# Patient Record
Sex: FEMALE | Race: WHITE | Employment: OTHER | URBAN - METROPOLITAN AREA
[De-identification: names, ages, dates, MRNs, and addresses within clinical notes are randomized per-mention and may not be internally consistent; named-entity substitution may affect disease eponyms.]

---

## 2023-07-05 LAB
ALBUMIN (G/DL) IN SER/PLAS: 3.9 G/DL (ref 3.4–5)
ANION GAP IN SER/PLAS: 14 MMOL/L (ref 10–20)
CALCIUM (MG/DL) IN SER/PLAS: 8.9 MG/DL (ref 8.6–10.3)
CARBON DIOXIDE, TOTAL (MMOL/L) IN SER/PLAS: 29 MMOL/L (ref 21–32)
CHLORIDE (MMOL/L) IN SER/PLAS: 97 MMOL/L (ref 98–107)
CREATININE (MG/DL) IN SER/PLAS: 1.11 MG/DL (ref 0.5–1.05)
GFR FEMALE: 47 ML/MIN/1.73M2
GLUCOSE (MG/DL) IN SER/PLAS: 89 MG/DL (ref 74–99)
NATRIURETIC PEPTIDE B (PG/ML) IN SER/PLAS: 215 PG/ML (ref 0–99)
PHOSPHATE (MG/DL) IN SER/PLAS: 3.9 MG/DL (ref 2.5–4.9)
POTASSIUM (MMOL/L) IN SER/PLAS: 4 MMOL/L (ref 3.5–5.3)
SODIUM (MMOL/L) IN SER/PLAS: 136 MMOL/L (ref 136–145)
UREA NITROGEN (MG/DL) IN SER/PLAS: 49 MG/DL (ref 6–23)

## 2023-08-25 LAB
6-ACETYLMORPHINE: <25 NG/ML
7-AMINOCLONAZEPAM: <25 NG/ML
ALPHA-HYDROXYALPRAZOLAM: <25 NG/ML
ALPHA-HYDROXYMIDAZOLAM: <25 NG/ML
ALPRAZOLAM: <25 NG/ML
AMPHETAMINE (PRESENCE) IN URINE BY SCREEN METHOD: ABNORMAL
BARBITURATES PRESENCE IN URINE BY SCREEN METHOD: ABNORMAL
CANNABINOIDS IN URINE BY SCREEN METHOD: ABNORMAL
CHLORDIAZEPOXIDE: <25 NG/ML
CLONAZEPAM: <25 NG/ML
COCAINE (PRESENCE) IN URINE BY SCREEN METHOD: ABNORMAL
CODEINE: <50 NG/ML
CREATINE, URINE FOR DRUG: 30.2 MG/DL
DIAZEPAM: <25 NG/ML
DRUG SCREEN COMMENT URINE: ABNORMAL
EDDP: <25 NG/ML
FENTANYL CONFIRMATION, URINE: <2.5 NG/ML
HYDROCODONE: <25 NG/ML
HYDROMORPHONE: <25 NG/ML
LORAZEPAM: <25 NG/ML
METHADONE CONFIRMATION,URINE: <25 NG/ML
MIDAZOLAM: <25 NG/ML
MORPHINE URINE: <50 NG/ML
NORDIAZEPAM: <25 NG/ML
NORFENTANYL: <2.5 NG/ML
NORHYDROCODONE: <25 NG/ML
NOROXYCODONE: <25 NG/ML
O-DESMETHYLTRAMADOL: >1000 NG/ML
OXAZEPAM: <25 NG/ML
OXYCODONE: <25 NG/ML
OXYMORPHONE: <25 NG/ML
PHENCYCLIDINE (PRESENCE) IN URINE BY SCREEN METHOD: ABNORMAL
TEMAZEPAM: <25 NG/ML
TRAMADOL: >1000 NG/ML
ZOLPIDEM METABOLITE (ZCA): <25 NG/ML
ZOLPIDEM: <25 NG/ML

## 2023-09-18 PROBLEM — M15.9 GENERALIZED OSTEOARTHROSIS, INVOLVING MULTIPLE SITES: Status: ACTIVE | Noted: 2023-09-18

## 2023-09-18 PROBLEM — E78.5 HYPERLIPIDEMIA: Status: ACTIVE | Noted: 2023-09-18

## 2023-09-18 PROBLEM — R07.89 CHEST PRESSURE: Status: ACTIVE | Noted: 2023-09-18

## 2023-09-18 PROBLEM — J44.9 COPD (CHRONIC OBSTRUCTIVE PULMONARY DISEASE) (MULTI): Status: ACTIVE | Noted: 2023-09-18

## 2023-09-18 PROBLEM — M86.9: Status: ACTIVE | Noted: 2023-09-18

## 2023-09-18 PROBLEM — I35.1 AORTIC VALVE REGURGITATION: Status: ACTIVE | Noted: 2023-09-18

## 2023-09-18 PROBLEM — I35.0 AORTIC VALVE STENOSIS: Status: ACTIVE | Noted: 2023-09-18

## 2023-09-18 PROBLEM — F41.9 ANXIETY: Status: ACTIVE | Noted: 2023-09-18

## 2023-09-18 PROBLEM — I10 HYPERTENSION: Status: ACTIVE | Noted: 2023-09-18

## 2023-09-18 PROBLEM — I50.9 CHF (CONGESTIVE HEART FAILURE) (MULTI): Status: ACTIVE | Noted: 2023-09-18

## 2023-09-18 PROBLEM — R06.00 DYSPNEA: Status: ACTIVE | Noted: 2023-09-18

## 2023-09-18 PROBLEM — I50.30 DIASTOLIC HEART FAILURE (MULTI): Status: ACTIVE | Noted: 2023-09-18

## 2023-09-18 PROBLEM — R09.02 HYPOXEMIA: Status: ACTIVE | Noted: 2023-09-18

## 2023-09-18 PROBLEM — M81.0 OSTEOPOROSIS: Status: ACTIVE | Noted: 2023-09-18

## 2023-09-18 PROBLEM — D86.9 SARCOIDOSIS: Status: ACTIVE | Noted: 2023-09-18

## 2023-09-18 PROBLEM — G62.9 PERIPHERAL NEUROPATHY: Status: ACTIVE | Noted: 2023-09-18

## 2023-09-18 PROBLEM — Z95.0 PACEMAKER: Status: ACTIVE | Noted: 2023-09-18

## 2023-09-18 PROBLEM — R07.9 CHEST PAIN: Status: ACTIVE | Noted: 2023-09-18

## 2023-09-18 PROBLEM — M00.9: Status: ACTIVE | Noted: 2023-09-18

## 2023-09-18 PROBLEM — R10.9 ABDOMINAL PAIN: Status: ACTIVE | Noted: 2023-09-18

## 2023-09-18 PROBLEM — M10.9 GOUT: Status: ACTIVE | Noted: 2023-09-18

## 2023-09-18 PROBLEM — I27.20 PULMONARY HYPERTENSION (MULTI): Status: ACTIVE | Noted: 2023-09-18

## 2023-09-18 PROBLEM — G89.29 CHRONIC PAIN: Status: ACTIVE | Noted: 2023-09-18

## 2023-09-18 PROBLEM — I25.10 CORONARY ARTERIOSCLEROSIS: Status: ACTIVE | Noted: 2023-09-18

## 2023-09-18 PROBLEM — K21.9 GERD (GASTROESOPHAGEAL REFLUX DISEASE): Status: ACTIVE | Noted: 2023-09-18

## 2023-09-18 PROBLEM — E53.8 VITAMIN B12 DEFICIENCY WITHOUT ANEMIA: Status: ACTIVE | Noted: 2023-09-18

## 2023-09-18 PROBLEM — F41.1 GAD (GENERALIZED ANXIETY DISORDER): Status: ACTIVE | Noted: 2023-09-18

## 2023-09-18 PROBLEM — J45.909 ASTHMA (HHS-HCC): Status: ACTIVE | Noted: 2023-09-18

## 2023-09-18 PROBLEM — I50.30 (HFPEF) HEART FAILURE WITH PRESERVED EJECTION FRACTION (MULTI): Status: ACTIVE | Noted: 2023-09-18

## 2023-09-18 PROBLEM — I42.2 HYPERTROPHIC CARDIOMYOPATHY (MULTI): Status: ACTIVE | Noted: 2023-09-18

## 2023-09-18 PROBLEM — D64.9 ANEMIA: Status: ACTIVE | Noted: 2023-09-18

## 2023-09-18 PROBLEM — E11.9 DIABETES MELLITUS (MULTI): Status: ACTIVE | Noted: 2023-09-18

## 2023-09-18 PROBLEM — E87.6 HYPOKALEMIA: Status: ACTIVE | Noted: 2023-09-18

## 2023-09-19 PROBLEM — G47.00 INSOMNIA: Status: ACTIVE | Noted: 2023-09-19

## 2023-09-19 PROBLEM — T84.54XA INFECTION OF PROSTHETIC LEFT KNEE JOINT (CMS-HCC): Status: ACTIVE | Noted: 2023-09-19

## 2023-09-19 PROBLEM — H11.32 SUBCONJUNCTIVAL HEMORRHAGE OF LEFT EYE: Status: ACTIVE | Noted: 2023-09-19

## 2023-09-19 PROBLEM — I49.5 TACHY-BRADY SYNDROME (MULTI): Status: ACTIVE | Noted: 2023-09-19

## 2023-09-19 PROBLEM — H61.21 IMPACTED CERUMEN OF RIGHT EAR: Status: ACTIVE | Noted: 2023-09-19

## 2023-09-19 PROBLEM — K59.00 CONSTIPATION: Status: ACTIVE | Noted: 2023-09-19

## 2023-09-19 PROBLEM — M79.671 PAIN OF RIGHT HEEL: Status: ACTIVE | Noted: 2023-09-19

## 2023-09-19 PROBLEM — H91.90 HEARING LOSS: Status: ACTIVE | Noted: 2023-09-19

## 2023-09-19 PROBLEM — M16.12 OSTEOARTHRITIS OF LEFT HIP: Status: ACTIVE | Noted: 2023-09-19

## 2023-09-19 PROBLEM — I44.7 LBBB (LEFT BUNDLE BRANCH BLOCK): Status: ACTIVE | Noted: 2023-09-19

## 2023-09-19 PROBLEM — R60.0 BILATERAL LOWER EXTREMITY EDEMA: Status: ACTIVE | Noted: 2023-09-19

## 2023-09-19 PROBLEM — Z74.09 MOBILITY IMPAIRED: Status: ACTIVE | Noted: 2023-09-19

## 2023-09-19 PROBLEM — E61.1 IRON DEFICIENCY: Status: ACTIVE | Noted: 2023-09-19

## 2023-09-19 PROBLEM — I44.2 COMPLETE HEART BLOCK (MULTI): Status: ACTIVE | Noted: 2023-09-19

## 2023-09-19 RX ORDER — NALOXONE HYDROCHLORIDE 4 MG/.1ML
4 SPRAY NASAL AS NEEDED
COMMUNITY

## 2023-09-19 RX ORDER — METOPROLOL TARTRATE 25 MG/1
1 TABLET, FILM COATED ORAL 2 TIMES DAILY
COMMUNITY
Start: 2022-01-10

## 2023-09-19 RX ORDER — LIDOCAINE 50 MG/G
1 PATCH TOPICAL NIGHTLY
Status: ON HOLD | COMMUNITY
End: 2024-02-17 | Stop reason: ALTCHOICE

## 2023-09-19 RX ORDER — PANTOPRAZOLE SODIUM 20 MG/1
1 TABLET, DELAYED RELEASE ORAL DAILY
COMMUNITY
Start: 2022-05-04 | End: 2023-10-23

## 2023-09-19 RX ORDER — ALLOPURINOL 100 MG/1
1 TABLET ORAL DAILY
COMMUNITY
Start: 2022-07-17 | End: 2023-11-06 | Stop reason: SDUPTHER

## 2023-09-19 RX ORDER — LACTULOSE 10 G/15ML
15-30 SOLUTION ORAL DAILY PRN
Status: ON HOLD | COMMUNITY
End: 2024-02-17 | Stop reason: ALTCHOICE

## 2023-09-19 RX ORDER — METHOCARBAMOL 500 MG/1
500 TABLET, FILM COATED ORAL 2 TIMES DAILY PRN
COMMUNITY
End: 2023-11-06 | Stop reason: ALTCHOICE

## 2023-09-19 RX ORDER — GABAPENTIN 100 MG/1
100 CAPSULE ORAL 2 TIMES DAILY
COMMUNITY
End: 2023-11-06 | Stop reason: SDUPTHER

## 2023-09-19 RX ORDER — POTASSIUM CHLORIDE 750 MG/1
TABLET, FILM COATED, EXTENDED RELEASE ORAL DAILY
COMMUNITY
Start: 2022-06-03 | End: 2023-11-06 | Stop reason: ALTCHOICE

## 2023-09-19 RX ORDER — FERROUS SULFATE 325(65) MG
65 TABLET, DELAYED RELEASE (ENTERIC COATED) ORAL DAILY
COMMUNITY
End: 2023-11-06 | Stop reason: SDUPTHER

## 2023-09-19 RX ORDER — CYANOCOBALAMIN 1000 UG/ML
1000 INJECTION, SOLUTION INTRAMUSCULAR; SUBCUTANEOUS
COMMUNITY
Start: 2021-11-09 | End: 2023-10-17

## 2023-09-19 RX ORDER — FLUTICASONE FUROATE AND VILANTEROL 200; 25 UG/1; UG/1
1 POWDER RESPIRATORY (INHALATION) DAILY
COMMUNITY
Start: 2022-07-07 | End: 2023-11-06 | Stop reason: SDUPTHER

## 2023-09-19 RX ORDER — ASPIRIN 81 MG
100 TABLET, DELAYED RELEASE (ENTERIC COATED) ORAL 2 TIMES DAILY PRN
Status: ON HOLD | COMMUNITY
End: 2024-02-17 | Stop reason: ALTCHOICE

## 2023-09-19 RX ORDER — SPIRONOLACTONE 25 MG/1
12.5 TABLET ORAL DAILY
COMMUNITY
End: 2023-11-02 | Stop reason: SDUPTHER

## 2023-09-19 RX ORDER — IPRATROPIUM BROMIDE AND ALBUTEROL SULFATE 2.5; .5 MG/3ML; MG/3ML
3 SOLUTION RESPIRATORY (INHALATION) 2 TIMES DAILY
Status: ON HOLD | COMMUNITY
End: 2024-02-17 | Stop reason: SDUPTHER

## 2023-09-19 RX ORDER — CEPHALEXIN 500 MG/1
500 CAPSULE ORAL EVERY 12 HOURS
COMMUNITY
Start: 2021-10-07 | End: 2024-03-21

## 2023-09-19 RX ORDER — GABAPENTIN 100 MG/1
1 CAPSULE ORAL 3 TIMES DAILY
COMMUNITY
End: 2023-11-06 | Stop reason: DRUGHIGH

## 2023-09-19 RX ORDER — HYDROXYZINE HYDROCHLORIDE 25 MG/1
25 TABLET, FILM COATED ORAL EVERY 8 HOURS PRN
COMMUNITY
End: 2023-11-06 | Stop reason: ALTCHOICE

## 2023-09-19 RX ORDER — IPRATROPIUM BROMIDE AND ALBUTEROL SULFATE 2.5; .5 MG/3ML; MG/3ML
3 SOLUTION RESPIRATORY (INHALATION) EVERY 4 HOURS
COMMUNITY
Start: 2022-07-18

## 2023-09-19 RX ORDER — TORSEMIDE 20 MG/1
TABLET ORAL DAILY
COMMUNITY
Start: 2022-06-03

## 2023-09-19 RX ORDER — BISACODYL 5 MG
10 TABLET, DELAYED RELEASE (ENTERIC COATED) ORAL NIGHTLY
Status: ON HOLD | COMMUNITY
End: 2024-02-17 | Stop reason: ALTCHOICE

## 2023-09-19 RX ORDER — DULOXETIN HYDROCHLORIDE 30 MG/1
30 CAPSULE, DELAYED RELEASE ORAL DAILY
COMMUNITY
End: 2024-03-22

## 2023-09-19 RX ORDER — ALENDRONATE SODIUM 35 MG/1
35 TABLET ORAL
COMMUNITY
Start: 2022-03-09 | End: 2024-01-17 | Stop reason: SDUPTHER

## 2023-09-19 RX ORDER — ATORVASTATIN CALCIUM 20 MG/1
1 TABLET, FILM COATED ORAL NIGHTLY
COMMUNITY
Start: 2022-03-03 | End: 2023-10-06 | Stop reason: SDUPTHER

## 2023-09-19 RX ORDER — TRAMADOL HYDROCHLORIDE 50 MG/1
1-2 TABLET ORAL 2 TIMES DAILY PRN
COMMUNITY
End: 2023-11-06 | Stop reason: SDUPTHER

## 2023-09-26 PROBLEM — M62.838 MUSCLE SPASM: Status: ACTIVE | Noted: 2023-09-26

## 2023-10-06 DIAGNOSIS — E78.5 HYPERLIPIDEMIA, UNSPECIFIED HYPERLIPIDEMIA TYPE: Primary | ICD-10-CM

## 2023-10-06 DIAGNOSIS — I50.32 CHRONIC HEART FAILURE WITH PRESERVED EJECTION FRACTION (MULTI): Primary | ICD-10-CM

## 2023-10-06 RX ORDER — ATORVASTATIN CALCIUM 20 MG/1
20 TABLET, FILM COATED ORAL NIGHTLY
Qty: 90 TABLET | Refills: 0 | Status: SHIPPED | OUTPATIENT
Start: 2023-10-06 | End: 2024-01-05

## 2023-10-06 RX ORDER — EMPAGLIFLOZIN 10 MG/1
10 TABLET, FILM COATED ORAL DAILY
Qty: 30 TABLET | Refills: 6 | Status: SHIPPED | OUTPATIENT
Start: 2023-10-06 | End: 2023-11-06 | Stop reason: SDUPTHER

## 2023-10-17 DIAGNOSIS — E53.8 VITAMIN B12 DEFICIENCY WITHOUT ANEMIA: ICD-10-CM

## 2023-10-17 RX ORDER — CYANOCOBALAMIN 1000 UG/ML
1000 INJECTION INTRAMUSCULAR; SUBCUTANEOUS
Qty: 3 ML | Refills: 3 | Status: SHIPPED | OUTPATIENT
Start: 2023-10-17

## 2023-10-19 ENCOUNTER — HOSPITAL ENCOUNTER (OUTPATIENT)
Dept: CARDIOLOGY | Facility: CLINIC | Age: 88
Discharge: HOME | End: 2023-10-19
Payer: MEDICARE

## 2023-10-19 DIAGNOSIS — Z95.0 PACEMAKER: ICD-10-CM

## 2023-10-19 DIAGNOSIS — I44.2 CHB (COMPLETE HEART BLOCK) (MULTI): Primary | ICD-10-CM

## 2023-10-19 DIAGNOSIS — I44.2 CHB (COMPLETE HEART BLOCK) (MULTI): ICD-10-CM

## 2023-10-19 PROCEDURE — 93290 INTERROG DEV EVAL ICPMS IP: CPT | Performed by: INTERNAL MEDICINE

## 2023-10-19 PROCEDURE — 93280 PM DEVICE PROGR EVAL DUAL: CPT

## 2023-10-19 PROCEDURE — 93280 PM DEVICE PROGR EVAL DUAL: CPT | Performed by: INTERNAL MEDICINE

## 2023-10-22 DIAGNOSIS — K21.9 GASTROESOPHAGEAL REFLUX DISEASE, UNSPECIFIED WHETHER ESOPHAGITIS PRESENT: ICD-10-CM

## 2023-10-23 RX ORDER — PANTOPRAZOLE SODIUM 20 MG/1
20 TABLET, DELAYED RELEASE ORAL DAILY
Qty: 90 TABLET | Refills: 3 | Status: SHIPPED | OUTPATIENT
Start: 2023-10-23

## 2023-11-02 DIAGNOSIS — I50.30 HEART FAILURE WITH PRESERVED LEFT VENTRICULAR FUNCTION (HFPEF) (MULTI): ICD-10-CM

## 2023-11-06 ENCOUNTER — OFFICE VISIT (OUTPATIENT)
Dept: GERIATRIC MEDICINE | Facility: CLINIC | Age: 88
End: 2023-11-06
Payer: MEDICARE

## 2023-11-06 DIAGNOSIS — G62.9 NEUROPATHY: ICD-10-CM

## 2023-11-06 DIAGNOSIS — J44.9 CHRONIC OBSTRUCTIVE PULMONARY DISEASE, UNSPECIFIED COPD TYPE (MULTI): ICD-10-CM

## 2023-11-06 DIAGNOSIS — D64.9 ANEMIA, UNSPECIFIED TYPE: ICD-10-CM

## 2023-11-06 DIAGNOSIS — G89.29 OTHER CHRONIC PAIN: ICD-10-CM

## 2023-11-06 DIAGNOSIS — I50.30 HEART FAILURE WITH PRESERVED EJECTION FRACTION, UNSPECIFIED HF CHRONICITY (MULTI): ICD-10-CM

## 2023-11-06 DIAGNOSIS — M10.9 GOUT, UNSPECIFIED CAUSE, UNSPECIFIED CHRONICITY, UNSPECIFIED SITE: ICD-10-CM

## 2023-11-06 DIAGNOSIS — I50.30 HEART FAILURE WITH PRESERVED LEFT VENTRICULAR FUNCTION (HFPEF) (MULTI): ICD-10-CM

## 2023-11-06 DIAGNOSIS — M16.12 OSTEOARTHRITIS OF LEFT HIP, UNSPECIFIED OSTEOARTHRITIS TYPE: Primary | ICD-10-CM

## 2023-11-06 DIAGNOSIS — G47.00 INSOMNIA, UNSPECIFIED TYPE: ICD-10-CM

## 2023-11-06 PROCEDURE — 1159F MED LIST DOCD IN RCRD: CPT | Performed by: NURSE PRACTITIONER

## 2023-11-06 PROCEDURE — 99349 HOME/RES VST EST MOD MDM 40: CPT | Performed by: NURSE PRACTITIONER

## 2023-11-06 PROCEDURE — 1125F AMNT PAIN NOTED PAIN PRSNT: CPT | Performed by: NURSE PRACTITIONER

## 2023-11-06 PROCEDURE — 1160F RVW MEDS BY RX/DR IN RCRD: CPT | Performed by: NURSE PRACTITIONER

## 2023-11-06 PROCEDURE — 3075F SYST BP GE 130 - 139MM HG: CPT | Performed by: NURSE PRACTITIONER

## 2023-11-06 PROCEDURE — 1036F TOBACCO NON-USER: CPT | Performed by: NURSE PRACTITIONER

## 2023-11-06 PROCEDURE — 3078F DIAST BP <80 MM HG: CPT | Performed by: NURSE PRACTITIONER

## 2023-11-06 RX ORDER — ALLOPURINOL 100 MG/1
100 TABLET ORAL DAILY
Qty: 90 TABLET | Refills: 3 | Status: SHIPPED | OUTPATIENT
Start: 2023-11-06

## 2023-11-06 RX ORDER — GABAPENTIN 100 MG/1
100 CAPSULE ORAL 2 TIMES DAILY
Qty: 180 CAPSULE | Refills: 3 | Status: SHIPPED | OUTPATIENT
Start: 2023-11-06

## 2023-11-06 RX ORDER — TRAMADOL HYDROCHLORIDE 50 MG/1
50 TABLET ORAL 3 TIMES DAILY PRN
Qty: 180 TABLET | Refills: 1 | Status: SHIPPED | OUTPATIENT
Start: 2023-11-06 | End: 2024-03-22

## 2023-11-06 RX ORDER — SPIRONOLACTONE 25 MG/1
12.5 TABLET ORAL DAILY
Qty: 45 TABLET | Refills: 3 | Status: SHIPPED | OUTPATIENT
Start: 2023-11-06

## 2023-11-06 RX ORDER — FLUTICASONE FUROATE AND VILANTEROL 100; 25 UG/1; UG/1
1 POWDER RESPIRATORY (INHALATION) DAILY
Qty: 3 EACH | Refills: 3 | Status: SHIPPED | OUTPATIENT
Start: 2023-11-06 | End: 2023-11-06 | Stop reason: ALTCHOICE

## 2023-11-06 RX ORDER — FERROUS SULFATE 325(65) MG
65 TABLET ORAL
Qty: 90 TABLET | Refills: 3 | Status: SHIPPED | OUTPATIENT
Start: 2023-11-06 | End: 2024-11-05

## 2023-11-06 RX ORDER — SPIRONOLACTONE 25 MG/1
12.5 TABLET ORAL DAILY
Qty: 90 TABLET | Refills: 3 | Status: SHIPPED | OUTPATIENT
Start: 2023-11-06 | End: 2023-11-06 | Stop reason: SDUPTHER

## 2023-11-06 RX ORDER — ALLOPURINOL 100 MG/1
100 TABLET ORAL DAILY
COMMUNITY
End: 2023-11-06 | Stop reason: SDUPTHER

## 2023-11-06 RX ORDER — ACETAMINOPHEN, DIPHENHYDRAMINE HCL, PHENYLEPHRINE HCL 325; 25; 5 MG/1; MG/1; MG/1
1 TABLET ORAL NIGHTLY PRN
Status: ON HOLD | COMMUNITY
End: 2024-02-17 | Stop reason: ALTCHOICE

## 2023-11-06 ASSESSMENT — ENCOUNTER SYMPTOMS
OCCASIONAL FEELINGS OF UNSTEADINESS: 0
DEPRESSION: 0
LOSS OF SENSATION IN FEET: 0

## 2023-11-06 ASSESSMENT — PAIN SCALES - GENERAL: PAINLEVEL: 6

## 2023-11-06 NOTE — PROGRESS NOTES
"Reason for visit:  HFpEF refill Spironolactone     Controlled Substance Agreement  on 2022 - will bring to next visit     Statement: Mrs. 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     Reason for visit: Leaving her home requires a considerable and taxing amount of effort, and maximum resources due to inability to walk.    HPI: Mrs. Dumont is being seen today in her home- today for follow up HFpEF for management of chronic active illnesses .    NP received a med refill request for Spironolactone.  Needed to make sure patient is not taking potassium supplements.  She states she does not- she took them the last few days when she ran out of Spironolactone.  Her son, Praveen was present for a portion of the visit and states this is true, although he feels that sometimes his mother mixes the medications up. Yet again, she becomes very upset and is able to tell you what she takes accurately.  Never runs out of meds early.     Both state that the pain in the left hip is poorly controlled, but patient never increased her Tramadol dosage to the maximum of 50 mg 2 tabs tid prn . Awakens her at night.  Rates 6/10 at the time of visit.  States the nerve pain is better controlled.  Current pain is described as \"aching, terrible \".      Requesting refills on several medications (noted)      Interval History: Patient has not been " hospitalized previously.   Current Diet: Regular-low sodium.   Appetite: good.   Food Consistency: Regular.   Liquids Consistency: thin.   Gait/Mobility: Manual wheelchair.   Bathing: needs assistance.   Dressing: performs independently.   Toileting: dependent.   Feeding: performs independently.   Personal Hygiene: performs independently.   Bowels: continent.   Bladder: incontinent.   Managing Finances: dependent, Praveen.   Shopping: dependent.   Managing Medications: performs independently.   Housework / Basic Home Maintenance: dependent.   Laundry: dependent.   Preparing Meals: dependent.    Falls Risk Screening:. NILAY has not fallen in the last 6 months. Her fall did not result in injury.   Home safety risk factors: none.   Advance directives:. Patient has living will. Patient has healthcare POA: Praveen.   Patient's DNR Status: DNR-CC Arrest. There is a form signed in the home.   Controlled Substance Monitoring Medication #1 Medication Name: Tramadol, Pill Count: Yes and Recent Lab Results: 8/22/2023 confirms use of medication.   Signs of Misuse: no.   Signs of Abuse: no.   Signs of Diversion: no.      Review of Systems  -Pain in left hip   -no chest pain or sob  -no fever or chills  -no diarrhea or constipation  -no dizziness  -Awakened in the middle of the night with pain in left hip- eases up with sitting upright  -no falls  -appetite is very good and drinks plenty of fluids  Physical Exam    Constitutional   General appearance: Alert, cooperative and in no acute distress.   Head and Face Examination/ inspection of hair and scalp: Normal.   Eyes   Inspection of eyes: Sclera and conjunctiva were normal.    Pupil exam: LILLIAN. Extraocular movements were intact.   Ears, Nose, Mouth, and Throat   Ears: Normal.    Oropharynx: Normal with moist mucus membranes, tongue midline, no PND, no erythema or enlargement of tonsils.    Hearing: Normal.     Lips, teeth, and gums: Normal.   Neck   Neck Exam: Appearance of the  neck was normal. No neck masses observed. No jugular vein distension.   Pulmonary   Respiratory assessment: No respiratory distress, normal respiratory rhythm and effort.    Auscultation of Lungs: Clear bilateral breath sounds. No rales, rhonchi or wheezes.   Cardiovascular   Auscultation of heart: Abnormal.  The heart rate was normal. A grade 2 systolic murmur was heard at the LLSB.    Pedal pulses: Regular rate. 2+ bilaterally.    Examination of extremities for edema and/or varicosities: Abnormal.  trace bilateral pedal edema. + pacemaker.   Chest   Chest: Symmetrical and normal appearance.   Abdomen   Abdominal Exam: No bruits normal bowel sounds, soft, non-tender, no abdominal masses palpated.    Liver and Spleen exam: No hepato-splenomegaly. wearing a diaper.   Genitourinary   Bladder: Normal on palpation. wearing a diaper.   Lymphatic   Palpation of lymph nodes in axillae: Normal.     Palpation of lymph nodes in neck: No cervical lymphadenopathy.    Palpation of lymph nodes in other areas: Normal.    Musculoskeletal   Digits and nails: Abnormal.     Inspection/palpation of joints: No joint swelling seen.    Range of motion: Abnormal.     Muscle strength/tone: Normal.  decrease ROM to left leg/hip. + Facial grimacing with ROM and repositioning in chair   Skin   Skin inspection: Skin dry, warm and intact. Normal skin color and pigmentation, normal skin turgor and no visible rash and no visible signs of pressure sores.   Neurologic   Cranial nerves: Nerves 2-12 were intact, no focal neuro defects.   Psychiatric   Judgment and insight: Intact and appropriate.    Orientation: Oriented to person, place, and time.    Mood and affect: Normal.    Recent and remote memory: Normal.                   [facility]  Outside Information  Results 4/13/2021   XR HIP BILAT 5V PEL/AP/LAT EACH HIP (Order [id number])      suggestion  Information displayed in this report may not trend or trigger automated decision support.      XR HIP BILAT 5V PEL/AP/LAT EACH HIP  Order: [id number]  Impression    IMPRESSION:    RIGHT TOTAL HIP PROSTHESIS WITHOUT EVIDENCE OF COMPLICATION.    SEVERE DEGENERATIVE CHANGES OF LEFT HIP.          : PSCB    Transcribe Date/Time: Apr 13 2021  1:52P    Dictated by : CHARISSA SHEEHAN MD    This examination was interpreted and the report reviewed and  electronically signed by:  CHARISSA SHEEHAN MD on Apr 13 2021  1:54PM  EST  Narrative    * * *Final Report* * *    DATE OF EXAM: Apr 13 2021 11:36AM      WH[facility]   [id number]  -  XR HIP JAMIE 5V PEL+ AP/LAT EA HIP  / ACCESSION #  [id number]    PROCEDURE REASON: multiple diagnoses        * * * * Physician Interpretation * * * *     HISTORY: 88-YEAR-OLD FEMALE WITH   Hip pain, bilateral Hip pain,  bilateral  .  PT STS SHE FELL A FEW WEEKS AGO / PAIN IN THE RIGHT HIP /  HIP WAS DONE ABOUT 11 YRS AGO    TECHNIQUE: XR HIP JAMIE 5V PEL+ AP/LAT EA HIP     Laterality:  BILATERAL     Number of different views (projections): 3 EACH    COMPARISON: None    RESULT:  Right hip: Status post cementless right total hip prosthesis in  near-anatomic alignment with no evidence of loosening, fracture or  dislocation.    Left hip: Severe superior joint space during with bone-on-bone contact.    Collar marginal osteophytes.  Symphysis pubis is intact.  Degenerative  changes sacral iliac joint and the visualized lumbar spine.  Exam End: 04/13/21 11:36    Specimen Collected: 04/13/21 11:36 Last Resulted: 04/13/21 13:56   Received From: [facility]  Result Received: 10/19/23 11:00       1. Osteoarthritis of left hip, unspecified osteoarthritis type  -patient reports excruciating pain in left hip.  Son confirms  -h/o of severe DJD , bone on bone contact  -Patient is not maximizing Tramadol dosage  -Change to Tramadol 50 mg 2- tablets TID prn along with Tylenol  mg -2 tablets tid prn  -continue topical pain cream as needed  - traMADol (Ultram) 50 mg tablet; Take 1 tablet (50 mg)  by mouth 3 times a day as needed for moderate pain (4 - 6) or severe pain (7 - 10) (pain). Take 2 tablets by mouth three times a day as needed at least 6-8  hours a part.  Dispense: 180 tablet; Refill: 1  -Oarrs report received and reviewd- no discrepancies  -Bring Controlled Substance Agreement to next visit ( renewal due)   -Urine for Opiate Compliance confirmed use of medication 8/2023.    2. Other chronic pain  Continue   - gabapentin (Neurontin) 100 mg capsule; Take 1 capsule (100 mg) by mouth 2 times a for neuropathy (nerve pain)   and Duloxetine 30 mg daily.         3. Heart failure with preserved left ventricular function (HFpEF) (CMS/[facility])    - spironolactone (Aldactone) 25 mg tablet; Take 0.5 tablets (12.5 mg) by mouth once daily. STOP TAKING POTASSIUM  Dispense: 45 tablet; Refill: 3  - empagliflozin (Jardiance) 10 mg; Take 1 tablet (10 mg) by mouth once daily.  Dispense: 90 tablet; Refill: 3  -Continue     4. Gout, unspecified cause, unspecified chronicity, unspecified site  -controlled, continue  - allopurinol (Zyloprim) 100 mg tablet; Take 1 tablet (100 mg) by mouth once daily.  Dispense: 90 tablet; Refill: 3    5. Anemia, unspecified type  -stable  - ferrous sulfate (FeroSuL) 325 (65 Fe) MG tablet; Take 1 tablet (65 mg of iron) by mouth once daily with a meal.  Dispense: 90 tablet; Refill: 3    6. Neuropathy  -improved , continue  - gabapentin (Neurontin) 100 mg capsule; Take 1 capsule (100 mg) by mouth 2 times a day. For neuropathy (nerve pain)  Dispense: 180 capsule; Refill: 3  -And Duloxetine  -This combination has worked best in relieving symptoms   -No s/s of adverse effects     7. Chronic obstructive pulmonary disease, unspecified COPD type (CMS/HCC)  Stable  Continue current medication regimen  Escript sent for refill of Breo Ellipta as requested    8. Insomnia, unspecified type  -sleep is interrupted due to pain   -control pain and follow up

## 2023-11-10 VITALS — SYSTOLIC BLOOD PRESSURE: 130 MMHG | HEART RATE: 70 BPM | DIASTOLIC BLOOD PRESSURE: 70 MMHG | RESPIRATION RATE: 20 BRPM

## 2023-11-10 NOTE — PATIENT INSTRUCTIONS
Dear Mrs. Dumont,    It was a pleasure seeing you today.    I am sorry that you are having so much pain in your left hip.     As we discussed, increase the Tramadol 50 mg - 2 tablets to three times a day as needed.    I have ordered the medication refills as you requested.  Let me know if you do not receive them.     I think your sleeping will improve if we can control the pain.     I will follow up with you in 4-6 weeks     Sincerely,  ADARSH Drake-BC

## 2023-12-22 NOTE — PROGRESS NOTES
Son called to give info for new pharmacy:  new pharmacy  Received: Yesterday  Ting Menon, APRN-CNP  Phone Number: [phone]     Hi    Son said pt pharmacy [facility] has closed and would like prescription to be sent to  [facility]  [address] [place] [address]  [phone]     (No requests for any meds were submitted at this time) Info noted  DB

## 2024-01-04 DIAGNOSIS — E78.5 HYPERLIPIDEMIA, UNSPECIFIED HYPERLIPIDEMIA TYPE: ICD-10-CM

## 2024-01-05 RX ORDER — ATORVASTATIN CALCIUM 20 MG/1
20 TABLET, FILM COATED ORAL NIGHTLY
Qty: 90 TABLET | Refills: 3 | Status: SHIPPED | OUTPATIENT
Start: 2024-01-05 | End: 2024-04-04

## 2024-01-16 ENCOUNTER — OFFICE VISIT (OUTPATIENT)
Dept: GERIATRIC MEDICINE | Facility: CLINIC | Age: 89
End: 2024-01-16
Payer: MEDICARE

## 2024-01-16 DIAGNOSIS — J45.909 ASTHMA, UNSPECIFIED ASTHMA SEVERITY, UNSPECIFIED WHETHER COMPLICATED, UNSPECIFIED WHETHER PERSISTENT (HHS-HCC): ICD-10-CM

## 2024-01-16 DIAGNOSIS — M62.838 MUSCLE SPASM: ICD-10-CM

## 2024-01-16 DIAGNOSIS — G89.29 OTHER CHRONIC PAIN: ICD-10-CM

## 2024-01-16 DIAGNOSIS — M81.0 OSTEOPOROSIS WITHOUT CURRENT PATHOLOGICAL FRACTURE, UNSPECIFIED OSTEOPOROSIS TYPE: Primary | ICD-10-CM

## 2024-01-16 PROCEDURE — 1159F MED LIST DOCD IN RCRD: CPT | Performed by: NURSE PRACTITIONER

## 2024-01-16 PROCEDURE — 99349 HOME/RES VST EST MOD MDM 40: CPT | Performed by: NURSE PRACTITIONER

## 2024-01-16 PROCEDURE — 1125F AMNT PAIN NOTED PAIN PRSNT: CPT | Performed by: NURSE PRACTITIONER

## 2024-01-16 PROCEDURE — 3078F DIAST BP <80 MM HG: CPT | Performed by: NURSE PRACTITIONER

## 2024-01-16 PROCEDURE — 1160F RVW MEDS BY RX/DR IN RCRD: CPT | Performed by: NURSE PRACTITIONER

## 2024-01-16 PROCEDURE — 1036F TOBACCO NON-USER: CPT | Performed by: NURSE PRACTITIONER

## 2024-01-16 PROCEDURE — 3077F SYST BP >= 140 MM HG: CPT | Performed by: NURSE PRACTITIONER

## 2024-01-16 ASSESSMENT — PAIN SCALES - GENERAL: PAINLEVEL: 6

## 2024-01-17 VITALS — HEART RATE: 60 BPM | SYSTOLIC BLOOD PRESSURE: 142 MMHG | DIASTOLIC BLOOD PRESSURE: 60 MMHG

## 2024-01-17 RX ORDER — ALENDRONATE SODIUM 35 MG/1
35 TABLET ORAL
Qty: 12 TABLET | Refills: 3 | Status: SHIPPED | OUTPATIENT
Start: 2024-01-17 | End: 2025-01-16

## 2024-01-17 RX ORDER — FLUTICASONE FUROATE AND VILANTEROL 200; 25 UG/1; UG/1
1 POWDER RESPIRATORY (INHALATION) DAILY
Qty: 3 EACH | Refills: 3 | Status: SHIPPED | OUTPATIENT
Start: 2024-01-17

## 2024-01-17 RX ORDER — BACLOFEN 5 MG/1
TABLET ORAL
Qty: 60 TABLET | Refills: 3 | Status: SHIPPED | OUTPATIENT
Start: 2024-01-17

## 2024-01-17 NOTE — PROGRESS NOTES
"Some elements may have been copied from prior note(s). The elements have been updated and reflect current evaluation, examination and decision making from today.          Reason for visit:  Follow up for pain in left hip and management for chronic active illnesses.      Statement: Mrs. 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      Reason for visit: Leaving her home requires a considerable and taxing amount of effort, and maximum resources due to inability to walk.     HPI: Mrs. Dumont is being seen today in her home- today for follow up for pain in left hip.   Continues to awaken her at night (supine) or if she is sitting in chair and leans back ( recline)  She still has not increased her Tramadol to 2 tabs tid prn- takes one tab with 2 Tylenol ES.   She does take Methocarbamol 500 mg bid ( will discontinue as it is not working). She reports the combination  Of Duloxetine 30 mg daily ( also prescribed for chronic pain ) and Gabapentin 100 mg bid \"has helped the neuropathy a lot. I would like to continue this\".     She is so happy that she is able to do more.  The other day she mopped the floor (sitting in wheelchair), but later experienced terrible low back pain.  She also has been washing dishes from wheelchair   She loves being able to do things for herself    Also, states she needs the 200 mcg dosing of the " Breo.          Interval History: Patient has not been hospitalized previously.   Current Diet: Regular-low sodium.   Appetite: good.   Food Consistency: Regular.   Liquids Consistency: thin.   Gait/Mobility: Manual wheelchair.   Bathing: needs assistance.   Dressing: performs independently.   Toileting: dependent.   Feeding: performs independently.   Personal Hygiene: performs independently.   Bowels: continent.   Bladder: incontinent.   Managing Finances: dependent, Praveen.   Shopping: dependent.   Managing Medications: performs independently.   Housework / Basic Home Maintenance: dependent.   Laundry: dependent.   Preparing Meals: dependent.    Falls Risk Screening:. NILAY has not fallen in the last 6 months. Her fall did not result in injury.   Home safety risk factors: none.   Advance directives:. Patient has living will. Patient has healthcare POA: Praveen.   Patient's DNR Status: DNR-CC Arrest. There is a form signed in the home.   Controlled Substance Monitoring Medication #1 Medication Name: Tramadol, Pill Count: Yes and Recent Lab Results: 8/22/2023 confirms use of medication.   Signs of Misuse: no.   Signs of Abuse: no.   Signs of Diversion: no.      ROS:  Appetite is good   Sleeps well  Moving bowels well  No falls/ED visits or hospitalizations  No cough, cold or flu-like symptoms  No dysuria  No chest pain or sob  No headaches     Physical Exam     Constitutional   General appearance: Alert, cooperative and in no acute distress.   Head and Face Examination/ inspection of hair and scalp: Normal.   Eyes   Inspection of eyes: Sclera and conjunctiva were normal.    Pupil exam: LILLIAN. Extraocular movements were intact.   Ears, Nose, Mouth, and Throat   Ears: Normal.    Oropharynx: Normal with moist mucus membranes, tongue midline, no PND, no erythema or enlargement of tonsils.    Hearing: Slightly [place]      Lips, teeth, and gums: Normal.   Neck   Neck Exam: Appearance of the neck was normal. No neck masses  observed. No jugular vein distension.   Pulmonary   Respiratory assessment: No respiratory distress, normal respiratory rhythm and effort.    Auscultation of Lungs: Clear bilateral breath sounds. No rales, rhonchi or wheezes.   Cardiovascular   Auscultation of heart: Abnormal.  The heart rate was normal. A grade 2 systolic murmur was heard at the LLSB.    Pedal pulses: Regular rate. 2+ bilaterally.    Examination of extremities for edema and/or varicosities: Abnormal.  trace bilateral pedal edema. + pacemaker.   Chest   Chest: Symmetrical and normal appearance.   Abdomen   Abdominal Exam: No bruits normal bowel sounds, soft, non-tender, no abdominal masses palpated.    Liver and Spleen exam: No hepato-splenomegaly. wearing a diaper.   Genitourinary   Bladder: Normal on palpation. wearing a diaper.   Lymphatic   Palpation of lymph nodes in axillae: Normal.     Palpation of lymph nodes in neck: No cervical lymphadenopathy.    Palpation of lymph nodes in other areas: Normal.    Musculoskeletal   Digits and nails: Abnormal.     Inspection/palpation of joints: No joint swelling seen.    Range of motion: Abnormal.     Muscle strength/tone: Normal.  decrease ROM to left leg/hip. + Facial grimacing with ROM and repositioning in chair -unchanged  No CVA tenderness   Skin   Skin inspection: Skin dry (particularly to BLEs) , warm and intact. Normal skin color and pigmentation, normal skin turgor and no visible rash and no visible signs of pressure sores.   Neurologic   Cranial nerves: Nerves 2-12 were intact, no focal neuro defects, except for mild hearing deficit   Psychiatric   Judgment and insight: Intact and appropriate.    Orientation: Oriented to person, place, and time.    Mood and affect: Normal.    Recent and remote memory: Normal.        Chemistry    Lab Results   Component Value Date/Time     07/05/2023 1347    K 4.0 07/05/2023 1347    CL 97 (L) 07/05/2023 1347    CO2 29 07/05/2023 1347    BUN 49 (H)  "07/05/2023 1347    CREATININE 1.11 (H) 07/05/2023 1347    Lab Results   Component Value Date/Time    CALCIUM 8.9 07/05/2023 1347    ALKPHOS 62 02/16/2023 1527    AST 24 02/16/2023 1527    ALT 17 02/16/2023 1527    BILITOT 0.4 02/16/2023 1527        Lab Results   Component Value Date    WBC 7.8 02/16/2023    HGB 11.0 (L) 02/16/2023    HCT 35.7 (L) 02/16/2023    MCV 90 02/16/2023     02/16/2023     Lab Results   Component Value Date    CHOL 128 09/09/2022     Lab Results   Component Value Date    HDL 43.2 09/09/2022     No results found for: \"LDLCALC\"  Lab Results   Component Value Date    TRIG 149 09/09/2022     No components found for: \"CHOLHDL\"    Lab Results   Component Value Date    HGBA1C 5.4 09/09/2022      Contains abnormal data OPIATE/OPIOID/BENZO PRESCRIPTION COMPLIANCE  Order: [id number]  Status: Final result       Visible to patient: No (inaccessible in [facility])    0 Result Notes      Component  Ref Range & Units 5 mo ago   DRUG SCREEN COMMENT URINE SEE BELOW   Comment: Drug screen results are presumptive and should not be used to assess  compliance with prescribed medication. Definitive confirmatory drug testing  has been added to this sample for any positive screen result and will be  reported separately.  .  Toxicology screening results are reported qualitatively. The concentration  must be greater than or equal to the cutoff to be reported as positive. The  concentration at which the screening test can detect an individual drug or  metabolite varies. The absence of expected drug(s) and/or drug metabolite(s)  may indicate non-compliance, inappropriate timing of specimen collection  relative to drug administration, poor drug absorption, diluted/adulterated  urine, or limitations of testing. For medical purposes only; not valid for  forensic use.  .  Interpretive questions should be directed to the laboratory medical  directors.     Creatine, Urine  mg/dL 30.2   Comment: A urine creatinine result " >= 20 mg/dL is considered valid without suspicion  of dilution. Samples with results below this range will automatically reflex  to specific gravity testing to verify specimen integrity.   Amphetamine Screen, Urine  NEGATIVE PRESUMPTIVE NEGATIVE   Comment:  CUTOFF LEVEL:  500 NG/ML   Cross-reactivity has been reported with high concentrations   of the following drugs: buproprion, chloroquine, chlorpromazine,   ephedrine, mephentermine, fenfluramine, phentermine,   phenylpropanolamine, pseudoephedrine, and propranolol.   Barbiturate Screen, Urine  NEGATIVE PRESUMPTIVE NEGATIVE   Comment:  CUTOFF LEVEL: 200 NG/ML   Cannabinoid Screen, Urine  NEGATIVE PRESUMPTIVE NEGATIVE   Comment:  CUTOFF LEVEL: 50 NG/ML   Cocaine Screen, Urine  NEGATIVE PRESUMPTIVE NEGATIVE   Comment:  CUTOFF LEVEL: 150 NG/ML   PCP Screen, Urine  NEGATIVE PRESUMPTIVE NEGATIVE   Comment:  CUTOFF LEVEL:  25 NG/ML   Cross-reactivity has been reported with dextromethorphan.   7-Aminoclonazepam  Cutoff <25 ng/mL <25   Alpha-Hydroxyalprazolam  Cutoff <25 ng/mL <25   Alpha-Hydroxymidazolam  Cutoff <25 ng/mL <25   Alprazolam  Cutoff <25 ng/mL <25   Chlordiazepoxide  Cutoff <25 ng/mL <25   Clonazepam  Cutoff <25 ng/mL <25   Diazepam  Cutoff <25 ng/mL <25   Lorazepam  Cutoff <25 ng/mL <25   Midazolam  Cutoff <25 ng/mL <25   Nordiazepam  Cutoff <25 ng/mL <25   Oxazepam  Cutoff <25 ng/mL <25   Temazepam  Cutoff <25 ng/mL <25   Comment:  The performance characteristics of the Benzodiazepine Confirmation,   Urine has been validated by the individual  laboratory site   where testing is performed. It has not been cleared or approved   by the FDA. However the FDA has determined that such clearance   or approval is not necessary. Our Laboratory is certified under   the Clinical Laboratory Improvement Amendments of 1988 (CLIA)   as qualified to perform high complexity clinical laboratory testing.   Zolpidem  Cutoff <25 ng/mL <25   Zolpidem Metabolite (ZCA)  Cutoff  <25 ng/mL <25   Comment:  The performance characteristics of the Zolpidem Confirmation,   Urine has been validated by the individual  laboratory site   where testing is performed. It has not been cleared or approved   by the FDA. However the FDA has determined that such clearance   or approval is not necessary. Our Laboratory is certified under   the Clinical Laboratory Improvement Amendments of 1988 (CLIA)   as qualified to perform high complexity clinical laboratory testing.   6-Acetylmorphine  Cutoff <25 ng/mL <25   Codeine  Cutoff <50 ng/mL <50   Hydrocodone  Cutoff <25 ng/mL <25   Hydromorphone  Cutoff <25 ng/mL <25   Morphine Urine  Cutoff <50 ng/mL <50   Norhydrocodone  Cutoff <25 ng/mL <25   Noroxycodone  Cutoff <25 ng/mL <25   Oxycodone  Cutoff <25 ng/mL <25   Oxymorphone  Cutoff <25 ng/mL <25   Comment:  The performance characteristics of the Opiate Confirmation,   Urine has been validated by the individual  laboratory site   where testing is performed. It has not been cleared or approved   by the FDA. However the FDA has determined that such clearance   or approval is not necessary. Our Laboratory is certified under   the Clinical Laboratory Improvement Amendments of 1988 (CLIA)   as qualified to perform high complexity clinical laboratory testing.   Tramadol  Cutoff <50 ng/mL >1000 Abnormal    Comment: Consistent with use of a drug containing tramadol, such as Ultram.   O-Desmethyltramadol  Cutoff <50 ng/mL >1000 Abnormal    Comment: Tramadol metabolite; consistent with use of a drug containing tramadol, such  as Ultram.   The performance characteristics of the Tramadol Confirmation, Urine   has been validated by the individual  laboratory site where   testing is performed. It has not been cleared or approved by the   FDA. However the FDA has determined that such clearance or approval   is not necessary. Our Laboratory is certified under the Clinical   Laboratory Improvement Amendments of 1988  (CLIA) as qualified   to perform high complexity clinical laboratory testing.   Fentanyl  Cutoff<2.5 ng/mL <2.5   Norfentanyl  Cutoff<2.5 ng/mL <2.5   Comment:  The performance characteristics of the Fentanyl Confirmation,   Urine has been validated by the individual  laboratory site   where testing is performed. It has not been cleared or approved   by the FDA. However the FDA has determined that such clearance   or approval is not necessary. Our Laboratory is certified under   the Clinical Laboratory Improvement Amendments of 1988 (CLIA)   as qualified to perform high complexity clinical laboratory testing.   METHADONE CONFIRMATION,URINE  Cutoff <25 ng/mL <25   EDDP  Cutoff <25 ng/mL <25   Comment:  The performance characteristics of the Methadone Confirmation,   Urine has been validated by the individual  laboratory site   where testing is performed. It has not been cleared or approved   by the FDA. However the FDA has determined that such clearance   or approval is not necessary. Our Laboratory is certified under   the Clinical Laboratory Improvement Amendments of 1988 (CLIA)   as qualified to perform high complexity clinical laboratory testing.   Resulting Agency [facility]              Specimen Collected: 08/22/23 14:00 Last Resulted: 08/25/23 19:57        Lab Flowsheet        Order Details        View Encounter        Lab and Collection Details        Routing        Result History     View All Conversations on this Encounter           Result Care Coordination      Patient Communication     Add Comments   Not seen Back to Top           Result Report    OPIATE/OPIOID/BENZO PRESCRIPTION COMPLIANCE (Order #[id number]) on 8/22/23     Lab Component SmartPhrase Guide    OPIATE/OPIOID/BENZO PRESCRIPTION COMPLIANCE (Order #[id number]) on 8/22/23       1. Osteoporosis without current pathological fracture, unspecified osteoporosis type/Chronic Pain in left hip    - alendronate (Fosamax) 35 mg tablet; Take 1 tablet (35 mg)  by mouth every 7 days.  Dispense: 12 tablet; Refill: 3  -Continue Tramadol - still has not increased to 2 tablets tid prn- may do so    2. Asthma, unspecified asthma severity, unspecified whether complicated, unspecified whether persistent    - fluticasone furoate-vilanteroL (Breo Ellipta) 200-25 mcg/dose inhaler; Inhale 1 puff once daily. Rinse mouth after each use  Dispense: 3 each; Refill: 3    3. Muscle spasm    - baclofen (Lioresal) 5 mg tablet; Take one tablet every night for spasm in left hip. If needed, may take one tablet during the day. STOP METHOCARBAMOL -due to ineffective-  Dispense: 60 tablet; Refill: 3  -Verbal and written communication provided at the time of visit  -Patient verbalizes a clear understanding      Stable  Routine follow up in in 4-6 weeks

## 2024-01-20 NOTE — PATIENT INSTRUCTIONS
Dear Mrs. Dumont,    It was a pleasure seeing you today.    I will follow up with you in 4-6 weeks    Sincerely,    Miladys Menon, MSN, APRN-BC

## 2024-02-16 ENCOUNTER — HOSPITAL ENCOUNTER (INPATIENT)
Facility: HOSPITAL | Age: 89
LOS: 1 days | Discharge: HOME | DRG: 309 | End: 2024-02-17
Attending: EMERGENCY MEDICINE | Admitting: INTERNAL MEDICINE
Payer: MEDICARE

## 2024-02-16 ENCOUNTER — CLINICAL SUPPORT (OUTPATIENT)
Dept: EMERGENCY MEDICINE | Facility: HOSPITAL | Age: 89
DRG: 309 | End: 2024-02-16
Payer: MEDICARE

## 2024-02-16 ENCOUNTER — APPOINTMENT (OUTPATIENT)
Dept: RADIOLOGY | Facility: HOSPITAL | Age: 89
DRG: 309 | End: 2024-02-16
Payer: MEDICARE

## 2024-02-16 ENCOUNTER — APPOINTMENT (OUTPATIENT)
Dept: CARDIOLOGY | Facility: HOSPITAL | Age: 89
DRG: 309 | End: 2024-02-16
Payer: MEDICARE

## 2024-02-16 DIAGNOSIS — R07.9 CHEST PAIN, UNSPECIFIED TYPE: Primary | ICD-10-CM

## 2024-02-16 DIAGNOSIS — R07.89 MUSCULAR CHEST PAIN: ICD-10-CM

## 2024-02-16 DIAGNOSIS — Z95.0 PACEMAKER: ICD-10-CM

## 2024-02-16 DIAGNOSIS — I44.2 COMPLETE HEART BLOCK (MULTI): ICD-10-CM

## 2024-02-16 DIAGNOSIS — I35.0 AORTIC VALVE STENOSIS, ETIOLOGY OF CARDIAC VALVE DISEASE UNSPECIFIED: ICD-10-CM

## 2024-02-16 LAB
ALBUMIN SERPL BCP-MCNC: 3.9 G/DL (ref 3.4–5)
ALP SERPL-CCNC: 68 U/L (ref 33–136)
ALT SERPL W P-5'-P-CCNC: 13 U/L (ref 7–45)
ANION GAP BLDV CALCULATED.4IONS-SCNC: 11 MMOL/L (ref 10–25)
ANION GAP SERPL CALC-SCNC: 16 MMOL/L (ref 10–20)
APPEARANCE UR: CLEAR
AST SERPL W P-5'-P-CCNC: 18 U/L (ref 9–39)
BASE EXCESS BLDV CALC-SCNC: 5.3 MMOL/L (ref -2–3)
BASOPHILS # BLD AUTO: 0.01 X10*3/UL (ref 0–0.1)
BASOPHILS NFR BLD AUTO: 0.1 %
BILIRUB SERPL-MCNC: 0.4 MG/DL (ref 0–1.2)
BILIRUB UR STRIP.AUTO-MCNC: NEGATIVE MG/DL
BNP SERPL-MCNC: 232 PG/ML (ref 0–99)
BODY TEMPERATURE: 37 DEGREES CELSIUS
BUN SERPL-MCNC: 64 MG/DL (ref 6–23)
CA-I BLDV-SCNC: 1.16 MMOL/L (ref 1.1–1.33)
CALCIUM SERPL-MCNC: 9.6 MG/DL (ref 8.6–10.6)
CARDIAC TROPONIN I PNL SERPL HS: 21 NG/L (ref 0–34)
CARDIAC TROPONIN I PNL SERPL HS: 25 NG/L (ref 0–34)
CHLORIDE BLDV-SCNC: 99 MMOL/L (ref 98–107)
CHLORIDE SERPL-SCNC: 100 MMOL/L (ref 98–107)
CO2 SERPL-SCNC: 28 MMOL/L (ref 21–32)
COLOR UR: ABNORMAL
CREAT SERPL-MCNC: 1.46 MG/DL (ref 0.5–1.05)
EGFRCR SERPLBLD CKD-EPI 2021: 34 ML/MIN/1.73M*2
EOSINOPHIL # BLD AUTO: 0.16 X10*3/UL (ref 0–0.4)
EOSINOPHIL NFR BLD AUTO: 2.4 %
ERYTHROCYTE [DISTWIDTH] IN BLOOD BY AUTOMATED COUNT: 15.3 % (ref 11.5–14.5)
FLUAV RNA RESP QL NAA+PROBE: NOT DETECTED
FLUBV RNA RESP QL NAA+PROBE: NOT DETECTED
GLUCOSE BLDV-MCNC: 141 MG/DL (ref 74–99)
GLUCOSE SERPL-MCNC: 130 MG/DL (ref 74–99)
GLUCOSE UR STRIP.AUTO-MCNC: ABNORMAL MG/DL
HCO3 BLDV-SCNC: 31.8 MMOL/L (ref 22–26)
HCT VFR BLD AUTO: 34.1 % (ref 36–46)
HCT VFR BLD EST: 34 % (ref 36–46)
HGB BLD-MCNC: 11 G/DL (ref 12–16)
HGB BLDV-MCNC: 11.4 G/DL (ref 12–16)
HOLD SPECIMEN: NORMAL
IMM GRANULOCYTES # BLD AUTO: 0.03 X10*3/UL (ref 0–0.5)
IMM GRANULOCYTES NFR BLD AUTO: 0.4 % (ref 0–0.9)
INHALED O2 CONCENTRATION: 21 %
KETONES UR STRIP.AUTO-MCNC: NEGATIVE MG/DL
LACTATE BLDV-SCNC: 2.3 MMOL/L (ref 0.4–2)
LEUKOCYTE ESTERASE UR QL STRIP.AUTO: NEGATIVE
LYMPHOCYTES # BLD AUTO: 0.64 X10*3/UL (ref 0.8–3)
LYMPHOCYTES NFR BLD AUTO: 9.5 %
MAGNESIUM SERPL-MCNC: 2.6 MG/DL (ref 1.6–2.4)
MCH RBC QN AUTO: 29.3 PG (ref 26–34)
MCHC RBC AUTO-ENTMCNC: 32.3 G/DL (ref 32–36)
MCV RBC AUTO: 91 FL (ref 80–100)
MONOCYTES # BLD AUTO: 0.61 X10*3/UL (ref 0.05–0.8)
MONOCYTES NFR BLD AUTO: 9 %
NEUTROPHILS # BLD AUTO: 5.3 X10*3/UL (ref 1.6–5.5)
NEUTROPHILS NFR BLD AUTO: 78.6 %
NITRITE UR QL STRIP.AUTO: NEGATIVE
NRBC BLD-RTO: 0 /100 WBCS (ref 0–0)
OXYHGB MFR BLDV: 54.5 % (ref 45–75)
PCO2 BLDV: 55 MM HG (ref 41–51)
PH BLDV: 7.37 PH (ref 7.33–7.43)
PH UR STRIP.AUTO: 6.5 [PH]
PLATELET # BLD AUTO: 216 X10*3/UL (ref 150–450)
PO2 BLDV: 34 MM HG (ref 35–45)
POTASSIUM BLDV-SCNC: 4.8 MMOL/L (ref 3.5–5.3)
POTASSIUM SERPL-SCNC: 4.6 MMOL/L (ref 3.5–5.3)
PROT SERPL-MCNC: 7.2 G/DL (ref 6.4–8.2)
PROT UR STRIP.AUTO-MCNC: NEGATIVE MG/DL
RBC # BLD AUTO: 3.75 X10*6/UL (ref 4–5.2)
RBC # UR STRIP.AUTO: NEGATIVE /UL
SAO2 % BLDV: 56 % (ref 45–75)
SARS-COV-2 RNA RESP QL NAA+PROBE: NOT DETECTED
SODIUM BLDV-SCNC: 137 MMOL/L (ref 136–145)
SODIUM SERPL-SCNC: 139 MMOL/L (ref 136–145)
SP GR UR STRIP.AUTO: 1.01
UROBILINOGEN UR STRIP.AUTO-MCNC: NORMAL MG/DL
WBC # BLD AUTO: 6.8 X10*3/UL (ref 4.4–11.3)

## 2024-02-16 PROCEDURE — 71045 X-RAY EXAM CHEST 1 VIEW: CPT

## 2024-02-16 PROCEDURE — 93005 ELECTROCARDIOGRAM TRACING: CPT

## 2024-02-16 PROCEDURE — 76604 US EXAM CHEST: CPT | Performed by: STUDENT IN AN ORGANIZED HEALTH CARE EDUCATION/TRAINING PROGRAM

## 2024-02-16 PROCEDURE — 85025 COMPLETE CBC W/AUTO DIFF WBC: CPT

## 2024-02-16 PROCEDURE — 71250 CT THORAX DX C-: CPT | Mod: FOREIGN READ | Performed by: RADIOLOGY

## 2024-02-16 PROCEDURE — 83735 ASSAY OF MAGNESIUM: CPT

## 2024-02-16 PROCEDURE — 1100000001 HC PRIVATE ROOM DAILY

## 2024-02-16 PROCEDURE — 99285 EMERGENCY DEPT VISIT HI MDM: CPT | Performed by: EMERGENCY MEDICINE

## 2024-02-16 PROCEDURE — 93280 PM DEVICE PROGR EVAL DUAL: CPT | Performed by: INTERNAL MEDICINE

## 2024-02-16 PROCEDURE — 36415 COLL VENOUS BLD VENIPUNCTURE: CPT

## 2024-02-16 PROCEDURE — 83880 ASSAY OF NATRIURETIC PEPTIDE: CPT

## 2024-02-16 PROCEDURE — 71250 CT THORAX DX C-: CPT

## 2024-02-16 PROCEDURE — 84484 ASSAY OF TROPONIN QUANT: CPT

## 2024-02-16 PROCEDURE — 87636 SARSCOV2 & INF A&B AMP PRB: CPT

## 2024-02-16 PROCEDURE — 93308 TTE F-UP OR LMTD: CPT | Performed by: STUDENT IN AN ORGANIZED HEALTH CARE EDUCATION/TRAINING PROGRAM

## 2024-02-16 PROCEDURE — 93290 INTERROG DEV EVAL ICPMS IP: CPT | Performed by: INTERNAL MEDICINE

## 2024-02-16 PROCEDURE — 93280 PM DEVICE PROGR EVAL DUAL: CPT

## 2024-02-16 PROCEDURE — 71045 X-RAY EXAM CHEST 1 VIEW: CPT | Mod: FOREIGN READ | Performed by: RADIOLOGY

## 2024-02-16 PROCEDURE — 93010 ELECTROCARDIOGRAM REPORT: CPT | Performed by: EMERGENCY MEDICINE

## 2024-02-16 PROCEDURE — 84132 ASSAY OF SERUM POTASSIUM: CPT

## 2024-02-16 PROCEDURE — 81003 URINALYSIS AUTO W/O SCOPE: CPT

## 2024-02-16 PROCEDURE — 99285 EMERGENCY DEPT VISIT HI MDM: CPT | Mod: 25 | Performed by: EMERGENCY MEDICINE

## 2024-02-16 RX ORDER — POLYETHYLENE GLYCOL 3350 17 G/17G
17 POWDER, FOR SOLUTION ORAL DAILY
Status: DISCONTINUED | OUTPATIENT
Start: 2024-02-17 | End: 2024-02-17 | Stop reason: HOSPADM

## 2024-02-16 RX ORDER — ENOXAPARIN SODIUM 100 MG/ML
30 INJECTION SUBCUTANEOUS EVERY 24 HOURS
Status: DISCONTINUED | OUTPATIENT
Start: 2024-02-16 | End: 2024-02-17 | Stop reason: HOSPADM

## 2024-02-16 RX ORDER — TRAMADOL HYDROCHLORIDE 50 MG/1
50 TABLET ORAL EVERY 12 HOURS PRN
Status: DISCONTINUED | OUTPATIENT
Start: 2024-02-16 | End: 2024-02-17 | Stop reason: HOSPADM

## 2024-02-16 SDOH — SOCIAL STABILITY: SOCIAL INSECURITY: ARE YOU OR HAVE YOU BEEN THREATENED OR ABUSED PHYSICALLY, EMOTIONALLY, OR SEXUALLY BY ANYONE?: NO

## 2024-02-16 SDOH — SOCIAL STABILITY: SOCIAL INSECURITY: DOES ANYONE TRY TO KEEP YOU FROM HAVING/CONTACTING OTHER FRIENDS OR DOING THINGS OUTSIDE YOUR HOME?: NO

## 2024-02-16 SDOH — SOCIAL STABILITY: SOCIAL INSECURITY: ARE THERE ANY APPARENT SIGNS OF INJURIES/BEHAVIORS THAT COULD BE RELATED TO ABUSE/NEGLECT?: NO

## 2024-02-16 SDOH — SOCIAL STABILITY: SOCIAL INSECURITY: HAVE YOU HAD THOUGHTS OF HARMING ANYONE ELSE?: YES

## 2024-02-16 SDOH — SOCIAL STABILITY: SOCIAL INSECURITY: DO YOU FEEL UNSAFE GOING BACK TO THE PLACE WHERE YOU ARE LIVING?: NO

## 2024-02-16 SDOH — SOCIAL STABILITY: SOCIAL INSECURITY: DO YOU FEEL ANYONE HAS EXPLOITED OR TAKEN ADVANTAGE OF YOU FINANCIALLY OR OF YOUR PERSONAL PROPERTY?: NO

## 2024-02-16 SDOH — SOCIAL STABILITY: SOCIAL INSECURITY: HAS ANYONE EVER THREATENED TO HURT YOUR FAMILY OR YOUR PETS?: NO

## 2024-02-16 SDOH — SOCIAL STABILITY: SOCIAL INSECURITY: ABUSE: ADULT

## 2024-02-16 ASSESSMENT — LIFESTYLE VARIABLES
SUBSTANCE_ABUSE_PAST_12_MONTHS: NO
AUDIT-C TOTAL SCORE: 0
HOW OFTEN DO YOU HAVE A DRINK CONTAINING ALCOHOL: NEVER
HAVE PEOPLE ANNOYED YOU BY CRITICIZING YOUR DRINKING: NO
HAVE YOU EVER FELT YOU SHOULD CUT DOWN ON YOUR DRINKING: NO
HOW MANY STANDARD DRINKS CONTAINING ALCOHOL DO YOU HAVE ON A TYPICAL DAY: PATIENT DOES NOT DRINK
PRESCIPTION_ABUSE_PAST_12_MONTHS: NO
EVER FELT BAD OR GUILTY ABOUT YOUR DRINKING: NO
EVER HAD A DRINK FIRST THING IN THE MORNING TO STEADY YOUR NERVES TO GET RID OF A HANGOVER: NO
HOW OFTEN DO YOU HAVE 6 OR MORE DRINKS ON ONE OCCASION: NEVER
SKIP TO QUESTIONS 9-10: 1
AUDIT-C TOTAL SCORE: 0

## 2024-02-16 ASSESSMENT — COGNITIVE AND FUNCTIONAL STATUS - GENERAL
STANDING UP FROM CHAIR USING ARMS: A LOT
TOILETING: A LITTLE
TURNING FROM BACK TO SIDE WHILE IN FLAT BAD: A LITTLE
PATIENT BASELINE BEDBOUND: NO
MOBILITY SCORE: 13
WALKING IN HOSPITAL ROOM: TOTAL
MOVING TO AND FROM BED TO CHAIR: A LITTLE
MOVING FROM LYING ON BACK TO SITTING ON SIDE OF FLAT BED WITH BEDRAILS: A LITTLE
DRESSING REGULAR LOWER BODY CLOTHING: A LITTLE
DAILY ACTIVITIY SCORE: 20
CLIMB 3 TO 5 STEPS WITH RAILING: TOTAL
DRESSING REGULAR UPPER BODY CLOTHING: A LITTLE
HELP NEEDED FOR BATHING: A LITTLE

## 2024-02-16 ASSESSMENT — PATIENT HEALTH QUESTIONNAIRE - PHQ9
1. LITTLE INTEREST OR PLEASURE IN DOING THINGS: NOT AT ALL
SUM OF ALL RESPONSES TO PHQ9 QUESTIONS 1 & 2: 0
2. FEELING DOWN, DEPRESSED OR HOPELESS: NOT AT ALL

## 2024-02-16 ASSESSMENT — PAIN SCALES - GENERAL
PAINLEVEL_OUTOF10: 3
PAINLEVEL_OUTOF10: 3

## 2024-02-16 ASSESSMENT — ACTIVITIES OF DAILY LIVING (ADL)
FEEDING YOURSELF: INDEPENDENT
TOILETING: NEEDS ASSISTANCE
GROOMING: INDEPENDENT
HEARING - LEFT EAR: FUNCTIONAL
ADEQUATE_TO_COMPLETE_ADL: YES
WALKS IN HOME: NEEDS ASSISTANCE
JUDGMENT_ADEQUATE_SAFELY_COMPLETE_DAILY_ACTIVITIES: YES
DRESSING YOURSELF: INDEPENDENT
PATIENT'S MEMORY ADEQUATE TO SAFELY COMPLETE DAILY ACTIVITIES?: YES
ASSISTIVE_DEVICE: WHEELCHAIR
HEARING - RIGHT EAR: FUNCTIONAL
BATHING: NEEDS ASSISTANCE

## 2024-02-16 ASSESSMENT — COLUMBIA-SUICIDE SEVERITY RATING SCALE - C-SSRS
2. HAVE YOU ACTUALLY HAD ANY THOUGHTS OF KILLING YOURSELF?: NO
1. IN THE PAST MONTH, HAVE YOU WISHED YOU WERE DEAD OR WISHED YOU COULD GO TO SLEEP AND NOT WAKE UP?: NO
6. HAVE YOU EVER DONE ANYTHING, STARTED TO DO ANYTHING, OR PREPARED TO DO ANYTHING TO END YOUR LIFE?: NO

## 2024-02-16 ASSESSMENT — PAIN - FUNCTIONAL ASSESSMENT: PAIN_FUNCTIONAL_ASSESSMENT: 0-10

## 2024-02-16 NOTE — ED PROCEDURE NOTE
Procedure    Performed by: Demian Quiroz MD  Authorized by: Gian Chandler MD  Cardiac Indications: chest pain                Procedure: Cardiac Ultrasound    Findings:   Views: parasternal long, parasternal short, apical four and subxiphoid  The pericardial space was visualized and was NEGATIVE for a significant pericardial effusion.  Activity: Ventricular contractions were visualized.  LV: LV systolic function was NORMAL.  RV: RV size was NORMAL.    Impression:  Cardiac: The focused cardiac ultrasound exam was NORMAL.  Procedure: Thoracic Ultrasound    Findings:  R Lung Sliding: The RIGHT chest was evaluated and LUNG SLIDING was visualized.  L Lung Sliding: The LEFT chest was evaluated and LUNG SLIDING was visualized.  R Effusion: The RIGHT chest was evaluated and there was NO PLEURAL EFFUSION.  L Effusion: The LEFT chest was evaluated and there was NO PLEURAL EFFUSION.  A-lines: The RIGHT chest was evaluated and multiple A-LINES were visualized  B-lines: The RIGHT chest was evaluated and there were NO B-LINES visualized  R Consolidation: The RIGHT chest was evaluated and there was NO RIGHT CONSOLIDATION.  L Consolidation: The LEFT chest was evaluated and there was NO LEFT CONSOLIDATION.    Impression:  Thorax: The focused thoracic ultrasound exam was NORMAL.    Comments: Cardiac ultrasound normal as per above criteria though enlarged LA and ventricular hypertrophy appreciated. Concern for diastolic dysfunction.               Demian Quiroz MD  02/16/24 2681

## 2024-02-16 NOTE — PROGRESS NOTES
Emergency Medicine Transition of Care Note.    I received Alayna Dumont in signout from Dr. Singer.  Please see the previous ED provider note for all HPI, PE and MDM up to the time of signout at 1500. This is in addition to the primary record.    In brief Alayna Dumont is an 89 y.o. female with a past medical history of CHF, aortic valve stenosis s/p repair, HTN, HLD, peripheral neuropathy, COPD/asthma/sarcoidosis, LETICIA, carotid vascular disease, and gout who presents to the ED for left-sided chest pain.  Patient's workup significant for unremarkable EKG and initial troponin, mild anemia of 11.0, negative viral testing, unremarkable UA, unremarkable BMP, and CXR that notes an enlarged heart with interstitial edema.    Chief Complaint   Patient presents with    Chest Pain     At the time of signout we were awaiting: Imaging and labs    ED Course as of 02/16/24 2123 Fri Feb 16, 2024   1424 Patient's pacemaker was unable to be identified on chest x-ray.  I called pacemaker rep who reports that she has an old [employer] pacemaker with [employer] leads that are not on conditional.  Rep is on his way to interrogate the pacemaker. [RR]   1426 BNP elevated but appears to be chronically so [RR]      ED Course User Index  [RR] Kaia Singer MD         Diagnoses as of 02/16/24 2123   Chest pain, unspecified type       Medical Decision Making  Repeat troponin was 25.  The previous provider consulted the pacemaker nurse to evaluate the patient's device.  Upon my review of the preliminary result from a cardiac device check, I did not note anything acute.  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  Concern for possible cardiac versus pacemaker etiology of the patient's pain.  Patient has a heart score of 4 given age and risk factors.  Discussed ED findings and admission with the patient.  Patient stated understanding and agreement with the plan.   Discussed patient presentation with admitting team.  Patient admitted to medicine in stable condition.    Final diagnoses:   [Z95.0] Pacemaker   [I44.2] Complete heart block (CMS/HCC)           Procedure  Procedures    Patient presentation and plan discussed with attending physician.    Nitin Rosales MD

## 2024-02-16 NOTE — ED TRIAGE NOTES
"Pt presents to the ED via EMS for the complaint of chest pain. Pt stats chest pain started last night and persisted into this morning. Pt has hx of valve replacement and pacemaker placed in 2013, HTN, and CHF (compliant with medications). Pt states \"I think my pacemaker is failing.\" Pt states pain is worse in arms with movement. Pt also c/o weakness and \"not feeling right.\" Pt neurologically intact, AxOx3.   "

## 2024-02-16 NOTE — ED PROVIDER NOTES
CC: Chest Pain     HPI:   Patient is a 89-year-old female with past medical history of hypertension, hyperlipidemia, chronic iron deficiency anemia, gout, AVR and pacemaker, diverticulosis presenting due to left-sided sharp chest pain that started 1 day ago.  Patient reports that she has pain over her pacemaker with point tenderness that worsens with abduction of her left arm.  Patient denies radiation of the pain to her back, upper neck or to her abdomen.  She has not lost consciousness or had any noted syncopal episodes.  She denies any blood in her stool and denies any nausea or vomiting.  Patient has good pulses in her upper and lower extremities and denies any history of dissection.  Patient denies any diaphoresis with her episodes and is unsure what her pacemaker brand is.  Patient denies any fevers or chills, weakness or numbness in her upper or lower extremities, changes to her medication regimen and is compliant with her medicines.  Patient denies any history of complications with her pacemaker and reports that it was placed in 2013.    Limitations to History: none  Additional History Obtained from: patient alone    PMHx/PSHx:  Per HPI.   - has no past medical history on file.  - has a past surgical history that includes Other surgical history (11/10/2022); Other surgical history (11/10/2022); Other surgical history (11/10/2022); and Other surgical history (11/10/2022).    Social History:  - Tobacco:  reports that she has never smoked. She has never used smokeless tobacco.   - Alcohol:  reports no history of alcohol use.   - Drugs:  reports no history of drug use.     Medications: Reviewed in EMR.     Allergies:  Patient has no known allergies.    ???????????????????????????????????????????????????????????????  Triage Vitals:  T 36.7 °C (98.1 °F)  HR 60  /53  RR 12  O2 98 % None (Room air)    Physical Exam  Vitals and nursing note reviewed.   Constitutional:       General: She is not in acute  distress.     Appearance: She is well-developed. She is obese.   HENT:      Head: Normocephalic and atraumatic.      Mouth/Throat:      Mouth: Mucous membranes are dry.      Pharynx: Oropharynx is clear.   Eyes:      Conjunctiva/sclera: Conjunctivae normal.   Cardiovascular:      Rate and Rhythm: Normal rate and regular rhythm.      Heart sounds: No murmur heard.     Comments: Tenderness over pacemaker worse with abduction of L arm  Pulmonary:      Effort: Pulmonary effort is normal. No respiratory distress.      Breath sounds: Normal breath sounds. No wheezing, rhonchi or rales.   Abdominal:      General: There is no distension.      Palpations: Abdomen is soft.      Tenderness: There is no abdominal tenderness. There is no guarding or rebound.   Musculoskeletal:         General: No swelling or tenderness.      Cervical back: Neck supple.   Skin:     General: Skin is warm and dry.      Capillary Refill: Capillary refill takes less than 2 seconds.   Neurological:      Mental Status: She is alert and oriented to person, place, and time.      Sensory: No sensory deficit.      Motor: No weakness.      Gait: Gait normal.   Psychiatric:         Mood and Affect: Mood normal.       ???????????????????????????????????????????????????????????????  EKG (per my interpretation):  Ventricular paced rhythm with a rate of 61.  No IL elongation with a QRS complex of 220 consistent with a paced rhythm.  No acute ST elevations or depressions noted when compared to previous EKGs.  No DONNA by sgarbosa criteria.    ED Course  ED Course as of 02/18/24 1219   Fri Feb 16, 2024 1424 Patient's pacemaker was unable to be identified on chest x-ray.  I called pacemaker rep who reports that she has an old [employer] pacemaker with [employer] leads that are not on conditional.  Rep is on his way to interrogate the pacemaker. [RR]   1426 BNP elevated but appears to be chronically so [RR]      ED Course User Index  [RR] Kaia Singer MD          Diagnoses as of 02/18/24 1219   Chest pain, unspecified type       Medical Decision Making:  Patient is a 89-year-old female with complex past medical history most significant for aortic valve replacement and pacemaker presenting due to acute left-sided chest pain for the past day.  Differentials considered but not limited to low battery on pacemaker, pacemaker malfunction, arrhythmia, electrolyte abnormality, ACS, dissection, pneumonia pneumonia, pneumothorax.    Based on patient's history physical examination initial story was concerning for possible dissection however patient remained stable and appeared to have focal tenderness over her pacemaker.  Workup was largely negative other than a mildly elevated creatinine at 1.4.  Patient's initial troponin was 25 and a BNP was 232.  Device rep was called to help interrogate pacemaker and patient has a [employer] leads with a [employer] pacemaker from 2013.  There was 1-1/2 years left on the battery when interrogated back in October 2023.  Patient is still pending the rest of her workup but will likely need admission for heart score 4.  Patient is pending CT chest  without contrast to evaluate for seroma vs abscess around pacemaker given lower concern for dissection over the course of ED stay. CT chest was ordered to patient care was handed off to oncoming ED provider in hemodynamically stable condition.    External records reviewed: recent inpatient, clinic, and prior ED notes  Diagnostic imaging independently reviewed/interpreted by me (as reflected in MDM) includes: cxr, non CT  Social Determinants Affecting Care: None identified  Discussion of management with other providers: attending  Prescription Drug Consideration: none  Escalation of Care: possible admission    Impression:   Chest pain  pacemaker    Disposition: Handoff      Procedures ? SmartLinks last updated 2/16/2024 3:19 PM     Kaia Singer  PGY-2 Emergency Medicine  [place]  [facility]     Kaia Singer MD  Resident  02/18/24 2135

## 2024-02-17 ENCOUNTER — APPOINTMENT (OUTPATIENT)
Dept: CARDIOLOGY | Facility: HOSPITAL | Age: 89
DRG: 309 | End: 2024-02-17
Payer: MEDICARE

## 2024-02-17 VITALS
TEMPERATURE: 97.7 F | HEIGHT: 63 IN | DIASTOLIC BLOOD PRESSURE: 47 MMHG | SYSTOLIC BLOOD PRESSURE: 159 MMHG | HEART RATE: 60 BPM | WEIGHT: 220.68 LBS | RESPIRATION RATE: 16 BRPM | OXYGEN SATURATION: 92 % | BODY MASS INDEX: 39.1 KG/M2

## 2024-02-17 LAB
ANION GAP SERPL CALC-SCNC: 14 MMOL/L (ref 10–20)
AORTIC VALVE MEAN GRADIENT: 22 MMHG
AORTIC VALVE PEAK VELOCITY: 3.18 M/S
ATRIAL RATE: 357 BPM
AV PEAK GRADIENT: 40.4 MMHG
AVA (PEAK VEL): 1.36 CM2
AVA (VTI): 1.35 CM2
BUN SERPL-MCNC: 54 MG/DL (ref 6–23)
CALCIUM SERPL-MCNC: 9.9 MG/DL (ref 8.6–10.6)
CHLORIDE SERPL-SCNC: 99 MMOL/L (ref 98–107)
CO2 SERPL-SCNC: 30 MMOL/L (ref 21–32)
CREAT SERPL-MCNC: 1.4 MG/DL (ref 0.5–1.05)
EGFRCR SERPLBLD CKD-EPI 2021: 36 ML/MIN/1.73M*2
EJECTION FRACTION APICAL 4 CHAMBER: 76.4
EJECTION FRACTION: 72 %
GLUCOSE SERPL-MCNC: 102 MG/DL (ref 74–99)
LEFT ATRIUM VOLUME AREA LENGTH INDEX BSA: 50.3 ML/M2
LEFT VENTRICLE INTERNAL DIMENSION DIASTOLE: 4.8 CM (ref 3.5–6)
LEFT VENTRICULAR OUTFLOW TRACT DIAMETER: 1.9 CM
P AXIS: 103 DEGREES
P OFFSET: 144 MS
P ONSET: 124 MS
POTASSIUM SERPL-SCNC: 4.7 MMOL/L (ref 3.5–5.3)
PR INTERVAL: 190 MS
Q ONSET: 172 MS
QRS COUNT: 10 BEATS
QRS DURATION: 218 MS
QT INTERVAL: 536 MS
QTC CALCULATION(BAZETT): 539 MS
QTC FREDERICIA: 538 MS
R AXIS: -75 DEGREES
SODIUM SERPL-SCNC: 138 MMOL/L (ref 136–145)
T AXIS: 110 DEGREES
T OFFSET: 440 MS
VENTRICULAR RATE: 61 BPM

## 2024-02-17 PROCEDURE — 36415 COLL VENOUS BLD VENIPUNCTURE: CPT | Performed by: STUDENT IN AN ORGANIZED HEALTH CARE EDUCATION/TRAINING PROGRAM

## 2024-02-17 PROCEDURE — 2500000002 HC RX 250 W HCPCS SELF ADMINISTERED DRUGS (ALT 637 FOR MEDICARE OP, ALT 636 FOR OP/ED): Mod: MUE | Performed by: STUDENT IN AN ORGANIZED HEALTH CARE EDUCATION/TRAINING PROGRAM

## 2024-02-17 PROCEDURE — 93325 DOPPLER ECHO COLOR FLOW MAPG: CPT

## 2024-02-17 PROCEDURE — 93308 TTE F-UP OR LMTD: CPT | Performed by: INTERNAL MEDICINE

## 2024-02-17 PROCEDURE — 2500000004 HC RX 250 GENERAL PHARMACY W/ HCPCS (ALT 636 FOR OP/ED): Performed by: STUDENT IN AN ORGANIZED HEALTH CARE EDUCATION/TRAINING PROGRAM

## 2024-02-17 PROCEDURE — 93325 DOPPLER ECHO COLOR FLOW MAPG: CPT | Performed by: INTERNAL MEDICINE

## 2024-02-17 PROCEDURE — 99221 1ST HOSP IP/OBS SF/LOW 40: CPT | Performed by: STUDENT IN AN ORGANIZED HEALTH CARE EDUCATION/TRAINING PROGRAM

## 2024-02-17 PROCEDURE — 80048 BASIC METABOLIC PNL TOTAL CA: CPT | Performed by: STUDENT IN AN ORGANIZED HEALTH CARE EDUCATION/TRAINING PROGRAM

## 2024-02-17 PROCEDURE — 2500000001 HC RX 250 WO HCPCS SELF ADMINISTERED DRUGS (ALT 637 FOR MEDICARE OP): Performed by: STUDENT IN AN ORGANIZED HEALTH CARE EDUCATION/TRAINING PROGRAM

## 2024-02-17 PROCEDURE — 94640 AIRWAY INHALATION TREATMENT: CPT

## 2024-02-17 PROCEDURE — 93321 DOPPLER ECHO F-UP/LMTD STD: CPT | Performed by: INTERNAL MEDICINE

## 2024-02-17 PROCEDURE — 99238 HOSP IP/OBS DSCHRG MGMT 30/<: CPT | Performed by: INTERNAL MEDICINE

## 2024-02-17 PROCEDURE — 2500000002 HC RX 250 W HCPCS SELF ADMINISTERED DRUGS (ALT 637 FOR MEDICARE OP, ALT 636 FOR OP/ED): Performed by: STUDENT IN AN ORGANIZED HEALTH CARE EDUCATION/TRAINING PROGRAM

## 2024-02-17 RX ORDER — VITAMIN E MIXED 400 UNIT
400 CAPSULE ORAL DAILY
COMMUNITY

## 2024-02-17 RX ORDER — DULOXETIN HYDROCHLORIDE 30 MG/1
30 CAPSULE, DELAYED RELEASE ORAL DAILY
Status: DISCONTINUED | OUTPATIENT
Start: 2024-02-17 | End: 2024-02-17 | Stop reason: HOSPADM

## 2024-02-17 RX ORDER — FLUTICASONE FUROATE AND VILANTEROL 200; 25 UG/1; UG/1
1 POWDER RESPIRATORY (INHALATION)
Status: DISCONTINUED | OUTPATIENT
Start: 2024-02-17 | End: 2024-02-17 | Stop reason: HOSPADM

## 2024-02-17 RX ORDER — ASCORBIC ACID 500 MG
500 TABLET ORAL DAILY
COMMUNITY

## 2024-02-17 RX ORDER — PANTOPRAZOLE SODIUM 20 MG/1
20 TABLET, DELAYED RELEASE ORAL
Status: DISCONTINUED | OUTPATIENT
Start: 2024-02-17 | End: 2024-02-17 | Stop reason: HOSPADM

## 2024-02-17 RX ORDER — ATORVASTATIN CALCIUM 20 MG/1
20 TABLET, FILM COATED ORAL NIGHTLY
Status: DISCONTINUED | OUTPATIENT
Start: 2024-02-17 | End: 2024-02-17 | Stop reason: HOSPADM

## 2024-02-17 RX ORDER — METOPROLOL TARTRATE 25 MG/1
25 TABLET, FILM COATED ORAL 2 TIMES DAILY
Status: DISCONTINUED | OUTPATIENT
Start: 2024-02-17 | End: 2024-02-17 | Stop reason: HOSPADM

## 2024-02-17 RX ORDER — CHOLECALCIFEROL (VITAMIN D3) 25 MCG
1000 TABLET ORAL DAILY
COMMUNITY

## 2024-02-17 RX ORDER — TORSEMIDE 20 MG/1
10 TABLET ORAL DAILY
Status: DISCONTINUED | OUTPATIENT
Start: 2024-02-17 | End: 2024-02-17 | Stop reason: HOSPADM

## 2024-02-17 RX ORDER — MULTIVIT-MINERALS/FOLIC ACID 120 MCG
2 TABLET,CHEWABLE ORAL NIGHTLY PRN
COMMUNITY

## 2024-02-17 RX ORDER — SPIRONOLACTONE 25 MG/1
12.5 TABLET ORAL DAILY
Status: DISCONTINUED | OUTPATIENT
Start: 2024-02-17 | End: 2024-02-17 | Stop reason: HOSPADM

## 2024-02-17 RX ORDER — BACLOFEN 10 MG/1
5 TABLET ORAL NIGHTLY PRN
Status: DISCONTINUED | OUTPATIENT
Start: 2024-02-17 | End: 2024-02-17 | Stop reason: HOSPADM

## 2024-02-17 RX ORDER — FERROUS SULFATE 325(65) MG
65 TABLET ORAL
Status: DISCONTINUED | OUTPATIENT
Start: 2024-02-17 | End: 2024-02-17 | Stop reason: HOSPADM

## 2024-02-17 RX ORDER — MULTIVIT-MIN/IRON FUM/FOLIC AC 7.5 MG-4
1 TABLET ORAL DAILY
COMMUNITY

## 2024-02-17 RX ORDER — ALLOPURINOL 100 MG/1
100 TABLET ORAL DAILY
Status: DISCONTINUED | OUTPATIENT
Start: 2024-02-17 | End: 2024-02-17 | Stop reason: HOSPADM

## 2024-02-17 RX ORDER — GABAPENTIN 100 MG/1
100 CAPSULE ORAL 2 TIMES DAILY
Status: DISCONTINUED | OUTPATIENT
Start: 2024-02-17 | End: 2024-02-17 | Stop reason: HOSPADM

## 2024-02-17 RX ADMIN — ALLOPURINOL 100 MG: 100 TABLET ORAL at 09:33

## 2024-02-17 RX ADMIN — EMPAGLIFLOZIN 10 MG: 10 TABLET, FILM COATED ORAL at 09:33

## 2024-02-17 RX ADMIN — TRAMADOL HYDROCHLORIDE 50 MG: 50 TABLET, COATED ORAL at 00:06

## 2024-02-17 RX ADMIN — PANTOPRAZOLE SODIUM 20 MG: 40 TABLET, DELAYED RELEASE ORAL at 06:01

## 2024-02-17 RX ADMIN — METOPROLOL TARTRATE 25 MG: 25 TABLET, FILM COATED ORAL at 09:33

## 2024-02-17 RX ADMIN — SPIRONOLACTONE 12.5 MG: 25 TABLET, FILM COATED ORAL at 09:33

## 2024-02-17 RX ADMIN — FERROUS SULFATE TAB 325 MG (65 MG ELEMENTAL FE) 1 TABLET: 325 (65 FE) TAB at 09:33

## 2024-02-17 RX ADMIN — PERFLUTREN 2 ML OF DILUTION: 6.52 INJECTION, SUSPENSION INTRAVENOUS at 13:58

## 2024-02-17 RX ADMIN — DULOXETINE HYDROCHLORIDE 30 MG: 30 CAPSULE, DELAYED RELEASE ORAL at 09:32

## 2024-02-17 RX ADMIN — TRAMADOL HYDROCHLORIDE 50 MG: 50 TABLET, COATED ORAL at 14:19

## 2024-02-17 RX ADMIN — SODIUM CHLORIDE, SODIUM LACTATE, POTASSIUM CHLORIDE, AND CALCIUM CHLORIDE 500 ML: 600; 310; 30; 20 INJECTION, SOLUTION INTRAVENOUS at 02:15

## 2024-02-17 RX ADMIN — FLUTICASONE FUROATE AND VILANTEROL TRIFENATATE 1 PUFF: 200; 25 POWDER RESPIRATORY (INHALATION) at 10:09

## 2024-02-17 RX ADMIN — TORSEMIDE 10 MG: 20 TABLET ORAL at 09:33

## 2024-02-17 RX ADMIN — GABAPENTIN 100 MG: 100 CAPSULE ORAL at 09:32

## 2024-02-17 ASSESSMENT — PAIN DESCRIPTION - ORIENTATION
ORIENTATION: LEFT
ORIENTATION: LEFT

## 2024-02-17 ASSESSMENT — PAIN SCALES - GENERAL
PAINLEVEL_OUTOF10: 10 - WORST POSSIBLE PAIN
PAINLEVEL_OUTOF10: 6
PAINLEVEL_OUTOF10: 6
PAINLEVEL_OUTOF10: 0 - NO PAIN
PAINLEVEL_OUTOF10: 2

## 2024-02-17 ASSESSMENT — PAIN DESCRIPTION - LOCATION
LOCATION: HIP
LOCATION: HIP

## 2024-02-17 ASSESSMENT — COGNITIVE AND FUNCTIONAL STATUS - GENERAL
WALKING IN HOSPITAL ROOM: TOTAL
MOBILITY SCORE: 12
CLIMB 3 TO 5 STEPS WITH RAILING: TOTAL
STANDING UP FROM CHAIR USING ARMS: A LOT
TURNING FROM BACK TO SIDE WHILE IN FLAT BAD: A LITTLE
MOVING FROM LYING ON BACK TO SITTING ON SIDE OF FLAT BED WITH BEDRAILS: A LITTLE
MOVING TO AND FROM BED TO CHAIR: A LOT

## 2024-02-17 ASSESSMENT — ACTIVITIES OF DAILY LIVING (ADL): LACK_OF_TRANSPORTATION: NO

## 2024-02-17 ASSESSMENT — PAIN SCALES - PAIN ASSESSMENT IN ADVANCED DEMENTIA (PAINAD)
CONSOLABILITY: NO NEED TO CONSOLE
BODYLANGUAGE: RELAXED
BREATHING: NORMAL

## 2024-02-17 ASSESSMENT — PAIN - FUNCTIONAL ASSESSMENT
PAIN_FUNCTIONAL_ASSESSMENT: 0-10

## 2024-02-17 ASSESSMENT — PAIN DESCRIPTION - DESCRIPTORS: DESCRIPTORS: SHOOTING;ACHING

## 2024-02-17 NOTE — CARE PLAN
The patient's goals for the shift include      The clinical goals for the shift include Pt will verbalize pain before it gets to a 6.    Over the shift, the patient did not make progress toward the following goals. Barriers to progression include . Recommendations to address these barriers include .

## 2024-02-17 NOTE — CARE PLAN
The patient's goals for the shift include  to rest    The clinical goals for the shift include Patient will have no complaints of chest pain during shift    Over the shift, the patient met goals

## 2024-02-17 NOTE — H&P
History Of Present Illness:    89 year old with past medical history significant for hypertension, hyperlipidemia, chronic anemia,iron deficiency, s/p status post knee replacement surgery, gout, anxiety, history of COPD,? sarcoidosis, diverticulosis, s/p AVR at [facility] (? year), s/p pacemaker (reportedly that this was needed after her valve surgery at the [facility]) who presents to the ED via EMS for the complaint of chest pain. Pt stats chest pain started last night.The pain is located at her L chest every time she moves her arm and takes a deep breath. Denies SOB or LE edema. Patient is non-ambulatory at baseline given her very severe osteoarthritis. She denies any fevers, chills or URI symptoms. Denies any ICD discharges.     Patient was admitted by ED team given chest pain and high HEART score of 4.  EKG av paced @60  Trops 21-->25, bnp 232    ED Work Up below:   Last Recorded Vitals:  Vitals:    02/16/24 1900 02/16/24 2000 02/16/24 2219 02/17/24 0002   BP: (!) 146/48 (!) 142/49 141/65 (!) 137/43   Pulse: 60 60 60 60   Resp: 10 10 16    Temp:   36.4 °C (97.5 °F) 36.6 °C (97.9 °F)   TempSrc:       SpO2: (!) 93% 100% 97% 95%   Weight:   100 kg (220 lb 10.9 oz)    Height:           Last Labs:  CBC - 2/16/2024: 12:28 PM  6.8 11.0 216    34.1      CMP - 2/16/2024: 12:28 PM  9.6 7.2 18 --- 0.4   3.9 3.9 13 68      PTT - No results in last year.  _   _ _     Troponin I, High Sensitivity   Date/Time Value Ref Range Status   02/16/2024 08:35 PM 25 0 - 34 ng/L Final   02/16/2024 12:28 PM 21 0 - 34 ng/L Final     BNP   Date/Time Value Ref Range Status   02/16/2024 12:28  (H) 0 - 99 pg/mL Final   07/05/2023 01:47  (H) 0 - 99 pg/mL Final     Comment:     .  <100 pg/mL - Heart failure unlikely  100-299 pg/mL - Intermediate probability of acute heart  .               failure exacerbation. Correlate with clinical  .               context and patient history.    >=300 pg/mL - Heart Failure likely. Correlate with  clinical  .               context and patient history.  BNP testing is performed using different testing   methodology at [facility] than at other   [facility]. Direct result comparisons should   only be made within the same method.     01/03/2023 02:28  (H) 0 - 99 pg/mL Final     Comment:     .  <100 pg/mL - Heart failure unlikely  100-299 pg/mL - Intermediate probability of acute heart  .               failure exacerbation. Correlate with clinical  .               context and patient history.    >=300 pg/mL - Heart Failure likely. Correlate with clinical  .               context and patient history.   Biotin interference may cause falsely decreased results.   Patients taking a Biotin dose of up to 5 mg/day should   refrain from taking Biotin for 24 hours before sample   collection. Providers may contact their local laboratory   for further information.       Hemoglobin A1C   Date/Time Value Ref Range Status   09/09/2022 05:36 PM 5.4 % Final     Comment:          Diagnosis of Diabetes-Adults   Non-Diabetic: < or = 5.6%   Increased risk for developing diabetes: 5.7-6.4%   Diagnostic of diabetes: > or = 6.5%  .       Monitoring of Diabetes                Age (y)     Therapeutic Goal (%)   Adults:          >18           <7.0   Pediatrics:    13-18           <7.5                   7-12           <8.0                   0- 6            7.5-8.5   American Diabetes Association. Diabetes Care 33(S1), Jan 2010.     01/08/2022 11:40 AM 5.9 (H) 4.3 - 5.6 % Final     Comment:     American Diabetes Association guidelines indicate that patients with HgbA1c in   the range 5.7-6.4% are at increased risk for development of diabetes, and   intervention by lifestyle modification may be beneficial. HgbA1c greater or   equal to 6.5% is considered diagnostic of diabetes.   09/02/2021 01:28 PM 5.6 4.3 - 5.6 % Final     Comment:     American Diabetes Association guidelines indicate that patients with HgbA1c in    "the range 5.7-6.4% are at increased risk for development of diabetes, and   intervention by lifestyle modification may be beneficial. HgbA1c greater or   equal to 6.5% is considered diagnostic of diabetes.     VLDL   Date/Time Value Ref Range Status   09/09/2022 05:36 PM 30 0 - 40 mg/dL Final      Last I/O:  No intake/output data recorded.    Past Cardiology Tests (Last 3 Years):  EKG:  No results found for this or any previous visit from the past 1095 days.    Echo:  No results found for this or any previous visit from the past 1095 days.    Ejection Fractions:  No results found for: \"EF\"  Cath:  No results found for this or any previous visit from the past 1095 days.    Stress Test:  No results found for this or any previous visit from the past 1095 days.    Cardiac Imaging:  No results found for this or any previous visit from the past 1095 days.      Past Medical History:  She has no past medical history on file.    Past Surgical History:  She has a past surgical history that includes Other surgical history (11/10/2022); Other surgical history (11/10/2022); Other surgical history (11/10/2022); and Other surgical history (11/10/2022).      Social History:  She reports that she has never smoked. She has never used smokeless tobacco. She reports that she does not drink alcohol and does not use drugs.    Family History:  Family History   Problem Relation Name Age of Onset    No Known Problems Mother      No Known Problems Father      No Known Problems Other Child         Allergies:  Patient has no known allergies.    Inpatient Medications:  Scheduled medications   Medication Dose Route Frequency    allopurinol  100 mg oral Daily    atorvastatin  20 mg oral Nightly    DULoxetine  30 mg oral Daily    empagliflozin  10 mg oral Daily    enoxaparin  30 mg subcutaneous q24h    ferrous sulfate (325 mg ferrous sulfate)  65 mg of iron oral Daily with breakfast    fluticasone furoate-vilanteroL  1 puff inhalation Daily    " gabapentin  100 mg oral BID    metoprolol tartrate  25 mg oral BID    pantoprazole  20 mg oral Daily    polyethylene glycol  17 g oral Daily    spironolactone  12.5 mg oral Daily    torsemide  10 mg oral Daily     PRN medications   Medication    baclofen    traMADol     Continuous Medications   Medication Dose Last Rate     Outpatient Medications:  Current Outpatient Medications   Medication Instructions    alendronate (FOSAMAX) 35 mg, oral, Every 7 days    allopurinol (ZYLOPRIM) 100 mg, oral, Daily    ascorbic acid (VITAMIN C) 500 mg, oral, Daily    atorvastatin (LIPITOR) 20 mg, oral, Nightly    baclofen (Lioresal) 5 mg tablet Take one tablet every night for spasm in left hip. If needed, may take one tablet during the day. STOP METHOCARBAMOL    cephalexin (KEFLEX) 500 mg, oral, Every 12 hours    cholecalciferol (VITAMIN D3) 1,000 Units, oral, Daily    Dodex 1,000 mcg, intramuscular, Every 30 days    DULoxetine (CYMBALTA) 30 mg, oral, Daily, Do not crush or chew. Discontinue 20 mg capsules    empagliflozin (JARDIANCE) 10 mg, oral, Daily    ferrous sulfate (FeroSuL) 325 (65 Fe) MG tablet 65 mg of iron, oral, Daily with breakfast    fluticasone furoate-vilanteroL (Breo Ellipta) 200-25 mcg/dose inhaler 1 puff, inhalation, Daily, Rinse mouth after each use    gabapentin (NEURONTIN) 100 mg, oral, 2 times daily, For neuropathy (nerve pain)     ipratropium-albuteroL (Duo-Neb) 0.5-2.5 mg/3 mL nebulizer solution 3 mL, inhalation, Every 4 hours, INHALE CONTENTS OF 1 VIAL IN NEBULIZER EVERY 4 HOURS WHILE AWAKE    melatonin 2.5 mg tablet,chewable 2 tablets, oral, Nightly PRN, Uses fruit flavored gummy    metoprolol tartrate (Lopressor) 25 mg tablet 1 tablet, oral, 2 times daily    multivitamin with minerals tablet 1 tablet, oral, Daily    naloxone (NARCAN) 4 mg, nasal, As needed, Instill one spray into nostril and dial 911. If no response may repeat x 1 in 2 minutes. Only to be used for suspected opioid overdose.     NON  FORMULARY oral, Daily, Heme Boost; take one tablet daily for anemia    pantoprazole (PROTONIX) 20 mg, oral, Daily    spironolactone (ALDACTONE) 12.5 mg, oral, Daily, STOP TAKING POTASSIUM    torsemide (Demadex) 20 mg tablet oral, Daily, Take 2 tablets on Mondays- Wednesdays- and Fridays, and 1 tablet all remaining days of the week<BR>    traMADol (ULTRAM) 50 mg, oral, 3 times daily PRN, Take 2 tablets by mouth three times a day as needed at least 6-8  hours a part.    vit A/vit C/vit E/zinc/copper (PRESERVISION AREDS ORAL) 1 capsule, oral, 2 times daily    VITAMIN B COMPLEX ORAL 1 tablet, oral, Daily RT    vitamin E 400 Units, oral, Daily       Physical Exam  AO x3, in no distress  No JVD, supple neck  CTAB, no crackles/rales/wheezing  S1/S2 wnl, no mrg, tenderness on palpation  Soft, nd, nt, +ve bs, no organomegaly  Warm and Dry extremities, +1 strength of all extremities    Assessment/Plan   #CP, atypical  -origin is non-cardiac  [ ] device interrogation in am to verify no alerts  [ ] limited ECHO to rule out prosthetic aortic valve stenosis; last ECHO 1y ago    #AVR s/p surgical repair  #HFpEF  #HLD  -c/w nisreen, torsemide, metop tart 25mg, empagliflzin 10mg, atorvastatin    #COPD  -c/w breo-ellipta    #OA pain  -c/w tramadol, duloxetine, gabapentin    #OSEAS  -UA w/o proteinuria or blood  -keep on monitoring; will give 500mls of LR    Code Status:  Full Code  I spent 60 minutes in the professional and overall care of this patient.  Lio Grigsby MD

## 2024-02-17 NOTE — DISCHARGE SUMMARY
Discharge Diagnosis  Chest pain, unspecified type    Issues Requiring Follow-Up  PCP follow up for continued care.     Test Results Pending At Discharge  Pending Labs       Order Current Status    Blood Gas Lactic Acid, Venous In process            Hospital Course   Patient is a 89 year old with lady with PMH of hypertension, hyperlipidemia, chronic anemia, iron deficiency, s/p status post knee replacement surgery, gout, anxiety, history of COPD,? sarcoidosis, diverticulosis, s/p AVR at [facility], s/p pacemaker (reportedly that this was needed after her valve surgery at the [facility]) who was presented to the ED via EMS with c/o left sided sharp chest pain. Pt stats chest pain started about a day ago.The pain iwa located at her L chest every time she moves her arm and takes a deep breath. Denied SOB or LE edema. Patient was non-ambulatory at baseline given her very severe osteoarthritis. She denied any fevers, chills or URI symptoms. Denies any ICD discharges.      Patient was admitted by ED team given chest pain and high HEART score of 4.  EKG av paced @60  Trops 21-->25, bnp 232  On the floor she was remained stable. Tele showed no events.   Troponins were negative.   Limited ECHO showed Hyperdynamic LV with EF 75%, Bioprosthetic Aortic valve, Mod aortic valve stenosis and severe AR, compared the study from 1/10/23, there was no significant change.     Clinical exam showed pain related to moving the left arm, which is likely musculo skeletal pain.   Advised tylenol.   Advised follow up with PCP.   Discharge day management time  > 30 minutes.        Pertinent Physical Exam At Time of Discharge  Physical Exam  Constitutional:       Comments: Elderly lady, alert and oriented x 3   HENT:      Head: Normocephalic.      Nose: Nose normal.      Mouth/Throat:      Mouth: Mucous membranes are moist.   Eyes:      Extraocular Movements: Extraocular movements intact.      Pupils: Pupils are equal, round, and reactive to light.    Cardiovascular:      Rate and Rhythm: Normal rate and regular rhythm.      Heart sounds: Murmur heard.      Comments: Systolic murmur in AA   Pulmonary:      Effort: No respiratory distress.      Breath sounds: Normal breath sounds. No wheezing.   Abdominal:      General: There is no distension.      Palpations: Abdomen is soft.      Tenderness: There is no abdominal tenderness.   Musculoskeletal:         General: Normal range of motion.      Cervical back: Normal range of motion.   Skin:     General: Skin is dry.      Coloration: Skin is not jaundiced.      Findings: No bruising.   Neurological:      General: No focal deficit present.      Mental Status: She is oriented to person, place, and time. Mental status is at baseline.   Psychiatric:         Mood and Affect: Mood normal.         Home Medications     Medication List      CONTINUE taking these medications     alendronate 35 mg tablet; Commonly known as: Fosamax; Take 1 tablet (35   mg) by mouth every 7 days.   allopurinol 100 mg tablet; Commonly known as: Zyloprim; Take 1 tablet   (100 mg) by mouth once daily.   ascorbic acid 500 mg tablet; Commonly known as: Vitamin C   atorvastatin 20 mg tablet; Commonly known as: Lipitor; TAKE 1 TABLET BY   MOUTH AT BEDTIME   baclofen 5 mg tablet; Commonly known as: Lioresal; Take one tablet every   night for spasm in left hip. If needed, may take one tablet during the   day. STOP METHOCARBAMOL   cephalexin 500 mg capsule; Commonly known as: Keflex   Dodex 1,000 mcg/mL injection; Generic drug: cyanocobalamin; inject 1   milliliter intramuscularly every month   DULoxetine 30 mg DR capsule; Commonly known as: Cymbalta   empagliflozin 10 mg; Commonly known as: Jardiance; Take 1 tablet (10 mg)   by mouth once daily.   ferrous sulfate (325 mg ferrous sulfate) tablet; Commonly known as:   FeroSuL; Take 1 tablet (65 mg of iron) by mouth once daily with a meal.   fluticasone furoate-vilanteroL 200-25 mcg/dose inhaler; Commonly  known   as: Breo Ellipta; Inhale 1 puff once daily. Rinse mouth after each use   gabapentin 100 mg capsule; Commonly known as: Neurontin; Take 1 capsule   (100 mg) by mouth 2 times a day. For neuropathy (nerve pain)   ipratropium-albuteroL 0.5-2.5 mg/3 mL nebulizer solution; Commonly known   as: Duo-Neb   melatonin 2.5 mg tablet,chewable   metoprolol tartrate 25 mg tablet; Commonly known as: Lopressor   multivitamin with minerals tablet   naloxone 4 mg/0.1 mL nasal spray; Commonly known as: Narcan   NON FORMULARY   pantoprazole 20 mg EC tablet; Commonly known as: ProtoNix; take 1 tablet   by mouth once daily   PRESERVISION AREDS ORAL   spironolactone 25 mg tablet; Commonly known as: Aldactone; Take 0.5   tablets (12.5 mg) by mouth once daily. STOP TAKING POTASSIUM   torsemide 20 mg tablet; Commonly known as: Demadex   traMADol 50 mg tablet; Commonly known as: Ultram; Take 1 tablet (50 mg)   by mouth 3 times a day as needed for moderate pain (4 - 6) or severe pain   (7 - 10) (pain). Take 2 tablets by mouth three times a day as needed at   least 6-8  hours a part.   VITAMIN B COMPLEX ORAL   Vitamin D3 25 MCG (1000 UT) tablet; Generic drug: cholecalciferol   vitamin E 180 mg (400 unit) capsule       Outpatient Follow-Up  No future appointments.    Walker Woo MD

## 2024-02-20 ENCOUNTER — OFFICE VISIT (OUTPATIENT)
Dept: GERIATRIC MEDICINE | Facility: CLINIC | Age: 89
End: 2024-02-20
Payer: MEDICARE

## 2024-02-20 DIAGNOSIS — G89.29 OTHER CHRONIC PAIN: ICD-10-CM

## 2024-02-20 DIAGNOSIS — S29.011A MUSCLE STRAIN OF CHEST WALL, INITIAL ENCOUNTER: Primary | ICD-10-CM

## 2024-02-20 DIAGNOSIS — J45.909 ASTHMA, UNSPECIFIED ASTHMA SEVERITY, UNSPECIFIED WHETHER COMPLICATED, UNSPECIFIED WHETHER PERSISTENT (HHS-HCC): ICD-10-CM

## 2024-02-20 DIAGNOSIS — M79.2 PERIPHERAL NEUROPATHIC PAIN: ICD-10-CM

## 2024-02-20 DIAGNOSIS — Z95.810 AICD (AUTOMATIC CARDIOVERTER/DEFIBRILLATOR) PRESENT: ICD-10-CM

## 2024-02-20 PROCEDURE — 1159F MED LIST DOCD IN RCRD: CPT | Performed by: NURSE PRACTITIONER

## 2024-02-20 PROCEDURE — 1111F DSCHRG MED/CURRENT MED MERGE: CPT | Performed by: NURSE PRACTITIONER

## 2024-02-20 PROCEDURE — 3077F SYST BP >= 140 MM HG: CPT | Performed by: NURSE PRACTITIONER

## 2024-02-20 PROCEDURE — 1160F RVW MEDS BY RX/DR IN RCRD: CPT | Performed by: NURSE PRACTITIONER

## 2024-02-20 PROCEDURE — 1125F AMNT PAIN NOTED PAIN PRSNT: CPT | Performed by: NURSE PRACTITIONER

## 2024-02-20 PROCEDURE — 99349 HOME/RES VST EST MOD MDM 40: CPT | Performed by: NURSE PRACTITIONER

## 2024-02-20 PROCEDURE — 3078F DIAST BP <80 MM HG: CPT | Performed by: NURSE PRACTITIONER

## 2024-02-20 PROCEDURE — 1036F TOBACCO NON-USER: CPT | Performed by: NURSE PRACTITIONER

## 2024-02-20 ASSESSMENT — PAIN SCALES - GENERAL: PAINLEVEL: 4

## 2024-02-20 NOTE — PROGRESS NOTES
"Some elements may have been copied from prior note(s). The elements have been updated and reflect current evaluation, examination and decision making from today.           Reason for visit:  Follow up s/p hospitalization for chest pain /Muscle strain.    Summary Statement: Mrs. 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      Reason for visit: Leaving her home requires a considerable and taxing amount of effort, and maximum resources due to inability to walk.     HPI: Mrs. Dumont is being seen today in her home  In the interim, patient was seen in the ED due to left-sided chest pain  Describes it as a pinch and it was around her AICD site.   \"Per ED Discharge Summary 2-;atient was admitted by ED team given chest pain and high HEART score of 4.  EKG av paced @60  Trops 21-->25, bnp 232  On the floor she was remained stable. Tele showed no events.   Troponins were negative.   Limited ECHO showed Hyperdynamic LV with EF 75%, Bioprosthetic Aortic valve, Mod aortic valve stenosis and severe AR, compared the study from 1/10/23, there was no significant change.      Clinical exam showed pain related to moving the left arm, which is likely musculo skeletal pain.   Advised tylenol.   Advised follow up with PCP\"      In the interim, patient was started on Baclofen 4 mg a bedtime \"That medication really helps\".  "     Current Outpatient Medications on File Prior to Visit   Medication Sig Dispense Refill    alendronate (Fosamax) 35 mg tablet Take 1 tablet (35 mg) by mouth every 7 days. 12 tablet 3    allopurinol (Zyloprim) 100 mg tablet Take 1 tablet (100 mg) by mouth once daily. 90 tablet 3    ascorbic acid (Vitamin C) 500 mg tablet Take 1 tablet (500 mg) by mouth once daily.      atorvastatin (Lipitor) 20 mg tablet TAKE 1 TABLET BY MOUTH AT BEDTIME 90 tablet 3    baclofen (Lioresal) 5 mg tablet Take one tablet every night for spasm in left hip. If needed, may take one tablet during the day. STOP METHOCARBAMOL 60 tablet 3    cephalexin (Keflex) 500 mg capsule Take 1 capsule (500 mg) by mouth every 12 hours.      cholecalciferol (Vitamin D3) 25 MCG (1000 UT) tablet Take 1 tablet (1,000 Units) by mouth once daily.      cyanocobalamin (Dodex) 1,000 mcg/mL injection inject 1 milliliter intramuscularly every month 3 mL 3    DULoxetine (Cymbalta) 30 mg DR capsule Take 1 capsule (30 mg) by mouth once daily. Do not crush or chew. Discontinue 20 mg capsules      empagliflozin (Jardiance) 10 mg Take 1 tablet (10 mg) by mouth once daily. 90 tablet 3    ferrous sulfate (FeroSuL) 325 (65 Fe) MG tablet Take 1 tablet (65 mg of iron) by mouth once daily with a meal. 90 tablet 3    fluticasone furoate-vilanteroL (Breo Ellipta) 200-25 mcg/dose inhaler Inhale 1 puff once daily. Rinse mouth after each use 3 each 3    gabapentin (Neurontin) 100 mg capsule Take 1 capsule (100 mg) by mouth 2 times a day. For neuropathy (nerve pain) 180 capsule 3    ipratropium-albuteroL (Duo-Neb) 0.5-2.5 mg/3 mL nebulizer solution Inhale 3 mL every 4 hours. INHALE CONTENTS OF 1 VIAL IN NEBULIZER EVERY 4 HOURS WHILE AWAKE      melatonin 2.5 mg tablet,chewable Chew 2 tablets (5 mg) as needed at bedtime (insomnia). Uses fruit flavored gummy      metoprolol tartrate (Lopressor) 25 mg tablet Take 1 tablet (25 mg) by mouth 2 times a day.      multivitamin with minerals  tablet Take 1 tablet by mouth once daily.      naloxone (Narcan) 4 mg/0.1 mL nasal spray Administer 1 spray (4 mg) into affected nostril(s) if needed for opioid reversal. Instill one spray into nostril and dial 911. If no response may repeat x 1 in 2 minutes. Only to be used for suspected opioid overdose.      NON FORMULARY Take by mouth once daily. Heme Boost; take one tablet daily for anemia      pantoprazole (ProtoNix) 20 mg EC tablet take 1 tablet by mouth once daily 90 tablet 3    spironolactone (Aldactone) 25 mg tablet Take 0.5 tablets (12.5 mg) by mouth once daily. STOP TAKING POTASSIUM 45 tablet 3    torsemide (Demadex) 20 mg tablet Take by mouth once daily. Take 2 tablets on Mondays- Wednesdays- and Fridays, and 1 tablet all remaining days of the week      traMADol (Ultram) 50 mg tablet Take 1 tablet (50 mg) by mouth 3 times a day as needed for moderate pain (4 - 6) or severe pain (7 - 10) (pain). Take 2 tablets by mouth three times a day as needed at least 6-8  hours a part. 180 tablet 1    vit A/vit C/vit E/zinc/copper (PRESERVISION AREDS ORAL) Take 1 capsule by mouth 2 times a day.      VITAMIN B COMPLEX ORAL Take 1 tablet by mouth once daily.      vitamin E 180 mg (400 unit) capsule Take 1 capsule (400 Units) by mouth once daily.      [DISCONTINUED] bisacodyl (Dulcolax) 5 mg EC tablet Take 2 tablets (10 mg) by mouth once daily at bedtime. Do not crush, chew, or split.      [DISCONTINUED] bisacodyl (Fleet Bisacodyl) 10 mg/30 mL enema Insert 15 mL (5 mg) into the rectum. 1/2 bottle z 1      [DISCONTINUED] docusate sodium (Colace) 100 mg tablet Take 1 tablet (100 mg) by mouth 2 times a day as needed for constipation.      [DISCONTINUED] ipratropium-albuteroL (Duo-Neb) 0.5-2.5 mg/3 mL nebulizer solution Take 3 mL by nebulization 2 times a day.      [DISCONTINUED] lactulose 10 gram/15 mL (15 mL) solution Take 15-30 mL by mouth once daily as needed (constipation).      [DISCONTINUED] lidocaine (Lidoderm) 5 %  patch Place 1 patch on the skin once daily at bedtime. Remove & discard patch within 12 hours or as directed by MD. Apply 1 patch to the left hip - on at bedtime and off in the morning      [DISCONTINUED] melatonin 10 mg tablet Take 1 tablet (10 mg) by mouth as needed at bedtime (insomnia).      [DISCONTINUED] NON FORMULARY Take by mouth as needed at bedtime. 5 mg (=2 gummies) take 2 gummies nightly prn for insomnia       No current facility-administered medications on file prior to visit.       Controlled Substance Monitoring Medication #1   Medication Name: Tramadol, Pill Count: Yes and Recent Lab Results: 8/22/2023 confirms use of medication.   Signs of Misuse: no.   Signs of Abuse: no.   Signs of Diversion: no.      ROS:  No chest pain or sob  No fever or chills  No diarrhea or constipation  No falls  No dizziness or headache    Physical Exam  /70 (BP Location: Right arm, Patient Position: Sitting, BP Cuff Size: Adult)   Pulse 70   Resp 20   LMP  (LMP Unknown)     Constitutional   General appearance: Alert, cooperative and in no acute distress.   Head and Face Examination/ inspection of hair and scalp: Normal.   Eyes   Inspection of eyes: Sclera and conjunctiva were normal.    Pupil exam: LILLIAN. Extraocular movements were intact.   Ears, Nose, Mouth, and Throat   Ears: Normal.    Oropharynx: Normal with moist mucus membranes, tongue midline, no PND, no erythema or enlargement of tonsils.    Hearing: Slightly [place]      Lips, teeth, and gums: Normal.   Neck   Neck Exam: Appearance of the neck was normal. No neck masses observed. No jugular vein distension.   Pulmonary   Respiratory assessment: No respiratory distress, normal respiratory rhythm and effort.    Auscultation of Lungs: Clear bilateral breath sounds. No rales, rhonchi or wheezes.   Cardiovascular   Auscultation of heart: Abnormal.  The heart rate was normal. A grade 2 systolic murmur was heard at the LLSB.    Pedal pulses: Regular rate. 2+  bilaterally.  +AICD left upper chest.  Area non-tender   Examination of extremities for edema and/or varicosities: Abnormal.  trace bilateral pedal edema.   Chest   Chest: Symmetrical and normal appearance.   Abdomen   Abdominal Exam: No bruits normal bowel sounds, soft, non-tender, no abdominal masses palpated.    Liver and Spleen exam: No hepato-splenomegaly. wearing a diaper.   Genitourinary   Bladder: Normal on palpation. wearing a diaper.   Lymphatic   Palpation of lymph nodes in axillae: Normal.     Palpation of lymph nodes in neck: No cervical lymphadenopathy.    Palpation of lymph nodes in other areas: Normal.    Musculoskeletal   Digits and nails: Abnormal.     Inspection/palpation of joints: No joint swelling seen.    Range of motion: Abnormal.     Muscle strength/tone: Normal.  decrease ROM to left leg/hip. + Facial grimacing with ROM and repositioning in chair -unchanged  No CVA tenderness   Skin   Skin inspection: Skin dry (particularly to BLEs) , warm and intact. Normal skin color and pigmentation, normal skin turgor and no visible rash and no visible signs of pressure sores.   Neurologic   Cranial nerves: Nerves 2-12 were intact, no focal neuro defects, except for mild hearing deficit   Psychiatric   Judgment and insight: Intact and appropriate.    Orientation: Oriented to person, place, and time.    Mood and affect: Normal.    Recent and remote memory: Normal.  .    LABS/DIAGNOSTIC IMAGING     Chemistry    Lab Results   Component Value Date/Time     02/17/2024 0936    K 4.7 02/17/2024 0936    CL 99 02/17/2024 0936    CO2 30 02/17/2024 0936    BUN 54 (H) 02/17/2024 0936    CREATININE 1.40 (H) 02/17/2024 0936    Lab Results   Component Value Date/Time    CALCIUM 9.9 02/17/2024 0936    ALKPHOS 68 02/16/2024 1228    AST 18 02/16/2024 1228    ALT 13 02/16/2024 1228    BILITOT 0.4 02/16/2024 1228        Lab Results   Component Value Date    WBC 6.8 02/16/2024    HGB 11.0 (L) 02/16/2024    HCT 34.1  (L) 02/16/2024    MCV 91 02/16/2024     02/16/2024     Lab Results   Component Value Date    HGBA1C 5.4 09/09/2022     Transthoracic Echo (TTE) Limited    Result Date: 2/17/2024   [facility], [address] [place] [address]                Tel [phone] and Fax [phone] TRANSTHORACIC ECHOCARDIOGRAM REPORT  Patient Name:      NILAY Reyes Physician:    [id number] Carolann Colorado MD Study Date:        2/17/2024            Ordering Provider:    [id number] DANNIE MCKEON MRN/PID:           [id number]             Fellow: Accession#:        [id number]         Nurse:                Chuyita Lim RN Date of Birth/Age: 5/10/1932 / 89 years Sonographer:          ABRAM Nunez RDCS Gender:            F                    Additional Staff: Height:            160.02 cm            Admit Date:           2/16/2024 Weight:            99.79 kg             Admission Status:     Inpatient -                                                               Routine BSA / BMI:         2.01 m2 / 38.97      Encounter#:           [id number]                    kg/m2                                         Department Location:  [facility] Non                                                               Invasive Blood Pressure: 137 /43 mmHg Study Type:    TRANSTHORACIC ECHO (TTE) LIMITED Diagnosis/ICD: Nonrheumatic aortic (valve) stenosis-I35.0 Indication:    AS CPT Code:      Echo Limited-01143; Doppler Limited-61235; Color Doppler-81339 Patient History: Pertinent History: Dyspnea, Chest Pain and COPD. tachy-shahram syndrome, AS s/p                    AVR with myocardial shaving (2013, make and model unknonw),                    GERD,  hypoxemia, sarcoidosis, LBBB. Study Detail: The following Echo studies were performed: 2D, M-Mode, Doppler and               color flow. Technically challenging study due to poor acoustic               windows. Definity used as a contrast agent for endocardial border               definition. Total contrast used for this procedure was 2 mL via IV               push.  PHYSICIAN INTERPRETATION: Left Ventricle: The left ventricular systolic function is hyperdynamic, with an estimated ejection fraction of 70-75%. There are no regional wall motion abnormalities. The left ventricular cavity size is normal. There is moderate concentric left ventricular hypertrophy. Abnormal (paradoxical) septal motion consistent with post-operative status. Spectral Doppler shows a normal pattern of left ventricular diastolic filling. Left Atrium: The left atrium is severely dilated. Right Ventricle: The right ventricle is normal in size. Unable to determine right ventricular systolic function. Right Atrium: The right atrium is normal in size. There is a device visualized in the right atrium. Aortic Valve: There is a prosthetic aortic valve present. There is evidence of moderate aortic valve stenosis. There is bioprosthetic aortic valve. There is moderate to severe aortic valve regurgitation. The peak instantaneous gradient of the aortic valve is 40.4 mmHg. The mean gradient of the aortic valve is 22.0 mmHg. Mitral Valve: The mitral valve is mildly thickened. There is mild to moderate mitral annular calcification. Mitral valve regurgitation was not assessed. Tricuspid Valve: The tricuspid valve is structurally normal. Tricuspid regurgitation was not assessed. Pulmonic Valve: The pulmonic valve is structurally normal. There is trace pulmonic valve regurgitation. Pericardium: There is no pericardial effusion noted. Aorta: The aortic root is normal. Systemic Veins: The inferior vena cava appears to be of normal size. In comparison to the  previous echocardiogram(s): Compared with study from 1/10/2023, no significant change. Gradients over the prosthetic valve is unchanged. PHT for regurg is also unchanged.  CONCLUSIONS:  1. Left ventricular systolic function is hyperdynamic with a 70-75% estimated ejection fraction.  2. There is moderate concentric left ventricular hypertrophy.  3. The left atrium is severely dilated.  4. There is a bioprosthetic aortic valve.  5. Moderate aortic valve stenosis.  6. Moderate to severe aortic valve regurgitation.  7. At least moderate AR, severe cannot be ruled out.  8. Prosthetic aortic valve dysfunction due to degenrative changes. Regurg mechanism is unclear if it is perivalvular or transvalvular.  9. Abnormal septal motion consistent with post-operative status. 10. Compared with study from 1/10/2023, no significant change. Gradients over the prosthetic valve is unchanged. PHT for regurg is also unchanged. QUANTITATIVE DATA SUMMARY: 2D MEASUREMENTS:                           Normal Ranges: Ao Root d:     3.10 cm    (2.0-3.7cm) LAs:           3.70 cm    (2.7-4.0cm) IVSd:          1.40 cm    (0.6-1.1cm) LVPWd:         1.10 cm    (0.6-1.1cm) LVIDd:         4.80 cm    (3.9-5.9cm) LVIDs:         3.50 cm LV Mass Index: 115.3 g/m2 LV % FS        27.1 % LA VOLUME:                               Normal Ranges: LA Vol A4C:        89.8 ml    (22+/-6mL/m2) LA Vol A2C:        113.4 ml LA Vol BP:         101.2 ml LA Vol Index A4C:  44.6ml/m2 LA Vol Index A2C:  56.3 ml/m2 LA Vol Index BP:   50.3 ml/m2 LA Area A4C:       26.7 cm2 LA Area A2C:       30.1 cm2 LA Major Axis A4C: 6.8 cm LA Major Axis A2C: 6.8 cm LA Volume Index:   50.3 ml/m2 AORTA MEASUREMENTS:                    Normal Ranges: Asc Ao, d: 3.90 cm (2.1-3.4cm) LV SYSTOLIC FUNCTION BY 2D PLANIMETRY (MOD):                     Normal Ranges: EF-A4C View: 76.4 % (>=55%) EF-A2C View: 68.7 % EF-Biplane:  71.8 % AORTIC VALVE:                                    Normal Ranges: AoV  Vmax:                3.18 m/s  (<=1.7m/s) AoV Peak P.4 mmHg (<20mmHg) AoV Mean P.0 mmHg (1.7-11.5mmHg) LVOT Max Matthew:            1.52 m/s  (<=1.1m/s) AoV VTI:                 68.20 cm  (18-25cm) LVOT VTI:                32.50 cm LVOT Diameter:           1.90 cm   (1.8-2.4cm) AoV Area, VTI:           1.35 cm2  (2.5-5.5cm2) AoV Area,Vmax:           1.36 cm2  (2.5-4.5cm2) AoV Dimensionless Index: 0.48 AORTIC INSUFFICIENCY: AI Vmax:       3.73 m/s AI Half-time:  418 msec AI Decel Rate: 266.50 cm/s2  [id number] Carolann Colorado MD Electronically signed on 2024 at 3:32:47 PM  ** Final **      Encounter Date: 24   ECG 12 lead   Result Value    Ventricular Rate 61    Atrial Rate 357    WI Interval 190    QRS Duration 218    QT Interval 536    QTC Calculation(Bazett) 539    P Axis 103    R Axis -75    T Axis 110    QRS Count 10    Q Onset 172    P Onset 124    P Offset 144    T Offset 440    QTC Fredericia 538    Narrative    AV dual-paced rhythm  Abnormal ECG  When compared with ECG of 2024 11:54,  No significant change was found    See ED provider note for full interpretation and clinical correlation  Confirmed by Walter Lagos ([id number]) on 2024 1:54:51 AM       ASSESSMENT/PLAN    1. Muscle strain of chest wall, initial encounter  -treated and resolved  -plan of care is ongoing     2. Other chronic pain/muscle spasm  -significant improvement left hip pain worse at night an sleep maintenance with the  Addition of Baclofen 5 mg at bedtime.  -Discussed adding a daytime dose if tolerated as spasmodic pain to left hip is present  At the time of visit  -Continue Tramadol  -No s/s of abuse, misuse or diversion     3. Asthma, unspecified asthma severity, unspecified whether complicated, unspecified whether persistent  -stable    4. Peripheral neuropathic pain  -tolerable on Duloxetine and low dose Gabapentin  -Symptoms exacerbated when trying to remove Gabapentin  -Will revisit  later  -Continue for now     5. AICD present  -son would like for patient to keep appointment with EPS 4/ 2024    -Stable  -High Risk for hospital/ED admission  -NP will follow up in one month

## 2024-02-21 VITALS — HEART RATE: 70 BPM | SYSTOLIC BLOOD PRESSURE: 148 MMHG | RESPIRATION RATE: 20 BRPM | DIASTOLIC BLOOD PRESSURE: 70 MMHG

## 2024-02-21 PROBLEM — S29.011A CHEST WALL MUSCLE STRAIN: Status: ACTIVE | Noted: 2024-02-21

## 2024-02-21 PROBLEM — Z95.810 AICD (AUTOMATIC CARDIOVERTER/DEFIBRILLATOR) PRESENT: Status: ACTIVE | Noted: 2024-02-21

## 2024-02-21 PROBLEM — M79.2 PERIPHERAL NEUROPATHIC PAIN: Status: ACTIVE | Noted: 2024-02-21

## 2024-02-21 ASSESSMENT — PAIN SCALES - GENERAL: PAINLEVEL: 4

## 2024-02-21 NOTE — PATIENT INSTRUCTIONS
Dear Mrs. Dumont,  So glad you are feeling much better    As we discussed, you may try adding a dose of Baclofen 5 mg during the day since  You obtained good results from using it at night.       I will follow up with you in one month    Sincerely,    Miladys Menon, MSN, APRN-BC

## 2024-03-21 ENCOUNTER — APPOINTMENT (OUTPATIENT)
Dept: RADIOLOGY | Facility: HOSPITAL | Age: 89
DRG: 177 | End: 2024-03-21
Payer: MEDICARE

## 2024-03-21 ENCOUNTER — HOSPITAL ENCOUNTER (INPATIENT)
Facility: HOSPITAL | Age: 89
LOS: 8 days | Discharge: NURSING, REHAB OR LONG-TERM CARE | DRG: 177 | End: 2024-03-30
Attending: EMERGENCY MEDICINE | Admitting: NURSE PRACTITIONER
Payer: MEDICARE

## 2024-03-21 DIAGNOSIS — G89.21 CHRONIC PAIN DUE TO TRAUMA: ICD-10-CM

## 2024-03-21 DIAGNOSIS — M86.9: Primary | ICD-10-CM

## 2024-03-21 DIAGNOSIS — M16.12 OSTEOARTHRITIS OF LEFT HIP, UNSPECIFIED OSTEOARTHRITIS TYPE: ICD-10-CM

## 2024-03-21 DIAGNOSIS — N30.00 ACUTE CYSTITIS WITHOUT HEMATURIA: ICD-10-CM

## 2024-03-21 DIAGNOSIS — U07.1 ACUTE HYPOXEMIC RESPIRATORY FAILURE DUE TO COVID-19 (MULTI): Chronic | ICD-10-CM

## 2024-03-21 DIAGNOSIS — N17.9 AKI (ACUTE KIDNEY INJURY) (CMS-HCC): Primary | ICD-10-CM

## 2024-03-21 DIAGNOSIS — K59.04 CHRONIC IDIOPATHIC CONSTIPATION: ICD-10-CM

## 2024-03-21 DIAGNOSIS — G89.29 OTHER CHRONIC PAIN: ICD-10-CM

## 2024-03-21 DIAGNOSIS — J96.01 ACUTE HYPOXEMIC RESPIRATORY FAILURE DUE TO COVID-19 (MULTI): Chronic | ICD-10-CM

## 2024-03-21 LAB
ALBUMIN SERPL BCP-MCNC: 3.9 G/DL (ref 3.4–5)
ALP SERPL-CCNC: 61 U/L (ref 33–136)
ALT SERPL W P-5'-P-CCNC: 20 U/L (ref 7–45)
ANION GAP BLDV CALCULATED.4IONS-SCNC: 10 MMOL/L (ref 10–25)
ANION GAP SERPL CALC-SCNC: 16 MMOL/L (ref 10–20)
AST SERPL W P-5'-P-CCNC: 24 U/L (ref 9–39)
BASE EXCESS BLDV CALC-SCNC: 4.2 MMOL/L (ref -2–3)
BASOPHILS # BLD AUTO: 0.03 X10*3/UL (ref 0–0.1)
BASOPHILS NFR BLD AUTO: 0.6 %
BILIRUB SERPL-MCNC: 0.3 MG/DL (ref 0–1.2)
BNP SERPL-MCNC: 584 PG/ML (ref 0–99)
BODY TEMPERATURE: 37 DEGREES CELSIUS
BUN SERPL-MCNC: 56 MG/DL (ref 6–23)
CA-I BLDV-SCNC: 1.08 MMOL/L (ref 1.1–1.33)
CALCIUM SERPL-MCNC: 8.9 MG/DL (ref 8.6–10.6)
CARDIAC TROPONIN I PNL SERPL HS: 40 NG/L (ref 0–34)
CHLORIDE BLDV-SCNC: 98 MMOL/L (ref 98–107)
CHLORIDE SERPL-SCNC: 97 MMOL/L (ref 98–107)
CO2 SERPL-SCNC: 27 MMOL/L (ref 21–32)
CREAT SERPL-MCNC: 1.6 MG/DL (ref 0.5–1.05)
EGFRCR SERPLBLD CKD-EPI 2021: 30 ML/MIN/1.73M*2
EOSINOPHIL # BLD AUTO: 0.01 X10*3/UL (ref 0–0.4)
EOSINOPHIL NFR BLD AUTO: 0.2 %
ERYTHROCYTE [DISTWIDTH] IN BLOOD BY AUTOMATED COUNT: 15.5 % (ref 11.5–14.5)
FLUAV RNA RESP QL NAA+PROBE: NOT DETECTED
FLUBV RNA RESP QL NAA+PROBE: NOT DETECTED
GLUCOSE BLDV-MCNC: 117 MG/DL (ref 74–99)
GLUCOSE SERPL-MCNC: 115 MG/DL (ref 74–99)
HCO3 BLDV-SCNC: 29.7 MMOL/L (ref 22–26)
HCT VFR BLD AUTO: 33.8 % (ref 36–46)
HCT VFR BLD EST: 33 % (ref 36–46)
HGB BLD-MCNC: 10.8 G/DL (ref 12–16)
HGB BLDV-MCNC: 10.9 G/DL (ref 12–16)
IMM GRANULOCYTES # BLD AUTO: 0.03 X10*3/UL (ref 0–0.5)
IMM GRANULOCYTES NFR BLD AUTO: 0.6 % (ref 0–0.9)
INHALED O2 CONCENTRATION: 21 %
LACTATE BLDV-SCNC: 1 MMOL/L (ref 0.4–2)
LYMPHOCYTES # BLD AUTO: 0.77 X10*3/UL (ref 0.8–3)
LYMPHOCYTES NFR BLD AUTO: 16.7 %
MAGNESIUM SERPL-MCNC: 2.27 MG/DL (ref 1.6–2.4)
MCH RBC QN AUTO: 29 PG (ref 26–34)
MCHC RBC AUTO-ENTMCNC: 32 G/DL (ref 32–36)
MCV RBC AUTO: 91 FL (ref 80–100)
MONOCYTES # BLD AUTO: 0.47 X10*3/UL (ref 0.05–0.8)
MONOCYTES NFR BLD AUTO: 10.2 %
NEUTROPHILS # BLD AUTO: 3.31 X10*3/UL (ref 1.6–5.5)
NEUTROPHILS NFR BLD AUTO: 71.7 %
NRBC BLD-RTO: 0 /100 WBCS (ref 0–0)
OXYHGB MFR BLDV: 39.9 % (ref 45–75)
PCO2 BLDV: 48 MM HG (ref 41–51)
PH BLDV: 7.4 PH (ref 7.33–7.43)
PLATELET # BLD AUTO: 214 X10*3/UL (ref 150–450)
PO2 BLDV: 30 MM HG (ref 35–45)
POTASSIUM BLDV-SCNC: 4.8 MMOL/L (ref 3.5–5.3)
POTASSIUM SERPL-SCNC: 4.5 MMOL/L (ref 3.5–5.3)
PROT SERPL-MCNC: 7.1 G/DL (ref 6.4–8.2)
RBC # BLD AUTO: 3.73 X10*6/UL (ref 4–5.2)
SAO2 % BLDV: 41 % (ref 45–75)
SARS-COV-2 RNA RESP QL NAA+PROBE: DETECTED
SODIUM BLDV-SCNC: 133 MMOL/L (ref 136–145)
SODIUM SERPL-SCNC: 135 MMOL/L (ref 136–145)
WBC # BLD AUTO: 4.6 X10*3/UL (ref 4.4–11.3)

## 2024-03-21 PROCEDURE — 93971 EXTREMITY STUDY: CPT

## 2024-03-21 PROCEDURE — 87636 SARSCOV2 & INF A&B AMP PRB: CPT | Performed by: STUDENT IN AN ORGANIZED HEALTH CARE EDUCATION/TRAINING PROGRAM

## 2024-03-21 PROCEDURE — 85025 COMPLETE CBC W/AUTO DIFF WBC: CPT | Performed by: STUDENT IN AN ORGANIZED HEALTH CARE EDUCATION/TRAINING PROGRAM

## 2024-03-21 PROCEDURE — 71046 X-RAY EXAM CHEST 2 VIEWS: CPT

## 2024-03-21 PROCEDURE — 99285 EMERGENCY DEPT VISIT HI MDM: CPT | Mod: 25

## 2024-03-21 PROCEDURE — 93971 EXTREMITY STUDY: CPT | Performed by: SURGERY

## 2024-03-21 PROCEDURE — 83735 ASSAY OF MAGNESIUM: CPT | Performed by: STUDENT IN AN ORGANIZED HEALTH CARE EDUCATION/TRAINING PROGRAM

## 2024-03-21 PROCEDURE — 71046 X-RAY EXAM CHEST 2 VIEWS: CPT | Performed by: SURGERY

## 2024-03-21 PROCEDURE — 83880 ASSAY OF NATRIURETIC PEPTIDE: CPT | Performed by: STUDENT IN AN ORGANIZED HEALTH CARE EDUCATION/TRAINING PROGRAM

## 2024-03-21 PROCEDURE — 99285 EMERGENCY DEPT VISIT HI MDM: CPT | Performed by: EMERGENCY MEDICINE

## 2024-03-21 PROCEDURE — 84132 ASSAY OF SERUM POTASSIUM: CPT | Performed by: STUDENT IN AN ORGANIZED HEALTH CARE EDUCATION/TRAINING PROGRAM

## 2024-03-21 PROCEDURE — 36415 COLL VENOUS BLD VENIPUNCTURE: CPT | Performed by: STUDENT IN AN ORGANIZED HEALTH CARE EDUCATION/TRAINING PROGRAM

## 2024-03-21 PROCEDURE — 2500000004 HC RX 250 GENERAL PHARMACY W/ HCPCS (ALT 636 FOR OP/ED): Performed by: STUDENT IN AN ORGANIZED HEALTH CARE EDUCATION/TRAINING PROGRAM

## 2024-03-21 PROCEDURE — 96375 TX/PRO/DX INJ NEW DRUG ADDON: CPT

## 2024-03-21 PROCEDURE — 84484 ASSAY OF TROPONIN QUANT: CPT | Performed by: STUDENT IN AN ORGANIZED HEALTH CARE EDUCATION/TRAINING PROGRAM

## 2024-03-21 PROCEDURE — 84075 ASSAY ALKALINE PHOSPHATASE: CPT | Performed by: STUDENT IN AN ORGANIZED HEALTH CARE EDUCATION/TRAINING PROGRAM

## 2024-03-21 PROCEDURE — 96374 THER/PROPH/DIAG INJ IV PUSH: CPT

## 2024-03-21 RX ORDER — CEPHALEXIN 500 MG/1
500 CAPSULE ORAL EVERY 12 HOURS
Qty: 180 CAPSULE | Refills: 0 | Status: SHIPPED | OUTPATIENT
Start: 2024-03-21 | End: 2024-03-30 | Stop reason: HOSPADM

## 2024-03-21 RX ORDER — IPRATROPIUM BROMIDE AND ALBUTEROL SULFATE 2.5; .5 MG/3ML; MG/3ML
3 SOLUTION RESPIRATORY (INHALATION) EVERY 20 MIN
Status: DISPENSED | OUTPATIENT
Start: 2024-03-21 | End: 2024-03-21

## 2024-03-21 RX ORDER — ONDANSETRON HYDROCHLORIDE 2 MG/ML
4 INJECTION, SOLUTION INTRAVENOUS ONCE
Status: COMPLETED | OUTPATIENT
Start: 2024-03-21 | End: 2024-03-21

## 2024-03-21 RX ADMIN — METHYLPREDNISOLONE SODIUM SUCCINATE 125 MG: 125 INJECTION, POWDER, FOR SOLUTION INTRAMUSCULAR; INTRAVENOUS at 23:06

## 2024-03-21 RX ADMIN — ONDANSETRON 4 MG: 2 INJECTION INTRAMUSCULAR; INTRAVENOUS at 23:06

## 2024-03-21 ASSESSMENT — COLUMBIA-SUICIDE SEVERITY RATING SCALE - C-SSRS
1. IN THE PAST MONTH, HAVE YOU WISHED YOU WERE DEAD OR WISHED YOU COULD GO TO SLEEP AND NOT WAKE UP?: NO
2. HAVE YOU ACTUALLY HAD ANY THOUGHTS OF KILLING YOURSELF?: NO
6. HAVE YOU EVER DONE ANYTHING, STARTED TO DO ANYTHING, OR PREPARED TO DO ANYTHING TO END YOUR LIFE?: NO

## 2024-03-21 ASSESSMENT — PAIN SCALES - GENERAL: PAINLEVEL_OUTOF10: 4

## 2024-03-21 ASSESSMENT — PAIN - FUNCTIONAL ASSESSMENT: PAIN_FUNCTIONAL_ASSESSMENT: 0-10

## 2024-03-21 ASSESSMENT — PAIN DESCRIPTION - LOCATION: LOCATION: HIP

## 2024-03-22 ENCOUNTER — APPOINTMENT (OUTPATIENT)
Dept: RADIOLOGY | Facility: HOSPITAL | Age: 89
DRG: 177 | End: 2024-03-22
Payer: MEDICARE

## 2024-03-22 ENCOUNTER — CLINICAL SUPPORT (OUTPATIENT)
Dept: EMERGENCY MEDICINE | Facility: HOSPITAL | Age: 89
DRG: 177 | End: 2024-03-22
Payer: MEDICARE

## 2024-03-22 PROBLEM — N17.9 AKI (ACUTE KIDNEY INJURY) (CMS-HCC): Status: ACTIVE | Noted: 2024-03-22

## 2024-03-22 LAB
ATRIAL RATE: 60 BPM
CARDIAC TROPONIN I PNL SERPL HS: 56 NG/L (ref 0–34)
CARDIAC TROPONIN I PNL SERPL HS: 63 NG/L (ref 0–34)
P AXIS: -81 DEGREES
P OFFSET: 136 MS
P ONSET: 116 MS
PR INTERVAL: 176 MS
Q ONSET: 167 MS
QRS COUNT: 10 BEATS
QRS DURATION: 214 MS
QT INTERVAL: 564 MS
QTC CALCULATION(BAZETT): 564 MS
QTC FREDERICIA: 564 MS
R AXIS: -74 DEGREES
T AXIS: 113 DEGREES
T OFFSET: 449 MS
VENTRICULAR RATE: 60 BPM

## 2024-03-22 PROCEDURE — 93005 ELECTROCARDIOGRAM TRACING: CPT

## 2024-03-22 PROCEDURE — 36415 COLL VENOUS BLD VENIPUNCTURE: CPT | Performed by: STUDENT IN AN ORGANIZED HEALTH CARE EDUCATION/TRAINING PROGRAM

## 2024-03-22 PROCEDURE — 99222 1ST HOSP IP/OBS MODERATE 55: CPT | Performed by: NURSE PRACTITIONER

## 2024-03-22 PROCEDURE — 97530 THERAPEUTIC ACTIVITIES: CPT | Mod: GP | Performed by: PHYSICAL THERAPIST

## 2024-03-22 PROCEDURE — 1100000001 HC PRIVATE ROOM DAILY

## 2024-03-22 PROCEDURE — 74177 CT ABD & PELVIS W/CONTRAST: CPT | Performed by: SURGERY

## 2024-03-22 PROCEDURE — 93010 ELECTROCARDIOGRAM REPORT: CPT | Performed by: PHYSICIAN ASSISTANT

## 2024-03-22 PROCEDURE — 2500000002 HC RX 250 W HCPCS SELF ADMINISTERED DRUGS (ALT 637 FOR MEDICARE OP, ALT 636 FOR OP/ED): Performed by: NURSE PRACTITIONER

## 2024-03-22 PROCEDURE — 2500000004 HC RX 250 GENERAL PHARMACY W/ HCPCS (ALT 636 FOR OP/ED): Performed by: NURSE PRACTITIONER

## 2024-03-22 PROCEDURE — 2550000001 HC RX 255 CONTRASTS: Performed by: EMERGENCY MEDICINE

## 2024-03-22 PROCEDURE — 2500000001 HC RX 250 WO HCPCS SELF ADMINISTERED DRUGS (ALT 637 FOR MEDICARE OP): Performed by: NURSE PRACTITIONER

## 2024-03-22 PROCEDURE — 97162 PT EVAL MOD COMPLEX 30 MIN: CPT | Mod: GP | Performed by: PHYSICAL THERAPIST

## 2024-03-22 PROCEDURE — 36415 COLL VENOUS BLD VENIPUNCTURE: CPT | Performed by: NURSE PRACTITIONER

## 2024-03-22 PROCEDURE — 2500000004 HC RX 250 GENERAL PHARMACY W/ HCPCS (ALT 636 FOR OP/ED): Performed by: EMERGENCY MEDICINE

## 2024-03-22 PROCEDURE — 71275 CT ANGIOGRAPHY CHEST: CPT

## 2024-03-22 PROCEDURE — 84484 ASSAY OF TROPONIN QUANT: CPT | Performed by: STUDENT IN AN ORGANIZED HEALTH CARE EDUCATION/TRAINING PROGRAM

## 2024-03-22 PROCEDURE — 71275 CT ANGIOGRAPHY CHEST: CPT | Performed by: SURGERY

## 2024-03-22 PROCEDURE — 2500000001 HC RX 250 WO HCPCS SELF ADMINISTERED DRUGS (ALT 637 FOR MEDICARE OP): Performed by: FAMILY MEDICINE

## 2024-03-22 PROCEDURE — 74177 CT ABD & PELVIS W/CONTRAST: CPT

## 2024-03-22 PROCEDURE — 84484 ASSAY OF TROPONIN QUANT: CPT | Performed by: NURSE PRACTITIONER

## 2024-03-22 RX ORDER — ACETAMINOPHEN 325 MG/1
650 TABLET ORAL EVERY 4 HOURS PRN
Status: DISCONTINUED | OUTPATIENT
Start: 2024-03-22 | End: 2024-03-25

## 2024-03-22 RX ORDER — BACLOFEN 10 MG/1
5 TABLET ORAL 2 TIMES DAILY
Status: DISCONTINUED | OUTPATIENT
Start: 2024-03-22 | End: 2024-03-30 | Stop reason: HOSPADM

## 2024-03-22 RX ORDER — GUAIFENESIN/DEXTROMETHORPHAN 100-10MG/5
10 SYRUP ORAL EVERY 4 HOURS PRN
Status: DISCONTINUED | OUTPATIENT
Start: 2024-03-22 | End: 2024-03-27

## 2024-03-22 RX ORDER — DULOXETIN HYDROCHLORIDE 30 MG/1
CAPSULE, DELAYED RELEASE ORAL
Qty: 90 CAPSULE | Refills: 3 | Status: SHIPPED | OUTPATIENT
Start: 2024-03-22

## 2024-03-22 RX ORDER — LOPERAMIDE HYDROCHLORIDE 2 MG/1
2 CAPSULE ORAL 4 TIMES DAILY PRN
Status: DISCONTINUED | OUTPATIENT
Start: 2024-03-22 | End: 2024-03-30 | Stop reason: HOSPADM

## 2024-03-22 RX ORDER — ACETAMINOPHEN 650 MG/1
650 SUPPOSITORY RECTAL EVERY 4 HOURS PRN
Status: DISCONTINUED | OUTPATIENT
Start: 2024-03-22 | End: 2024-03-25

## 2024-03-22 RX ORDER — ACETAMINOPHEN 500 MG
10 TABLET ORAL DAILY PRN
Status: DISCONTINUED | OUTPATIENT
Start: 2024-03-22 | End: 2024-03-30 | Stop reason: HOSPADM

## 2024-03-22 RX ORDER — TRAMADOL HYDROCHLORIDE 50 MG/1
50 TABLET ORAL 3 TIMES DAILY PRN
Status: DISCONTINUED | OUTPATIENT
Start: 2024-03-22 | End: 2024-03-30 | Stop reason: HOSPADM

## 2024-03-22 RX ORDER — TRAMADOL HYDROCHLORIDE 50 MG/1
100 TABLET ORAL 3 TIMES DAILY PRN
Qty: 180 TABLET | Refills: 0 | Status: SHIPPED | OUTPATIENT
Start: 2024-03-22 | End: 2024-03-30 | Stop reason: HOSPADM

## 2024-03-22 RX ORDER — TRAMADOL HYDROCHLORIDE 50 MG/1
50 TABLET ORAL 3 TIMES DAILY PRN
Status: DISCONTINUED | OUTPATIENT
Start: 2024-03-22 | End: 2024-03-22

## 2024-03-22 RX ORDER — GABAPENTIN 100 MG/1
100 CAPSULE ORAL 2 TIMES DAILY
Status: DISCONTINUED | OUTPATIENT
Start: 2024-03-22 | End: 2024-03-30 | Stop reason: HOSPADM

## 2024-03-22 RX ORDER — FERROUS SULFATE 325(65) MG
65 TABLET ORAL
Status: DISCONTINUED | OUTPATIENT
Start: 2024-03-22 | End: 2024-03-30 | Stop reason: HOSPADM

## 2024-03-22 RX ORDER — HEPARIN SODIUM 5000 [USP'U]/ML
5000 INJECTION, SOLUTION INTRAVENOUS; SUBCUTANEOUS EVERY 8 HOURS
Status: DISCONTINUED | OUTPATIENT
Start: 2024-03-22 | End: 2024-03-30 | Stop reason: HOSPADM

## 2024-03-22 RX ORDER — HYDROCODONE BITARTRATE AND ACETAMINOPHEN 5; 325 MG/1; MG/1
1 TABLET ORAL EVERY 6 HOURS PRN
Status: DISCONTINUED | OUTPATIENT
Start: 2024-03-22 | End: 2024-03-25

## 2024-03-22 RX ORDER — METOPROLOL TARTRATE 25 MG/1
25 TABLET, FILM COATED ORAL 2 TIMES DAILY
Status: DISCONTINUED | OUTPATIENT
Start: 2024-03-22 | End: 2024-03-30 | Stop reason: HOSPADM

## 2024-03-22 RX ORDER — MULTIVIT-MIN/IRON FUM/FOLIC AC 7.5 MG-4
1 TABLET ORAL DAILY
Status: DISCONTINUED | OUTPATIENT
Start: 2024-03-22 | End: 2024-03-30 | Stop reason: HOSPADM

## 2024-03-22 RX ORDER — DULOXETIN HYDROCHLORIDE 30 MG/1
30 CAPSULE, DELAYED RELEASE ORAL DAILY
Status: DISCONTINUED | OUTPATIENT
Start: 2024-03-22 | End: 2024-03-30 | Stop reason: HOSPADM

## 2024-03-22 RX ORDER — ACETAMINOPHEN 160 MG/5ML
650 SOLUTION ORAL EVERY 4 HOURS PRN
Status: DISCONTINUED | OUTPATIENT
Start: 2024-03-22 | End: 2024-03-25

## 2024-03-22 RX ORDER — PANTOPRAZOLE SODIUM 20 MG/1
20 TABLET, DELAYED RELEASE ORAL DAILY
Status: DISCONTINUED | OUTPATIENT
Start: 2024-03-22 | End: 2024-03-30 | Stop reason: HOSPADM

## 2024-03-22 RX ORDER — FLUTICASONE FUROATE AND VILANTEROL 200; 25 UG/1; UG/1
1 POWDER RESPIRATORY (INHALATION) DAILY
Status: DISCONTINUED | OUTPATIENT
Start: 2024-03-22 | End: 2024-03-30 | Stop reason: HOSPADM

## 2024-03-22 RX ORDER — POLYETHYLENE GLYCOL 3350 17 G/17G
17 POWDER, FOR SOLUTION ORAL DAILY PRN
Status: DISCONTINUED | OUTPATIENT
Start: 2024-03-22 | End: 2024-03-27

## 2024-03-22 RX ADMIN — HEPARIN SODIUM 5000 UNITS: 5000 INJECTION INTRAVENOUS; SUBCUTANEOUS at 18:52

## 2024-03-22 RX ADMIN — SODIUM CHLORIDE 500 ML: 9 INJECTION, SOLUTION INTRAVENOUS at 03:32

## 2024-03-22 RX ADMIN — IOHEXOL 80 ML: 350 INJECTION, SOLUTION INTRAVENOUS at 01:37

## 2024-03-22 RX ADMIN — Medication 1 TABLET: at 08:19

## 2024-03-22 RX ADMIN — GABAPENTIN 100 MG: 100 CAPSULE ORAL at 20:23

## 2024-03-22 RX ADMIN — EMPAGLIFLOZIN 10 MG: 10 TABLET, FILM COATED ORAL at 08:19

## 2024-03-22 RX ADMIN — METOPROLOL TARTRATE 25 MG: 50 TABLET, FILM COATED ORAL at 20:23

## 2024-03-22 RX ADMIN — ACETAMINOPHEN 650 MG: 325 TABLET ORAL at 23:52

## 2024-03-22 RX ADMIN — HEPARIN SODIUM 5000 UNITS: 5000 INJECTION INTRAVENOUS; SUBCUTANEOUS at 10:00

## 2024-03-22 RX ADMIN — BACLOFEN 5 MG: 10 TABLET ORAL at 08:18

## 2024-03-22 RX ADMIN — HYDROCODONE BITARTRATE AND ACETAMINOPHEN 1 TABLET: 5; 325 TABLET ORAL at 13:26

## 2024-03-22 RX ADMIN — DULOXETINE HYDROCHLORIDE 30 MG: 30 CAPSULE, DELAYED RELEASE ORAL at 08:19

## 2024-03-22 RX ADMIN — FLUTICASONE FUROATE AND VILANTEROL TRIFENATATE 1 PUFF: 200; 25 POWDER RESPIRATORY (INHALATION) at 08:18

## 2024-03-22 RX ADMIN — HYDROCODONE BITARTRATE AND ACETAMINOPHEN 1 TABLET: 5; 325 TABLET ORAL at 20:23

## 2024-03-22 RX ADMIN — BACLOFEN 5 MG: 10 TABLET ORAL at 20:23

## 2024-03-22 RX ADMIN — TRAMADOL HYDROCHLORIDE 50 MG: 50 TABLET, COATED ORAL at 08:40

## 2024-03-22 RX ADMIN — METOPROLOL TARTRATE 25 MG: 50 TABLET, FILM COATED ORAL at 08:19

## 2024-03-22 RX ADMIN — GABAPENTIN 100 MG: 100 CAPSULE ORAL at 08:19

## 2024-03-22 RX ADMIN — FERROUS SULFATE TAB 325 MG (65 MG ELEMENTAL FE) 1 TABLET: 325 (65 FE) TAB at 08:19

## 2024-03-22 RX ADMIN — PANTOPRAZOLE SODIUM 20 MG: 20 TABLET, DELAYED RELEASE ORAL at 08:19

## 2024-03-22 SDOH — SOCIAL STABILITY: SOCIAL INSECURITY: HAVE YOU HAD THOUGHTS OF HARMING ANYONE ELSE?: NO

## 2024-03-22 SDOH — SOCIAL STABILITY: SOCIAL INSECURITY: DOES ANYONE TRY TO KEEP YOU FROM HAVING/CONTACTING OTHER FRIENDS OR DOING THINGS OUTSIDE YOUR HOME?: NO

## 2024-03-22 SDOH — SOCIAL STABILITY: SOCIAL INSECURITY: HAS ANYONE EVER THREATENED TO HURT YOUR FAMILY OR YOUR PETS?: NO

## 2024-03-22 SDOH — SOCIAL STABILITY: SOCIAL INSECURITY: ARE YOU OR HAVE YOU BEEN THREATENED OR ABUSED PHYSICALLY, EMOTIONALLY, OR SEXUALLY BY ANYONE?: NO

## 2024-03-22 SDOH — SOCIAL STABILITY: SOCIAL INSECURITY: DO YOU FEEL ANYONE HAS EXPLOITED OR TAKEN ADVANTAGE OF YOU FINANCIALLY OR OF YOUR PERSONAL PROPERTY?: NO

## 2024-03-22 SDOH — SOCIAL STABILITY: SOCIAL INSECURITY: DO YOU FEEL UNSAFE GOING BACK TO THE PLACE WHERE YOU ARE LIVING?: NO

## 2024-03-22 SDOH — SOCIAL STABILITY: SOCIAL INSECURITY: ARE THERE ANY APPARENT SIGNS OF INJURIES/BEHAVIORS THAT COULD BE RELATED TO ABUSE/NEGLECT?: NO

## 2024-03-22 SDOH — SOCIAL STABILITY: SOCIAL INSECURITY: ABUSE: ADULT

## 2024-03-22 SDOH — SOCIAL STABILITY: SOCIAL INSECURITY: WERE YOU ABLE TO COMPLETE ALL THE BEHAVIORAL HEALTH SCREENINGS?: YES

## 2024-03-22 ASSESSMENT — COGNITIVE AND FUNCTIONAL STATUS - GENERAL
PATIENT BASELINE BEDBOUND: YES
STANDING UP FROM CHAIR USING ARMS: TOTAL
MOVING TO AND FROM BED TO CHAIR: TOTAL
CLIMB 3 TO 5 STEPS WITH RAILING: TOTAL
WALKING IN HOSPITAL ROOM: TOTAL
MOBILITY SCORE: 6
STANDING UP FROM CHAIR USING ARMS: TOTAL
MOVING TO AND FROM BED TO CHAIR: TOTAL
WALKING IN HOSPITAL ROOM: TOTAL
STANDING UP FROM CHAIR USING ARMS: A LOT
MOVING FROM LYING ON BACK TO SITTING ON SIDE OF FLAT BED WITH BEDRAILS: TOTAL
MOVING FROM LYING ON BACK TO SITTING ON SIDE OF FLAT BED WITH BEDRAILS: A LOT
MOBILITY SCORE: 6
MOVING FROM LYING ON BACK TO SITTING ON SIDE OF FLAT BED WITH BEDRAILS: TOTAL
MOBILITY SCORE: 6
CLIMB 3 TO 5 STEPS WITH RAILING: TOTAL
MOVING TO AND FROM BED TO CHAIR: A LOT
MOVING FROM LYING ON BACK TO SITTING ON SIDE OF FLAT BED WITH BEDRAILS: TOTAL
MOBILITY SCORE: 10
TURNING FROM BACK TO SIDE WHILE IN FLAT BAD: TOTAL
MOVING TO AND FROM BED TO CHAIR: TOTAL
TURNING FROM BACK TO SIDE WHILE IN FLAT BAD: TOTAL
STANDING UP FROM CHAIR USING ARMS: TOTAL
TURNING FROM BACK TO SIDE WHILE IN FLAT BAD: A LOT
TURNING FROM BACK TO SIDE WHILE IN FLAT BAD: TOTAL
CLIMB 3 TO 5 STEPS WITH RAILING: TOTAL
CLIMB 3 TO 5 STEPS WITH RAILING: TOTAL
WALKING IN HOSPITAL ROOM: TOTAL
WALKING IN HOSPITAL ROOM: TOTAL

## 2024-03-22 ASSESSMENT — LIFESTYLE VARIABLES
HAVE YOU EVER FELT YOU SHOULD CUT DOWN ON YOUR DRINKING: NO
AUDIT-C TOTAL SCORE: 0
HOW MANY STANDARD DRINKS CONTAINING ALCOHOL DO YOU HAVE ON A TYPICAL DAY: PATIENT DOES NOT DRINK
HAVE PEOPLE ANNOYED YOU BY CRITICIZING YOUR DRINKING: NO
AUDIT-C TOTAL SCORE: 0
HOW OFTEN DO YOU HAVE 6 OR MORE DRINKS ON ONE OCCASION: NEVER
TOTAL SCORE: 0
SKIP TO QUESTIONS 9-10: 1
HOW OFTEN DO YOU HAVE A DRINK CONTAINING ALCOHOL: NEVER
EVER FELT BAD OR GUILTY ABOUT YOUR DRINKING: NO
EVER HAD A DRINK FIRST THING IN THE MORNING TO STEADY YOUR NERVES TO GET RID OF A HANGOVER: NO

## 2024-03-22 ASSESSMENT — ENCOUNTER SYMPTOMS
FLANK PAIN: 0
CHILLS: 0
FREQUENCY: 0
NAUSEA: 1
PALPITATIONS: 0
DIARRHEA: 1
HEMATURIA: 0
VOMITING: 1
DYSURIA: 0
ABDOMINAL PAIN: 1
SHORTNESS OF BREATH: 0
CONSTIPATION: 0
FEVER: 0

## 2024-03-22 ASSESSMENT — ACTIVITIES OF DAILY LIVING (ADL)
GROOMING: NEEDS ASSISTANCE
WALKS IN HOME: DEPENDENT
TOILETING: NEEDS ASSISTANCE
JUDGMENT_ADEQUATE_SAFELY_COMPLETE_DAILY_ACTIVITIES: YES
BATHING: NEEDS ASSISTANCE
PATIENT'S MEMORY ADEQUATE TO SAFELY COMPLETE DAILY ACTIVITIES?: YES
HEARING - LEFT EAR: FUNCTIONAL
ADEQUATE_TO_COMPLETE_ADL: YES
LACK_OF_TRANSPORTATION: NO
ASSISTIVE_DEVICE: WHEELCHAIR
HEARING - RIGHT EAR: FUNCTIONAL
DRESSING YOURSELF: NEEDS ASSISTANCE
FEEDING YOURSELF: INDEPENDENT

## 2024-03-22 ASSESSMENT — PAIN SCALES - GENERAL
PAINLEVEL_OUTOF10: 5 - MODERATE PAIN
PAINLEVEL_OUTOF10: 4
PAINLEVEL_OUTOF10: 5 - MODERATE PAIN
PAINLEVEL_OUTOF10: 0 - NO PAIN
PAINLEVEL_OUTOF10: 10 - WORST POSSIBLE PAIN
PAINLEVEL_OUTOF10: 5 - MODERATE PAIN
PAINLEVEL_OUTOF10: 10 - WORST POSSIBLE PAIN

## 2024-03-22 ASSESSMENT — PAIN DESCRIPTION - LOCATION: LOCATION: BACK

## 2024-03-22 ASSESSMENT — PAIN - FUNCTIONAL ASSESSMENT
PAIN_FUNCTIONAL_ASSESSMENT: 0-10
PAIN_FUNCTIONAL_ASSESSMENT: 0-10

## 2024-03-22 ASSESSMENT — PATIENT HEALTH QUESTIONNAIRE - PHQ9
2. FEELING DOWN, DEPRESSED OR HOPELESS: NOT AT ALL
SUM OF ALL RESPONSES TO PHQ9 QUESTIONS 1 & 2: 0
1. LITTLE INTEREST OR PLEASURE IN DOING THINGS: NOT AT ALL

## 2024-03-22 NOTE — H&P
History Of Present Illness  Alayna Dumont is a 89 y.o. female with a past medical history of HTN, HLD, SUNDEEP, HFpEF (EF 70-75% 02/2024), AVR, dual chamber PPM, sarcoidosis, and COPD who presented to the ED with multiple medical complaints. Initially, EMS was activated for shortness of breath, however this was reported to have been resolved prior to their arrival. The patient then endorsed abdominal pain, nausea, vomiting, and diarrhea. She denies any fever, chills, chest pain, or urinary symptoms. She is non-ambulatory at baseline. On exam in ED22, she is joined by her son at bedside and reports feeling improved since arrival. Her son raises concerns of RLE edema and produces a photo of 2:1 edema to the RLE, however on exam this has resolved. He notes she lives at home with another son, but may benefit from home health care as she is getting older. She denies smoking, EtOH abuse, or illicit substance use.      Past Medical History  History reviewed. No pertinent past medical history.    Surgical History  Past Surgical History:   Procedure Laterality Date    OTHER SURGICAL HISTORY  11/10/2022    Knee surgery    OTHER SURGICAL HISTORY  11/10/2022    Hip surgery    OTHER SURGICAL HISTORY  11/10/2022    Heart surgery    OTHER SURGICAL HISTORY  11/10/2022    Pacemaker insertion        Social History  She reports that she has never smoked. She has never used smokeless tobacco. She reports that she does not drink alcohol and does not use drugs.    Family History  Family History   Problem Relation Name Age of Onset    No Known Problems Mother      No Known Problems Father      No Known Problems Other Child         Allergies  Patient has no known allergies.    Review of Systems   Constitutional:  Negative for chills and fever.   Respiratory:  Negative for shortness of breath.    Cardiovascular:  Negative for chest pain and palpitations.   Gastrointestinal:  Positive for abdominal pain, diarrhea, nausea and vomiting. Negative  for constipation.   Genitourinary:  Negative for dysuria, flank pain, frequency, hematuria and urgency.   All other systems reviewed and are negative.       Physical Exam  Vitals reviewed.   HENT:      Head: Normocephalic and atraumatic.   Cardiovascular:      Rate and Rhythm: Normal rate and regular rhythm.      Heart sounds: Normal heart sounds.   Pulmonary:      Effort: Pulmonary effort is normal.      Breath sounds: Normal air entry.   Abdominal:      General: Bowel sounds are normal.      Palpations: Abdomen is soft.      Tenderness: There is no abdominal tenderness.   Musculoskeletal:         General: No deformity.   Skin:     General: Skin is warm and dry.      Findings: Bruising present.   Neurological:      General: No focal deficit present.      Mental Status: She is alert and oriented to person, place, and time.   Psychiatric:         Mood and Affect: Mood normal.         Behavior: Behavior normal.          Last Recorded Vitals  Blood pressure 151/50, pulse 60, temperature 36 °C (96.8 °F), temperature source Temporal, resp. rate 13, SpO2 99 %.    Relevant Results      Lab Results   Component Value Date    WBC 4.6 03/21/2024    HGB 10.8 (L) 03/21/2024    HCT 33.8 (L) 03/21/2024    MCV 91 03/21/2024     03/21/2024     Lab Results   Component Value Date    GLUCOSE 115 (H) 03/21/2024    CALCIUM 8.9 03/21/2024     (L) 03/21/2024    K 4.5 03/21/2024    CO2 27 03/21/2024    CL 97 (L) 03/21/2024    BUN 56 (H) 03/21/2024    CREATININE 1.60 (H) 03/21/2024     CT abdomen pelvis w IV contrast  Narrative: Interpreted By:  Marcus Farrell,  Janie Aguayo   STUDY:  CT ABDOMEN PELVIS W IV CONTRAST;  3/22/2024 1:46 am      INDICATION:  Signs/Symptoms:abd pain, r/o sbo.      COMPARISON:  CT abdomen pelvis 08/21/2022.      ACCESSION NUMBER(S):  [id number]      ORDERING CLINICIAN:  ABBY BAUTISTA      TECHNIQUE:  Contiguous axial images of the abdomen and pelvis were obtained after  the  intravenous administration of 80 mL Omnipaque 350 contrast.  Coronal and sagittal reformatted images were reconstructed from the  axial data.      FINDINGS:  LOWER CHEST: Lower chest findings are better characterized on the  same day CT chest.          ABDOMEN/PELVIS:      ABDOMINAL WALL: There is rectus diastasis.      LIVER: Enlarged. No focal lesion.      BILE DUCTS: No significant intrahepatic or extrahepatic dilatation.      GALLBLADDER: No significant abnormality.      PANCREAS: No significant abnormality.      SPLEEN: No significant abnormality.      ADRENALS: No significant abnormality.      KIDNEYS, URETERS, BLADDER: Scattered cortical cysts. No  hydronephrosis or urolithiasis. Ureters appear normal. Bladder  appears normal allowing for artifact from right hip prosthesis.      REPRODUCTIVE ORGANS: No significant abnormality.      VESSELS: There are moderate to severe atherosclerotic calcification  abdominal aorta and its branches. No aneurysmal dilation.      RETROPERITONEUM/LYMPH NODES: No enlarged lymph nodes.      BOWEL/MESENTERY/PERITONEUM: There is sigmoid colon diverticulosis  without evidence of acute inflammation. No inflammatory bowel wall  thickening or dilatation. Appendix is absent.      No ascites, free air, or fluid collection.          MUSCULOSKELETAL: No acute osseous abnormality. Postsurgical changes  of right total hip arthroplasty with intact hardware and no  perihardware fractures or lucencies. Severe degenerative changes of  the left hip joint with axial migration. There are severe  degenerative changes of the lower thoracic and lumbar spine.      Impression: No evidence of acute abnormality within the abdomen or pelvis.  Specifically, no evidence of bowel obstruction as clinically queried.          I personally reviewed the images/study and I agree with the findings  as stated by resident physician Dr. Olivier Alva . This study  was interpreted at [facility]  [place], [place].      MACRO:  None      Signed by: Marcus Farrell 3/22/2024 2:39 AM  Dictation workstation:   [id number]  CT angio chest for pulmonary embolism  Narrative: Interpreted By:  Marcus Farrell,  and Chiquita Aguayo   STUDY:  CT ANGIO CHEST FOR PULMONARY EMBOLISM;  3/22/2024 1:46 am      INDICATION:  Signs/Symptoms:WHEAT, pleuritic cp.      COMPARISON:  CT chest 02/16/2020.      ACCESSION NUMBER(S):  [id number]      ORDERING CLINICIAN:  ABBY BAUTISTA      TECHNIQUE:  Helical data acquisition of the chest was obtained after intravenous  administration of 80 mL of Omnipaque 350, as per PE protocol. Images  were reformatted in coronal and sagittal planes. Axial and coronal  maximum intensity projection (MIP) images were created and reviewed.      FINDINGS:  POTENTIAL LIMITATIONS OF THE STUDY: None      HEART AND VESSELS:  There are no discrete filling defects within main pulmonary artery  and its branches to suggest acute pulmonary embolism. Main pulmonary  artery measures 3.5 cm. There are prominent proximal right and left  pulmonary arteries.      Ascending thoracic aorta measures 4.5 cm.There is mild scattered  atherosclerosis present, including calcified and noncalcified  plaques. Mild coronary artery calcifications are seen. Please  note,the study is not optimized for evaluation of coronary arteries.      There is mild cardiomegaly with mild hypertrophy of the left  ventricle and mild enlargement of the left atrium.There are coarse  calcifications of the mitral valve.      There is no pericardial effusion seen.      MEDIASTINUM AND LANCE, LOWER NECK AND AXILLA:  The visualized thyroid gland is within normal limits.  Multiple calcified mediastinal lymph nodes are present.  There is a small hiatal hernia. Otherwise, esophagus is unremarkable.      LUNGS AND AIRWAYS:  The trachea and central airways are patent. No endobronchial lesion  is seen.Small amount of secretions are  visualized in the upper  esophagus. There is interlobular septal thickening of the bilateral  lung bases. Redemonstrated thickening of the upper lobe axial  interstitium with bronchiectasis, likely reflecting scarring. No  focal consolidation, pleural effusion or pneumothorax. Right basilar  paraseptal emphysema.              UPPER ABDOMEN:  The visualized subdiaphragmatic structures demonstrate no remarkable  findings.              CHEST WALL AND OSSEOUS STRUCTURES:  Left chest dual-chamber pacemaker with atrial and ventricular leads  in place. Postsurgical changes of median sternotomy with aortic valve  replacement/repair. There are abandoned pericardial leads. No acute  osseous pathology.There are no suspicious osseous lesions.Diffuse  osteopenia.      Impression: 1. No evidence of acute pulmonary embolism.  2. Cardiomegaly. Interlobular septal thickening in bilateral lung  bases likely representing acute pulmonary interstitial edema/CHF.  Correlate clinically with volume status and consider correlation with  BNP.  3. Pulmonary artery is enlarged measuring 3.5 cm. Recommend  correlation with concern for pulmonary artery hypertension.  4. Redemonstrated thickening of the upper lobe axial interstitium  with bronchiectasis, likely reflecting scarring. No focal  consolidation, pleural effusion or pneumothorax.      I personally reviewed the images/study and I agree with the findings  as stated by resident physician Dr. Olivier Alva . This study  was interpreted at [facility],  [place], [place].      MACRO:  None      Signed by: Marcus Farrell 3/22/2024 2:30 AM  Dictation workstation:   [id number]  Lower extremity venous duplex right  Narrative: Interpreted By:  Marcus Farrell,  and Ryan Guan   STUDY:  [facility] LOWER EXTREMITY VENOUS DUPLEX RIGHT;  3/21/2024 10:23 pm      INDICATION:  Signs/Symptoms:acute swelling, r/o dvt.      COMPARISON:  None.      ACCESSION  NUMBER(S):  [id number]      ORDERING CLINICIAN:  ABBY BAUTISTA      TECHNIQUE:  Vascular ultrasound of the  right lower extremity was performed. Real  time compression views as well as Gray scale, color Doppler and  spectral Doppler waveform analysis was performed.   This examination  was interpreted at [facility].      FINDINGS:  Evaluation of the visualized portions of the  right common femoral  vein, proximal, mid, and distal femoral vein, and popliteal vein were  performed.  Evaluation of the visualized portions of the  posterior  tibial and peroneal veins were also performed.  In addition,  evaluation of the contralateral common femoral vein was performed.      Limitations:  None.      The evaluated veins demonstrate normal compressibility. There is  intact venous flow demonstrating normal respiratory variability and  normal augmentation of flow with calf compression. Therefore, there  is no ultrasonographic evidence for deep vein thrombosis within the  evaluated veins.      Respiratory variation and augmentation to calf pressure was noted.      Impression: No DVT in the right lower extremity.      I personally reviewed the images/study and I agree with the findings  as stated by Roge Davis MD (resident) . This study was  interpreted at [place], [place].      MACRO:  None      Signed by: Marcus Farrell 3/22/2024 12:54 AM  Dictation workstation:   [id number]    ED Medication Administration from 03/21/2024 2048 to 03/22/2024 0240         Date/Time Order Dose Route Action Action by     03/21/2024 2135 EDT ipratropium-albuteroL (Duo-Neb) 0.5-2.5 mg/3 mL nebulizer solution 3 mL 3 mL nebulization Not Given MARIANNE Raymundo     03/21/2024 2155 EDT ipratropium-albuteroL (Duo-Neb) 0.5-2.5 mg/3 mL nebulizer solution 3 mL 3 mL nebulization Not Given Bauerle, K     03/21/2024 2215 EDT ipratropium-albuteroL (Duo-Neb) 0.5-2.5 mg/3  mL nebulizer solution 3 mL 3 mL nebulization Not Given MARIANNE Raymundo     03/21/2024 2306 EDT methylPREDNISolone sod succinate (SOLU-Medrol) injection 125 mg 125 mg intravenous Given MARIANNE Raymundo     03/21/2024 2306 EDT ondansetron (Zofran) injection 4 mg 4 mg intravenous Given MARIANNE Raymundo     03/22/2024 0137 EDT iohexol (OMNIPaque) 350 mg iodine/mL solution 80 mL 80 mL intravenous Given ARMANDO Shen          Transthoracic Echo (TTE) Limited    Result Date: 2/17/2024   [facility], [address] [place] [address]                Tel [phone] and Fax [phone] TRANSTHORACIC ECHOCARDIOGRAM REPORT  Patient Name:      NILAY Reyes Physician:    [id number] Carolann Colorado MD Study Date:        2/17/2024            Ordering Provider:    [id number] Guadalupe MCKEON MRN/PID:           [id number]             Fellow: Accession#:        [id number]         Nurse:                Chuyita Lim RN Date of Birth/Age: 5/10/1932 / 89 years Sonographer:          ABRAM Nunez RDCS Gender:            F                    Additional Staff: Height:            160.02 cm            Admit Date:           2/16/2024 Weight:            99.79 kg             Admission Status:     Inpatient -                                                               Routine BSA / BMI:         2.01 m2 / 38.97      Encounter#:           [id number]                    kg/m2                                         Department Location:  [facility]                                                               Invasive Blood Pressure: 137 /43 mmHg Study Type:    TRANSTHORACIC ECHO (TTE) LIMITED Diagnosis/ICD: Nonrheumatic aortic (valve) stenosis-I35.0 Indication:    AS CPT  Code:      Echo Limited-20058; Doppler Limited-38813; Color Doppler-99263 Patient History: Pertinent History: Dyspnea, Chest Pain and COPD. tachy-shahram syndrome, AS s/p                    AVR with myocardial shaving (2013, make and model unknonw),                    GERD, hypoxemia, sarcoidosis, LBBB. Study Detail: The following Echo studies were performed: 2D, M-Mode, Doppler and               color flow. Technically challenging study due to poor acoustic               windows. Definity used as a contrast agent for endocardial border               definition. Total contrast used for this procedure was 2 mL via IV               push.  PHYSICIAN INTERPRETATION: Left Ventricle: The left ventricular systolic function is hyperdynamic, with an estimated ejection fraction of 70-75%. There are no regional wall motion abnormalities. The left ventricular cavity size is normal. There is moderate concentric left ventricular hypertrophy. Abnormal (paradoxical) septal motion consistent with post-operative status. Spectral Doppler shows a normal pattern of left ventricular diastolic filling. Left Atrium: The left atrium is severely dilated. Right Ventricle: The right ventricle is normal in size. Unable to determine right ventricular systolic function. Right Atrium: The right atrium is normal in size. There is a device visualized in the right atrium. Aortic Valve: There is a prosthetic aortic valve present. There is evidence of moderate aortic valve stenosis. There is bioprosthetic aortic valve. There is moderate to severe aortic valve regurgitation. The peak instantaneous gradient of the aortic valve is 40.4 mmHg. The mean gradient of the aortic valve is 22.0 mmHg. Mitral Valve: The mitral valve is mildly thickened. There is mild to moderate mitral annular calcification. Mitral valve regurgitation was not assessed. Tricuspid Valve: The tricuspid valve is structurally normal. Tricuspid regurgitation was not assessed. Pulmonic  Valve: The pulmonic valve is structurally normal. There is trace pulmonic valve regurgitation. Pericardium: There is no pericardial effusion noted. Aorta: The aortic root is normal. Systemic Veins: The inferior vena cava appears to be of normal size. In comparison to the previous echocardiogram(s): Compared with study from 1/10/2023, no significant change. Gradients over the prosthetic valve is unchanged. PHT for regurg is also unchanged.  CONCLUSIONS:  1. Left ventricular systolic function is hyperdynamic with a 70-75% estimated ejection fraction.  2. There is moderate concentric left ventricular hypertrophy.  3. The left atrium is severely dilated.  4. There is a bioprosthetic aortic valve.  5. Moderate aortic valve stenosis.  6. Moderate to severe aortic valve regurgitation.  7. At least moderate AR, severe cannot be ruled out.  8. Prosthetic aortic valve dysfunction due to degenrative changes. Regurg mechanism is unclear if it is perivalvular or transvalvular.  9. Abnormal septal motion consistent with post-operative status. 10. Compared with study from 1/10/2023, no significant change. Gradients over the prosthetic valve is unchanged. PHT for regurg is also unchanged. QUANTITATIVE DATA SUMMARY: 2D MEASUREMENTS:                           Normal Ranges: Ao Root d:     3.10 cm    (2.0-3.7cm) LAs:           3.70 cm    (2.7-4.0cm) IVSd:          1.40 cm    (0.6-1.1cm) LVPWd:         1.10 cm    (0.6-1.1cm) LVIDd:         4.80 cm    (3.9-5.9cm) LVIDs:         3.50 cm LV Mass Index: 115.3 g/m2 LV % FS        27.1 % LA VOLUME:                               Normal Ranges: LA Vol A4C:        89.8 ml    (22+/-6mL/m2) LA Vol A2C:        113.4 ml LA Vol BP:         101.2 ml LA Vol Index A4C:  44.6ml/m2 LA Vol Index A2C:  56.3 ml/m2 LA Vol Index BP:   50.3 ml/m2 LA Area A4C:       26.7 cm2 LA Area A2C:       30.1 cm2 LA Major Axis A4C: 6.8 cm LA Major Axis A2C: 6.8 cm LA Volume Index:   50.3 ml/m2 AORTA MEASUREMENTS:                     Normal Ranges: Asc Ao, d: 3.90 cm (2.1-3.4cm) LV SYSTOLIC FUNCTION BY 2D PLANIMETRY (MOD):                     Normal Ranges: EF-A4C View: 76.4 % (>=55%) EF-A2C View: 68.7 % EF-Biplane:  71.8 % AORTIC VALVE:                                    Normal Ranges: AoV Vmax:                3.18 m/s  (<=1.7m/s) AoV Peak P.4 mmHg (<20mmHg) AoV Mean P.0 mmHg (1.7-11.5mmHg) LVOT Max Matthew:            1.52 m/s  (<=1.1m/s) AoV VTI:                 68.20 cm  (18-25cm) LVOT VTI:                32.50 cm LVOT Diameter:           1.90 cm   (1.8-2.4cm) AoV Area, VTI:           1.35 cm2  (2.5-5.5cm2) AoV Area,Vmax:           1.36 cm2  (2.5-4.5cm2) AoV Dimensionless Index: 0.48 AORTIC INSUFFICIENCY: AI Vmax:       3.73 m/s AI Half-time:  418 msec AI Decel Rate: 266.50 cm/s2  [id number] Carolann Colorado MD Electronically signed on 2024 at 3:32:47 PM  ** Final **         Assessment/Plan   Principal Problem:    OSEAS (acute kidney injury) (CMS/[facility])      #OSEAS  -Suspect 2/2 poor PO intake, n/v/d  -Will give NS @ 100 mL/hr for 500 mL  -Will hold torsemide, spironolactone for tomorrow  -Avoid nephrotoxic agents where possible  -Renal dosing where indicated  -Follow RFP    #Covid-19  -Afebrile, no hypoxia  -Droplet plus precautions  -Monitor, treat per guidelines if condition worsens    #Troponinemia  #s/p AVR  #s/p PPM  -Troponin mildly elevated  -No complaints of chest pain  -Recently admitted (2024) for chest pain, -ve workup  -EKG A/V paced without signs of acute ischemia  -Continue home medications  -Okay for no telemetry    #HFpEF  -Echo 2024 hyperdynamic, EF 70-75%  -Daily weights  -Continue home medications  -Appears euvolemic on exam if not dry    #Sarcoidosis  #COPD  -No acute issues  -Continue home medications    #HTN  #HLD  -Hypertensive on arrival  -Resume home medications           Oscar Munoz, APRN-CNP

## 2024-03-22 NOTE — ED TRIAGE NOTES
Pt bib EMS for abdominal pain, EMS was called for shortness of breath but resolved before they arrived, changed to abdominal pain, Pt endoreses n/v/d.  Pt has a PMHx of dual paced heart, hip replacement, knee replacement, pt mentioned recently seeing a doctor that said heart pacer should be good for the next year.  Pt denies thinner use,   Bed low and locked, side rails up, call light within reach, will continue to monitor

## 2024-03-22 NOTE — ED PROVIDER NOTES
HPI  Patient is a 89-year-old female with with PMH of tachybradycardia syndrome s/p AICD, COPD, CHF, HOCM, HTN, HLD, aortic valve stenosis/regurgitation s/p AVR, and asthma presenting to the emergency department due to feeling short of breath.  Patient's son states that he was feeding her when she noticed that she was having difficulty swallowing and began coughing.  Was had been feeling short of breath of the past 3 to 4 days.  Denies subjective fevers, however does report a little bit of chest discomfort.  No recent falls.  No longer on anticoagulation that she was previously taking for valvular issues.    Physical Exam  VITALS: Vital signs reviewed in nursing triage note, EMR flow sheets, and at patient's bedside  CONSTITUTIONAL: Well-appearing, in no apparent distress  HEAD: NCAT  EYES: PERRL, EOMI  NECK: Supple, non-tender, full ROM  CARD: RRR, no m/r/g, no JVD  RESP: LCTAB, speaking full sentences, mild increased work of breathing, no use of accessory respiratory muscles  ABD: Bowel sounds present, non-distended, soft and non-tender, no palpable organomegaly, no masses, no guarding or rebound tenderness  BACK: No vertebral point or CVA tenderness, no obvious bony deformities, no spinal step-offs   EXT: Normal ROM in all four extremities, 2+ radial and DP pulses bilaterally, considerable RLE edema greater than LLE edema  SKIN: Dry with appropriate turgor, no apparent rashes, suspicious lesions, or masses   NEURO: CNs II-XII grossly intact, AAOx4, sensation intact bilaterally, strength 5/5 on UEs and LEs bilaterally, speech is fluent and comprehensible  PSYCH: Appropriate mood and affect, no HI/SI, not responding to internal stimuli    Vitals:    03/22/24 1344   BP:    Pulse: 60   Resp: 15   Temp:    SpO2: 96%       Assessment/Plan/MDM  Patient clinically stable with normal vital signs upon presentation to the emergency department.  Initially was placed on 2 L of supplemental oxygen, however able to be weaned  off onto room air.  EKG was obtained that did not demonstrate any significant ischemic changes and only demonstrated a paced rhythm.  Basic laboratory work will be obtained including CBC, CMP, delta troponin, magnesium, VBG, and COVID/flu swabs.  CXR obtained demonstrated evidence of pulmonary edema.  Given her shortness of breath and lower extremity swelling however, will be sent for CT PE and lower extremity DVT ultrasound.  Patient initially was complaining of some abdominal pain for which a CT abdomen/pelvis was ordered.  Ultimate disposition will be contingent on labs and imaging, or for suspect she will likely be admitted.  Will be signed out in stable condition to incoming provider pending the above.    Results  *See section(s) entitled ``Lab Results´´, ``Diagnostic Imaging Results Review´´ for entirety.  Notable results listed below  - Labs: I independently interpreted as pending at the time of signout  - Images: I independently interpreted as CXR shows evidence of pulmonary edema, remainder of imaging pending at the time of signout    Diagnoses as of 03/22/24 5659   OSEAS (acute kidney injury) (CMS/[facility])      Clinical Impression  Shortness of breath    Dispo  Signed out to Dr. Lan at 2300; please see their note for full details regarding disposition.    Patient seen and discussed with attending physician Dr. Gallegos.    Dirk Mccall MD  Emergency Medicine, PGY-3    Was dictated using Dragon dictation. Please excuse any errors found in the note.     Dirk Mccall MD  Resident  03/22/24 7028

## 2024-03-22 NOTE — PROGRESS NOTES
Physical Therapy    Physical Therapy Evaluation & Treatment    Patient Name: Alayna Dumont  MRN: [id number]  Today's Date: 3/22/2024   Time Calculation  Start Time: 0949  Stop Time: 1021  Time Calculation (min): 32 min    Assessment/Plan   PT Assessment  PT Assessment Results: Decreased strength, Decreased range of motion, Decreased endurance, Impaired balance, Decreased mobility, Decreased coordination, Pain  Rehab Prognosis: Good  Strengths: Attitude of self  Barriers to Participation: Comorbidities  End of Session Communication: Bedside nurse  Assessment Comment: pt is a 89 y.o admitted for SOB now with COVID+ pt would benefit from skilled services to improve mobility to PLOF  End of Session Patient Position: Bed, 2 rail up   IP OR SWING BED PT PLAN  Inpatient or Swing Bed: Inpatient  PT Plan  PT Plan: Skilled PT  PT Frequency: 3 times per week  PT Discharge Recommendations: Moderate intensity level of continued care  PT Recommended Transfer Status: Assist x1  PT - OK to Discharge: Yes (Eval received and completed. Recs made.)      Subjective     General Visit Information:  General  Reason for Referral: pt admitted with SOB, nausea, vomitting, abdominal pain and now testing postive for COVID  Past Medical History Relevant to Rehab: 89 y.o. female with a past medical history of HTN, HLD, SUNDEEP, HFpEF (EF 70-75% 02/2024), AVR, dual chamber PPM, sarcoidosis, and COPD  Family/Caregiver Present: No  Prior to Session Communication: Bedside nurse  Patient Position Received: Bed, 2 rail up  General Comment: pt supine in bed and willing to participate.  Home Living:  Home Living  Type of Home: House  Lives With: Adult children (pt lives with son)  Home Adaptive Equipment: Walker rolling or standard, Wheelchair-manual (BSC)  Home Layout: One level  Home Access: Stairs to enter with rails  Entrance Stairs-Number of Steps: 2  Prior Level of Function:  Prior Function Per Pt/Caregiver Report  Level of [place]: Needs  assistance with ADLs, Needs assistance with homemaking  Ambulatory Assistance: Needs assistance (pt is non ambulatory. pt stands and pivots to wc and bsc daily from bed)  Prior Function Comments: pt stated that her son would leave food in fridge during day and she woulds stand and place in microwave but has not been able to do this in the last two days.  Precautions:  Precautions  Medical Precautions: Fall precautions  Precautions Comment: airborne precautions for COVID +  Vital Signs:  Vital Signs  Heart Rate: 60  Heart Rate Source: Monitor  Pulse Ox: 98 %  BP: (!) 143/92  BP Location: Right arm  BP Method: Automatic  Patient Position: Lying    Objective   Pain:  Pain Assessment  Pain Assessment: 0-10  Pain Score: 5 - Moderate pain  Pain Type: Chronic pain  Pain Location:  (pt with pain in hip and knee. bone on bone and never bears weight on LE)  Pain Orientation: Left  Pain Interventions: Medication (See MAR)  Cognition:  Cognition  Overall Cognitive Status: Within Functional Limits    General Assessments:      Activity Tolerance  Endurance: Tolerates 30 min exercise with multiple rests    Sensation  Light Touch: No apparent deficits    Strength  Strength Comments: L LE grossly 3-/5, R LE grossly 4/5  Postural Control  Postural Control: Impaired  Posture Comment: pt with forward flexed posture.    Static Sitting Balance  Static Sitting-Balance Support: Feet supported (with stool)  Static Sitting-Level of Assistance: Contact guard  Static Sitting-Comment/Number of Minutes: pt sat EOB for 15 mins at request    Static Standing Balance  Static Standing-Balance Support: Bilateral upper extremity supported  Static Standing-Level of Assistance: Moderate assistance  Static Standing-Comment/Number of Minutes: stood briefly  Functional Assessments:  Bed Mobility  Bed Mobility: Yes  Bed Mobility 1  Bed Mobility 1: Supine to sitting, Sitting to supine  Level of Assistance 1: Minimum assistance    Transfers  Transfer:  Yes  Transfer 1  Transfer From 1: Sit to, Stand to  Transfer to 1: Stand, Sit  Technique 1: Sit to stand, Stand to sit  Transfer Device 1:  (handle for step and UE assist on L)  Transfer Level of Assistance 1: Moderate assistance    Ambulation/Gait Training  Ambulation/Gait Training Performed: No       Extremity/Trunk Assessments:     LLE   LLE : Exceptions to WFL  PROM LLE (degrees)  LLE PROM Comment: pt at rest in ~30 degree flexion  Treatments:       Therapeutic Activity  Therapeutic Activity Performed: Yes  Therapeutic Activity 1: pt asking to sit EOB for 15 mins to work on core, posture and breathing. with CGA    Bed Mobility  Bed Mobility: Yes  Bed Mobility 1  Bed Mobility 1: Supine to sitting, Sitting to supine  Level of Assistance 1: Minimum assistance    Ambulation/Gait Training  Ambulation/Gait Training Performed: No  Transfers  Transfer: Yes  Transfer 1  Transfer From 1: Sit to, Stand to  Transfer to 1: Stand, Sit  Technique 1: Sit to stand, Stand to sit  Transfer Device 1:  (handle for step and UE assist on L)  Transfer Level of Assistance 1: Moderate assistance       Outcome Measures:  [facility] Basic Mobility  Turning from your back to your side while in a flat bed without using bedrails: A lot  Moving from lying on your back to sitting on the side of a flat bed without using bedrails: A lot  Moving to and from bed to chair (including a wheelchair): A lot  Standing up from a chair using your arms (e.g. wheelchair or bedside chair): A lot  To walk in hospital room: Total  Climbing 3-5 steps with railing: Total  Basic Mobility - Total Score: 10    Encounter Problems       Encounter Problems (Active)       Balance       STG - Maintains static standing balance with upper extremity support with B UE support for 2 mins without LOB and with CGA        Start:  03/22/24    Expected End:  04/05/24       INTERVENTIONS:  1. Practice standing with minimal support.  2. Educate patient about standing tolerance.  3.  Educate patient about independence with gait, transfers, and ADL's.  4. Educate patient about use of assistive device.  5. Educate patient about self-directed care.            PT Transfers       LTG - Patient will transfer to commode with min assist       Start:  03/22/24    Expected End:  04/05/24            STG - Transfer from bed to chair with min assist        Start:  03/22/24    Expected End:  04/05/24            STG - Patient will perform bed mobility CGA       Start:  03/22/24    Expected End:  04/05/24                   Education Documentation  Precautions, taught by Rosette Castro, PT at 3/22/2024 12:48 PM.  Learner: Patient  Readiness: Eager  Method: Explanation  Response: Verbalizes Understanding    Mobility Training, taught by Rosette Castro, PT at 3/22/2024 12:48 PM.  Learner: Patient  Readiness: Eager  Method: Explanation  Response: Verbalizes Understanding    Education Comments  No comments found.

## 2024-03-22 NOTE — PROGRESS NOTES
This patient's GFR is noted to be 30.  We believe the benefits of contrasted CT scans outweigh the risks of contrast-induced nephropathy.  Will proceed with CT imaging.    03/22/24  Walter Braun MD

## 2024-03-22 NOTE — PROGRESS NOTES
89-year-old chronically ill female here with multiple medical complaints.  Previous provider evaluated the patient, deemed her necessary for labs, CT PE and abdomen and pelvis which show pulmonary edema, and stable chronic changes. Found to be COVID+ with OSEAS and concern for FTT. Admit to medicine for further treament.

## 2024-03-23 LAB
ALBUMIN SERPL BCP-MCNC: 3.4 G/DL (ref 3.4–5)
ANION GAP SERPL CALC-SCNC: 12 MMOL/L (ref 10–20)
APPEARANCE UR: ABNORMAL
BILIRUB UR STRIP.AUTO-MCNC: NEGATIVE MG/DL
BUN SERPL-MCNC: 61 MG/DL (ref 6–23)
CALCIUM SERPL-MCNC: 8.6 MG/DL (ref 8.6–10.6)
CHLORIDE SERPL-SCNC: 102 MMOL/L (ref 98–107)
CO2 SERPL-SCNC: 28 MMOL/L (ref 21–32)
COLOR UR: YELLOW
CREAT SERPL-MCNC: 1.78 MG/DL (ref 0.5–1.05)
EGFRCR SERPLBLD CKD-EPI 2021: 27 ML/MIN/1.73M*2
ERYTHROCYTE [DISTWIDTH] IN BLOOD BY AUTOMATED COUNT: 15.5 % (ref 11.5–14.5)
GLUCOSE SERPL-MCNC: 105 MG/DL (ref 74–99)
GLUCOSE UR STRIP.AUTO-MCNC: ABNORMAL MG/DL
HCT VFR BLD AUTO: 32.4 % (ref 36–46)
HGB BLD-MCNC: 9.4 G/DL (ref 12–16)
KETONES UR STRIP.AUTO-MCNC: NEGATIVE MG/DL
LEUKOCYTE ESTERASE UR QL STRIP.AUTO: ABNORMAL
MAGNESIUM SERPL-MCNC: 2.41 MG/DL (ref 1.6–2.4)
MCH RBC QN AUTO: 27.6 PG (ref 26–34)
MCHC RBC AUTO-ENTMCNC: 29 G/DL (ref 32–36)
MCV RBC AUTO: 95 FL (ref 80–100)
MUCOUS THREADS #/AREA URNS AUTO: NORMAL /LPF
NITRITE UR QL STRIP.AUTO: NEGATIVE
NRBC BLD-RTO: 0 /100 WBCS (ref 0–0)
PH UR STRIP.AUTO: 8.5 [PH]
PHOSPHATE SERPL-MCNC: 2.8 MG/DL (ref 2.5–4.9)
PLATELET # BLD AUTO: 218 X10*3/UL (ref 150–450)
POTASSIUM SERPL-SCNC: 4.6 MMOL/L (ref 3.5–5.3)
PROT UR STRIP.AUTO-MCNC: ABNORMAL MG/DL
RBC # BLD AUTO: 3.4 X10*6/UL (ref 4–5.2)
RBC # UR STRIP.AUTO: NEGATIVE /UL
RBC #/AREA URNS AUTO: NORMAL /HPF
SODIUM SERPL-SCNC: 137 MMOL/L (ref 136–145)
SP GR UR STRIP.AUTO: 1.02
SQUAMOUS #/AREA URNS AUTO: NORMAL /HPF
UROBILINOGEN UR STRIP.AUTO-MCNC: NORMAL MG/DL
WBC # BLD AUTO: 5.6 X10*3/UL (ref 4.4–11.3)
WBC #/AREA URNS AUTO: NORMAL /HPF

## 2024-03-23 PROCEDURE — 2500000004 HC RX 250 GENERAL PHARMACY W/ HCPCS (ALT 636 FOR OP/ED): Performed by: NURSE PRACTITIONER

## 2024-03-23 PROCEDURE — 99232 SBSQ HOSP IP/OBS MODERATE 35: CPT | Performed by: FAMILY MEDICINE

## 2024-03-23 PROCEDURE — 1100000001 HC PRIVATE ROOM DAILY

## 2024-03-23 PROCEDURE — 85027 COMPLETE CBC AUTOMATED: CPT | Performed by: FAMILY MEDICINE

## 2024-03-23 PROCEDURE — 2500000001 HC RX 250 WO HCPCS SELF ADMINISTERED DRUGS (ALT 637 FOR MEDICARE OP): Performed by: STUDENT IN AN ORGANIZED HEALTH CARE EDUCATION/TRAINING PROGRAM

## 2024-03-23 PROCEDURE — 80069 RENAL FUNCTION PANEL: CPT | Performed by: FAMILY MEDICINE

## 2024-03-23 PROCEDURE — 83735 ASSAY OF MAGNESIUM: CPT | Performed by: FAMILY MEDICINE

## 2024-03-23 PROCEDURE — 2500000001 HC RX 250 WO HCPCS SELF ADMINISTERED DRUGS (ALT 637 FOR MEDICARE OP): Performed by: NURSE PRACTITIONER

## 2024-03-23 PROCEDURE — 87086 URINE CULTURE/COLONY COUNT: CPT | Performed by: FAMILY MEDICINE

## 2024-03-23 PROCEDURE — 2500000001 HC RX 250 WO HCPCS SELF ADMINISTERED DRUGS (ALT 637 FOR MEDICARE OP): Performed by: FAMILY MEDICINE

## 2024-03-23 PROCEDURE — 2500000002 HC RX 250 W HCPCS SELF ADMINISTERED DRUGS (ALT 637 FOR MEDICARE OP, ALT 636 FOR OP/ED): Performed by: NURSE PRACTITIONER

## 2024-03-23 PROCEDURE — 2500000002 HC RX 250 W HCPCS SELF ADMINISTERED DRUGS (ALT 637 FOR MEDICARE OP, ALT 636 FOR OP/ED): Performed by: STUDENT IN AN ORGANIZED HEALTH CARE EDUCATION/TRAINING PROGRAM

## 2024-03-23 PROCEDURE — 2500000004 HC RX 250 GENERAL PHARMACY W/ HCPCS (ALT 636 FOR OP/ED): Performed by: FAMILY MEDICINE

## 2024-03-23 PROCEDURE — 36415 COLL VENOUS BLD VENIPUNCTURE: CPT | Performed by: FAMILY MEDICINE

## 2024-03-23 PROCEDURE — 81001 URINALYSIS AUTO W/SCOPE: CPT | Performed by: FAMILY MEDICINE

## 2024-03-23 PROCEDURE — 2500000004 HC RX 250 GENERAL PHARMACY W/ HCPCS (ALT 636 FOR OP/ED): Performed by: EMERGENCY MEDICINE

## 2024-03-23 RX ORDER — NYSTATIN 100000 [USP'U]/G
1 POWDER TOPICAL 2 TIMES DAILY
Status: DISCONTINUED | OUTPATIENT
Start: 2024-03-23 | End: 2024-03-30 | Stop reason: HOSPADM

## 2024-03-23 RX ORDER — CEPHALEXIN 500 MG/1
500 CAPSULE ORAL EVERY 12 HOURS
Status: DISCONTINUED | OUTPATIENT
Start: 2024-03-23 | End: 2024-03-26

## 2024-03-23 RX ORDER — IPRATROPIUM BROMIDE AND ALBUTEROL SULFATE 2.5; .5 MG/3ML; MG/3ML
3 SOLUTION RESPIRATORY (INHALATION) ONCE
Status: COMPLETED | OUTPATIENT
Start: 2024-03-23 | End: 2024-03-23

## 2024-03-23 RX ADMIN — TRAMADOL HYDROCHLORIDE 50 MG: 50 TABLET, COATED ORAL at 06:33

## 2024-03-23 RX ADMIN — GABAPENTIN 100 MG: 100 CAPSULE ORAL at 08:02

## 2024-03-23 RX ADMIN — PANTOPRAZOLE SODIUM 20 MG: 20 TABLET, DELAYED RELEASE ORAL at 08:02

## 2024-03-23 RX ADMIN — GUAIFENESIN AND DEXTROMETHORPHAN 10 ML: 100; 10 SYRUP ORAL at 08:03

## 2024-03-23 RX ADMIN — CEPHALEXIN 500 MG: 500 CAPSULE ORAL at 20:11

## 2024-03-23 RX ADMIN — LOPERAMIDE HYDROCHLORIDE 2 MG: 2 CAPSULE ORAL at 08:03

## 2024-03-23 RX ADMIN — GABAPENTIN 100 MG: 100 CAPSULE ORAL at 20:11

## 2024-03-23 RX ADMIN — FERROUS SULFATE TAB 325 MG (65 MG ELEMENTAL FE) 1 TABLET: 325 (65 FE) TAB at 08:03

## 2024-03-23 RX ADMIN — IPRATROPIUM BROMIDE AND ALBUTEROL SULFATE 3 ML: .5; 3 SOLUTION RESPIRATORY (INHALATION) at 20:49

## 2024-03-23 RX ADMIN — FLUTICASONE FUROATE AND VILANTEROL TRIFENATATE 1 PUFF: 200; 25 POWDER RESPIRATORY (INHALATION) at 09:00

## 2024-03-23 RX ADMIN — SODIUM CHLORIDE, POTASSIUM CHLORIDE, SODIUM LACTATE AND CALCIUM CHLORIDE 500 ML: 600; 310; 30; 20 INJECTION, SOLUTION INTRAVENOUS at 14:56

## 2024-03-23 RX ADMIN — TRAMADOL HYDROCHLORIDE 50 MG: 50 TABLET, COATED ORAL at 20:11

## 2024-03-23 RX ADMIN — HYDROCODONE BITARTRATE AND ACETAMINOPHEN 1 TABLET: 5; 325 TABLET ORAL at 17:42

## 2024-03-23 RX ADMIN — HYDROCODONE BITARTRATE AND ACETAMINOPHEN 1 TABLET: 5; 325 TABLET ORAL at 02:40

## 2024-03-23 RX ADMIN — NYSTATIN 1 APPLICATION: 100000 POWDER TOPICAL at 20:13

## 2024-03-23 RX ADMIN — TRAMADOL HYDROCHLORIDE 50 MG: 50 TABLET, COATED ORAL at 15:08

## 2024-03-23 RX ADMIN — HEPARIN SODIUM 5000 UNITS: 5000 INJECTION INTRAVENOUS; SUBCUTANEOUS at 15:09

## 2024-03-23 RX ADMIN — METOPROLOL TARTRATE 25 MG: 50 TABLET, FILM COATED ORAL at 08:03

## 2024-03-23 RX ADMIN — HEPARIN SODIUM 5000 UNITS: 5000 INJECTION INTRAVENOUS; SUBCUTANEOUS at 02:40

## 2024-03-23 RX ADMIN — EMPAGLIFLOZIN 10 MG: 10 TABLET, FILM COATED ORAL at 08:03

## 2024-03-23 RX ADMIN — HYDROCODONE BITARTRATE AND ACETAMINOPHEN 1 TABLET: 5; 325 TABLET ORAL at 10:07

## 2024-03-23 RX ADMIN — POLYETHYLENE GLYCOL 3350 17 G: 17 POWDER, FOR SOLUTION ORAL at 08:02

## 2024-03-23 RX ADMIN — Medication 1 TABLET: at 08:02

## 2024-03-23 RX ADMIN — METOPROLOL TARTRATE 25 MG: 50 TABLET, FILM COATED ORAL at 20:11

## 2024-03-23 RX ADMIN — HEPARIN SODIUM 5000 UNITS: 5000 INJECTION INTRAVENOUS; SUBCUTANEOUS at 08:03

## 2024-03-23 RX ADMIN — DULOXETINE HYDROCHLORIDE 30 MG: 30 CAPSULE, DELAYED RELEASE ORAL at 08:02

## 2024-03-23 RX ADMIN — BACLOFEN 5 MG: 10 TABLET ORAL at 08:02

## 2024-03-23 RX ADMIN — BACLOFEN 5 MG: 10 TABLET ORAL at 20:11

## 2024-03-23 ASSESSMENT — PAIN - FUNCTIONAL ASSESSMENT
PAIN_FUNCTIONAL_ASSESSMENT: 0-10

## 2024-03-23 ASSESSMENT — COGNITIVE AND FUNCTIONAL STATUS - GENERAL
DAILY ACTIVITIY SCORE: 14
TURNING FROM BACK TO SIDE WHILE IN FLAT BAD: TOTAL
DRESSING REGULAR UPPER BODY CLOTHING: A LOT
EATING MEALS: A LITTLE
DRESSING REGULAR LOWER BODY CLOTHING: A LOT
HELP NEEDED FOR BATHING: A LOT
STANDING UP FROM CHAIR USING ARMS: TOTAL
MOVING TO AND FROM BED TO CHAIR: TOTAL
TOILETING: A LOT
MOVING FROM LYING ON BACK TO SITTING ON SIDE OF FLAT BED WITH BEDRAILS: TOTAL
CLIMB 3 TO 5 STEPS WITH RAILING: TOTAL
MOBILITY SCORE: 6
PERSONAL GROOMING: A LITTLE
WALKING IN HOSPITAL ROOM: TOTAL

## 2024-03-23 ASSESSMENT — PAIN SCALES - GENERAL
PAINLEVEL_OUTOF10: 6
PAINLEVEL_OUTOF10: 10 - WORST POSSIBLE PAIN
PAINLEVEL_OUTOF10: 6
PAINLEVEL_OUTOF10: 6
PAINLEVEL_OUTOF10: 10 - WORST POSSIBLE PAIN
PAINLEVEL_OUTOF10: 0 - NO PAIN
PAINLEVEL_OUTOF10: 8
PAINLEVEL_OUTOF10: 9
PAINLEVEL_OUTOF10: 10 - WORST POSSIBLE PAIN

## 2024-03-23 ASSESSMENT — PAIN DESCRIPTION - LOCATION
LOCATION: LEG
LOCATION: LEG
LOCATION: HIP

## 2024-03-23 ASSESSMENT — PAIN DESCRIPTION - ORIENTATION
ORIENTATION: LEFT
ORIENTATION: LEFT

## 2024-03-23 NOTE — CARE PLAN
Problem: Fall/Injury  Goal: Not fall by end of shift  Outcome: Progressing  Goal: Be free from injury by end of the shift  Outcome: Progressing  Goal: Verbalize understanding of personal risk factors for fall in the hospital  Outcome: Progressing  Goal: Verbalize understanding of risk factor reduction measures to prevent injury from fall in the home  Outcome: Progressing  Goal: Use assistive devices by end of the shift  Outcome: Progressing  Goal: Pace activities to prevent fatigue by end of the shift  Outcome: Progressing   The patient's goals for the shift include      The clinical goals for the shift include be free from fall and injury    Over the shift, the patient did not make progress toward the following goals.

## 2024-03-23 NOTE — PROGRESS NOTES
#Covid-19  -Afebrile, no hypoxia  -Droplet plus precautions  -Monitor, treat per guidelines if condition worsens     #OSEAS  -Suspect 2/2 poor PO intake, n/v/d  -s/p NS @ 100 mL/hr for 500 mL  -hold torsemide, spironolactone for tomorrow  -Avoid nephrotoxic agents where possible  -Renal dosing where indicated  -Follow RFP    #OA L hip  - not controlled on tramadol,, started norco  - PT/OT consult  - patient is unlikely to be a surgical candidate, after discussing with patient and family at bedside will not call ortho consult for now  - patient may benefit from palliative care consult    #Troponinemia  #s/p AVR  #s/p PPM  -Troponin mildly elevated  -No complaints of chest pain  -Recently admitted (2/2024) for chest pain, -ve workup  -EKG A/V paced without signs of acute ischemia  -Continue home medications  -Okay for no telemetry     #HFpEF  -Echo 2/2024 hyperdynamic, EF 70-75%  -Daily weights  -Continue home medications  -Appears euvolemic on exam if not dry     #Sarcoidosis  #COPD  -No acute issues  -Continue home medications     #HTN  #HLD  -Hypertensive on arrival  -Resume home medications      Additional Progress Note...

## 2024-03-23 NOTE — PROGRESS NOTES
"Alayna Dumont is a 89 y.o. female on day 1 of admission presenting with OSEAS (acute kidney injury) (CMS/[facility]).    Subjective   Patient seen asleep in bed. No reported overnight events as per nurse.     ROS deferred as patient asleep       Objective     Physical Exam  Gen: Adult female, no acute distress  HEENT: Normocephalic, atraumatic  Neck: No thyroid enlargement appreciated  CV: No limb edema appreciated  Resp: No increased work of breathing, no wheezes appreciated  Abdomen: nondistended  MSK: No joint swelling appreciated  Neuro: asleep, no response to voice    Last Recorded Vitals  Blood pressure 151/63, pulse 62, temperature 36.1 °C (97 °F), temperature source Tympanic, resp. rate 18, height 1.61 m (5' 3.39\"), weight 95.9 kg (211 lb 6.7 oz), SpO2 91 %.  Intake/Output last 3 Shifts:  I/O last 3 completed shifts:  In: - (0 mL/kg)   Out: 300 (3.1 mL/kg) [Urine:300 (0.1 mL/kg/hr)]  Weight: 95.9 kg       Relevant Results  Scheduled medications  baclofen, 5 mg, oral, BID  DULoxetine, 30 mg, oral, Daily  empagliflozin, 10 mg, oral, Daily  ferrous sulfate (325 mg ferrous sulfate), 65 mg of iron, oral, Daily with breakfast  fluticasone furoate-vilanteroL, 1 puff, inhalation, Daily  gabapentin, 100 mg, oral, BID  heparin (porcine), 5,000 Units, subcutaneous, q8h  metoprolol tartrate, 25 mg, oral, BID  multivitamin with minerals, 1 tablet, oral, Daily  nystatin, 1 Application, Topical, BID  pantoprazole, 20 mg, oral, Daily      Continuous medications     PRN medications  PRN medications: acetaminophen **OR** acetaminophen **OR** acetaminophen, dextromethorphan-guaifenesin, HYDROcodone-acetaminophen, loperamide, melatonin, polyethylene glycol, traMADol    Results for orders placed or performed during the hospital encounter of 03/21/24 (from the past 24 hour(s))   Magnesium   Result Value Ref Range    Magnesium 2.41 (H) 1.60 - 2.40 mg/dL   Renal Function Panel   Result Value Ref Range    Glucose 105 (H) 74 - 99 mg/dL    " Sodium 137 136 - 145 mmol/L    Potassium 4.6 3.5 - 5.3 mmol/L    Chloride 102 98 - 107 mmol/L    Bicarbonate 28 21 - 32 mmol/L    Anion Gap 12 10 - 20 mmol/L    Urea Nitrogen 61 (H) 6 - 23 mg/dL    Creatinine 1.78 (H) 0.50 - 1.05 mg/dL    eGFR 27 (L) >60 mL/min/1.73m*2    Calcium 8.6 8.6 - 10.6 mg/dL    Phosphorus 2.8 2.5 - 4.9 mg/dL    Albumin 3.4 3.4 - 5.0 g/dL   CBC   Result Value Ref Range    WBC 5.6 4.4 - 11.3 x10*3/uL    nRBC 0.0 0.0 - 0.0 /100 WBCs    RBC 3.40 (L) 4.00 - 5.20 x10*6/uL    Hemoglobin 9.4 (L) 12.0 - 16.0 g/dL    Hematocrit 32.4 (L) 36.0 - 46.0 %    MCV 95 80 - 100 fL    MCH 27.6 26.0 - 34.0 pg    MCHC 29.0 (L) 32.0 - 36.0 g/dL    RDW 15.5 (H) 11.5 - 14.5 %    Platelets 218 150 - 450 x10*3/uL   Urinalysis with Reflex Culture and Microscopic   Result Value Ref Range    Color, Urine Yellow Light-Yellow, Yellow, Dark-Yellow    Appearance, Urine Ex.Turbid (N) Clear    Specific Gravity, Urine 1.023 1.005 - 1.035    pH, Urine 8.5 (N) 5.0, 5.5, 6.0, 6.5, 7.0, 7.5, 8.0    Protein, Urine 100 (2+) (A) NEGATIVE, 10 (TRACE), 20 (TRACE) mg/dL    Glucose, Urine 300 (3+) (A) Normal mg/dL    Blood, Urine NEGATIVE NEGATIVE    Ketones, Urine NEGATIVE NEGATIVE mg/dL    Bilirubin, Urine NEGATIVE NEGATIVE    Urobilinogen, Urine Normal Normal mg/dL    Nitrite, Urine NEGATIVE NEGATIVE    Leukocyte Esterase, Urine 500 Anibal/µL (A) NEGATIVE   Microscopic Only, Urine   Result Value Ref Range    WBC, Urine 1-5 1-5, NONE /HPF    RBC, Urine 1-2 NONE, 1-2, 3-5 /HPF    Squamous Epithelial Cells, Urine 26-50 (1+) Reference range not established. /HPF    Mucus, Urine 2+ Reference range not established. /LPF       ECG 12 lead    Result Date: 3/22/2024  See ED provider note for full interpretation and clinical correlation Confirmed by Ru Wang ([id number]) on 3/22/2024 2:57:40 AM    CT abdomen pelvis w IV contrast    Result Date: 3/22/2024  Interpreted By:  Marcus Farrell and Afshari Mirak Sohrab STUDY: CT ABDOMEN PELVIS W IV  CONTRAST;  3/22/2024 1:46 am   INDICATION: Signs/Symptoms:abd pain, r/o sbo.   COMPARISON: CT abdomen pelvis 08/21/2022.   ACCESSION NUMBER(S): [id number]   ORDERING CLINICIAN: ABBY BAUTISTA   TECHNIQUE: Contiguous axial images of the abdomen and pelvis were obtained after the intravenous administration of 80 mL Omnipaque 350 contrast. Coronal and sagittal reformatted images were reconstructed from the axial data.   FINDINGS: LOWER CHEST: Lower chest findings are better characterized on the same day CT chest.     ABDOMEN/PELVIS:   ABDOMINAL WALL: There is rectus diastasis.   LIVER: Enlarged. No focal lesion.   BILE DUCTS: No significant intrahepatic or extrahepatic dilatation.   GALLBLADDER: No significant abnormality.   PANCREAS: No significant abnormality.   SPLEEN: No significant abnormality.   ADRENALS: No significant abnormality.   KIDNEYS, URETERS, BLADDER: Scattered cortical cysts. No hydronephrosis or urolithiasis. Ureters appear normal. Bladder appears normal allowing for artifact from right hip prosthesis.   REPRODUCTIVE ORGANS: No significant abnormality.   VESSELS: There are moderate to severe atherosclerotic calcification abdominal aorta and its branches. No aneurysmal dilation.   RETROPERITONEUM/LYMPH NODES: No enlarged lymph nodes.   BOWEL/MESENTERY/PERITONEUM: There is sigmoid colon diverticulosis without evidence of acute inflammation. No inflammatory bowel wall thickening or dilatation. Appendix is absent.   No ascites, free air, or fluid collection.     MUSCULOSKELETAL: No acute osseous abnormality. Postsurgical changes of right total hip arthroplasty with intact hardware and no perihardware fractures or lucencies. Severe degenerative changes of the left hip joint with axial migration. There are severe degenerative changes of the lower thoracic and lumbar spine.       No evidence of acute abnormality within the abdomen or pelvis. Specifically, no evidence of bowel obstruction as clinically  queried.     I personally reviewed the images/study and I agree with the findings as stated by resident physician Dr. Olivier Alva . This study was interpreted at [facility], [place], [place].   MACRO: None   Signed by: Marcus Farrell 3/22/2024 2:39 AM Dictation workstation:   [id number]    CT angio chest for pulmonary embolism    Result Date: 3/22/2024  Interpreted By:  Marcus Farrell and Afshari Mirak Sohrab STUDY: CT ANGIO CHEST FOR PULMONARY EMBOLISM;  3/22/2024 1:46 am   INDICATION: Signs/Symptoms:WHEAT, pleuritic cp.   COMPARISON: CT chest 02/16/2020.   ACCESSION NUMBER(S): [id number]   ORDERING CLINICIAN: ABBY BAUTISTA   TECHNIQUE: Helical data acquisition of the chest was obtained after intravenous administration of 80 mL of Omnipaque 350, as per PE protocol. Images were reformatted in coronal and sagittal planes. Axial and coronal maximum intensity projection (MIP) images were created and reviewed.   FINDINGS: POTENTIAL LIMITATIONS OF THE STUDY: None   HEART AND VESSELS: There are no discrete filling defects within main pulmonary artery and its branches to suggest acute pulmonary embolism. Main pulmonary artery measures 3.5 cm. There are prominent proximal right and left pulmonary arteries.   Ascending thoracic aorta measures 4.5 cm.There is mild scattered atherosclerosis present, including calcified and noncalcified plaques. Mild coronary artery calcifications are seen. Please note,the study is not optimized for evaluation of coronary arteries.   There is mild cardiomegaly with mild hypertrophy of the left ventricle and mild enlargement of the left atrium.There are coarse calcifications of the mitral valve.   There is no pericardial effusion seen.   MEDIASTINUM AND LANCE, LOWER NECK AND AXILLA: The visualized thyroid gland is within normal limits. Multiple calcified mediastinal lymph nodes are present. There is a small hiatal hernia. Otherwise, esophagus is  unremarkable.   LUNGS AND AIRWAYS: The trachea and central airways are patent. No endobronchial lesion is seen.Small amount of secretions are visualized in the upper esophagus. There is interlobular septal thickening of the bilateral lung bases. Redemonstrated thickening of the upper lobe axial interstitium with bronchiectasis, likely reflecting scarring. No focal consolidation, pleural effusion or pneumothorax. Right basilar paraseptal emphysema.       UPPER ABDOMEN: The visualized subdiaphragmatic structures demonstrate no remarkable findings.       CHEST WALL AND OSSEOUS STRUCTURES: Left chest dual-chamber pacemaker with atrial and ventricular leads in place. Postsurgical changes of median sternotomy with aortic valve replacement/repair. There are abandoned pericardial leads. No acute osseous pathology.There are no suspicious osseous lesions.Diffuse osteopenia.       1. No evidence of acute pulmonary embolism. 2. Cardiomegaly. Interlobular septal thickening in bilateral lung bases likely representing acute pulmonary interstitial edema/CHF. Correlate clinically with volume status and consider correlation with BNP. 3. Pulmonary artery is enlarged measuring 3.5 cm. Recommend correlation with concern for pulmonary artery hypertension. 4. Redemonstrated thickening of the upper lobe axial interstitium with bronchiectasis, likely reflecting scarring. No focal consolidation, pleural effusion or pneumothorax.   I personally reviewed the images/study and I agree with the findings as stated by resident physician Dr. Olivier Alva . This study was interpreted at [facility], [place], [place].   MACRO: None   Signed by: Marcus Farrell 3/22/2024 2:30 AM Dictation workstation:   [id number]    Lower extremity venous duplex right    Result Date: 3/22/2024  Interpreted By:  Marcus Farrell,  and Ryan Guan STUDY: [facility] LOWER EXTREMITY VENOUS DUPLEX RIGHT;  3/21/2024 10:23 pm    INDICATION: Signs/Symptoms:acute swelling, r/o dvt.   COMPARISON: None.   ACCESSION NUMBER(S): [id number]   ORDERING CLINICIAN: ABBY BAUTISTA   TECHNIQUE: Vascular ultrasound of the  right lower extremity was performed. Real time compression views as well as Gray scale, color Doppler and spectral Doppler waveform analysis was performed.   This examination was interpreted at [facility].   FINDINGS: Evaluation of the visualized portions of the  right common femoral vein, proximal, mid, and distal femoral vein, and popliteal vein were performed.  Evaluation of the visualized portions of the  posterior tibial and peroneal veins were also performed.  In addition, evaluation of the contralateral common femoral vein was performed.   Limitations:  None.   The evaluated veins demonstrate normal compressibility. There is intact venous flow demonstrating normal respiratory variability and normal augmentation of flow with calf compression. Therefore, there is no ultrasonographic evidence for deep vein thrombosis within the evaluated veins.   Respiratory variation and augmentation to calf pressure was noted.       No DVT in the right lower extremity.   I personally reviewed the images/study and I agree with the findings as stated by Roge Davis MD (resident) . This study was interpreted at [place], [place].   MACRO: None   Signed by: Marcus Farrell 3/22/2024 12:54 AM Dictation workstation:   [id number]    XR chest 2 views    Result Date: 3/21/2024  Interpreted By:  Marcus Farrell and Stephens Katherine STUDY: XR CHEST 2 VIEWS;  3/21/2024 9:46 pm   INDICATION: Signs/Symptoms:sob.   COMPARISON: Chest radiograph and CT chest 02/16/2024   ACCESSION NUMBER(S): [id number]   ORDERING CLINICIAN: ABBY BAUTISTA   FINDINGS: AP and lateral radiographs of the chest were provided.   Left  chest wall cardiac pacemaker/AICD with leads  overlying the cardiomediastinal silhouette is stably configured. Postsurgical changes from median sternotomy. Cardiac valve replacement/repair and CABG.   CARDIOMEDIASTINAL SILHOUETTE: The cardiomediastinal silhouette is enlarged, stable in size and configuration. Aortic knob calcifications present.   LUNGS: Prominent perihilar interstitial markings. Small left-greater-than-right pleural effusions with adjacent hazy opacity. No pneumothorax.   ABDOMEN: No remarkable upper abdominal findings.   BONES: No acute osseous changes.       1.  Prominent perihilar interstitial markings which can be seen in setting of pulmonary edema, recommend correlation with patient's fluid status. 2. Small left-greater-than-right pleural effusions with adjacent atelectasis. 3. Similar cardiomegaly. 4. Postsurgical changes and medical devices as described above.   I personally reviewed the images/study and I agree with Teresa Owens DO's (radiology resident) findings as stated. This study was interpreted at [place], [place].   MACRO: None   Signed by: Marcus Farrell 3/21/2024 9:54 PM Dictation workstation:   [id number]                             Assessment/Plan   Principal Problem:    OSEAS (acute kidney injury) (CMS/[facility])      #Covid-19  -Afebrile, no hypoxia  -Droplet plus precautions  -Monitor, treat per guidelines if condition worsens  -monitor SpO2  -Guaifenesin-DM PRN cough, Loperamide PRN diarrhea     #OSEAS  -Suspect 2/2 poor PO intake, n/v/d  -s/p NS @ 100 mL/hr for 500 mL  -hold torsemide, spironolactone   -uptrending Cr, will give gentle bolus with caution given elevated BNP  -Avoid nephrotoxic agents where possible  -Renal dosing where indicated  -Follow RFP    #UTI  -UA shows LE  -resume home cephalexin, renally dosed    #OA L hip  - not controlled on tramadol, started norco PRN  - PT/OT consult  - patient is unlikely to be a surgical candidate, after discussing with patient and  family at bedside will not call ortho consult for now  - ordered palliative care consult    #Troponinemia  #s/p AVR  #s/p PPM  -Troponin mildly elevated  -No complaints of chest pain  -Recently admitted (2/2024) for chest pain, -ve workup  -EKG A/V paced without signs of acute ischemia  -Continue home medications  -Okay for no telemetry     #HFpEF  -Echo 2/2024 hyperdynamic, EF 70-75%  -Daily weights  -Continue home medications  -Appears euvolemic on exam if not dry     #Sarcoidosis  #COPD  -No acute issues  -Continue home medications     #HTN  #HLD  -Hypertensive on arrival  -Resume home medications              I spent 20 minutes in the professional and overall care of this patient.      Flaco Del Toro MD

## 2024-03-23 NOTE — CARE PLAN
Patient remained safe and free from falls and/or injury. Urine cultures sent; positive for UTI. Keflex ordered BID. Son updated this shift. Gentle bolus ordered for patient OSEAS. Patient complained of elevated pain this shift; medicated per EMR.     The clinical goals for the shift include patient will be free from any fall and/or injury this shift. Goal met. Patient safe.     Patient plan of care ongoing.     Edwige CASTAÑEDAN, RN, CMSRN    Problem: Skin  Goal: Decreased wound size/increased tissue granulation at next dressing change  Outcome: Progressing  Flowsheets (Taken 3/23/2024 1126)  Decreased wound size/increased tissue granulation at next dressing change:   Promote sleep for wound healing   Protective dressings over bony prominences  Goal: Participates in plan/prevention/treatment measures  Outcome: Progressing  Flowsheets (Taken 3/23/2024 1126)  Participates in plan/prevention/treatment measures:   Discuss with provider PT/OT consult   Elevate heels   Increase activity/out of bed for meals  Goal: Prevent/manage excess moisture  Outcome: Progressing  Flowsheets (Taken 3/23/2024 1126)  Prevent/manage excess moisture:   Cleanse incontinence/protect with barrier cream   Moisturize dry skin   Follow provider orders for dressing changes   Monitor for/manage infection if present  Goal: Prevent/minimize sheer/friction injuries  Outcome: Progressing  Flowsheets (Taken 3/23/2024 1126)  Prevent/minimize sheer/friction injuries:   HOB 30 degrees or less   Increase activity/out of bed for meals   Turn/reposition every 2 hours/use positioning/transfer devices   Use pull sheet  Goal: Promote/optimize nutrition  Outcome: Progressing  Flowsheets (Taken 3/23/2024 1126)  Promote/optimize nutrition:   Assist with feeding   Discuss with provider if NPO > 2 days   Consume > 50% meals/supplements   Monitor/record intake including meals   Offer water/supplements/favorite foods   Reassess MST if dietician not consulted  Goal:  Promote skin healing  Outcome: Progressing  Flowsheets (Taken 3/23/2024 1126)  Promote skin healing:   Protective dressings over bony prominences   Rotate device position/do not position patient on device   Turn/reposition every 2 hours/use positioning/transfer devices   Assess skin/pad under line(s)/device(s)     Problem: Fall/Injury  Goal: Not fall by end of shift  Outcome: Progressing  Goal: Be free from injury by end of the shift  Outcome: Progressing  Goal: Verbalize understanding of personal risk factors for fall in the hospital  Outcome: Progressing  Goal: Verbalize understanding of risk factor reduction measures to prevent injury from fall in the home  Outcome: Progressing  Goal: Use assistive devices by end of the shift  Outcome: Progressing  Goal: Pace activities to prevent fatigue by end of the shift  Outcome: Progressing     Problem: Pain - Adult  Goal: Verbalizes/displays adequate comfort level or baseline comfort level  Outcome: Progressing     Problem: Safety - Adult  Goal: Free from fall injury  Outcome: Progressing     Problem: Discharge Planning  Goal: Discharge to home or other facility with appropriate resources  Outcome: Progressing     Problem: Chronic Conditions and Co-morbidities  Goal: Patient's chronic conditions and co-morbidity symptoms are monitored and maintained or improved  Outcome: Progressing     Problem: Diabetes  Goal: Achieve decreasing blood glucose levels by end of shift  Outcome: Progressing  Goal: Increase stability of blood glucose readings by end of shift  Outcome: Progressing  Goal: Decrease in ketones present in urine by end of shift  Outcome: Progressing  Goal: Maintain electrolyte levels within acceptable range throughout shift  Outcome: Progressing  Goal: Maintain glucose levels >70mg/dl to <250mg/dl throughout shift  Outcome: Progressing  Goal: No changes in neurological exam by end of shift  Outcome: Progressing  Goal: Learn about and adhere to nutrition  recommendations by end of shift  Outcome: Progressing  Goal: Vital signs within normal range for age by end of shift  Outcome: Progressing  Goal: Increase self care and/or family involovement by end of shift  Outcome: Progressing  Goal: Receive DSME education by end of shift  Outcome: Progressing     Problem: Pain  Goal: Takes deep breaths with improved pain control throughout the shift  Outcome: Progressing  Goal: Turns in bed with improved pain control throughout the shift  Outcome: Progressing  Goal: Walks with improved pain control throughout the shift  Outcome: Progressing  Goal: Performs ADL's with improved pain control throughout shift  Outcome: Progressing  Goal: Participates in PT with improved pain control throughout the shift  Outcome: Progressing  Goal: Free from opioid side effects throughout the shift  Outcome: Progressing  Goal: Free from acute confusion related to pain meds throughout the shift  Outcome: Progressing

## 2024-03-24 LAB
ALBUMIN SERPL BCP-MCNC: 3.6 G/DL (ref 3.4–5)
ANION GAP SERPL CALC-SCNC: 17 MMOL/L (ref 10–20)
BUN SERPL-MCNC: 56 MG/DL (ref 6–23)
CALCIUM SERPL-MCNC: 8.9 MG/DL (ref 8.6–10.6)
CHLORIDE SERPL-SCNC: 105 MMOL/L (ref 98–107)
CO2 SERPL-SCNC: 23 MMOL/L (ref 21–32)
CREAT SERPL-MCNC: 1.59 MG/DL (ref 0.5–1.05)
EGFRCR SERPLBLD CKD-EPI 2021: 31 ML/MIN/1.73M*2
ERYTHROCYTE [DISTWIDTH] IN BLOOD BY AUTOMATED COUNT: 15.6 % (ref 11.5–14.5)
GLUCOSE BLD MANUAL STRIP-MCNC: 129 MG/DL (ref 74–99)
GLUCOSE SERPL-MCNC: 94 MG/DL (ref 74–99)
HCT VFR BLD AUTO: 34 % (ref 36–46)
HGB BLD-MCNC: 10.4 G/DL (ref 12–16)
HOLD SPECIMEN: NORMAL
MAGNESIUM SERPL-MCNC: 2.63 MG/DL (ref 1.6–2.4)
MCH RBC QN AUTO: 29.4 PG (ref 26–34)
MCHC RBC AUTO-ENTMCNC: 30.6 G/DL (ref 32–36)
MCV RBC AUTO: 96 FL (ref 80–100)
NRBC BLD-RTO: 0 /100 WBCS (ref 0–0)
PHOSPHATE SERPL-MCNC: 3.4 MG/DL (ref 2.5–4.9)
PLATELET # BLD AUTO: 236 X10*3/UL (ref 150–450)
POTASSIUM SERPL-SCNC: 5.5 MMOL/L (ref 3.5–5.3)
RBC # BLD AUTO: 3.54 X10*6/UL (ref 4–5.2)
SODIUM SERPL-SCNC: 139 MMOL/L (ref 136–145)
WBC # BLD AUTO: 7.3 X10*3/UL (ref 4.4–11.3)

## 2024-03-24 PROCEDURE — 82947 ASSAY GLUCOSE BLOOD QUANT: CPT

## 2024-03-24 PROCEDURE — 2500000002 HC RX 250 W HCPCS SELF ADMINISTERED DRUGS (ALT 637 FOR MEDICARE OP, ALT 636 FOR OP/ED): Performed by: FAMILY MEDICINE

## 2024-03-24 PROCEDURE — 2500000002 HC RX 250 W HCPCS SELF ADMINISTERED DRUGS (ALT 637 FOR MEDICARE OP, ALT 636 FOR OP/ED): Performed by: NURSE PRACTITIONER

## 2024-03-24 PROCEDURE — 2500000001 HC RX 250 WO HCPCS SELF ADMINISTERED DRUGS (ALT 637 FOR MEDICARE OP): Performed by: NURSE PRACTITIONER

## 2024-03-24 PROCEDURE — 85027 COMPLETE CBC AUTOMATED: CPT | Performed by: FAMILY MEDICINE

## 2024-03-24 PROCEDURE — 2500000004 HC RX 250 GENERAL PHARMACY W/ HCPCS (ALT 636 FOR OP/ED): Performed by: NURSE PRACTITIONER

## 2024-03-24 PROCEDURE — 2500000001 HC RX 250 WO HCPCS SELF ADMINISTERED DRUGS (ALT 637 FOR MEDICARE OP): Performed by: STUDENT IN AN ORGANIZED HEALTH CARE EDUCATION/TRAINING PROGRAM

## 2024-03-24 PROCEDURE — 80069 RENAL FUNCTION PANEL: CPT | Performed by: FAMILY MEDICINE

## 2024-03-24 PROCEDURE — 1100000001 HC PRIVATE ROOM DAILY

## 2024-03-24 PROCEDURE — 2500000004 HC RX 250 GENERAL PHARMACY W/ HCPCS (ALT 636 FOR OP/ED): Performed by: EMERGENCY MEDICINE

## 2024-03-24 PROCEDURE — 83735 ASSAY OF MAGNESIUM: CPT | Performed by: FAMILY MEDICINE

## 2024-03-24 PROCEDURE — 99232 SBSQ HOSP IP/OBS MODERATE 35: CPT | Performed by: FAMILY MEDICINE

## 2024-03-24 PROCEDURE — 2500000001 HC RX 250 WO HCPCS SELF ADMINISTERED DRUGS (ALT 637 FOR MEDICARE OP): Performed by: FAMILY MEDICINE

## 2024-03-24 PROCEDURE — 36415 COLL VENOUS BLD VENIPUNCTURE: CPT | Performed by: FAMILY MEDICINE

## 2024-03-24 RX ORDER — ATORVASTATIN CALCIUM 20 MG/1
20 TABLET, FILM COATED ORAL NIGHTLY
Status: DISCONTINUED | OUTPATIENT
Start: 2024-03-24 | End: 2024-03-30 | Stop reason: HOSPADM

## 2024-03-24 RX ORDER — SPIRONOLACTONE 25 MG/1
12.5 TABLET ORAL DAILY
Status: DISCONTINUED | OUTPATIENT
Start: 2024-03-24 | End: 2024-03-30 | Stop reason: HOSPADM

## 2024-03-24 RX ORDER — IPRATROPIUM BROMIDE AND ALBUTEROL SULFATE 2.5; .5 MG/3ML; MG/3ML
3 SOLUTION RESPIRATORY (INHALATION) EVERY 4 HOURS PRN
Status: DISCONTINUED | OUTPATIENT
Start: 2024-03-24 | End: 2024-03-25

## 2024-03-24 RX ADMIN — POLYETHYLENE GLYCOL 3350 17 G: 17 POWDER, FOR SOLUTION ORAL at 20:18

## 2024-03-24 RX ADMIN — NYSTATIN 1 APPLICATION: 100000 POWDER TOPICAL at 20:18

## 2024-03-24 RX ADMIN — PANTOPRAZOLE SODIUM 20 MG: 20 TABLET, DELAYED RELEASE ORAL at 08:42

## 2024-03-24 RX ADMIN — IPRATROPIUM BROMIDE AND ALBUTEROL SULFATE 3 ML: .5; 3 SOLUTION RESPIRATORY (INHALATION) at 14:58

## 2024-03-24 RX ADMIN — TRAMADOL HYDROCHLORIDE 50 MG: 50 TABLET, COATED ORAL at 20:18

## 2024-03-24 RX ADMIN — NYSTATIN 1 APPLICATION: 100000 POWDER TOPICAL at 08:43

## 2024-03-24 RX ADMIN — GABAPENTIN 100 MG: 100 CAPSULE ORAL at 08:42

## 2024-03-24 RX ADMIN — HEPARIN SODIUM 5000 UNITS: 5000 INJECTION INTRAVENOUS; SUBCUTANEOUS at 23:20

## 2024-03-24 RX ADMIN — BACLOFEN 5 MG: 10 TABLET ORAL at 08:42

## 2024-03-24 RX ADMIN — FERROUS SULFATE TAB 325 MG (65 MG ELEMENTAL FE) 1 TABLET: 325 (65 FE) TAB at 08:43

## 2024-03-24 RX ADMIN — HEPARIN SODIUM 5000 UNITS: 5000 INJECTION INTRAVENOUS; SUBCUTANEOUS at 16:00

## 2024-03-24 RX ADMIN — CEPHALEXIN 500 MG: 500 CAPSULE ORAL at 08:42

## 2024-03-24 RX ADMIN — HEPARIN SODIUM 5000 UNITS: 5000 INJECTION INTRAVENOUS; SUBCUTANEOUS at 08:42

## 2024-03-24 RX ADMIN — EMPAGLIFLOZIN 10 MG: 10 TABLET, FILM COATED ORAL at 08:42

## 2024-03-24 RX ADMIN — BACLOFEN 5 MG: 10 TABLET ORAL at 20:18

## 2024-03-24 RX ADMIN — GABAPENTIN 100 MG: 100 CAPSULE ORAL at 20:18

## 2024-03-24 RX ADMIN — Medication 1 TABLET: at 08:42

## 2024-03-24 RX ADMIN — CEPHALEXIN 500 MG: 500 CAPSULE ORAL at 20:18

## 2024-03-24 RX ADMIN — ATORVASTATIN CALCIUM 20 MG: 20 TABLET, FILM COATED ORAL at 20:18

## 2024-03-24 RX ADMIN — Medication 10 MG: at 20:18

## 2024-03-24 RX ADMIN — METOPROLOL TARTRATE 25 MG: 50 TABLET, FILM COATED ORAL at 20:18

## 2024-03-24 RX ADMIN — HYDROCODONE BITARTRATE AND ACETAMINOPHEN 1 TABLET: 5; 325 TABLET ORAL at 22:00

## 2024-03-24 RX ADMIN — DULOXETINE HYDROCHLORIDE 30 MG: 30 CAPSULE, DELAYED RELEASE ORAL at 08:42

## 2024-03-24 RX ADMIN — HYDROCODONE BITARTRATE AND ACETAMINOPHEN 1 TABLET: 5; 325 TABLET ORAL at 14:51

## 2024-03-24 RX ADMIN — METOPROLOL TARTRATE 25 MG: 50 TABLET, FILM COATED ORAL at 08:42

## 2024-03-24 RX ADMIN — HYDROCODONE BITARTRATE AND ACETAMINOPHEN 1 TABLET: 5; 325 TABLET ORAL at 03:15

## 2024-03-24 RX ADMIN — SPIRONOLACTONE 12.5 MG: 25 TABLET, FILM COATED ORAL at 18:22

## 2024-03-24 ASSESSMENT — PAIN DESCRIPTION - ORIENTATION: ORIENTATION: LEFT

## 2024-03-24 ASSESSMENT — COGNITIVE AND FUNCTIONAL STATUS - GENERAL
PERSONAL GROOMING: A LOT
MOVING TO AND FROM BED TO CHAIR: TOTAL
DRESSING REGULAR LOWER BODY CLOTHING: TOTAL
MOVING FROM LYING ON BACK TO SITTING ON SIDE OF FLAT BED WITH BEDRAILS: TOTAL
TURNING FROM BACK TO SIDE WHILE IN FLAT BAD: TOTAL
CLIMB 3 TO 5 STEPS WITH RAILING: TOTAL
WALKING IN HOSPITAL ROOM: TOTAL
HELP NEEDED FOR BATHING: TOTAL
STANDING UP FROM CHAIR USING ARMS: TOTAL
TOILETING: TOTAL
DRESSING REGULAR UPPER BODY CLOTHING: TOTAL
MOBILITY SCORE: 6

## 2024-03-24 ASSESSMENT — PAIN DESCRIPTION - LOCATION: LOCATION: LEG

## 2024-03-24 ASSESSMENT — PAIN SCALES - GENERAL
PAINLEVEL_OUTOF10: 8
PAINLEVEL_OUTOF10: 10 - WORST POSSIBLE PAIN
PAINLEVEL_OUTOF10: 8
PAINLEVEL_OUTOF10: 10 - WORST POSSIBLE PAIN
PAINLEVEL_OUTOF10: 6

## 2024-03-24 NOTE — PROGRESS NOTES
"Alayna Dumont is a 89 y.o. female on day 2 of admission presenting with OSEAS (acute kidney injury) (CMS/[facility]).    Subjective   Patient seen awake in bed. She reports that she hasn't gotten any nebulizer treatments and reports some increased shortness of breath. Nurse reports patient declined morning Breo inhaler. No reported overnight events.     ROS:  Constitutional: No fever, no chills, no fatigue  HEENT: No headache, no vision changes, no hearing loss, no nasal congestion  Respiratory: SOB, no cough  Cardiac: No chest pain, no palpitations, no swelling of limbs  GI: No nausea, no vomiting, no diarrhea  Musculoskeletal: Leg pain.   Neuro: No seizures, no dizziness, no lightheadedness  Heme: No easy bruising, no bleeding  Genitourinary:  No Dysuria       Objective     Physical Exam  Gen: Adult female, no acute distress  HEENT: Normocephalic, atraumatic, good dentition, no oral lesions appreciated  Neck: Supple. No cervical lymphadenopathy appreciated, no thyroid enlargement appreciated  CV: Regular rate and rhythm, S1 and S2 present, no murmurs rubs or gallops appreciated  Resp: Lungs clear to auscultation bilaterally, no ronchi, rales or wheezes appreciated  Abdomen: soft, nontender, nondistended, no guarding, no masses appreciated  MSK: No joint swelling appreciated, full ROM  Psych: appropriate mood and affect    Last Recorded Vitals  Blood pressure 150/55, pulse 60, temperature 36.1 °C (97 °F), temperature source Tympanic, resp. rate 24, height 1.61 m (5' 3.39\"), weight 95.9 kg (211 lb 6.7 oz), SpO2 96 %.  Intake/Output last 3 Shifts:  I/O last 3 completed shifts:  In: 1240 (12.9 mL/kg) [P.O.:240; IV Piggyback:1000]  Out: 1110 (11.6 mL/kg) [Urine:1110 (0.3 mL/kg/hr)]  Weight: 95.9 kg       Relevant Results  Scheduled medications  baclofen, 5 mg, oral, BID  cephalexin, 500 mg, oral, q12h  DULoxetine, 30 mg, oral, Daily  empagliflozin, 10 mg, oral, Daily  ferrous sulfate (325 mg ferrous sulfate), 65 mg of iron, " oral, Daily with breakfast  fluticasone furoate-vilanteroL, 1 puff, inhalation, Daily  gabapentin, 100 mg, oral, BID  heparin (porcine), 5,000 Units, subcutaneous, q8h  metoprolol tartrate, 25 mg, oral, BID  multivitamin with minerals, 1 tablet, oral, Daily  nystatin, 1 Application, Topical, BID  pantoprazole, 20 mg, oral, Daily      Continuous medications     PRN medications  PRN medications: acetaminophen **OR** acetaminophen **OR** acetaminophen, dextromethorphan-guaifenesin, HYDROcodone-acetaminophen, ipratropium-albuteroL, loperamide, melatonin, polyethylene glycol, traMADol    Results for orders placed or performed during the hospital encounter of 03/21/24 (from the past 24 hour(s))   Magnesium   Result Value Ref Range    Magnesium 2.63 (H) 1.60 - 2.40 mg/dL   CBC   Result Value Ref Range    WBC 7.3 4.4 - 11.3 x10*3/uL    nRBC 0.0 0.0 - 0.0 /100 WBCs    RBC 3.54 (L) 4.00 - 5.20 x10*6/uL    Hemoglobin 10.4 (L) 12.0 - 16.0 g/dL    Hematocrit 34.0 (L) 36.0 - 46.0 %    MCV 96 80 - 100 fL    MCH 29.4 26.0 - 34.0 pg    MCHC 30.6 (L) 32.0 - 36.0 g/dL    RDW 15.6 (H) 11.5 - 14.5 %    Platelets 236 150 - 450 x10*3/uL       ECG 12 lead    Result Date: 3/22/2024  See ED provider note for full interpretation and clinical correlation Confirmed by Ru Wang ([id number]) on 3/22/2024 2:57:40 AM    CT abdomen pelvis w IV contrast    Result Date: 3/22/2024  Interpreted By:  Marcus Farrell and Afshari Mirak Sohrab STUDY: CT ABDOMEN PELVIS W IV CONTRAST;  3/22/2024 1:46 am   INDICATION: Signs/Symptoms:abd pain, r/o sbo.   COMPARISON: CT abdomen pelvis 08/21/2022.   ACCESSION NUMBER(S): [id number]   ORDERING CLINICIAN: ABBY BAUTISTA   TECHNIQUE: Contiguous axial images of the abdomen and pelvis were obtained after the intravenous administration of 80 mL Omnipaque 350 contrast. Coronal and sagittal reformatted images were reconstructed from the axial data.   FINDINGS: LOWER CHEST: Lower chest findings are better characterized  on the same day CT chest.     ABDOMEN/PELVIS:   ABDOMINAL WALL: There is rectus diastasis.   LIVER: Enlarged. No focal lesion.   BILE DUCTS: No significant intrahepatic or extrahepatic dilatation.   GALLBLADDER: No significant abnormality.   PANCREAS: No significant abnormality.   SPLEEN: No significant abnormality.   ADRENALS: No significant abnormality.   KIDNEYS, URETERS, BLADDER: Scattered cortical cysts. No hydronephrosis or urolithiasis. Ureters appear normal. Bladder appears normal allowing for artifact from right hip prosthesis.   REPRODUCTIVE ORGANS: No significant abnormality.   VESSELS: There are moderate to severe atherosclerotic calcification abdominal aorta and its branches. No aneurysmal dilation.   RETROPERITONEUM/LYMPH NODES: No enlarged lymph nodes.   BOWEL/MESENTERY/PERITONEUM: There is sigmoid colon diverticulosis without evidence of acute inflammation. No inflammatory bowel wall thickening or dilatation. Appendix is absent.   No ascites, free air, or fluid collection.     MUSCULOSKELETAL: No acute osseous abnormality. Postsurgical changes of right total hip arthroplasty with intact hardware and no perihardware fractures or lucencies. Severe degenerative changes of the left hip joint with axial migration. There are severe degenerative changes of the lower thoracic and lumbar spine.       No evidence of acute abnormality within the abdomen or pelvis. Specifically, no evidence of bowel obstruction as clinically queried.     I personally reviewed the images/study and I agree with the findings as stated by resident physician Dr. Olivier Alva . This study was interpreted at [place], [place].   MACRO: None   Signed by: Marcus Farrell 3/22/2024 2:39 AM Dictation workstation:   [id number]    CT angio chest for pulmonary embolism    Result Date: 3/22/2024  Interpreted By:  Marcus Farrell and Afshari Mirak Sohrab STUDY: CT ANGIO CHEST FOR PULMONARY  EMBOLISM;  3/22/2024 1:46 am   INDICATION: Signs/Symptoms:WHEAT, pleuritic cp.   COMPARISON: CT chest 02/16/2020.   ACCESSION NUMBER(S): [id number]   ORDERING CLINICIAN: ABBY BAUTISTA   TECHNIQUE: Helical data acquisition of the chest was obtained after intravenous administration of 80 mL of Omnipaque 350, as per PE protocol. Images were reformatted in coronal and sagittal planes. Axial and coronal maximum intensity projection (MIP) images were created and reviewed.   FINDINGS: POTENTIAL LIMITATIONS OF THE STUDY: None   HEART AND VESSELS: There are no discrete filling defects within main pulmonary artery and its branches to suggest acute pulmonary embolism. Main pulmonary artery measures 3.5 cm. There are prominent proximal right and left pulmonary arteries.   Ascending thoracic aorta measures 4.5 cm.There is mild scattered atherosclerosis present, including calcified and noncalcified plaques. Mild coronary artery calcifications are seen. Please note,the study is not optimized for evaluation of coronary arteries.   There is mild cardiomegaly with mild hypertrophy of the left ventricle and mild enlargement of the left atrium.There are coarse calcifications of the mitral valve.   There is no pericardial effusion seen.   MEDIASTINUM AND LANCE, LOWER NECK AND AXILLA: The visualized thyroid gland is within normal limits. Multiple calcified mediastinal lymph nodes are present. There is a small hiatal hernia. Otherwise, esophagus is unremarkable.   LUNGS AND AIRWAYS: The trachea and central airways are patent. No endobronchial lesion is seen.Small amount of secretions are visualized in the upper esophagus. There is interlobular septal thickening of the bilateral lung bases. Redemonstrated thickening of the upper lobe axial interstitium with bronchiectasis, likely reflecting scarring. No focal consolidation, pleural effusion or pneumothorax. Right basilar paraseptal emphysema.       UPPER ABDOMEN: The visualized  subdiaphragmatic structures demonstrate no remarkable findings.       CHEST WALL AND OSSEOUS STRUCTURES: Left chest dual-chamber pacemaker with atrial and ventricular leads in place. Postsurgical changes of median sternotomy with aortic valve replacement/repair. There are abandoned pericardial leads. No acute osseous pathology.There are no suspicious osseous lesions.Diffuse osteopenia.       1. No evidence of acute pulmonary embolism. 2. Cardiomegaly. Interlobular septal thickening in bilateral lung bases likely representing acute pulmonary interstitial edema/CHF. Correlate clinically with volume status and consider correlation with BNP. 3. Pulmonary artery is enlarged measuring 3.5 cm. Recommend correlation with concern for pulmonary artery hypertension. 4. Redemonstrated thickening of the upper lobe axial interstitium with bronchiectasis, likely reflecting scarring. No focal consolidation, pleural effusion or pneumothorax.   I personally reviewed the images/study and I agree with the findings as stated by resident physician Dr. Olivier Alva . This study was interpreted at [place], [place].   MACRO: None   Signed by: Marcus Farrell 3/22/2024 2:30 AM Dictation workstation:   [id number]    Lower extremity venous duplex right    Result Date: 3/22/2024  Interpreted By:  Marcus Farrell,  and Ryan Guan STUDY: [facility] US LOWER EXTREMITY VENOUS DUPLEX RIGHT;  3/21/2024 10:23 pm   INDICATION: Signs/Symptoms:acute swelling, r/o dvt.   COMPARISON: None.   ACCESSION NUMBER(S): [id number]   ORDERING CLINICIAN: ABBY BAUTISTA   TECHNIQUE: Vascular ultrasound of the  right lower extremity was performed. Real time compression views as well as Gray scale, color Doppler and spectral Doppler waveform analysis was performed.   This examination was interpreted at [facility].   FINDINGS: Evaluation of the visualized portions of the   right common femoral vein, proximal, mid, and distal femoral vein, and popliteal vein were performed.  Evaluation of the visualized portions of the  posterior tibial and peroneal veins were also performed.  In addition, evaluation of the contralateral common femoral vein was performed.   Limitations:  None.   The evaluated veins demonstrate normal compressibility. There is intact venous flow demonstrating normal respiratory variability and normal augmentation of flow with calf compression. Therefore, there is no ultrasonographic evidence for deep vein thrombosis within the evaluated veins.   Respiratory variation and augmentation to calf pressure was noted.       No DVT in the right lower extremity.   I personally reviewed the images/study and I agree with the findings as stated by Roge Davis MD (resident) . This study was interpreted at [place], [place].   MACRO: None   Signed by: Marcus Farrell 3/22/2024 12:54 AM Dictation workstation:   [id number]    XR chest 2 views    Result Date: 3/21/2024  Interpreted By:  Marcus Farrell and Stephens Katherine STUDY: XR CHEST 2 VIEWS;  3/21/2024 9:46 pm   INDICATION: Signs/Symptoms:sob.   COMPARISON: Chest radiograph and CT chest 02/16/2024   ACCESSION NUMBER(S): [id number]   ORDERING CLINICIAN: ABBY BAUTISTA   FINDINGS: AP and lateral radiographs of the chest were provided.   Left  chest wall cardiac pacemaker/AICD with leads overlying the cardiomediastinal silhouette is stably configured. Postsurgical changes from median sternotomy. Cardiac valve replacement/repair and CABG.   CARDIOMEDIASTINAL SILHOUETTE: The cardiomediastinal silhouette is enlarged, stable in size and configuration. Aortic knob calcifications present.   LUNGS: Prominent perihilar interstitial markings. Small left-greater-than-right pleural effusions with adjacent hazy opacity. No pneumothorax.   ABDOMEN: No remarkable upper abdominal  findings.   BONES: No acute osseous changes.       1.  Prominent perihilar interstitial markings which can be seen in setting of pulmonary edema, recommend correlation with patient's fluid status. 2. Small left-greater-than-right pleural effusions with adjacent atelectasis. 3. Similar cardiomegaly. 4. Postsurgical changes and medical devices as described above.   I personally reviewed the images/study and I agree with Teresa Owens DO's (radiology resident) findings as stated. This study was interpreted at [facility], [place], [place].   MACRO: None   Signed by: Marcus Farrell 3/21/2024 9:54 PM Dictation workstation:   [id number]                             Assessment/Plan   Principal Problem:    OSEAS (acute kidney injury) (CMS/[facility])    #Covid-19  -Afebrile, no hypoxia. No indication for decadron or remdesivir at this time  -Droplet plus precautions  -Monitor, treat per guidelines if condition worsens  -monitor SpO2  -Guaifenesin-DM PRN cough, Loperamide PRN diarrhea     #OSEAS  -Suspect 2/2 poor PO intake, n/v/d  -s/p NS @ 100 mL/hr for 500 mL  -hold torsemide, spironolactone   -uptrending Cr, will give gentle bolus with caution given elevated BNP  -Avoid nephrotoxic agents where possible  -Renal dosing where indicated  -Follow RFP    #UTI  -UA shows +LE  -resumed home cephalexin, renally dosed  -urine culture pending    #OA L hip  - not controlled on home tramadol, started norco PRN  - PT/OT consult  - patient is unlikely to be a surgical candidate, after discussing with patient and family at bedside will not call ortho consult for now  - ordered palliative care consult    #Troponinemia  #s/p AVR  #s/p PPM  -Troponin mildly elevated  -No complaints of chest pain  -Recently admitted (2/2024) for chest pain, -ve workup  -EKG A/V paced without signs of acute ischemia  -Continue home medications  -Okay for no telemetry     #HFpEF  -Echo 2/2024 hyperdynamic, EF 70-75%  -Daily  weights  -Continue home metoprolol, atorvastatin, jardiance  -Appears euvolemic on exam if not dry     #Sarcoidosis  #COPD  -No acute issues  -Continue home Juanpablo Mckeon     #HTN  #HLD  -Hypertensive on arrival  -Resume home metoprolol, spironolactone  -holding torsemide for OSEAS  -resume home atorvastatin              I spent 25 minutes in the professional and overall care of this patient.      Flaco Del Toro MD

## 2024-03-24 NOTE — CARE PLAN
Problem: Fall/Injury  Goal: Not fall by end of shift  Outcome: Progressing  Goal: Be free from injury by end of the shift  Outcome: Progressing  Goal: Verbalize understanding of personal risk factors for fall in the hospital  Outcome: Progressing  Goal: Verbalize understanding of risk factor reduction measures to prevent injury from fall in the home  Outcome: Progressing  Goal: Use assistive devices by end of the shift  Outcome: Progressing  Goal: Pace activities to prevent fatigue by end of the shift  Outcome: Progressing   The patient's goals for the shift include      The clinical goals for the shift include patient will be free from any fall and/or injury this shift.    Over the shift, the patient did not make progress toward the following goals.

## 2024-03-25 ENCOUNTER — APPOINTMENT (OUTPATIENT)
Dept: CARDIOLOGY | Facility: HOSPITAL | Age: 89
DRG: 177 | End: 2024-03-25
Payer: MEDICARE

## 2024-03-25 PROBLEM — J96.01 ACUTE HYPOXEMIC RESPIRATORY FAILURE DUE TO COVID-19 (MULTI): Chronic | Status: ACTIVE | Noted: 2024-03-25

## 2024-03-25 PROBLEM — N30.00 ACUTE CYSTITIS WITHOUT HEMATURIA: Status: ACTIVE | Noted: 2024-03-25

## 2024-03-25 PROBLEM — U07.1 ACUTE HYPOXEMIC RESPIRATORY FAILURE DUE TO COVID-19 (MULTI): Status: ACTIVE | Noted: 2024-03-25

## 2024-03-25 PROBLEM — I50.32 CHRONIC HEART FAILURE WITH PRESERVED EJECTION FRACTION (HFPEF) (MULTI): Status: ACTIVE | Noted: 2023-09-18

## 2024-03-25 PROBLEM — J96.01 ACUTE HYPOXEMIC RESPIRATORY FAILURE DUE TO COVID-19 (MULTI): Status: ACTIVE | Noted: 2024-03-25

## 2024-03-25 PROBLEM — U07.1 ACUTE HYPOXEMIC RESPIRATORY FAILURE DUE TO COVID-19 (MULTI): Chronic | Status: ACTIVE | Noted: 2024-03-25

## 2024-03-25 PROBLEM — J44.1 COPD EXACERBATION (MULTI): Status: ACTIVE | Noted: 2023-09-18

## 2024-03-25 PROBLEM — D86.0 SARCOIDOSIS OF LUNG (MULTI): Status: ACTIVE | Noted: 2023-09-18

## 2024-03-25 LAB
ALBUMIN SERPL BCP-MCNC: 3.4 G/DL (ref 3.4–5)
ANION GAP SERPL CALC-SCNC: 14 MMOL/L (ref 10–20)
BUN SERPL-MCNC: 39 MG/DL (ref 6–23)
CALCIUM SERPL-MCNC: 9 MG/DL (ref 8.6–10.6)
CHLORIDE SERPL-SCNC: 103 MMOL/L (ref 98–107)
CO2 SERPL-SCNC: 25 MMOL/L (ref 21–32)
CREAT SERPL-MCNC: 1.29 MG/DL (ref 0.5–1.05)
EGFRCR SERPLBLD CKD-EPI 2021: 39 ML/MIN/1.73M*2
GLUCOSE BLD MANUAL STRIP-MCNC: 104 MG/DL (ref 74–99)
GLUCOSE BLD MANUAL STRIP-MCNC: 178 MG/DL (ref 74–99)
GLUCOSE BLD MANUAL STRIP-MCNC: 192 MG/DL (ref 74–99)
GLUCOSE SERPL-MCNC: 143 MG/DL (ref 74–99)
PHOSPHATE SERPL-MCNC: 2.9 MG/DL (ref 2.5–4.9)
POTASSIUM SERPL-SCNC: 4.9 MMOL/L (ref 3.5–5.3)
SODIUM SERPL-SCNC: 137 MMOL/L (ref 136–145)

## 2024-03-25 PROCEDURE — 2500000002 HC RX 250 W HCPCS SELF ADMINISTERED DRUGS (ALT 637 FOR MEDICARE OP, ALT 636 FOR OP/ED)

## 2024-03-25 PROCEDURE — 99233 SBSQ HOSP IP/OBS HIGH 50: CPT | Performed by: STUDENT IN AN ORGANIZED HEALTH CARE EDUCATION/TRAINING PROGRAM

## 2024-03-25 PROCEDURE — 80069 RENAL FUNCTION PANEL: CPT | Performed by: STUDENT IN AN ORGANIZED HEALTH CARE EDUCATION/TRAINING PROGRAM

## 2024-03-25 PROCEDURE — 93005 ELECTROCARDIOGRAM TRACING: CPT

## 2024-03-25 PROCEDURE — XW033E5 INTRODUCTION OF REMDESIVIR ANTI-INFECTIVE INTO PERIPHERAL VEIN, PERCUTANEOUS APPROACH, NEW TECHNOLOGY GROUP 5: ICD-10-PCS | Performed by: STUDENT IN AN ORGANIZED HEALTH CARE EDUCATION/TRAINING PROGRAM

## 2024-03-25 PROCEDURE — 2500000005 HC RX 250 GENERAL PHARMACY W/O HCPCS: Mod: JZ | Performed by: STUDENT IN AN ORGANIZED HEALTH CARE EDUCATION/TRAINING PROGRAM

## 2024-03-25 PROCEDURE — 2500000002 HC RX 250 W HCPCS SELF ADMINISTERED DRUGS (ALT 637 FOR MEDICARE OP, ALT 636 FOR OP/ED): Performed by: STUDENT IN AN ORGANIZED HEALTH CARE EDUCATION/TRAINING PROGRAM

## 2024-03-25 PROCEDURE — 2500000004 HC RX 250 GENERAL PHARMACY W/ HCPCS (ALT 636 FOR OP/ED): Performed by: EMERGENCY MEDICINE

## 2024-03-25 PROCEDURE — 2500000004 HC RX 250 GENERAL PHARMACY W/ HCPCS (ALT 636 FOR OP/ED): Performed by: STUDENT IN AN ORGANIZED HEALTH CARE EDUCATION/TRAINING PROGRAM

## 2024-03-25 PROCEDURE — 36415 COLL VENOUS BLD VENIPUNCTURE: CPT | Performed by: STUDENT IN AN ORGANIZED HEALTH CARE EDUCATION/TRAINING PROGRAM

## 2024-03-25 PROCEDURE — 94640 AIRWAY INHALATION TREATMENT: CPT

## 2024-03-25 PROCEDURE — 3E0DX3Z INTRODUCTION OF ANTI-INFLAMMATORY INTO MOUTH AND PHARYNX, EXTERNAL APPROACH: ICD-10-PCS | Performed by: STUDENT IN AN ORGANIZED HEALTH CARE EDUCATION/TRAINING PROGRAM

## 2024-03-25 PROCEDURE — 2500000002 HC RX 250 W HCPCS SELF ADMINISTERED DRUGS (ALT 637 FOR MEDICARE OP, ALT 636 FOR OP/ED): Performed by: FAMILY MEDICINE

## 2024-03-25 PROCEDURE — 2500000002 HC RX 250 W HCPCS SELF ADMINISTERED DRUGS (ALT 637 FOR MEDICARE OP, ALT 636 FOR OP/ED): Performed by: NURSE PRACTITIONER

## 2024-03-25 PROCEDURE — 2500000001 HC RX 250 WO HCPCS SELF ADMINISTERED DRUGS (ALT 637 FOR MEDICARE OP): Performed by: STUDENT IN AN ORGANIZED HEALTH CARE EDUCATION/TRAINING PROGRAM

## 2024-03-25 PROCEDURE — 1100000001 HC PRIVATE ROOM DAILY

## 2024-03-25 PROCEDURE — 97165 OT EVAL LOW COMPLEX 30 MIN: CPT | Mod: GO

## 2024-03-25 PROCEDURE — 2500000004 HC RX 250 GENERAL PHARMACY W/ HCPCS (ALT 636 FOR OP/ED): Performed by: NURSE PRACTITIONER

## 2024-03-25 PROCEDURE — 82947 ASSAY GLUCOSE BLOOD QUANT: CPT

## 2024-03-25 PROCEDURE — 2500000001 HC RX 250 WO HCPCS SELF ADMINISTERED DRUGS (ALT 637 FOR MEDICARE OP): Performed by: NURSE PRACTITIONER

## 2024-03-25 PROCEDURE — 97530 THERAPEUTIC ACTIVITIES: CPT | Mod: GP | Performed by: PHYSICAL THERAPIST

## 2024-03-25 PROCEDURE — 2500000001 HC RX 250 WO HCPCS SELF ADMINISTERED DRUGS (ALT 637 FOR MEDICARE OP): Performed by: FAMILY MEDICINE

## 2024-03-25 PROCEDURE — 93010 ELECTROCARDIOGRAM REPORT: CPT | Performed by: INTERNAL MEDICINE

## 2024-03-25 RX ORDER — OXYCODONE HYDROCHLORIDE 5 MG/1
2.5 TABLET ORAL EVERY 4 HOURS PRN
Status: DISCONTINUED | OUTPATIENT
Start: 2024-03-25 | End: 2024-03-28

## 2024-03-25 RX ORDER — DEXAMETHASONE 6 MG/1
6 TABLET ORAL DAILY
Status: DISCONTINUED | OUTPATIENT
Start: 2024-03-26 | End: 2024-03-27

## 2024-03-25 RX ORDER — IPRATROPIUM BROMIDE AND ALBUTEROL SULFATE 2.5; .5 MG/3ML; MG/3ML
3 SOLUTION RESPIRATORY (INHALATION)
Status: DISCONTINUED | OUTPATIENT
Start: 2024-03-25 | End: 2024-03-27

## 2024-03-25 RX ORDER — DEXTROSE 50 % IN WATER (D50W) INTRAVENOUS SYRINGE
12.5
Status: DISCONTINUED | OUTPATIENT
Start: 2024-03-25 | End: 2024-03-30 | Stop reason: HOSPADM

## 2024-03-25 RX ORDER — DEXTROSE 50 % IN WATER (D50W) INTRAVENOUS SYRINGE
25
Status: DISCONTINUED | OUTPATIENT
Start: 2024-03-25 | End: 2024-03-30 | Stop reason: HOSPADM

## 2024-03-25 RX ORDER — ALBUTEROL SULFATE 0.83 MG/ML
2.5 SOLUTION RESPIRATORY (INHALATION) EVERY 6 HOURS PRN
Status: DISCONTINUED | OUTPATIENT
Start: 2024-03-25 | End: 2024-03-30 | Stop reason: HOSPADM

## 2024-03-25 RX ORDER — ACETAMINOPHEN 325 MG/1
975 TABLET ORAL EVERY 8 HOURS
Status: DISCONTINUED | OUTPATIENT
Start: 2024-03-25 | End: 2024-03-30 | Stop reason: HOSPADM

## 2024-03-25 RX ORDER — IPRATROPIUM BROMIDE AND ALBUTEROL SULFATE 2.5; .5 MG/3ML; MG/3ML
SOLUTION RESPIRATORY (INHALATION)
Status: COMPLETED
Start: 2024-03-25 | End: 2024-03-25

## 2024-03-25 RX ADMIN — FLUTICASONE FUROATE AND VILANTEROL TRIFENATATE 1 PUFF: 200; 25 POWDER RESPIRATORY (INHALATION) at 09:29

## 2024-03-25 RX ADMIN — METOPROLOL TARTRATE 25 MG: 50 TABLET, FILM COATED ORAL at 20:48

## 2024-03-25 RX ADMIN — HEPARIN SODIUM 5000 UNITS: 5000 INJECTION INTRAVENOUS; SUBCUTANEOUS at 23:08

## 2024-03-25 RX ADMIN — IPRATROPIUM BROMIDE AND ALBUTEROL SULFATE 3 ML: .5; 3 SOLUTION RESPIRATORY (INHALATION) at 12:31

## 2024-03-25 RX ADMIN — EMPAGLIFLOZIN 10 MG: 10 TABLET, FILM COATED ORAL at 09:15

## 2024-03-25 RX ADMIN — CEPHALEXIN 500 MG: 500 CAPSULE ORAL at 20:47

## 2024-03-25 RX ADMIN — IPRATROPIUM BROMIDE AND ALBUTEROL SULFATE 3 ML: .5; 3 SOLUTION RESPIRATORY (INHALATION) at 16:29

## 2024-03-25 RX ADMIN — ATORVASTATIN CALCIUM 20 MG: 20 TABLET, FILM COATED ORAL at 20:47

## 2024-03-25 RX ADMIN — HEPARIN SODIUM 5000 UNITS: 5000 INJECTION INTRAVENOUS; SUBCUTANEOUS at 09:14

## 2024-03-25 RX ADMIN — DULOXETINE HYDROCHLORIDE 30 MG: 30 CAPSULE, DELAYED RELEASE ORAL at 09:13

## 2024-03-25 RX ADMIN — IPRATROPIUM BROMIDE AND ALBUTEROL SULFATE 3 ML: .5; 3 SOLUTION RESPIRATORY (INHALATION) at 09:28

## 2024-03-25 RX ADMIN — Medication 1 TABLET: at 09:12

## 2024-03-25 RX ADMIN — FERROUS SULFATE TAB 325 MG (65 MG ELEMENTAL FE) 1 TABLET: 325 (65 FE) TAB at 09:15

## 2024-03-25 RX ADMIN — BACLOFEN 5 MG: 10 TABLET ORAL at 20:47

## 2024-03-25 RX ADMIN — Medication 10 MG: at 20:48

## 2024-03-25 RX ADMIN — CEPHALEXIN 500 MG: 500 CAPSULE ORAL at 09:12

## 2024-03-25 RX ADMIN — SPIRONOLACTONE 12.5 MG: 25 TABLET, FILM COATED ORAL at 09:12

## 2024-03-25 RX ADMIN — METHYLPREDNISOLONE SODIUM SUCCINATE 125 MG: 125 INJECTION, POWDER, FOR SOLUTION INTRAMUSCULAR; INTRAVENOUS at 11:47

## 2024-03-25 RX ADMIN — PANTOPRAZOLE SODIUM 20 MG: 20 TABLET, DELAYED RELEASE ORAL at 09:13

## 2024-03-25 RX ADMIN — GUAIFENESIN AND DEXTROMETHORPHAN 10 ML: 100; 10 SYRUP ORAL at 09:12

## 2024-03-25 RX ADMIN — GUAIFENESIN AND DEXTROMETHORPHAN 10 ML: 100; 10 SYRUP ORAL at 23:08

## 2024-03-25 RX ADMIN — ACETAMINOPHEN 975 MG: 325 TABLET ORAL at 20:48

## 2024-03-25 RX ADMIN — POLYETHYLENE GLYCOL 3350 17 G: 17 POWDER, FOR SOLUTION ORAL at 11:48

## 2024-03-25 RX ADMIN — REMDESIVIR 200 MG: 100 INJECTION, POWDER, LYOPHILIZED, FOR SOLUTION INTRAVENOUS at 11:48

## 2024-03-25 RX ADMIN — GABAPENTIN 100 MG: 100 CAPSULE ORAL at 09:13

## 2024-03-25 RX ADMIN — ACETAMINOPHEN 975 MG: 325 TABLET ORAL at 12:38

## 2024-03-25 RX ADMIN — TRAMADOL HYDROCHLORIDE 50 MG: 50 TABLET, COATED ORAL at 09:18

## 2024-03-25 RX ADMIN — OXYCODONE HYDROCHLORIDE 2.5 MG: 5 TABLET ORAL at 20:47

## 2024-03-25 RX ADMIN — HEPARIN SODIUM 5000 UNITS: 5000 INJECTION INTRAVENOUS; SUBCUTANEOUS at 16:21

## 2024-03-25 RX ADMIN — METOPROLOL TARTRATE 25 MG: 50 TABLET, FILM COATED ORAL at 09:12

## 2024-03-25 RX ADMIN — GABAPENTIN 100 MG: 100 CAPSULE ORAL at 20:47

## 2024-03-25 RX ADMIN — BACLOFEN 5 MG: 10 TABLET ORAL at 09:14

## 2024-03-25 RX ADMIN — NYSTATIN 1 APPLICATION: 100000 POWDER TOPICAL at 09:17

## 2024-03-25 RX ADMIN — IPRATROPIUM BROMIDE AND ALBUTEROL SULFATE 3 ML: .5; 3 SOLUTION RESPIRATORY (INHALATION) at 19:33

## 2024-03-25 RX ADMIN — NYSTATIN 1 APPLICATION: 100000 POWDER TOPICAL at 20:49

## 2024-03-25 ASSESSMENT — PAIN SCALES - GENERAL
PAINLEVEL_OUTOF10: 7
PAINLEVEL_OUTOF10: 5 - MODERATE PAIN
PAINLEVEL_OUTOF10: 6
PAINLEVEL_OUTOF10: 5 - MODERATE PAIN
PAINLEVEL_OUTOF10: 6
PAINLEVEL_OUTOF10: 5 - MODERATE PAIN
PAINLEVEL_OUTOF10: 0 - NO PAIN

## 2024-03-25 ASSESSMENT — COGNITIVE AND FUNCTIONAL STATUS - GENERAL
DAILY ACTIVITIY SCORE: 14
PERSONAL GROOMING: A LITTLE
CLIMB 3 TO 5 STEPS WITH RAILING: TOTAL
EATING MEALS: A LITTLE
STANDING UP FROM CHAIR USING ARMS: A LOT
TURNING FROM BACK TO SIDE WHILE IN FLAT BAD: A LITTLE
DAILY ACTIVITIY SCORE: 14
DRESSING REGULAR UPPER BODY CLOTHING: A LOT
MOVING TO AND FROM BED TO CHAIR: A LOT
TURNING FROM BACK TO SIDE WHILE IN FLAT BAD: A LOT
DRESSING REGULAR LOWER BODY CLOTHING: A LOT
CLIMB 3 TO 5 STEPS WITH RAILING: TOTAL
WALKING IN HOSPITAL ROOM: TOTAL
HELP NEEDED FOR BATHING: A LOT
HELP NEEDED FOR BATHING: A LOT
MOVING TO AND FROM BED TO CHAIR: A LOT
MOBILITY SCORE: 11
MOVING FROM LYING ON BACK TO SITTING ON SIDE OF FLAT BED WITH BEDRAILS: A LITTLE
DRESSING REGULAR UPPER BODY CLOTHING: A LOT
TOILETING: A LOT
STANDING UP FROM CHAIR USING ARMS: A LOT
MOVING FROM LYING ON BACK TO SITTING ON SIDE OF FLAT BED WITH BEDRAILS: A LITTLE
TOILETING: A LOT
WALKING IN HOSPITAL ROOM: TOTAL
DRESSING REGULAR LOWER BODY CLOTHING: A LOT
PERSONAL GROOMING: A LOT
MOBILITY SCORE: 12

## 2024-03-25 ASSESSMENT — PAIN DESCRIPTION - LOCATION: LOCATION: HIP

## 2024-03-25 ASSESSMENT — PAIN - FUNCTIONAL ASSESSMENT
PAIN_FUNCTIONAL_ASSESSMENT: 0-10

## 2024-03-25 NOTE — PROGRESS NOTES
Physical Therapy    Physical Therapy Treatment    Patient Name: Alayna Dumont  MRN: [id number]  Today's Date: 3/25/2024  Time Calculation  Start Time: 1025  Stop Time: 1051  Time Calculation (min): 26 min       Assessment/Plan   PT Assessment  PT Assessment Results: Decreased strength, Decreased range of motion, Decreased endurance, Impaired balance, Decreased mobility, Decreased coordination, Pain  Rehab Prognosis: Good  Strengths: Attitude of self  Barriers to Participation: Comorbidities  End of Session Communication: Bedside nurse  Assessment Comment: pt is a 89 y.o admitted for SOB now with COVID+ pt would benefit from skilled services to improve mobility to PLOF  End of Session Patient Position: Bed, 2 rail up  PT Plan  Inpatient/Swing Bed or Outpatient: Inpatient  PT Plan  Treatment/Interventions: Bed mobility, Transfer training, Gait training, Stair training, Endurance training, Range of motion, Therapeutic exercise, Therapeutic activity  PT Plan: Skilled PT  PT Frequency: 3 times per week  PT Discharge Recommendations: Moderate intensity level of continued care  PT Recommended Transfer Status: Assist x1  PT - OK to Discharge: Yes      General Visit Information:   PT  Visit  PT Received On: 03/25/24  General  Reason for Referral: pt admitted with SOB, nausea, vomitting, abdominal pain and now testing postive for COVID  Past Medical History Relevant to Rehab: 89 y.o. female with a past medical history of HTN, HLD, SUNDEEP, HFpEF (EF 70-75% 02/2024), AVR, dual chamber PPM, sarcoidosis, and COPD  Family/Caregiver Present: No  Co-Treatment: OT  Co-Treatment Reason: to maximize safety with mobility and allow for therapeutic benefit.  Prior to Session Communication: Bedside nurse  Patient Position Received: Bed, 2 rail up  General Comment: pt sitting EOB eating breakfast and willing to participate.    Subjective   Precautions:  Precautions  Medical Precautions: Fall precautions, Oxygen therapy device and  L/min  Precautions Comment: airborne precautions for COVID +  Vital Signs:  Vital Signs  Heart Rate:  (HR alarm sounded several times but quickly returned to under 91 BPM)  Heart Rate Source: Monitor  SpO2: 96 %    Objective   Pain:  Pain Assessment  Pain Assessment: 0-10  Pain Score: 5 - Moderate pain  Pain Type: Acute pain  Pain Location:  (hip and abdomen)  Pain Orientation: Left  Pain Interventions: Repositioned (RN made aware)  Cognition:  Cognition  Overall Cognitive Status: Within Functional Limits  Orientation Level: Disoriented to situation  Postural Control:  Postural Control  Postural Control: Impaired  Posture Comment: pt with forward flexed posture.  Static Sitting Balance  Static Sitting-Balance Support: Feet supported  Static Sitting-Level of Assistance: Close supervision  Static Sitting-Comment/Number of Minutes: pt sat EOB for session  Static Standing Balance  Static Standing-Balance Support: Bilateral upper extremity supported  Static Standing-Level of Assistance: Moderate assistance  Static Standing-Comment/Number of Minutes: stood for less than 60 seconds    Activity Tolerance:  Activity Tolerance  Endurance: Tolerates 10 - 20 min exercise with multiple rests  Treatments:  Therapeutic Exercise  Therapeutic Exercise Performed: Yes  Therapeutic Exercise Activity 1: pt performed SAQ and marches during session.    Therapeutic Activity  Therapeutic Activity Performed: Yes  Therapeutic Activity 1: pt able to scoot to the right 4 times along bed using UE for assist              Bed Mobility  Bed Mobility: No    Ambulation/Gait Training  Ambulation/Gait Training Performed: No  Transfers  Transfer: Yes  Transfer 1  Transfer From 1: Sit to, Stand to  Transfer to 1: Stand, Sit  Technique 1: Sit to stand, Stand to sit  Transfer Level of Assistance 1: Arm in arm assistance, Hand held assistance, +2, Moderate assistance         Outcome Measures:  [facility] Basic Mobility  Turning from your back to your side while  in a flat bed without using bedrails: A little  Moving from lying on your back to sitting on the side of a flat bed without using bedrails: A little  Moving to and from bed to chair (including a wheelchair): A lot  Standing up from a chair using your arms (e.g. wheelchair or bedside chair): A lot  To walk in hospital room: Total  Climbing 3-5 steps with railing: Total  Basic Mobility - Total Score: 12    Education Documentation  Precautions, taught by Rosette Castro PT at 3/25/2024 11:56 AM.  Learner: Patient  Readiness: Eager  Method: Explanation  Response: Verbalizes Understanding    Mobility Training, taught by Rosette Castro, PT at 3/25/2024 11:56 AM.  Learner: Patient  Readiness: Eager  Method: Explanation  Response: Verbalizes Understanding    Education Comments  No comments found.        OP EDUCATION:       Encounter Problems       Encounter Problems (Active)       Balance       STG - Maintains static standing balance with upper extremity support with B UE support for 2 mins without LOB and with CGA  (Progressing)       Start:  03/22/24    Expected End:  04/05/24       INTERVENTIONS:  1. Practice standing with minimal support.  2. Educate patient about standing tolerance.  3. Educate patient about independence with gait, transfers, and ADL's.  4. Educate patient about use of assistive device.  5. Educate patient about self-directed care.            PT Transfers       LTG - Patient will transfer to commode with min assist (Progressing)       Start:  03/22/24    Expected End:  04/05/24            STG - Transfer from bed to chair with min assist  (Progressing)       Start:  03/22/24    Expected End:  04/05/24            STG - Patient will perform bed mobility CGA (Progressing)       Start:  03/22/24    Expected End:  04/05/24               Pain - Adult

## 2024-03-25 NOTE — CARE PLAN
Problem: Skin  Goal: Decreased wound size/increased tissue granulation at next dressing change  Outcome: Progressing  Goal: Participates in plan/prevention/treatment measures  Outcome: Progressing  Goal: Prevent/manage excess moisture  Outcome: Progressing  Goal: Prevent/minimize sheer/friction injuries  Outcome: Progressing  Goal: Promote/optimize nutrition  Outcome: Progressing  Goal: Promote skin healing  Outcome: Progressing     Problem: Fall/Injury  Goal: Not fall by end of shift  Outcome: Progressing  Goal: Be free from injury by end of the shift  Outcome: Progressing  Goal: Verbalize understanding of personal risk factors for fall in the hospital  Outcome: Progressing  Goal: Verbalize understanding of risk factor reduction measures to prevent injury from fall in the home  Outcome: Progressing  Goal: Use assistive devices by end of the shift  Outcome: Progressing  Goal: Pace activities to prevent fatigue by end of the shift  Outcome: Progressing

## 2024-03-25 NOTE — CARE PLAN
Problem: Skin  Goal: Decreased wound size/increased tissue granulation at next dressing change  3/25/2024 0630 by Agapito Kirkland RN  Outcome: Progressing  3/25/2024 0630 by Agapito Kirkland RN  Outcome: Progressing  Goal: Participates in plan/prevention/treatment measures  3/25/2024 0630 by Agapito Kirkland RN  Outcome: Progressing  3/25/2024 0630 by Agapito Kirkland RN  Outcome: Progressing  Goal: Prevent/manage excess moisture  3/25/2024 0630 by Agapito Kirkland RN  Outcome: Progressing  3/25/2024 0630 by Agapito Kirkland RN  Outcome: Progressing  Goal: Prevent/minimize sheer/friction injuries  3/25/2024 0630 by Agapito Kirkland RN  Outcome: Progressing  3/25/2024 0630 by Agapito Kirkland RN  Outcome: Progressing  Goal: Promote/optimize nutrition  3/25/2024 0630 by Agapito Kirkland RN  Outcome: Progressing  3/25/2024 0630 by Agapito Kirkland RN  Outcome: Progressing  Goal: Promote skin healing  3/25/2024 0630 by Agapito Kirkland RN  Outcome: Progressing  3/25/2024 0630 by Agapito Kirkland RN  Outcome: Progressing     Problem: Safety - Adult  Goal: Free from fall injury  3/25/2024 0630 by Agapito Kirkland RN  Outcome: Progressing  3/25/2024 0630 by Agapito Kirkland RN  Outcome: Progressing

## 2024-03-25 NOTE — PROGRESS NOTES
Occupational Therapy    Evaluation    Patient Name: Alayna Dumont  MRN: [id number]  Today's Date: 3/25/2024  Time Calculation  Start Time: 1025  Stop Time: 1050  Time Calculation (min): 25 min    Assessment  IP OT Assessment  End of Session Communication: Bedside nurse  End of Session Patient Position: Bed, 2 rail up  Plan:  OT Frequency: 3 times per week  OT Discharge Recommendations: Moderate intensity level of continued care  OT - OK to Discharge:  (OT Evaluation completed.)    Subjective   Current Problem:  1. OSEAS (acute kidney injury) (CMS/HCC)          General:  Reason for Referral: pt admitted with SOB, nausea, vomitting, abdominal pain and now testing postive for COVID  Past Medical History Relevant to Rehab: 89 y.o. female with a past medical history of HTN, HLD, SUNDEEP, HFpEF (EF 70-75% 02/2024), AVR, dual chamber PPM, sarcoidosis, and COPD  Co-Treatment: PT  Co-Treatment Reason: to maximize safety with mobility and allow for therapeutic benefit.  Prior to Session Communication: Bedside nurse  Patient Position Received: Bed, 2 rail up  Family/Caregiver Present: No  General Comment: Pt received seated on EOB eating breakfast. Pt with c/o pain left hip and abdomen throughout session, but able to participate with encouragement. Pt fatigues easily with all attempted mobility tasks.   Precautions:  Medical Precautions: Fall precautions, Oxygen therapy device and L/min (1L O2 via NC)  Precautions Comment: airborne precautions for COVID +  Vital Signs:  SpO2: 96 % (1L O2 via NC)  Pain:  Pain Assessment  Pain Assessment: 0-10  Pain Score: 5 - Moderate pain  Pain Type: Acute pain  Pain Location:  (Left hip, abdomen)  Pain Interventions:  (Repositioned, RN made aware.)           Objective   Cognition:  Overall Cognitive Status: Within Functional Limits  Orientation Level: Disoriented to situation           Home Living:  Type of Home: House  Lives With: Adult children (Son)  Home Adaptive Equipment: Walker rolling or  standard, Wheelchair-manual (B/S Commode)  Home Layout: One level  Home Access: Stairs to enter with rails   Prior Function:  Level of Andrew: Needs assistance with ADLs, Needs assistance with homemaking  ADL Assistance:  (Pt reports she is independent for basic ADL's, however anticipate pt likely requires assist for higher leverl ADL's, TBE further.)  Ambulatory Assistance: Needs assistance (Pt reports she is non ambulatory, mainly transfers self to wheelchair or commode.)    ADL:  LE Dressing Assistance: Maximal  LE Dressing Deficit: Don/doff R sock, Don/doff L sock  Toileting Assistance with Device:  (Anticipate at least mod to max assist based uon pt's functional performance during other mobility tasks in session, will continue to assess.)  Activity Tolerance:  Endurance: Tolerates 10 - 20 min exercise with multiple rests  Balance:  Static Sitting Balance  Static Sitting-Level of Assistance: Close supervision  Static Standing Balance  Static Standing-Comment/Number of Minutes: Mod assist x2 with B/L arm in arm assist  Bed Mobility/Transfers: Bed Mobility  Bed Mobility: No (Pt seated EOB pre/post session, RN aware.)   and Transfers  Transfer: Yes  Transfer 1  Transfer From 1: Sit to, Stand to  Transfer to 1: Stand, Sit  Technique 1: Sit to stand, Stand to sit  Transfer Level of Assistance 1: Arm in arm assistance, Hand held assistance, +2, Moderate assistance       Vision: Vision - Basic Assessment  Current Vision:  (Grossly WFL for basic ADL's)   and    Sensation:  Light Touch: No apparent deficits (B/L UE's)  Strength:  Strength Comments: Grossly 4/5 throughout UE's within available ranges.  Hand Function:  Hand Function  Gross Grasp: Functional  Extremities: RUE   RUE :  (Mildly decreased in end range AROM, otherwise grossly WFL for basic ADL's.), LUE   LUE:  (Mildly decreased end range AROM B/L shoulders but otherwise grossly WFL for basic ADL's.),  , and      Outcome Measures: [facility] Daily  Activity  Putting on and taking off regular lower body clothing: A lot  Bathing (including washing, rinsing, drying): A lot  Putting on and taking off regular upper body clothing: A lot  Toileting, which includes using toilet, bedpan or urinal: A lot  Taking care of personal grooming such as brushing teeth: A little  Eating Meals: A little  Daily Activity - Total Score: 14         ,     OT Adult Other Outcome Measures  4AT: 0    Education Documentation  Precautions, taught by Mary Jane Diaz OT at 3/25/2024  2:08 PM.  Learner: Patient  Readiness: Acceptance  Method: Explanation  Response: Needs Reinforcement    ADL Training, taught by Mary Jane Diaz OT at 3/25/2024  2:08 PM.  Learner: Patient  Readiness: Acceptance  Method: Explanation  Response: Needs Reinforcement    Education Comments  No comments found.        Goals:     Encounter Problems       Encounter Problems (Active)       ADLs       Patient will perform UB and LB bathing  with contact guard assist level of assistance and extended tub bench and long-handled sponge after setup.       Start:  03/25/24    Expected End:  04/08/24            Patient with complete upper body dressing with independent level of assistance donning and doffing all UE clothes with PRN adaptive equipment while edge of bed        Start:  03/25/24    Expected End:  04/08/24            Patient with complete lower body dressing with contact guard assist level of assistance donning and doffing pants without a zipper/fasteners, underwear, and socks with reacher, shoe horn, and sock-aid while edge of bed        Start:  03/25/24    Expected End:  04/08/24            Patient will complete daily grooming tasks brushing teeth and washing face/hair with set-up and stand by assist level of assistance and PRN adaptive equipment while supported sitting.       Start:  03/25/24    Expected End:  04/08/24            Patient will complete toileting including hygiene clothing management/hygiene with contact  guard assist level of assistance and bedside commode.       Start:  03/25/24    Expected End:  04/08/24               TRANSFERS       Patient will perform bed mobility independent level of assistance and bed rails in order to improve safety and independence with mobility       Start:  03/25/24    Expected End:  04/08/24            Patient will complete functional transfer to wheelchair and B/S commode with least restrictive device with set-up and stand by assist level of assistance.       Start:  03/25/24    Expected End:  04/08/24 03/25/24 at 2:09 PM   Mary Jane Diaz OT   Rehab Office: [phone]

## 2024-03-25 NOTE — PROGRESS NOTES
[facility]   HOSPITAL MEDICINE     PROGRESS NOTE       PATIENT NAME: Alayna Dumont   MRN: [id number]   SERVICE DATE: 03/25/24   SERVICE TIME: 12:28 PM      Hospital Medicine/Primary Attending: Kb Leal DO   LENGTH OF STAY: 3     CHIEF COMPLAINT: Acute hypoxemic respiratory failure due to COVID-19 (CMS/[facility])   Principal Problem:    Acute hypoxemic respiratory failure due to COVID-19 (CMS/[facility])  Active Problems:    Chronic heart failure with preserved ejection fraction (HFpEF) (CMS/[facility])    COPD exacerbation (CMS/[facility])    Sarcoidosis of lung (CMS/[facility])    OSEAS (acute kidney injury) (CMS/[facility])    Acute cystitis without hematuria        Assessment/Plan      I reviewed:    All new and relevant labs and imaging   New EKGs  Consultant notes   Vitals Signs   Nursing notes       89 y.o. female with a past medical history of HTN, HLD, SUNDEEP, HFpEF (EF 70-75% 02/2024), AVR, dual chamber PPM, sarcoidosis, and COPD who was addmited for OSESA and COVID 19. H    #Acute hypoxic respiratory failure 2/2 COPD exacerbation 2/2 COVID 19 pneumonia   + resp distress   +severe wheezing   -STAT duoneb   -ctn o2 and wean as christiano  -methyl pred 125x1 then dex 6mg daily  -Remdesivir ( 5 days )   -Dexamethasone ( 10 days )   -Schedule Tessalon Perles and Mucinex   -Daily CMP   -duoneb q4h   -Monitor parameters (Heart Rate & CMP) daily   -If HR <50 or symptomatic from bradycardia, please consider holding remdesivir   -If CrCl <30 & Not on dialysis, please consider holding remdesivir     -If ALT is >= 10x ULN during treatment, please consider holding remdesivir     #OSEAS  1.4>1.6>1.8>1.6>1.3  -Suspect 2/2 poor PO intake, n/v/d  -LR at 75   -hold torsemide, spironolactone   -Avoid nephrotoxic agents where possible  -Follow RFP     #UTI  -UA shows +LE  -resumed home cephalexin, renally dosed  -urine culture still pending     #OA L hip  - not controlled on home tramadol  - Sched acetamin 1000mg q8h   -2.5mg oxy PRN   -  PT/OT consult  - patient is unlikely to be a surgical candidate    #Troponinemia  #s/p AVR  #s/p PPM  -Troponin mildly elevated  -No complaints of chest pain  -Recently admitted (2/2024) for chest pain, -ve workup  -EKG A/V paced without signs of acute ischemia  -Continue home medications     #HFpEF chronic   -Echo 2/2024 hyperdynamic, EF 70-75%  -Daily weights  -Continue home metoprolol, atorvastatin, jardiance  -holding diuresis as above      #Sarcoidosis  #acute exacerbation of COPD  -No acute issues  -hold Breo  -see plan above      #HTN  #HLD  -Resume home metoprolol, spironolactone  -holding torsemide for OSEAS  -resume home atorvastatin                      DISPOSITION:  Functional Status Prior to Admit:     Medical Necessity for Continued Hospitalization y     Plan of care discussed with:         Subjective   SUBJECTIVE:  Pt seen and examined.   SOB     Patient denies CP, SOB, fevers, chills, nausea, or emesis.         Objective      PHYSICAL EXAM: Patient Vitals for the past 24 hrs:   BP Temp Temp src Pulse Resp SpO2   03/25/24 1025 -- -- -- -- -- 96 %   03/25/24 0934 -- -- -- -- -- 97 %   03/25/24 0918 -- -- -- -- 26 --   03/25/24 0523 (!) 152/49 36.9 °C (98.4 °F) Temporal 60 20 96 %   03/24/24 2004 155/51 36.5 °C (97.7 °F) -- 60 16 97 %   03/24/24 1404 150/55 36.1 °C (97 °F) Tympanic 60 24 96 %      Physical Exam  Vitals and nursing note reviewed.   Constitutional:       General: She is not in acute distress.     Appearance: Normal appearance. She is not ill-appearing.   HENT:      Head: Normocephalic and atraumatic.   Cardiovascular:      Rate and Rhythm: Normal rate and regular rhythm.   Pulmonary:      Effort: Respiratory distress present.      Breath sounds: No stridor. Wheezing and rhonchi present.   Abdominal:      Tenderness: There is no guarding.   Skin:     Coloration: Skin is not jaundiced or pale.   Neurological:      Mental Status: She is alert and oriented to person, place, and time.  "  Psychiatric:         Mood and Affect: Mood normal.            MEDICATIONS:   Scheduled medications  acetaminophen, 975 mg, oral, q8h  atorvastatin, 20 mg, oral, Nightly  baclofen, 5 mg, oral, BID  cephalexin, 500 mg, oral, q12h  [START ON 3/26/2024] dexAMETHasone, 6 mg, oral, Daily  DULoxetine, 30 mg, oral, Daily  empagliflozin, 10 mg, oral, Daily  ferrous sulfate (325 mg ferrous sulfate), 65 mg of iron, oral, Daily with breakfast  fluticasone furoate-vilanteroL, 1 puff, inhalation, Daily  gabapentin, 100 mg, oral, BID  heparin (porcine), 5,000 Units, subcutaneous, q8h  ipratropium-albuteroL, 3 mL, nebulization, q4h while awake  metoprolol tartrate, 25 mg, oral, BID  multivitamin with minerals, 1 tablet, oral, Daily  nystatin, 1 Application, Topical, BID  pantoprazole, 20 mg, oral, Daily  remdesivir, 200 mg, intravenous, Once   Followed by  [START ON 3/26/2024] remdesivir, 100 mg, intravenous, q24h  spironolactone, 12.5 mg, oral, Daily      Continuous medications     PRN medications  PRN medications: albuterol, dextromethorphan-guaifenesin, dextrose, dextrose, glucagon, glucagon, loperamide, melatonin, oxyCODONE, polyethylene glycol, traMADol       DATA:      Diagnostic tests reviewed for today's visit:     CBC:   Results from last 72 hours   Lab Units 03/24/24  0728   WBC AUTO x10*3/uL 7.3   HEMATOCRIT % 34.0*   PLATELETS AUTO x10*3/uL 236   MCV fL 96     Coags:     CMP:   Results from last 72 hours   Lab Units 03/25/24  0907 03/24/24  0728   SODIUM mmol/L 137 139   POTASSIUM mmol/L 4.9 5.5*   CO2 mmol/L 25 23   BUN mg/dL 39* 56*   MAGNESIUM mg/dL  --  2.63*   ALBUMIN g/dL 3.4 3.6      Cardiac Enzymes: No lab exists for component: \"TROP\" .lab  Liver Function, Amylase, Lipase:       No lab exists for component: \"TPROT\", \"ALB\", \"TBILI\"   MG/PHOS: [id number](mg,p)@   Renal Panel:    Results from last 72 hours   Lab Units 03/25/24  0907   ALBUMIN g/dL 3.4   BUN mg/dL 39*   POTASSIUM mmol/L 4.9   CO2 mmol/L 25 " "  SODIUM mmol/L 137      Heme:        No lab exists for component: \"RETICP\", \"ABSRETIC\", \"LD\", \"DAILY\", \"FE\", \"TRANSFERSAT\"   No components found for: \"UALBCR\"      Medication and Non-Pharmacologic VTE Prophylaxis/Anticoagulants    The patient has received anticoagulants recently:  Heparin is ordered or has been given within the last 24 hours OR  Lovenox has been given in the last 24 hours OR  An anticoagulant other than Heparin or Lovenox is on the home med list OR  An anticoagulant other than Heparin or Lovenox has been given within the last 5 days  Last Anticoag Admin            heparin (porcine) injection 5,000 Units    Given 5,000 Units at 0914    Frequency: Every 8 hours         There are additional administrations since 03/22/24 1225 that are not shown.    No unadministered anticoagulant orders found.                   SIGNATURE: Kb Leal, [facility] Medicine    PAGER/CONTACT #: Epic Chat      Disclaimer: Portions of this note may have been generated using Dragon voice recognition software. Reasonable efforts were made to correct any dictation errors that resulted due to the programming of this software but some may still be present.     Portions of this note including HPI, ROS, impression/plan, and examination may have been copied forward from yesterday to March 25, 2024 as to provide important historical information essential in contributing to medical decision making. Documentation has been reviewed and edited as necessary to support clinical decision making for today's visit and to reflect my own independent evaluation of this patient.       The time of this note does not reflect the time I saw the patient but the time that this note was written     "

## 2024-03-25 NOTE — HOSPITAL COURSE
89 y.o. female with a past medical history of HTN, HLD, SUNDEEP, HFpEF (EF 70-75% 02/2024), AVR, dual chamber PPM, sarcoidosis, and COPD who was addmited for OSEAS and AHRF 2/2 COVID 19. The patient received remdesivir and dexamethasone as well as nebulizers with significant improvement in her respiratory status.  Unfortunately, continues to require oxygen.  Patient also developed a cough as part of her viral syndrome, which continues.  Stay was complicated by multiple other medical issues including acute cystitis secondary to MDRO Proteus vulgaris which was treated with ciprofloxacin with improvement in the patient's symptoms.  Patient also had an OSEAS on her CKD (baseline creatinine 1.1).  Overall OSEAS had improved but fluctuation of creatinine was tolerated to due to fluid overload requiring diuresis.  Finally, patient continued to complain of constipation and received laxatives as well as suppositories to aid with her bowel movements.  She was deemed medically ready for discharge to skilled nursing facility 3/29/2024 but the patient objected as she said she expected that she needs to fully heal and feel better before she would be willing to leave the hospital    #Acute hypoxic respiratory failure 2/2 COPD exacerbation 2/2 COVID 19 pneumonia   - resp distress & severe wheezing resolved  -ctn o2 and wean as christiano  -Remdesivir ( 5 days )   -dexameth (10 days)   -Schedule  Mucinex   -Daily CMP   -duoneb q4h   -Monitor parameters (Heart Rate & CMP) daily   -If HR <50 or symptomatic from bradycardia, please consider holding remdesivir   -If CrCl <30 & Not on dialysis, please consider holding remdesivir     -If ALT is >= 10x ULN during treatment, please consider holding remdesivir     #acute cystitis 2/2 MDRO Proteus vulgaris  Suseptable to cipro  -stop current abx  -start cipro IV then PO tomorrow   -monitor for sx    #OSEAS on ?CKD   Baseline Cr ~1.1  1.4>1.6>1.8>1.6>1.3>1.22>1.4  -Avoid nephrotoxic agents where  possible  -Follow RFP      #acute on chronic HFpEF   -Echo 2/2024 hyperdynamic, EF 70-75%  -stop furosemide IV 40mg BID   -resume home torsemide at 20mg po daily   -Daily weights  -Continue home metoprolol, atorvastatin, jardiance  -check RFP and mg daily   -monitor fluid status       #Sarcoidosis  #acute exacerbation of COPD  -hold Breo and switch to sched duonebs   -steroids IV as above did not improve on PO  -see plan above     #OA L hip  - not controlled on home tramadol  - Sched acetamin 1000mg q8h   -home tramadol renal dosing  - PT/OT consult  - patient is unlikely to be a surgical candidate    #Troponinemia  #s/p AVR  #s/p PPM  -Troponin mildly elevated  -No complaints of chest pain  -Recently admitted (2/2024) for chest pain, -ve workup  -EKG A/V paced without signs of acute ischemia  -Continue home medications     #HTN  #HLD  -Resume home metoprolol, spironolactone  -holding torsemide for OSEAS  -resume home atorvastatin      #Constipation  -miralax bid   -senna BID

## 2024-03-25 NOTE — CARE PLAN
The patient's goals for the shift include      The clinical goals for the shift include Patient will not show any signs of resp. distress during my shift    Patient noted with Exp. Wheezing this am. Duonebs scheduled. X1 dose IV Solumedrol given .Remdesiver ordered as well.

## 2024-03-26 LAB
ALBUMIN SERPL BCP-MCNC: 3.4 G/DL (ref 3.4–5)
ANION GAP SERPL CALC-SCNC: 15 MMOL/L (ref 10–20)
ATRIAL RATE: 60 BPM
BACTERIA UR CULT: ABNORMAL
BUN SERPL-MCNC: 44 MG/DL (ref 6–23)
CALCIUM SERPL-MCNC: 8.9 MG/DL (ref 8.6–10.6)
CHLORIDE SERPL-SCNC: 105 MMOL/L (ref 98–107)
CO2 SERPL-SCNC: 26 MMOL/L (ref 21–32)
CREAT SERPL-MCNC: 1.22 MG/DL (ref 0.5–1.05)
EGFRCR SERPLBLD CKD-EPI 2021: 42 ML/MIN/1.73M*2
GLUCOSE BLD MANUAL STRIP-MCNC: 167 MG/DL (ref 74–99)
GLUCOSE BLD MANUAL STRIP-MCNC: 167 MG/DL (ref 74–99)
GLUCOSE BLD MANUAL STRIP-MCNC: 177 MG/DL (ref 74–99)
GLUCOSE BLD MANUAL STRIP-MCNC: 191 MG/DL (ref 74–99)
GLUCOSE SERPL-MCNC: 119 MG/DL (ref 74–99)
P AXIS: 73 DEGREES
P OFFSET: 142 MS
P ONSET: 122 MS
PHOSPHATE SERPL-MCNC: 4.1 MG/DL (ref 2.5–4.9)
POTASSIUM SERPL-SCNC: 5.6 MMOL/L (ref 3.5–5.3)
PR INTERVAL: 174 MS
Q ONSET: 168 MS
QRS COUNT: 10 BEATS
QRS DURATION: 218 MS
QT INTERVAL: 540 MS
QTC CALCULATION(BAZETT): 540 MS
QTC FREDERICIA: 540 MS
R AXIS: -73 DEGREES
SODIUM SERPL-SCNC: 140 MMOL/L (ref 136–145)
T AXIS: 125 DEGREES
T OFFSET: 438 MS
VENTRICULAR RATE: 60 BPM

## 2024-03-26 PROCEDURE — 2500000002 HC RX 250 W HCPCS SELF ADMINISTERED DRUGS (ALT 637 FOR MEDICARE OP, ALT 636 FOR OP/ED): Performed by: FAMILY MEDICINE

## 2024-03-26 PROCEDURE — 82947 ASSAY GLUCOSE BLOOD QUANT: CPT

## 2024-03-26 PROCEDURE — 76937 US GUIDE VASCULAR ACCESS: CPT

## 2024-03-26 PROCEDURE — 2500000002 HC RX 250 W HCPCS SELF ADMINISTERED DRUGS (ALT 637 FOR MEDICARE OP, ALT 636 FOR OP/ED): Performed by: NURSE PRACTITIONER

## 2024-03-26 PROCEDURE — 2500000001 HC RX 250 WO HCPCS SELF ADMINISTERED DRUGS (ALT 637 FOR MEDICARE OP): Performed by: FAMILY MEDICINE

## 2024-03-26 PROCEDURE — 99233 SBSQ HOSP IP/OBS HIGH 50: CPT | Performed by: STUDENT IN AN ORGANIZED HEALTH CARE EDUCATION/TRAINING PROGRAM

## 2024-03-26 PROCEDURE — 2500000002 HC RX 250 W HCPCS SELF ADMINISTERED DRUGS (ALT 637 FOR MEDICARE OP, ALT 636 FOR OP/ED): Performed by: STUDENT IN AN ORGANIZED HEALTH CARE EDUCATION/TRAINING PROGRAM

## 2024-03-26 PROCEDURE — 2500000005 HC RX 250 GENERAL PHARMACY W/O HCPCS: Mod: JZ | Performed by: STUDENT IN AN ORGANIZED HEALTH CARE EDUCATION/TRAINING PROGRAM

## 2024-03-26 PROCEDURE — 2500000004 HC RX 250 GENERAL PHARMACY W/ HCPCS (ALT 636 FOR OP/ED): Performed by: NURSE PRACTITIONER

## 2024-03-26 PROCEDURE — 2500000001 HC RX 250 WO HCPCS SELF ADMINISTERED DRUGS (ALT 637 FOR MEDICARE OP): Performed by: STUDENT IN AN ORGANIZED HEALTH CARE EDUCATION/TRAINING PROGRAM

## 2024-03-26 PROCEDURE — 2500000004 HC RX 250 GENERAL PHARMACY W/ HCPCS (ALT 636 FOR OP/ED): Performed by: STUDENT IN AN ORGANIZED HEALTH CARE EDUCATION/TRAINING PROGRAM

## 2024-03-26 PROCEDURE — 80069 RENAL FUNCTION PANEL: CPT | Performed by: STUDENT IN AN ORGANIZED HEALTH CARE EDUCATION/TRAINING PROGRAM

## 2024-03-26 PROCEDURE — 1100000001 HC PRIVATE ROOM DAILY

## 2024-03-26 PROCEDURE — 2500000004 HC RX 250 GENERAL PHARMACY W/ HCPCS (ALT 636 FOR OP/ED): Performed by: EMERGENCY MEDICINE

## 2024-03-26 PROCEDURE — 2500000001 HC RX 250 WO HCPCS SELF ADMINISTERED DRUGS (ALT 637 FOR MEDICARE OP): Performed by: NURSE PRACTITIONER

## 2024-03-26 PROCEDURE — 94640 AIRWAY INHALATION TREATMENT: CPT

## 2024-03-26 PROCEDURE — 36415 COLL VENOUS BLD VENIPUNCTURE: CPT | Performed by: STUDENT IN AN ORGANIZED HEALTH CARE EDUCATION/TRAINING PROGRAM

## 2024-03-26 RX ORDER — INSULIN LISPRO 100 [IU]/ML
0-5 INJECTION, SOLUTION INTRAVENOUS; SUBCUTANEOUS
Status: DISCONTINUED | OUTPATIENT
Start: 2024-03-26 | End: 2024-03-30 | Stop reason: HOSPADM

## 2024-03-26 RX ORDER — CIPROFLOXACIN 2 MG/ML
400 INJECTION, SOLUTION INTRAVENOUS EVERY 12 HOURS
Status: COMPLETED | OUTPATIENT
Start: 2024-03-26 | End: 2024-03-27

## 2024-03-26 RX ORDER — CIPROFLOXACIN 500 MG/1
500 TABLET ORAL EVERY 12 HOURS SCHEDULED
Status: DISCONTINUED | OUTPATIENT
Start: 2024-03-27 | End: 2024-03-28

## 2024-03-26 RX ADMIN — PANTOPRAZOLE SODIUM 20 MG: 20 TABLET, DELAYED RELEASE ORAL at 09:17

## 2024-03-26 RX ADMIN — BACLOFEN 5 MG: 10 TABLET ORAL at 09:17

## 2024-03-26 RX ADMIN — OXYCODONE HYDROCHLORIDE 2.5 MG: 5 TABLET ORAL at 03:59

## 2024-03-26 RX ADMIN — IPRATROPIUM BROMIDE AND ALBUTEROL SULFATE 3 ML: .5; 3 SOLUTION RESPIRATORY (INHALATION) at 20:27

## 2024-03-26 RX ADMIN — BACLOFEN 5 MG: 10 TABLET ORAL at 21:45

## 2024-03-26 RX ADMIN — INSULIN LISPRO 1 UNITS: 100 INJECTION, SOLUTION INTRAVENOUS; SUBCUTANEOUS at 17:50

## 2024-03-26 RX ADMIN — IPRATROPIUM BROMIDE AND ALBUTEROL SULFATE 3 ML: .5; 3 SOLUTION RESPIRATORY (INHALATION) at 01:14

## 2024-03-26 RX ADMIN — NYSTATIN 1 APPLICATION: 100000 POWDER TOPICAL at 09:00

## 2024-03-26 RX ADMIN — Medication 10 MG: at 21:45

## 2024-03-26 RX ADMIN — NYSTATIN 1 APPLICATION: 100000 POWDER TOPICAL at 21:45

## 2024-03-26 RX ADMIN — OXYCODONE HYDROCHLORIDE 2.5 MG: 5 TABLET ORAL at 09:16

## 2024-03-26 RX ADMIN — DEXAMETHASONE 6 MG: 6 TABLET ORAL at 09:17

## 2024-03-26 RX ADMIN — HEPARIN SODIUM 5000 UNITS: 5000 INJECTION INTRAVENOUS; SUBCUTANEOUS at 23:44

## 2024-03-26 RX ADMIN — ACETAMINOPHEN 975 MG: 325 TABLET ORAL at 21:45

## 2024-03-26 RX ADMIN — REMDESIVIR 100 MG: 100 INJECTION, POWDER, LYOPHILIZED, FOR SOLUTION INTRAVENOUS at 11:54

## 2024-03-26 RX ADMIN — ACETAMINOPHEN 975 MG: 325 TABLET ORAL at 15:21

## 2024-03-26 RX ADMIN — METOPROLOL TARTRATE 25 MG: 50 TABLET, FILM COATED ORAL at 09:17

## 2024-03-26 RX ADMIN — SODIUM ZIRCONIUM CYCLOSILICATE 10 G: 10 POWDER, FOR SUSPENSION ORAL at 23:43

## 2024-03-26 RX ADMIN — METOPROLOL TARTRATE 25 MG: 50 TABLET, FILM COATED ORAL at 21:45

## 2024-03-26 RX ADMIN — CEPHALEXIN 500 MG: 500 CAPSULE ORAL at 09:16

## 2024-03-26 RX ADMIN — IPRATROPIUM BROMIDE AND ALBUTEROL SULFATE 3 ML: .5; 3 SOLUTION RESPIRATORY (INHALATION) at 13:57

## 2024-03-26 RX ADMIN — ACETAMINOPHEN 975 MG: 325 TABLET ORAL at 03:59

## 2024-03-26 RX ADMIN — GABAPENTIN 100 MG: 100 CAPSULE ORAL at 09:17

## 2024-03-26 RX ADMIN — FERROUS SULFATE TAB 325 MG (65 MG ELEMENTAL FE) 1 TABLET: 325 (65 FE) TAB at 09:17

## 2024-03-26 RX ADMIN — HEPARIN SODIUM 5000 UNITS: 5000 INJECTION INTRAVENOUS; SUBCUTANEOUS at 15:26

## 2024-03-26 RX ADMIN — GUAIFENESIN AND DEXTROMETHORPHAN 10 ML: 100; 10 SYRUP ORAL at 06:03

## 2024-03-26 RX ADMIN — POLYETHYLENE GLYCOL 3350 17 G: 17 POWDER, FOR SOLUTION ORAL at 18:53

## 2024-03-26 RX ADMIN — IPRATROPIUM BROMIDE AND ALBUTEROL SULFATE 3 ML: .5; 3 SOLUTION RESPIRATORY (INHALATION) at 09:21

## 2024-03-26 RX ADMIN — ATORVASTATIN CALCIUM 20 MG: 20 TABLET, FILM COATED ORAL at 21:45

## 2024-03-26 RX ADMIN — EMPAGLIFLOZIN 10 MG: 10 TABLET, FILM COATED ORAL at 15:20

## 2024-03-26 RX ADMIN — GUAIFENESIN AND DEXTROMETHORPHAN 10 ML: 100; 10 SYRUP ORAL at 21:50

## 2024-03-26 RX ADMIN — DULOXETINE HYDROCHLORIDE 30 MG: 30 CAPSULE, DELAYED RELEASE ORAL at 09:17

## 2024-03-26 RX ADMIN — FLUTICASONE FUROATE AND VILANTEROL TRIFENATATE 1 PUFF: 200; 25 POWDER RESPIRATORY (INHALATION) at 09:21

## 2024-03-26 RX ADMIN — CIPROFLOXACIN 400 MG: 400 INJECTION, SOLUTION INTRAVENOUS at 15:20

## 2024-03-26 RX ADMIN — TRAMADOL HYDROCHLORIDE 50 MG: 50 TABLET, COATED ORAL at 06:03

## 2024-03-26 RX ADMIN — SPIRONOLACTONE 12.5 MG: 25 TABLET, FILM COATED ORAL at 09:17

## 2024-03-26 RX ADMIN — Medication 1 TABLET: at 09:17

## 2024-03-26 RX ADMIN — OXYCODONE HYDROCHLORIDE 2.5 MG: 5 TABLET ORAL at 21:45

## 2024-03-26 RX ADMIN — GABAPENTIN 100 MG: 100 CAPSULE ORAL at 21:45

## 2024-03-26 ASSESSMENT — PAIN - FUNCTIONAL ASSESSMENT
PAIN_FUNCTIONAL_ASSESSMENT: 0-10

## 2024-03-26 ASSESSMENT — COGNITIVE AND FUNCTIONAL STATUS - GENERAL
DRESSING REGULAR LOWER BODY CLOTHING: A LOT
STANDING UP FROM CHAIR USING ARMS: A LOT
TOILETING: A LITTLE
DAILY ACTIVITIY SCORE: 15
MOVING TO AND FROM BED TO CHAIR: A LOT
TURNING FROM BACK TO SIDE WHILE IN FLAT BAD: A LOT
MOVING FROM LYING ON BACK TO SITTING ON SIDE OF FLAT BED WITH BEDRAILS: A LITTLE
PERSONAL GROOMING: A LOT
WALKING IN HOSPITAL ROOM: TOTAL
HELP NEEDED FOR BATHING: A LOT
DRESSING REGULAR UPPER BODY CLOTHING: A LOT
CLIMB 3 TO 5 STEPS WITH RAILING: TOTAL
MOBILITY SCORE: 11

## 2024-03-26 ASSESSMENT — PAIN SCALES - GENERAL
PAINLEVEL_OUTOF10: 2
PAINLEVEL_OUTOF10: 4
PAINLEVEL_OUTOF10: 0 - NO PAIN
PAINLEVEL_OUTOF10: 7
PAINLEVEL_OUTOF10: 8
PAINLEVEL_OUTOF10: 3
PAINLEVEL_OUTOF10: 6
PAINLEVEL_OUTOF10: 6

## 2024-03-26 ASSESSMENT — PAIN DESCRIPTION - DESCRIPTORS: DESCRIPTORS: ACHING

## 2024-03-26 ASSESSMENT — PAIN DESCRIPTION - LOCATION
LOCATION: HIP
LOCATION: HIP

## 2024-03-26 ASSESSMENT — ACTIVITIES OF DAILY LIVING (ADL): LACK_OF_TRANSPORTATION: NO

## 2024-03-26 ASSESSMENT — PAIN DESCRIPTION - ORIENTATION
ORIENTATION: LEFT
ORIENTATION: LEFT

## 2024-03-26 NOTE — PROGRESS NOTES
03/26/24 1541   Discharge Planning   Living Arrangements Children  (Home with son/MAURICIO Morton.)   Support Systems Children   Assistance Needed SNF at discharge.   Type of Residence Private residence   Who is requesting discharge planning? Other (Comment)  (family)   Home or Post Acute Services Post acute facilities (Rehab/SNF/etc)   Type of Post Acute Facility Services Skilled nursing   Patient expects to be discharged to: SNF   Does the patient need discharge transport arranged? Yes   RoundTrip coordination needed? Yes   Has discharge transport been arranged? No   Financial Resource Strain   How hard is it for you to pay for the very basics like food, housing, medical care, and heating? Not very   Housing Stability   In the last 12 months, was there a time when you were not able to pay the mortgage or rent on time? N   In the last 12 months, was there a time when you did not have a steady place to sleep or slept in a shelter (including now)? N   Transportation Needs   In the past 12 months, has lack of transportation kept you from medical appointments or from getting medications? no   In the past 12 months, has lack of transportation kept you from meetings, work, or from getting things needed for daily living? No   Patient Choice   Provider Choice list and CMS website (https://medicare.gov/care-compare#search) for post-acute Quality and Resource Measure Data were provided and reviewed with: Family     Assessment Note:  Met with pt briefly ([facility]) and spoke with son Contreras Dumont ([phone]) via phone,  introduced myself as care coordinator and member of the Care Transitions team for discharge planning.   Pt feels safe at home with support of family.  Per son, pt could benefit from having home care.  Pt's son Contreras provides [facility].  Pt's address, phone number and contact information was verified.  PT and OT are recommending moderate intensity, pt and son are agreeable.  Pt's preference is [place]  [facility]  Pt's son provided [facility] and [facility] as additional preferences, MADI Dorman will sent referrals.  Pt does not have any questions/concerns at this time.     Previous Home Care: None  DME: hospital bed (\"hard crank\"), wheelchair, lift recliner chair, BSC, portable ramp.  Pharmacy: [facility] in [place]  Falls: Pt was reaching down while sitting in her recliner and slipped out.  PCP:   [facility] (last visit 1/2024)    Nell Chang MSN, RN-BC  Transitional Care Coordinator (TCC)  [phone]   "

## 2024-03-26 NOTE — CARE PLAN
The patient's goals for the shift include      The clinical goals for the shift include Patient will not show any signs of resp distress during my shift    Patient resp. Status improving. Only occasional wheeze noted. Lungs Diminished.

## 2024-03-26 NOTE — PROGRESS NOTES
[facility]   HOSPITAL MEDICINE     PROGRESS NOTE       PATIENT NAME: Alayna Dumont   MRN: [id number]   SERVICE DATE: 03/26/24   SERVICE TIME: 12:40 PM      Hospital Medicine/Primary Attending: Kb Leal DO   LENGTH OF STAY: 4     CHIEF COMPLAINT: Acute hypoxemic respiratory failure due to COVID-19 (CMS/[facility])   Principal Problem:    Acute hypoxemic respiratory failure due to COVID-19 (CMS/HCC)  Active Problems:    Chronic heart failure with preserved ejection fraction (HFpEF) (CMS/[facility])    COPD exacerbation (CMS/[facility])    Sarcoidosis of lung (CMS/[facility])    OSEAS (acute kidney injury) (CMS/[facility])    Acute cystitis without hematuria        Assessment/Plan      I reviewed:    All new and relevant labs and imaging   New EKGs  Consultant notes   Vitals Signs   Nursing notes       89 y.o. female with a past medical history of HTN, HLD, SUNDEEP, HFpEF (EF 70-75% 02/2024), AVR, dual chamber PPM, sarcoidosis, and COPD who was addmited for OSEAS and COVID 19. H    #Acute hypoxic respiratory failure 2/2 COPD exacerbation 2/2 COVID 19 pneumonia   + resp distress   +severe wheezing   -STAT duoneb   -ctn o2 and wean as christiano  -methyl pred 125x1 then dex 6mg daily  -Remdesivir ( 5 days )   -Dexamethasone ( 10 days )   -Schedule Tessalon Perles and Mucinex   -Daily CMP   -duoneb q4h   -Monitor parameters (Heart Rate & CMP) daily   -If HR <50 or symptomatic from bradycardia, please consider holding remdesivir   -If CrCl <30 & Not on dialysis, please consider holding remdesivir     -If ALT is >= 10x ULN during treatment, please consider holding remdesivir     #acute cystitis 2/2 MDRO Proteus vulgaris  Suseptable to cipro  -stop current abx  -start cipro IV then PO tomorrow   -monitor for sx      #OSEAS  1.4>1.6>1.8>1.6>1.3>1.22  -Suspect 2/2 poor PO intake, n/v/d  -continue to hold torsemide, spironolactone   -Avoid nephrotoxic agents where possible  -Follow RFP     #OA L hip  - not controlled on home  tramadol  - Sched acetamin 1000mg q8h   -2.5mg oxy PRN   - PT/OT consult  - patient is unlikely to be a surgical candidate    #Troponinemia  #s/p AVR  #s/p PPM  -Troponin mildly elevated  -No complaints of chest pain  -Recently admitted (2/2024) for chest pain, -ve workup  -EKG A/V paced without signs of acute ischemia  -Continue home medications     #HFpEF chronic   -Echo 2/2024 hyperdynamic, EF 70-75%  -Daily weights  -Continue home metoprolol, atorvastatin, jardiance  -holding diuresis as above      #Sarcoidosis  #acute exacerbation of COPD  -No acute issues  -hold Breo  -see plan above      #HTN  #HLD  -Resume home metoprolol, spironolactone  -holding torsemide for OSEAS  -resume home atorvastatin                      DISPOSITION:  Functional Status Prior to Admit:     Medical Necessity for Continued Hospitalization y     Plan of care discussed with:         Subjective   SUBJECTIVE:  Pt seen and examined.   SOB     Patient denies CP, SOB, fevers, chills, nausea, or emesis.         Objective      PHYSICAL EXAM: Patient Vitals for the past 24 hrs:   BP Temp Temp src Pulse Resp SpO2   03/26/24 0921 171/61 36.3 °C (97.3 °F) Tympanic 61 24 99 %   03/26/24 0351 149/50 36.2 °C (97.2 °F) Temporal 60 16 96 %   03/25/24 2006 (!) 147/47 36.6 °C (97.9 °F) Temporal 60 15 98 %   03/25/24 1933 -- -- -- -- -- 97 %   03/25/24 1629 -- -- -- -- -- 97 %        Physical Exam  Vitals and nursing note reviewed.   Constitutional:       General: She is not in acute distress.     Appearance: Normal appearance. She is not ill-appearing.   HENT:      Head: Normocephalic and atraumatic.   Cardiovascular:      Rate and Rhythm: Normal rate and regular rhythm.   Pulmonary:      Effort: Respiratory distress present.      Breath sounds: No stridor. Wheezing and rhonchi present.   Abdominal:      Tenderness: There is no guarding.   Skin:     Coloration: Skin is not jaundiced or pale.   Neurological:      Mental Status: She is alert and oriented to  "person, place, and time.   Psychiatric:         Mood and Affect: Mood normal.            MEDICATIONS:   Scheduled medications  acetaminophen, 975 mg, oral, q8h  atorvastatin, 20 mg, oral, Nightly  baclofen, 5 mg, oral, BID  ciprofloxacin, 400 mg, intravenous, q12h   Followed by  [START ON 3/27/2024] ciprofloxacin, 500 mg, oral, q12h [facility]  dexAMETHasone, 6 mg, oral, Daily  DULoxetine, 30 mg, oral, Daily  empagliflozin, 10 mg, oral, Daily  ferrous sulfate (325 mg ferrous sulfate), 65 mg of iron, oral, Daily with breakfast  fluticasone furoate-vilanteroL, 1 puff, inhalation, Daily  gabapentin, 100 mg, oral, BID  heparin (porcine), 5,000 Units, subcutaneous, q8h  ipratropium-albuteroL, 3 mL, nebulization, q4h while awake  metoprolol tartrate, 25 mg, oral, BID  multivitamin with minerals, 1 tablet, oral, Daily  nystatin, 1 Application, Topical, BID  pantoprazole, 20 mg, oral, Daily  remdesivir, 100 mg, intravenous, q24h  spironolactone, 12.5 mg, oral, Daily      Continuous medications     PRN medications  PRN medications: albuterol, dextromethorphan-guaifenesin, dextrose, dextrose, glucagon, glucagon, loperamide, melatonin, oxyCODONE, polyethylene glycol, traMADol       DATA:      Diagnostic tests reviewed for today's visit:     CBC:   Results from last 72 hours   Lab Units 03/24/24  0728   WBC AUTO x10*3/uL 7.3   HEMATOCRIT % 34.0*   PLATELETS AUTO x10*3/uL 236   MCV fL 96       Coags:     CMP:   Results from last 72 hours   Lab Units 03/26/24  0911 03/25/24  0907 03/24/24  0728   SODIUM mmol/L 140   < > 139   POTASSIUM mmol/L 5.6*   < > 5.5*   CO2 mmol/L 26   < > 23   BUN mg/dL 44*   < > 56*   MAGNESIUM mg/dL  --   --  2.63*   ALBUMIN g/dL 3.4   < > 3.6    < > = values in this interval not displayed.        Cardiac Enzymes: No lab exists for component: \"TROP\" .lab  Liver Function, Amylase, Lipase:       No lab exists for component: \"TPROT\", \"ALB\", \"TBILI\"   MG/PHOS: [id number](mg,p)@   Renal Panel:    Results from " "last 72 hours   Lab Units 03/26/24  0911   ALBUMIN g/dL 3.4   BUN mg/dL 44*   POTASSIUM mmol/L 5.6*   CO2 mmol/L 26   SODIUM mmol/L 140        Heme:        No lab exists for component: \"RETICP\", \"ABSRETIC\", \"LD\", \"DAILY\", \"FE\", \"TRANSFERSAT\"   No components found for: \"UALBCR\"      Medication and Non-Pharmacologic VTE Prophylaxis/Anticoagulants    The patient has received anticoagulants recently:  Heparin is ordered or has been given within the last 24 hours OR  Lovenox has been given in the last 24 hours OR  An anticoagulant other than Heparin or Lovenox is on the home med list OR  An anticoagulant other than Heparin or Lovenox has been given within the last 5 days  Last Anticoag Admin            heparin (porcine) injection 5,000 Units    Given 5,000 Units at 0914    Frequency: Every 8 hours         There are additional administrations since 03/22/24 1225 that are not shown.    No unadministered anticoagulant orders found.                   SIGNATURE: Kb Leal, Merged with [facility]    PAGER/CONTACT #: Epic Chat      Disclaimer: Portions of this note may have been generated using Dragon voice recognition software. Reasonable efforts were made to correct any dictation errors that resulted due to the programming of this software but some may still be present.     Portions of this note including HPI, ROS, impression/plan, and examination may have been copied forward from yesterday to March 26, 2024 as to provide important historical information essential in contributing to medical decision making. Documentation has been reviewed and edited as necessary to support clinical decision making for today's visit and to reflect my own independent evaluation of this patient.       The time of this note does not reflect the time I saw the patient but the time that this note was written     "

## 2024-03-26 NOTE — NURSING NOTE
Geriatric Nursing Rounds Summary  Ms Dumont is a 89 year old admitted covid + possible uti  [facility] cad pacer heart failure sarcoidosis jayme hematuria  AGE FRIENDLY  What matters full code possible snf decreased sob  Mentation cam neg  Meds steroids oral remdisavir have helped  Mobility stood at bedside with therapy     Satisfactory

## 2024-03-26 NOTE — PROGRESS NOTES
Alayna Dumont is a 89 y.o. female on day 4 of admission presenting with Acute hypoxemic respiratory failure due to COVID-19 (CMS/HCC).      Transitional Care Coordination Progress Note:   Patient discussed during interdisciplinary rounds.   Team members present: BINA,MADI FRANK  Plan per Medical/Surgical team: Monitoring respiratory status   Discharge disposition: SNF pending accepting agency  Status: Inpatient   Payer: Medicare  Potential Barriers: none   ADOD: 3/28/24    Update 3/28/24 16:00-.  Transitional Care Coordination Progress Note:   Patient discussed during interdisciplinary rounds.   Team members present: MD/MADI/BINA  Plan per Medical/Surgical team:Continuing monitoring respiratory status   Discharge disposition: SNF pending accepting agency   Status: Inpatient  Payer: Medicare  Potential Barriers: none   ADOD: 3/29 vs 3/30/24

## 2024-03-27 LAB
ALBUMIN SERPL BCP-MCNC: 3.4 G/DL (ref 3.4–5)
ANION GAP SERPL CALC-SCNC: 14 MMOL/L (ref 10–20)
BUN SERPL-MCNC: 49 MG/DL (ref 6–23)
CALCIUM SERPL-MCNC: 9 MG/DL (ref 8.6–10.6)
CHLORIDE SERPL-SCNC: 106 MMOL/L (ref 98–107)
CO2 SERPL-SCNC: 25 MMOL/L (ref 21–32)
CREAT SERPL-MCNC: 1.33 MG/DL (ref 0.5–1.05)
EGFRCR SERPLBLD CKD-EPI 2021: 38 ML/MIN/1.73M*2
GLUCOSE BLD MANUAL STRIP-MCNC: 102 MG/DL (ref 74–99)
GLUCOSE BLD MANUAL STRIP-MCNC: 133 MG/DL (ref 74–99)
GLUCOSE BLD MANUAL STRIP-MCNC: 156 MG/DL (ref 74–99)
GLUCOSE BLD MANUAL STRIP-MCNC: 229 MG/DL (ref 74–99)
GLUCOSE SERPL-MCNC: 96 MG/DL (ref 74–99)
PHOSPHATE SERPL-MCNC: 3.5 MG/DL (ref 2.5–4.9)
POTASSIUM SERPL-SCNC: 5.1 MMOL/L (ref 3.5–5.3)
SODIUM SERPL-SCNC: 140 MMOL/L (ref 136–145)

## 2024-03-27 PROCEDURE — 1100000001 HC PRIVATE ROOM DAILY

## 2024-03-27 PROCEDURE — 80069 RENAL FUNCTION PANEL: CPT | Performed by: STUDENT IN AN ORGANIZED HEALTH CARE EDUCATION/TRAINING PROGRAM

## 2024-03-27 PROCEDURE — 2500000001 HC RX 250 WO HCPCS SELF ADMINISTERED DRUGS (ALT 637 FOR MEDICARE OP): Performed by: STUDENT IN AN ORGANIZED HEALTH CARE EDUCATION/TRAINING PROGRAM

## 2024-03-27 PROCEDURE — 99233 SBSQ HOSP IP/OBS HIGH 50: CPT | Performed by: STUDENT IN AN ORGANIZED HEALTH CARE EDUCATION/TRAINING PROGRAM

## 2024-03-27 PROCEDURE — 36415 COLL VENOUS BLD VENIPUNCTURE: CPT | Performed by: STUDENT IN AN ORGANIZED HEALTH CARE EDUCATION/TRAINING PROGRAM

## 2024-03-27 PROCEDURE — 2500000002 HC RX 250 W HCPCS SELF ADMINISTERED DRUGS (ALT 637 FOR MEDICARE OP, ALT 636 FOR OP/ED): Performed by: FAMILY MEDICINE

## 2024-03-27 PROCEDURE — 2500000002 HC RX 250 W HCPCS SELF ADMINISTERED DRUGS (ALT 637 FOR MEDICARE OP, ALT 636 FOR OP/ED): Performed by: NURSE PRACTITIONER

## 2024-03-27 PROCEDURE — 2500000004 HC RX 250 GENERAL PHARMACY W/ HCPCS (ALT 636 FOR OP/ED): Performed by: STUDENT IN AN ORGANIZED HEALTH CARE EDUCATION/TRAINING PROGRAM

## 2024-03-27 PROCEDURE — 2500000004 HC RX 250 GENERAL PHARMACY W/ HCPCS (ALT 636 FOR OP/ED): Performed by: EMERGENCY MEDICINE

## 2024-03-27 PROCEDURE — 2500000001 HC RX 250 WO HCPCS SELF ADMINISTERED DRUGS (ALT 637 FOR MEDICARE OP): Performed by: NURSE PRACTITIONER

## 2024-03-27 PROCEDURE — 2500000001 HC RX 250 WO HCPCS SELF ADMINISTERED DRUGS (ALT 637 FOR MEDICARE OP): Performed by: FAMILY MEDICINE

## 2024-03-27 PROCEDURE — 2500000005 HC RX 250 GENERAL PHARMACY W/O HCPCS: Mod: JZ | Performed by: STUDENT IN AN ORGANIZED HEALTH CARE EDUCATION/TRAINING PROGRAM

## 2024-03-27 PROCEDURE — 82947 ASSAY GLUCOSE BLOOD QUANT: CPT

## 2024-03-27 PROCEDURE — 2500000002 HC RX 250 W HCPCS SELF ADMINISTERED DRUGS (ALT 637 FOR MEDICARE OP, ALT 636 FOR OP/ED): Performed by: STUDENT IN AN ORGANIZED HEALTH CARE EDUCATION/TRAINING PROGRAM

## 2024-03-27 PROCEDURE — 94640 AIRWAY INHALATION TREATMENT: CPT

## 2024-03-27 PROCEDURE — 97530 THERAPEUTIC ACTIVITIES: CPT | Mod: GP

## 2024-03-27 RX ORDER — IPRATROPIUM BROMIDE AND ALBUTEROL SULFATE 2.5; .5 MG/3ML; MG/3ML
3 SOLUTION RESPIRATORY (INHALATION)
Status: DISCONTINUED | OUTPATIENT
Start: 2024-03-27 | End: 2024-03-28

## 2024-03-27 RX ORDER — IPRATROPIUM BROMIDE AND ALBUTEROL SULFATE 2.5; .5 MG/3ML; MG/3ML
3 SOLUTION RESPIRATORY (INHALATION)
Status: DISCONTINUED | OUTPATIENT
Start: 2024-03-27 | End: 2024-03-27

## 2024-03-27 RX ORDER — FUROSEMIDE 10 MG/ML
40 INJECTION INTRAMUSCULAR; INTRAVENOUS
Status: DISCONTINUED | OUTPATIENT
Start: 2024-03-28 | End: 2024-03-28

## 2024-03-27 RX ORDER — POLYETHYLENE GLYCOL 3350 17 G/17G
17 POWDER, FOR SOLUTION ORAL 2 TIMES DAILY
Status: DISCONTINUED | OUTPATIENT
Start: 2024-03-27 | End: 2024-03-30 | Stop reason: HOSPADM

## 2024-03-27 RX ORDER — SENNOSIDES 8.6 MG/1
1 TABLET ORAL 2 TIMES DAILY
Status: DISCONTINUED | OUTPATIENT
Start: 2024-03-27 | End: 2024-03-30 | Stop reason: HOSPADM

## 2024-03-27 RX ORDER — FUROSEMIDE 10 MG/ML
40 INJECTION INTRAMUSCULAR; INTRAVENOUS EVERY 8 HOURS
Status: COMPLETED | OUTPATIENT
Start: 2024-03-27 | End: 2024-03-27

## 2024-03-27 RX ORDER — GUAIFENESIN/DEXTROMETHORPHAN 100-10MG/5
10 SYRUP ORAL EVERY 8 HOURS
Status: DISCONTINUED | OUTPATIENT
Start: 2024-03-27 | End: 2024-03-30 | Stop reason: HOSPADM

## 2024-03-27 RX ADMIN — CIPROFLOXACIN 400 MG: 400 INJECTION, SOLUTION INTRAVENOUS at 03:13

## 2024-03-27 RX ADMIN — IPRATROPIUM BROMIDE AND ALBUTEROL SULFATE 3 ML: .5; 3 SOLUTION RESPIRATORY (INHALATION) at 14:26

## 2024-03-27 RX ADMIN — POLYETHYLENE GLYCOL 3350 17 G: 17 POWDER, FOR SOLUTION ORAL at 20:29

## 2024-03-27 RX ADMIN — METOPROLOL TARTRATE 25 MG: 50 TABLET, FILM COATED ORAL at 20:29

## 2024-03-27 RX ADMIN — CIPROFLOXACIN HYDROCHLORIDE 500 MG: 500 TABLET, FILM COATED ORAL at 20:29

## 2024-03-27 RX ADMIN — GABAPENTIN 100 MG: 100 CAPSULE ORAL at 20:28

## 2024-03-27 RX ADMIN — EMPAGLIFLOZIN 10 MG: 10 TABLET, FILM COATED ORAL at 09:51

## 2024-03-27 RX ADMIN — REMDESIVIR 100 MG: 100 INJECTION, POWDER, LYOPHILIZED, FOR SOLUTION INTRAVENOUS at 09:51

## 2024-03-27 RX ADMIN — FLUTICASONE FUROATE AND VILANTEROL TRIFENATATE 1 PUFF: 200; 25 POWDER RESPIRATORY (INHALATION) at 08:14

## 2024-03-27 RX ADMIN — FUROSEMIDE 40 MG: 10 INJECTION, SOLUTION INTRAVENOUS at 12:09

## 2024-03-27 RX ADMIN — BACLOFEN 5 MG: 10 TABLET ORAL at 20:29

## 2024-03-27 RX ADMIN — IPRATROPIUM BROMIDE AND ALBUTEROL SULFATE 3 ML: .5; 3 SOLUTION RESPIRATORY (INHALATION) at 20:38

## 2024-03-27 RX ADMIN — SPIRONOLACTONE 12.5 MG: 25 TABLET, FILM COATED ORAL at 09:57

## 2024-03-27 RX ADMIN — NYSTATIN 1 APPLICATION: 100000 POWDER TOPICAL at 20:30

## 2024-03-27 RX ADMIN — ATORVASTATIN CALCIUM 20 MG: 20 TABLET, FILM COATED ORAL at 20:29

## 2024-03-27 RX ADMIN — DEXAMETHASONE 6 MG: 6 TABLET ORAL at 09:58

## 2024-03-27 RX ADMIN — METOPROLOL TARTRATE 25 MG: 50 TABLET, FILM COATED ORAL at 09:58

## 2024-03-27 RX ADMIN — BACLOFEN 5 MG: 10 TABLET ORAL at 09:58

## 2024-03-27 RX ADMIN — ACETAMINOPHEN 975 MG: 325 TABLET ORAL at 12:09

## 2024-03-27 RX ADMIN — METHYLPREDNISOLONE SODIUM SUCCINATE 40 MG: 40 INJECTION, POWDER, FOR SOLUTION INTRAMUSCULAR; INTRAVENOUS at 12:09

## 2024-03-27 RX ADMIN — ACETAMINOPHEN 975 MG: 325 TABLET ORAL at 03:13

## 2024-03-27 RX ADMIN — GUAIFENESIN AND DEXTROMETHORPHAN 10 ML: 100; 10 SYRUP ORAL at 20:29

## 2024-03-27 RX ADMIN — FERROUS SULFATE TAB 325 MG (65 MG ELEMENTAL FE) 1 TABLET: 325 (65 FE) TAB at 10:00

## 2024-03-27 RX ADMIN — PANTOPRAZOLE SODIUM 20 MG: 20 TABLET, DELAYED RELEASE ORAL at 09:58

## 2024-03-27 RX ADMIN — SODIUM ZIRCONIUM CYCLOSILICATE 10 G: 10 POWDER, FOR SUSPENSION ORAL at 09:51

## 2024-03-27 RX ADMIN — TRAMADOL HYDROCHLORIDE 50 MG: 50 TABLET, COATED ORAL at 17:51

## 2024-03-27 RX ADMIN — Medication 1 TABLET: at 09:58

## 2024-03-27 RX ADMIN — HEPARIN SODIUM 5000 UNITS: 5000 INJECTION INTRAVENOUS; SUBCUTANEOUS at 17:00

## 2024-03-27 RX ADMIN — CIPROFLOXACIN HYDROCHLORIDE 500 MG: 500 TABLET, FILM COATED ORAL at 12:12

## 2024-03-27 RX ADMIN — TRAMADOL HYDROCHLORIDE 50 MG: 50 TABLET, COATED ORAL at 09:59

## 2024-03-27 RX ADMIN — INSULIN LISPRO 1 UNITS: 100 INJECTION, SOLUTION INTRAVENOUS; SUBCUTANEOUS at 14:29

## 2024-03-27 RX ADMIN — GUAIFENESIN AND DEXTROMETHORPHAN 10 ML: 100; 10 SYRUP ORAL at 12:09

## 2024-03-27 RX ADMIN — ACETAMINOPHEN 975 MG: 325 TABLET ORAL at 20:29

## 2024-03-27 RX ADMIN — DULOXETINE HYDROCHLORIDE 30 MG: 30 CAPSULE, DELAYED RELEASE ORAL at 09:58

## 2024-03-27 RX ADMIN — NYSTATIN 1 APPLICATION: 100000 POWDER TOPICAL at 09:57

## 2024-03-27 RX ADMIN — POLYETHYLENE GLYCOL 3350 17 G: 17 POWDER, FOR SOLUTION ORAL at 12:12

## 2024-03-27 RX ADMIN — GABAPENTIN 100 MG: 100 CAPSULE ORAL at 09:58

## 2024-03-27 RX ADMIN — FUROSEMIDE 40 MG: 10 INJECTION, SOLUTION INTRAVENOUS at 20:28

## 2024-03-27 RX ADMIN — SENNOSIDES 8.6 MG: 8.6 TABLET, FILM COATED ORAL at 12:13

## 2024-03-27 RX ADMIN — HEPARIN SODIUM 5000 UNITS: 5000 INJECTION INTRAVENOUS; SUBCUTANEOUS at 09:49

## 2024-03-27 RX ADMIN — SENNOSIDES 8.6 MG: 8.6 TABLET, FILM COATED ORAL at 20:28

## 2024-03-27 ASSESSMENT — COGNITIVE AND FUNCTIONAL STATUS - GENERAL
MOVING TO AND FROM BED TO CHAIR: A LOT
WALKING IN HOSPITAL ROOM: TOTAL
HELP NEEDED FOR BATHING: A LOT
MOVING FROM LYING ON BACK TO SITTING ON SIDE OF FLAT BED WITH BEDRAILS: A LITTLE
TOILETING: A LITTLE
DRESSING REGULAR LOWER BODY CLOTHING: A LOT
MOVING TO AND FROM BED TO CHAIR: A LOT
STANDING UP FROM CHAIR USING ARMS: A LOT
MOBILITY SCORE: 11
PERSONAL GROOMING: A LOT
CLIMB 3 TO 5 STEPS WITH RAILING: TOTAL
STANDING UP FROM CHAIR USING ARMS: A LOT
TURNING FROM BACK TO SIDE WHILE IN FLAT BAD: A LOT
TURNING FROM BACK TO SIDE WHILE IN FLAT BAD: A LITTLE
CLIMB 3 TO 5 STEPS WITH RAILING: TOTAL
MOVING FROM LYING ON BACK TO SITTING ON SIDE OF FLAT BED WITH BEDRAILS: A LITTLE
DRESSING REGULAR UPPER BODY CLOTHING: A LOT
DAILY ACTIVITIY SCORE: 15
MOBILITY SCORE: 12
WALKING IN HOSPITAL ROOM: TOTAL

## 2024-03-27 ASSESSMENT — PAIN DESCRIPTION - DESCRIPTORS
DESCRIPTORS: ACHING
DESCRIPTORS: ACHING

## 2024-03-27 ASSESSMENT — PAIN SCALES - GENERAL
PAINLEVEL_OUTOF10: 4

## 2024-03-27 ASSESSMENT — PAIN - FUNCTIONAL ASSESSMENT
PAIN_FUNCTIONAL_ASSESSMENT: 0-10
PAIN_FUNCTIONAL_ASSESSMENT: 0-10

## 2024-03-27 NOTE — CARE PLAN
The patient's goals for the shift include      The clinical goals for the shift include Pt. will verbalize decreased pain by end of shift    Over the shift, the patient did not make progress toward the following goals. Barriers to progression include. Recommendations to address these barriers include.    Problem: Pain - Adult  Goal: Verbalizes/displays adequate comfort level or baseline comfort level  Outcome: Progressing     Problem: Pain  Goal: Takes deep breaths with improved pain control throughout the shift  Outcome: Progressing  Goal: Turns in bed with improved pain control throughout the shift  Outcome: Progressing  Goal: Walks with improved pain control throughout the shift  Outcome: Progressing  Goal: Performs ADL's with improved pain control throughout shift  Outcome: Progressing  Goal: Participates in PT with improved pain control throughout the shift  Outcome: Progressing  Goal: Free from opioid side effects throughout the shift  Outcome: Progressing  Goal: Free from acute confusion related to pain meds throughout the shift  Outcome: Progressing     Problem: Skin  Goal: Decreased wound size/increased tissue granulation at next dressing change  Outcome: Progressing  Flowsheets (Taken 3/27/2024 1358)  Decreased wound size/increased tissue granulation at next dressing change:   Promote sleep for wound healing   Protective dressings over bony prominences  Goal: Participates in plan/prevention/treatment measures  Outcome: Progressing  Flowsheets (Taken 3/27/2024 1359)  Participates in plan/prevention/treatment measures:   Discuss with provider PT/OT consult   Elevate heels  Goal: Prevent/manage excess moisture  Outcome: Progressing  Flowsheets (Taken 3/27/2024 1356)  Prevent/manage excess moisture:   Cleanse incontinence/protect with barrier cream   Moisturize dry skin   Monitor for/manage infection if present  Goal: Prevent/minimize sheer/friction injuries  Outcome: Progressing  Flowsheets (Taken 3/27/2024  1720)  Prevent/minimize sheer/friction injuries:   HOB 30 degrees or less   Turn/reposition every 2 hours/use positioning/transfer devices   Increase activity/out of bed for meals  Goal: Promote/optimize nutrition  Outcome: Progressing  Flowsheets (Taken 3/27/2024 2584)  Promote/optimize nutrition:   Consume > 50% meals/supplements   Offer water/supplements/favorite foods   Monitor/record intake including meals  Goal: Promote skin healing  Outcome: Progressing  Flowsheets (Taken 3/27/2024 5864)  Promote skin healing:   Assess skin/pad under line(s)/device(s)   Turn/reposition every 2 hours/use positioning/transfer devices   Protective dressings over bony prominences     Problem: Fall/Injury  Goal: Not fall by end of shift  Outcome: Progressing  Goal: Be free from injury by end of the shift  Outcome: Progressing  Goal: Verbalize understanding of personal risk factors for fall in the hospital  Outcome: Progressing  Goal: Verbalize understanding of risk factor reduction measures to prevent injury from fall in the home  Outcome: Progressing  Goal: Use assistive devices by end of the shift  Outcome: Progressing  Goal: Pace activities to prevent fatigue by end of the shift  Outcome: Progressing

## 2024-03-27 NOTE — PROGRESS NOTES
[facility]   HOSPITAL MEDICINE     PROGRESS NOTE       PATIENT NAME: Alayna Dumont   MRN: [id number]   SERVICE DATE: 03/27/24   SERVICE TIME: 3:57 PM      Hospital Medicine/Primary Attending: Kb Leal DO   LENGTH OF STAY: 5     CHIEF COMPLAINT: Acute hypoxemic respiratory failure due to COVID-19 (CMS/[facility])   Principal Problem:    Acute hypoxemic respiratory failure due to COVID-19 (CMS/HCC)  Active Problems:    Chronic heart failure with preserved ejection fraction (HFpEF) (CMS/[facility])    COPD exacerbation (CMS/[facility])    Sarcoidosis of lung (CMS/[facility])    OSEAS (acute kidney injury) (CMS/[facility])    Acute cystitis without hematuria        Assessment/Plan      I reviewed:    All new and relevant labs and imaging   New EKGs  Consultant notes   Vitals Signs   Nursing notes       89 y.o. female with a past medical history of HTN, HLD, SUNDEEP, HFpEF (EF 70-75% 02/2024), AVR, dual chamber PPM, sarcoidosis, and COPD who was addmited for OSEAS and COVID 19. H    #Acute hypoxic respiratory failure 2/2 COPD exacerbation 2/2 COVID 19 pneumonia   + resp distress   +severe wheezing   -STAT duoneb   -ctn o2 and wean as christiano  -methyl pred 125x1 then dex 6mg daily  -Remdesivir ( 5 days )   -Dexamethasone ( 10 days )   -Schedule Tessalon Perles and Mucinex   -Daily CMP   -duoneb q4h   -Monitor parameters (Heart Rate & CMP) daily   -If HR <50 or symptomatic from bradycardia, please consider holding remdesivir   -If CrCl <30 & Not on dialysis, please consider holding remdesivir     -If ALT is >= 10x ULN during treatment, please consider holding remdesivir     #acute cystitis 2/2 MDRO Proteus vulgaris  Suseptable to cipro  -stop current abx  -start cipro IV then PO tomorrow   -monitor for sx      #OSEAS on ?CKD   Baseline Cr ~1.1  1.4>1.6>1.8>1.6>1.3>1.22  -Suspect 2/2 poor PO intake, n/v/d  -Avoid nephrotoxic agents where possible  -Follow RFP      #acute on chronic HFpEF   -Echo 2/2024 hyperdynamic, EF  70-75%  -furosemide IV 40mg BID   -Daily weights  -Continue home metoprolol, atorvastatin, jardiance  -check RFP and mg daily   -monitor fluid status       #Sarcoidosis  #acute exacerbation of COPD  -hold Breo and switch to sched duonebs   -steroids IV as above did not improve on PO  -see plan above       #OA L hip  - not controlled on home tramadol  - Sched acetamin 1000mg q8h   -2.5mg oxy PRN   - PT/OT consult  - patient is unlikely to be a surgical candidate    #Troponinemia  #s/p AVR  #s/p PPM  -Troponin mildly elevated  -No complaints of chest pain  -Recently admitted (2/2024) for chest pain, -ve workup  -EKG A/V paced without signs of acute ischemia  -Continue home medications     #HTN  #HLD  -Resume home metoprolol, spironolactone  -holding torsemide for OSEAS  -resume home atorvastatin      #Constipation  -miralax bid   -senna BID   Reassess                 DISPOSITION:  Functional Status Prior to Admit:     Medical Necessity for Continued Hospitalization y     Plan of care discussed with:         Subjective   SUBJECTIVE:  Pt seen and examined.   SOB     Patient denies CP, SOB, fevers, chills, nausea, or emesis.         Objective      PHYSICAL EXAM: Patient Vitals for the past 24 hrs:   BP Temp Temp src Pulse Resp SpO2   03/27/24 1426 -- -- -- -- -- 97 %   03/27/24 1304 143/54 36 °C (96.8 °F) Tympanic 59 24 98 %   03/27/24 0814 -- -- -- -- -- 98 %   03/26/24 2152 (!) 131/41 36.6 °C (97.9 °F) Temporal 60 20 97 %        Physical Exam  Vitals and nursing note reviewed.   Constitutional:       General: She is not in acute distress.     Appearance: Normal appearance. She is not ill-appearing.   HENT:      Head: Normocephalic and atraumatic.   Cardiovascular:      Rate and Rhythm: Normal rate and regular rhythm.   Pulmonary:      Effort: Respiratory distress present.      Breath sounds: No stridor. Wheezing and rhonchi present.   Abdominal:      Tenderness: There is no guarding.   Skin:     Coloration: Skin is not  "jaundiced or pale.   Neurological:      Mental Status: She is alert and oriented to person, place, and time.   Psychiatric:         Mood and Affect: Mood normal.            MEDICATIONS:   Scheduled medications  acetaminophen, 975 mg, oral, q8h  atorvastatin, 20 mg, oral, Nightly  baclofen, 5 mg, oral, BID  ciprofloxacin, 500 mg, oral, q12h CHEO  dextromethorphan-guaifenesin, 10 mL, oral, q8h  DULoxetine, 30 mg, oral, Daily  empagliflozin, 10 mg, oral, Daily  ferrous sulfate (325 mg ferrous sulfate), 65 mg of iron, oral, Daily with breakfast  fluticasone furoate-vilanteroL, 1 puff, inhalation, Daily  [START ON 3/28/2024] furosemide, 40 mg, intravenous, 2 times per day  furosemide, 40 mg, intravenous, q8h  gabapentin, 100 mg, oral, BID  heparin (porcine), 5,000 Units, subcutaneous, q8h  insulin lispro, 0-5 Units, subcutaneous, TID with meals  ipratropium-albuteroL, 3 mL, nebulization, q6h  methylPREDNISolone sodium succinate (PF), 40 mg, intravenous, q12h  metoprolol tartrate, 25 mg, oral, BID  multivitamin with minerals, 1 tablet, oral, Daily  nystatin, 1 Application, Topical, BID  pantoprazole, 20 mg, oral, Daily  polyethylene glycol, 17 g, oral, BID  remdesivir, 100 mg, intravenous, q24h  sennosides, 1 tablet, oral, BID  spironolactone, 12.5 mg, oral, Daily      Continuous medications     PRN medications  PRN medications: albuterol, dextrose, dextrose, glucagon, glucagon, loperamide, melatonin, oxyCODONE, traMADol       DATA:      Diagnostic tests reviewed for today's visit:     CBC:         Coags:     CMP:   Results from last 72 hours   Lab Units 03/27/24  0844   SODIUM mmol/L 140   POTASSIUM mmol/L 5.1   CO2 mmol/L 25   BUN mg/dL 49*   ALBUMIN g/dL 3.4        Cardiac Enzymes: No lab exists for component: \"TROP\" .lab  Liver Function, Amylase, Lipase:       No lab exists for component: \"TPROT\", \"ALB\", \"TBILI\"   MG/PHOS: [id number](mg,p)@   Renal Panel:    Results from last 72 hours   Lab Units 03/27/24  0844 " "  ALBUMIN g/dL 3.4   BUN mg/dL 49*   POTASSIUM mmol/L 5.1   CO2 mmol/L 25   SODIUM mmol/L 140        Heme:        No lab exists for component: \"RETICP\", \"ABSRETIC\", \"LD\", \"DAILY\", \"FE\", \"TRANSFERSAT\"   No components found for: \"UALBCR\"      Medication and Non-Pharmacologic VTE Prophylaxis/Anticoagulants    The patient has received anticoagulants recently:  Heparin is ordered or has been given within the last 24 hours OR  Lovenox has been given in the last 24 hours OR  An anticoagulant other than Heparin or Lovenox is on the home med list OR  An anticoagulant other than Heparin or Lovenox has been given within the last 5 days  Last Anticoag Admin            heparin (porcine) injection 5,000 Units    Given 5,000 Units at 0914    Frequency: Every 8 hours         There are additional administrations since 03/22/24 1225 that are not shown.    No unadministered anticoagulant orders found.                   SIGNATURE: Kb Leal [facility]    PAGER/CONTACT #: Epic Juan      Disclaimer: Portions of this note may have been generated using Dragon voice recognition software. Reasonable efforts were made to correct any dictation errors that resulted due to the programming of this software but some may still be present.     Portions of this note including HPI, ROS, impression/plan, and examination may have been copied forward from yesterday to March 27, 2024 as to provide important historical information essential in contributing to medical decision making. Documentation has been reviewed and edited as necessary to support clinical decision making for today's visit and to reflect my own independent evaluation of this patient.       The time of this note does not reflect the time I saw the patient but the time that this note was written     "

## 2024-03-27 NOTE — PROGRESS NOTES
Physical Therapy    Physical Therapy Treatment    Patient Name: Alayna Dumont  MRN: [id number]  Today's Date: 3/27/2024  Time Calculation  Start Time: 1019  Stop Time: 1036  Time Calculation (min): 17 min       Assessment/Plan   PT Assessment  PT Assessment Results: Decreased strength, Decreased range of motion, Decreased endurance, Impaired balance, Decreased mobility, Decreased coordination, Pain  End of Session Communication: Bedside nurse  End of Session Patient Position: Bed, 3 rail up, Alarm on  PT Plan  Inpatient/Swing Bed or Outpatient: Inpatient  PT Plan  Treatment/Interventions: Bed mobility, Transfer training, Gait training, Stair training, Endurance training, Range of motion, Therapeutic exercise, Therapeutic activity  PT Plan: Skilled PT  PT Frequency: 3 times per week  PT Discharge Recommendations: Moderate intensity level of continued care  PT Recommended Transfer Status: Assist x1  PT - OK to Discharge: Yes      General Visit Information:   PT  Visit  PT Received On: 03/27/24  General  Family/Caregiver Present: Yes  Caregiver Feedback: RN exiting room as PT entering. RN noting patient had not slept much last night and now fatigued.  Prior to Session Communication: Bedside nurse  Patient Position Received: Bed, 3 rail up, Alarm on  General Comment: Pt resting in bed upon entry. Pt noting she did not sleep at all last night and not as to sure why. Despite fatigue patient is still pleasant and cooperative and willing to work with PT.    Subjective   Precautions:  Precautions  Medical Precautions: Fall precautions  Precautions Comment: airborne precautions for COVID +    Objective   Pain:  Pain Assessment  Pain Assessment: 0-10  Pain Score: 4  Pain Location: Hip  Pain Orientation: Left  Pain Interventions: Medication (See MAR)  Cognition:  Cognition  Overall Cognitive Status: Within Functional Limits  Orientation Level: Oriented X4  Insight: Within function limits  Processing Speed: Within funtional  limits  Postural Control:  Postural Control  Postural Control: Impaired  Posture Comment: Slight flexed posture due to fatigue/generalized weakness.  Static Sitting Balance  Static Sitting-Balance Support: Bilateral upper extremity supported  Static Sitting-Level of Assistance: Contact guard, Close supervision  Static Sitting-Comment/Number of Minutes: Approximately 10 minutes total.  Static Standing Balance  Static Standing-Comment/Number of Minutes: Pt unable to tolerate attempts to stand on this date due to fatigue and not having slept.    Activity Tolerance:  Activity Tolerance  Endurance: Tolerates 10 - 20 min exercise with multiple rests  Treatments:  Therapeutic Exercise  Therapeutic Exercise Performed: Yes  Therapeutic Exercise Activity 1: Pt demonstrated to therapist TE she has been performing including LAQ, marches, ankle pumps, using a rubber band to perform UE exercises, as well as other BUE shoulder ROM.    Therapeutic Activity  Therapeutic Activity Performed: Yes  Therapeutic Activity 1: bed mobility, transfers, static sit    Bed Mobility  Bed Mobility: Yes  Bed Mobility 1  Bed Mobility 1: Supine to sitting, Sitting to supine  Level of Assistance 1: Minimum assistance, Moderate assistance    Transfers  Transfer: No  Transfer 1  Trials/Comments 1: Pt unable to tolerate attempts to stand on this visit though anticipate would require mod assist to perform.    Outcome Measures:  [facility] Basic Mobility  Turning from your back to your side while in a flat bed without using bedrails: A little  Moving from lying on your back to sitting on the side of a flat bed without using bedrails: A little  Moving to and from bed to chair (including a wheelchair): A lot  Standing up from a chair using your arms (e.g. wheelchair or bedside chair): A lot  To walk in hospital room: Total  Climbing 3-5 steps with railing: Total  Basic Mobility - Total Score: 12    Education Documentation  Precautions, taught by Harvinder Be,  PT at 3/27/2024 11:08 AM.  Learner: Patient  Readiness: Acceptance  Method: Explanation  Response: Verbalizes Understanding    Mobility Training, taught by Harvinder Be, PT at 3/27/2024 11:08 AM.  Learner: Patient  Readiness: Acceptance  Method: Explanation  Response: Verbalizes Understanding    Education Comments  No comments found.      Encounter Problems       Encounter Problems (Active)       Balance       STG - Maintains static standing balance with upper extremity support with B UE support for 2 mins without LOB and with CGA  (Progressing)       Start:  03/22/24    Expected End:  03/30/24       INTERVENTIONS:  1. Practice standing with minimal support.  2. Educate patient about standing tolerance.  3. Educate patient about independence with gait, transfers, and ADL's.  4. Educate patient about use of assistive device.  5. Educate patient about self-directed care.            PT Transfers       LTG - Patient will transfer to commode with min assist (Progressing)       Start:  03/22/24    Expected End:  03/30/24            STG - Transfer from bed to chair with min assist  (Progressing)       Start:  03/22/24    Expected End:  03/30/24            STG - Patient will perform bed mobility CGA (Progressing)       Start:  03/22/24    Expected End:  03/30/24               Pain - Adult

## 2024-03-28 LAB
ALBUMIN SERPL BCP-MCNC: 3.5 G/DL (ref 3.4–5)
ANION GAP SERPL CALC-SCNC: 18 MMOL/L (ref 10–20)
BUN SERPL-MCNC: 54 MG/DL (ref 6–23)
CALCIUM SERPL-MCNC: 8.7 MG/DL (ref 8.6–10.6)
CHLORIDE SERPL-SCNC: 101 MMOL/L (ref 98–107)
CO2 SERPL-SCNC: 27 MMOL/L (ref 21–32)
CREAT SERPL-MCNC: 1.43 MG/DL (ref 0.5–1.05)
EGFRCR SERPLBLD CKD-EPI 2021: 35 ML/MIN/1.73M*2
GLUCOSE BLD MANUAL STRIP-MCNC: 121 MG/DL (ref 74–99)
GLUCOSE BLD MANUAL STRIP-MCNC: 123 MG/DL (ref 74–99)
GLUCOSE BLD MANUAL STRIP-MCNC: 135 MG/DL (ref 74–99)
GLUCOSE BLD MANUAL STRIP-MCNC: 136 MG/DL (ref 74–99)
GLUCOSE SERPL-MCNC: 197 MG/DL (ref 74–99)
MAGNESIUM SERPL-MCNC: 2.46 MG/DL (ref 1.6–2.4)
PHOSPHATE SERPL-MCNC: 4.1 MG/DL (ref 2.5–4.9)
POTASSIUM SERPL-SCNC: 4.5 MMOL/L (ref 3.5–5.3)
SODIUM SERPL-SCNC: 141 MMOL/L (ref 136–145)

## 2024-03-28 PROCEDURE — 94640 AIRWAY INHALATION TREATMENT: CPT

## 2024-03-28 PROCEDURE — 82947 ASSAY GLUCOSE BLOOD QUANT: CPT

## 2024-03-28 PROCEDURE — 2500000001 HC RX 250 WO HCPCS SELF ADMINISTERED DRUGS (ALT 637 FOR MEDICARE OP): Performed by: STUDENT IN AN ORGANIZED HEALTH CARE EDUCATION/TRAINING PROGRAM

## 2024-03-28 PROCEDURE — 2500000002 HC RX 250 W HCPCS SELF ADMINISTERED DRUGS (ALT 637 FOR MEDICARE OP, ALT 636 FOR OP/ED): Performed by: FAMILY MEDICINE

## 2024-03-28 PROCEDURE — 80069 RENAL FUNCTION PANEL: CPT | Performed by: STUDENT IN AN ORGANIZED HEALTH CARE EDUCATION/TRAINING PROGRAM

## 2024-03-28 PROCEDURE — 99232 SBSQ HOSP IP/OBS MODERATE 35: CPT | Performed by: STUDENT IN AN ORGANIZED HEALTH CARE EDUCATION/TRAINING PROGRAM

## 2024-03-28 PROCEDURE — 2500000004 HC RX 250 GENERAL PHARMACY W/ HCPCS (ALT 636 FOR OP/ED): Performed by: STUDENT IN AN ORGANIZED HEALTH CARE EDUCATION/TRAINING PROGRAM

## 2024-03-28 PROCEDURE — 97530 THERAPEUTIC ACTIVITIES: CPT | Mod: GP | Performed by: PHYSICAL THERAPIST

## 2024-03-28 PROCEDURE — 36415 COLL VENOUS BLD VENIPUNCTURE: CPT | Performed by: STUDENT IN AN ORGANIZED HEALTH CARE EDUCATION/TRAINING PROGRAM

## 2024-03-28 PROCEDURE — 2500000005 HC RX 250 GENERAL PHARMACY W/O HCPCS: Mod: JZ | Performed by: STUDENT IN AN ORGANIZED HEALTH CARE EDUCATION/TRAINING PROGRAM

## 2024-03-28 PROCEDURE — 2500000001 HC RX 250 WO HCPCS SELF ADMINISTERED DRUGS (ALT 637 FOR MEDICARE OP): Performed by: FAMILY MEDICINE

## 2024-03-28 PROCEDURE — 2500000002 HC RX 250 W HCPCS SELF ADMINISTERED DRUGS (ALT 637 FOR MEDICARE OP, ALT 636 FOR OP/ED): Performed by: NURSE PRACTITIONER

## 2024-03-28 PROCEDURE — 2500000004 HC RX 250 GENERAL PHARMACY W/ HCPCS (ALT 636 FOR OP/ED): Performed by: EMERGENCY MEDICINE

## 2024-03-28 PROCEDURE — 2500000002 HC RX 250 W HCPCS SELF ADMINISTERED DRUGS (ALT 637 FOR MEDICARE OP, ALT 636 FOR OP/ED): Performed by: STUDENT IN AN ORGANIZED HEALTH CARE EDUCATION/TRAINING PROGRAM

## 2024-03-28 PROCEDURE — 1100000001 HC PRIVATE ROOM DAILY

## 2024-03-28 PROCEDURE — 97110 THERAPEUTIC EXERCISES: CPT | Mod: GP | Performed by: PHYSICAL THERAPIST

## 2024-03-28 PROCEDURE — 2500000001 HC RX 250 WO HCPCS SELF ADMINISTERED DRUGS (ALT 637 FOR MEDICARE OP): Performed by: NURSE PRACTITIONER

## 2024-03-28 PROCEDURE — 83735 ASSAY OF MAGNESIUM: CPT | Performed by: STUDENT IN AN ORGANIZED HEALTH CARE EDUCATION/TRAINING PROGRAM

## 2024-03-28 RX ORDER — BISACODYL 5 MG
10 TABLET, DELAYED RELEASE (ENTERIC COATED) ORAL ONCE
Status: COMPLETED | OUTPATIENT
Start: 2024-03-28 | End: 2024-03-28

## 2024-03-28 RX ORDER — TORSEMIDE 20 MG/1
20 TABLET ORAL DAILY
Status: DISCONTINUED | OUTPATIENT
Start: 2024-03-28 | End: 2024-03-30 | Stop reason: HOSPADM

## 2024-03-28 RX ORDER — IPRATROPIUM BROMIDE AND ALBUTEROL SULFATE 2.5; .5 MG/3ML; MG/3ML
3 SOLUTION RESPIRATORY (INHALATION) 3 TIMES DAILY
Status: DISCONTINUED | OUTPATIENT
Start: 2024-03-28 | End: 2024-03-29

## 2024-03-28 RX ORDER — TRAMADOL HYDROCHLORIDE 50 MG/1
100 TABLET ORAL 2 TIMES DAILY PRN
Status: DISCONTINUED | OUTPATIENT
Start: 2024-03-28 | End: 2024-03-30 | Stop reason: HOSPADM

## 2024-03-28 RX ORDER — CIPROFLOXACIN 500 MG/1
500 TABLET ORAL DAILY
Status: DISCONTINUED | OUTPATIENT
Start: 2024-03-29 | End: 2024-03-30 | Stop reason: HOSPADM

## 2024-03-28 RX ADMIN — HEPARIN SODIUM 5000 UNITS: 5000 INJECTION INTRAVENOUS; SUBCUTANEOUS at 23:49

## 2024-03-28 RX ADMIN — BACLOFEN 5 MG: 10 TABLET ORAL at 21:08

## 2024-03-28 RX ADMIN — GUAIFENESIN AND DEXTROMETHORPHAN 10 ML: 100; 10 SYRUP ORAL at 05:17

## 2024-03-28 RX ADMIN — GUAIFENESIN AND DEXTROMETHORPHAN 10 ML: 100; 10 SYRUP ORAL at 21:08

## 2024-03-28 RX ADMIN — REMDESIVIR 100 MG: 100 INJECTION, POWDER, LYOPHILIZED, FOR SOLUTION INTRAVENOUS at 09:08

## 2024-03-28 RX ADMIN — METHYLPREDNISOLONE SODIUM SUCCINATE 40 MG: 40 INJECTION, POWDER, FOR SOLUTION INTRAMUSCULAR; INTRAVENOUS at 15:42

## 2024-03-28 RX ADMIN — IPRATROPIUM BROMIDE AND ALBUTEROL SULFATE 3 ML: .5; 3 SOLUTION RESPIRATORY (INHALATION) at 07:36

## 2024-03-28 RX ADMIN — FUROSEMIDE 40 MG: 10 INJECTION, SOLUTION INTRAVENOUS at 05:17

## 2024-03-28 RX ADMIN — ACETAMINOPHEN 975 MG: 325 TABLET ORAL at 12:45

## 2024-03-28 RX ADMIN — GUAIFENESIN AND DEXTROMETHORPHAN 10 ML: 100; 10 SYRUP ORAL at 12:15

## 2024-03-28 RX ADMIN — BISACODYL 10 MG: 5 TABLET, COATED ORAL at 15:42

## 2024-03-28 RX ADMIN — SPIRONOLACTONE 12.5 MG: 25 TABLET, FILM COATED ORAL at 09:04

## 2024-03-28 RX ADMIN — METHYLPREDNISOLONE SODIUM SUCCINATE 40 MG: 40 INJECTION, POWDER, FOR SOLUTION INTRAMUSCULAR; INTRAVENOUS at 23:50

## 2024-03-28 RX ADMIN — HEPARIN SODIUM 5000 UNITS: 5000 INJECTION INTRAVENOUS; SUBCUTANEOUS at 15:42

## 2024-03-28 RX ADMIN — Medication 1 TABLET: at 09:04

## 2024-03-28 RX ADMIN — HEPARIN SODIUM 5000 UNITS: 5000 INJECTION INTRAVENOUS; SUBCUTANEOUS at 00:34

## 2024-03-28 RX ADMIN — SENNOSIDES 8.6 MG: 8.6 TABLET, FILM COATED ORAL at 21:08

## 2024-03-28 RX ADMIN — EMPAGLIFLOZIN 10 MG: 10 TABLET, FILM COATED ORAL at 09:05

## 2024-03-28 RX ADMIN — NYSTATIN 1 APPLICATION: 100000 POWDER TOPICAL at 09:42

## 2024-03-28 RX ADMIN — TORSEMIDE 20 MG: 20 TABLET ORAL at 15:42

## 2024-03-28 RX ADMIN — CIPROFLOXACIN HYDROCHLORIDE 500 MG: 500 TABLET, FILM COATED ORAL at 09:04

## 2024-03-28 RX ADMIN — ATORVASTATIN CALCIUM 20 MG: 20 TABLET, FILM COATED ORAL at 21:08

## 2024-03-28 RX ADMIN — HEPARIN SODIUM 5000 UNITS: 5000 INJECTION INTRAVENOUS; SUBCUTANEOUS at 09:07

## 2024-03-28 RX ADMIN — POLYETHYLENE GLYCOL 3350 17 G: 17 POWDER, FOR SOLUTION ORAL at 09:08

## 2024-03-28 RX ADMIN — NYSTATIN 1 APPLICATION: 100000 POWDER TOPICAL at 21:08

## 2024-03-28 RX ADMIN — DULOXETINE HYDROCHLORIDE 30 MG: 30 CAPSULE, DELAYED RELEASE ORAL at 09:03

## 2024-03-28 RX ADMIN — POLYETHYLENE GLYCOL 3350 17 G: 17 POWDER, FOR SOLUTION ORAL at 21:08

## 2024-03-28 RX ADMIN — IPRATROPIUM BROMIDE AND ALBUTEROL SULFATE 3 ML: .5; 3 SOLUTION RESPIRATORY (INHALATION) at 14:13

## 2024-03-28 RX ADMIN — INSULIN LISPRO 2 UNITS: 100 INJECTION, SOLUTION INTRAVENOUS; SUBCUTANEOUS at 10:00

## 2024-03-28 RX ADMIN — ACETAMINOPHEN 975 MG: 325 TABLET ORAL at 21:08

## 2024-03-28 RX ADMIN — GABAPENTIN 100 MG: 100 CAPSULE ORAL at 09:04

## 2024-03-28 RX ADMIN — METHYLPREDNISOLONE SODIUM SUCCINATE 40 MG: 40 INJECTION, POWDER, FOR SOLUTION INTRAMUSCULAR; INTRAVENOUS at 00:34

## 2024-03-28 RX ADMIN — BACLOFEN 5 MG: 10 TABLET ORAL at 09:03

## 2024-03-28 RX ADMIN — ACETAMINOPHEN 975 MG: 325 TABLET ORAL at 05:17

## 2024-03-28 RX ADMIN — METOPROLOL TARTRATE 25 MG: 50 TABLET, FILM COATED ORAL at 09:03

## 2024-03-28 RX ADMIN — TRAMADOL HYDROCHLORIDE 50 MG: 50 TABLET, COATED ORAL at 09:05

## 2024-03-28 RX ADMIN — FERROUS SULFATE TAB 325 MG (65 MG ELEMENTAL FE) 1 TABLET: 325 (65 FE) TAB at 09:04

## 2024-03-28 RX ADMIN — PANTOPRAZOLE SODIUM 20 MG: 20 TABLET, DELAYED RELEASE ORAL at 09:04

## 2024-03-28 RX ADMIN — FLUTICASONE FUROATE AND VILANTEROL TRIFENATATE 1 PUFF: 200; 25 POWDER RESPIRATORY (INHALATION) at 07:39

## 2024-03-28 RX ADMIN — SENNOSIDES 8.6 MG: 8.6 TABLET, FILM COATED ORAL at 09:03

## 2024-03-28 RX ADMIN — GABAPENTIN 100 MG: 100 CAPSULE ORAL at 21:08

## 2024-03-28 RX ADMIN — METOPROLOL TARTRATE 25 MG: 50 TABLET, FILM COATED ORAL at 21:08

## 2024-03-28 ASSESSMENT — COGNITIVE AND FUNCTIONAL STATUS - GENERAL
WALKING IN HOSPITAL ROOM: TOTAL
STANDING UP FROM CHAIR USING ARMS: A LOT
MOVING TO AND FROM BED TO CHAIR: A LOT
CLIMB 3 TO 5 STEPS WITH RAILING: TOTAL
DAILY ACTIVITIY SCORE: 15
HELP NEEDED FOR BATHING: A LOT
DRESSING REGULAR LOWER BODY CLOTHING: A LOT
TOILETING: A LITTLE
DRESSING REGULAR UPPER BODY CLOTHING: A LOT
PERSONAL GROOMING: A LOT
MOVING TO AND FROM BED TO CHAIR: A LOT
MOBILITY SCORE: 13
CLIMB 3 TO 5 STEPS WITH RAILING: TOTAL
WALKING IN HOSPITAL ROOM: A LOT
MOVING FROM LYING ON BACK TO SITTING ON SIDE OF FLAT BED WITH BEDRAILS: A LITTLE
DRESSING REGULAR LOWER BODY CLOTHING: A LOT
TURNING FROM BACK TO SIDE WHILE IN FLAT BAD: A LITTLE
WALKING IN HOSPITAL ROOM: TOTAL
MOVING TO AND FROM BED TO CHAIR: A LOT
MOBILITY SCORE: 12
MOBILITY SCORE: 13
TOILETING: A LITTLE
MOVING TO AND FROM BED TO CHAIR: A LOT
CLIMB 3 TO 5 STEPS WITH RAILING: TOTAL
HELP NEEDED FOR BATHING: A LOT
TURNING FROM BACK TO SIDE WHILE IN FLAT BAD: A LITTLE
MOVING FROM LYING ON BACK TO SITTING ON SIDE OF FLAT BED WITH BEDRAILS: A LITTLE
PERSONAL GROOMING: A LOT
WALKING IN HOSPITAL ROOM: A LOT
TURNING FROM BACK TO SIDE WHILE IN FLAT BAD: A LITTLE
DRESSING REGULAR UPPER BODY CLOTHING: A LOT
DAILY ACTIVITIY SCORE: 15
CLIMB 3 TO 5 STEPS WITH RAILING: TOTAL
PERSONAL GROOMING: A LOT
DAILY ACTIVITIY SCORE: 15
DRESSING REGULAR LOWER BODY CLOTHING: A LOT
HELP NEEDED FOR BATHING: A LOT
STANDING UP FROM CHAIR USING ARMS: A LOT
TOILETING: A LITTLE
MOVING FROM LYING ON BACK TO SITTING ON SIDE OF FLAT BED WITH BEDRAILS: A LITTLE
STANDING UP FROM CHAIR USING ARMS: A LOT
DRESSING REGULAR UPPER BODY CLOTHING: A LOT
STANDING UP FROM CHAIR USING ARMS: A LOT
TURNING FROM BACK TO SIDE WHILE IN FLAT BAD: A LITTLE
MOBILITY SCORE: 12
MOVING FROM LYING ON BACK TO SITTING ON SIDE OF FLAT BED WITH BEDRAILS: A LITTLE

## 2024-03-28 ASSESSMENT — PAIN SCALES - GENERAL
PAINLEVEL_OUTOF10: 0 - NO PAIN
PAINLEVEL_OUTOF10: 4
PAINLEVEL_OUTOF10: 0 - NO PAIN

## 2024-03-28 ASSESSMENT — PAIN - FUNCTIONAL ASSESSMENT
PAIN_FUNCTIONAL_ASSESSMENT: 0-10
PAIN_FUNCTIONAL_ASSESSMENT: 0-10

## 2024-03-28 ASSESSMENT — PAIN DESCRIPTION - DESCRIPTORS: DESCRIPTORS: ACHING

## 2024-03-28 NOTE — CARE PLAN
The patient's goals for the shift include      The clinical goals for the shift include Pt. will verbalize decreased pain by end of shift    Over the shift, the patient did not make progress toward the following goals. Barriers to progression include . Recommendations to address these barriers include   Problem: Skin  Goal: Decreased wound size/increased tissue granulation at next dressing change  Outcome: Progressing  Goal: Participates in plan/prevention/treatment measures  Outcome: Progressing  Goal: Prevent/manage excess moisture  Outcome: Progressing  Goal: Prevent/minimize sheer/friction injuries  3/28/2024 0209 by Kurtis Brizuela RN  Outcome: Progressing  3/28/2024 0209 by Kurtis Brizuela RN  Flowsheets (Taken 3/28/2024 0209)  Prevent/minimize sheer/friction injuries:   HOB 30 degrees or less   Increase activity/out of bed for meals   Turn/reposition every 2 hours/use positioning/transfer devices   Use pull sheet  Goal: Promote/optimize nutrition  Outcome: Progressing  Goal: Promote skin healing  Outcome: Progressing     Problem: Fall/Injury  Goal: Not fall by end of shift  Outcome: Progressing  Goal: Be free from injury by end of the shift  Outcome: Progressing  Goal: Verbalize understanding of personal risk factors for fall in the hospital  Outcome: Progressing  Goal: Verbalize understanding of risk factor reduction measures to prevent injury from fall in the home  Outcome: Progressing  Goal: Use assistive devices by end of the shift  Outcome: Progressing  Goal: Pace activities to prevent fatigue by end of the shift  Outcome: Progressing     Problem: Pain - Adult  Goal: Verbalizes/displays adequate comfort level or baseline comfort level  Outcome: Progressing     Problem: Safety - Adult  Goal: Free from fall injury  Outcome: Progressing     Problem: Discharge Planning  Goal: Discharge to home or other facility with appropriate resources  Outcome: Progressing     Problem: Chronic Conditions and  Co-morbidities  Goal: Patient's chronic conditions and co-morbidity symptoms are monitored and maintained or improved  Outcome: Progressing     Problem: Diabetes  Goal: Achieve decreasing blood glucose levels by end of shift  Outcome: Progressing  Goal: Increase stability of blood glucose readings by end of shift  Outcome: Progressing  Goal: Decrease in ketones present in urine by end of shift  Outcome: Progressing  Goal: Maintain electrolyte levels within acceptable range throughout shift  Outcome: Progressing  Goal: Maintain glucose levels >70mg/dl to <250mg/dl throughout shift  Outcome: Progressing  Goal: No changes in neurological exam by end of shift  Outcome: Progressing  Goal: Learn about and adhere to nutrition recommendations by end of shift  Outcome: Progressing  Goal: Vital signs within normal range for age by end of shift  Outcome: Progressing  Goal: Increase self care and/or family involovement by end of shift  Outcome: Progressing  Goal: Receive DSME education by end of shift  Outcome: Progressing     Problem: Pain  Goal: Takes deep breaths with improved pain control throughout the shift  Outcome: Progressing  Goal: Turns in bed with improved pain control throughout the shift  Outcome: Progressing  Goal: Walks with improved pain control throughout the shift  Outcome: Progressing  Goal: Performs ADL's with improved pain control throughout shift  Outcome: Progressing  Goal: Participates in PT with improved pain control throughout the shift  Outcome: Progressing  Goal: Free from opioid side effects throughout the shift  Outcome: Progressing  Goal: Free from acute confusion related to pain meds throughout the shift  Outcome: Progressing   .

## 2024-03-28 NOTE — PROGRESS NOTES
Physical Therapy    Physical Therapy Treatment    Patient Name: Alayna Dumont  MRN: [id number]  Today's Date: 3/28/2024  Time Calculation  Start Time: 1130  Stop Time: 1215  Time Calculation (min): 45 min       Assessment/Plan   PT Assessment  PT Assessment Results: Decreased strength, Decreased range of motion, Decreased endurance, Impaired balance, Decreased mobility, Decreased coordination, Pain  Rehab Prognosis: Good  Strengths: Attitude of self  Barriers to Participation: Comorbidities  End of Session Communication: Bedside nurse  Assessment Comment: pt is a 89 y.o admitted for SOB now with COVID+ pt would benefit from skilled services to improve mobility to PLOF. Pt very motivated to get back home and progressed well today  End of Session Patient Position: Bed, 3 rail up, Alarm on  PT Plan  Inpatient/Swing Bed or Outpatient: Inpatient  PT Plan  Treatment/Interventions: Bed mobility, Transfer training, Gait training, Balance training, Strengthening, Therapeutic exercise, Therapeutic activity, Range of motion  PT Plan: Skilled PT  PT Frequency: 3 times per week  PT Discharge Recommendations: Moderate intensity level of continued care  PT Recommended Transfer Status: Assist x1  PT - OK to Discharge: Yes      General Visit Information:   PT  Visit  PT Received On: 03/28/24  General  Reason for Referral: pt admitted with SOB, nausea, vomitting, abdominal pain and now testing postive for COVID  Past Medical History Relevant to Rehab: 89 y.o. female with a past medical history of HTN, HLD, SUNDEEP, HFpEF (EF 70-75% 02/2024), AVR, dual chamber PPM, sarcoidosis, and COPD  Family/Caregiver Present: No  Prior to Session Communication: Bedside nurse  Patient Position Received: Bed, 3 rail up, Alarm on  General Comment: pt resting in bed wanting to get up,cleaned up and do exercises. Pt with 1 L NC O2.    Subjective   Precautions:  Precautions  Medical Precautions: Fall precautions  Precautions Comment: airborne precautions for  COVID +  Vital Signs:  Vital Signs  Heart Rate: 60 (ranged from 55-65 bpm)  SpO2: 97 % (ranged from 95-97%)    Objective   Pain:  Pain Assessment  Pain Assessment: 0-10  Pain Score: 0 - No pain  Cognition:  Cognition  Orientation Level: Oriented X4  Postural Control:  Postural Control  Posture Comment: Slight flexed posture due to fatigue/generalized weakness.  Static Sitting Balance  Static Sitting-Balance Support: Bilateral upper extremity supported  Static Sitting-Level of Assistance: Contact guard, Close supervision  Static Sitting-Comment/Number of Minutes: pt sat EOB for 20 mins and performed teeth brushing, hygiene, gown change.and for therex  Dynamic Sitting Balance  Dynamic Sitting-Balance Support: Bilateral upper extremity supported  Dynamic Sitting-Comments: CGA  Static Standing Balance  Static Standing-Balance Support: Bilateral upper extremity supported  Static Standing-Level of Assistance: Minimum assistance  Static Standing-Comment/Number of Minutes: pt tolerated two stands today for less than 1.5 mins.    Activity Tolerance:  Activity Tolerance  Endurance: Tolerates 10 - 20 min exercise with multiple rests, Tolerates 30+ min exercise without fatigue  Treatments:  Therapeutic Exercise  Therapeutic Exercise Performed: Yes  Therapeutic Exercise Activity 1: pt performed sitting marches, SAq, hip abd/add 1 x 15 each B    Therapeutic Activity  Therapeutic Activity Performed: Yes  Therapeutic Activity 1: pt stat Eob for 20 mins and performed two stands today.    Bed Mobility  Bed Mobility: Yes  Bed Mobility 1  Bed Mobility 1: Supine to sitting, Sitting to supine  Level of Assistance 1: Minimum assistance  Bed Mobility 2  Bed Mobility  2: Rolling right, Rolling left  Level of Assistance 2: Minimum assistance    Ambulation/Gait Training  Ambulation/Gait Training Performed: Yes  Ambulation/Gait Training 1  Surface 1: Level tile  Device 1: Rolling walker  Assistance 1: Minimum assistance  Quality of Gait 1:  Antalgic, Soft knee(s), Knee(s) buckle, Decreased step length, Inconsistent stride length  Comments/Distance (ft) 1: pt with decrease foot clearance and sliding feet along floor. Able to clear L foot easier. pt travelled 2 ft laterally to the R.  Transfers  Transfer: Yes  Transfer 1  Transfer From 1: Sit to, Stand to  Transfer to 1: Stand, Sit  Technique 1: Sit to stand, Stand to sit  Transfer Device 1: Walker  Transfer Level of Assistance 1: Minimum assistance         Outcome Measures:  [facility] Basic Mobility  Turning from your back to your side while in a flat bed without using bedrails: A little  Moving from lying on your back to sitting on the side of a flat bed without using bedrails: A little  Moving to and from bed to chair (including a wheelchair): A lot  Standing up from a chair using your arms (e.g. wheelchair or bedside chair): A lot  To walk in hospital room: A lot  Climbing 3-5 steps with railing: Total  Basic Mobility - Total Score: 13    Education Documentation  Precautions, taught by Rosette Castro, PT at 3/28/2024 12:34 PM.  Learner: Patient  Readiness: Eager  Method: Explanation  Response: Verbalizes Understanding    Mobility Training, taught by Rosette Castro, PT at 3/28/2024 12:34 PM.  Learner: Patient  Readiness: Eager  Method: Explanation  Response: Verbalizes Understanding    Education Comments  No comments found.        OP EDUCATION:       Encounter Problems       Encounter Problems (Active)       Balance       STG - Maintains static standing balance with upper extremity support with B UE support for 2 mins without LOB and with CGA  (Progressing)       Start:  03/22/24    Expected End:  03/30/24       INTERVENTIONS:  1. Practice standing with minimal support.  2. Educate patient about standing tolerance.  3. Educate patient about independence with gait, transfers, and ADL's.  4. Educate patient about use of assistive device.  5. Educate patient about self-directed care.             PT Transfers       LTG - Patient will transfer to commode with min assist (Progressing)       Start:  03/22/24    Expected End:  03/30/24            STG - Transfer from bed to chair with min assist  (Progressing)       Start:  03/22/24    Expected End:  03/30/24            STG - Patient will perform bed mobility CGA (Progressing)       Start:  03/22/24    Expected End:  03/30/24               Pain - Adult

## 2024-03-28 NOTE — PROGRESS NOTES
[facility]   HOSPITAL MEDICINE     PROGRESS NOTE       PATIENT NAME: Aalyna Dumont   MRN: [id number]   SERVICE DATE: 03/28/24   SERVICE TIME: 2:02 PM      Hospital Medicine/Primary Attending: Kb Leal DO   LENGTH OF STAY: 6     CHIEF COMPLAINT: Acute hypoxemic respiratory failure due to COVID-19 (CMS/[facility])   Principal Problem:    Acute hypoxemic respiratory failure due to COVID-19 (CMS/HCC)  Active Problems:    Chronic heart failure with preserved ejection fraction (HFpEF) (CMS/[facility])    COPD exacerbation (CMS/[facility])    Sarcoidosis of lung (CMS/[facility])    OSEAS (acute kidney injury) (CMS/[facility])    Acute cystitis without hematuria        Assessment/Plan      I reviewed:    All new and relevant labs and imaging   New EKGs  Consultant notes   Vitals Signs   Nursing notes       89 y.o. female with a past medical history of HTN, HLD, SUNDEEP, HFpEF (EF 70-75% 02/2024), AVR, dual chamber PPM, sarcoidosis, and COPD who was addmited for OSEAS and COVID 19.     #Acute hypoxic respiratory failure 2/2 COPD exacerbation 2/2 COVID 19 pneumonia   - resp distress & severe wheezing resolved  -ctn o2 and wean as christiano  -Remdesivir ( 5 days )   -methylpred IV 40mg BID  -Schedule Tessalon Perles and Mucinex   -Daily CMP   -duoneb q4h   -Monitor parameters (Heart Rate & CMP) daily   -If HR <50 or symptomatic from bradycardia, please consider holding remdesivir   -If CrCl <30 & Not on dialysis, please consider holding remdesivir     -If ALT is >= 10x ULN during treatment, please consider holding remdesivir     #acute cystitis 2/2 MDRO Proteus vulgaris  Suseptable to cipro  -stop current abx  -start cipro IV then PO tomorrow   -monitor for sx    #OSEAS on ?CKD   Baseline Cr ~1.1  1.4>1.6>1.8>1.6>1.3>1.22>1.4  -Avoid nephrotoxic agents where possible  -Follow RFP      #acute on chronic HFpEF   -Echo 2/2024 hyperdynamic, EF 70-75%  -stop furosemide IV 40mg BID   -resume home torsemide at 20mg po daily   -Daily  weights  -Continue home metoprolol, atorvastatin, jardiance  -check RFP and mg daily   -monitor fluid status       #Sarcoidosis  #acute exacerbation of COPD  -hold Breo and switch to sched duonebs   -steroids IV as above did not improve on PO  -see plan above     #OA L hip  - not controlled on home tramadol  - Sched acetamin 1000mg q8h   -home tramadol renal dosing  - PT/OT consult  - patient is unlikely to be a surgical candidate    #Troponinemia  #s/p AVR  #s/p PPM  -Troponin mildly elevated  -No complaints of chest pain  -Recently admitted (2/2024) for chest pain, -ve workup  -EKG A/V paced without signs of acute ischemia  -Continue home medications     #HTN  #HLD  -Resume home metoprolol, spironolactone  -holding torsemide for OSEAS  -resume home atorvastatin      #Constipation  -miralax bid   -senna BID                 DISPOSITION:  Functional Status Prior to Admit:     Medical Necessity for Continued Hospitalization y     Plan of care discussed with:         Subjective   SUBJECTIVE:  Pt seen and examined.   SOB     Patient denies CP, SOB, fevers, chills, nausea, or emesis.         Objective      PHYSICAL EXAM: Patient Vitals for the past 24 hrs:   BP Temp Temp src Pulse Resp SpO2   03/28/24 1130 -- -- -- 60 -- 97 %   03/28/24 0545 145/55 -- -- -- -- --   03/28/24 0424 163/60 36.4 °C (97.5 °F) Temporal 60 19 96 %   03/27/24 2038 -- -- -- -- -- 99 %   03/27/24 2023 (!) 188/60 36.5 °C (97.7 °F) Temporal 51 17 97 %   03/27/24 1426 -- -- -- -- -- 97 %        Physical Exam  Vitals and nursing note reviewed.   Constitutional:       General: She is not in acute distress.     Appearance: Normal appearance. She is not ill-appearing.   HENT:      Head: Normocephalic and atraumatic.   Cardiovascular:      Rate and Rhythm: Normal rate and regular rhythm.   Pulmonary:      Effort: Respiratory distress present.      Breath sounds: No stridor. Wheezing and rhonchi present.   Abdominal:      Tenderness: There is no guarding.  "  Skin:     Coloration: Skin is not jaundiced or pale.   Neurological:      Mental Status: She is alert and oriented to person, place, and time.   Psychiatric:         Mood and Affect: Mood normal.            MEDICATIONS:   Scheduled medications  acetaminophen, 975 mg, oral, q8h  atorvastatin, 20 mg, oral, Nightly  baclofen, 5 mg, oral, BID  bisacodyl, 10 mg, oral, Once  [START ON 3/29/2024] ciprofloxacin, 500 mg, oral, Daily  dextromethorphan-guaifenesin, 10 mL, oral, q8h  DULoxetine, 30 mg, oral, Daily  empagliflozin, 10 mg, oral, Daily  ferrous sulfate (325 mg ferrous sulfate), 65 mg of iron, oral, Daily with breakfast  fluticasone furoate-vilanteroL, 1 puff, inhalation, Daily  gabapentin, 100 mg, oral, BID  heparin (porcine), 5,000 Units, subcutaneous, q8h  insulin lispro, 0-5 Units, subcutaneous, TID with meals  ipratropium-albuteroL, 3 mL, nebulization, q6h  methylPREDNISolone sodium succinate (PF), 40 mg, intravenous, q12h  metoprolol tartrate, 25 mg, oral, BID  multivitamin with minerals, 1 tablet, oral, Daily  nystatin, 1 Application, Topical, BID  pantoprazole, 20 mg, oral, Daily  polyethylene glycol, 17 g, oral, BID  remdesivir, 100 mg, intravenous, q24h  sennosides, 1 tablet, oral, BID  spironolactone, 12.5 mg, oral, Daily  torsemide, 20 mg, oral, Daily      Continuous medications     PRN medications  PRN medications: albuterol, dextrose, dextrose, glucagon, glucagon, loperamide, melatonin, traMADol, traMADol       DATA:      Diagnostic tests reviewed for today's visit:     CBC:         Coags:     CMP:   Results from last 72 hours   Lab Units 03/28/24  1023   SODIUM mmol/L 141   POTASSIUM mmol/L 4.5   CO2 mmol/L 27   BUN mg/dL 54*   MAGNESIUM mg/dL 2.46*   ALBUMIN g/dL 3.5        Cardiac Enzymes: No lab exists for component: \"TROP\" .lab  Liver Function, Amylase, Lipase:       No lab exists for component: \"TPROT\", \"ALB\", \"TBILI\"   MG/PHOS: [id number](mg,p)@   Renal Panel:    Results from last 72 hours " "  Lab Units 03/28/24  1023   ALBUMIN g/dL 3.5   BUN mg/dL 54*   POTASSIUM mmol/L 4.5   CO2 mmol/L 27   SODIUM mmol/L 141        Heme:        No lab exists for component: \"RETICP\", \"ABSRETIC\", \"LD\", \"DAILY\", \"FE\", \"TRANSFERSAT\"   No components found for: \"UALBCR\"      Medication and Non-Pharmacologic VTE Prophylaxis/Anticoagulants    The patient has received anticoagulants recently:  Heparin is ordered or has been given within the last 24 hours OR  Lovenox has been given in the last 24 hours OR  An anticoagulant other than Heparin or Lovenox is on the home med list OR  An anticoagulant other than Heparin or Lovenox has been given within the last 5 days  Last Anticoag Admin            heparin (porcine) injection 5,000 Units    Given 5,000 Units at 0914    Frequency: Every 8 hours         There are additional administrations since 03/22/24 1225 that are not shown.    No unadministered anticoagulant orders found.                   SIGNATURE: Kb Leal, [facility] Medicine    PAGER/CONTACT #: Epic Juan      Disclaimer: Portions of this note may have been generated using Dragon voice recognition software. Reasonable efforts were made to correct any dictation errors that resulted due to the programming of this software but some may still be present.     Portions of this note including HPI, ROS, impression/plan, and examination may have been copied forward from yesterday to March 28, 2024 as to provide important historical information essential in contributing to medical decision making. Documentation has been reviewed and edited as necessary to support clinical decision making for today's visit and to reflect my own independent evaluation of this patient.       The time of this note does not reflect the time I saw the patient but the time that this note was written     "

## 2024-03-28 NOTE — CARE PLAN
The patient's goals for the shift include      The clinical goals for the shift include patient to remain free from a fall or injury during this shift    Over the shift, the patient did not make progress toward the following goals. Barriers to progression include. Recommendations to address these barriers include.    Problem: Fall/Injury  Goal: Not fall by end of shift  Outcome: Progressing  Goal: Be free from injury by end of the shift  Outcome: Progressing  Goal: Verbalize understanding of personal risk factors for fall in the hospital  Outcome: Progressing  Goal: Verbalize understanding of risk factor reduction measures to prevent injury from fall in the home  Outcome: Progressing  Goal: Use assistive devices by end of the shift  Outcome: Progressing  Goal: Pace activities to prevent fatigue by end of the shift  Outcome: Progressing     Problem: Skin  Goal: Decreased wound size/increased tissue granulation at next dressing change  Outcome: Progressing  Flowsheets (Taken 3/28/2024 1133)  Decreased wound size/increased tissue granulation at next dressing change:   Promote sleep for wound healing   Protective dressings over bony prominences  Goal: Participates in plan/prevention/treatment measures  Outcome: Progressing  Flowsheets (Taken 3/28/2024 1133)  Participates in plan/prevention/treatment measures:   Discuss with provider PT/OT consult   Elevate heels  Goal: Prevent/manage excess moisture  Outcome: Progressing  Flowsheets (Taken 3/28/2024 1133)  Prevent/manage excess moisture:   Cleanse incontinence/protect with barrier cream   Moisturize dry skin   Monitor for/manage infection if present  Goal: Prevent/minimize sheer/friction injuries  Outcome: Progressing  Flowsheets (Taken 3/28/2024 1133)  Prevent/minimize sheer/friction injuries:   HOB 30 degrees or less   Turn/reposition every 2 hours/use positioning/transfer devices   Increase activity/out of bed for meals  Goal: Promote/optimize nutrition  Outcome:  Progressing  Flowsheets (Taken 3/28/2024 1133)  Promote/optimize nutrition:   Monitor/record intake including meals   Consume > 50% meals/supplements   Offer water/supplements/favorite foods  Goal: Promote skin healing  Outcome: Progressing  Flowsheets (Taken 3/28/2024 1133)  Promote skin healing:   Assess skin/pad under line(s)/device(s)   Turn/reposition every 2 hours/use positioning/transfer devices     Problem: Pain  Goal: Takes deep breaths with improved pain control throughout the shift  Outcome: Progressing  Goal: Turns in bed with improved pain control throughout the shift  Outcome: Progressing  Goal: Walks with improved pain control throughout the shift  Outcome: Progressing  Goal: Performs ADL's with improved pain control throughout shift  Outcome: Progressing  Goal: Participates in PT with improved pain control throughout the shift  Outcome: Progressing  Goal: Free from opioid side effects throughout the shift  Outcome: Progressing  Goal: Free from acute confusion related to pain meds throughout the shift  Outcome: Progressing

## 2024-03-28 NOTE — NURSING NOTE
Geriatric nursing rounds summary  Ms Dumont is a 89 year old admitted covid+  on 1 liter oxygen uti pmh pacer  Age friendly  What matters full code   Mobility up in wheelchair  Meds antibiotics for acute cystitis  Mentation CAM neg

## 2024-03-29 PROBLEM — I50.33 ACUTE ON CHRONIC HEART FAILURE WITH PRESERVED EJECTION FRACTION (HFPEF) (MULTI): Status: ACTIVE | Noted: 2023-09-18

## 2024-03-29 LAB
ALBUMIN SERPL BCP-MCNC: 3.6 G/DL (ref 3.4–5)
ANION GAP SERPL CALC-SCNC: 18 MMOL/L (ref 10–20)
BUN SERPL-MCNC: 62 MG/DL (ref 6–23)
CALCIUM SERPL-MCNC: 8.9 MG/DL (ref 8.6–10.6)
CHLORIDE SERPL-SCNC: 101 MMOL/L (ref 98–107)
CO2 SERPL-SCNC: 24 MMOL/L (ref 21–32)
CREAT SERPL-MCNC: 1.41 MG/DL (ref 0.5–1.05)
EGFRCR SERPLBLD CKD-EPI 2021: 35 ML/MIN/1.73M*2
GLUCOSE BLD MANUAL STRIP-MCNC: 142 MG/DL (ref 74–99)
GLUCOSE BLD MANUAL STRIP-MCNC: 176 MG/DL (ref 74–99)
GLUCOSE BLD MANUAL STRIP-MCNC: 207 MG/DL (ref 74–99)
GLUCOSE SERPL-MCNC: 224 MG/DL (ref 74–99)
MAGNESIUM SERPL-MCNC: 2.36 MG/DL (ref 1.6–2.4)
PHOSPHATE SERPL-MCNC: 4.4 MG/DL (ref 2.5–4.9)
POTASSIUM SERPL-SCNC: 4.4 MMOL/L (ref 3.5–5.3)
SODIUM SERPL-SCNC: 139 MMOL/L (ref 136–145)

## 2024-03-29 PROCEDURE — 82947 ASSAY GLUCOSE BLOOD QUANT: CPT

## 2024-03-29 PROCEDURE — 2500000001 HC RX 250 WO HCPCS SELF ADMINISTERED DRUGS (ALT 637 FOR MEDICARE OP): Performed by: FAMILY MEDICINE

## 2024-03-29 PROCEDURE — 84100 ASSAY OF PHOSPHORUS: CPT | Performed by: STUDENT IN AN ORGANIZED HEALTH CARE EDUCATION/TRAINING PROGRAM

## 2024-03-29 PROCEDURE — 2500000001 HC RX 250 WO HCPCS SELF ADMINISTERED DRUGS (ALT 637 FOR MEDICARE OP): Performed by: STUDENT IN AN ORGANIZED HEALTH CARE EDUCATION/TRAINING PROGRAM

## 2024-03-29 PROCEDURE — 2500000002 HC RX 250 W HCPCS SELF ADMINISTERED DRUGS (ALT 637 FOR MEDICARE OP, ALT 636 FOR OP/ED): Performed by: NURSE PRACTITIONER

## 2024-03-29 PROCEDURE — 94640 AIRWAY INHALATION TREATMENT: CPT

## 2024-03-29 PROCEDURE — 2500000002 HC RX 250 W HCPCS SELF ADMINISTERED DRUGS (ALT 637 FOR MEDICARE OP, ALT 636 FOR OP/ED): Performed by: FAMILY MEDICINE

## 2024-03-29 PROCEDURE — 2500000004 HC RX 250 GENERAL PHARMACY W/ HCPCS (ALT 636 FOR OP/ED): Performed by: STUDENT IN AN ORGANIZED HEALTH CARE EDUCATION/TRAINING PROGRAM

## 2024-03-29 PROCEDURE — 99239 HOSP IP/OBS DSCHRG MGMT >30: CPT | Performed by: STUDENT IN AN ORGANIZED HEALTH CARE EDUCATION/TRAINING PROGRAM

## 2024-03-29 PROCEDURE — 83735 ASSAY OF MAGNESIUM: CPT | Performed by: STUDENT IN AN ORGANIZED HEALTH CARE EDUCATION/TRAINING PROGRAM

## 2024-03-29 PROCEDURE — 36415 COLL VENOUS BLD VENIPUNCTURE: CPT | Performed by: STUDENT IN AN ORGANIZED HEALTH CARE EDUCATION/TRAINING PROGRAM

## 2024-03-29 PROCEDURE — 1100000001 HC PRIVATE ROOM DAILY

## 2024-03-29 PROCEDURE — 2500000004 HC RX 250 GENERAL PHARMACY W/ HCPCS (ALT 636 FOR OP/ED): Performed by: EMERGENCY MEDICINE

## 2024-03-29 PROCEDURE — 2500000001 HC RX 250 WO HCPCS SELF ADMINISTERED DRUGS (ALT 637 FOR MEDICARE OP): Performed by: NURSE PRACTITIONER

## 2024-03-29 PROCEDURE — 2500000002 HC RX 250 W HCPCS SELF ADMINISTERED DRUGS (ALT 637 FOR MEDICARE OP, ALT 636 FOR OP/ED): Performed by: STUDENT IN AN ORGANIZED HEALTH CARE EDUCATION/TRAINING PROGRAM

## 2024-03-29 PROCEDURE — 2500000005 HC RX 250 GENERAL PHARMACY W/O HCPCS: Mod: JZ | Performed by: STUDENT IN AN ORGANIZED HEALTH CARE EDUCATION/TRAINING PROGRAM

## 2024-03-29 RX ORDER — TRAMADOL HYDROCHLORIDE 100 MG/1
100 TABLET, COATED ORAL 2 TIMES DAILY PRN
Qty: 10 TABLET | Refills: 0
Start: 2024-03-29 | End: 2024-05-13 | Stop reason: SDUPTHER

## 2024-03-29 RX ORDER — TORSEMIDE 20 MG/1
20 TABLET ORAL DAILY
Status: DISCONTINUED | OUTPATIENT
Start: 2024-03-29 | End: 2024-03-29

## 2024-03-29 RX ORDER — DEXAMETHASONE 6 MG/1
6 TABLET ORAL DAILY
Status: DISCONTINUED | OUTPATIENT
Start: 2024-03-30 | End: 2024-03-30 | Stop reason: HOSPADM

## 2024-03-29 RX ORDER — PREDNISONE 20 MG/1
20 TABLET ORAL DAILY
Status: DISCONTINUED | OUTPATIENT
Start: 2024-03-30 | End: 2024-03-29

## 2024-03-29 RX ORDER — SENNOSIDES 8.6 MG/1
1 TABLET ORAL 2 TIMES DAILY PRN
Start: 2024-03-29

## 2024-03-29 RX ORDER — BISACODYL 10 MG/1
10 SUPPOSITORY RECTAL ONCE
Status: DISCONTINUED | OUTPATIENT
Start: 2024-03-29 | End: 2024-03-30 | Stop reason: HOSPADM

## 2024-03-29 RX ORDER — LACTULOSE 10 G/15ML
20 SOLUTION ORAL ONCE
Status: DISCONTINUED | OUTPATIENT
Start: 2024-03-29 | End: 2024-03-30 | Stop reason: HOSPADM

## 2024-03-29 RX ORDER — TORSEMIDE 20 MG/1
20 TABLET ORAL ONCE
Status: COMPLETED | OUTPATIENT
Start: 2024-03-29 | End: 2024-03-29

## 2024-03-29 RX ORDER — BENZONATATE 100 MG/1
100 CAPSULE ORAL 3 TIMES DAILY PRN
Status: DISCONTINUED | OUTPATIENT
Start: 2024-03-29 | End: 2024-03-30 | Stop reason: HOSPADM

## 2024-03-29 RX ORDER — POLYETHYLENE GLYCOL 3350 17 G/17G
17 POWDER, FOR SOLUTION ORAL 2 TIMES DAILY
Start: 2024-03-29

## 2024-03-29 RX ORDER — DEXAMETHASONE 6 MG/1
6 TABLET ORAL DAILY
Qty: 5 TABLET | Refills: 0
Start: 2024-03-30 | End: 2024-05-04 | Stop reason: HOSPADM

## 2024-03-29 RX ORDER — CIPROFLOXACIN 500 MG/1
500 TABLET ORAL DAILY
Qty: 1 TABLET | Refills: 0
Start: 2024-03-30 | End: 2024-03-31

## 2024-03-29 RX ADMIN — ACETAMINOPHEN 975 MG: 325 TABLET ORAL at 20:22

## 2024-03-29 RX ADMIN — CIPROFLOXACIN HYDROCHLORIDE 500 MG: 500 TABLET, FILM COATED ORAL at 08:02

## 2024-03-29 RX ADMIN — BACLOFEN 5 MG: 10 TABLET ORAL at 20:23

## 2024-03-29 RX ADMIN — EMPAGLIFLOZIN 10 MG: 10 TABLET, FILM COATED ORAL at 08:03

## 2024-03-29 RX ADMIN — BACLOFEN 5 MG: 10 TABLET ORAL at 08:02

## 2024-03-29 RX ADMIN — REMDESIVIR 100 MG: 100 INJECTION, POWDER, LYOPHILIZED, FOR SOLUTION INTRAVENOUS at 10:59

## 2024-03-29 RX ADMIN — METOPROLOL TARTRATE 25 MG: 50 TABLET, FILM COATED ORAL at 20:23

## 2024-03-29 RX ADMIN — DULOXETINE HYDROCHLORIDE 30 MG: 30 CAPSULE, DELAYED RELEASE ORAL at 08:02

## 2024-03-29 RX ADMIN — TORSEMIDE 20 MG: 20 TABLET ORAL at 16:22

## 2024-03-29 RX ADMIN — FERROUS SULFATE TAB 325 MG (65 MG ELEMENTAL FE) 1 TABLET: 325 (65 FE) TAB at 08:02

## 2024-03-29 RX ADMIN — METOPROLOL TARTRATE 25 MG: 50 TABLET, FILM COATED ORAL at 08:02

## 2024-03-29 RX ADMIN — ACETAMINOPHEN 975 MG: 325 TABLET ORAL at 05:05

## 2024-03-29 RX ADMIN — ATORVASTATIN CALCIUM 20 MG: 20 TABLET, FILM COATED ORAL at 20:22

## 2024-03-29 RX ADMIN — GABAPENTIN 100 MG: 100 CAPSULE ORAL at 20:23

## 2024-03-29 RX ADMIN — GUAIFENESIN AND DEXTROMETHORPHAN 10 ML: 100; 10 SYRUP ORAL at 11:00

## 2024-03-29 RX ADMIN — POLYETHYLENE GLYCOL 3350 17 G: 17 POWDER, FOR SOLUTION ORAL at 08:02

## 2024-03-29 RX ADMIN — Medication 1 TABLET: at 08:02

## 2024-03-29 RX ADMIN — HEPARIN SODIUM 5000 UNITS: 5000 INJECTION INTRAVENOUS; SUBCUTANEOUS at 08:02

## 2024-03-29 RX ADMIN — TORSEMIDE 20 MG: 20 TABLET ORAL at 08:03

## 2024-03-29 RX ADMIN — PANTOPRAZOLE SODIUM 20 MG: 20 TABLET, DELAYED RELEASE ORAL at 08:02

## 2024-03-29 RX ADMIN — GABAPENTIN 100 MG: 100 CAPSULE ORAL at 08:02

## 2024-03-29 RX ADMIN — HEPARIN SODIUM 5000 UNITS: 5000 INJECTION INTRAVENOUS; SUBCUTANEOUS at 16:22

## 2024-03-29 RX ADMIN — NYSTATIN 1 APPLICATION: 100000 POWDER TOPICAL at 08:02

## 2024-03-29 RX ADMIN — SENNOSIDES 8.6 MG: 8.6 TABLET, FILM COATED ORAL at 08:02

## 2024-03-29 RX ADMIN — GUAIFENESIN AND DEXTROMETHORPHAN 10 ML: 100; 10 SYRUP ORAL at 20:23

## 2024-03-29 RX ADMIN — GUAIFENESIN AND DEXTROMETHORPHAN 10 ML: 100; 10 SYRUP ORAL at 05:05

## 2024-03-29 RX ADMIN — IPRATROPIUM BROMIDE AND ALBUTEROL SULFATE 3 ML: .5; 3 SOLUTION RESPIRATORY (INHALATION) at 08:37

## 2024-03-29 RX ADMIN — ACETAMINOPHEN 975 MG: 325 TABLET ORAL at 11:00

## 2024-03-29 RX ADMIN — FLUTICASONE FUROATE AND VILANTEROL TRIFENATATE 1 PUFF: 200; 25 POWDER RESPIRATORY (INHALATION) at 08:38

## 2024-03-29 RX ADMIN — NYSTATIN 1 APPLICATION: 100000 POWDER TOPICAL at 20:22

## 2024-03-29 RX ADMIN — SPIRONOLACTONE 12.5 MG: 25 TABLET, FILM COATED ORAL at 08:02

## 2024-03-29 RX ADMIN — METHYLPREDNISOLONE SODIUM SUCCINATE 40 MG: 40 INJECTION, POWDER, FOR SOLUTION INTRAMUSCULAR; INTRAVENOUS at 11:00

## 2024-03-29 ASSESSMENT — COGNITIVE AND FUNCTIONAL STATUS - GENERAL
MOVING TO AND FROM BED TO CHAIR: A LOT
TOILETING: A LITTLE
DRESSING REGULAR UPPER BODY CLOTHING: A LOT
CLIMB 3 TO 5 STEPS WITH RAILING: TOTAL
STANDING UP FROM CHAIR USING ARMS: A LOT
MOVING FROM LYING ON BACK TO SITTING ON SIDE OF FLAT BED WITH BEDRAILS: A LITTLE
DRESSING REGULAR LOWER BODY CLOTHING: A LOT
HELP NEEDED FOR BATHING: A LOT
MOBILITY SCORE: 13
TURNING FROM BACK TO SIDE WHILE IN FLAT BAD: A LITTLE
PERSONAL GROOMING: A LOT
DAILY ACTIVITIY SCORE: 15
WALKING IN HOSPITAL ROOM: A LOT

## 2024-03-29 ASSESSMENT — PAIN SCALES - GENERAL
PAINLEVEL_OUTOF10: 0 - NO PAIN
PAINLEVEL_OUTOF10: 0 - NO PAIN

## 2024-03-29 ASSESSMENT — PAIN - FUNCTIONAL ASSESSMENT: PAIN_FUNCTIONAL_ASSESSMENT: 0-10

## 2024-03-29 NOTE — PROGRESS NOTES
Alayna Dumont is a 89 y.o. female on day 7 of admission presenting with Acute hypoxemic respiratory failure due to COVID-19 (CMS/HCC).    Subjective   Received update that patient is medically ready for discharge; attempted to speak with MAURICIO Morton but received VM. Placed call to patients son Contreras to further discuss. Advised him that their [facility] is unable to accept due to bed availability. Advised him that [facility] and [facility] are both able to accept. After further discussion, he reports that [facility] is FOC.    Update sent to Dr. Leal, [facility] Kasia & bedside RN; updated that patient plans to appeal her discharge through her Medicare. TCC will review IMM with patient and have Abelardo process initiated.     SW will continue to follow.    UPDATE 0806 Received message from [employer] that appeal was initiated and clinicals were uploaded via the portal. Case ID [id number].    Will continue to follow.    -Lakia AQUINO MA, LSW  [phone] or [employer] Secure Chat  Care Transitions

## 2024-03-29 NOTE — CARE PLAN
Patient remained safe and free from falls and/or injury this shift. No acute events. Patient continues on Remdesivir and medications to help alleviate cough. Patient denies any pain or nausea. Patient pending discharge to facility.     The clinical goals for the shift include patient will rate pain at or below a level of 4 out of 10 by the end of this shift. Goal met. No complaints of pain noted to this RN.     Patient plan of care ongoing.    Edwige CASTAÑEDAN, RN, CMSRN      Problem: Skin  Goal: Decreased wound size/increased tissue granulation at next dressing change  Outcome: Progressing  Flowsheets (Taken 3/29/2024 0723)  Decreased wound size/increased tissue granulation at next dressing change:   Promote sleep for wound healing   Protective dressings over bony prominences   Utilize specialty bed per algorithm  Goal: Participates in plan/prevention/treatment measures  Outcome: Progressing  Flowsheets (Taken 3/29/2024 0723)  Participates in plan/prevention/treatment measures:   Discuss with provider PT/OT consult   Elevate heels   Increase activity/out of bed for meals  Goal: Prevent/manage excess moisture  Outcome: Progressing  Flowsheets (Taken 3/29/2024 0723)  Prevent/manage excess moisture:   Moisturize dry skin   Cleanse incontinence/protect with barrier cream   Follow provider orders for dressing changes   Monitor for/manage infection if present  Goal: Prevent/minimize sheer/friction injuries  Outcome: Progressing  Flowsheets (Taken 3/29/2024 0723)  Prevent/minimize sheer/friction injuries:   HOB 30 degrees or less   Complete micro-shifts as needed if patient unable. Adjust patient position to relieve pressure points, not a full turn   Increase activity/out of bed for meals   Turn/reposition every 2 hours/use positioning/transfer devices   Use pull sheet   Utilize specialty bed per algorithm  Goal: Promote/optimize nutrition  Outcome: Progressing  Flowsheets (Taken 3/29/2024 0723)  Promote/optimize  nutrition:   Assist with feeding   Discuss with provider if NPO > 2 days   Offer water/supplements/favorite foods   Consume > 50% meals/supplements   Monitor/record intake including meals   Reassess MST if dietician not consulted  Goal: Promote skin healing  Outcome: Progressing  Flowsheets (Taken 3/29/2024 6032)  Promote skin healing:   Assess skin/pad under line(s)/device(s)   Ensure correct size (line/device) and apply per  instructions   Rotate device position/do not position patient on device   Turn/reposition every 2 hours/use positioning/transfer devices   Protective dressings over bony prominences     Problem: Fall/Injury  Goal: Not fall by end of shift  Outcome: Progressing  Goal: Be free from injury by end of the shift  Outcome: Progressing  Goal: Verbalize understanding of personal risk factors for fall in the hospital  Outcome: Progressing  Goal: Verbalize understanding of risk factor reduction measures to prevent injury from fall in the home  Outcome: Progressing  Goal: Use assistive devices by end of the shift  Outcome: Progressing  Goal: Pace activities to prevent fatigue by end of the shift  Outcome: Progressing     Problem: Pain - Adult  Goal: Verbalizes/displays adequate comfort level or baseline comfort level  Outcome: Progressing     Problem: Safety - Adult  Goal: Free from fall injury  Outcome: Progressing     Problem: Discharge Planning  Goal: Discharge to home or other facility with appropriate resources  Outcome: Progressing     Problem: Chronic Conditions and Co-morbidities  Goal: Patient's chronic conditions and co-morbidity symptoms are monitored and maintained or improved  Outcome: Progressing     Problem: Diabetes  Goal: Achieve decreasing blood glucose levels by end of shift  Outcome: Progressing  Goal: Increase stability of blood glucose readings by end of shift  Outcome: Progressing  Goal: Decrease in ketones present in urine by end of shift  Outcome: Progressing  Goal:  Maintain electrolyte levels within acceptable range throughout shift  Outcome: Progressing  Goal: Maintain glucose levels >70mg/dl to <250mg/dl throughout shift  Outcome: Progressing  Goal: No changes in neurological exam by end of shift  Outcome: Progressing  Goal: Learn about and adhere to nutrition recommendations by end of shift  Outcome: Progressing  Goal: Vital signs within normal range for age by end of shift  Outcome: Progressing  Goal: Increase self care and/or family involovement by end of shift  Outcome: Progressing  Goal: Receive DSME education by end of shift  Outcome: Progressing     Problem: Pain  Goal: Takes deep breaths with improved pain control throughout the shift  Outcome: Progressing  Goal: Turns in bed with improved pain control throughout the shift  Outcome: Progressing  Goal: Walks with improved pain control throughout the shift  Outcome: Progressing  Goal: Performs ADL's with improved pain control throughout shift  Outcome: Progressing  Goal: Participates in PT with improved pain control throughout the shift  Outcome: Progressing  Goal: Free from opioid side effects throughout the shift  Outcome: Progressing  Goal: Free from acute confusion related to pain meds throughout the shift  Outcome: Progressing

## 2024-03-29 NOTE — PROGRESS NOTES
03/29/24 1259   Discharge Planning   Who is requesting discharge planning? Provider   Home or Post Acute Services Post acute facilities (Rehab/SNF/etc)   Type of Post Acute Facility Services Skilled nursing   Patient expects to be discharged to: [facility] [phone]   Does the patient need discharge transport arranged? Yes   RoundTrip coordination needed? Yes   Has discharge transport been arranged? No   What day is the transport expected? 03/29/24   What time is the transport expected? 1500   Patient Choice   Provider Choice list and CMS website (https://medicare.gov/care-compare#search) for post-acute Quality and Resource Measure Data were provided and reviewed with: Family   Patient / Family choosing to utilize agency / facility established prior to hospitalization No     Per attending pt is ready for discharge pending SNF placement. MADI confirmed [facility] can accept pt today as she will not require precert.  RN BINA provided pt with IMM letter from [facility] with right to appeal. Pt stated she does not feel she is ready to discharge so rather than discharge to SNF only to return to hospital 4 days later she would like to appeal her discharge. Pt signed copy of IMM letter placed in her paper chart and daniel Chairez [phone] [facility] as pt stated he is the one that will have to call on her behalf. Son Contreras agreed to call me back with case number once he calls [facility] to appeal. Attending, MADI and nursing updated of above.    Leda Parsons RN  Transitional Care Coordinator/TCC  [phone]

## 2024-03-29 NOTE — DISCHARGE SUMMARY
Discharge Diagnosis  Acute hypoxemic respiratory failure due to COVID-19 (CMS/HCC)    Issues Requiring Follow-Up  Wean O2 as tolerated  Continue miralax for reagular Bms  Weak, PT/OT   Repeat CBC and RFP in 4-5 days. Tolerating slightly higher Cr for diuresis (now back on home regimen)    Test Results Pending At Discharge  Pending Labs       No current pending labs.            Hospital Course  89 y.o. female with a past medical history of HTN, HLD, SUNDEEP, HFpEF (EF 70-75% 02/2024), AVR, dual chamber PPM, sarcoidosis, and COPD who was addmited for OSEAS and AHRF 2/2 COVID 19. The patient received remdesivir and dexamethasone as well as nebulizers with significant improvement in her respiratory status.  Unfortunately, continues to require oxygen.  Patient also developed a cough as part of her viral syndrome, which continues.  Stay was complicated by multiple other medical issues including acute cystitis secondary to MDRO Proteus vulgaris which was treated with ciprofloxacin with improvement in the patient's symptoms.  Patient also had an OSEAS on her CKD (baseline creatinine 1.1).  Overall OSEAS had improved but fluctuation of creatinine was tolerated to due to fluid overload requiring diuresis.  Finally, patient continued to complain of constipation and received laxatives as well as suppositories to aid with her bowel movements.  She was deemed medically ready for discharge to skilled nursing facility 3/29/2024 but the patient objected as she said she expected that she needs to fully heal and feel better before she would be willing to leave the hospital    #Acute hypoxic respiratory failure 2/2 COPD exacerbation 2/2 COVID 19 pneumonia   - resp distress & severe wheezing resolved  -ctn o2 and wean as christiano  -Remdesivir ( 5 days )   -dexameth (10 days)   -Schedule  Mucinex   -Daily CMP   -duoneb q4h   -Monitor parameters (Heart Rate & CMP) daily   -If HR <50 or symptomatic from bradycardia, please consider holding remdesivir    -If CrCl <30 & Not on dialysis, please consider holding remdesivir     -If ALT is >= 10x ULN during treatment, please consider holding remdesivir     #acute cystitis 2/2 MDRO Proteus vulgaris  Suseptable to cipro  -stop current abx  -start cipro IV then PO tomorrow   -monitor for sx    #OSEAS on ?CKD   Baseline Cr ~1.1  1.4>1.6>1.8>1.6>1.3>1.22>1.4  -Avoid nephrotoxic agents where possible  -Follow RFP      #acute on chronic HFpEF   -Echo 2/2024 hyperdynamic, EF 70-75%  -stop furosemide IV 40mg BID   -resume home torsemide at 20mg po daily   -Daily weights  -Continue home metoprolol, atorvastatin, jardiance  -check RFP and mg daily   -monitor fluid status       #Sarcoidosis  #acute exacerbation of COPD  -hold Breo and switch to sched duonebs   -steroids IV as above did not improve on PO  -see plan above     #OA L hip  - not controlled on home tramadol  - Sched acetamin 1000mg q8h   -home tramadol renal dosing  - PT/OT consult  - patient is unlikely to be a surgical candidate    #Troponinemia  #s/p AVR  #s/p PPM  -Troponin mildly elevated  -No complaints of chest pain  -Recently admitted (2/2024) for chest pain, -ve workup  -EKG A/V paced without signs of acute ischemia  -Continue home medications     #HTN  #HLD  -Resume home metoprolol, spironolactone  -holding torsemide for OSEAS  -resume home atorvastatin      #Constipation  -miralax bid   -senna BID        Pertinent Physical Exam At Time of Discharge  Physical Exam  Vitals and nursing note reviewed.   Constitutional:       General: She is not in acute distress.     Appearance: Normal appearance. She is not ill-appearing.   HENT:      Head: Normocephalic and atraumatic.   Cardiovascular:      Rate and Rhythm: Regular rhythm.   Pulmonary:      Effort: No respiratory distress.      Comments: Much better. No WOB. More color to her face. Talking without dyspnea or cough on exam  Abdominal:      Tenderness: There is no guarding.   Skin:     Coloration: Skin is not  jaundiced or pale.   Neurological:      Mental Status: She is alert and oriented to person, place, and time.   Psychiatric:         Mood and Affect: Mood normal.         Home Medications     Medication List      START taking these medications     ciprofloxacin 500 mg tablet; Commonly known as: Cipro; Take 1 tablet   (500 mg) by mouth once daily for 1 day. Do not start before March 30, 2024.; Start taking on: March 30, 2024   dexAMETHasone 6 mg tablet; Commonly known as: Decadron; Take 1 tablet (6   mg) by mouth once daily for 5 days. Do not start before March 30, 2024.;   Start taking on: March 30, 2024   polyethylene glycol 17 gram packet; Commonly known as: Glycolax,   Miralax; Take 17 g by mouth 2 times a day.   sennosides 8.6 mg tablet; Commonly known as: Senokot; Take 1 tablet (8.6   mg) by mouth 2 times a day as needed for constipation (constipation).   traMADol 100 mg tablet; Commonly known as: Ultram; Take 1 tablet (100   mg) by mouth 2 times a day as needed for severe pain (7 - 10).     CHANGE how you take these medications     DULoxetine 30 mg DR capsule; Commonly known as: Cymbalta; TAKE 1 CAPSULE   BY MOUTH ONCE DAILY DISCONTINUE 20 MILLIGRAM CAPSULE; What changed: See   the new instructions.     CONTINUE taking these medications     alendronate 35 mg tablet; Commonly known as: Fosamax; Take 1 tablet (35   mg) by mouth every 7 days.   allopurinol 100 mg tablet; Commonly known as: Zyloprim; Take 1 tablet   (100 mg) by mouth once daily.   ascorbic acid 500 mg tablet; Commonly known as: Vitamin C   atorvastatin 20 mg tablet; Commonly known as: Lipitor; TAKE 1 TABLET BY   MOUTH AT BEDTIME   baclofen 5 mg tablet; Commonly known as: Lioresal; Take one tablet every   night for spasm in left hip. If needed, may take one tablet during the   day. STOP METHOCARBAMOL   Dodex 1,000 mcg/mL injection; Generic drug: cyanocobalamin; inject 1   milliliter intramuscularly every month   empagliflozin 10 mg; Commonly known  as: Jardiance; Take 1 tablet (10 mg)   by mouth once daily.   ferrous sulfate (325 mg ferrous sulfate) tablet; Commonly known as:   FeroSuL; Take 1 tablet (65 mg of iron) by mouth once daily with a meal.   fluticasone furoate-vilanteroL 200-25 mcg/dose inhaler; Commonly known   as: Breo Ellipta; Inhale 1 puff once daily. Rinse mouth after each use   gabapentin 100 mg capsule; Commonly known as: Neurontin; Take 1 capsule   (100 mg) by mouth 2 times a day. For neuropathy (nerve pain)   ipratropium-albuteroL 0.5-2.5 mg/3 mL nebulizer solution; Commonly known   as: Duo-Neb   melatonin 2.5 mg tablet,chewable   metoprolol tartrate 25 mg tablet; Commonly known as: Lopressor   multivitamin with minerals tablet   naloxone 4 mg/0.1 mL nasal spray; Commonly known as: Narcan   NON FORMULARY   pantoprazole 20 mg EC tablet; Commonly known as: ProtoNix; take 1 tablet   by mouth once daily   PRESERVISION AREDS ORAL   spironolactone 25 mg tablet; Commonly known as: Aldactone; Take 0.5   tablets (12.5 mg) by mouth once daily. STOP TAKING POTASSIUM   torsemide 20 mg tablet; Commonly known as: Demadex   VITAMIN B COMPLEX ORAL   Vitamin D3 25 MCG (1000 UT) tablet; Generic drug: cholecalciferol   vitamin E 180 mg (400 unit) capsule     STOP taking these medications     cephalexin 500 mg capsule; Commonly known as: Keflex       Outpatient Follow-Up  No future appointments.    Kb Leal DO

## 2024-03-29 NOTE — PROGRESS NOTES
[facility]   HOSPITAL MEDICINE     PROGRESS NOTE       PATIENT NAME: Alayna Dumont   MRN: [id number]   SERVICE DATE: 03/29/24   SERVICE TIME: 1:56 PM      Hospital Medicine/Primary Attending: Kb Leal DO   LENGTH OF STAY: 7     CHIEF COMPLAINT: Acute hypoxemic respiratory failure due to COVID-19 (CMS/[facility])   Principal Problem:    Acute hypoxemic respiratory failure due to COVID-19 (CMS/HCC)  Active Problems:    Chronic heart failure with preserved ejection fraction (HFpEF) (CMS/[facility])    COPD exacerbation (CMS/[facility])    Sarcoidosis of lung (CMS/[facility])    OSEAS (acute kidney injury) (CMS/[facility])    Acute cystitis without hematuria        Assessment/Plan      I reviewed:    All new and relevant labs and imaging   New EKGs  Consultant notes   Vitals Signs   Nursing notes       89 y.o. female with a past medical history of HTN, HLD, SUNDEEP, HFpEF (EF 70-75% 02/2024), AVR, dual chamber PPM, sarcoidosis, and COPD who was addmited for OSEAS and COVID 19.     #Acute hypoxic respiratory failure 2/2 COPD exacerbation 2/2 COVID 19 pneumonia   - resp distress & severe wheezing resolved  -ctn o2 and wean as christiano  -Remdesivir ( 5 days )   -methylpred IV 40mg BID  -Schedule Tessalon Perles and Mucinex   -Daily CMP   -duoneb q4h   -Monitor parameters (Heart Rate & CMP) daily   -If HR <50 or symptomatic from bradycardia, please consider holding remdesivir   -If CrCl <30 & Not on dialysis, please consider holding remdesivir     -If ALT is >= 10x ULN during treatment, please consider holding remdesivir     #acute cystitis 2/2 MDRO Proteus vulgaris  Suseptable to cipro  -stop current abx  -start cipro IV then PO tomorrow   -monitor for sx    #OSEAS on ?CKD   Baseline Cr ~1.1  1.4>1.6>1.8>1.6>1.3>1.22>1.4  -Avoid nephrotoxic agents where possible  -Follow RFP      #acute on chronic HFpEF   -Echo 2/2024 hyperdynamic, EF 70-75%  -stop furosemide IV 40mg BID   -resume home torsemide at 20mg po daily   -Daily  weights  -Continue home metoprolol, atorvastatin, jardiance  -check RFP and mg daily   -monitor fluid status       #Sarcoidosis  #acute exacerbation of COPD  -hold Breo and switch to sched duonebs   -steroids IV as above did not improve on PO  -see plan above     #OA L hip  - not controlled on home tramadol  - Sched acetamin 1000mg q8h   -home tramadol renal dosing  - PT/OT consult  - patient is unlikely to be a surgical candidate    #Troponinemia  #s/p AVR  #s/p PPM  -Troponin mildly elevated  -No complaints of chest pain  -Recently admitted (2/2024) for chest pain, -ve workup  -EKG A/V paced without signs of acute ischemia  -Continue home medications     #HTN  #HLD  -Resume home metoprolol, spironolactone  -holding torsemide for OSEAS  -resume home atorvastatin      #Constipation  -miralax bid   -senna BID                 DISPOSITION:  Functional Status Prior to Admit:     Medical Necessity for Continued Hospitalization y     Plan of care discussed with:         Subjective   SUBJECTIVE:  Pt seen and examined.   SOB     Patient denies CP, SOB, fevers, chills, nausea, or emesis.         Objective      PHYSICAL EXAM: Patient Vitals for the past 24 hrs:   BP Temp Temp src Pulse Resp SpO2   03/29/24 1234 (!) 157/47 36.9 °C (98.4 °F) Temporal 60 19 97 %   03/29/24 0512 (!) 160/49 36.4 °C (97.5 °F) -- 60 -- 97 %        Physical Exam  Vitals and nursing note reviewed.   Constitutional:       General: She is not in acute distress.     Appearance: Normal appearance. She is not ill-appearing.   HENT:      Head: Normocephalic and atraumatic.   Cardiovascular:      Rate and Rhythm: Normal rate and regular rhythm.   Pulmonary:      Effort: Respiratory distress present.      Breath sounds: No stridor. Wheezing and rhonchi present.   Abdominal:      Tenderness: There is no guarding.   Skin:     Coloration: Skin is not jaundiced or pale.   Neurological:      Mental Status: She is alert and oriented to person, place, and time.  "  Psychiatric:         Mood and Affect: Mood normal.            MEDICATIONS:   Scheduled medications  acetaminophen, 975 mg, oral, q8h  atorvastatin, 20 mg, oral, Nightly  baclofen, 5 mg, oral, BID  bisacodyl, 10 mg, rectal, Once  ciprofloxacin, 500 mg, oral, Daily  [START ON 3/30/2024] dexAMETHasone, 6 mg, oral, Daily  dextromethorphan-guaifenesin, 10 mL, oral, q8h  DULoxetine, 30 mg, oral, Daily  empagliflozin, 10 mg, oral, Daily  ferrous sulfate (325 mg ferrous sulfate), 65 mg of iron, oral, Daily with breakfast  fluticasone furoate-vilanteroL, 1 puff, inhalation, Daily  gabapentin, 100 mg, oral, BID  heparin (porcine), 5,000 Units, subcutaneous, q8h  insulin lispro, 0-5 Units, subcutaneous, TID with meals  metoprolol tartrate, 25 mg, oral, BID  multivitamin with minerals, 1 tablet, oral, Daily  nystatin, 1 Application, Topical, BID  pantoprazole, 20 mg, oral, Daily  polyethylene glycol, 17 g, oral, BID  sennosides, 1 tablet, oral, BID  spironolactone, 12.5 mg, oral, Daily  torsemide, 20 mg, oral, Daily  torsemide, 20 mg, oral, Once      Continuous medications     PRN medications  PRN medications: albuterol, benzonatate, dextrose, dextrose, glucagon, glucagon, loperamide, melatonin, traMADol, traMADol       DATA:      Diagnostic tests reviewed for today's visit:     CBC:         Coags:     CMP:   Results from last 72 hours   Lab Units 03/29/24  0859   SODIUM mmol/L 139   POTASSIUM mmol/L 4.4   CO2 mmol/L 24   BUN mg/dL 62*   MAGNESIUM mg/dL 2.36   ALBUMIN g/dL 3.6        Cardiac Enzymes: No lab exists for component: \"TROP\" .lab  Liver Function, Amylase, Lipase:       No lab exists for component: \"TPROT\", \"ALB\", \"TBILI\"   MG/PHOS: [id number](mg,p)@   Renal Panel:    Results from last 72 hours   Lab Units 03/29/24  0859   ALBUMIN g/dL 3.6   BUN mg/dL 62*   POTASSIUM mmol/L 4.4   CO2 mmol/L 24   SODIUM mmol/L 139        Heme:        No lab exists for component: \"RETICP\", \"ABSRETIC\", \"LD\", \"DAILY\", \"FE\", " "\"TRANSFERSAT\"   No components found for: \"UALBCR\"      Medication and Non-Pharmacologic VTE Prophylaxis/Anticoagulants    The patient has received anticoagulants recently:  Heparin is ordered or has been given within the last 24 hours OR  Lovenox has been given in the last 24 hours OR  An anticoagulant other than Heparin or Lovenox is on the home med list OR  An anticoagulant other than Heparin or Lovenox has been given within the last 5 days  Last Anticoag Admin            heparin (porcine) injection 5,000 Units    Given 5,000 Units at 0914    Frequency: Every 8 hours         There are additional administrations since 03/22/24 1225 that are not shown.    No unadministered anticoagulant orders found.                   SIGNATURE: Kb Leal [facility]    PAGER/CONTACT #: Epic Juan      Disclaimer: Portions of this note may have been generated using Dragon voice recognition software. Reasonable efforts were made to correct any dictation errors that resulted due to the programming of this software but some may still be present.     Portions of this note including HPI, ROS, impression/plan, and examination may have been copied forward from yesterday to March 29, 2024 as to provide important historical information essential in contributing to medical decision making. Documentation has been reviewed and edited as necessary to support clinical decision making for today's visit and to reflect my own independent evaluation of this patient.       The time of this note does not reflect the time I saw the patient but the time that this note was written     "

## 2024-03-29 NOTE — CARE PLAN
Problem: Fall/Injury  Goal: Not fall by end of shift  Outcome: Progressing  Goal: Be free from injury by end of the shift  Outcome: Progressing  Goal: Verbalize understanding of personal risk factors for fall in the hospital  Outcome: Progressing   The patient's goals for the shift include      The clinical goals for the shift include Pt. will remain free from falls/injury throughout shift

## 2024-03-30 VITALS
HEART RATE: 60 BPM | WEIGHT: 211.42 LBS | RESPIRATION RATE: 16 BRPM | BODY MASS INDEX: 37.46 KG/M2 | SYSTOLIC BLOOD PRESSURE: 138 MMHG | OXYGEN SATURATION: 94 % | DIASTOLIC BLOOD PRESSURE: 46 MMHG | HEIGHT: 63 IN | TEMPERATURE: 97.2 F

## 2024-03-30 LAB
ALBUMIN SERPL BCP-MCNC: 3.3 G/DL (ref 3.4–5)
ANION GAP SERPL CALC-SCNC: 16 MMOL/L (ref 10–20)
BUN SERPL-MCNC: 67 MG/DL (ref 6–23)
CALCIUM SERPL-MCNC: 8.8 MG/DL (ref 8.6–10.6)
CHLORIDE SERPL-SCNC: 101 MMOL/L (ref 98–107)
CO2 SERPL-SCNC: 28 MMOL/L (ref 21–32)
CREAT SERPL-MCNC: 1.57 MG/DL (ref 0.5–1.05)
EGFRCR SERPLBLD CKD-EPI 2021: 31 ML/MIN/1.73M*2
GLUCOSE BLD MANUAL STRIP-MCNC: 107 MG/DL (ref 74–99)
GLUCOSE BLD MANUAL STRIP-MCNC: 147 MG/DL (ref 74–99)
GLUCOSE SERPL-MCNC: 140 MG/DL (ref 74–99)
MAGNESIUM SERPL-MCNC: 2.36 MG/DL (ref 1.6–2.4)
PHOSPHATE SERPL-MCNC: 3.7 MG/DL (ref 2.5–4.9)
POTASSIUM SERPL-SCNC: 3.8 MMOL/L (ref 3.5–5.3)
SODIUM SERPL-SCNC: 141 MMOL/L (ref 136–145)

## 2024-03-30 PROCEDURE — 80069 RENAL FUNCTION PANEL: CPT | Performed by: STUDENT IN AN ORGANIZED HEALTH CARE EDUCATION/TRAINING PROGRAM

## 2024-03-30 PROCEDURE — 2500000002 HC RX 250 W HCPCS SELF ADMINISTERED DRUGS (ALT 637 FOR MEDICARE OP, ALT 636 FOR OP/ED): Performed by: FAMILY MEDICINE

## 2024-03-30 PROCEDURE — 2500000004 HC RX 250 GENERAL PHARMACY W/ HCPCS (ALT 636 FOR OP/ED): Performed by: EMERGENCY MEDICINE

## 2024-03-30 PROCEDURE — 2500000004 HC RX 250 GENERAL PHARMACY W/ HCPCS (ALT 636 FOR OP/ED): Performed by: STUDENT IN AN ORGANIZED HEALTH CARE EDUCATION/TRAINING PROGRAM

## 2024-03-30 PROCEDURE — 2500000002 HC RX 250 W HCPCS SELF ADMINISTERED DRUGS (ALT 637 FOR MEDICARE OP, ALT 636 FOR OP/ED): Performed by: NURSE PRACTITIONER

## 2024-03-30 PROCEDURE — 2500000001 HC RX 250 WO HCPCS SELF ADMINISTERED DRUGS (ALT 637 FOR MEDICARE OP): Performed by: FAMILY MEDICINE

## 2024-03-30 PROCEDURE — 36415 COLL VENOUS BLD VENIPUNCTURE: CPT | Performed by: STUDENT IN AN ORGANIZED HEALTH CARE EDUCATION/TRAINING PROGRAM

## 2024-03-30 PROCEDURE — 2500000001 HC RX 250 WO HCPCS SELF ADMINISTERED DRUGS (ALT 637 FOR MEDICARE OP): Performed by: NURSE PRACTITIONER

## 2024-03-30 PROCEDURE — 83735 ASSAY OF MAGNESIUM: CPT | Performed by: STUDENT IN AN ORGANIZED HEALTH CARE EDUCATION/TRAINING PROGRAM

## 2024-03-30 PROCEDURE — 82947 ASSAY GLUCOSE BLOOD QUANT: CPT

## 2024-03-30 PROCEDURE — 2500000001 HC RX 250 WO HCPCS SELF ADMINISTERED DRUGS (ALT 637 FOR MEDICARE OP): Performed by: STUDENT IN AN ORGANIZED HEALTH CARE EDUCATION/TRAINING PROGRAM

## 2024-03-30 RX ADMIN — FLUTICASONE FUROATE AND VILANTEROL TRIFENATATE 1 PUFF: 200; 25 POWDER RESPIRATORY (INHALATION) at 09:00

## 2024-03-30 RX ADMIN — TRAMADOL HYDROCHLORIDE 50 MG: 50 TABLET, COATED ORAL at 09:39

## 2024-03-30 RX ADMIN — ACETAMINOPHEN 975 MG: 325 TABLET ORAL at 13:21

## 2024-03-30 RX ADMIN — Medication 1 TABLET: at 09:40

## 2024-03-30 RX ADMIN — ACETAMINOPHEN 975 MG: 325 TABLET ORAL at 04:48

## 2024-03-30 RX ADMIN — BENZONATATE 100 MG: 100 CAPSULE ORAL at 09:39

## 2024-03-30 RX ADMIN — GUAIFENESIN AND DEXTROMETHORPHAN 10 ML: 100; 10 SYRUP ORAL at 04:48

## 2024-03-30 RX ADMIN — DULOXETINE HYDROCHLORIDE 30 MG: 30 CAPSULE, DELAYED RELEASE ORAL at 09:39

## 2024-03-30 RX ADMIN — NYSTATIN 1 APPLICATION: 100000 POWDER TOPICAL at 09:00

## 2024-03-30 RX ADMIN — SPIRONOLACTONE 12.5 MG: 25 TABLET, FILM COATED ORAL at 09:40

## 2024-03-30 RX ADMIN — GABAPENTIN 100 MG: 100 CAPSULE ORAL at 09:39

## 2024-03-30 RX ADMIN — EMPAGLIFLOZIN 10 MG: 10 TABLET, FILM COATED ORAL at 09:39

## 2024-03-30 RX ADMIN — PANTOPRAZOLE SODIUM 20 MG: 20 TABLET, DELAYED RELEASE ORAL at 09:39

## 2024-03-30 RX ADMIN — FERROUS SULFATE TAB 325 MG (65 MG ELEMENTAL FE) 1 TABLET: 325 (65 FE) TAB at 09:40

## 2024-03-30 RX ADMIN — HEPARIN SODIUM 5000 UNITS: 5000 INJECTION INTRAVENOUS; SUBCUTANEOUS at 09:41

## 2024-03-30 RX ADMIN — METOPROLOL TARTRATE 25 MG: 50 TABLET, FILM COATED ORAL at 09:39

## 2024-03-30 RX ADMIN — HEPARIN SODIUM 5000 UNITS: 5000 INJECTION INTRAVENOUS; SUBCUTANEOUS at 00:25

## 2024-03-30 RX ADMIN — TORSEMIDE 20 MG: 20 TABLET ORAL at 09:40

## 2024-03-30 RX ADMIN — BACLOFEN 5 MG: 10 TABLET ORAL at 09:39

## 2024-03-30 RX ADMIN — DEXAMETHASONE 6 MG: 6 TABLET ORAL at 09:40

## 2024-03-30 RX ADMIN — CIPROFLOXACIN HYDROCHLORIDE 500 MG: 500 TABLET, FILM COATED ORAL at 09:39

## 2024-03-30 ASSESSMENT — PAIN SCALES - GENERAL: PAINLEVEL_OUTOF10: 5 - MODERATE PAIN

## 2024-03-30 NOTE — CARE PLAN
Problem: Skin  Goal: Decreased wound size/increased tissue granulation at next dressing change  Outcome: Progressing  Goal: Participates in plan/prevention/treatment measures  Outcome: Progressing  Goal: Prevent/manage excess moisture  Outcome: Progressing  Flowsheets (Taken 3/30/2024 1100)  Prevent/manage excess moisture: Cleanse incontinence/protect with barrier cream  Goal: Prevent/minimize sheer/friction injuries  Outcome: Progressing  Goal: Promote/optimize nutrition  Outcome: Progressing  Goal: Promote skin healing  Outcome: Progressing     Problem: Fall/Injury  Goal: Not fall by end of shift  Outcome: Progressing  Goal: Be free from injury by end of the shift  Outcome: Progressing  Goal: Verbalize understanding of personal risk factors for fall in the hospital  Outcome: Progressing  Goal: Verbalize understanding of risk factor reduction measures to prevent injury from fall in the home  Outcome: Progressing  Goal: Use assistive devices by end of the shift  Outcome: Progressing  Goal: Pace activities to prevent fatigue by end of the shift  Outcome: Progressing     Problem: Pain - Adult  Goal: Verbalizes/displays adequate comfort level or baseline comfort level  Outcome: Progressing     Problem: Safety - Adult  Goal: Free from fall injury  Outcome: Progressing     Problem: Discharge Planning  Goal: Discharge to home or other facility with appropriate resources  Outcome: Progressing     Problem: Chronic Conditions and Co-morbidities  Goal: Patient's chronic conditions and co-morbidity symptoms are monitored and maintained or improved  Outcome: Progressing     Problem: Diabetes  Goal: Achieve decreasing blood glucose levels by end of shift  Outcome: Progressing  Goal: Increase stability of blood glucose readings by end of shift  Outcome: Progressing  Goal: Decrease in ketones present in urine by end of shift  Outcome: Progressing  Goal: Maintain electrolyte levels within acceptable range throughout  shift  Outcome: Progressing  Goal: Maintain glucose levels >70mg/dl to <250mg/dl throughout shift  Outcome: Progressing  Goal: No changes in neurological exam by end of shift  Outcome: Progressing  Goal: Learn about and adhere to nutrition recommendations by end of shift  Outcome: Progressing  Goal: Vital signs within normal range for age by end of shift  Outcome: Progressing  Goal: Increase self care and/or family involovement by end of shift  Outcome: Progressing  Goal: Receive DSME education by end of shift  Outcome: Progressing     Problem: Pain  Goal: Takes deep breaths with improved pain control throughout the shift  Outcome: Progressing  Goal: Turns in bed with improved pain control throughout the shift  Outcome: Progressing  Goal: Walks with improved pain control throughout the shift  Outcome: Progressing  Goal: Performs ADL's with improved pain control throughout shift  Outcome: Progressing  Goal: Participates in PT with improved pain control throughout the shift  Outcome: Progressing  Goal: Free from opioid side effects throughout the shift  Outcome: Progressing  Goal: Free from acute confusion related to pain meds throughout the shift  Outcome: Progressing

## 2024-03-30 NOTE — DISCHARGE SUMMARY
Discharge Diagnosis  Acute hypoxemic respiratory failure due to COVID-19 (CMS/[facility])    Issues Requiring Follow-Up      Test Results Pending At Discharge  Pending Labs       No current pending labs.            Hospital Course  89 y.o. female with a past medical history of HTN, HLD, SUNDEEP, HFpEF (EF 70-75% 02/2024), AVR, dual chamber PPM, sarcoidosis, and COPD who was addmited for OSEAS and AHRF 2/2 COVID 19. The patient received remdesivir and dexamethasone as well as nebulizers with significant improvement in her respiratory status.  Unfortunately, continues to require oxygen.  Patient also developed a cough as part of her viral syndrome, which continues.  Stay was complicated by multiple other medical issues including acute cystitis secondary to MDRO Proteus vulgaris which was treated with ciprofloxacin with improvement in the patient's symptoms.  Patient also had an OSEAS on her CKD (baseline creatinine 1.1).  Overall OSEAS had improved but fluctuation of creatinine was tolerated to due to fluid overload requiring diuresis.  Finally, patient continued to complain of constipation and received laxatives as well as suppositories to aid with her bowel movements.  She was deemed medically ready for discharge to skilled nursing facility 3/29/2024 but the patient objected as she said she expected that she needs to fully heal and feel better before she would be willing to leave the hospital    #Acute hypoxic respiratory failure 2/2 COPD exacerbation 2/2 COVID 19 pneumonia   - resp distress & severe wheezing resolved  -ctn o2 and wean as christiano  -Remdesivir ( 5 days )   -dexameth (10 days)   -Schedule  Mucinex   -Daily CMP   -duoneb q4h   -Monitor parameters (Heart Rate & CMP) daily   -If HR <50 or symptomatic from bradycardia, please consider holding remdesivir   -If CrCl <30 & Not on dialysis, please consider holding remdesivir     -If ALT is >= 10x ULN during treatment, please consider holding remdesivir     #acute cystitis 2/2  MDRO Proteus vulgaris  Suseptable to cipro  -stop current abx  -start cipro IV then PO tomorrow   -monitor for sx    #OSEAS on ?CKD   Baseline Cr ~1.1  1.4>1.6>1.8>1.6>1.3>1.22>1.4  -Avoid nephrotoxic agents where possible  -Follow RFP      #acute on chronic HFpEF   -Echo 2/2024 hyperdynamic, EF 70-75%  -stop furosemide IV 40mg BID   -resume home torsemide at 20mg po daily   -Daily weights  -Continue home metoprolol, atorvastatin, jardiance  -check RFP and mg daily   -monitor fluid status       #Sarcoidosis  #acute exacerbation of COPD  -hold Breo and switch to sched duonebs   -steroids IV as above did not improve on PO  -see plan above     #OA L hip  - not controlled on home tramadol  - Sched acetamin 1000mg q8h   -home tramadol renal dosing  - PT/OT consult  - patient is unlikely to be a surgical candidate    #Troponinemia  #s/p AVR  #s/p PPM  -Troponin mildly elevated  -No complaints of chest pain  -Recently admitted (2/2024) for chest pain, -ve workup  -EKG A/V paced without signs of acute ischemia  -Continue home medications     #HTN  #HLD  -Resume home metoprolol, spironolactone  -holding torsemide for OSEAS  -resume home atorvastatin      #Constipation  -miralax bid   -senna BID        Pertinent Physical Exam At Time of Discharge  Physical Exam  Vitals and nursing note reviewed.   Constitutional:       General: She is not in acute distress.     Appearance: Normal appearance. She is not ill-appearing or diaphoretic.   HENT:      Head: Normocephalic and atraumatic.   Pulmonary:      Effort: No respiratory distress.      Breath sounds: No wheezing.      Comments: Off o2!. No conversational dyspnea.   Abdominal:      Tenderness: There is no guarding.   Skin:     Coloration: Skin is not jaundiced or pale.      Comments: Less pale, mor color today   Neurological:      Mental Status: She is alert and oriented to person, place, and time.   Psychiatric:         Mood and Affect: Mood normal.         Home Medications      Medication List      START taking these medications     ciprofloxacin 500 mg tablet; Commonly known as: Cipro; Take 1 tablet   (500 mg) by mouth once daily for 1 day. Do not start before March 30, 2024.   dexAMETHasone 6 mg tablet; Commonly known as: Decadron; Take 1 tablet (6   mg) by mouth once daily for 5 days. Do not start before March 30, 2024.   polyethylene glycol 17 gram packet; Commonly known as: Glycolax,   Miralax; Take 17 g by mouth 2 times a day.   sennosides 8.6 mg tablet; Commonly known as: Senokot; Take 1 tablet (8.6   mg) by mouth 2 times a day as needed for constipation (constipation).   traMADol 100 mg tablet; Commonly known as: Ultram; Take 1 tablet (100   mg) by mouth 2 times a day as needed for severe pain (7 - 10).     CHANGE how you take these medications     DULoxetine 30 mg DR capsule; Commonly known as: Cymbalta; TAKE 1 CAPSULE   BY MOUTH ONCE DAILY DISCONTINUE 20 MILLIGRAM CAPSULE; What changed: See   the new instructions.     CONTINUE taking these medications     alendronate 35 mg tablet; Commonly known as: Fosamax; Take 1 tablet (35   mg) by mouth every 7 days.   allopurinol 100 mg tablet; Commonly known as: Zyloprim; Take 1 tablet   (100 mg) by mouth once daily.   ascorbic acid 500 mg tablet; Commonly known as: Vitamin C   atorvastatin 20 mg tablet; Commonly known as: Lipitor; TAKE 1 TABLET BY   MOUTH AT BEDTIME   baclofen 5 mg tablet; Commonly known as: Lioresal; Take one tablet every   night for spasm in left hip. If needed, may take one tablet during the   day. STOP METHOCARBAMOL   Dodex 1,000 mcg/mL injection; Generic drug: cyanocobalamin; inject 1   milliliter intramuscularly every month   empagliflozin 10 mg; Commonly known as: Jardiance; Take 1 tablet (10 mg)   by mouth once daily.   ferrous sulfate (325 mg ferrous sulfate) tablet; Commonly known as:   FeroSuL; Take 1 tablet (65 mg of iron) by mouth once daily with a meal.   fluticasone furoate-vilanteroL 200-25 mcg/dose  inhaler; Commonly known   as: Breo Ellipta; Inhale 1 puff once daily. Rinse mouth after each use   gabapentin 100 mg capsule; Commonly known as: Neurontin; Take 1 capsule   (100 mg) by mouth 2 times a day. For neuropathy (nerve pain)   ipratropium-albuteroL 0.5-2.5 mg/3 mL nebulizer solution; Commonly known   as: Duo-Neb   melatonin 2.5 mg tablet,chewable   metoprolol tartrate 25 mg tablet; Commonly known as: Lopressor   multivitamin with minerals tablet   naloxone 4 mg/0.1 mL nasal spray; Commonly known as: Narcan   NON FORMULARY   pantoprazole 20 mg EC tablet; Commonly known as: ProtoNix; take 1 tablet   by mouth once daily   PRESERVISION AREDS ORAL   spironolactone 25 mg tablet; Commonly known as: Aldactone; Take 0.5   tablets (12.5 mg) by mouth once daily. STOP TAKING POTASSIUM   torsemide 20 mg tablet; Commonly known as: Demadex   VITAMIN B COMPLEX ORAL   Vitamin D3 25 MCG (1000 UT) tablet; Generic drug: cholecalciferol   vitamin E 180 mg (400 unit) capsule     STOP taking these medications     cephalexin 500 mg capsule; Commonly known as: Keflex       Outpatient Follow-Up  No future appointments.    Kb Leal DO

## 2024-03-30 NOTE — NURSING NOTE
Attempted to call report to [facility] at 1300, [phone], and [phone] with no answer. Report given to transport at bedside.

## 2024-03-30 NOTE — PROGRESS NOTES
Alayna Dumont is a 89 y.o. female on day 8 of admission presenting with Acute hypoxemic respiratory failure due to COVID-19 (CMS/[facility]).    Subjective   [employer] website reviewed and noted that patients appeal was denied and that they agree with termination of hospital services; patient will have full liability starting 3/31.    Met with patient at bedside and provided update on above; she expressed understanding. Discussed that [place] does not have any available beds and her sons were agreeable with [facility]. Patient denied further questions at this time. Patient son Contreras updated.     Transportation requested in [employer]; awaiting confirmed  time. Completed [place] uploaded via [employer] and 7000 completed in [employer].    MD/Bedside RN/TCC updated.    UPDATE 1110 Received confirmed  time of 2:00pm with [employer] [facility] [employer]. Blue form completed and provided to unit secretary.     -Lakia AQUINO MA, LSW  [phone] or [facility] Secure Chat  Care Transitions

## 2024-03-30 NOTE — CARE PLAN
Problem: Skin  Goal: Decreased wound size/increased tissue granulation at next dressing change  Outcome: Progressing  Goal: Participates in plan/prevention/treatment measures  Outcome: Progressing  Goal: Prevent/manage excess moisture  Outcome: Progressing  Goal: Prevent/minimize sheer/friction injuries  Outcome: Progressing  Goal: Promote/optimize nutrition  Outcome: Progressing  Goal: Promote skin healing  Outcome: Progressing   The patient's goals for the shift include      The clinical goals for the shift include patient will rate pain at or below a level of 4 out of 10 by the end of this shift.    Over the shift, the patient did not make progress toward the following goals.

## 2024-04-01 ENCOUNTER — TELEPHONE (OUTPATIENT)
Dept: GERIATRIC MEDICINE | Facility: CLINIC | Age: 89
End: 2024-04-01

## 2024-04-02 ENCOUNTER — NURSING HOME VISIT (OUTPATIENT)
Dept: POST ACUTE CARE | Facility: EXTERNAL LOCATION | Age: 89
End: 2024-04-02
Payer: MEDICARE

## 2024-04-02 DIAGNOSIS — R26.2 DIFFICULTY IN WALKING: Primary | ICD-10-CM

## 2024-04-02 DIAGNOSIS — I27.20 PULMONARY HYPERTENSION (MULTI): ICD-10-CM

## 2024-04-02 DIAGNOSIS — J44.9 COPD MIXED TYPE (MULTI): ICD-10-CM

## 2024-04-02 DIAGNOSIS — M15.9 GENERALIZED OSTEOARTHROSIS, INVOLVING MULTIPLE SITES: ICD-10-CM

## 2024-04-02 DIAGNOSIS — I50.30 HEART FAILURE WITH PRESERVED LEFT VENTRICULAR FUNCTION (HFPEF) (MULTI): ICD-10-CM

## 2024-04-02 DIAGNOSIS — F41.1 GAD (GENERALIZED ANXIETY DISORDER): ICD-10-CM

## 2024-04-02 DIAGNOSIS — K21.9 GASTROESOPHAGEAL REFLUX DISEASE WITHOUT ESOPHAGITIS: ICD-10-CM

## 2024-04-02 DIAGNOSIS — M81.0 OSTEOPOROSIS WITHOUT CURRENT PATHOLOGICAL FRACTURE, UNSPECIFIED OSTEOPOROSIS TYPE: ICD-10-CM

## 2024-04-02 PROCEDURE — 99306 1ST NF CARE HIGH MDM 50: CPT | Performed by: INTERNAL MEDICINE

## 2024-04-02 NOTE — LETTER
Patient: Alayna Dumont  : 5/10/1932    Encounter Date: 2024    Subjective  Patient ID: Alayna Dumont is a 89 y.o. female who is acute skilled care being seen and evaluated for multiple medical problems.    H&P: Was hospitalized with acute respiratory distress and hypoxemia requiring oxygen by nasal cannula due to COVID.  She was also treated with Cipro for UTI.  She has renal insufficiency which improved.  Echocardiogram showed normal ejection fraction.  Today she is alert. Denies pain. No respiratory distress noted. Appetite okay.  She requires assistance with transfers and ambulation         Review of Systems   Constitutional:  Negative for fever and unexpected weight change.   HENT:  Negative for sore throat.    Respiratory:  Negative for cough and shortness of breath.    Cardiovascular:  Negative for chest pain and palpitations.   Gastrointestinal:  Negative for abdominal pain, constipation, diarrhea, nausea and vomiting.   Genitourinary:  Negative for difficulty urinating and frequency.   Musculoskeletal:  Positive for arthralgias. Negative for back pain.   Skin:  Negative for rash.   Neurological:  Negative for dizziness, seizures and headaches.   Psychiatric/Behavioral:  Negative for agitation. The patient is not nervous/anxious.        Objective  LMP  (LMP Unknown)     Physical Exam  Vitals reviewed.   Constitutional:       General: She is not in acute distress.  HENT:      Mouth/Throat:      Mouth: Mucous membranes are moist.   Cardiovascular:      Rate and Rhythm: Regular rhythm.      Heart sounds: Normal heart sounds.   Pulmonary:      Breath sounds: Normal breath sounds. No rales.   Abdominal:      Palpations: Abdomen is soft.      Tenderness: There is no abdominal tenderness.   Musculoskeletal:         General: No tenderness.      Cervical back: Neck supple. No tenderness.   Skin:     General: Skin is warm.   Neurological:      General: No focal deficit present.      Mental Status: She is  alert.   Psychiatric:         Behavior: Behavior normal.         Assessment/Plan  Problem List Items Addressed This Visit       Heart failure with preserved left ventricular function (HFpEF) (Multi)    LETICIA (generalized anxiety disorder)    Generalized osteoarthrosis, involving multiple sites    GERD (gastroesophageal reflux disease)    Osteoporosis    Pulmonary hypertension (Multi)     Other Visit Diagnoses       Difficulty in walking    -  Primary    COPD mixed type (Multi)                 Goals       • Take your medication every day         PT and OT evaluation, fall precautions.  Will check labs      Electronically Signed By: Yvrose Fields MD   5/18/24  5:37 PM

## 2024-04-03 ASSESSMENT — ENCOUNTER SYMPTOMS
PALPITATIONS: 0
FREQUENCY: 0
DIZZINESS: 0
ABDOMINAL PAIN: 0
FEVER: 0
NERVOUS/ANXIOUS: 0
ARTHRALGIAS: 1
CONSTIPATION: 0
COUGH: 0
VOMITING: 0
BACK PAIN: 0
NAUSEA: 0
SORE THROAT: 0
SHORTNESS OF BREATH: 0
HEADACHES: 0
SEIZURES: 0
UNEXPECTED WEIGHT CHANGE: 0
DIFFICULTY URINATING: 0
AGITATION: 0
DIARRHEA: 0

## 2024-04-03 NOTE — PROGRESS NOTES
Subjective   Patient ID: Alayna Dumont is a 89 y.o. female who is acute skilled care being seen and evaluated for multiple medical problems.    H&P: Was hospitalized with acute respiratory distress and hypoxemia requiring oxygen by nasal cannula due to COVID.  She was also treated with Cipro for UTI.  She has renal insufficiency which improved.  Echocardiogram showed normal ejection fraction.  Today she is alert. Denies pain. No respiratory distress noted. Appetite okay.  She requires assistance with transfers and ambulation         Review of Systems   Constitutional:  Negative for fever and unexpected weight change.   HENT:  Negative for sore throat.    Respiratory:  Negative for cough and shortness of breath.    Cardiovascular:  Negative for chest pain and palpitations.   Gastrointestinal:  Negative for abdominal pain, constipation, diarrhea, nausea and vomiting.   Genitourinary:  Negative for difficulty urinating and frequency.   Musculoskeletal:  Positive for arthralgias. Negative for back pain.   Skin:  Negative for rash.   Neurological:  Negative for dizziness, seizures and headaches.   Psychiatric/Behavioral:  Negative for agitation. The patient is not nervous/anxious.        Objective   LMP  (LMP Unknown)     Physical Exam  Vitals reviewed.   Constitutional:       General: She is not in acute distress.  HENT:      Mouth/Throat:      Mouth: Mucous membranes are moist.   Cardiovascular:      Rate and Rhythm: Regular rhythm.      Heart sounds: Normal heart sounds.   Pulmonary:      Breath sounds: Normal breath sounds. No rales.   Abdominal:      Palpations: Abdomen is soft.      Tenderness: There is no abdominal tenderness.   Musculoskeletal:         General: No tenderness.      Cervical back: Neck supple. No tenderness.   Skin:     General: Skin is warm.   Neurological:      General: No focal deficit present.      Mental Status: She is alert.   Psychiatric:         Behavior: Behavior normal.          Assessment/Plan   Problem List Items Addressed This Visit       Heart failure with preserved left ventricular function (HFpEF) (Multi)    LETICIA (generalized anxiety disorder)    Generalized osteoarthrosis, involving multiple sites    GERD (gastroesophageal reflux disease)    Osteoporosis    Pulmonary hypertension (Multi)     Other Visit Diagnoses       Difficulty in walking    -  Primary    COPD mixed type (Multi)                 Goals        Take your medication every day         PT and OT evaluation, fall precautions.  Will check labs

## 2024-04-05 ENCOUNTER — NURSING HOME VISIT (OUTPATIENT)
Dept: POST ACUTE CARE | Facility: EXTERNAL LOCATION | Age: 89
End: 2024-04-05
Payer: MEDICARE

## 2024-04-05 DIAGNOSIS — F41.9 ANXIETY: ICD-10-CM

## 2024-04-05 DIAGNOSIS — K21.9 GASTROESOPHAGEAL REFLUX DISEASE WITHOUT ESOPHAGITIS: ICD-10-CM

## 2024-04-05 DIAGNOSIS — M15.9 GENERALIZED OSTEOARTHROSIS, INVOLVING MULTIPLE SITES: ICD-10-CM

## 2024-04-05 DIAGNOSIS — B37.0 THRUSH: Primary | ICD-10-CM

## 2024-04-05 PROCEDURE — 99308 SBSQ NF CARE LOW MDM 20: CPT | Performed by: INTERNAL MEDICINE

## 2024-04-05 NOTE — LETTER
Patient: Alayna Dumont  : 5/10/1932    Encounter Date: 2024    Subjective  Patient ID: Alayna Dumont is a 89 y.o. female who is acute skilled care being seen and evaluated for multiple medical problems.    Alert, pleasant, complaining of tongue discomfort and coating. Denies pain and shortness of breath. Her appetite is good. Participates in physical therapy, ambulates with walker, no recent falls. Compliant with medications.         Review of Systems   Constitutional:  Negative for fever and unexpected weight change.   HENT:  Negative for sore throat.    Respiratory:  Negative for cough and shortness of breath.    Cardiovascular:  Negative for chest pain and palpitations.   Gastrointestinal:  Negative for abdominal pain, constipation, diarrhea, nausea and vomiting.   Genitourinary:  Negative for difficulty urinating and frequency.   Musculoskeletal:  Positive for arthralgias. Negative for back pain.   Skin:  Negative for rash.   Neurological:  Negative for dizziness, seizures and headaches.   Psychiatric/Behavioral:  Negative for agitation. The patient is not nervous/anxious.        Objective  LMP  (LMP Unknown)     Physical Exam  Vitals reviewed.   Constitutional:       General: She is not in acute distress.  HENT:      Mouth/Throat:      Mouth: Mucous membranes are moist.      Pharynx: No oropharyngeal exudate.   Cardiovascular:      Rate and Rhythm: Regular rhythm.      Heart sounds: Normal heart sounds.   Pulmonary:      Breath sounds: Normal breath sounds. No rales.   Abdominal:      Palpations: Abdomen is soft.      Tenderness: There is no abdominal tenderness.   Musculoskeletal:         General: No tenderness.      Cervical back: Neck supple. No tenderness.   Neurological:      Mental Status: She is alert.         Assessment/Plan  Problem List Items Addressed This Visit       Anxiety    Generalized osteoarthrosis, involving multiple sites    GERD (gastroesophageal reflux disease)     Other Visit  Diagnoses       Thrush    -  Primary             Goals       • Take your medication every day         Will start nystatin swish and swallow      Electronically Signed By: Yvrose Fields MD   5/18/24  5:39 PM

## 2024-04-09 ENCOUNTER — NURSING HOME VISIT (OUTPATIENT)
Dept: POST ACUTE CARE | Facility: EXTERNAL LOCATION | Age: 89
End: 2024-04-09
Payer: MEDICARE

## 2024-04-09 DIAGNOSIS — F41.9 ANXIETY: ICD-10-CM

## 2024-04-09 DIAGNOSIS — I10 HYPERTENSION, UNSPECIFIED TYPE: ICD-10-CM

## 2024-04-09 DIAGNOSIS — K21.9 GASTROESOPHAGEAL REFLUX DISEASE WITHOUT ESOPHAGITIS: ICD-10-CM

## 2024-04-09 DIAGNOSIS — M81.0 OSTEOPOROSIS WITHOUT CURRENT PATHOLOGICAL FRACTURE, UNSPECIFIED OSTEOPOROSIS TYPE: ICD-10-CM

## 2024-04-09 DIAGNOSIS — J44.9 COPD MIXED TYPE (MULTI): Primary | ICD-10-CM

## 2024-04-09 PROCEDURE — 99308 SBSQ NF CARE LOW MDM 20: CPT | Performed by: INTERNAL MEDICINE

## 2024-04-09 NOTE — LETTER
Patient: Alayna Dumont  : 5/10/1932    Encounter Date: 2024    Subjective  Patient ID: Alayna Dumont is a 89 y.o. female who is acute skilled care being seen and evaluated for multiple medical problems.    Alert, pleasant. Denies pain and shortness of breath. Her appetite is good. Ambulates with walker, no recent falls. Compliant with medications.         Review of Systems   Constitutional:  Negative for fever and unexpected weight change.   HENT:  Negative for sore throat.    Respiratory:  Negative for cough and shortness of breath.    Cardiovascular:  Negative for chest pain and palpitations.   Gastrointestinal:  Negative for abdominal pain, constipation, diarrhea, nausea and vomiting.   Genitourinary:  Negative for difficulty urinating and frequency.   Musculoskeletal:  Positive for arthralgias. Negative for back pain.   Skin:  Negative for rash.   Neurological:  Negative for dizziness, seizures and headaches.   Psychiatric/Behavioral:  Negative for agitation. The patient is not nervous/anxious.        Objective  LMP  (LMP Unknown)     Physical Exam  Vitals reviewed.   Constitutional:       General: She is not in acute distress.  HENT:      Mouth/Throat:      Mouth: Mucous membranes are moist.   Cardiovascular:      Rate and Rhythm: Regular rhythm.      Heart sounds: Normal heart sounds.   Pulmonary:      Breath sounds: Normal breath sounds. No rales.   Abdominal:      Palpations: Abdomen is soft.      Tenderness: There is no abdominal tenderness.   Musculoskeletal:         General: No tenderness.      Cervical back: Neck supple. No tenderness.   Neurological:      Mental Status: She is alert.         Assessment/Plan  Problem List Items Addressed This Visit       Anxiety    GERD (gastroesophageal reflux disease)    Hypertension     Will monitor BP         Osteoporosis     Other Visit Diagnoses       COPD mixed type (Multi)    -  Primary             Goals       • Take your medication every day          Will continue physical therapy, fall precautions      Electronically Signed By: Yvrose Fields MD   5/18/24  5:41 PM

## 2024-04-12 ENCOUNTER — NURSING HOME VISIT (OUTPATIENT)
Dept: POST ACUTE CARE | Facility: EXTERNAL LOCATION | Age: 89
End: 2024-04-12
Payer: MEDICARE

## 2024-04-12 DIAGNOSIS — M81.0 OSTEOPOROSIS WITHOUT CURRENT PATHOLOGICAL FRACTURE, UNSPECIFIED OSTEOPOROSIS TYPE: ICD-10-CM

## 2024-04-12 DIAGNOSIS — I10 HYPERTENSION, UNSPECIFIED TYPE: Primary | ICD-10-CM

## 2024-04-12 DIAGNOSIS — F41.1 GAD (GENERALIZED ANXIETY DISORDER): ICD-10-CM

## 2024-04-12 DIAGNOSIS — K21.9 GASTROESOPHAGEAL REFLUX DISEASE WITHOUT ESOPHAGITIS: ICD-10-CM

## 2024-04-12 PROCEDURE — 99308 SBSQ NF CARE LOW MDM 20: CPT | Performed by: INTERNAL MEDICINE

## 2024-04-12 NOTE — LETTER
Patient: Alayna Dumont  : 5/10/1932    Encounter Date: 2024    Subjective  Patient ID: Alayna Dumont is a 89 y.o. female who is acute skilled care being seen and evaluated for multiple medical problems.    Alert, pleasant. Denies pain and shortness of breath. Her appetite is good. Ambulates with walker, no recent falls. Compliant with medications.         Review of Systems   Constitutional:  Negative for fever and unexpected weight change.   HENT:  Negative for sore throat.    Respiratory:  Negative for cough and shortness of breath.    Cardiovascular:  Negative for chest pain and palpitations.   Gastrointestinal:  Negative for abdominal pain, constipation, diarrhea, nausea and vomiting.   Genitourinary:  Negative for difficulty urinating and frequency.   Musculoskeletal:  Positive for arthralgias. Negative for back pain.   Skin:  Negative for rash.   Neurological:  Negative for dizziness, seizures and headaches.   Psychiatric/Behavioral:  Negative for agitation. The patient is not nervous/anxious.        Objective  LMP  (LMP Unknown)     Physical Exam  Vitals reviewed.   Constitutional:       General: She is not in acute distress.  HENT:      Mouth/Throat:      Mouth: Mucous membranes are moist.   Cardiovascular:      Rate and Rhythm: Regular rhythm.      Heart sounds: Normal heart sounds.   Pulmonary:      Breath sounds: Normal breath sounds. No rales.   Abdominal:      Palpations: Abdomen is soft.      Tenderness: There is no abdominal tenderness.   Musculoskeletal:         General: No tenderness.      Cervical back: Neck supple. No tenderness.   Neurological:      Mental Status: She is alert.         Assessment/Plan  Problem List Items Addressed This Visit       LETICIA (generalized anxiety disorder)    GERD (gastroesophageal reflux disease)    Hypertension - Primary     Will monitor BP         Osteoporosis        Goals       • Take your medication every day         Continue physical therapy, fall  precautions      Electronically Signed By: Yvrose Fields MD   5/18/24  5:42 PM

## 2024-04-15 DIAGNOSIS — I51.9 CARDIOPATHY: Primary | ICD-10-CM

## 2024-04-15 DIAGNOSIS — I50.22 CHRONIC SYSTOLIC CONGESTIVE HEART FAILURE (MULTI): ICD-10-CM

## 2024-04-16 ENCOUNTER — NURSING HOME VISIT (OUTPATIENT)
Dept: POST ACUTE CARE | Facility: EXTERNAL LOCATION | Age: 89
End: 2024-04-16
Payer: MEDICARE

## 2024-04-16 DIAGNOSIS — K21.9 GASTROESOPHAGEAL REFLUX DISEASE WITHOUT ESOPHAGITIS: ICD-10-CM

## 2024-04-16 DIAGNOSIS — I50.30 HEART FAILURE WITH PRESERVED LEFT VENTRICULAR FUNCTION (HFPEF) (MULTI): ICD-10-CM

## 2024-04-16 DIAGNOSIS — M81.0 OSTEOPOROSIS WITHOUT CURRENT PATHOLOGICAL FRACTURE, UNSPECIFIED OSTEOPOROSIS TYPE: ICD-10-CM

## 2024-04-16 DIAGNOSIS — K59.1 FUNCTIONAL DIARRHEA: Primary | ICD-10-CM

## 2024-04-16 PROCEDURE — 99309 SBSQ NF CARE MODERATE MDM 30: CPT | Performed by: INTERNAL MEDICINE

## 2024-04-16 ASSESSMENT — ENCOUNTER SYMPTOMS
BACK PAIN: 0
FREQUENCY: 0
NAUSEA: 0
ARTHRALGIAS: 1
ABDOMINAL PAIN: 0
DIFFICULTY URINATING: 0
SHORTNESS OF BREATH: 0
DIARRHEA: 0
AGITATION: 0
NERVOUS/ANXIOUS: 0
VOMITING: 0
UNEXPECTED WEIGHT CHANGE: 0
COUGH: 0
CONSTIPATION: 0
FEVER: 0
SEIZURES: 0
HEADACHES: 0
DIZZINESS: 0
SORE THROAT: 0
PALPITATIONS: 0

## 2024-04-16 NOTE — PROGRESS NOTES
Subjective   Patient ID: Alayna Dumont is a 89 y.o. female who is acute skilled care being seen and evaluated for multiple medical problems.    Alert, pleasant, complaining of tongue discomfort and coating. Denies pain and shortness of breath. Her appetite is good. Participates in physical therapy, ambulates with walker, no recent falls. Compliant with medications.         Review of Systems   Constitutional:  Negative for fever and unexpected weight change.   HENT:  Negative for sore throat.    Respiratory:  Negative for cough and shortness of breath.    Cardiovascular:  Negative for chest pain and palpitations.   Gastrointestinal:  Negative for abdominal pain, constipation, diarrhea, nausea and vomiting.   Genitourinary:  Negative for difficulty urinating and frequency.   Musculoskeletal:  Positive for arthralgias. Negative for back pain.   Skin:  Negative for rash.   Neurological:  Negative for dizziness, seizures and headaches.   Psychiatric/Behavioral:  Negative for agitation. The patient is not nervous/anxious.        Objective   LMP  (LMP Unknown)     Physical Exam  Vitals reviewed.   Constitutional:       General: She is not in acute distress.  HENT:      Mouth/Throat:      Mouth: Mucous membranes are moist.      Pharynx: No oropharyngeal exudate.   Cardiovascular:      Rate and Rhythm: Regular rhythm.      Heart sounds: Normal heart sounds.   Pulmonary:      Breath sounds: Normal breath sounds. No rales.   Abdominal:      Palpations: Abdomen is soft.      Tenderness: There is no abdominal tenderness.   Musculoskeletal:         General: No tenderness.      Cervical back: Neck supple. No tenderness.   Neurological:      Mental Status: She is alert.         Assessment/Plan   Problem List Items Addressed This Visit       Anxiety    Generalized osteoarthrosis, involving multiple sites    GERD (gastroesophageal reflux disease)     Other Visit Diagnoses       Thrush    -  Primary             Goals        Take your  medication every day         Will start nystatin swish and swallow

## 2024-04-16 NOTE — LETTER
Patient: Alayna Dumont  : 5/10/1932    Encounter Date: 2024    Subjective  Patient ID: Alayna Dumont is a 89 y.o. female who is acute skilled care being seen and evaluated for multiple medical problems.    Alert, sometimes, she is complaining of diarrhea and feeling weak.  Denies pain and shortness of breath. Her appetite is good. Ambulates short distances  with walker. No recent falls. Compliant with medications.         Review of Systems   Constitutional:  Negative for fever and unexpected weight change.   HENT:  Negative for sore throat.    Respiratory:  Negative for cough and shortness of breath.    Cardiovascular:  Negative for chest pain and palpitations.   Gastrointestinal:  Negative for abdominal pain, constipation, diarrhea, nausea and vomiting.   Genitourinary:  Negative for difficulty urinating and frequency.   Musculoskeletal:  Positive for arthralgias. Negative for back pain.   Skin:  Negative for rash.   Neurological:  Negative for dizziness, seizures and headaches.   Psychiatric/Behavioral:  Negative for agitation. The patient is not nervous/anxious.        Objective  LMP  (LMP Unknown)     Physical Exam  Vitals reviewed.   Constitutional:       General: She is not in acute distress.  HENT:      Mouth/Throat:      Mouth: Mucous membranes are moist.   Cardiovascular:      Rate and Rhythm: Regular rhythm.      Heart sounds: Normal heart sounds.   Pulmonary:      Breath sounds: Normal breath sounds. No rales.   Abdominal:      Palpations: Abdomen is soft.      Tenderness: There is no abdominal tenderness.   Musculoskeletal:         General: No tenderness.      Cervical back: Neck supple. No tenderness.   Neurological:      Mental Status: She is alert.         Assessment/Plan  Problem List Items Addressed This Visit       Heart failure with preserved left ventricular function (HFpEF) (Multi)    GERD (gastroesophageal reflux disease)    Osteoporosis     Other Visit Diagnoses       Functional  diarrhea    -  Primary             Goals       • Take your medication every day         Will discontinue laxatives, send stool for C. difficile if diarrhea persist.  Check labs      Electronically Signed By: Yvrose Fields MD   5/18/24  5:45 PM

## 2024-04-18 DIAGNOSIS — I51.9 CARDIOPATHY: Primary | ICD-10-CM

## 2024-04-18 DIAGNOSIS — I50.22 CHRONIC SYSTOLIC CONGESTIVE HEART FAILURE (MULTI): ICD-10-CM

## 2024-04-19 ENCOUNTER — NURSING HOME VISIT (OUTPATIENT)
Dept: POST ACUTE CARE | Facility: EXTERNAL LOCATION | Age: 89
End: 2024-04-19
Payer: MEDICARE

## 2024-04-19 DIAGNOSIS — H91.90 HEARING LOSS, UNSPECIFIED HEARING LOSS TYPE, UNSPECIFIED LATERALITY: ICD-10-CM

## 2024-04-19 DIAGNOSIS — K21.9 GASTROESOPHAGEAL REFLUX DISEASE WITHOUT ESOPHAGITIS: ICD-10-CM

## 2024-04-19 DIAGNOSIS — J44.1 COPD EXACERBATION (MULTI): ICD-10-CM

## 2024-04-19 DIAGNOSIS — M81.0 OSTEOPOROSIS WITHOUT CURRENT PATHOLOGICAL FRACTURE, UNSPECIFIED OSTEOPOROSIS TYPE: Primary | ICD-10-CM

## 2024-04-19 PROCEDURE — 99308 SBSQ NF CARE LOW MDM 20: CPT | Performed by: INTERNAL MEDICINE

## 2024-04-19 NOTE — LETTER
Patient: Alayna Dumont  : 5/10/1932    Encounter Date: 2024    Subjective  Patient ID: Alayna Dumont is a 89 y.o. female who is acute skilled care being seen and evaluated for multiple medical problems.    Alert, pleasant. Her appetite is good. Ambulates short distances with walker. No recent falls. Compliant with medications.         Review of Systems   Constitutional:  Negative for fever and unexpected weight change.   HENT:  Negative for sore throat.    Respiratory:  Negative for cough and shortness of breath.    Cardiovascular:  Negative for chest pain and palpitations.   Gastrointestinal:  Negative for abdominal pain, constipation, diarrhea, nausea and vomiting.   Genitourinary:  Negative for difficulty urinating and frequency.   Musculoskeletal:  Positive for arthralgias. Negative for back pain.   Skin:  Negative for rash.   Neurological:  Negative for dizziness, seizures and headaches.   Psychiatric/Behavioral:  Negative for agitation. The patient is not nervous/anxious.        Objective  LMP  (LMP Unknown)     Physical Exam  Vitals reviewed.   Constitutional:       General: She is not in acute distress.  HENT:      Mouth/Throat:      Mouth: Mucous membranes are moist.   Cardiovascular:      Rate and Rhythm: Regular rhythm.      Heart sounds: Normal heart sounds.   Pulmonary:      Breath sounds: Normal breath sounds. No rales.   Abdominal:      Palpations: Abdomen is soft.      Tenderness: There is no abdominal tenderness.   Musculoskeletal:         General: No tenderness.      Cervical back: Neck supple. No tenderness.   Neurological:      Mental Status: She is alert.         Assessment/Plan  Problem List Items Addressed This Visit       COPD exacerbation (Multi)    GERD (gastroesophageal reflux disease)    Osteoporosis - Primary    Hearing loss        Goals       • Take your medication every day         Continue physical therapy, fall precautions      Electronically Signed By: Yvrose Fields MD    5/18/24  5:46 PM

## 2024-04-23 ENCOUNTER — NURSING HOME VISIT (OUTPATIENT)
Dept: POST ACUTE CARE | Facility: EXTERNAL LOCATION | Age: 89
End: 2024-04-23
Payer: MEDICARE

## 2024-04-23 DIAGNOSIS — I50.30 HEART FAILURE WITH PRESERVED LEFT VENTRICULAR FUNCTION (HFPEF) (MULTI): Primary | ICD-10-CM

## 2024-04-23 DIAGNOSIS — K21.9 GASTROESOPHAGEAL REFLUX DISEASE WITHOUT ESOPHAGITIS: ICD-10-CM

## 2024-04-23 DIAGNOSIS — M15.9 GENERALIZED OSTEOARTHROSIS, INVOLVING MULTIPLE SITES: ICD-10-CM

## 2024-04-23 PROCEDURE — 99308 SBSQ NF CARE LOW MDM 20: CPT | Performed by: INTERNAL MEDICINE

## 2024-04-23 ASSESSMENT — ENCOUNTER SYMPTOMS
FREQUENCY: 0
DIFFICULTY URINATING: 0
ABDOMINAL PAIN: 0
SORE THROAT: 0
DIARRHEA: 0
NERVOUS/ANXIOUS: 0
SORE THROAT: 0
SHORTNESS OF BREATH: 0
FREQUENCY: 0
VOMITING: 0
BACK PAIN: 0
DIFFICULTY URINATING: 0
BACK PAIN: 0
COUGH: 0
NERVOUS/ANXIOUS: 0
FEVER: 0
ABDOMINAL PAIN: 0
ABDOMINAL PAIN: 0
HEADACHES: 0
UNEXPECTED WEIGHT CHANGE: 0
NAUSEA: 0
ABDOMINAL PAIN: 0
CONSTIPATION: 0
BACK PAIN: 0
SHORTNESS OF BREATH: 0
COUGH: 0
PALPITATIONS: 0
ARTHRALGIAS: 1
DIARRHEA: 0
VOMITING: 0
ARTHRALGIAS: 1
PALPITATIONS: 0
FEVER: 0
CONSTIPATION: 0
ARTHRALGIAS: 1
FREQUENCY: 0
DIZZINESS: 0
FEVER: 0
NERVOUS/ANXIOUS: 0
UNEXPECTED WEIGHT CHANGE: 0
DIZZINESS: 0
SEIZURES: 0
VOMITING: 0
AGITATION: 0
NAUSEA: 0
SORE THROAT: 0
SORE THROAT: 0
UNEXPECTED WEIGHT CHANGE: 0
SHORTNESS OF BREATH: 0
BACK PAIN: 0
NAUSEA: 0
VOMITING: 0
UNEXPECTED WEIGHT CHANGE: 0
DIFFICULTY URINATING: 0
SEIZURES: 0
ARTHRALGIAS: 1
SEIZURES: 0
SHORTNESS OF BREATH: 0
AGITATION: 0
NAUSEA: 0
PALPITATIONS: 0
COUGH: 0
CONSTIPATION: 0
PALPITATIONS: 0
AGITATION: 0
DIARRHEA: 0
FEVER: 0
HEADACHES: 0
AGITATION: 0
FREQUENCY: 0
DIZZINESS: 0
COUGH: 0
DIFFICULTY URINATING: 0
CONSTIPATION: 0
DIZZINESS: 0
DIARRHEA: 0
HEADACHES: 0
SEIZURES: 0
NERVOUS/ANXIOUS: 0
HEADACHES: 0

## 2024-04-23 NOTE — PROGRESS NOTES
Subjective   Patient ID: Alayna Dumont is a 89 y.o. female who is acute skilled care being seen and evaluated for multiple medical problems.    Alert, pleasant. Denies pain and shortness of breath. Her appetite is good. Ambulates with walker, no recent falls. Compliant with medications.         Review of Systems   Constitutional:  Negative for fever and unexpected weight change.   HENT:  Negative for sore throat.    Respiratory:  Negative for cough and shortness of breath.    Cardiovascular:  Negative for chest pain and palpitations.   Gastrointestinal:  Negative for abdominal pain, constipation, diarrhea, nausea and vomiting.   Genitourinary:  Negative for difficulty urinating and frequency.   Musculoskeletal:  Positive for arthralgias. Negative for back pain.   Skin:  Negative for rash.   Neurological:  Negative for dizziness, seizures and headaches.   Psychiatric/Behavioral:  Negative for agitation. The patient is not nervous/anxious.        Objective   LMP  (LMP Unknown)     Physical Exam  Vitals reviewed.   Constitutional:       General: She is not in acute distress.  HENT:      Mouth/Throat:      Mouth: Mucous membranes are moist.   Cardiovascular:      Rate and Rhythm: Regular rhythm.      Heart sounds: Normal heart sounds.   Pulmonary:      Breath sounds: Normal breath sounds. No rales.   Abdominal:      Palpations: Abdomen is soft.      Tenderness: There is no abdominal tenderness.   Musculoskeletal:         General: No tenderness.      Cervical back: Neck supple. No tenderness.   Neurological:      Mental Status: She is alert.         Assessment/Plan   Problem List Items Addressed This Visit       Anxiety    GERD (gastroesophageal reflux disease)    Hypertension     Will monitor BP         Osteoporosis     Other Visit Diagnoses       COPD mixed type (Multi)    -  Primary             Goals        Take your medication every day         Will continue physical therapy, fall precautions

## 2024-04-23 NOTE — PROGRESS NOTES
Subjective   Patient ID: Alayna Dumont is a 89 y.o. female who is acute skilled care being seen and evaluated for multiple medical problems.    Alert, pleasant. Her appetite is good. Ambulates short distances with walker. No recent falls. Compliant with medications.         Review of Systems   Constitutional:  Negative for fever and unexpected weight change.   HENT:  Negative for sore throat.    Respiratory:  Negative for cough and shortness of breath.    Cardiovascular:  Negative for chest pain and palpitations.   Gastrointestinal:  Negative for abdominal pain, constipation, diarrhea, nausea and vomiting.   Genitourinary:  Negative for difficulty urinating and frequency.   Musculoskeletal:  Positive for arthralgias. Negative for back pain.   Skin:  Negative for rash.   Neurological:  Negative for dizziness, seizures and headaches.   Psychiatric/Behavioral:  Negative for agitation. The patient is not nervous/anxious.        Objective   LMP  (LMP Unknown)     Physical Exam  Vitals reviewed.   Constitutional:       General: She is not in acute distress.  HENT:      Mouth/Throat:      Mouth: Mucous membranes are moist.   Cardiovascular:      Rate and Rhythm: Regular rhythm.      Heart sounds: Normal heart sounds.   Pulmonary:      Breath sounds: Normal breath sounds. No rales.   Abdominal:      Palpations: Abdomen is soft.      Tenderness: There is no abdominal tenderness.   Musculoskeletal:         General: No tenderness.      Cervical back: Neck supple. No tenderness.   Neurological:      Mental Status: She is alert.         Assessment/Plan   Problem List Items Addressed This Visit       COPD exacerbation (Multi)    GERD (gastroesophageal reflux disease)    Osteoporosis - Primary    Hearing loss        Goals        Take your medication every day         Continue physical therapy, fall precautions

## 2024-04-23 NOTE — PROGRESS NOTES
Subjective   Patient ID: Alayna Dumont is a 89 y.o. female who is acute skilled care being seen and evaluated for multiple medical problems.    Alert, sometimes, she is complaining of diarrhea and feeling weak.  Denies pain and shortness of breath. Her appetite is good. Ambulates short distances  with walker. No recent falls. Compliant with medications.         Review of Systems   Constitutional:  Negative for fever and unexpected weight change.   HENT:  Negative for sore throat.    Respiratory:  Negative for cough and shortness of breath.    Cardiovascular:  Negative for chest pain and palpitations.   Gastrointestinal:  Negative for abdominal pain, constipation, diarrhea, nausea and vomiting.   Genitourinary:  Negative for difficulty urinating and frequency.   Musculoskeletal:  Positive for arthralgias. Negative for back pain.   Skin:  Negative for rash.   Neurological:  Negative for dizziness, seizures and headaches.   Psychiatric/Behavioral:  Negative for agitation. The patient is not nervous/anxious.        Objective   LMP  (LMP Unknown)     Physical Exam  Vitals reviewed.   Constitutional:       General: She is not in acute distress.  HENT:      Mouth/Throat:      Mouth: Mucous membranes are moist.   Cardiovascular:      Rate and Rhythm: Regular rhythm.      Heart sounds: Normal heart sounds.   Pulmonary:      Breath sounds: Normal breath sounds. No rales.   Abdominal:      Palpations: Abdomen is soft.      Tenderness: There is no abdominal tenderness.   Musculoskeletal:         General: No tenderness.      Cervical back: Neck supple. No tenderness.   Neurological:      Mental Status: She is alert.         Assessment/Plan   Problem List Items Addressed This Visit       Heart failure with preserved left ventricular function (HFpEF) (Multi)    GERD (gastroesophageal reflux disease)    Osteoporosis     Other Visit Diagnoses       Functional diarrhea    -  Primary             Goals        Take your medication  every day         Will discontinue laxatives, send stool for C. difficile if diarrhea persist.  Check labs

## 2024-04-23 NOTE — LETTER
Patient: Alayna Dumont  : 5/10/1932    Encounter Date: 2024    Subjective  Patient ID: Alayna Dumont is a 89 y.o. female who is acute skilled care being seen and evaluated for multiple medical problems.    Alert, pleasant. Denies any pain. Her appetite is fair.  He is complaining of bloating and constipation.  She is noted to have weight gain.  Ambulates short distances with walker. No recent falls.         Review of Systems   Constitutional:  Negative for fever and unexpected weight change.   HENT:  Negative for sore throat.    Respiratory:  Negative for cough and shortness of breath.    Cardiovascular:  Negative for chest pain and palpitations.   Gastrointestinal:  Negative for abdominal pain, constipation, diarrhea, nausea and vomiting.   Genitourinary:  Negative for difficulty urinating and frequency.   Musculoskeletal:  Positive for arthralgias. Negative for back pain.   Skin:  Negative for rash.   Neurological:  Negative for dizziness, seizures and headaches.   Psychiatric/Behavioral:  Negative for agitation. The patient is not nervous/anxious.        Objective  LMP  (LMP Unknown)     Physical Exam  Vitals reviewed.   Constitutional:       General: She is not in acute distress.  HENT:      Mouth/Throat:      Mouth: Mucous membranes are moist.   Cardiovascular:      Rate and Rhythm: Regular rhythm.      Heart sounds: Normal heart sounds.   Pulmonary:      Breath sounds: Normal breath sounds. No rales.   Abdominal:      Palpations: Abdomen is soft.      Tenderness: There is no abdominal tenderness.   Musculoskeletal:         General: No tenderness.      Cervical back: Neck supple. No tenderness.      Right lower leg: Edema present.      Left lower leg: Edema present.   Skin:     General: Skin is warm.   Neurological:      General: No focal deficit present.      Mental Status: She is alert.   Psychiatric:         Behavior: Behavior normal.         Assessment/Plan  Problem List Items Addressed This  Visit       Heart failure with preserved left ventricular function (HFpEF) (Multi) - Primary    Generalized osteoarthrosis, involving multiple sites    GERD (gastroesophageal reflux disease)     Will resume stool softener, increase the spironolactone dose, check labs      Electronically Signed By: Yvrose Fields MD   5/18/24  5:49 PM

## 2024-04-23 NOTE — PROGRESS NOTES
Subjective   Patient ID: Alayna Dumont is a 89 y.o. female who is acute skilled care being seen and evaluated for multiple medical problems.    Alert, pleasant. Denies pain and shortness of breath. Her appetite is good. Ambulates with walker, no recent falls. Compliant with medications.         Review of Systems   Constitutional:  Negative for fever and unexpected weight change.   HENT:  Negative for sore throat.    Respiratory:  Negative for cough and shortness of breath.    Cardiovascular:  Negative for chest pain and palpitations.   Gastrointestinal:  Negative for abdominal pain, constipation, diarrhea, nausea and vomiting.   Genitourinary:  Negative for difficulty urinating and frequency.   Musculoskeletal:  Positive for arthralgias. Negative for back pain.   Skin:  Negative for rash.   Neurological:  Negative for dizziness, seizures and headaches.   Psychiatric/Behavioral:  Negative for agitation. The patient is not nervous/anxious.        Objective   LMP  (LMP Unknown)     Physical Exam  Vitals reviewed.   Constitutional:       General: She is not in acute distress.  HENT:      Mouth/Throat:      Mouth: Mucous membranes are moist.   Cardiovascular:      Rate and Rhythm: Regular rhythm.      Heart sounds: Normal heart sounds.   Pulmonary:      Breath sounds: Normal breath sounds. No rales.   Abdominal:      Palpations: Abdomen is soft.      Tenderness: There is no abdominal tenderness.   Musculoskeletal:         General: No tenderness.      Cervical back: Neck supple. No tenderness.   Neurological:      Mental Status: She is alert.         Assessment/Plan   Problem List Items Addressed This Visit       LETICIA (generalized anxiety disorder)    GERD (gastroesophageal reflux disease)    Hypertension - Primary     Will monitor BP         Osteoporosis        Goals        Take your medication every day         Continue physical therapy, fall precautions

## 2024-04-25 ENCOUNTER — CLINICAL SUPPORT (OUTPATIENT)
Dept: EMERGENCY MEDICINE | Facility: HOSPITAL | Age: 89
DRG: 291 | End: 2024-04-25
Payer: MEDICARE

## 2024-04-25 ENCOUNTER — APPOINTMENT (OUTPATIENT)
Dept: RADIOLOGY | Facility: HOSPITAL | Age: 89
DRG: 291 | End: 2024-04-25
Payer: MEDICARE

## 2024-04-25 ENCOUNTER — HOSPITAL ENCOUNTER (INPATIENT)
Facility: HOSPITAL | Age: 89
LOS: 9 days | Discharge: NURSING, REHAB OR LONG-TERM CARE | DRG: 291 | End: 2024-05-04
Attending: EMERGENCY MEDICINE | Admitting: NURSE PRACTITIONER
Payer: MEDICARE

## 2024-04-25 DIAGNOSIS — M17.0 PRIMARY OSTEOARTHRITIS OF BOTH KNEES: ICD-10-CM

## 2024-04-25 DIAGNOSIS — I50.21 ACUTE SYSTOLIC HEART FAILURE (MULTI): ICD-10-CM

## 2024-04-25 DIAGNOSIS — R06.00 DYSPNEA, UNSPECIFIED TYPE: Primary | ICD-10-CM

## 2024-04-25 DIAGNOSIS — I42.2 HYPERTROPHIC CARDIOMYOPATHY (MULTI): ICD-10-CM

## 2024-04-25 DIAGNOSIS — I50.33 ACUTE ON CHRONIC DIASTOLIC CONGESTIVE HEART FAILURE (MULTI): ICD-10-CM

## 2024-04-25 DIAGNOSIS — I50.30 HEART FAILURE WITH PRESERVED LEFT VENTRICULAR FUNCTION (HFPEF) (MULTI): ICD-10-CM

## 2024-04-25 PROBLEM — N18.30 CKD (CHRONIC KIDNEY DISEASE) STAGE 3, GFR 30-59 ML/MIN (MULTI): Status: ACTIVE | Noted: 2018-11-06

## 2024-04-25 LAB
ALBUMIN SERPL BCP-MCNC: 3.6 G/DL (ref 3.4–5)
ALP SERPL-CCNC: 66 U/L (ref 33–136)
ALT SERPL W P-5'-P-CCNC: 19 U/L (ref 7–45)
ANION GAP SERPL CALC-SCNC: 14 MMOL/L (ref 10–20)
AST SERPL W P-5'-P-CCNC: 20 U/L (ref 9–39)
BASOPHILS # BLD AUTO: 0.03 X10*3/UL (ref 0–0.1)
BASOPHILS NFR BLD AUTO: 0.4 %
BILIRUB SERPL-MCNC: 0.3 MG/DL (ref 0–1.2)
BNP SERPL-MCNC: 952 PG/ML (ref 0–99)
BUN SERPL-MCNC: 40 MG/DL (ref 6–23)
CALCIUM SERPL-MCNC: 9.1 MG/DL (ref 8.6–10.6)
CARDIAC TROPONIN I PNL SERPL HS: 29 NG/L (ref 0–34)
CARDIAC TROPONIN I PNL SERPL HS: 35 NG/L (ref 0–34)
CHLORIDE SERPL-SCNC: 98 MMOL/L (ref 98–107)
CO2 SERPL-SCNC: 26 MMOL/L (ref 21–32)
CREAT SERPL-MCNC: 1.12 MG/DL (ref 0.5–1.05)
EGFRCR SERPLBLD CKD-EPI 2021: 47 ML/MIN/1.73M*2
EOSINOPHIL # BLD AUTO: 0.19 X10*3/UL (ref 0–0.4)
EOSINOPHIL NFR BLD AUTO: 2.5 %
ERYTHROCYTE [DISTWIDTH] IN BLOOD BY AUTOMATED COUNT: 16.3 % (ref 11.5–14.5)
EST. AVERAGE GLUCOSE BLD GHB EST-MCNC: 128 MG/DL
GLUCOSE BLD MANUAL STRIP-MCNC: 97 MG/DL (ref 74–99)
GLUCOSE SERPL-MCNC: 94 MG/DL (ref 74–99)
HBA1C MFR BLD: 6.1 %
HCT VFR BLD AUTO: 28.6 % (ref 36–46)
HGB BLD-MCNC: 9.5 G/DL (ref 12–16)
IMM GRANULOCYTES # BLD AUTO: 0.18 X10*3/UL (ref 0–0.5)
IMM GRANULOCYTES NFR BLD AUTO: 2.3 % (ref 0–0.9)
LYMPHOCYTES # BLD AUTO: 1.1 X10*3/UL (ref 0.8–3)
LYMPHOCYTES NFR BLD AUTO: 14.3 %
MCH RBC QN AUTO: 29.3 PG (ref 26–34)
MCHC RBC AUTO-ENTMCNC: 33.2 G/DL (ref 32–36)
MCV RBC AUTO: 88 FL (ref 80–100)
MONOCYTES # BLD AUTO: 1.05 X10*3/UL (ref 0.05–0.8)
MONOCYTES NFR BLD AUTO: 13.6 %
NEUTROPHILS # BLD AUTO: 5.15 X10*3/UL (ref 1.6–5.5)
NEUTROPHILS NFR BLD AUTO: 66.9 %
NRBC BLD-RTO: 0 /100 WBCS (ref 0–0)
PLATELET # BLD AUTO: 336 X10*3/UL (ref 150–450)
POTASSIUM SERPL-SCNC: 5.4 MMOL/L (ref 3.5–5.3)
PROT SERPL-MCNC: 6.5 G/DL (ref 6.4–8.2)
RBC # BLD AUTO: 3.24 X10*6/UL (ref 4–5.2)
SODIUM SERPL-SCNC: 133 MMOL/L (ref 136–145)
WBC # BLD AUTO: 7.7 X10*3/UL (ref 4.4–11.3)

## 2024-04-25 PROCEDURE — 80053 COMPREHEN METABOLIC PANEL: CPT

## 2024-04-25 PROCEDURE — 1200000002 HC GENERAL ROOM WITH TELEMETRY DAILY

## 2024-04-25 PROCEDURE — 84484 ASSAY OF TROPONIN QUANT: CPT

## 2024-04-25 PROCEDURE — 2500000004 HC RX 250 GENERAL PHARMACY W/ HCPCS (ALT 636 FOR OP/ED)

## 2024-04-25 PROCEDURE — 99285 EMERGENCY DEPT VISIT HI MDM: CPT | Mod: 25

## 2024-04-25 PROCEDURE — 96374 THER/PROPH/DIAG INJ IV PUSH: CPT

## 2024-04-25 PROCEDURE — 36415 COLL VENOUS BLD VENIPUNCTURE: CPT

## 2024-04-25 PROCEDURE — 2500000001 HC RX 250 WO HCPCS SELF ADMINISTERED DRUGS (ALT 637 FOR MEDICARE OP): Performed by: NURSE PRACTITIONER

## 2024-04-25 PROCEDURE — 99223 1ST HOSP IP/OBS HIGH 75: CPT | Performed by: NURSE PRACTITIONER

## 2024-04-25 PROCEDURE — 82947 ASSAY GLUCOSE BLOOD QUANT: CPT

## 2024-04-25 PROCEDURE — 2500000006 HC RX 250 W HCPCS SELF ADMINISTERED DRUGS (ALT 637 FOR ALL PAYERS): Mod: MUE | Performed by: NURSE PRACTITIONER

## 2024-04-25 PROCEDURE — 2500000006 HC RX 250 W HCPCS SELF ADMINISTERED DRUGS (ALT 637 FOR ALL PAYERS): Performed by: NURSE PRACTITIONER

## 2024-04-25 PROCEDURE — 93005 ELECTROCARDIOGRAM TRACING: CPT

## 2024-04-25 PROCEDURE — 71045 X-RAY EXAM CHEST 1 VIEW: CPT

## 2024-04-25 PROCEDURE — 85025 COMPLETE CBC W/AUTO DIFF WBC: CPT

## 2024-04-25 PROCEDURE — 83036 HEMOGLOBIN GLYCOSYLATED A1C: CPT | Performed by: NURSE PRACTITIONER

## 2024-04-25 PROCEDURE — 83880 ASSAY OF NATRIURETIC PEPTIDE: CPT

## 2024-04-25 PROCEDURE — 99285 EMERGENCY DEPT VISIT HI MDM: CPT | Performed by: EMERGENCY MEDICINE

## 2024-04-25 PROCEDURE — 2500000004 HC RX 250 GENERAL PHARMACY W/ HCPCS (ALT 636 FOR OP/ED): Performed by: NURSE PRACTITIONER

## 2024-04-25 PROCEDURE — 71045 X-RAY EXAM CHEST 1 VIEW: CPT | Mod: FOREIGN READ | Performed by: RADIOLOGY

## 2024-04-25 RX ORDER — SPIRONOLACTONE 25 MG/1
12.5 TABLET ORAL DAILY
Status: DISCONTINUED | OUTPATIENT
Start: 2024-04-26 | End: 2024-05-04 | Stop reason: HOSPADM

## 2024-04-25 RX ORDER — DEXTROSE 50 % IN WATER (D50W) INTRAVENOUS SYRINGE
25
Status: DISCONTINUED | OUTPATIENT
Start: 2024-04-25 | End: 2024-05-04 | Stop reason: HOSPADM

## 2024-04-25 RX ORDER — DEXTROSE 50 % IN WATER (D50W) INTRAVENOUS SYRINGE
12.5
Status: DISCONTINUED | OUTPATIENT
Start: 2024-04-25 | End: 2024-05-04 | Stop reason: HOSPADM

## 2024-04-25 RX ORDER — TRAMADOL HYDROCHLORIDE 50 MG/1
100 TABLET ORAL 2 TIMES DAILY PRN
Status: DISCONTINUED | OUTPATIENT
Start: 2024-04-25 | End: 2024-05-04 | Stop reason: HOSPADM

## 2024-04-25 RX ORDER — MULTIVIT-MIN/IRON FUM/FOLIC AC 7.5 MG-4
1 TABLET ORAL DAILY
Status: DISCONTINUED | OUTPATIENT
Start: 2024-04-25 | End: 2024-05-04 | Stop reason: HOSPADM

## 2024-04-25 RX ORDER — GABAPENTIN 100 MG/1
100 CAPSULE ORAL 2 TIMES DAILY
Status: DISCONTINUED | OUTPATIENT
Start: 2024-04-25 | End: 2024-05-04 | Stop reason: HOSPADM

## 2024-04-25 RX ORDER — ASCORBIC ACID 500 MG
500 TABLET ORAL DAILY
Status: DISCONTINUED | OUTPATIENT
Start: 2024-04-26 | End: 2024-05-04 | Stop reason: HOSPADM

## 2024-04-25 RX ORDER — FERROUS SULFATE 325(65) MG
65 TABLET ORAL
Status: DISCONTINUED | OUTPATIENT
Start: 2024-04-26 | End: 2024-05-04 | Stop reason: HOSPADM

## 2024-04-25 RX ORDER — PANTOPRAZOLE SODIUM 20 MG/1
20 TABLET, DELAYED RELEASE ORAL DAILY
Status: DISCONTINUED | OUTPATIENT
Start: 2024-04-26 | End: 2024-05-04 | Stop reason: HOSPADM

## 2024-04-25 RX ORDER — ATORVASTATIN CALCIUM 20 MG/1
20 TABLET, FILM COATED ORAL NIGHTLY
Status: DISCONTINUED | OUTPATIENT
Start: 2024-04-25 | End: 2024-04-26

## 2024-04-25 RX ORDER — ALLOPURINOL 100 MG/1
100 TABLET ORAL DAILY
Status: DISCONTINUED | OUTPATIENT
Start: 2024-04-26 | End: 2024-05-04 | Stop reason: HOSPADM

## 2024-04-25 RX ORDER — FUROSEMIDE 10 MG/ML
40 INJECTION INTRAMUSCULAR; INTRAVENOUS ONCE
Status: COMPLETED | OUTPATIENT
Start: 2024-04-25 | End: 2024-04-25

## 2024-04-25 RX ORDER — CHOLECALCIFEROL (VITAMIN D3) 25 MCG
1000 TABLET ORAL DAILY
Status: DISCONTINUED | OUTPATIENT
Start: 2024-04-26 | End: 2024-05-04 | Stop reason: HOSPADM

## 2024-04-25 RX ORDER — IPRATROPIUM BROMIDE AND ALBUTEROL SULFATE 2.5; .5 MG/3ML; MG/3ML
3 SOLUTION RESPIRATORY (INHALATION) EVERY 4 HOURS PRN
Status: DISCONTINUED | OUTPATIENT
Start: 2024-04-25 | End: 2024-05-04 | Stop reason: HOSPADM

## 2024-04-25 RX ORDER — HEPARIN SODIUM 5000 [USP'U]/ML
5000 INJECTION, SOLUTION INTRAVENOUS; SUBCUTANEOUS EVERY 8 HOURS
Status: DISCONTINUED | OUTPATIENT
Start: 2024-04-25 | End: 2024-04-26

## 2024-04-25 RX ORDER — METOPROLOL TARTRATE 50 MG/1
25 TABLET ORAL 2 TIMES DAILY
Status: DISCONTINUED | OUTPATIENT
Start: 2024-04-25 | End: 2024-05-04 | Stop reason: HOSPADM

## 2024-04-25 RX ORDER — AMOXICILLIN 250 MG
2 CAPSULE ORAL 2 TIMES DAILY
Status: DISCONTINUED | OUTPATIENT
Start: 2024-04-25 | End: 2024-05-04 | Stop reason: HOSPADM

## 2024-04-25 RX ORDER — ACETAMINOPHEN 325 MG/1
975 TABLET ORAL ONCE
Status: COMPLETED | OUTPATIENT
Start: 2024-04-25 | End: 2024-04-25

## 2024-04-25 RX ORDER — TALC
3 POWDER (GRAM) TOPICAL NIGHTLY PRN
Status: DISCONTINUED | OUTPATIENT
Start: 2024-04-25 | End: 2024-05-04 | Stop reason: HOSPADM

## 2024-04-25 RX ORDER — BACLOFEN 10 MG/1
5 TABLET ORAL NIGHTLY
Status: DISCONTINUED | OUTPATIENT
Start: 2024-04-25 | End: 2024-05-01

## 2024-04-25 RX ORDER — DULOXETIN HYDROCHLORIDE 30 MG/1
30 CAPSULE, DELAYED RELEASE ORAL DAILY
Status: DISCONTINUED | OUTPATIENT
Start: 2024-04-26 | End: 2024-05-04 | Stop reason: HOSPADM

## 2024-04-25 RX ORDER — FUROSEMIDE 10 MG/ML
40 INJECTION INTRAMUSCULAR; INTRAVENOUS
Status: DISCONTINUED | OUTPATIENT
Start: 2024-04-26 | End: 2024-04-26

## 2024-04-25 RX ORDER — FLUTICASONE FUROATE AND VILANTEROL 200; 25 UG/1; UG/1
1 POWDER RESPIRATORY (INHALATION) DAILY
Status: DISCONTINUED | OUTPATIENT
Start: 2024-04-25 | End: 2024-05-04 | Stop reason: HOSPADM

## 2024-04-25 RX ADMIN — ATORVASTATIN CALCIUM 20 MG: 20 TABLET, FILM COATED ORAL at 20:59

## 2024-04-25 RX ADMIN — Medication 1 TABLET: at 18:29

## 2024-04-25 RX ADMIN — GABAPENTIN 100 MG: 100 CAPSULE ORAL at 20:59

## 2024-04-25 RX ADMIN — SODIUM ZIRCONIUM CYCLOSILICATE 10 G: 10 POWDER, FOR SUSPENSION ORAL at 18:29

## 2024-04-25 RX ADMIN — ACETAMINOPHEN 975 MG: 325 TABLET ORAL at 13:37

## 2024-04-25 RX ADMIN — FUROSEMIDE 40 MG: 10 INJECTION, SOLUTION INTRAVENOUS at 13:38

## 2024-04-25 RX ADMIN — SENNOSIDES AND DOCUSATE SODIUM 2 TABLET: 8.6; 5 TABLET ORAL at 20:59

## 2024-04-25 RX ADMIN — HEPARIN SODIUM 5000 UNITS: 5000 INJECTION INTRAVENOUS; SUBCUTANEOUS at 18:29

## 2024-04-25 RX ADMIN — BACLOFEN 5 MG: 10 TABLET ORAL at 23:09

## 2024-04-25 RX ADMIN — METOPROLOL TARTRATE 25 MG: 50 TABLET, FILM COATED ORAL at 20:59

## 2024-04-25 SDOH — SOCIAL STABILITY: SOCIAL INSECURITY: HAS ANYONE EVER THREATENED TO HURT YOUR FAMILY OR YOUR PETS?: NO

## 2024-04-25 SDOH — SOCIAL STABILITY: SOCIAL INSECURITY: DO YOU FEEL ANYONE HAS EXPLOITED OR TAKEN ADVANTAGE OF YOU FINANCIALLY OR OF YOUR PERSONAL PROPERTY?: NO

## 2024-04-25 SDOH — SOCIAL STABILITY: SOCIAL INSECURITY: ABUSE: ADULT

## 2024-04-25 SDOH — SOCIAL STABILITY: SOCIAL INSECURITY: ARE YOU OR HAVE YOU BEEN THREATENED OR ABUSED PHYSICALLY, EMOTIONALLY, OR SEXUALLY BY ANYONE?: NO

## 2024-04-25 SDOH — SOCIAL STABILITY: SOCIAL INSECURITY: HAVE YOU HAD THOUGHTS OF HARMING ANYONE ELSE?: NO

## 2024-04-25 SDOH — SOCIAL STABILITY: SOCIAL INSECURITY: WERE YOU ABLE TO COMPLETE ALL THE BEHAVIORAL HEALTH SCREENINGS?: YES

## 2024-04-25 SDOH — SOCIAL STABILITY: SOCIAL INSECURITY: DO YOU FEEL UNSAFE GOING BACK TO THE PLACE WHERE YOU ARE LIVING?: NO

## 2024-04-25 SDOH — SOCIAL STABILITY: SOCIAL INSECURITY: ARE THERE ANY APPARENT SIGNS OF INJURIES/BEHAVIORS THAT COULD BE RELATED TO ABUSE/NEGLECT?: NO

## 2024-04-25 SDOH — SOCIAL STABILITY: SOCIAL INSECURITY: DOES ANYONE TRY TO KEEP YOU FROM HAVING/CONTACTING OTHER FRIENDS OR DOING THINGS OUTSIDE YOUR HOME?: NO

## 2024-04-25 SDOH — SOCIAL STABILITY: SOCIAL INSECURITY: HAVE YOU HAD ANY THOUGHTS OF HARMING ANYONE ELSE?: NO

## 2024-04-25 ASSESSMENT — ACTIVITIES OF DAILY LIVING (ADL)
ADEQUATE_TO_COMPLETE_ADL: YES
HEARING - LEFT EAR: FUNCTIONAL
FEEDING YOURSELF: INDEPENDENT
PATIENT'S MEMORY ADEQUATE TO SAFELY COMPLETE DAILY ACTIVITIES?: YES
ASSISTIVE_DEVICE: WHEELCHAIR
WALKS IN HOME: NEEDS ASSISTANCE
DRESSING YOURSELF: NEEDS ASSISTANCE
BATHING: NEEDS ASSISTANCE
JUDGMENT_ADEQUATE_SAFELY_COMPLETE_DAILY_ACTIVITIES: YES
TOILETING: NEEDS ASSISTANCE
GROOMING: NEEDS ASSISTANCE
HEARING - RIGHT EAR: FUNCTIONAL
LACK_OF_TRANSPORTATION: NO

## 2024-04-25 ASSESSMENT — PATIENT HEALTH QUESTIONNAIRE - PHQ9
1. LITTLE INTEREST OR PLEASURE IN DOING THINGS: NOT AT ALL
1. LITTLE INTEREST OR PLEASURE IN DOING THINGS: NOT AT ALL
2. FEELING DOWN, DEPRESSED OR HOPELESS: NOT AT ALL
2. FEELING DOWN, DEPRESSED OR HOPELESS: NOT AT ALL
SUM OF ALL RESPONSES TO PHQ9 QUESTIONS 1 & 2: 0
SUM OF ALL RESPONSES TO PHQ9 QUESTIONS 1 & 2: 0

## 2024-04-25 ASSESSMENT — COLUMBIA-SUICIDE SEVERITY RATING SCALE - C-SSRS
1. IN THE PAST MONTH, HAVE YOU WISHED YOU WERE DEAD OR WISHED YOU COULD GO TO SLEEP AND NOT WAKE UP?: NO
6. HAVE YOU EVER DONE ANYTHING, STARTED TO DO ANYTHING, OR PREPARED TO DO ANYTHING TO END YOUR LIFE?: NO
2. HAVE YOU ACTUALLY HAD ANY THOUGHTS OF KILLING YOURSELF?: NO

## 2024-04-25 ASSESSMENT — LIFESTYLE VARIABLES
HOW OFTEN DO YOU HAVE A DRINK CONTAINING ALCOHOL: NEVER
HOW OFTEN DO YOU HAVE A DRINK CONTAINING ALCOHOL: NEVER
HOW OFTEN DO YOU HAVE 6 OR MORE DRINKS ON ONE OCCASION: NEVER
SKIP TO QUESTIONS 9-10: 1
AUDIT-C TOTAL SCORE: 0
TOTAL SCORE: 0
EVER HAD A DRINK FIRST THING IN THE MORNING TO STEADY YOUR NERVES TO GET RID OF A HANGOVER: NO
HAVE YOU EVER FELT YOU SHOULD CUT DOWN ON YOUR DRINKING: NO
EVER FELT BAD OR GUILTY ABOUT YOUR DRINKING: NO
AUDIT-C TOTAL SCORE: 0
HAVE PEOPLE ANNOYED YOU BY CRITICIZING YOUR DRINKING: NO
SKIP TO QUESTIONS 9-10: 1
AUDIT-C TOTAL SCORE: 0
HOW MANY STANDARD DRINKS CONTAINING ALCOHOL DO YOU HAVE ON A TYPICAL DAY: PATIENT DOES NOT DRINK
HOW MANY STANDARD DRINKS CONTAINING ALCOHOL DO YOU HAVE ON A TYPICAL DAY: PATIENT DOES NOT DRINK
AUDIT-C TOTAL SCORE: 0
HOW OFTEN DO YOU HAVE 6 OR MORE DRINKS ON ONE OCCASION: NEVER

## 2024-04-25 ASSESSMENT — PAIN SCALES - GENERAL
PAINLEVEL_OUTOF10: 5 - MODERATE PAIN
PAINLEVEL_OUTOF10: 9

## 2024-04-25 ASSESSMENT — ENCOUNTER SYMPTOMS
CHILLS: 0
CONSTIPATION: 0
SHORTNESS OF BREATH: 1
VOMITING: 0
DIZZINESS: 0
BACK PAIN: 1
WHEEZING: 0
DIARRHEA: 1
DIFFICULTY URINATING: 0
ENDOCRINE NEGATIVE: 1
ARTHRALGIAS: 1
EYES NEGATIVE: 1
FEVER: 0
HEADACHES: 0
COUGH: 0
NAUSEA: 0
DYSURIA: 0
ABDOMINAL PAIN: 0
LIGHT-HEADEDNESS: 0

## 2024-04-25 ASSESSMENT — PAIN - FUNCTIONAL ASSESSMENT
PAIN_FUNCTIONAL_ASSESSMENT: 0-10
PAIN_FUNCTIONAL_ASSESSMENT: 0-10

## 2024-04-25 ASSESSMENT — COGNITIVE AND FUNCTIONAL STATUS - GENERAL: PATIENT BASELINE BEDBOUND: YES

## 2024-04-25 ASSESSMENT — PAIN DESCRIPTION - LOCATION: LOCATION: LEG

## 2024-04-25 NOTE — H&P
History Of Present Illness  Alayna Dumont is a 89 y.o. 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 Pt presented to the ED this afternoon for further evaluation of dyspnea and worsening BLE edema. Pt is currently on 2LNC. She does not wear oxygen at home.  Pt states that worsening edema started a few days ago and she has been wrapping her legs tightly with Coban but edema is not improving. Labs obtained on admit notable for anemia, hyponatremia, impaired renal function, elevated BNP, & mild hyperkalemia. CXR showed cardiomegaly with pulmonary vascular congestion and small left pleural effusion. EKG on admit showed AV paced rhythm. Pt given 40 mg IV Lasix while in the ED. Last admit was 3/22-3/29 for treatment of OSEAS and ADHF. Dyspnea and BLE edema could also be attributed to mod-severe aortic valve regurgitation seen on Echo done 2/17/24. Pt also has multiple co morbidities that can cause pt to experience dyspnea. Pt may benefit from pulmonary medicine consult during this admit given history of pHTN and pulmonary sarcoidosis. Pt lives at home with her son Bartolome and is wheelchair bound. Pt told writer that she does not walk because her left hip and knee is bone on bone and she experiences pain with ambulation and at rest. Pt is interested in getting steroid injections to see if that will help her pain. Decision made to admit pt to medicine service for CHF exacerbation.     Past Medical History  History reviewed. No pertinent past medical history.    Surgical History  Past Surgical History:   Procedure Laterality Date    OTHER SURGICAL HISTORY  11/10/2022    Knee surgery    OTHER SURGICAL HISTORY  11/10/2022    Hip surgery    OTHER SURGICAL HISTORY  11/10/2022    Heart surgery    OTHER SURGICAL HISTORY  11/10/2022    Pacemaker insertion         Social History  She reports that she has never smoked. She has never used smokeless tobacco. She reports that she does not drink alcohol and does not use drugs.    Family History  Family History   Problem Relation Name Age of Onset    No Known Problems Mother      No Known Problems Father      No Known Problems Other Child         Allergies  Naproxen    Review of Systems   Constitutional:  Negative for chills and fever.   HENT:  Negative for congestion.    Eyes: Negative.    Respiratory:  Positive for shortness of breath. Negative for cough and wheezing.    Cardiovascular:  Positive for leg swelling. Negative for chest pain.   Gastrointestinal:  Positive for diarrhea. Negative for abdominal pain, constipation, nausea and vomiting.   Endocrine: Negative.    Genitourinary:  Negative for difficulty urinating and dysuria.   Musculoskeletal:  Positive for arthralgias and back pain.   Skin: Negative.    Neurological:  Negative for dizziness, light-headedness and headaches.        Physical Exam  Vitals reviewed.   Constitutional:       General: She is not in acute distress.     Appearance: She is ill-appearing.   HENT:      Head: Normocephalic.      Mouth/Throat:      Mouth: Mucous membranes are dry.   Eyes:      Extraocular Movements: Extraocular movements intact.      Conjunctiva/sclera: Conjunctivae normal.   Cardiovascular:      Rate and Rhythm: Regular rhythm.      Pulses: Normal pulses.      Heart sounds: Normal heart sounds.   Pulmonary:      Effort: Pulmonary effort is normal.      Comments: Fine crackles present in bilateral lobes of lungs, pt currently on 2L NC  Abdominal:      General: Bowel sounds are normal.      Palpations: Abdomen is soft.   Musculoskeletal:         General: Normal range of motion.      Cervical back: Normal range of motion and neck supple.      Right lower leg: Edema present.      Left lower leg: Edema present.   Skin:     General: Skin is warm and dry.   Neurological:      Mental Status:  "She is alert and oriented to person, place, and time.          Last Recorded Vitals  Blood pressure 162/57, pulse 60, temperature 35.7 °C (96.2 °F), temperature source Tympanic, resp. rate 20, height 1.6 m (5' 3\"), weight 95.7 kg (211 lb), SpO2 100%.    Relevant Results  XR chest 1 view    Result Date: 4/25/2024  STUDY: Chest Radiograph;  04/25/2024 at 1:59 PM. INDICATION: CHF exacerbation. COMPARISON: XR chest 3/21/24 ACCESSION NUMBER(S): [id number] ORDERING CLINICIAN: SNEHA PUENTES TECHNIQUE:  Frontal chest was obtained at 13:59 hours. FINDINGS: CARDIOMEDIASTINAL SILHOUETTE: The heart is enlarged with dual lead left chest pacemaker.  Pulmonary vascular congestion.  LUNGS: The right costophrenic angle is excluded on the film.  Small left pleural effusion.  No pneumothorax.  ABDOMEN: Limited.  BONES: Mild degenerative changes left acromioclavicular joint.    Cardiomegaly with pulmonary vascular congestion and small left pleural effusion. Signed by Adolfo Borjas MD      Results for orders placed or performed during the hospital encounter of 04/25/24 (from the past 24 hour(s))   CBC and Auto Differential   Result Value Ref Range    WBC 7.7 4.4 - 11.3 x10*3/uL    nRBC 0.0 0.0 - 0.0 /100 WBCs    RBC 3.24 (L) 4.00 - 5.20 x10*6/uL    Hemoglobin 9.5 (L) 12.0 - 16.0 g/dL    Hematocrit 28.6 (L) 36.0 - 46.0 %    MCV 88 80 - 100 fL    MCH 29.3 26.0 - 34.0 pg    MCHC 33.2 32.0 - 36.0 g/dL    RDW 16.3 (H) 11.5 - 14.5 %    Platelets 336 150 - 450 x10*3/uL    Neutrophils % 66.9 40.0 - 80.0 %    Immature Granulocytes %, Automated 2.3 (H) 0.0 - 0.9 %    Lymphocytes % 14.3 13.0 - 44.0 %    Monocytes % 13.6 2.0 - 10.0 %    Eosinophils % 2.5 0.0 - 6.0 %    Basophils % 0.4 0.0 - 2.0 %    Neutrophils Absolute 5.15 1.60 - 5.50 x10*3/uL    Immature Granulocytes Absolute, Automated 0.18 0.00 - 0.50 x10*3/uL    Lymphocytes Absolute 1.10 0.80 - 3.00 x10*3/uL    Monocytes Absolute 1.05 (H) 0.05 - 0.80 x10*3/uL    Eosinophils Absolute 0.19 " 0.00 - 0.40 x10*3/uL    Basophils Absolute 0.03 0.00 - 0.10 x10*3/uL   Comprehensive metabolic panel   Result Value Ref Range    Glucose 94 74 - 99 mg/dL    Sodium 133 (L) 136 - 145 mmol/L    Potassium 5.4 (H) 3.5 - 5.3 mmol/L    Chloride 98 98 - 107 mmol/L    Bicarbonate 26 21 - 32 mmol/L    Anion Gap 14 10 - 20 mmol/L    Urea Nitrogen 40 (H) 6 - 23 mg/dL    Creatinine 1.12 (H) 0.50 - 1.05 mg/dL    eGFR 47 (L) >60 mL/min/1.73m*2    Calcium 9.1 8.6 - 10.6 mg/dL    Albumin 3.6 3.4 - 5.0 g/dL    Alkaline Phosphatase 66 33 - 136 U/L    Total Protein 6.5 6.4 - 8.2 g/dL    AST 20 9 - 39 U/L    Bilirubin, Total 0.3 0.0 - 1.2 mg/dL    ALT 19 7 - 45 U/L   B-Type Natriuretic Peptide   Result Value Ref Range     (H) 0 - 99 pg/mL   Troponin I, High Sensitivity, Initial   Result Value Ref Range    Troponin I, High Sensitivity 29 0 - 34 ng/L      Lab Results   Component Value Date    HGBA1C 6.1 (H) 04/25/2024      ED Medication Administration from 04/25/2024 1226 to 04/25/2024 1627         Date/Time Order Dose Route Action Action by     04/25/2024 1337 EDT acetaminophen (Tylenol) tablet 975 mg 975 mg oral Given MARIIA Arnett     04/25/2024 1338 EDT furosemide (Lasix) injection 40 mg 40 mg intravenous Given MARIIA Arnett     04/25/2024 1520 EDT dexAMETHasone (Decadron) 40 mg in dextrose 5 % in water (D5W) 50 mL IV 40 mg intravenous Not Given MARIIA Arnett           Scheduled medications  [START ON 4/26/2024] allopurinol, 100 mg, oral, Daily  [START ON 4/26/2024] ascorbic acid, 500 mg, oral, Daily  atorvastatin, 20 mg, oral, Nightly  [START ON 4/26/2024] cholecalciferol, 1,000 Units, oral, Daily  [START ON 4/26/2024] DULoxetine, 30 mg, oral, Daily  [START ON 4/26/2024] empagliflozin, 10 mg, oral, Daily  [START ON 4/26/2024] ferrous sulfate (325 mg ferrous sulfate), 65 mg of iron, oral, Daily with breakfast  fluticasone furoate-vilanteroL, 1 puff, inhalation, Daily  [START ON 4/26/2024] furosemide, 40 mg, intravenous, BID  AC  gabapentin, 100 mg, oral, BID  heparin (porcine), 5,000 Units, subcutaneous, q8h  metoprolol tartrate, 25 mg, oral, BID  multivitamin with minerals, 1 tablet, oral, Daily  [START ON 4/26/2024] pantoprazole, 20 mg, oral, Daily  sennosides-docusate sodium, 2 tablet, oral, BID  sodium zirconium cyclosilicate, 10 g, oral, Once  [START ON 4/26/2024] spironolactone, 12.5 mg, oral, Daily      Continuous medications     PRN medications  PRN medications: ipratropium-albuteroL, melatonin, traMADol      Assessment/Plan   Principal Problem:    Acute on chronic diastolic congestive heart failure (Multi)    #acute on chronic diastolic congestive heart failure  #HTN  :: Echocardiogram (2/17/24): Left ventricular systolic function is hyperdynamic with a 70-75% estimated ejection fraction.There is moderate concentric left ventricular hypertrophy.The left atrium is severely dilated. There is a bioprosthetic aortic valve. Moderate aortic valve stenosis. Moderate to severe aortic valve regurgitation. At least moderate AR, severe cannot be ruled out.Prosthetic aortic valve dysfunction due to degenerative changes. Regurg mechanism is unclear if it is perivalvular or transvalvular.Abnormal septal motion consistent with post-operative status.Compared with study from 1/10/2023, no significant change. Gradients over the prosthetic valve is unchanged. PHT for regurg is also unchanged.  - home regimen: Torsemide 40 mg PO every day (MWF) and 20 mg PO every day rest of wk, Aldactone 12.5 mg PO every day, Jardiance 10 mg PO every day, Lopressor 25 mg PO BID  - CXR: cardiomegaly with pulmonary vascular congestion and small left pleural effusion  - Respiratory Regimen: Duonebs Q4hr PRN, Incentive spirometry, Supplemental oxygen PRN  - Ordered Lasix 40mg IV BID  - Monitor renal function and electrolytes closely while on aggressive IV diuretic therapy  - Daily Weights, Strict I/Os   - Goal Net Negative I/O of -1 to -2 Liters / 24hr  - Consider  repeat echocardiogram to assess cardiovascular status  - consult cardiology/heart failure in AM  - pt no show to follow-up appt with cardiology scheduled for 4/18/24  - 2g Na diet, 1800 ml fluid restriction  -  on admit  - continue home dose of Aldactone, Lopressor and Jardiance  - monitor on tele  - last /75  - continue to monitor BP    #hyperkalemia (mild)  - K 5.4 on admit  - Lokelma 10g x1 dose  - RFP ordered for AM    #CKD stage 3 (stable)  - Cr 1.12 GFR 47 on admit, Cr 1.57 GFR 31 on 3/30  - RFP ordered for AM  - renally dose meds as needed    #SUNDEEP  - Hgb 9.5 on admit, Hgb 10.4 on 3/24  - continue home dose of Ferrous sulfate 325 mg PO every day    #HLD  - continue home dose of Atorvastatin 20 mg PO at bedtime    #COPD  #pHTN  #pulmonary sarcoidosis  - consider pulmonary medicine consult for further evaluation of sarcoidosis and pHTN as these conditions may be causing pt to experience issues with SOB  - maintain Pox>92%, supplemental oxygen as needed  - continue home dose of Breo 1 puff every day and Duoneb nebulizer every 4 hrs PRN SOB    #T2DM (last HgA1c 6.1% 4/25/24)  - continue home dose of Jardiance 10 mg PO QD  - accucheck ACHS  - hypoglycemia order set placed     Dispo: admit to RNF. Plan per above.    I spent 75 minutes in the professional and overall care of this patient.      Yancy Martin, APRN-CNP

## 2024-04-25 NOTE — ED PROVIDER NOTES
HPI   No chief complaint on file.      HPI     Patient is a 89 y.o. female with a past medical history of HTN, HLD, SUNDEEP, HFpEF (EF 70-75% 02/2024), AVR, dual chamber PPM, sarcoidosis, and COPD presenting with several day hx of shortness of breath and worsening edema. History is collected from the patient. She states that over the past few days, she has noticed her legs getting more more swollen. People at the nursing home wrapped her legs tightly with Coban which kept the swelling down but worsened the swelling proximally. She also complains of difficulty breathing but denies chest pain. On presentation, she is wearing oxygen nasal cannula on 0 L which EMS was using for capnography. She does not wear oxygen at home. States that she took all her medications today. Patient denies recent history of fevers, night sweats, cough, congestion, rhinorrhea, diarrhea, constipation, nausea, vomiting, sore throat, or myalgias.               No data recorded                   Patient History   No past medical history on file.  Past Surgical History:   Procedure Laterality Date    OTHER SURGICAL HISTORY  11/10/2022    Knee surgery    OTHER SURGICAL HISTORY  11/10/2022    Hip surgery    OTHER SURGICAL HISTORY  11/10/2022    Heart surgery    OTHER SURGICAL HISTORY  11/10/2022    Pacemaker insertion     Family History   Problem Relation Name Age of Onset    No Known Problems Mother      No Known Problems Father      No Known Problems Other Child      Social History     Tobacco Use    Smoking status: Never    Smokeless tobacco: Never   Substance Use Topics    Alcohol use: Never    Drug use: Never       Physical Exam   ED Triage Vitals   Temp Pulse Resp BP   -- -- -- --      SpO2 Temp src Heart Rate Source Patient Position   -- -- -- --      BP Location FiO2 (%)     -- --       Physical Exam  Constitutional:       Appearance: Normal appearance.   HENT:      Head: Normocephalic and atraumatic.   Eyes:      Extraocular Movements:  Extraocular movements intact.      Pupils: Pupils are equal, round, and reactive to light.   Cardiovascular:      Rate and Rhythm: Normal rate and regular rhythm.   Pulmonary:      Comments: Fine rales in bilateral lung bases. No wheezing or rhonchi. Mildly increased work of breathing saturating 97% on room air.  Abdominal:      General: Abdomen is flat.      Palpations: Abdomen is soft.   Musculoskeletal:      Comments: Significant pitting edema of bilateral lower extremities. On presentation, bilateral lower extremities wrapped tightly with Coban which were taken off. No overlying skin changes. Proximal to wrapping legs are very edematous. Homans' sign negative. Limited range of motion of left knee due to pain which the patient states is baseline   Neurological:      General: No focal deficit present.      Mental Status: She is alert and oriented to person, place, and time.         ED Course & MDM   ED Course as of 04/25/24 1412   Thu Apr 25, 2024   1344 EKG AV paced rhythm, neg Sgarbossa's criteria [AM]      ED Course User Index  [AM] Gurpreet García MD         Diagnoses as of 04/25/24 1412   Dyspnea, unspecified type       Medical Decision Making  Patient is a 89 y.o. female with a past medical history of HTN, HLD, SUNDEEP, HFpEF (EF 70-75% 02/2024), AVR, dual chamber PPM, sarcoidosis, and COPD presenting with several day hx of shortness of breath and worsening edema. On presentation, patient is saturating 100% on room air and is hemodynamically stable. She is not in acute distress. Clinical exam and history are most suggestive of CHF at this time given multiple days of progressive edema and shortness of breath with fine crackles in bilateral lung bases. ACS, PE and sepsis also considered. Patient is afebrile and normotensive. She is not diaphoretic and does not complain of chest pain. PE unlikely given negative Homans' sign and bilateral lower extremity edema occurring over the course of days and saturating 100%  which would be uncharacteristic of PE. For treatment of fluid overload, patient given diuresis of 40 mg of IV Lasix since she takes 20 mg torsemide at home. 975 mg of Tylenol for bilateral lower extremity aches. Laboratory workup initiated with troponin BMP CMP and CBC collected pending results on signout to oncoming provider. Chest x-ray suggests fluid overload with bilateral hazy opacities and mild pleural effusions. Following patient's laboratory workup, anticipate that patient will require admission to the hospital for further diuresis and management of CHF exacerbation.    Procedure  Procedures     Radhames Sanders MD  Resident  04/25/24 [id number]

## 2024-04-26 ENCOUNTER — APPOINTMENT (OUTPATIENT)
Dept: CARDIOLOGY | Facility: HOSPITAL | Age: 89
DRG: 291 | End: 2024-04-26
Payer: MEDICARE

## 2024-04-26 LAB
ALBUMIN SERPL BCP-MCNC: 3.4 G/DL (ref 3.4–5)
ANION GAP SERPL CALC-SCNC: 13 MMOL/L (ref 10–20)
AORTIC VALVE MEAN GRADIENT: 15.1 MMHG
AORTIC VALVE PEAK VELOCITY: 2.83 M/S
AV PEAK GRADIENT: 31.9 MMHG
AVA (PEAK VEL): 1.34 CM2
AVA (VTI): 1.51 CM2
BUN SERPL-MCNC: 34 MG/DL (ref 6–23)
CALCIUM SERPL-MCNC: 9.3 MG/DL (ref 8.6–10.6)
CARDIAC TROPONIN I PNL SERPL HS: 41 NG/L (ref 0–34)
CHLORIDE SERPL-SCNC: 98 MMOL/L (ref 98–107)
CO2 SERPL-SCNC: 28 MMOL/L (ref 21–32)
CREAT SERPL-MCNC: 1.06 MG/DL (ref 0.5–1.05)
EGFRCR SERPLBLD CKD-EPI 2021: 50 ML/MIN/1.73M*2
EJECTION FRACTION APICAL 4 CHAMBER: 33
ERYTHROCYTE [DISTWIDTH] IN BLOOD BY AUTOMATED COUNT: 16.4 % (ref 11.5–14.5)
GLUCOSE BLD MANUAL STRIP-MCNC: 102 MG/DL (ref 74–99)
GLUCOSE BLD MANUAL STRIP-MCNC: 117 MG/DL (ref 74–99)
GLUCOSE BLD MANUAL STRIP-MCNC: 138 MG/DL (ref 74–99)
GLUCOSE BLD MANUAL STRIP-MCNC: 146 MG/DL (ref 74–99)
GLUCOSE BLD MANUAL STRIP-MCNC: 98 MG/DL (ref 74–99)
GLUCOSE SERPL-MCNC: 89 MG/DL (ref 74–99)
HCT VFR BLD AUTO: 30.1 % (ref 36–46)
HGB BLD-MCNC: 10 G/DL (ref 12–16)
LEFT ATRIUM VOLUME AREA LENGTH INDEX BSA: 39.6 ML/M2
LEFT VENTRICLE INTERNAL DIMENSION DIASTOLE: 4.75 CM (ref 3.5–6)
LEFT VENTRICULAR OUTFLOW TRACT DIAMETER: 2.02 CM
LV EJECTION FRACTION BIPLANE: 41 %
MAGNESIUM SERPL-MCNC: 2.47 MG/DL (ref 1.6–2.4)
MCH RBC QN AUTO: 29.9 PG (ref 26–34)
MCHC RBC AUTO-ENTMCNC: 33.2 G/DL (ref 32–36)
MCV RBC AUTO: 90 FL (ref 80–100)
MITRAL VALVE E/A RATIO: 1.53
MITRAL VALVE E/E' RATIO: 24.33
NRBC BLD-RTO: 0 /100 WBCS (ref 0–0)
PHOSPHATE SERPL-MCNC: 4.7 MG/DL (ref 2.5–4.9)
PLATELET # BLD AUTO: 322 X10*3/UL (ref 150–450)
POTASSIUM SERPL-SCNC: 4.9 MMOL/L (ref 3.5–5.3)
RBC # BLD AUTO: 3.35 X10*6/UL (ref 4–5.2)
RIGHT VENTRICLE FREE WALL PEAK S': 5 CM/S
RIGHT VENTRICLE PEAK SYSTOLIC PRESSURE: 42.5 MMHG
SODIUM SERPL-SCNC: 134 MMOL/L (ref 136–145)
TRICUSPID ANNULAR PLANE SYSTOLIC EXCURSION: 1.5 CM
WBC # BLD AUTO: 6.1 X10*3/UL (ref 4.4–11.3)

## 2024-04-26 PROCEDURE — 80069 RENAL FUNCTION PANEL: CPT | Performed by: NURSE PRACTITIONER

## 2024-04-26 PROCEDURE — 93005 ELECTROCARDIOGRAM TRACING: CPT

## 2024-04-26 PROCEDURE — 2500000004 HC RX 250 GENERAL PHARMACY W/ HCPCS (ALT 636 FOR OP/ED): Performed by: STUDENT IN AN ORGANIZED HEALTH CARE EDUCATION/TRAINING PROGRAM

## 2024-04-26 PROCEDURE — 83735 ASSAY OF MAGNESIUM: CPT | Performed by: NURSE PRACTITIONER

## 2024-04-26 PROCEDURE — 93308 TTE F-UP OR LMTD: CPT | Performed by: INTERNAL MEDICINE

## 2024-04-26 PROCEDURE — 2500000004 HC RX 250 GENERAL PHARMACY W/ HCPCS (ALT 636 FOR OP/ED): Performed by: NURSE PRACTITIONER

## 2024-04-26 PROCEDURE — 2500000001 HC RX 250 WO HCPCS SELF ADMINISTERED DRUGS (ALT 637 FOR MEDICARE OP): Performed by: NURSE PRACTITIONER

## 2024-04-26 PROCEDURE — 2500000006 HC RX 250 W HCPCS SELF ADMINISTERED DRUGS (ALT 637 FOR ALL PAYERS): Mod: MUE | Performed by: NURSE PRACTITIONER

## 2024-04-26 PROCEDURE — 2500000001 HC RX 250 WO HCPCS SELF ADMINISTERED DRUGS (ALT 637 FOR MEDICARE OP): Performed by: STUDENT IN AN ORGANIZED HEALTH CARE EDUCATION/TRAINING PROGRAM

## 2024-04-26 PROCEDURE — 84484 ASSAY OF TROPONIN QUANT: CPT | Performed by: STUDENT IN AN ORGANIZED HEALTH CARE EDUCATION/TRAINING PROGRAM

## 2024-04-26 PROCEDURE — 1200000002 HC GENERAL ROOM WITH TELEMETRY DAILY

## 2024-04-26 PROCEDURE — 93325 DOPPLER ECHO COLOR FLOW MAPG: CPT

## 2024-04-26 PROCEDURE — 2500000006 HC RX 250 W HCPCS SELF ADMINISTERED DRUGS (ALT 637 FOR ALL PAYERS): Performed by: NURSE PRACTITIONER

## 2024-04-26 PROCEDURE — 94640 AIRWAY INHALATION TREATMENT: CPT

## 2024-04-26 PROCEDURE — 36415 COLL VENOUS BLD VENIPUNCTURE: CPT | Performed by: STUDENT IN AN ORGANIZED HEALTH CARE EDUCATION/TRAINING PROGRAM

## 2024-04-26 PROCEDURE — 36415 COLL VENOUS BLD VENIPUNCTURE: CPT | Performed by: NURSE PRACTITIONER

## 2024-04-26 PROCEDURE — 85027 COMPLETE CBC AUTOMATED: CPT | Performed by: NURSE PRACTITIONER

## 2024-04-26 PROCEDURE — 82947 ASSAY GLUCOSE BLOOD QUANT: CPT

## 2024-04-26 PROCEDURE — 99233 SBSQ HOSP IP/OBS HIGH 50: CPT | Performed by: STUDENT IN AN ORGANIZED HEALTH CARE EDUCATION/TRAINING PROGRAM

## 2024-04-26 PROCEDURE — 93325 DOPPLER ECHO COLOR FLOW MAPG: CPT | Performed by: INTERNAL MEDICINE

## 2024-04-26 PROCEDURE — 93321 DOPPLER ECHO F-UP/LMTD STD: CPT | Performed by: INTERNAL MEDICINE

## 2024-04-26 RX ORDER — NAPROXEN SODIUM 220 MG/1
324 TABLET, FILM COATED ORAL ONCE
Status: COMPLETED | OUTPATIENT
Start: 2024-04-26 | End: 2024-04-26

## 2024-04-26 RX ORDER — HEPARIN SODIUM 10000 [USP'U]/100ML
0-4000 INJECTION, SOLUTION INTRAVENOUS CONTINUOUS
Status: DISCONTINUED | OUTPATIENT
Start: 2024-04-26 | End: 2024-04-26

## 2024-04-26 RX ORDER — FUROSEMIDE 10 MG/ML
40 INJECTION INTRAMUSCULAR; INTRAVENOUS
Status: DISCONTINUED | OUTPATIENT
Start: 2024-04-26 | End: 2024-04-27

## 2024-04-26 RX ORDER — OXYCODONE HYDROCHLORIDE 5 MG/1
2.5 TABLET ORAL EVERY 6 HOURS PRN
Status: DISCONTINUED | OUTPATIENT
Start: 2024-04-26 | End: 2024-05-04 | Stop reason: HOSPADM

## 2024-04-26 RX ORDER — ASPIRIN 81 MG/1
81 TABLET ORAL DAILY
Status: DISCONTINUED | OUTPATIENT
Start: 2024-04-27 | End: 2024-05-04 | Stop reason: HOSPADM

## 2024-04-26 RX ORDER — ATORVASTATIN CALCIUM 40 MG/1
40 TABLET, FILM COATED ORAL NIGHTLY
Status: DISCONTINUED | OUTPATIENT
Start: 2024-04-26 | End: 2024-05-04 | Stop reason: HOSPADM

## 2024-04-26 RX ADMIN — FERROUS SULFATE TAB 325 MG (65 MG ELEMENTAL FE) 1 TABLET: 325 (65 FE) TAB at 09:42

## 2024-04-26 RX ADMIN — GABAPENTIN 100 MG: 100 CAPSULE ORAL at 09:42

## 2024-04-26 RX ADMIN — METOPROLOL TARTRATE 25 MG: 50 TABLET, FILM COATED ORAL at 21:20

## 2024-04-26 RX ADMIN — PERFLUTREN 1 ML OF DILUTION: 6.52 INJECTION, SUSPENSION INTRAVENOUS at 13:50

## 2024-04-26 RX ADMIN — FUROSEMIDE 40 MG: 10 INJECTION, SOLUTION INTRAVENOUS at 15:22

## 2024-04-26 RX ADMIN — ALLOPURINOL 100 MG: 100 TABLET ORAL at 09:42

## 2024-04-26 RX ADMIN — EMPAGLIFLOZIN 10 MG: 10 TABLET, FILM COATED ORAL at 09:41

## 2024-04-26 RX ADMIN — SPIRONOLACTONE 12.5 MG: 25 TABLET, FILM COATED ORAL at 09:42

## 2024-04-26 RX ADMIN — TRAMADOL HYDROCHLORIDE 100 MG: 50 TABLET, COATED ORAL at 21:19

## 2024-04-26 RX ADMIN — METOPROLOL TARTRATE 25 MG: 50 TABLET, FILM COATED ORAL at 09:41

## 2024-04-26 RX ADMIN — ATORVASTATIN CALCIUM 40 MG: 40 TABLET, FILM COATED ORAL at 21:20

## 2024-04-26 RX ADMIN — GABAPENTIN 100 MG: 100 CAPSULE ORAL at 21:19

## 2024-04-26 RX ADMIN — OXYCODONE HYDROCHLORIDE AND ACETAMINOPHEN 500 MG: 500 TABLET ORAL at 09:41

## 2024-04-26 RX ADMIN — Medication 1 TABLET: at 09:42

## 2024-04-26 RX ADMIN — SENNOSIDES AND DOCUSATE SODIUM 2 TABLET: 8.6; 5 TABLET ORAL at 21:20

## 2024-04-26 RX ADMIN — FLUTICASONE FUROATE AND VILANTEROL TRIFENATATE 1 PUFF: 200; 25 POWDER RESPIRATORY (INHALATION) at 15:58

## 2024-04-26 RX ADMIN — HEPARIN SODIUM 5000 UNITS: 5000 INJECTION INTRAVENOUS; SUBCUTANEOUS at 09:41

## 2024-04-26 RX ADMIN — PANTOPRAZOLE SODIUM 20 MG: 20 TABLET, DELAYED RELEASE ORAL at 09:42

## 2024-04-26 RX ADMIN — SENNOSIDES AND DOCUSATE SODIUM 2 TABLET: 8.6; 5 TABLET ORAL at 09:41

## 2024-04-26 RX ADMIN — HEPARIN SODIUM 5000 UNITS: 5000 INJECTION INTRAVENOUS; SUBCUTANEOUS at 15:23

## 2024-04-26 RX ADMIN — TRAMADOL HYDROCHLORIDE 100 MG: 50 TABLET, COATED ORAL at 09:41

## 2024-04-26 RX ADMIN — ASPIRIN 81 MG 324 MG: 81 TABLET ORAL at 19:23

## 2024-04-26 RX ADMIN — Medication 1000 UNITS: at 09:41

## 2024-04-26 RX ADMIN — DULOXETINE HYDROCHLORIDE 30 MG: 30 CAPSULE, DELAYED RELEASE ORAL at 09:41

## 2024-04-26 ASSESSMENT — COGNITIVE AND FUNCTIONAL STATUS - GENERAL
TOILETING: A LOT
PERSONAL GROOMING: A LITTLE
WALKING IN HOSPITAL ROOM: TOTAL
DRESSING REGULAR UPPER BODY CLOTHING: A LOT
MOVING FROM LYING ON BACK TO SITTING ON SIDE OF FLAT BED WITH BEDRAILS: A LITTLE
DAILY ACTIVITIY SCORE: 14
MOVING TO AND FROM BED TO CHAIR: A LOT
CLIMB 3 TO 5 STEPS WITH RAILING: TOTAL
DRESSING REGULAR LOWER BODY CLOTHING: TOTAL
TURNING FROM BACK TO SIDE WHILE IN FLAT BAD: A LOT
MOBILITY SCORE: 10
HELP NEEDED FOR BATHING: A LOT
STANDING UP FROM CHAIR USING ARMS: TOTAL

## 2024-04-26 ASSESSMENT — PAIN - FUNCTIONAL ASSESSMENT
PAIN_FUNCTIONAL_ASSESSMENT: 0-10

## 2024-04-26 ASSESSMENT — PAIN SCALES - GENERAL
PAINLEVEL_OUTOF10: 7
PAINLEVEL_OUTOF10: 0 - NO PAIN
PAINLEVEL_OUTOF10: 10 - WORST POSSIBLE PAIN
PAINLEVEL_OUTOF10: 0 - NO PAIN

## 2024-04-26 ASSESSMENT — PAIN DESCRIPTION - ORIENTATION: ORIENTATION: LEFT

## 2024-04-26 ASSESSMENT — PAIN SCALES - WONG BAKER: WONGBAKER_NUMERICALRESPONSE: HURTS WORST

## 2024-04-26 NOTE — PROGRESS NOTES
Alayna Dumont is a 89 y.o. female on day 1 of admission presenting with Acute on chronic diastolic congestive heart failure (Multi).    Transitional Care Coordination Progress Note:  Patient discussed during interdisciplinary rounds.   Team members present: MD and TCC  Plan per Medical/Surgical team: PT/OT evaluation, monitoring pulmonary Issues.  Payor: Medicare  Discharge disposition: Waiting on PT/OT Recommendations  Potential Barriers: None  ADOD: 04/30/24        CAROLYNN HINOJOSA RN

## 2024-04-26 NOTE — PROGRESS NOTES
Assessment/Plan   Alayna Dumont is a 89 y.o. 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 New o2 requirment of 2L noted. Labs obtained on admit notable for anemia, hyponatremia, impaired renal function, elevated BNP, & mild hyperkalemia. Trop neg delta. CXR showed cardiomegaly with pulmonary vascular congestion and small left pleural effusion. EKG AV paced. S/p lasix. Noted last admit 3/2024 for ADHF and OSEAS.  Pt presenting from home, lives with son and is wheelchiar bound due to severe left hip OA. Admitted to hospitalist service.     #acute on chronic respiratory failure with hypoxemia  #acute on chronic diastolic congestive heart failure   #pulmonary sarcoidosis  #pulmonary hypertension  #COPD  -likely multifactorial primarily due to decomp HFpEF, however contributing is pulmonary htn, pulmonary sarcoidosis, COPD. ACS ruled out.   -  noted dyspnea, fluid retention, xray chest with pulm edema, elevated bnp. Noted cxr with left pl effusoin. Per records, has history of L pleural effusion not able to be tapped per [facility] IR, had been diuresed prev.   - missed last cards visit, do not see record of pulmonary visits.   - last echo 2/2024 EF 70-75%, mod cLVH, bioprosthetic AV moderate AV stenosis, moderate to severe aortic regurg, noted prosthetic valve dysfunction due to degeneration, no significant changes compared to 1/2023.  - repeat echo to assess valve function  - home regimen: Torsemide 40 mg PO every day (MWF) and 20 mg PO every day rest of wk, Aldactone 12.5 mg PO every day, Jardiance 10 mg PO every day, Lopressor 25 mg PO BID   - continue home dose of Aldactone, Lopressor and Jardiance   - lasix 40mg iv bid  - continue home dose of Breo 1 puff every day and Duoneb nebulizer every 4 hrs  "PRN SOB   - salt restrict, monitor I/o, dialy weight  - tele  - goal sat 89-92%    # Hyponatremia  -Mild, will monitor repeat    #hyperkalemia (mild)  - K 5.4 on admit  - Lokelma 10g x1 dose  - RFP ordered for AM, improving.      #CKD stage 3 (stable)  - Cr 1.12 GFR 47 on admit, Cr 1.57 GFR 31 on 3/30  - RFP ordered for AM, repeat stable  - renally dose meds as needed     #SUNDEEP  - Hgb 9.5 on admit, Hgb 10.4 on 3/24  - continue home dose of Ferrous sulfate 325 mg PO every day     #HLD  - continue home dose of Atorvastatin 20 mg PO at bedtime              Scheduled outpatient appointments in system:   No future appointments.  ---------------------------------------------------------------------------------------------------  Subjective   No new events, overall feeling better with regards to her breathing, she had questions about her potassium and her labs were reviewed with her.  She reports chronic left leg pain due to several injuries and immobility, basically is wheelchair-bound versus bedbound and only transfers but is not mobile.  Discussed her prior echo findings about her possible prosthetic aortic valve dysfunction.  Awaiting echo for repeat.     ---------------------------------------------------------------------------------------------------  Objective   Last Recorded Vitals  Blood pressure 158/50, pulse 60, temperature 36.5 °C (97.7 °F), resp. rate 18, height 1.6 m (5' 3\"), weight 95.7 kg (211 lb), SpO2 98%.  Intake/Output last 3 Shifts:  I/O last 3 completed shifts:  In: 300 (3.1 mL/kg) [P.O.:300]  Out: 3300 (34.5 mL/kg) [Urine:3300 (1 mL/kg/hr)]  Weight: 95.7 kg     Physical Exam  Vitals and nursing note reviewed.   Constitutional:       General: She is not in acute distress.     Appearance: Normal appearance. She is ill-appearing. She is not toxic-appearing.      Comments: Elderly female, calm and appropriately interactive, engaged in her care   HENT:      Head: Normocephalic and atraumatic.      " Mouth/Throat:      Mouth: Mucous membranes are moist.   Eyes:      General: No scleral icterus.     Extraocular Movements: Extraocular movements intact.      Conjunctiva/sclera: Conjunctivae normal.   Cardiovascular:      Rate and Rhythm: Normal rate and regular rhythm.      Heart sounds: S1 normal and S2 normal. No murmur heard.     Comments: Systolic murmur best at apex  Pulmonary:      Effort: Pulmonary effort is normal. No respiratory distress.      Breath sounds: Rales present. No wheezing or rhonchi.      Comments: Bibasilar crackles  Abdominal:      General: Bowel sounds are normal. There is no distension.      Palpations: Abdomen is soft.      Tenderness: There is no abdominal tenderness. There is no guarding or rebound.   Musculoskeletal:         General: Swelling and deformity present.      Cervical back: Neck supple.      Comments: Left leg/knee   Skin:     General: Skin is warm and dry.      Findings: No rash.   Neurological:      General: No focal deficit present.      Mental Status: She is alert. Mental status is at baseline.   Psychiatric:         Mood and Affect: Mood normal.         Relevant Results  Lab Results   Component Value Date    WBC 6.1 04/26/2024    HGB 10.0 (L) 04/26/2024    HCT 30.1 (L) 04/26/2024    MCV 90 04/26/2024     04/26/2024      Lab Results   Component Value Date    GLUCOSE 89 04/26/2024    CALCIUM 9.3 04/26/2024     (L) 04/26/2024    K 4.9 04/26/2024    CO2 28 04/26/2024    CL 98 04/26/2024    BUN 34 (H) 04/26/2024    CREATININE 1.06 (H) 04/26/2024     Scheduled medications  allopurinol, 100 mg, oral, Daily  ascorbic acid, 500 mg, oral, Daily  atorvastatin, 20 mg, oral, Nightly  baclofen, 5 mg, oral, Nightly  cholecalciferol, 1,000 Units, oral, Daily  DULoxetine, 30 mg, oral, Daily  empagliflozin, 10 mg, oral, Daily  ferrous sulfate (325 mg ferrous sulfate), 65 mg of iron, oral, Daily with breakfast  fluticasone furoate-vilanteroL, 1 puff, inhalation,  Daily  furosemide, 40 mg, intravenous, BID AC  gabapentin, 100 mg, oral, BID  heparin (porcine), 5,000 Units, subcutaneous, q8h  metoprolol tartrate, 25 mg, oral, BID  multivitamin with minerals, 1 tablet, oral, Daily  pantoprazole, 20 mg, oral, Daily  sennosides-docusate sodium, 2 tablet, oral, BID  spironolactone, 12.5 mg, oral, Daily      Continuous medications     PRN medications  PRN medications: dextrose, dextrose, glucagon, glucagon, ipratropium-albuteroL, melatonin, oxyCODONE, traMADol    Mc Ortiz MD

## 2024-04-27 LAB
ALBUMIN SERPL BCP-MCNC: 3.6 G/DL (ref 3.4–5)
ANION GAP SERPL CALC-SCNC: 16 MMOL/L (ref 10–20)
BUN SERPL-MCNC: 39 MG/DL (ref 6–23)
CALCIUM SERPL-MCNC: 9.2 MG/DL (ref 8.6–10.6)
CHLORIDE SERPL-SCNC: 95 MMOL/L (ref 98–107)
CO2 SERPL-SCNC: 29 MMOL/L (ref 21–32)
CREAT SERPL-MCNC: 1.39 MG/DL (ref 0.5–1.05)
EGFRCR SERPLBLD CKD-EPI 2021: 36 ML/MIN/1.73M*2
ERYTHROCYTE [DISTWIDTH] IN BLOOD BY AUTOMATED COUNT: 16.6 % (ref 11.5–14.5)
GLUCOSE BLD MANUAL STRIP-MCNC: 104 MG/DL (ref 74–99)
GLUCOSE BLD MANUAL STRIP-MCNC: 116 MG/DL (ref 74–99)
GLUCOSE BLD MANUAL STRIP-MCNC: 116 MG/DL (ref 74–99)
GLUCOSE SERPL-MCNC: 161 MG/DL (ref 74–99)
HCT VFR BLD AUTO: 35 % (ref 36–46)
HGB BLD-MCNC: 10.6 G/DL (ref 12–16)
MAGNESIUM SERPL-MCNC: 2.28 MG/DL (ref 1.6–2.4)
MCH RBC QN AUTO: 29.2 PG (ref 26–34)
MCHC RBC AUTO-ENTMCNC: 30.3 G/DL (ref 32–36)
MCV RBC AUTO: 96 FL (ref 80–100)
NRBC BLD-RTO: 0 /100 WBCS (ref 0–0)
PHOSPHATE SERPL-MCNC: 4.6 MG/DL (ref 2.5–4.9)
PLATELET # BLD AUTO: 340 X10*3/UL (ref 150–450)
POTASSIUM SERPL-SCNC: 4.7 MMOL/L (ref 3.5–5.3)
RBC # BLD AUTO: 3.63 X10*6/UL (ref 4–5.2)
SODIUM SERPL-SCNC: 135 MMOL/L (ref 136–145)
WBC # BLD AUTO: 8.5 X10*3/UL (ref 4.4–11.3)

## 2024-04-27 PROCEDURE — 2500000004 HC RX 250 GENERAL PHARMACY W/ HCPCS (ALT 636 FOR OP/ED): Performed by: STUDENT IN AN ORGANIZED HEALTH CARE EDUCATION/TRAINING PROGRAM

## 2024-04-27 PROCEDURE — 80069 RENAL FUNCTION PANEL: CPT | Performed by: NURSE PRACTITIONER

## 2024-04-27 PROCEDURE — 36415 COLL VENOUS BLD VENIPUNCTURE: CPT | Performed by: NURSE PRACTITIONER

## 2024-04-27 PROCEDURE — 2500000006 HC RX 250 W HCPCS SELF ADMINISTERED DRUGS (ALT 637 FOR ALL PAYERS): Performed by: NURSE PRACTITIONER

## 2024-04-27 PROCEDURE — 99233 SBSQ HOSP IP/OBS HIGH 50: CPT | Performed by: STUDENT IN AN ORGANIZED HEALTH CARE EDUCATION/TRAINING PROGRAM

## 2024-04-27 PROCEDURE — 83735 ASSAY OF MAGNESIUM: CPT | Performed by: NURSE PRACTITIONER

## 2024-04-27 PROCEDURE — 82947 ASSAY GLUCOSE BLOOD QUANT: CPT

## 2024-04-27 PROCEDURE — 2500000001 HC RX 250 WO HCPCS SELF ADMINISTERED DRUGS (ALT 637 FOR MEDICARE OP): Performed by: NURSE PRACTITIONER

## 2024-04-27 PROCEDURE — 2500000001 HC RX 250 WO HCPCS SELF ADMINISTERED DRUGS (ALT 637 FOR MEDICARE OP): Performed by: STUDENT IN AN ORGANIZED HEALTH CARE EDUCATION/TRAINING PROGRAM

## 2024-04-27 PROCEDURE — 1200000002 HC GENERAL ROOM WITH TELEMETRY DAILY

## 2024-04-27 PROCEDURE — 85027 COMPLETE CBC AUTOMATED: CPT | Performed by: NURSE PRACTITIONER

## 2024-04-27 PROCEDURE — 94640 AIRWAY INHALATION TREATMENT: CPT

## 2024-04-27 RX ORDER — ACETAMINOPHEN 325 MG/1
975 TABLET ORAL EVERY 8 HOURS
Status: DISCONTINUED | OUTPATIENT
Start: 2024-04-27 | End: 2024-05-04 | Stop reason: HOSPADM

## 2024-04-27 RX ORDER — COLCHICINE 0.6 MG/1
0.6 TABLET ORAL ONCE
Status: COMPLETED | OUTPATIENT
Start: 2024-04-27 | End: 2024-04-27

## 2024-04-27 RX ADMIN — Medication 1 TABLET: at 08:40

## 2024-04-27 RX ADMIN — SENNOSIDES AND DOCUSATE SODIUM 2 TABLET: 8.6; 5 TABLET ORAL at 20:00

## 2024-04-27 RX ADMIN — BACLOFEN 5 MG: 10 TABLET ORAL at 20:00

## 2024-04-27 RX ADMIN — SACUBITRIL AND VALSARTAN 1 TABLET: 24; 26 TABLET, FILM COATED ORAL at 14:20

## 2024-04-27 RX ADMIN — FERROUS SULFATE TAB 325 MG (65 MG ELEMENTAL FE) 1 TABLET: 325 (65 FE) TAB at 08:40

## 2024-04-27 RX ADMIN — SPIRONOLACTONE 12.5 MG: 25 TABLET, FILM COATED ORAL at 08:40

## 2024-04-27 RX ADMIN — COLCHICINE 0.6 MG: 0.6 TABLET, FILM COATED ORAL at 19:58

## 2024-04-27 RX ADMIN — GABAPENTIN 100 MG: 100 CAPSULE ORAL at 08:40

## 2024-04-27 RX ADMIN — FLUTICASONE FUROATE AND VILANTEROL TRIFENATATE 1 PUFF: 200; 25 POWDER RESPIRATORY (INHALATION) at 10:04

## 2024-04-27 RX ADMIN — OXYCODONE HYDROCHLORIDE 2.5 MG: 5 TABLET ORAL at 14:49

## 2024-04-27 RX ADMIN — FUROSEMIDE 40 MG: 10 INJECTION, SOLUTION INTRAVENOUS at 06:00

## 2024-04-27 RX ADMIN — SENNOSIDES AND DOCUSATE SODIUM 2 TABLET: 8.6; 5 TABLET ORAL at 08:40

## 2024-04-27 RX ADMIN — ACETAMINOPHEN 975 MG: 325 TABLET ORAL at 19:57

## 2024-04-27 RX ADMIN — SACUBITRIL AND VALSARTAN 1 TABLET: 24; 26 TABLET, FILM COATED ORAL at 20:02

## 2024-04-27 RX ADMIN — EMPAGLIFLOZIN 10 MG: 10 TABLET, FILM COATED ORAL at 08:40

## 2024-04-27 RX ADMIN — ALLOPURINOL 100 MG: 100 TABLET ORAL at 08:40

## 2024-04-27 RX ADMIN — GABAPENTIN 100 MG: 100 CAPSULE ORAL at 20:01

## 2024-04-27 RX ADMIN — PANTOPRAZOLE SODIUM 20 MG: 20 TABLET, DELAYED RELEASE ORAL at 08:40

## 2024-04-27 RX ADMIN — ATORVASTATIN CALCIUM 40 MG: 40 TABLET, FILM COATED ORAL at 20:00

## 2024-04-27 RX ADMIN — TRAMADOL HYDROCHLORIDE 100 MG: 50 TABLET, COATED ORAL at 08:39

## 2024-04-27 RX ADMIN — BACLOFEN 5 MG: 10 TABLET ORAL at 03:40

## 2024-04-27 RX ADMIN — FUROSEMIDE 40 MG: 10 INJECTION, SOLUTION INTRAVENOUS at 14:20

## 2024-04-27 RX ADMIN — OXYCODONE HYDROCHLORIDE AND ACETAMINOPHEN 500 MG: 500 TABLET ORAL at 08:40

## 2024-04-27 RX ADMIN — DULOXETINE HYDROCHLORIDE 30 MG: 30 CAPSULE, DELAYED RELEASE ORAL at 08:40

## 2024-04-27 RX ADMIN — METOPROLOL TARTRATE 25 MG: 50 TABLET, FILM COATED ORAL at 08:40

## 2024-04-27 RX ADMIN — ASPIRIN 81 MG: 81 TABLET, COATED ORAL at 08:40

## 2024-04-27 RX ADMIN — Medication 1000 UNITS: at 08:40

## 2024-04-27 RX ADMIN — METOPROLOL TARTRATE 25 MG: 50 TABLET, FILM COATED ORAL at 20:00

## 2024-04-27 ASSESSMENT — COGNITIVE AND FUNCTIONAL STATUS - GENERAL
WALKING IN HOSPITAL ROOM: TOTAL
DAILY ACTIVITIY SCORE: 14
DRESSING REGULAR UPPER BODY CLOTHING: A LOT
MOVING TO AND FROM BED TO CHAIR: A LOT
PERSONAL GROOMING: A LITTLE
CLIMB 3 TO 5 STEPS WITH RAILING: TOTAL
TURNING FROM BACK TO SIDE WHILE IN FLAT BAD: A LOT
DRESSING REGULAR UPPER BODY CLOTHING: A LOT
STANDING UP FROM CHAIR USING ARMS: TOTAL
HELP NEEDED FOR BATHING: A LOT
TOILETING: A LOT
DAILY ACTIVITIY SCORE: 14
MOVING FROM LYING ON BACK TO SITTING ON SIDE OF FLAT BED WITH BEDRAILS: A LITTLE
HELP NEEDED FOR BATHING: A LOT
PERSONAL GROOMING: A LITTLE
MOVING FROM LYING ON BACK TO SITTING ON SIDE OF FLAT BED WITH BEDRAILS: A LOT
CLIMB 3 TO 5 STEPS WITH RAILING: TOTAL
MOBILITY SCORE: 10
DRESSING REGULAR LOWER BODY CLOTHING: TOTAL
STANDING UP FROM CHAIR USING ARMS: TOTAL
DRESSING REGULAR LOWER BODY CLOTHING: TOTAL
MOVING TO AND FROM BED TO CHAIR: A LOT
TURNING FROM BACK TO SIDE WHILE IN FLAT BAD: A LOT
MOBILITY SCORE: 9
WALKING IN HOSPITAL ROOM: TOTAL
TOILETING: A LOT

## 2024-04-27 ASSESSMENT — PAIN - FUNCTIONAL ASSESSMENT
PAIN_FUNCTIONAL_ASSESSMENT: 0-10
PAIN_FUNCTIONAL_ASSESSMENT: 0-10

## 2024-04-27 ASSESSMENT — PAIN DESCRIPTION - ORIENTATION: ORIENTATION: RIGHT

## 2024-04-27 ASSESSMENT — PAIN SCALES - GENERAL
PAINLEVEL_OUTOF10: 2
PAINLEVEL_OUTOF10: 10 - WORST POSSIBLE PAIN
PAINLEVEL_OUTOF10: 8

## 2024-04-27 ASSESSMENT — PAIN SCALES - PAIN ASSESSMENT IN ADVANCED DEMENTIA (PAINAD): TOTALSCORE: MEDICATION (SEE MAR);COLD APPLIED

## 2024-04-27 ASSESSMENT — PAIN DESCRIPTION - LOCATION: LOCATION: KNEE

## 2024-04-27 NOTE — CARE PLAN
Problem: Pain  Goal: My pain/discomfort is manageable  Outcome: Progressing  Flowsheets (Taken 4/27/2024 0129)  Resident's pain/discomfort is manageable:   Include resident/family/caregiver in decisions related to pain management   Offer non-pharmacological pain management interventions   Administer pain medication prior to activities that may trigger pain   Identify and avoid pain triggers     Problem: Skin  Goal: Participates in plan/prevention/treatment measures  Outcome: Progressing  Flowsheets (Taken 4/27/2024 0127)  Participates in plan/prevention/treatment measures:   Elevate heels   Discuss with provider PT/OT consult   Increase activity/out of bed for meals  Goal: Prevent/manage excess moisture  Outcome: Progressing  Flowsheets (Taken 4/27/2024 0127)  Prevent/manage excess moisture:   Cleanse incontinence/protect with barrier cream   Moisturize dry skin   Use wicking fabric (obtain order)   Monitor for/manage infection if present  Goal: Prevent/minimize sheer/friction injuries  Outcome: Progressing  Flowsheets (Taken 4/27/2024 0127)  Prevent/minimize sheer/friction injuries:   Complete micro-shifts as needed if patient unable. Adjust patient position to relieve pressure points, not a full turn   Increase activity/out of bed for meals   Use pull sheet   HOB 30 degrees or less   The patient's goals for the shift include      The clinical goals for the shift include Pt oxygen will be within normal limits

## 2024-04-27 NOTE — CONSULTS
Inpatient consult to Cardiology  Consult performed by: Demian Carter MD  Consult ordered by: Mc Ortiz MD        History Of Present Illness:    89 y.o. 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 Cardiology c/s for new HFrEF eval.    Per Hospitalist Note:  “New o2 requirement of 2L noted. Labs obtained on admit notable for anemia, hyponatremia, impaired renal function, elevated BNP, & mild hyperkalemia. Trop neg delta. CXR showed cardiomegaly with pulmonary vascular congestion and small left pleural effusion. EKG AV paced. S/p lasix. Noted last admit 3/2024 for ADHF and OSEAS.  Pt presenting from home, lives with son and is wheelchiar bound due to severe left hip OA. Admitted to hospitalist service.”    Per primary team, last TTE was 2/2024 showing EF 70-75% with known bioprosthetic valve stenosis and regurg. Currently EF 40-45% with similar valve dysfunction and apical wma. Pt had not been complaining of any CP just dyspnea. Pt in sinus. Trop 35->41. Cr ~1.1    Pt had PPM placed in 5/2023 per pt. She believes she still has 1 year of battery life left. She reports that prior to presentation she was having issues primarily with SOB when she would try to get in and out of bed with mild exertion. She denies any CP or palpitations. She feels much better since coming to hospital and her SOB has greatly improved with diuresis.    On tele is 100% AV paced.    Cardiac Meds:  Asa 81  Atorva 40  Empa 10  Lasix 40 bid  Metop tart 25 bid  Modale 12.5 od    Cardiac Testing:  TTE 4/26/24:  1. Poorly visualized anatomical structures due to suboptimal image quality.   2. Left ventricular systolic function is mildly to moderately decreased with a 40-45% estimated  ejection fraction.   3. Entire apex is abnormal.   4. There is severe concentric left ventricular hypertrophy.   5. The left atrium is mild to moderately dilated.   6. Mildly elevated RVSP.   7. Moderate aortic valve stenosis.   8. Moderate aortic valve regurgitation.   9. There is moderate aortic valve cusp calcification.  10. Mild to moderate tricuspid regurgitation visualized.  11. There is moderate to severe mitral annular calcification.  12. There is a bioprosthetic aortic valve.  13. Abnormal septal motion consistent with post-operative status.  14. Compared with study from 2/17/2024, LV systolic dysfunction with regional wall motion is new. 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 No major change in gradients over aortic valve.    TTE 2/24/24  1. Left ventricular systolic function is hyperdynamic with a 70-75% estimated ejection fraction.   2. There is moderate concentric left ventricular hypertrophy.   3. The left atrium is severely dilated.   4. There is a bioprosthetic aortic valve.   5. Moderate aortic valve stenosis.   6. Moderate to severe aortic valve regurgitation.   7. At least moderate AR, severe cannot be ruled out.   8. Prosthetic aortic valve dysfunction due to degenrative changes. Regurg mechanism is unclear if it is perivalvular or transvalvular.   9. Abnormal septal motion consistent with post-operative status.  10. Compared with study from 1/10/2023, no significant change. Gradients over the prosthetic valve is unchanged. PHT for regurg is also unchanged.     Last Recorded Vitals:  Vitals:    04/26/24 2333 04/27/24 0433 04/27/24 0600 04/27/24 0815   BP: (!) 128/45 (!) 136/47  (!) 156/47   Pulse: 60 60  60   Resp: 18 18  16   Temp: 36.3 °C (97.3 °F) 36.4 °C (97.5 °F)  36.4 °C (97.5 °F)   TempSrc:       SpO2: 96% 92%  97%   Weight:   95.5 kg (210 lb 8.6 oz)    Height:           Last Labs:  CBC - 4/26/2024:  7:11 AM  6.1 10.0 322    30.1      CMP - 4/26/2024:  7:11 AM  9.3 6.5 20 --- 0.3    4.7 3.4 19 66      PTT - No results in last year.  _   _ _     Troponin I, High Sensitivity   Date/Time Value Ref Range Status   04/26/2024 06:56 PM 41 (H) 0 - 34 ng/L Final   04/25/2024 06:39 PM 35 (H) 0 - 34 ng/L Final   04/25/2024 01:31 PM 29 0 - 34 ng/L Final     BNP   Date/Time Value Ref Range Status   04/25/2024 01:31  (H) 0 - 99 pg/mL Final   03/21/2024 10:52  (H) 0 - 99 pg/mL Final     Hemoglobin A1C   Date/Time Value Ref Range Status   04/25/2024 01:31 PM 6.1 (H) see below % Final   09/09/2022 05:36 PM 5.4 % Final     Comment:          Diagnosis of Diabetes-Adults   Non-Diabetic: < or = 5.6%   Increased risk for developing diabetes: 5.7-6.4%   Diagnostic of diabetes: > or = 6.5%  .       Monitoring of Diabetes                Age (y)     Therapeutic Goal (%)   Adults:          >18           <7.0   Pediatrics:    13-18           <7.5                   7-12           <8.0                   0- 6            7.5-8.5   American Diabetes Association. Diabetes Care 33(S1), Jan 2010.     01/08/2022 11:40 AM 5.9 (H) 4.3 - 5.6 % Final     Comment:     American Diabetes Association guidelines indicate that patients with HgbA1c in   the range 5.7-6.4% are at increased risk for development of diabetes, and   intervention by lifestyle modification may be beneficial. HgbA1c greater or   equal to 6.5% is considered diagnostic of diabetes.   09/02/2021 01:28 PM 5.6 4.3 - 5.6 % Final     Comment:     American Diabetes Association guidelines indicate that patients with HgbA1c in   the range 5.7-6.4% are at increased risk for development of diabetes, and   intervention by lifestyle modification may be beneficial. HgbA1c greater or   equal to 6.5% is considered diagnostic of diabetes.     VLDL   Date/Time Value Ref Range Status   09/09/2022 05:36 PM 30 0 - 40 mg/dL Final      Last I/O:  I/O last 3 completed shifts:  In: - (0 mL/kg)   Out: 2900 (30.4 mL/kg) [Urine:2900 (0.8 mL/kg/hr)]  Weight: 95.5 kg     Past  Cardiology Tests (Last 3 Years):  EKG:  ECG 12 Lead 03/25/2024      ECG 12 lead 03/22/2024      ECG 12 lead 02/16/2024    Echo:  Transthoracic Echo (TTE) Limited 04/26/2024      Transthoracic Echo (TTE) Limited 02/17/2024    Ejection Fractions:  EF   Date/Time Value Ref Range Status   02/17/2024 02:12 PM 72 %      Cath:  No results found for this or any previous visit from the past 1095 days.    Stress Test:  No results found for this or any previous visit from the past 1095 days.    Cardiac Imaging:  No results found for this or any previous visit from the past 1095 days.      Past Medical History:  She has no past medical history on file.    Past Surgical History:  She has a past surgical history that includes Other surgical history (11/10/2022); Other surgical history (11/10/2022); Other surgical history (11/10/2022); and Other surgical history (11/10/2022).      Social History:  She reports that she has never smoked. She has never used smokeless tobacco. She reports that she does not drink alcohol and does not use drugs.    Family History:  Family History   Problem Relation Name Age of Onset    No Known Problems Mother      No Known Problems Father      No Known Problems Other Child         Allergies:  Naproxen    Inpatient Medications:  Scheduled medications   Medication Dose Route Frequency    allopurinol  100 mg oral Daily    ascorbic acid  500 mg oral Daily    aspirin  81 mg oral Daily    atorvastatin  40 mg oral Nightly    baclofen  5 mg oral Nightly    cholecalciferol  1,000 Units oral Daily    DULoxetine  30 mg oral Daily    empagliflozin  10 mg oral Daily    ferrous sulfate (325 mg ferrous sulfate)  65 mg of iron oral Daily with breakfast    fluticasone furoate-vilanteroL  1 puff inhalation Daily    furosemide  40 mg intravenous BID AC    gabapentin  100 mg oral BID    metoprolol tartrate  25 mg oral BID    multivitamin with minerals  1 tablet oral Daily    pantoprazole  20 mg oral Daily     sennosides-docusate sodium  2 tablet oral BID    spironolactone  12.5 mg oral Daily     PRN medications   Medication    dextrose    dextrose    glucagon    glucagon    ipratropium-albuteroL    melatonin    oxyCODONE    traMADol     Continuous Medications   Medication Dose Last Rate     Outpatient Medications:  Current Outpatient Medications   Medication Instructions    alendronate (FOSAMAX) 35 mg, oral, Every 7 days    allopurinol (ZYLOPRIM) 100 mg, oral, Daily    ascorbic acid (VITAMIN C) 500 mg, oral, Daily    atorvastatin (LIPITOR) 20 mg, oral, Nightly    baclofen (Lioresal) 5 mg tablet Take one tablet every night for spasm in left hip. If needed, may take one tablet during the day. STOP METHOCARBAMOL    cholecalciferol (VITAMIN D3) 1,000 Units, oral, Daily    dexAMETHasone (DECADRON) 6 mg, oral, Daily    Dodex 1,000 mcg, intramuscular, Every 30 days    DULoxetine (Cymbalta) 30 mg DR capsule TAKE 1 CAPSULE BY MOUTH ONCE DAILY DISCONTINUE 20 MILLIGRAM CAPSULE    empagliflozin (JARDIANCE) 10 mg, oral, Daily    ferrous sulfate (FeroSuL) 325 (65 Fe) MG tablet 65 mg of iron, oral, Daily with breakfast    fluticasone furoate-vilanteroL (Breo Ellipta) 200-25 mcg/dose inhaler 1 puff, inhalation, Daily, Rinse mouth after each use    gabapentin (NEURONTIN) 100 mg, oral, 2 times daily, For neuropathy (nerve pain)     ipratropium-albuteroL (Duo-Neb) 0.5-2.5 mg/3 mL nebulizer solution 3 mL, inhalation, Every 4 hours, INHALE CONTENTS OF 1 VIAL IN NEBULIZER EVERY 4 HOURS WHILE AWAKE    melatonin 2.5 mg tablet,chewable 2 tablets, oral, Nightly PRN, Uses fruit flavored gummy    metoprolol tartrate (Lopressor) 25 mg tablet 1 tablet, oral, 2 times daily    multivitamin with minerals tablet 1 tablet, oral, Daily    naloxone (NARCAN) 4 mg, nasal, As needed, Instill one spray into nostril and dial 911. If no response may repeat x 1 in 2 minutes. Only to be used for suspected opioid overdose.     NON FORMULARY oral, Daily, Heme Boost;  take one tablet daily for anemia    pantoprazole (PROTONIX) 20 mg, oral, Daily    polyethylene glycol (GLYCOLAX, MIRALAX) 17 g, oral, 2 times daily    sennosides (SENOKOT) 8.6 mg, oral, 2 times daily PRN    spironolactone (ALDACTONE) 12.5 mg, oral, Daily, STOP TAKING POTASSIUM    torsemide (Demadex) 20 mg tablet oral, Daily, Take 2 tablets on Mondays- Wednesdays- and Fridays, and 1 tablet all remaining days of the week<BR>    traMADol (ULTRAM) 100 mg, oral, 2 times daily PRN    vit A/vit C/vit E/zinc/copper (PRESERVISION AREDS ORAL) 1 capsule, oral, 2 times daily    VITAMIN B COMPLEX ORAL 1 tablet, oral, Daily RT    vitamin E 400 Units, oral, Daily       Physical Exam:  GEN: NAD, pleasant, elderly  CV: RRR, S1/S2, 2/6 SILKE throughout precordium best heard at RUSB, minimal to no JVD, prominent JV waveform  PULM: Lungs CTAB, no wrr, no increased wob on NC  EXT: trace LE edema      Assessment/Plan   89 y.o. 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 Cardiology c/s for new HFrEF eval.    Pt is 100% AV pacing on tele. Her sx prior to presentation seem most consistent w ADHF and not ACS or worsening stable angina, particularly since her sx have dramatically improved w volume removal.The differential for new HFrEF in this pts case is broad and includes pacemaker mediated CM from frequent RV pacing (seems most likely), progressive decline in EF in the setting of bioprosthetic valve dysfunction, ?tachymediation in the setting of tachy-shahram syndrome vs less likely takotsubo, burnt out HCM, and/or ischemia. Notably pts apex does not appear particularly aneurysmal however she has long standing hypertrophy. Her trops are mildly elevated but flat and so likely demand. Pt also  had no CP sx previously. Would evaluate RV pacing burden via device interrogation. Other etiologys for HFrEF can be evaluated w cMRI.    Recommendations:  -Minimal to no JVD though with prominient JV waveform, trace LE edema, likely approaching euvolemia but continue diuresis for now  -Obtain formal device interrogation Monday to see what burden of RV pacing is  -Recommend stress cMRI on Monday as well if pt has MRI safe device   -Start entresto 24-26 bid now as GDMT for HFrEF, pt otherwise on all other GDMT (nisreen, BB, empa, ASA/Statin)  -If high RV pacing burden would c/s EP for CRT upgrade eval  -Anticipate repeat TTE as outpt in 3 months and cardiology outpt f/u    Staffed with attending Dr. Rivera.    Thank you for the consult. Cardiology will continue to follow.    General Cardiology Consult Pager: [phone] (weekdays 7AM-6PM and weekend 7AM-2PM) and other: [phone]  EP Consult Pager: [phone] (weekdays 7AM-6PM and weekend 7AM-2PM) and other: [phone]  EP Device Nurse Pager: [phone] (weekdays 7AM-5PM)  Advanced Heart Failure Consult Pager: [phone] anytime  CICU Fellow Pager: [phone] anytime  Endovascular/Limb Salvage Team Pager: [phone] for day coverage (8AM-5PM)  Interventional Cardiology Fellow Pager: [phone] for night and weekend coverage  Structural Heart Team Pager: [phone] anytime  Night coverage: [id number]     Peripheral IV 04/25/24 20 G Left;Proximal;Ventral Forearm (Active)   Site Assessment Clean;Dry;Intact 04/27/24 0842   Dressing Status Clean;Dry 04/27/24 0842   Number of days: 2       Code Status:  Full Code    I spent 30 minutes in the professional and overall care of this patient.        Demian Carter MD

## 2024-04-27 NOTE — PROGRESS NOTES
Assessment/Plan   Alayna Dumont is a 89 y.o. 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 New o2 requirment of 2L noted. Labs obtained on admit notable for anemia, hyponatremia, impaired renal function, elevated BNP, & mild hyperkalemia. Trop neg delta. CXR showed cardiomegaly with pulmonary vascular congestion and small left pleural effusion. EKG AV paced. S/p lasix. Noted last admit 3/2024 for ADHF and OSEAS.  Pt presenting from home, lives with son and is wheelchiar bound due to severe left hip OA. Admitted to hospitalist service.  Patient continued on diuretics and her guideline directed medical therapy.  Overall improving after diuresis, diuretics on hold due to bump in creatinine now, symptoms improving.  Echo showed new reduction in EF, also with a new regional wall motion abnormality.  Cardiology consulted, suspect less likely due to ischemia more likely related to the pacemaker.  Recommending pacemaker interrogation on Monday as well as cardiac MRI, pacemaker appears non- conditional, will need to discuss further with service prior to ordering cardiac MRI with stress.    #acute on chronic respiratory failure with hypoxemia  #acute on chronic diastolic congestive heart failure   #pulmonary sarcoidosis  #pulmonary hypertension  #COPD  -likely multifactorial primarily due to decomp HFpEF, however contributing is pulmonary htn, pulmonary sarcoidosis, COPD. ACS ruled out.   -  noted dyspnea, fluid retention, xray chest with pulm edema, elevated bnp. Noted cxr with left pl effusoin. Per records, has history of L pleural effusion not able to be tapped per [facility] IR, had been diuresed prev.   - missed last cards visit, do not see record of pulmonary visits.   - last echo 2/2024 EF 70-75%, mod  cLVH, bioprosthetic AV moderate AV stenosis, moderate to severe aortic regurg, noted prosthetic valve dysfunction due to degeneration, no significant changes compared to 1/2023.  - repeat echo 4/26/2024 showed new reduction in EF to 40 to 45% and new regional wall motion abnormality.  - home regimen: Torsemide 40 mg PO every day (MWF) and 20 mg PO every day rest of wk, Aldactone 12.5 mg PO every day, Jardiance 10 mg PO every day, Lopressor 25 mg PO BID   - continue home dose of Aldactone, Lopressor and Jardiance   - lasix 40mg iv bid, dc given new increase in creatinine, breathing is improving.  No active chest pain.  Follow-up RFP prior to restarting any maintenance dosing of her diuretic.  -Started Entresto per cardiology recommendation  -Started aspirin 81 mg daily, increase statin to 40 mg daily.  If no evidence of ischemia patient can stop aspirin.  Can discuss with cardiology.  - continue home dose of Breo 1 puff every day and Duoneb nebulizer every 4 hrs PRN SOB   - salt restrict, monitor I/o, dialy weight  - tele, goal sat 89-92%  -  cardiology consulted, appreciate recommendations.  Discussed with cardiology, reduction in EF could be from RV pacing or related to burned-out apical HOCM.  Suspect ischemia is less likely.  Recommended cardiac stress MRI, pacemaker interrogation, per record however pacemaker is non conditional.  Will hold off placing order until discussed with cardiology tomorrow.  May need EP evaluation pending formal device interrogation on Monday    #Tachy/shahram syndrome s/p duel champer PPM  -Per record 2/2024, device rep was called and patient has a [employer] pacemaker placed in 2013 with non-conditional [employer] leads  -Patient is recommended MRI, non- conditional pacemaker typically done on Thursdays, ordered device check for Monday, needs to be coordinated.  Will place order for MRI however will need to be cleared by device clinic and MRI, will need to be discussed need for MRI  "and timing with cardiology.  Device interrogation ordered primarily for percentage of pacing to see what the RV pacing burden is.  Typically 100% paced rhythm.    # Hyponatremia  -Mild, will monitor repeat    #hyperkalemia (mild)  - K 5.4 on admit  - Lokelma 10g x1 dose  - RFP ordered for AM, improving.      #CKD stage 3 (stable)  - Cr 1.12 GFR 47 on admit, Cr 1.57 GFR 31 on 3/30  - RFP ordered for AM, repeat stable  - renally dose meds as needed     #SUNDEEP  - Hgb 9.5 on admit, Hgb 10.4 on 3/24  - continue home dose of Ferrous sulfate 325 mg PO every day     #HLD  - continue home dose of Atorvastatin 20 mg PO at bedtime    # Severe OA  # Impaired mobility  -Mostly wheelchair-bound, remains at home, has home visiting primary care.  Has chronic pain issues.  Placed social work consult for home palliative care which she is interested in.    Scheduled outpatient appointments in system:   No future appointments.  ---------------------------------------------------------------------------------------------------  Subjective   No new events, overall feeling better, breathing is improving.  Denies having any new or recent chest pain.  Swelling peripherally is also improving.  She primarily has significant pain in her lower legs.  States she is not mobile and does not leave her house.  We discussed home palliative care which she is interested in.     ---------------------------------------------------------------------------------------------------  Objective   Last Recorded Vitals  Blood pressure 131/57, pulse 60, temperature 36.6 °C (97.9 °F), resp. rate 16, height 1.6 m (5' 3\"), weight 95.5 kg (210 lb 8.6 oz), SpO2 95%.  Intake/Output last 3 Shifts:  I/O last 3 completed shifts:  In: - (0 mL/kg)   Out: 2900 (30.4 mL/kg) [Urine:2900 (0.8 mL/kg/hr)]  Weight: 95.5 kg     Physical Exam  Vitals and nursing note reviewed.   Constitutional:       General: She is not in acute distress.     Appearance: Normal appearance. She is " ill-appearing. She is not toxic-appearing.      Comments: Elderly female, calm and appropriately interactive, engaged in her care   HENT:      Head: Normocephalic and atraumatic.      Mouth/Throat:      Mouth: Mucous membranes are moist.   Eyes:      General: No scleral icterus.     Extraocular Movements: Extraocular movements intact.      Conjunctiva/sclera: Conjunctivae normal.   Cardiovascular:      Rate and Rhythm: Normal rate and regular rhythm.      Heart sounds: S1 normal and S2 normal. No murmur heard.     Comments: Systolic murmur best at apex  Pulmonary:      Effort: Pulmonary effort is normal. No respiratory distress.      Breath sounds: Rales present. No wheezing or rhonchi.      Comments: Bibasilar crackles  Abdominal:      General: Bowel sounds are normal. There is no distension.      Palpations: Abdomen is soft.      Tenderness: There is no abdominal tenderness. There is no guarding or rebound.   Musculoskeletal:         General: Swelling and deformity present.      Cervical back: Neck supple.      Comments: Left leg/knee, peripheral edema is improving, mild.    Skin:     General: Skin is warm and dry.      Findings: No rash.   Neurological:      General: No focal deficit present.      Mental Status: She is alert and oriented to person, place, and time. Mental status is at baseline.   Psychiatric:         Mood and Affect: Mood normal.         Behavior: Behavior normal.         Relevant Results  Lab Results   Component Value Date    WBC 8.5 04/27/2024    HGB 10.6 (L) 04/27/2024    HCT 35.0 (L) 04/27/2024    MCV 96 04/27/2024     04/27/2024      Lab Results   Component Value Date    GLUCOSE 161 (H) 04/27/2024    CALCIUM 9.2 04/27/2024     (L) 04/27/2024    K 4.7 04/27/2024    CO2 29 04/27/2024    CL 95 (L) 04/27/2024    BUN 39 (H) 04/27/2024    CREATININE 1.39 (H) 04/27/2024     Scheduled medications  allopurinol, 100 mg, oral, Daily  ascorbic acid, 500 mg, oral, Daily  aspirin, 81 mg,  oral, Daily  atorvastatin, 40 mg, oral, Nightly  baclofen, 5 mg, oral, Nightly  cholecalciferol, 1,000 Units, oral, Daily  DULoxetine, 30 mg, oral, Daily  empagliflozin, 10 mg, oral, Daily  ferrous sulfate (325 mg ferrous sulfate), 65 mg of iron, oral, Daily with breakfast  fluticasone furoate-vilanteroL, 1 puff, inhalation, Daily  furosemide, 40 mg, intravenous, BID AC  gabapentin, 100 mg, oral, BID  metoprolol tartrate, 25 mg, oral, BID  multivitamin with minerals, 1 tablet, oral, Daily  pantoprazole, 20 mg, oral, Daily  sacubitriL-valsartan, 1 tablet, oral, BID  sennosides-docusate sodium, 2 tablet, oral, BID  spironolactone, 12.5 mg, oral, Daily      Continuous medications     PRN medications  PRN medications: dextrose, dextrose, glucagon, glucagon, ipratropium-albuteroL, melatonin, oxyCODONE, traMADol    Mc Ortiz MD

## 2024-04-28 LAB
ALBUMIN SERPL BCP-MCNC: 3.2 G/DL (ref 3.4–5)
ANION GAP SERPL CALC-SCNC: 12 MMOL/L (ref 10–20)
ATRIAL RATE: 60 BPM
BUN SERPL-MCNC: 44 MG/DL (ref 6–23)
CALCIUM SERPL-MCNC: 8.9 MG/DL (ref 8.6–10.6)
CHLORIDE SERPL-SCNC: 96 MMOL/L (ref 98–107)
CO2 SERPL-SCNC: 34 MMOL/L (ref 21–32)
CREAT SERPL-MCNC: 1.54 MG/DL (ref 0.5–1.05)
EGFRCR SERPLBLD CKD-EPI 2021: 32 ML/MIN/1.73M*2
ERYTHROCYTE [DISTWIDTH] IN BLOOD BY AUTOMATED COUNT: 16.6 % (ref 11.5–14.5)
GLUCOSE BLD MANUAL STRIP-MCNC: 105 MG/DL (ref 74–99)
GLUCOSE BLD MANUAL STRIP-MCNC: 110 MG/DL (ref 74–99)
GLUCOSE BLD MANUAL STRIP-MCNC: 124 MG/DL (ref 74–99)
GLUCOSE BLD MANUAL STRIP-MCNC: 143 MG/DL (ref 74–99)
GLUCOSE SERPL-MCNC: 85 MG/DL (ref 74–99)
HCT VFR BLD AUTO: 33.3 % (ref 36–46)
HGB BLD-MCNC: 10.5 G/DL (ref 12–16)
MAGNESIUM SERPL-MCNC: 2.39 MG/DL (ref 1.6–2.4)
MCH RBC QN AUTO: 29.8 PG (ref 26–34)
MCHC RBC AUTO-ENTMCNC: 31.5 G/DL (ref 32–36)
MCV RBC AUTO: 95 FL (ref 80–100)
NRBC BLD-RTO: 0 /100 WBCS (ref 0–0)
P AXIS: 100 DEGREES
P OFFSET: 139 MS
P ONSET: 119 MS
PHOSPHATE SERPL-MCNC: 4.5 MG/DL (ref 2.5–4.9)
PLATELET # BLD AUTO: 321 X10*3/UL (ref 150–450)
POTASSIUM SERPL-SCNC: 4.6 MMOL/L (ref 3.5–5.3)
PR INTERVAL: 176 MS
Q ONSET: 172 MS
QRS COUNT: 10 BEATS
QRS DURATION: 202 MS
QT INTERVAL: 538 MS
QTC CALCULATION(BAZETT): 538 MS
QTC FREDERICIA: 538 MS
R AXIS: -74 DEGREES
RBC # BLD AUTO: 3.52 X10*6/UL (ref 4–5.2)
SODIUM SERPL-SCNC: 137 MMOL/L (ref 136–145)
T AXIS: 102 DEGREES
T OFFSET: 441 MS
URATE SERPL-MCNC: 6.9 MG/DL (ref 2.3–6.7)
VENTRICULAR RATE: 60 BPM
WBC # BLD AUTO: 7 X10*3/UL (ref 4.4–11.3)

## 2024-04-28 PROCEDURE — 94640 AIRWAY INHALATION TREATMENT: CPT

## 2024-04-28 PROCEDURE — 2500000001 HC RX 250 WO HCPCS SELF ADMINISTERED DRUGS (ALT 637 FOR MEDICARE OP): Performed by: NURSE PRACTITIONER

## 2024-04-28 PROCEDURE — 80069 RENAL FUNCTION PANEL: CPT | Performed by: NURSE PRACTITIONER

## 2024-04-28 PROCEDURE — 2500000001 HC RX 250 WO HCPCS SELF ADMINISTERED DRUGS (ALT 637 FOR MEDICARE OP): Performed by: STUDENT IN AN ORGANIZED HEALTH CARE EDUCATION/TRAINING PROGRAM

## 2024-04-28 PROCEDURE — 85027 COMPLETE CBC AUTOMATED: CPT | Performed by: NURSE PRACTITIONER

## 2024-04-28 PROCEDURE — 83735 ASSAY OF MAGNESIUM: CPT | Performed by: NURSE PRACTITIONER

## 2024-04-28 PROCEDURE — 2500000006 HC RX 250 W HCPCS SELF ADMINISTERED DRUGS (ALT 637 FOR ALL PAYERS): Performed by: NURSE PRACTITIONER

## 2024-04-28 PROCEDURE — 84550 ASSAY OF BLOOD/URIC ACID: CPT | Performed by: STUDENT IN AN ORGANIZED HEALTH CARE EDUCATION/TRAINING PROGRAM

## 2024-04-28 PROCEDURE — 82947 ASSAY GLUCOSE BLOOD QUANT: CPT

## 2024-04-28 PROCEDURE — 2500000002 HC RX 250 W HCPCS SELF ADMINISTERED DRUGS (ALT 637 FOR MEDICARE OP, ALT 636 FOR OP/ED): Mod: MUE | Performed by: NURSE PRACTITIONER

## 2024-04-28 PROCEDURE — 36415 COLL VENOUS BLD VENIPUNCTURE: CPT | Performed by: NURSE PRACTITIONER

## 2024-04-28 PROCEDURE — 99232 SBSQ HOSP IP/OBS MODERATE 35: CPT | Performed by: STUDENT IN AN ORGANIZED HEALTH CARE EDUCATION/TRAINING PROGRAM

## 2024-04-28 PROCEDURE — 1200000002 HC GENERAL ROOM WITH TELEMETRY DAILY

## 2024-04-28 RX ADMIN — SACUBITRIL AND VALSARTAN 1 TABLET: 24; 26 TABLET, FILM COATED ORAL at 09:49

## 2024-04-28 RX ADMIN — GABAPENTIN 100 MG: 100 CAPSULE ORAL at 09:50

## 2024-04-28 RX ADMIN — FLUTICASONE FUROATE AND VILANTEROL TRIFENATATE 1 PUFF: 200; 25 POWDER RESPIRATORY (INHALATION) at 12:48

## 2024-04-28 RX ADMIN — SPIRONOLACTONE 12.5 MG: 25 TABLET, FILM COATED ORAL at 09:48

## 2024-04-28 RX ADMIN — BACLOFEN 5 MG: 10 TABLET ORAL at 21:42

## 2024-04-28 RX ADMIN — EMPAGLIFLOZIN 10 MG: 10 TABLET, FILM COATED ORAL at 09:49

## 2024-04-28 RX ADMIN — SACUBITRIL AND VALSARTAN 1 TABLET: 24; 26 TABLET, FILM COATED ORAL at 21:42

## 2024-04-28 RX ADMIN — FERROUS SULFATE TAB 325 MG (65 MG ELEMENTAL FE) 1 TABLET: 325 (65 FE) TAB at 09:48

## 2024-04-28 RX ADMIN — OXYCODONE HYDROCHLORIDE AND ACETAMINOPHEN 500 MG: 500 TABLET ORAL at 09:49

## 2024-04-28 RX ADMIN — PANTOPRAZOLE SODIUM 20 MG: 20 TABLET, DELAYED RELEASE ORAL at 09:49

## 2024-04-28 RX ADMIN — Medication 1000 UNITS: at 09:48

## 2024-04-28 RX ADMIN — METOPROLOL TARTRATE 25 MG: 50 TABLET, FILM COATED ORAL at 09:49

## 2024-04-28 RX ADMIN — OXYCODONE HYDROCHLORIDE 2.5 MG: 5 TABLET ORAL at 09:48

## 2024-04-28 RX ADMIN — IPRATROPIUM BROMIDE AND ALBUTEROL SULFATE 3 ML: .5; 3 SOLUTION RESPIRATORY (INHALATION) at 17:40

## 2024-04-28 RX ADMIN — ACETAMINOPHEN 975 MG: 325 TABLET ORAL at 09:48

## 2024-04-28 RX ADMIN — SENNOSIDES AND DOCUSATE SODIUM 2 TABLET: 8.6; 5 TABLET ORAL at 09:48

## 2024-04-28 RX ADMIN — METOPROLOL TARTRATE 25 MG: 50 TABLET, FILM COATED ORAL at 21:42

## 2024-04-28 RX ADMIN — ACETAMINOPHEN 975 MG: 325 TABLET ORAL at 18:34

## 2024-04-28 RX ADMIN — SENNOSIDES AND DOCUSATE SODIUM 2 TABLET: 8.6; 5 TABLET ORAL at 21:42

## 2024-04-28 RX ADMIN — Medication 1 TABLET: at 09:48

## 2024-04-28 RX ADMIN — ASPIRIN 81 MG: 81 TABLET, COATED ORAL at 09:48

## 2024-04-28 RX ADMIN — GABAPENTIN 100 MG: 100 CAPSULE ORAL at 21:42

## 2024-04-28 RX ADMIN — ATORVASTATIN CALCIUM 40 MG: 40 TABLET, FILM COATED ORAL at 21:42

## 2024-04-28 RX ADMIN — ALLOPURINOL 100 MG: 100 TABLET ORAL at 09:49

## 2024-04-28 ASSESSMENT — PAIN - FUNCTIONAL ASSESSMENT
PAIN_FUNCTIONAL_ASSESSMENT: 0-10
PAIN_FUNCTIONAL_ASSESSMENT: 0-10

## 2024-04-28 ASSESSMENT — COGNITIVE AND FUNCTIONAL STATUS - GENERAL
DRESSING REGULAR LOWER BODY CLOTHING: TOTAL
WALKING IN HOSPITAL ROOM: TOTAL
HELP NEEDED FOR BATHING: A LOT
TURNING FROM BACK TO SIDE WHILE IN FLAT BAD: A LOT
MOVING FROM LYING ON BACK TO SITTING ON SIDE OF FLAT BED WITH BEDRAILS: A LOT
STANDING UP FROM CHAIR USING ARMS: TOTAL
PERSONAL GROOMING: A LITTLE
CLIMB 3 TO 5 STEPS WITH RAILING: TOTAL
TOILETING: A LOT
DAILY ACTIVITIY SCORE: 14
MOVING TO AND FROM BED TO CHAIR: A LOT
MOBILITY SCORE: 9
DRESSING REGULAR UPPER BODY CLOTHING: A LOT

## 2024-04-28 ASSESSMENT — PAIN SCALES - GENERAL
PAINLEVEL_OUTOF10: 0 - NO PAIN
PAINLEVEL_OUTOF10: 0 - NO PAIN

## 2024-04-28 NOTE — PROGRESS NOTES
"---------------------------------------------------------------------------------------------------  Subjective   No new events, overall feeling better, breathing is improving.  Denies having any new or recent chest pain.  Swelling peripherally is also improving.  She primarily has significant pain in her lower legs.  States she is not mobile and does not leave her house.  We discussed home palliative care which she is interested in.     ---------------------------------------------------------------------------------------------------  Objective   Last Recorded Vitals  Blood pressure (!) 138/41, pulse 60, temperature 36.5 °C (97.7 °F), resp. rate 16, height 1.6 m (5' 3\"), weight 95.5 kg (210 lb 8.6 oz), SpO2 97%.  Intake/Output last 3 Shifts:  I/O last 3 completed shifts:  In: - (0 mL/kg)   Out: 2900 (30.4 mL/kg) [Urine:2900 (0.8 mL/kg/hr)]  Weight: 95.5 kg     Physical Exam  Vitals and nursing note reviewed.   Constitutional:       General: She is not in acute distress.     Appearance: Normal appearance. She is ill-appearing. She is not toxic-appearing.      Comments: Elderly female, calm and appropriately interactive, engaged in her care   HENT:      Head: Normocephalic and atraumatic.      Mouth/Throat:      Mouth: Mucous membranes are moist.   Eyes:      General: No scleral icterus.     Extraocular Movements: Extraocular movements intact.      Conjunctiva/sclera: Conjunctivae normal.   Cardiovascular:      Rate and Rhythm: Normal rate and regular rhythm.      Heart sounds: S1 normal and S2 normal. No murmur heard.     Comments: Systolic murmur best at apex  Pulmonary:      Effort: Pulmonary effort is normal. No respiratory distress.      Breath sounds: Rales present. No wheezing or rhonchi.      Comments: Bibasilar crackles  Abdominal:      General: Bowel sounds are normal. There is no distension.      Palpations: Abdomen is soft.      Tenderness: There is no abdominal tenderness. There is no guarding or " rebound.   Musculoskeletal:         General: Swelling and deformity present.      Cervical back: Neck supple.      Comments: Left leg/knee, peripheral edema is improving, mild.    Skin:     General: Skin is warm and dry.      Findings: No rash.   Neurological:      General: No focal deficit present.      Mental Status: She is alert and oriented to person, place, and time. Mental status is at baseline.   Psychiatric:         Mood and Affect: Mood normal.         Behavior: Behavior normal.         Relevant Results  Lab Results   Component Value Date    WBC 7.0 04/28/2024    HGB 10.5 (L) 04/28/2024    HCT 33.3 (L) 04/28/2024    MCV 95 04/28/2024     04/28/2024      Lab Results   Component Value Date    GLUCOSE 85 04/28/2024    CALCIUM 8.9 04/28/2024     04/28/2024    K 4.6 04/28/2024    CO2 34 (H) 04/28/2024    CL 96 (L) 04/28/2024    BUN 44 (H) 04/28/2024    CREATININE 1.54 (H) 04/28/2024     Scheduled medications  acetaminophen, 975 mg, oral, q8h  allopurinol, 100 mg, oral, Daily  ascorbic acid, 500 mg, oral, Daily  aspirin, 81 mg, oral, Daily  atorvastatin, 40 mg, oral, Nightly  baclofen, 5 mg, oral, Nightly  cholecalciferol, 1,000 Units, oral, Daily  DULoxetine, 30 mg, oral, Daily  empagliflozin, 10 mg, oral, Daily  ferrous sulfate (325 mg ferrous sulfate), 65 mg of iron, oral, Daily with breakfast  fluticasone furoate-vilanteroL, 1 puff, inhalation, Daily  gabapentin, 100 mg, oral, BID  metoprolol tartrate, 25 mg, oral, BID  multivitamin with minerals, 1 tablet, oral, Daily  pantoprazole, 20 mg, oral, Daily  sacubitriL-valsartan, 1 tablet, oral, BID  sennosides-docusate sodium, 2 tablet, oral, BID  spironolactone, 12.5 mg, oral, Daily      Continuous medications     PRN medications  PRN medications: dextrose, dextrose, glucagon, glucagon, ipratropium-albuteroL, melatonin, oxyCODONE, traMADol    Assessment/Plan   Alayna Dumont is a 89 y.o. 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 New o2 requirment of 2L noted. Labs obtained on admit notable for anemia, hyponatremia, impaired renal function, elevated BNP, & mild hyperkalemia. Trop neg delta. CXR showed cardiomegaly with pulmonary vascular congestion and small left pleural effusion. EKG AV paced. S/p lasix. Noted last admit 3/2024 for ADHF and OSEAS.  Pt presenting from home, lives with son and is wheelchiar bound due to severe left hip OA. Admitted to hospitalist service.  Patient continued on diuretics and her guideline directed medical therapy.  Overall improving after diuresis, diuretics on hold due to bump in creatinine now, symptoms improving.  Echo showed new reduction in EF, also with a new regional wall motion abnormality.  Cardiology consulted, suspect less likely due to ischemia more likely related to the pacemaker.  Recommending pacemaker interrogation on Monday as well as cardiac MRI, pacemaker appears non- conditional, will need to discuss further with service prior to ordering cardiac MRI with stress.    #acute on chronic respiratory failure with hypoxemia  #acute on chronic diastolic congestive heart failure   #pulmonary sarcoidosis  #pulmonary hypertension  #COPD  -likely multifactorial primarily due to decomp HFpEF, however contributing is pulmonary htn, pulmonary sarcoidosis, COPD. ACS ruled out.   -  noted dyspnea, fluid retention, xray chest with pulm edema, elevated bnp. Noted cxr with left pl effusoin. Per records, has history of L pleural effusion not able to be tapped per [facility] IR, had been diuresed prev.   - missed last cards visit, do not see record of pulmonary visits.   - last echo 2/2024 EF 70-75%, mod cLVH, bioprosthetic AV moderate AV stenosis, moderate to severe aortic regurg, noted prosthetic valve  dysfunction due to degeneration, no significant changes compared to 1/2023.  - repeat echo 4/26/2024 showed new reduction in EF to 40 to 45% and new regional wall motion abnormality.  - home regimen: Torsemide 40 mg PO every day (MWF) and 20 mg PO every day rest of wk, Aldactone 12.5 mg PO every day, Jardiance 10 mg PO every day, Lopressor 25 mg PO BID   - continue home dose of Aldactone, Lopressor and Jardiance   - lasix 40mg iv bid, dc given new increase in creatinine, breathing is improving.  No active chest pain.  Follow-up RFP prior to restarting any maintenance dosing of her diuretic.  -Started Entresto per cardiology recommendation  -Started aspirin 81 mg daily, increase statin to 40 mg daily.  If no evidence of ischemia patient can stop aspirin.  Can discuss with cardiology.  - continue home dose of Breo 1 puff every day and Duoneb nebulizer every 4 hrs PRN SOB   - salt restrict, monitor I/o, dialy weight  - tele, goal sat 89-92%  -  cardiology consulted, appreciate recommendations.  Discussed with cardiology, reduction in EF could be from RV pacing or related to burned-out apical HOCM.  Suspect ischemia is less likely.  Recommended cardiac stress MRI, pacemaker interrogation, per record however pacemaker is non conditional.  Cardiac device check order placed. Unclear of the exact model to obtain clearance from MRI.     #Knee pain  -Will send for Uric acid  -S/p 1x dose of colchinine  -R knee is painful to touch, but no erythema or edema noted today. No other joint acutely affected.     #Tachy/shahram syndrome s/p duel champer PPM  -Per record 2/2024, device rep was called and patient has a [employer] pacemaker placed in 2013 with non-conditional [employer] leads  -Patient is recommended MRI, non- conditional pacemaker typically done on Thursdays, ordered device check for Monday, needs to be coordinated.  Will place order for MRI however will need to be cleared by device clinic and MRI, will need to be  discussed need for MRI and timing with cardiology.  Device interrogation ordered primarily for percentage of pacing to see what the RV pacing burden is.  Typically 100% paced rhythm.    #hyperkalemia (mild)  - K 5.4 on admit  - Lokelma 10g x1 dose  - RFP ordered for AM, improving.      # OSEAS on CKD stage 3  - Cr 1.12 GFR 47 on admit, Cr 1.57 GFR 31 on 3/30  - Likely in the s/o diuresis, will obtain Urine electrolytes.   - renally dose meds as needed     #SUNDEEP  - Hgb 9.5 on admit, Hgb 10.4 on 3/24  - continue home dose of Ferrous sulfate 325 mg PO every day     #HLD  - continue home dose of Atorvastatin 20 mg PO at bedtime    # Severe OA  # Impaired mobility  -Mostly wheelchair-bound, remains at home, has home visiting primary care.  Has chronic pain issues.  Placed social work consult for home palliative care which she is interested in.    Scheduled outpatient appointments in system:   No future appointments.  Bridger Bob MD

## 2024-04-29 LAB
GLUCOSE BLD MANUAL STRIP-MCNC: 119 MG/DL (ref 74–99)
GLUCOSE BLD MANUAL STRIP-MCNC: 136 MG/DL (ref 74–99)
GLUCOSE BLD MANUAL STRIP-MCNC: 144 MG/DL (ref 74–99)
GLUCOSE BLD MANUAL STRIP-MCNC: 160 MG/DL (ref 74–99)
GLUCOSE BLD MANUAL STRIP-MCNC: 169 MG/DL (ref 74–99)

## 2024-04-29 PROCEDURE — 99232 SBSQ HOSP IP/OBS MODERATE 35: CPT | Performed by: STUDENT IN AN ORGANIZED HEALTH CARE EDUCATION/TRAINING PROGRAM

## 2024-04-29 PROCEDURE — 94640 AIRWAY INHALATION TREATMENT: CPT

## 2024-04-29 PROCEDURE — 99233 SBSQ HOSP IP/OBS HIGH 50: CPT | Performed by: NURSE PRACTITIONER

## 2024-04-29 PROCEDURE — 99497 ADVNCD CARE PLAN 30 MIN: CPT | Performed by: INTERNAL MEDICINE

## 2024-04-29 PROCEDURE — 99223 1ST HOSP IP/OBS HIGH 75: CPT | Performed by: INTERNAL MEDICINE

## 2024-04-29 PROCEDURE — 2500000001 HC RX 250 WO HCPCS SELF ADMINISTERED DRUGS (ALT 637 FOR MEDICARE OP): Performed by: STUDENT IN AN ORGANIZED HEALTH CARE EDUCATION/TRAINING PROGRAM

## 2024-04-29 PROCEDURE — 2500000001 HC RX 250 WO HCPCS SELF ADMINISTERED DRUGS (ALT 637 FOR MEDICARE OP): Performed by: NURSE PRACTITIONER

## 2024-04-29 PROCEDURE — 2500000006 HC RX 250 W HCPCS SELF ADMINISTERED DRUGS (ALT 637 FOR ALL PAYERS): Performed by: NURSE PRACTITIONER

## 2024-04-29 PROCEDURE — 2500000004 HC RX 250 GENERAL PHARMACY W/ HCPCS (ALT 636 FOR OP/ED): Performed by: STUDENT IN AN ORGANIZED HEALTH CARE EDUCATION/TRAINING PROGRAM

## 2024-04-29 PROCEDURE — 1200000002 HC GENERAL ROOM WITH TELEMETRY DAILY

## 2024-04-29 PROCEDURE — 82947 ASSAY GLUCOSE BLOOD QUANT: CPT

## 2024-04-29 PROCEDURE — 2500000002 HC RX 250 W HCPCS SELF ADMINISTERED DRUGS (ALT 637 FOR MEDICARE OP, ALT 636 FOR OP/ED): Mod: MUE | Performed by: NURSE PRACTITIONER

## 2024-04-29 RX ORDER — FUROSEMIDE 10 MG/ML
40 INJECTION INTRAMUSCULAR; INTRAVENOUS ONCE
Status: COMPLETED | OUTPATIENT
Start: 2024-04-29 | End: 2024-04-29

## 2024-04-29 RX ADMIN — FLUTICASONE FUROATE AND VILANTEROL TRIFENATATE 1 PUFF: 200; 25 POWDER RESPIRATORY (INHALATION) at 07:22

## 2024-04-29 RX ADMIN — BACLOFEN 5 MG: 10 TABLET ORAL at 23:13

## 2024-04-29 RX ADMIN — ATORVASTATIN CALCIUM 40 MG: 40 TABLET, FILM COATED ORAL at 21:27

## 2024-04-29 RX ADMIN — TRAMADOL HYDROCHLORIDE 100 MG: 50 TABLET, COATED ORAL at 14:28

## 2024-04-29 RX ADMIN — GABAPENTIN 100 MG: 100 CAPSULE ORAL at 21:27

## 2024-04-29 RX ADMIN — EMPAGLIFLOZIN 10 MG: 10 TABLET, FILM COATED ORAL at 09:19

## 2024-04-29 RX ADMIN — ASPIRIN 81 MG: 81 TABLET, COATED ORAL at 09:19

## 2024-04-29 RX ADMIN — IPRATROPIUM BROMIDE AND ALBUTEROL SULFATE 3 ML: .5; 3 SOLUTION RESPIRATORY (INHALATION) at 07:24

## 2024-04-29 RX ADMIN — GABAPENTIN 100 MG: 100 CAPSULE ORAL at 09:19

## 2024-04-29 RX ADMIN — OXYCODONE HYDROCHLORIDE AND ACETAMINOPHEN 500 MG: 500 TABLET ORAL at 09:19

## 2024-04-29 RX ADMIN — DULOXETINE HYDROCHLORIDE 30 MG: 30 CAPSULE, DELAYED RELEASE ORAL at 09:19

## 2024-04-29 RX ADMIN — SENNOSIDES AND DOCUSATE SODIUM 2 TABLET: 8.6; 5 TABLET ORAL at 21:27

## 2024-04-29 RX ADMIN — Medication 1 TABLET: at 09:19

## 2024-04-29 RX ADMIN — FUROSEMIDE 40 MG: 10 INJECTION, SOLUTION INTRAVENOUS at 17:54

## 2024-04-29 RX ADMIN — ALLOPURINOL 100 MG: 100 TABLET ORAL at 09:20

## 2024-04-29 RX ADMIN — SPIRONOLACTONE 12.5 MG: 25 TABLET, FILM COATED ORAL at 09:19

## 2024-04-29 RX ADMIN — ACETAMINOPHEN 975 MG: 325 TABLET ORAL at 17:54

## 2024-04-29 RX ADMIN — ACETAMINOPHEN 975 MG: 325 TABLET ORAL at 11:22

## 2024-04-29 RX ADMIN — METOPROLOL TARTRATE 25 MG: 50 TABLET, FILM COATED ORAL at 09:19

## 2024-04-29 RX ADMIN — SENNOSIDES AND DOCUSATE SODIUM 2 TABLET: 8.6; 5 TABLET ORAL at 09:20

## 2024-04-29 RX ADMIN — PANTOPRAZOLE SODIUM 20 MG: 20 TABLET, DELAYED RELEASE ORAL at 09:19

## 2024-04-29 RX ADMIN — METOPROLOL TARTRATE 25 MG: 50 TABLET, FILM COATED ORAL at 21:27

## 2024-04-29 RX ADMIN — SACUBITRIL AND VALSARTAN 1 TABLET: 24; 26 TABLET, FILM COATED ORAL at 21:27

## 2024-04-29 RX ADMIN — FERROUS SULFATE TAB 325 MG (65 MG ELEMENTAL FE) 1 TABLET: 325 (65 FE) TAB at 09:19

## 2024-04-29 RX ADMIN — SACUBITRIL AND VALSARTAN 1 TABLET: 24; 26 TABLET, FILM COATED ORAL at 09:19

## 2024-04-29 RX ADMIN — Medication 1000 UNITS: at 09:19

## 2024-04-29 RX ADMIN — OXYCODONE HYDROCHLORIDE 2.5 MG: 5 TABLET ORAL at 09:41

## 2024-04-29 ASSESSMENT — COGNITIVE AND FUNCTIONAL STATUS - GENERAL
HELP NEEDED FOR BATHING: A LOT
DAILY ACTIVITIY SCORE: 14
MOBILITY SCORE: 8
TURNING FROM BACK TO SIDE WHILE IN FLAT BAD: A LOT
TOILETING: A LOT
WALKING IN HOSPITAL ROOM: TOTAL
MOVING TO AND FROM BED TO CHAIR: TOTAL
CLIMB 3 TO 5 STEPS WITH RAILING: TOTAL
MOVING FROM LYING ON BACK TO SITTING ON SIDE OF FLAT BED WITH BEDRAILS: A LOT
PERSONAL GROOMING: A LITTLE
EATING MEALS: A LITTLE
STANDING UP FROM CHAIR USING ARMS: TOTAL
DRESSING REGULAR UPPER BODY CLOTHING: A LOT
DRESSING REGULAR LOWER BODY CLOTHING: A LOT

## 2024-04-29 ASSESSMENT — PAIN SCALES - GENERAL
PAINLEVEL_OUTOF10: 8
PAINLEVEL_OUTOF10: 0 - NO PAIN
PAINLEVEL_OUTOF10: 10 - WORST POSSIBLE PAIN

## 2024-04-29 NOTE — CARE PLAN
The patient's goals for the shift include      The clinical goals for the shift include Pt pain will be controlled during the shift. Goal progressing. Pain was controlled with medication and repositioning. Pt was checked and monitored during the shift. Pt had no falls or injuries during the shift. Pt was rotated during the shift. Most goals met.       Problem: Pain  Goal: My pain/discomfort is manageable  4/29/2024 1844 by Edwige Bustamante RN  Outcome: Not met  4/29/2024 1843 by Edwige Bustamante RN  Outcome: Progressing     Problem: Daily Care  Goal: Daily care needs are met  4/29/2024 1844 by Edwige Bustamante RN  Outcome: Not met  4/29/2024 1843 by Edwige Bustamante RN  Outcome: Progressing     Problem: Psychosocial Needs  Goal: Demonstrates ability to cope with hospitalization/illness  4/29/2024 1844 by Edwige Bustamante RN  Outcome: Not met  4/29/2024 1843 by Edwige Bustamante RN  Outcome: Progressing  Goal: Collaborate with me, my family, and caregiver to identify my specific goals  4/29/2024 1844 by Edwige Bustamante RN  Outcome: Not met  4/29/2024 1843 by Edwige Bustamante RN  Outcome: Progressing     Problem: Discharge Barriers  Goal: My discharge needs are met  4/29/2024 1844 by Edwige Bustamante RN  Outcome: Not met  4/29/2024 1843 by Edwige Bustamante RN  Outcome: Progressing     Problem: Pain  Goal: Takes deep breaths with improved pain control throughout the shift  4/29/2024 1844 by Edwige Bustamante RN  Outcome: Not met  4/29/2024 1843 by Edwige Bustamante RN  Outcome: Progressing  Goal: Turns in bed with improved pain control throughout the shift  4/29/2024 1844 by Edwige Bustamante RN  Outcome: Not met  4/29/2024 1843 by Edwige Bustamante RN  Outcome: Progressing  Goal: Walks with improved pain control throughout the shift  4/29/2024 1844 by Edwige Bustamante RN  Outcome: Not met  4/29/2024 1843 by Edwige Bustamante RN  Outcome: Progressing  Goal:  Performs ADL's with improved pain control throughout shift  4/29/2024 1844 by Edwige Bustamante RN  Outcome: Not met  4/29/2024 1843 by Edwige Bustamante RN  Outcome: Progressing  Goal: Participates in PT with improved pain control throughout the shift  4/29/2024 1844 by Edwige Bustamante RN  Outcome: Not met  4/29/2024 1843 by Edwige Bustamante RN  Outcome: Progressing  Goal: Free from opioid side effects throughout the shift  4/29/2024 1844 by Edwige Bustamante RN  Outcome: Not met  4/29/2024 1843 by Edwige Bustamante RN  Outcome: Progressing  Goal: Free from acute confusion related to pain meds throughout the shift  4/29/2024 1844 by Edwige Bustamante RN  Outcome: Not met  4/29/2024 1843 by Edwige Bustamante RN  Outcome: Progressing     Problem: Skin  Goal: Participates in plan/prevention/treatment measures  4/29/2024 1844 by Edwige Bustamante RN  Outcome: Not met  4/29/2024 1843 by Edwige Bustamante RN  Outcome: Progressing  Flowsheets (Taken 4/29/2024 1843)  Participates in plan/prevention/treatment measures: Elevate heels  Goal: Prevent/manage excess moisture  4/29/2024 1844 by Edwige Bustamante RN  Outcome: Not met  4/29/2024 1843 by Edwige Bustamante RN  Outcome: Progressing  Flowsheets (Taken 4/29/2024 1843)  Prevent/manage excess moisture:   Moisturize dry skin   Cleanse incontinence/protect with barrier cream  Goal: Prevent/minimize sheer/friction injuries  4/29/2024 1844 by Edwige Bustamante RN  Outcome: Not met  4/29/2024 1843 by Edwige Bustamante RN  Outcome: Progressing  Flowsheets (Taken 4/29/2024 1843)  Prevent/minimize sheer/friction injuries: Turn/reposition every 2 hours/use positioning/transfer devices  Goal: Promote/optimize nutrition  4/29/2024 1844 by Edwige Bustamante RN  Outcome: Not met  4/29/2024 1843 by Edwige Bustamante RN  Outcome: Progressing  Flowsheets (Taken 4/29/2024 1843)  Promote/optimize nutrition: Consume > 50%  meals/supplements

## 2024-04-29 NOTE — PROGRESS NOTES
Subjective:  AV paced HR 60  Denies CP   Current with device interrogations. Battery life estimation ~ 10 months (was estimated as 1 year in 2/2024)    Objective:     Cardiac Meds:  Asa 81  Atorva 40  Empa 10  Lasix 40 bid  Metop tart 25 bid  Zohaib 12.5 od    Cardiac Testing:  TTE 4/26/24:  1. Poorly visualized anatomical structures due to suboptimal image quality.   2. Left ventricular systolic function is mildly to moderately decreased with a 40-45% estimated ejection fraction.   3. Entire apex is abnormal.   4. There is severe concentric left ventricular hypertrophy.   5. The left atrium is mild to moderately dilated.   6. Mildly elevated RVSP.   7. Moderate aortic valve stenosis.   8. Moderate aortic valve regurgitation.   9. There is moderate aortic valve cusp calcification.  10. Mild to moderate tricuspid regurgitation visualized.  11. There is moderate to severe mitral annular calcification.  12. There is a bioprosthetic aortic valve.  13. Abnormal septal motion consistent with post-operative status.  14. Compared with study from 2/17/2024, LV systolic dysfunction with regional wall motion is new. 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 No major change in gradients over aortic valve.    TTE 2/24/24  1. Left ventricular systolic function is hyperdynamic with a 70-75% estimated ejection fraction.   2. There is moderate concentric left ventricular hypertrophy.   3. The left atrium is severely dilated.   4. There is a bioprosthetic aortic valve.   5. Moderate aortic valve stenosis.   6. Moderate to severe aortic valve regurgitation.   7. At least moderate AR, severe cannot be ruled out.   8. Prosthetic aortic valve dysfunction due to degenrative changes. Regurg mechanism is unclear if it is perivalvular or transvalvular.   9. Abnormal septal motion consistent with post-operative status.  10. Compared with study from 1/10/2023, no significant change. Gradients over the prosthetic valve is unchanged. PHT for  regurg is also unchanged.     Last Recorded Vitals:  Vitals:    04/28/24 2332 04/29/24 0600 04/29/24 0835 04/29/24 1144   BP: 133/86  (!) 144/35 125/82   Pulse: 60  60 60   Resp: 18  16 15   Temp: 36.7 °C (98.1 °F)  36.5 °C (97.7 °F) 36.3 °C (97.3 °F)   TempSrc:       SpO2: 93%  90% 92%   Weight:  97 kg (213 lb 13.5 oz)     Height:           Last Labs:  CBC - 4/28/2024:  8:21 AM  7.0 10.5 321    33.3      CMP - 4/28/2024:  8:21 AM  8.9 6.5 20 --- 0.3   4.5 3.2 19 66      PTT - No results in last year.  _   _ _     Troponin I, High Sensitivity   Date/Time Value Ref Range Status   04/26/2024 06:56 PM 41 (H) 0 - 34 ng/L Final   04/25/2024 06:39 PM 35 (H) 0 - 34 ng/L Final   04/25/2024 01:31 PM 29 0 - 34 ng/L Final     BNP   Date/Time Value Ref Range Status   04/25/2024 01:31  (H) 0 - 99 pg/mL Final   03/21/2024 10:52  (H) 0 - 99 pg/mL Final     Hemoglobin A1C   Date/Time Value Ref Range Status   04/25/2024 01:31 PM 6.1 (H) see below % Final   09/09/2022 05:36 PM 5.4 % Final     Comment:          Diagnosis of Diabetes-Adults   Non-Diabetic: < or = 5.6%   Increased risk for developing diabetes: 5.7-6.4%   Diagnostic of diabetes: > or = 6.5%  .       Monitoring of Diabetes                Age (y)     Therapeutic Goal (%)   Adults:          >18           <7.0   Pediatrics:    13-18           <7.5                   7-12           <8.0                   0- 6            7.5-8.5   American Diabetes Association. Diabetes Care 33(S1), Jan 2010.     01/08/2022 11:40 AM 5.9 (H) 4.3 - 5.6 % Final     Comment:     American Diabetes Association guidelines indicate that patients with HgbA1c in   the range 5.7-6.4% are at increased risk for development of diabetes, and   intervention by lifestyle modification may be beneficial. HgbA1c greater or   equal to 6.5% is considered diagnostic of diabetes.   09/02/2021 01:28 PM 5.6 4.3 - 5.6 % Final     Comment:     American Diabetes Association guidelines indicate that patients  with HgbA1c in   the range 5.7-6.4% are at increased risk for development of diabetes, and   intervention by lifestyle modification may be beneficial. HgbA1c greater or   equal to 6.5% is considered diagnostic of diabetes.     VLDL   Date/Time Value Ref Range Status   09/09/2022 05:36 PM 30 0 - 40 mg/dL Final      Last I/O:  I/O last 3 completed shifts:  In: 240 (2.5 mL/kg) [P.O.:240]  Out: 800 (8.2 mL/kg) [Urine:800 (0.2 mL/kg/hr)]  Weight: 97 kg     Ejection Fractions:  EF   Date/Time Value Ref Range Status   02/17/2024 02:12 PM 72 %         Allergies:  Naproxen    Inpatient Medications:  Scheduled medications   Medication Dose Route Frequency    acetaminophen  975 mg oral q8h    allopurinol  100 mg oral Daily    ascorbic acid  500 mg oral Daily    aspirin  81 mg oral Daily    atorvastatin  40 mg oral Nightly    baclofen  5 mg oral Nightly    cholecalciferol  1,000 Units oral Daily    DULoxetine  30 mg oral Daily    empagliflozin  10 mg oral Daily    ferrous sulfate (325 mg ferrous sulfate)  65 mg of iron oral Daily with breakfast    fluticasone furoate-vilanteroL  1 puff inhalation Daily    gabapentin  100 mg oral BID    metoprolol tartrate  25 mg oral BID    multivitamin with minerals  1 tablet oral Daily    pantoprazole  20 mg oral Daily    sacubitriL-valsartan  1 tablet oral BID    sennosides-docusate sodium  2 tablet oral BID    spironolactone  12.5 mg oral Daily     PRN medications   Medication    dextrose    dextrose    glucagon    glucagon    ipratropium-albuteroL    melatonin    oxyCODONE    traMADol     Continuous Medications   Medication Dose Last Rate     Outpatient Medications:  Current Outpatient Medications   Medication Instructions    alendronate (FOSAMAX) 35 mg, oral, Every 7 days    allopurinol (ZYLOPRIM) 100 mg, oral, Daily    ascorbic acid (VITAMIN C) 500 mg, oral, Daily    atorvastatin (LIPITOR) 20 mg, oral, Nightly    baclofen (Lioresal) 5 mg tablet Take one tablet every night for spasm in  left hip. If needed, may take one tablet during the day. STOP METHOCARBAMOL    cholecalciferol (VITAMIN D3) 1,000 Units, oral, Daily    dexAMETHasone (DECADRON) 6 mg, oral, Daily    Dodex 1,000 mcg, intramuscular, Every 30 days    DULoxetine (Cymbalta) 30 mg DR capsule TAKE 1 CAPSULE BY MOUTH ONCE DAILY DISCONTINUE 20 MILLIGRAM CAPSULE    empagliflozin (JARDIANCE) 10 mg, oral, Daily    ferrous sulfate (FeroSuL) 325 (65 Fe) MG tablet 65 mg of iron, oral, Daily with breakfast    fluticasone furoate-vilanteroL (Breo Ellipta) 200-25 mcg/dose inhaler 1 puff, inhalation, Daily, Rinse mouth after each use    gabapentin (NEURONTIN) 100 mg, oral, 2 times daily, For neuropathy (nerve pain)     ipratropium-albuteroL (Duo-Neb) 0.5-2.5 mg/3 mL nebulizer solution 3 mL, inhalation, Every 4 hours, INHALE CONTENTS OF 1 VIAL IN NEBULIZER EVERY 4 HOURS WHILE AWAKE    melatonin 2.5 mg tablet,chewable 2 tablets, oral, Nightly PRN, Uses fruit flavored gummy    metoprolol tartrate (Lopressor) 25 mg tablet 1 tablet, oral, 2 times daily    multivitamin with minerals tablet 1 tablet, oral, Daily    naloxone (NARCAN) 4 mg, nasal, As needed, Instill one spray into nostril and dial 911. If no response may repeat x 1 in 2 minutes. Only to be used for suspected opioid overdose.     NON FORMULARY oral, Daily, Heme Boost; take one tablet daily for anemia    pantoprazole (PROTONIX) 20 mg, oral, Daily    polyethylene glycol (GLYCOLAX, MIRALAX) 17 g, oral, 2 times daily    sennosides (SENOKOT) 8.6 mg, oral, 2 times daily PRN    spironolactone (ALDACTONE) 12.5 mg, oral, Daily, STOP TAKING POTASSIUM    torsemide (Demadex) 20 mg tablet oral, Daily, Take 2 tablets on Mondays- Wednesdays- and Fridays, and 1 tablet all remaining days of the week<BR>    traMADol (ULTRAM) 100 mg, oral, 2 times daily PRN    vit A/vit C/vit E/zinc/copper (PRESERVISION AREDS ORAL) 1 capsule, oral, 2 times daily    VITAMIN B COMPLEX ORAL 1 tablet, oral, Daily RT    vitamin E 400  Units, oral, Daily       Physical Exam:  General: Sitting up in bed, room air, NAD  Skin:  warm and dry  HEENT: EOMI. MMM  Cardiovascular: RRR. Systolic murmur.  No JVD at 45 degrees   Respiratory: Reduced A/E left, few bibasilar rales  Gastrointestinal: soft, non-distended  Extremities: small amt chronic LLE edema  Neurological: no gross focal deficits  Psychiatric: appropriate mood and affect      Assessment/Plan   89 y.o. 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 Cardiology c/s for new HFrEF eval.    She is W/C bound and does not leave her house (per primary team's note). She is interested in palliative care.     Recommendations:  - Please give one more dose Lasix 40 mg Iv x1 today and then restart home Torsemide 40 mg daily from tomorrow  - will wait for palliative care evaluation to see what her GOC are prior to proceeding with CMR    - If she would like to proceed with CMR and possibly invasive ischemic eval, will plan for stress CMR on Thursday this week as her PPM is conditional    - Please order MRI Cardiac w/wo contrast with Regadenoson stress for Morph/Funct and Valve Dz     Cardiology will follow  Case discussed with Dr. Ac Kuhn, APRN-CNP  Cardiology Consults    Please call with any questions  Pager [phone] M-F 8a-5p; Saturday 8a-2p  Pager [phone] all other times        Code Status:  Full Code

## 2024-04-29 NOTE — CONSULTS
Reason For Consult  Heart Failure Education  Newly reduced EF    Congestive Heart Failure disease education was performed by the Clinical Nurse Navigator.    CHF signs and symptoms discussed and when to call cardiologist?  Heart Failure Zones  Living With Heart Failure Education booklet? yes  Controlling Heart Failure at Home Education? yes  Home medication usage? GDMT discussed with patient and the importance of adherence.  Nutrition Education? 2g sodium limit. Eat healthy food choices as discussed in HF book.  Fluid Restriction Education? 2L   Daily Weight Education? yes  Follow-up with Cardiologist after discharge education? Yes    Met with patient at bedside for heart failure education. We discussed GOC and QOL during education. Patient is open to meeting with palliative care given medical history given three cardiac related admissions since February 2024. Patient also stated interest in whatever testing is needed after discussing risk and benefits. Patient advised that I will let the team know of her needs.   Spoke with Alexandria Kuhn, cardiology APRN regarding conversation with patient.   4/29/24 Cards Consult Reqs:  Recommendations:  - Please give one more dose Lasix 40 mg Iv x1 today and then restart home Torsemide 40 mg daily from tomorrow  - will wait for palliative care evaluation to see what her GOC are prior to proceeding with CMR    - If she would like to proceed with CMR and possibly invasive ischemic eval, will plan for stress CMR on Thursday this week as her PPM is conditional    - Please order MRI Cardiac w/wo contrast with Regadenoson stress for Morph/Funct and Valve Dz      Cardiology will follow  Case discussed with Dr. Ac Kuhn, APRN-CNP  Cardiology Consults      Echocardiogram: 4/26/24  PHYSICIAN INTERPRETATION:  Left Ventricle: The left ventricular systolic function is mildly to moderately decreased, with an estimated ejection fraction of 40-45%. Wall motion is abnormal. The  left ventricular cavity size is normal. There is severe concentric left ventricular hypertrophy. Abnormal (paradoxical) septal motion consistent with post-operative status. Left ventricular diastolic filling was not assessed.  LV Wall Scoring:  The entire apex is akinetic.     Left Atrium: The left atrium is mild to moderately dilated.  Right Ventricle: The right ventricle was not well visualized. Right ventricular systolic function not assessed.  Right Atrium: The right atrium is mildly dilated.  Aortic Valve: There is a prosthetic aortic valve present. There is moderate aortic valve cusp calcification. There is evidence of moderate aortic valve stenosis.  There is bioprosthetic aortic valve. There is moderate aortic valve regurgitation. The peak instantaneous gradient of the aortic valve is 31.9 mmHg. The mean gradient of the aortic valve is 15.1 mmHg.  Mitral Valve: The mitral valve is mildly thickened. There is moderate to severe mitral annular calcification. There is mild mitral valve regurgitation.  Tricuspid Valve: The tricuspid valve is structurally normal. There is mild to moderate tricuspid regurgitation. The Doppler estimated RVSP is mildly elevated at 42.5 mmHg.  Pulmonic Valve: The pulmonic valve is structurally normal. There is trace pulmonic valve regurgitation.  Pericardium: There is no pericardial effusion noted.  Aorta: The aortic root is normal.  Systemic Veins: The inferior vena cava appears to be of normal size. There is IVC inspiratory collapse greater than 50%.  In comparison to the previous echocardiogram(s): Compared with study from 2/17/2024, LV systolic dysfunction with regional wall motion is new. 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 No major change in gradients over aortic valve.      CONCLUSIONS:   1. Poorly visualized anatomical structures due to suboptimal image quality.   2. Left ventricular systolic function is mildly to moderately decreased with a 40-45% estimated ejection  fraction.   3. Entire apex is abnormal.   4. There is severe concentric left ventricular hypertrophy.   5. The left atrium is mild to moderately dilated.   6. Mildly elevated RVSP.   7. Moderate aortic valve stenosis.   8. Moderate aortic valve regurgitation.   9. There is moderate aortic valve cusp calcification.  10. Mild to moderate tricuspid regurgitation visualized.  11. There is moderate to severe mitral annular calcification.  12. There is a bioprosthetic aortic valve.  13. Abnormal septal motion consistent with post-operative status.  14. Compared with study from 2/17/2024, LV systolic dysfunction with regional wall motion is new. 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 No major change in gradients over aortic valve.    GDMT Hospital Course:  Metoprolol tartrate 25mg BID  Entresto 24-26mg BID  Jardiance 10mg every day  Spironolactone 12.5mg every day     LABS:   Latest Reference Range & Units 04/28/24 08:21   GLUCOSE 74 - 99 mg/dL 85   SODIUM 136 - 145 mmol/L 137   POTASSIUM 3.5 - 5.3 mmol/L 4.6   CHLORIDE 98 - 107 mmol/L 96 (L)   Bicarbonate 21 - 32 mmol/L 34 (H)   Anion Gap 10 - 20 mmol/L 12   Blood Urea Nitrogen 6 - 23 mg/dL 44 (H)   Creatinine 0.50 - 1.05 mg/dL 1.54 (H)   EGFR >60 mL/min/1.73m*2 32 (L)   Calcium 8.6 - 10.6 mg/dL 8.9   PHOSPHORUS 2.5 - 4.9 mg/dL 4.5   Albumin 3.4 - 5.0 g/dL 3.2 (L)   MAGNESIUM 1.60 - 2.40 mg/dL 2.39   URIC ACID 2.3 - 6.7 mg/dL 6.9 (H)      Latest Reference Range & Units 04/25/24 13:31   BNP 0 - 99 pg/mL 952 (H)       Discharge Date/Disposition: 5/4/24 [facility]   Admission weight: 95.7kg  Discharge weight: 92.2kg      GDMT at Discharge- TBD  Jardiance 10mg every day  Metoprolol tartrate 25mg BID  Entresto 24-26mg BID  Spironolactone 12.5mg every day   Torsemide 30mg MWF, 20mg other days        Cardiology Follow-up Appointment(s)  Followed by Miladys ALTAMIRANO for home visits. To be arranged after discharge from Rehab.        Jony Cruz BSN, RN  Clinical Nurse  Navigator- [facility]  [phone]

## 2024-04-29 NOTE — PROGRESS NOTES
"---------------------------------------------------------------------------------------------------  Subjective   No new events, overall feeling better, breathing is improving.  Denies having any new or recent chest pain.  Swelling peripherally is also improving.  She primarily has significant pain in her lower legs.  States she is not mobile and does not leave her house.  We discussed home palliative care which she is interested in.     ---------------------------------------------------------------------------------------------------  Objective   Last Recorded Vitals  Blood pressure (!) 144/35, pulse 60, temperature 36.5 °C (97.7 °F), resp. rate 16, height 1.6 m (5' 3\"), weight 97 kg (213 lb 13.5 oz), SpO2 90%.  Intake/Output last 3 Shifts:  I/O last 3 completed shifts:  In: 240 (2.5 mL/kg) [P.O.:240]  Out: 800 (8.2 mL/kg) [Urine:800 (0.2 mL/kg/hr)]  Weight: 97 kg     Physical Exam  Vitals and nursing note reviewed.   Constitutional:       General: She is not in acute distress.     Appearance: Normal appearance. She is ill-appearing. She is not toxic-appearing.      Comments: Elderly female, calm and appropriately interactive, engaged in her care   HENT:      Head: Normocephalic and atraumatic.      Mouth/Throat:      Mouth: Mucous membranes are moist.   Eyes:      General: No scleral icterus.     Extraocular Movements: Extraocular movements intact.      Conjunctiva/sclera: Conjunctivae normal.   Cardiovascular:      Rate and Rhythm: Normal rate and regular rhythm.      Heart sounds: S1 normal and S2 normal. No murmur heard.     Comments: Systolic murmur best at apex  Pulmonary:      Effort: Pulmonary effort is normal. No respiratory distress.      Breath sounds: Rales present. No wheezing or rhonchi.      Comments: Bibasilar crackles  Abdominal:      General: Bowel sounds are normal. There is no distension.      Palpations: Abdomen is soft.      Tenderness: There is no abdominal tenderness. There is no guarding " or rebound.   Musculoskeletal:         General: Swelling and deformity present.      Cervical back: Neck supple.      Comments: Left leg/knee, peripheral edema is improving, mild.    Skin:     General: Skin is warm and dry.      Findings: No rash.   Neurological:      General: No focal deficit present.      Mental Status: She is alert and oriented to person, place, and time. Mental status is at baseline.   Psychiatric:         Mood and Affect: Mood normal.         Behavior: Behavior normal.         Relevant Results  Lab Results   Component Value Date    WBC 7.0 04/28/2024    HGB 10.5 (L) 04/28/2024    HCT 33.3 (L) 04/28/2024    MCV 95 04/28/2024     04/28/2024      Lab Results   Component Value Date    GLUCOSE 85 04/28/2024    CALCIUM 8.9 04/28/2024     04/28/2024    K 4.6 04/28/2024    CO2 34 (H) 04/28/2024    CL 96 (L) 04/28/2024    BUN 44 (H) 04/28/2024    CREATININE 1.54 (H) 04/28/2024     Scheduled medications  acetaminophen, 975 mg, oral, q8h  allopurinol, 100 mg, oral, Daily  ascorbic acid, 500 mg, oral, Daily  aspirin, 81 mg, oral, Daily  atorvastatin, 40 mg, oral, Nightly  baclofen, 5 mg, oral, Nightly  cholecalciferol, 1,000 Units, oral, Daily  DULoxetine, 30 mg, oral, Daily  empagliflozin, 10 mg, oral, Daily  ferrous sulfate (325 mg ferrous sulfate), 65 mg of iron, oral, Daily with breakfast  fluticasone furoate-vilanteroL, 1 puff, inhalation, Daily  gabapentin, 100 mg, oral, BID  metoprolol tartrate, 25 mg, oral, BID  multivitamin with minerals, 1 tablet, oral, Daily  pantoprazole, 20 mg, oral, Daily  sacubitriL-valsartan, 1 tablet, oral, BID  sennosides-docusate sodium, 2 tablet, oral, BID  spironolactone, 12.5 mg, oral, Daily      Continuous medications     PRN medications  PRN medications: dextrose, dextrose, glucagon, glucagon, ipratropium-albuteroL, melatonin, oxyCODONE, traMADol    Assessment/Plan         Alayna Dumont is a 89 y.o. 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 New o2 requirment of 2L noted. Labs obtained on admit notable for anemia, hyponatremia, impaired renal function, elevated BNP, & mild hyperkalemia. Trop neg delta. CXR showed cardiomegaly with pulmonary vascular congestion and small left pleural effusion. EKG AV paced. S/p lasix. Noted last admit 3/2024 for ADHF and OSEAS.  Pt presenting from home, lives with son and is wheelchair bound due to severe left hip OA. Admitted to hospitalist service.  Patient continued on diuretics and her guideline directed medical therapy.  Overall improving after diuresis, diuretics on hold due to bump in creatinine now, symptoms improving.  Echo showed new reduction in EF, also with a new regional wall motion abnormality.  Cardiology consulted, suspect less likely due to ischemia more likely related to the pacemaker.      - Her device checks are current and she is pacemaker dependent and will pace 100% of the time. In terms of cardiac MRI , this would have to be completed on a non-conditional MRI day (typically Thursdays).       - Shortness of breath is improving, she is dry with bump in creatinine today, held diuretics, please see if we can resume maintenance dosing tomorrow  - Severe OA bilateral legs, does not walk, chronic pain., new right knee pain reported night, nursing that was swollen.  Do not feel need to increase narcotics at this time.  Started Tylenol scheduled and given 1 dose of colchicine.  Less likely gout - uric acid 6.9 monoarticular. This could just be OA. But can consider obtaining Knee Xr +/- ortho for arthrocentesis +/- prednisone given worsening SCr.  She seems to always asked for pain medicine  and talk about her pain and then I stepped out of the back and she goes to sleep.  I did put in for  home palliative care through social work consult.   - Suspect OSEAS on CKD from diuresis, follow up urine electrolytes and hold off on diuresis         -------------------    #acute on chronic respiratory failure with hypoxemia  #acute on chronic diastolic congestive heart failure   #pulmonary sarcoidosis  #pulmonary hypertension  #COPD  -likely multifactorial primarily due to decomp HFpEF, however contributing is pulmonary htn, pulmonary sarcoidosis, COPD. ACS ruled out.   -  noted dyspnea, fluid retention, xray chest with pulm edema, elevated bnp. Noted cxr with left pl effusoin. Per records, has history of L pleural effusion not able to be tapped per [facility] IR, had been diuresed prev.   - missed last cards visit, do not see record of pulmonary visits.   - last echo 2/2024 EF 70-75%, mod cLVH, bioprosthetic AV moderate AV stenosis, moderate to severe aortic regurg, noted prosthetic valve dysfunction due to degeneration, no significant changes compared to 1/2023.  - repeat echo 4/26/2024 showed new reduction in EF to 40 to 45% and new regional wall motion abnormality.  - home regimen: Torsemide 40 mg PO every day (MWF) and 20 mg PO every day rest of wk, Aldactone 12.5 mg PO every day, Jardiance 10 mg PO every day, Lopressor 25 mg PO BID   - continue home dose of Aldactone, Lopressor and Jardiance   - lasix 40mg iv bid, dc given new increase in creatinine, breathing is improving.  No active chest pain.  Follow-up RFP prior to restarting any maintenance dosing of her diuretic.  -Started Entresto per cardiology recommendation  -Started aspirin 81 mg daily, increase statin to 40 mg daily.  If no evidence of ischemia patient can stop aspirin.  Can discuss with cardiology.  - continue home dose of Breo 1 puff every day and Duoneb nebulizer every 4 hrs PRN SOB   - salt restrict, monitor I/o, dialy weight  - tele, goal sat 89-92%  -  cardiology consulted, appreciate recommendations.  Discussed with cardiology, reduction in EF  could be from RV pacing or related to burned-out apical HOCM.  Suspect ischemia is less likely.  Recommended cardiac stress MRI, pacemaker interrogation, per record however pacemaker is non conditional.  Cardiac device check order placed. Unclear of the exact model to obtain clearance from MRI.     #Knee pain  -Will send for Uric acid  -S/p 1x dose of colchinine  -R knee is painful to touch, but no erythema or edema noted today. No other joint acutely affected.     #Tachy/shahram syndrome s/p duel champer PPM  -Per record 2/2024, device rep was called and patient has a [employer] pacemaker placed in 2013 with non-conditional [employer] leads  -Patient is recommended MRI, non- conditional pacemaker typically done on Thursdays, ordered device check for Monday, needs to be coordinated.  Will place order for MRI however will need to be cleared by device clinic and MRI, will need to be discussed need for MRI and timing with cardiology.  Device interrogation ordered primarily for percentage of pacing to see what the RV pacing burden is.  Typically 100% paced rhythm.    #hyperkalemia (mild)  - K 5.4 on admit  - Lokelma 10g x1 dose  - RFP ordered for AM, improving.      # OSEAS on CKD stage 3  - Cr 1.12 GFR 47 on admit, Cr 1.57 GFR 31 on 3/30  - Likely in the s/o diuresis, will obtain Urine electrolytes.   - renally dose meds as needed     #SUNDEEP  - Hgb 9.5 on admit, Hgb 10.4 on 3/24  - continue home dose of Ferrous sulfate 325 mg PO every day     #HLD  - continue home dose of Atorvastatin 20 mg PO at bedtime    # Severe OA  # Impaired mobility  -Mostly wheelchair-bound, remains at home, has home visiting primary care.  Has chronic pain issues.  Placed social work consult for home palliative care which she is interested in.    Scheduled outpatient appointments in system:   No future appointments.  Cydney Chris MD

## 2024-04-29 NOTE — PROGRESS NOTES
Alayna Dumont is a 89 y.o. female on day 4 of admission presenting with Acute on chronic diastolic congestive heart failure (Multi).    Transitional Care Coordination Progress Note:  Patient discussed during interdisciplinary rounds.   Team members present: MD and TCC  Plan per Medical/Surgical team: Waiting on PT/OT to evaluate and Goals of care discussion.  Payor: Medicare  Discharge disposition: Waiting on PT/OT recommendations.  Potential Barriers: None  ADOD: 05/02/24    CAROLYNN HINOJOSA RN

## 2024-04-29 NOTE — CONSULTS
Inpatient consult to Geriatric Medicine  Consult performed by: Leola James MD  Consult ordered by: Cydney Chris MD      Primary Team: Hospital Medicine    Admit Date: 4/25/2024    Emergency Contact:   Extended Emergency Contact Information  Primary Emergency Contact: Contreras Dumont  Home Phone: [phone]  Relation: None  Secondary Emergency Contact: Kelly Tillman  Home Phone: [phone]  Relation: Other     Reason For Consult: Goals of care discussion, CODE STATUS, whether or not she wants to proceed with invasive procedures.    History Of Present Illness:  Alayna Dumont is a 89 y.o. female presenting with worsening shortness of breath and bilateral lower extremity edema with ascites on 04/25/24. Patient has past medical history of heart failure with preserved EF, EF 70 to 75%, moderate to severe aortic valve regurgitation, history of tachybradycardia syndrome status post pacemaker, hypertension, hyperlipidemia, sarcoidosis.  Admitting chest x-ray showed pulmonary vascular congestion and small left pleural effusion.  She was admitted to the medical floor for management of acute exacerbation of CHF.  Cardiology was consulted. Cardiac MRI was recommended with possibility of left heart cath later on. Patient was started on GDMT    History per patient:  Patient stated she was feeling better than previous. She denied any chest pain, dizziness or SOB.     History per sons:  Patient lives at home with his son, Praveen and his girlfriend.  She is usually wheelchair-bound.  She is able to use her edenilson chair lift.  She usually does not and during the day.  She has no cognitive concerns according to her family.  Her son helps with her instrumental activities of daily living due to difficulty ambulating.    What matters most to the patient: Family    Prior to Admission Meds  Prior to Admission Medications   Prescriptions Last Dose Informant Patient Reported? Taking?   DULoxetine (Cymbalta) 30 mg DR capsule   No No    Sig: TAKE 1 CAPSULE BY MOUTH ONCE DAILY DISCONTINUE 20 MILLIGRAM CAPSULE   NON FORMULARY  Self Yes No   Sig: Take by mouth once daily. Heme Boost; take one tablet daily for anemia   VITAMIN B COMPLEX ORAL  Self Yes No   Sig: Take 1 tablet by mouth once daily.   alendronate (Fosamax) 35 mg tablet  Self No No   Sig: Take 1 tablet (35 mg) by mouth every 7 days.   allopurinol (Zyloprim) 100 mg tablet  Self No No   Sig: Take 1 tablet (100 mg) by mouth once daily.   ascorbic acid (Vitamin C) 500 mg tablet  Self Yes No   Sig: Take 1 tablet (500 mg) by mouth once daily.   atorvastatin (Lipitor) 20 mg tablet  Self No No   Sig: TAKE 1 TABLET BY MOUTH AT BEDTIME   baclofen (Lioresal) 5 mg tablet  Self No No   Sig: Take one tablet every night for spasm in left hip. If needed, may take one tablet during the day. STOP METHOCARBAMOL   cholecalciferol (Vitamin D3) 25 MCG (1000 UT) tablet  Self Yes No   Sig: Take 1 tablet (1,000 Units) by mouth once daily.   cyanocobalamin (Dodex) 1,000 mcg/mL injection  Self No No   Sig: inject 1 milliliter intramuscularly every month   dexAMETHasone (Decadron) 6 mg tablet   No No   Sig: Take 1 tablet (6 mg) by mouth once daily for 5 days. Do not start before March 30, 2024.   empagliflozin (Jardiance) 10 mg  Self No No   Sig: Take 1 tablet (10 mg) by mouth once daily.   ferrous sulfate (FeroSuL) 325 (65 Fe) MG tablet  Self No No   Sig: Take 1 tablet (65 mg of iron) by mouth once daily with a meal.   fluticasone furoate-vilanteroL (Breo Ellipta) 200-25 mcg/dose inhaler  Self No No   Sig: Inhale 1 puff once daily. Rinse mouth after each use   gabapentin (Neurontin) 100 mg capsule  Self No No   Sig: Take 1 capsule (100 mg) by mouth 2 times a day. For neuropathy (nerve pain)   ipratropium-albuteroL (Duo-Neb) 0.5-2.5 mg/3 mL nebulizer solution  Self Yes No   Sig: Inhale 3 mL every 4 hours. INHALE CONTENTS OF 1 VIAL IN NEBULIZER EVERY 4 HOURS WHILE AWAKE   melatonin 2.5 mg tablet,chewable  Self Yes No    Sig: Chew 2 tablets (5 mg) as needed at bedtime (insomnia). Uses fruit flavored gummy   metoprolol tartrate (Lopressor) 25 mg tablet  Self Yes No   Sig: Take 1 tablet (25 mg) by mouth 2 times a day.   multivitamin with minerals tablet  Self Yes No   Sig: Take 1 tablet by mouth once daily.   naloxone (Narcan) 4 mg/0.1 mL nasal spray  Self Yes No   Sig: Administer 1 spray (4 mg) into affected nostril(s) if needed for opioid reversal. Instill one spray into nostril and dial 911. If no response may repeat x 1 in 2 minutes. Only to be used for suspected opioid overdose.   pantoprazole (ProtoNix) 20 mg EC tablet  Self No No   Sig: take 1 tablet by mouth once daily   polyethylene glycol (Glycolax, Miralax) 17 gram packet   No No   Sig: Take 17 g by mouth 2 times a day.   sennosides (Senokot) 8.6 mg tablet   No No   Sig: Take 1 tablet (8.6 mg) by mouth 2 times a day as needed for constipation (constipation).   spironolactone (Aldactone) 25 mg tablet  Self No No   Sig: Take 0.5 tablets (12.5 mg) by mouth once daily. STOP TAKING POTASSIUM   torsemide (Demadex) 20 mg tablet  Self Yes No   Sig: Take by mouth once daily. Take 2 tablets on Mondays- Wednesdays- and Fridays, and 1 tablet all remaining days of the week   traMADol (Ultram) 100 mg tablet   No No   Sig: Take 1 tablet (100 mg) by mouth 2 times a day as needed for severe pain (7 - 10).   vit A/vit C/vit E/zinc/copper (PRESERVISION AREDS ORAL)  Self Yes No   Sig: Take 1 capsule by mouth 2 times a day.   vitamin E 180 mg (400 unit) capsule  Self Yes No   Sig: Take 1 capsule (400 Units) by mouth once daily.      Facility-Administered Medications: None        Current Meds in Hospital  Current Facility-Administered Medications   Medication Dose Route Frequency Provider Last Rate Last Admin    acetaminophen (Tylenol) tablet 975 mg  975 mg oral q8h Mc Ortiz MD   975 mg at 04/29/24 1122    allopurinol (Zyloprim) tablet 100 mg  100 mg oral Daily Yancy MENDENHALL  Mario, APRN-CNP   100 mg at 04/29/24 0920    ascorbic acid (Vitamin C) tablet 500 mg  500 mg oral Daily ADARSH Martin-CNP   500 mg at 04/29/24 0919    aspirin EC tablet 81 mg  81 mg oral Daily Mc Ortiz MD   81 mg at 04/29/24 0919    atorvastatin (Lipitor) tablet 40 mg  40 mg oral Nightly Mc Ortiz MD   40 mg at 04/28/24 2142    baclofen (Lioresal) tablet 5 mg  5 mg oral Nightly ADARSH Martin-CNP   5 mg at 04/28/24 2142    cholecalciferol (Vitamin D-3) tablet 1,000 Units  1,000 Units oral Daily ADARSH Martin-CNP   1,000 Units at 04/29/24 0919    dextrose 50 % injection 12.5 g  12.5 g intravenous q15 min PRN ADARSH Martin-CNP        dextrose 50 % injection 25 g  25 g intravenous q15 min PRN Yancy Martin APRN-CNP        DULoxetine (Cymbalta) DR capsule 30 mg  30 mg oral Daily ADARSH Martin-CNP   30 mg at 04/29/24 0919    empagliflozin (Jardiance) tablet 10 mg  10 mg oral Daily ADARSH Martni-CNP   10 mg at 04/29/24 0919    ferrous sulfate (325 mg ferrous sulfate) tablet 1 tablet  65 mg of iron oral Daily with breakfast BELTRAN Martin   1 tablet at 04/29/24 0919    fluticasone furoate-vilanteroL (Breo Ellipta) 200-25 mcg/dose inhaler 1 puff  1 puff inhalation Daily ADARSH Martin-CNP   1 puff at 04/29/24 0722    furosemide (Lasix) injection 40 mg  40 mg intravenous Once Cydney Chris MD        gabapentin (Neurontin) capsule 100 mg  100 mg oral BID ADARSH Martin-CNP   100 mg at 04/29/24 0919    glucagon (Glucagen) injection 1 mg  1 mg intramuscular q15 min PRN Yancy Martin APRN-CNP        glucagon (Glucagen) injection 1 mg  1 mg intramuscular q15 min PRN BELTRAN Martin        ipratropium-albuteroL (Duo-Neb) 0.5-2.5 mg/3 mL nebulizer solution 3 mL  3 mL nebulization q4h PRN BELTRAN Martin   3 mL at 04/29/24 0724     melatonin tablet 3 mg  3 mg oral Nightly PRN Yancy Martin APRN-CNP        metoprolol tartrate (Lopressor) tablet 25 mg  25 mg oral BID ADARSH Martin-CNP   25 mg at 04/29/24 0919    multivitamin with minerals 1 tablet  1 tablet oral Daily ADARSH Martin-CNP   1 tablet at 04/29/24 0919    oxyCODONE (Roxicodone) immediate release tablet 2.5 mg  2.5 mg oral q6h PRN Mc Ortiz MD   2.5 mg at 04/29/24 0941    pantoprazole (ProtoNix) EC tablet 20 mg  20 mg oral Daily ADARSH Martin-CNP   20 mg at 04/29/24 0919    sacubitriL-valsartan (Entresto) 24-26 mg per tablet 1 tablet  1 tablet oral BID Mc Ortiz MD   1 tablet at 04/29/24 0919    sennosides-docusate sodium (Naa-Colace) 8.6-50 mg per tablet 2 tablet  2 tablet oral BID ADARSH Martin-CNP   2 tablet at 04/29/24 0920    spironolactone (Aldactone) tablet 12.5 mg  12.5 mg oral Daily ADARSH Martin-CNP   12.5 mg at 04/29/24 0919    traMADol (Ultram) tablet 100 mg  100 mg oral BID PRN ADARSH Martin-CNP   100 mg at 04/29/24 1428        The patient's outpatient electronic medical record (EMR) is reviewed, notable information includes:  4/19/24-last note from recent discharge in skilled nursing facility    Patient follows longitudinally with  geriatrics housecalls    Past Medical History  Hypertension,   Vitamin B12 deficiency,   Hyperlipidemia,   Chronic pain,   Peripheral neuropathy  LETICIA  Chronic pain syndrome-history of infected left hip status post surgery  Functional urinary incontinence    Surgical History  Past Surgical History:   Procedure Laterality Date    OTHER SURGICAL HISTORY  11/10/2022    Knee surgery    OTHER SURGICAL HISTORY  11/10/2022    Hip surgery    OTHER SURGICAL HISTORY  11/10/2022    Heart surgery    OTHER SURGICAL HISTORY  11/10/2022    Pacemaker insertion        Family History  Her family history includes No Known Problems in her father,  mother, and another family member.     Social History  -Alcohol use: No  -Tobacco use: No  -Illicit drug use: No  -Exercise: No  -Spiritual needs: Religion  -Marital Status:   Occupation: Housewife  Highest Level of Education: Less than High School  Community Resources: No  [place]: No  Current living environment: Lives in house, uses a wheelchair    Activities of Daily Living:  Basic ADLs: (I= independent, A= assistance, D= dependent)  Bathing: A , Dressing: I, Toileting: I, Transferring: D , Continence: D , Feeding: I    [place] Index:  4  Instrumental ADLs: (I= independent, A= assistance, D= dependent)  Ability to use phone: I, Shopping: I, Cooking: A , Housekeeping: A , Laundry: A , Transportation: A , Medications: I, Handle Finances: A  [place] Scale:  8    Allergies  Naproxen    Review of Systems  Review of System:  Constitutional:   -Night sweats/fever: No     -Weight change: Yes    Integumentary: No    Ears, Nose, Throat:  -Hearing loss: Yes, but does not use hearing aids        Eyes:  -Vision loss: Wears glasses for reading and doing     Cardiovascular:  -Chest Pain: No  -Orthopnea: No      -Paroxysmal nocturnal dyspnea: No  -Edema: Yes    Respiratory:   -Dyspnea: No  -Wheezing: No    Gastrointestinal:           -Appetite: Very good  -Difficulty chewing/ swallowing: No  -Heartburn: No  -Bowels: Occasionally constipation    Genitourinary:   -Incontinence: Yes, urine    Musculoskeletal:  -Mobility: Wheelchair bound  -Pain: Pain in knees and hip    Neurological:  -Confusion: No  -Falls: None  -Gait: Wheel chair, lift chair  -Memory: Good, see HPI  -Tremor: A little, sometimes    Psychiatric:  -Mood: Good   -Sleep: Poor due to sleep    Documents on file and valid:  Advance Directive/Living Will: Yes   Health Care Power of : Yes, Praveen Dumont  Code Status: DNR/DNI    Physical Exam  GEN: Alert, oriented to person, place and time, not pale, not jaundiced, morbidly obese  CVS: HS I +II, regular, 3/6  holosystolic murmur heard along the left sternal border   RESP: Diminished air entry  ABD: Full, soft, BS present, nontender, no palpable organs,   EXT: Bilateral leg edema trace to +1    Last Recorded Vitals      4/28/2024     3:50 PM 4/28/2024     8:15 PM 4/28/2024    11:32 PM 4/29/2024     6:00 AM 4/29/2024     8:35 AM 4/29/2024    11:44 AM 4/29/2024     4:41 PM   Vitals   Systolic 126 146 133  144 125 142   Diastolic 46 49 86  35 82 42   Heart Rate 60 60 60  60 60 60   Temp 36.9 °C (98.4 °F) 36.6 °C (97.9 °F) 36.7 °C (98.1 °F)  36.5 °C (97.7 °F) 36.3 °C (97.3 °F) 36.6 °C (97.9 °F)   Resp 16 16 18  16 15 17   Weight (lb)    213.85      BMI    37.88 kg/m2      BSA (m2)    2.08 m2         Vitals:    04/29/24 0600   Weight: 97 kg (213 lb 13.5 oz)        Confusion Assessment Method(CAM) for diagnosis of delirium:    1.  Acute onset or fluctuating course: absent/present: Absent  2.  Inattention: absent/present: Absent  3.  Disorganized thinking: absent/present: Absent  4.  Altered level of consciousness: absent/present: Absent  CAM: negative    AT Score For Assessment of Delirium and Cognitive Impairment:    Alertness: 0  Normal(fully alert,but not agitated, throughout assessment)=0  Mild sleepiness for <10 seconds after walking, then normal=0  Clearly abnormal=4  2.  AMT4: 0  No mistakes=0  One mistake=1  Two or more mistakes/untestable=2  3.  Attention: 0  Achieves seven months or more correctly=0  Starts but scores <7 months/ refuses to start=1  Untestable(cannot start because unwell, drowsy, inattentive)=2  4.  Acute: 0  No=0  Yes=4    Total Score: 0  4 or above: Possible delirium +/- cognitive impairment  1-3: Possible cognitive impairment  0: Delirium or severe cognitive impairment unlikely(but delirium still possible if (4) information incomplete)     Relevant Results  Lab Results   Component Value Date    TSH 2.79 09/09/2022    [id number] 598 09/09/2022    VITD25 28 (A) 09/09/2022    HGBA1C 6.1 (H) 04/25/2024      Results from last 7 days   Lab Units 04/28/24  0821 04/27/24  1053 04/26/24  0711 04/25/24  1331   WBC AUTO x10*3/uL 7.0 8.5 6.1 7.7   HEMOGLOBIN g/dL 10.5* 10.6* 10.0* 9.5*   HEMATOCRIT % 33.3* 35.0* 30.1* 28.6*   ALT U/L  --   --   --  19   AST U/L  --   --   --  20   SODIUM mmol/L 137 135* 134* 133*   POTASSIUM mmol/L 4.6 4.7 4.9 5.4*   CHLORIDE mmol/L 96* 95* 98 98   CREATININE mg/dL 1.54* 1.39* 1.06* 1.12*   BUN mg/dL 44* 39* 34* 40*   CO2 mmol/L 34* 29 28 26   HEMOGLOBIN A1C %  --   --   --  6.1*       Recent Imaging Results      ECG 12 lead  AV dual-paced rhythm  Abnormal ECG  When compared with ECG of 25-MAR-2024 11:35,  No significant change was found  See ED provider note for full interpretation and clinical correlation  Confirmed by Odessa Fuentes ([id number]) on 4/28/2024 3:06:49 AM      Head/Brain Imaging  No results found for this or any previous visit.  No results found for this or any previous visit.        DATA:  EKG: QTC  Encounter Date: 04/25/24   ECG 12 lead   Result Value    Ventricular Rate 60    Atrial Rate 60    NC Interval 176    QRS Duration 202    QT Interval 538    QTC Calculation(Bazett) 538    P Axis 100    R Axis -74    T Axis 102    QRS Count 10    Q Onset 172    P Onset 119    P Offset 139    T Offset 441    QTC Fredericia 538    Narrative    AV dual-paced rhythm  Abnormal ECG  When compared with ECG of 25-MAR-2024 11:35,  No significant change was found  See ED provider note for full interpretation and clinical correlation  Confirmed by Odessa Fuentes ([id number]) on 4/28/2024 3:06:49 AM     Anti-psychotics in 48 hours: No  Opioids/Benzodiazepines in 48 hours: No  Anticholinergics on board:No  Restraints:No  Indwelling catheters:No  Last BM: No BM   UO in 24 hours:  Activity in the past 24 hours:  Need for ambulatory devices:    Assessment/Plan   89-year-old female with multiple comorbidities including heart failure with preserved EF, 66 5%, hypertension, moderate/severe aortic stenosis,  history of tachybradycardia syndrome status post pacemaker, hypertension, hyperlipidemia, sarcoidosis who was admitted with acute decompensated CHF.  2D echo had showed a new reduction in EF, EF 40 to 45%.   Cardiology was consulted and recommended goals of care discussion with regards to aggressive workup for her new cardiomyopathy.    Principal Problem:    Acute on chronic diastolic congestive heart failure (Multi)    Goals of care discussion  Patient's current clinical condition, including diagnosis, prognosis, and management plan, and goals of care were discussed.   Life limiting disease -new heart failure with reduced EF, moderate to severe aortic regurgitation, status post pacemaker with 10 months of battery length  Family: Patient's sons-Dl and to be a very involved in her care.  Praveen is her healthcare power of   Performance status: Patient is mostly wheelchair-bound  Joys/meaning/strength: Her family, trupti  Understanding of health: She demonstrates a good understanding of her health condition so far  Information: We discussed what the cardiology team is recommending in terms of aggressive workup with MRI of the heart as well as probable left heart cath.  Patient stated that she has had a left heart cath 10 years ago prior to CABG. She does not want to go to cardiac cath again.  Goals: She states that her family wants her to be around as much as possible  Worries and fears now and future: That she cannot be there for her family  Minimum acceptable outcome/QOL: Patient and her sons value quality.  She asked that I discussed this further with her sons.  Called both sons in a conference call -son stated that they value quality for their mother.  They do not want aggressive care.  They prefer to optimize medical therapy.  Patient later on stated that, that is what she had also thought independently and had phoned her sond earlier on to let them know what she decided and it appears they are all on the  same page.    We discussed CODE STATUS also -I discussed the various types of CODE STATUS, including full code, DNR/DNI and comfort care.  Patient's son who is the healthcare power of  stated the patient has a living will.  They know very well that patient would not want to be on life support ever.  They opted for DNR/DNI, in the event of an acute cardiopulmonary arrest.    2.  Polypharmacy-patient with chronic pain, functional immobility  She is on baclofen, Cymbalta and gabapentin  Recommend discontinuing baclofen and keeping on Cymbalta and gabapentin  Will need to be followed up in the outpatient    Care Transitions:  -Recommended level for discharge: Pending PT/OT for  -Home going considerations: Pending PT/OT eval  -Primary care physician: Miladys Menon NP    Goals of Care:  -Health care power of :Praveen Dumont  -Living will:Yes  Code status:DNR/DNI    4M AGE-FRIENDLY INITIATIVE:  What matters most to patient: Family  Medications: Baclofen  Mentation: CAM negative, 4 AT 0  Mobility: Wheelchair-bound      Geriatric medicine will continue to follow the patient. Thank you for allowing geriatric medicine to be involved in the care of your patient. Geriatric medicine consultation team is available during work hours Monday through Friday. For any emergency issues requiring immediate assistance over the weekend, please page Geriatrics pager [phone]    Consult Billing Time  Prep time on date of patient encounter(minutes):30  Time directly with patient/family/caregiver(minutes):65  Documentation time(minutes):30  TOTAL TIME(minutes):125    Time spent discussing CODE STATUS: 17 minutes    Leola James MD

## 2024-04-29 NOTE — CARE PLAN
The patient's goals for the shift include  comfort.    The clinical goals for the shift include Patient will be repositioned to ensure comfort throughout shift.

## 2024-04-30 LAB
ALBUMIN SERPL BCP-MCNC: 3.6 G/DL (ref 3.4–5)
ANION GAP SERPL CALC-SCNC: 14 MMOL/L (ref 10–20)
BUN SERPL-MCNC: 56 MG/DL (ref 6–23)
CALCIUM SERPL-MCNC: 8.6 MG/DL (ref 8.6–10.6)
CHLORIDE SERPL-SCNC: 98 MMOL/L (ref 98–107)
CO2 SERPL-SCNC: 27 MMOL/L (ref 21–32)
CREAT SERPL-MCNC: 1.41 MG/DL (ref 0.5–1.05)
EGFRCR SERPLBLD CKD-EPI 2021: 35 ML/MIN/1.73M*2
ERYTHROCYTE [DISTWIDTH] IN BLOOD BY AUTOMATED COUNT: 16.3 % (ref 11.5–14.5)
GLUCOSE BLD MANUAL STRIP-MCNC: 134 MG/DL (ref 74–99)
GLUCOSE BLD MANUAL STRIP-MCNC: 136 MG/DL (ref 74–99)
GLUCOSE BLD MANUAL STRIP-MCNC: 97 MG/DL (ref 74–99)
GLUCOSE SERPL-MCNC: 134 MG/DL (ref 74–99)
HCT VFR BLD AUTO: 34.2 % (ref 36–46)
HGB BLD-MCNC: 10.6 G/DL (ref 12–16)
MCH RBC QN AUTO: 29.7 PG (ref 26–34)
MCHC RBC AUTO-ENTMCNC: 31 G/DL (ref 32–36)
MCV RBC AUTO: 96 FL (ref 80–100)
NRBC BLD-RTO: 0 /100 WBCS (ref 0–0)
PHOSPHATE SERPL-MCNC: 4.2 MG/DL (ref 2.5–4.9)
PLATELET # BLD AUTO: 337 X10*3/UL (ref 150–450)
POTASSIUM SERPL-SCNC: 4.6 MMOL/L (ref 3.5–5.3)
RBC # BLD AUTO: 3.57 X10*6/UL (ref 4–5.2)
SODIUM SERPL-SCNC: 134 MMOL/L (ref 136–145)
WBC # BLD AUTO: 6.8 X10*3/UL (ref 4.4–11.3)

## 2024-04-30 PROCEDURE — 1200000002 HC GENERAL ROOM WITH TELEMETRY DAILY

## 2024-04-30 PROCEDURE — 97166 OT EVAL MOD COMPLEX 45 MIN: CPT | Mod: GO | Performed by: OCCUPATIONAL THERAPIST

## 2024-04-30 PROCEDURE — 82565 ASSAY OF CREATININE: CPT | Mod: 91 | Performed by: STUDENT IN AN ORGANIZED HEALTH CARE EDUCATION/TRAINING PROGRAM

## 2024-04-30 PROCEDURE — 97161 PT EVAL LOW COMPLEX 20 MIN: CPT | Mod: GP

## 2024-04-30 PROCEDURE — 2500000001 HC RX 250 WO HCPCS SELF ADMINISTERED DRUGS (ALT 637 FOR MEDICARE OP): Performed by: NURSE PRACTITIONER

## 2024-04-30 PROCEDURE — 94640 AIRWAY INHALATION TREATMENT: CPT

## 2024-04-30 PROCEDURE — 97530 THERAPEUTIC ACTIVITIES: CPT | Mod: GO | Performed by: OCCUPATIONAL THERAPIST

## 2024-04-30 PROCEDURE — 36415 COLL VENOUS BLD VENIPUNCTURE: CPT | Performed by: STUDENT IN AN ORGANIZED HEALTH CARE EDUCATION/TRAINING PROGRAM

## 2024-04-30 PROCEDURE — 2500000002 HC RX 250 W HCPCS SELF ADMINISTERED DRUGS (ALT 637 FOR MEDICARE OP, ALT 636 FOR OP/ED): Performed by: NURSE PRACTITIONER

## 2024-04-30 PROCEDURE — 2500000006 HC RX 250 W HCPCS SELF ADMINISTERED DRUGS (ALT 637 FOR ALL PAYERS): Performed by: NURSE PRACTITIONER

## 2024-04-30 PROCEDURE — 85027 COMPLETE CBC AUTOMATED: CPT | Performed by: STUDENT IN AN ORGANIZED HEALTH CARE EDUCATION/TRAINING PROGRAM

## 2024-04-30 PROCEDURE — 99233 SBSQ HOSP IP/OBS HIGH 50: CPT | Performed by: NURSE PRACTITIONER

## 2024-04-30 PROCEDURE — 82947 ASSAY GLUCOSE BLOOD QUANT: CPT

## 2024-04-30 PROCEDURE — 97530 THERAPEUTIC ACTIVITIES: CPT | Mod: GP

## 2024-04-30 PROCEDURE — 99232 SBSQ HOSP IP/OBS MODERATE 35: CPT | Performed by: STUDENT IN AN ORGANIZED HEALTH CARE EDUCATION/TRAINING PROGRAM

## 2024-04-30 PROCEDURE — 2500000001 HC RX 250 WO HCPCS SELF ADMINISTERED DRUGS (ALT 637 FOR MEDICARE OP): Performed by: STUDENT IN AN ORGANIZED HEALTH CARE EDUCATION/TRAINING PROGRAM

## 2024-04-30 RX ORDER — TORSEMIDE 20 MG/1
20 TABLET ORAL DAILY
Status: DISCONTINUED | OUTPATIENT
Start: 2024-05-01 | End: 2024-05-04 | Stop reason: HOSPADM

## 2024-04-30 RX ADMIN — SACUBITRIL AND VALSARTAN 1 TABLET: 24; 26 TABLET, FILM COATED ORAL at 20:46

## 2024-04-30 RX ADMIN — IPRATROPIUM BROMIDE AND ALBUTEROL SULFATE 3 ML: .5; 3 SOLUTION RESPIRATORY (INHALATION) at 07:32

## 2024-04-30 RX ADMIN — DULOXETINE HYDROCHLORIDE 30 MG: 30 CAPSULE, DELAYED RELEASE ORAL at 09:20

## 2024-04-30 RX ADMIN — TRAMADOL HYDROCHLORIDE 100 MG: 50 TABLET, COATED ORAL at 01:17

## 2024-04-30 RX ADMIN — ATORVASTATIN CALCIUM 40 MG: 40 TABLET, FILM COATED ORAL at 20:46

## 2024-04-30 RX ADMIN — Medication 1000 UNITS: at 09:19

## 2024-04-30 RX ADMIN — GABAPENTIN 100 MG: 100 CAPSULE ORAL at 20:46

## 2024-04-30 RX ADMIN — METOPROLOL TARTRATE 25 MG: 50 TABLET, FILM COATED ORAL at 09:19

## 2024-04-30 RX ADMIN — SACUBITRIL AND VALSARTAN 1 TABLET: 24; 26 TABLET, FILM COATED ORAL at 09:20

## 2024-04-30 RX ADMIN — Medication 1 TABLET: at 09:19

## 2024-04-30 RX ADMIN — EMPAGLIFLOZIN 10 MG: 10 TABLET, FILM COATED ORAL at 09:00

## 2024-04-30 RX ADMIN — OXYCODONE HYDROCHLORIDE AND ACETAMINOPHEN 500 MG: 500 TABLET ORAL at 09:19

## 2024-04-30 RX ADMIN — ASPIRIN 81 MG: 81 TABLET, COATED ORAL at 09:19

## 2024-04-30 RX ADMIN — ALLOPURINOL 100 MG: 100 TABLET ORAL at 09:19

## 2024-04-30 RX ADMIN — FERROUS SULFATE TAB 325 MG (65 MG ELEMENTAL FE) 1 TABLET: 325 (65 FE) TAB at 09:19

## 2024-04-30 RX ADMIN — METOPROLOL TARTRATE 25 MG: 50 TABLET, FILM COATED ORAL at 20:46

## 2024-04-30 RX ADMIN — SENNOSIDES AND DOCUSATE SODIUM 2 TABLET: 8.6; 5 TABLET ORAL at 20:46

## 2024-04-30 RX ADMIN — TRAMADOL HYDROCHLORIDE 100 MG: 50 TABLET, COATED ORAL at 12:53

## 2024-04-30 RX ADMIN — GABAPENTIN 100 MG: 100 CAPSULE ORAL at 09:19

## 2024-04-30 RX ADMIN — SENNOSIDES AND DOCUSATE SODIUM 2 TABLET: 8.6; 5 TABLET ORAL at 09:19

## 2024-04-30 RX ADMIN — FLUTICASONE FUROATE AND VILANTEROL TRIFENATATE 1 PUFF: 200; 25 POWDER RESPIRATORY (INHALATION) at 07:32

## 2024-04-30 RX ADMIN — PANTOPRAZOLE SODIUM 20 MG: 20 TABLET, DELAYED RELEASE ORAL at 09:20

## 2024-04-30 RX ADMIN — ACETAMINOPHEN 975 MG: 325 TABLET ORAL at 12:52

## 2024-04-30 RX ADMIN — ACETAMINOPHEN 975 MG: 325 TABLET ORAL at 17:52

## 2024-04-30 RX ADMIN — SPIRONOLACTONE 12.5 MG: 25 TABLET, FILM COATED ORAL at 09:19

## 2024-04-30 ASSESSMENT — ACTIVITIES OF DAILY LIVING (ADL)
BATHING_ASSISTANCE: MODERATE
ADL_ASSISTANCE: NEEDS ASSISTANCE

## 2024-04-30 ASSESSMENT — COGNITIVE AND FUNCTIONAL STATUS - GENERAL
WALKING IN HOSPITAL ROOM: TOTAL
MOVING TO AND FROM BED TO CHAIR: A LOT
STANDING UP FROM CHAIR USING ARMS: A LOT
MOVING FROM LYING ON BACK TO SITTING ON SIDE OF FLAT BED WITH BEDRAILS: A LOT
MOBILITY SCORE: 10
HELP NEEDED FOR BATHING: A LOT
DAILY ACTIVITIY SCORE: 15
TOILETING: TOTAL
PERSONAL GROOMING: A LITTLE
DRESSING REGULAR LOWER BODY CLOTHING: A LOT
TURNING FROM BACK TO SIDE WHILE IN FLAT BAD: A LOT
CLIMB 3 TO 5 STEPS WITH RAILING: TOTAL
DRESSING REGULAR UPPER BODY CLOTHING: A LITTLE

## 2024-04-30 ASSESSMENT — PAIN SCALES - GENERAL: PAINLEVEL_OUTOF10: 8

## 2024-04-30 ASSESSMENT — PAIN - FUNCTIONAL ASSESSMENT: PAIN_FUNCTIONAL_ASSESSMENT: 0-10

## 2024-04-30 NOTE — CARE PLAN
The patient's goals for the shift include      The clinical goals for the shift include Pt will be HDS during the shift. Goal met. Pt was checked and monitored throughout the shift. Most goals met. Pt was changed and rotated throughout the shift.       Problem: Fall/Injury  Goal: Not fall by end of shift  Outcome: Met  Goal: Be free from injury by end of the shift  Outcome: Met  Goal: Verbalize understanding of personal risk factors for fall in the hospital  Outcome: Met  Goal: Verbalize understanding of risk factor reduction measures to prevent injury from fall in the home  Outcome: Met  Goal: Use assistive devices by end of the shift  Outcome: Met  Goal: Pace activities to prevent fatigue by end of the shift  Outcome: Met

## 2024-04-30 NOTE — PROGRESS NOTES
Subjective:  Diuresed well with lasix 40 mg IV yesterday  AV paced HR 60  Geriatrics had discussion with patient and son to determine GOC. All agreeable to avoid invasive procedures and proceed with medical management  Code status changed to DNR/DNI          Objective:     Last Recorded Vitals:  Vitals:    04/30/24 0500 04/30/24 0600 04/30/24 0714 04/30/24 1141   BP:   104/67 98/69   BP Location:    Right arm   Pulse:   60 60   Resp: 17  15 16   Temp:   36.5 °C (97.7 °F) 36.5 °C (97.7 °F)   TempSrc:    Tympanic   SpO2:   94% 91%   Weight:  92.2 kg (203 lb 4.2 oz)     Height:           Last Labs:  CBC - 4/28/2024:  8:21 AM  7.0 10.5 321    33.3      CMP - 4/28/2024:  8:21 AM  8.9 6.5 20 --- 0.3   4.5 3.2 19 66      PTT - No results in last year.  _   _ _     Troponin I, High Sensitivity   Date/Time Value Ref Range Status   04/26/2024 06:56 PM 41 (H) 0 - 34 ng/L Final   04/25/2024 06:39 PM 35 (H) 0 - 34 ng/L Final   04/25/2024 01:31 PM 29 0 - 34 ng/L Final     BNP   Date/Time Value Ref Range Status   04/25/2024 01:31  (H) 0 - 99 pg/mL Final   03/21/2024 10:52  (H) 0 - 99 pg/mL Final     Hemoglobin A1C   Date/Time Value Ref Range Status   04/25/2024 01:31 PM 6.1 (H) see below % Final   09/09/2022 05:36 PM 5.4 % Final     Comment:          Diagnosis of Diabetes-Adults   Non-Diabetic: < or = 5.6%   Increased risk for developing diabetes: 5.7-6.4%   Diagnostic of diabetes: > or = 6.5%  .       Monitoring of Diabetes                Age (y)     Therapeutic Goal (%)   Adults:          >18           <7.0   Pediatrics:    13-18           <7.5                   7-12           <8.0                   0- 6            7.5-8.5   American Diabetes Association. Diabetes Care 33(S1), Jan 2010.     01/08/2022 11:40 AM 5.9 (H) 4.3 - 5.6 % Final     Comment:     American Diabetes Association guidelines indicate that patients with HgbA1c in   the range 5.7-6.4% are at increased risk for development of diabetes, and    intervention by lifestyle modification may be beneficial. HgbA1c greater or   equal to 6.5% is considered diagnostic of diabetes.   09/02/2021 01:28 PM 5.6 4.3 - 5.6 % Final     Comment:     American Diabetes Association guidelines indicate that patients with HgbA1c in   the range 5.7-6.4% are at increased risk for development of diabetes, and   intervention by lifestyle modification may be beneficial. HgbA1c greater or   equal to 6.5% is considered diagnostic of diabetes.     VLDL   Date/Time Value Ref Range Status   09/09/2022 05:36 PM 30 0 - 40 mg/dL Final      Last I/O:  I/O last 3 completed shifts:  In: - (0 mL/kg)   Out: 1000 (10.8 mL/kg) [Urine:1000 (0.3 mL/kg/hr)]  Weight: 92.2 kg     Ejection Fractions:  EF   Date/Time Value Ref Range Status   02/17/2024 02:12 PM 72 %         Allergies:  Naproxen    Inpatient Medications:  Scheduled medications   Medication Dose Route Frequency    acetaminophen  975 mg oral q8h    allopurinol  100 mg oral Daily    ascorbic acid  500 mg oral Daily    aspirin  81 mg oral Daily    atorvastatin  40 mg oral Nightly    baclofen  5 mg oral Nightly    cholecalciferol  1,000 Units oral Daily    DULoxetine  30 mg oral Daily    empagliflozin  10 mg oral Daily    ferrous sulfate (325 mg ferrous sulfate)  65 mg of iron oral Daily with breakfast    fluticasone furoate-vilanteroL  1 puff inhalation Daily    gabapentin  100 mg oral BID    metoprolol tartrate  25 mg oral BID    multivitamin with minerals  1 tablet oral Daily    pantoprazole  20 mg oral Daily    sacubitriL-valsartan  1 tablet oral BID    sennosides-docusate sodium  2 tablet oral BID    spironolactone  12.5 mg oral Daily     PRN medications   Medication    dextrose    dextrose    glucagon    glucagon    ipratropium-albuteroL    melatonin    oxyCODONE    traMADol     Continuous Medications   Medication Dose Last Rate         Physical Exam:  General: Sitting up in bed, room air, NAD  Skin:  warm and dry  HEENT: EOMI.  MMM  Cardiovascular: RRR. Systolic murmur.  No JVD at 45 degrees   Respiratory:  few bibasilar rales  Gastrointestinal: soft, non-distended  Extremities: small amt chronic LLE edema  Neurological: no gross focal deficits  Psychiatric: appropriate mood and affect      Assessment/Plan   89 y.o. 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 Cardiology c/s for new HFrEF eval.    She is W/C bound and does not leave her house. Her son lives close and assists with meals etc as necessary.        Recommendations:  - Please obtain basic labs to follow renal function with diuresis  - Please restart home Torsemide 40 mg daily from tomorrow ( or at least by discharge)   - She is not interested in invasive procedures  - Will proceed with Max medical therapy  - continue ASA, Atorva, Empagliflozin, Metoprolol, Entresto and Spironolactone   - Continue with home visits from her primary care provider (SANDRITA Menon) as prior to hospitalization   - It is my understanding she would like to be DNR/DNI, I will leave this to primary team    I called her son Praveen to provide an update regarding current cardiology plan of care. I left a message for him to return my call.    Cardiology will sign off  Case discussed with ADARSH Mojica-CNP  Cardiology Consults    Please call with any questions  Pager [phone] M-F 8a-5p; Saturday 8a-2p  Pager [phone] all other times

## 2024-04-30 NOTE — PROGRESS NOTES
"Physical Therapy    Physical Therapy Evaluation & Treatment    Patient Name: Alayna Dumont  MRN: [id number]  Today's Date: 4/30/2024   Time Calculation  Start Time: 1009  Stop Time: 1038  Time Calculation (min): 29 min    Assessment/Plan   PT Assessment  PT Assessment Results: Decreased strength, Decreased endurance, Decreased mobility  Rehab Prognosis: Good  Barriers to Discharge: medical comorbidities  End of Session Communication: Bedside nurse  Assessment Comment: Pt demos need for assist to complete all mobility tasks, will benefit from continued skilled therapy to return to baseline.  End of Session Patient Position: Bed, 3 rail up, Alarm on   IP OR SWING BED PT PLAN  Inpatient or Swing Bed: Inpatient  PT Plan  Treatment/Interventions: Bed mobility, Transfer training, Balance training, Strengthening, Therapeutic exercise  PT Plan: Skilled PT  PT Frequency: 3 times per week  PT Discharge Recommendations: Moderate intensity level of continued care  PT Recommended Transfer Status: Assist x2  PT - OK to Discharge: Yes (POC/goals/discharge rec intensity created)    Subjective     General Visit Information:  General  Reason for Referral: acute on chronic resp failure, CHF exacerbation  Past Medical History Relevant to Rehab: Covid 3/2024, HTN, SUNDEEP, HF, AVR, pacemaker, sarcoidosis, COPD, Afib  Co-Treatment: OT  Co-Treatment Reason: need for ASAP evals, pt with baseline lower mobility, anticipated need for 2 skilled assist to mobilize safely  Prior to Session Communication: Bedside nurse  General Comment: Pt agreeable to participate, states she dangled her legs off the bed yesterday which caused her \"good leg\" to hurt but it is feeling better.  Completes mobility tasks with assist this session.  Will continue to follow.  Home Living:  Home Living  Type of Home: House  Lives With: Adult children (reports son that lives with her is unable to physically assist her, another son needs to push her in wheelchair up ramp to " Patient need a 2 week hospital follow up [phone]     get into house)  Home Adaptive Equipment: Wheelchair-manual, Wheelchair-power, Walker rolling or standard, Hospital bed  Home Layout: One level  Home Access: Ramped entrance  Prior Level of Function:  Prior Function Per Pt/Caregiver Report  ADL Assistance: Needs assistance  Ambulatory Assistance:  (reports recent baseline of (I) stand/squat pivots to and from surfaces, self propels wheelchair within houeshold.  Does not appear to use power chair, reports it is in the living room.  At SN[facility], was working towards (I) transfers.)  Precautions:  Precautions  Hearing/Visual Limitations: appears WFL  Medical Precautions: Fall precautions     Objective   Pain:  Pain Assessment  Pain Assessment: 0-10  Pain Score:  (does not give # rating)  Pain Location: Knee  Pain Orientation: Right  Cognition:  Cognition  Arousal/Alertness: Appropriate responses to stimuli  Orientation Level: Oriented X4  Following Commands: Follows one step commands with increased time    General Assessments:     Activity Tolerance  Endurance: Tolerates 30 min exercise with multiple rests    Sensation  Light Touch: No apparent deficits  Proprioception: No apparent deficits        Perception  Inattention/Neglect: Appears intact  Initiation: Appears intact  Motor Planning: Appears intact  Perseveration: Not present     Postural Control  Posture Comment: rounded shoulders/ forward flexed    Static Sitting Balance  Static Sitting-Balance Support: Feet supported  Static Sitting-Level of Assistance: Close supervision, Distant supervision  Dynamic Sitting Balance  Dynamic Sitting-Balance Support: Feet supported, Bilateral upper extremity supported  Dynamic Sitting-Comments: CGA    Static Standing Balance  Static Standing-Balance Support: Bilateral upper extremity supported  Static Standing-Level of Assistance: Moderate assistance  Static Standing-Comment/Number of Minutes: with whw  Functional Assessments:  Bed Mobility  Bed Mobility: Yes  Bed Mobility 1  Bed  "Mobility 1: Supine to sitting  Level of Assistance 1: Maximum assistance, Maximum verbal cues (x1)  Bed Mobility Comments 1: HOB raised, assist at trunk  Bed Mobility 2  Bed Mobility  2: Scooting (lateral along EOB)  Level of Assistance 2: Contact guard, Moderate verbal cues  Bed Mobility 3  Bed Mobility 3: Sitting to supine  Level of Assistance 3: Moderate assistance, Maximum verbal cues (x2)    Transfers  Transfer: Yes  Transfer 1  Technique 1: Sit to stand, Stand to sit  Transfer Device 1: Walker (raised bed height)  Transfer Level of Assistance 1: Maximum assistance, +2, Moderate verbal cues  Trials/Comments 1: maintained flexion at knees, hips, trunk    Ambulation/Gait Training  Ambulation/Gait Training Performed: No     Extremity/Trunk Assessments:  RLE   RLE : Exceptions to WFL  Strength RLE  RLE Overall Strength: Greater than or equal to 3/5 as evidenced by functional mobility  LLE   LLE : Exceptions to WFL  Strength LLE  LLE Overall Strength: Greater than or equal to 3/5 as evidenced by functional mobility (reports (L) leg is \"crooked\" at baseline, cannot fully extend at knee)  Treatments:  Therapeutic Exercise  Therapeutic Exercise Performed: Yes  Therapeutic Exercise Activity 1: (B) LE LAQ    Therapeutic Activity  Therapeutic Activity Performed: Yes  Therapeutic Activity 1: Assisted with chux change due to leaky purewick while pt maintained static stand at walker with mod ax1 ~2 min  Outcome Measures:  [facility] Basic Mobility  Turning from your back to your side while in a flat bed without using bedrails: A lot  Moving from lying on your back to sitting on the side of a flat bed without using bedrails: A lot  Moving to and from bed to chair (including a wheelchair): A lot  Standing up from a chair using your arms (e.g. wheelchair or bedside chair): A lot  To walk in hospital room: Total  Climbing 3-5 steps with railing: Total  Basic Mobility - Total Score: 10    Encounter Problems       Encounter Problems " (Active)       PT Transfers       STG - Transfer from bed to chair with mod ax1.       Start:  04/30/24    Expected End:  05/14/24            STG - Patient will perform bed mobility with CGA.       Start:  04/30/24    Expected End:  05/14/24            STG - Patient will transfer sit to and from stand with mod ax1.        Start:  04/30/24    Expected End:  05/14/24               Strength       Will complete (B) LE therex to improve strength and transfers.       Start:  04/30/24    Expected End:  05/14/24                   Education Documentation  Mobility Training, taught by Gali Tucker, PT at 4/30/2024 11:48 AM.  Learner: Patient  Readiness: Acceptance  Method: Explanation  Response: Verbalizes Understanding, Demonstrated Understanding  Comment: mobility, therex      04/30/24 at 11:49 AM - Gali Tucker, PT

## 2024-04-30 NOTE — PROGRESS NOTES
"---------------------------------------------------------------------------------------------------  Subjective   No new events, overall feeling better.        ---------------------------------------------------------------------------------------------------  Objective   Last Recorded Vitals  Blood pressure 104/67, pulse 60, temperature 36.5 °C (97.7 °F), resp. rate 15, height 1.6 m (5' 3\"), weight 92.2 kg (203 lb 4.2 oz), SpO2 94%.  Intake/Output last 3 Shifts:  I/O last 3 completed shifts:  In: - (0 mL/kg)   Out: 1000 (10.8 mL/kg) [Urine:1000 (0.3 mL/kg/hr)]  Weight: 92.2 kg     Physical Exam  Vitals and nursing note reviewed.   Constitutional:       General: She is not in acute distress.     Appearance: Normal appearance. She is ill-appearing. She is not toxic-appearing.      Comments: Elderly female, calm and appropriately interactive, engaged in her care   HENT:      Head: Normocephalic and atraumatic.      Mouth/Throat:      Mouth: Mucous membranes are moist.   Eyes:      General: No scleral icterus.     Extraocular Movements: Extraocular movements intact.      Conjunctiva/sclera: Conjunctivae normal.   Cardiovascular:      Rate and Rhythm: Normal rate and regular rhythm.      Heart sounds: S1 normal and S2 normal. No murmur heard.     Comments: Systolic murmur best at apex  Pulmonary:      Effort: Pulmonary effort is normal. No respiratory distress.      Breath sounds: Rales present. No wheezing or rhonchi.      Comments: Bibasilar crackles  Abdominal:      General: Bowel sounds are normal. There is no distension.      Palpations: Abdomen is soft.      Tenderness: There is no abdominal tenderness. There is no guarding or rebound.   Musculoskeletal:         General: Swelling and deformity present.      Cervical back: Neck supple.      Comments: Left leg/knee, peripheral edema is improving, mild.    Skin:     General: Skin is warm and dry.      Findings: No rash.   Neurological:      General: No focal deficit " present.      Mental Status: She is alert and oriented to person, place, and time. Mental status is at baseline.   Psychiatric:         Mood and Affect: Mood normal.         Behavior: Behavior normal.         Relevant Results  Lab Results   Component Value Date    WBC 7.0 04/28/2024    HGB 10.5 (L) 04/28/2024    HCT 33.3 (L) 04/28/2024    MCV 95 04/28/2024     04/28/2024      Lab Results   Component Value Date    GLUCOSE 85 04/28/2024    CALCIUM 8.9 04/28/2024     04/28/2024    K 4.6 04/28/2024    CO2 34 (H) 04/28/2024    CL 96 (L) 04/28/2024    BUN 44 (H) 04/28/2024    CREATININE 1.54 (H) 04/28/2024     Scheduled medications  acetaminophen, 975 mg, oral, q8h  allopurinol, 100 mg, oral, Daily  ascorbic acid, 500 mg, oral, Daily  aspirin, 81 mg, oral, Daily  atorvastatin, 40 mg, oral, Nightly  baclofen, 5 mg, oral, Nightly  cholecalciferol, 1,000 Units, oral, Daily  DULoxetine, 30 mg, oral, Daily  empagliflozin, 10 mg, oral, Daily  ferrous sulfate (325 mg ferrous sulfate), 65 mg of iron, oral, Daily with breakfast  fluticasone furoate-vilanteroL, 1 puff, inhalation, Daily  gabapentin, 100 mg, oral, BID  metoprolol tartrate, 25 mg, oral, BID  multivitamin with minerals, 1 tablet, oral, Daily  pantoprazole, 20 mg, oral, Daily  sacubitriL-valsartan, 1 tablet, oral, BID  sennosides-docusate sodium, 2 tablet, oral, BID  spironolactone, 12.5 mg, oral, Daily      Continuous medications     PRN medications  PRN medications: dextrose, dextrose, glucagon, glucagon, ipratropium-albuteroL, melatonin, oxyCODONE, traMADol    Assessment/Plan         Alayna Dumont is a 89 y.o. 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 New o2 requirment of 2L noted. Labs obtained on admit notable for anemia,  hyponatremia, impaired renal function, elevated BNP, & mild hyperkalemia. Trop neg delta. CXR showed cardiomegaly with pulmonary vascular congestion and small left pleural effusion. EKG AV paced. S/p lasix. Noted last admit 3/2024 for ADHF and OSEAS.  Pt presenting from home, lives with son and is wheelchair bound due to severe left hip OA. Admitted to hospitalist service.  Patient continued on diuretics and her guideline directed medical therapy.  Overall improving after diuresis, diuretics on hold due to bump in creatinine now, symptoms improving.  Echo showed new reduction in EF, also with a new regional wall motion abnormality.  Cardiology consulted, suspect less likely due to ischemia more likely related to the pacemaker.      - Her device checks are current and she is pacemaker dependent and will pace 100% of the time. In terms of cardiac MRI , this would have to be completed on a non-conditional MRI day (typically Thursdays).       - Shortness of breath is improving, she is dry with bump in creatinine today, held diuretics, please see if we can resume maintenance dosing tomorrow  - Severe OA bilateral legs, does not walk, chronic pain., new right knee pain reported night, nursing that was swollen.  Do not feel need to increase narcotics at this time.  Started Tylenol scheduled and given 1 dose of colchicine.  Less likely gout - uric acid 6.9 monoarticular. This could just be OA. But can consider obtaining Knee Xr +/- ortho for arthrocentesis +/- prednisone given worsening SCr.  She seems to always asked for pain medicine  and talk about her pain and then I stepped out of the back and she goes to sleep.  I did put in for home palliative care through social work consult.   - Suspect OSEAS on CKD from diuresis, follow up urine electrolytes and hold off on diuresis         -------------------    #acute on chronic respiratory failure with hypoxemia  #acute on chronic diastolic congestive heart failure   #pulmonary  sarcoidosis  #pulmonary hypertension  #COPD  -likely multifactorial primarily due to decomp HFpEF, however contributing is pulmonary htn, pulmonary sarcoidosis, COPD. ACS ruled out.   -  noted dyspnea, fluid retention, xray chest with pulm edema, elevated bnp. Noted cxr with left pl effusoin. Per records, has history of L pleural effusion not able to be tapped per [facility] IR, had been diuresed prev.   - missed last cards visit, do not see record of pulmonary visits.   - last echo 2/2024 EF 70-75%, mod cLVH, bioprosthetic AV moderate AV stenosis, moderate to severe aortic regurg, noted prosthetic valve dysfunction due to degeneration, no significant changes compared to 1/2023.  - repeat echo 4/26/2024 showed new reduction in EF to 40 to 45% and new regional wall motion abnormality.  - home regimen: Torsemide 40 mg PO every day (MWF) and 20 mg PO every day rest of wk, Aldactone 12.5 mg PO every day, Jardiance 10 mg PO every day, Lopressor 25 mg PO BID   - continue home dose of Aldactone, Lopressor and Jardiance   - lasix 40mg iv bid, dc given new increase in creatinine, breathing is improving.  No active chest pain.  Follow-up RFP prior to restarting any maintenance dosing of her diuretic.  -Started Entresto per cardiology recommendation  -Started aspirin 81 mg daily, increase statin to 40 mg daily.  If no evidence of ischemia patient can stop aspirin.  Can discuss with cardiology.  - continue home dose of Breo 1 puff every day and Duoneb nebulizer every 4 hrs PRN SOB   - salt restrict, monitor I/o, dialy weight  - tele, goal sat 89-92%  -  cardiology consulted, appreciate recommendations.  Discussed with cardiology, reduction in EF could be from RV pacing or related to burned-out apical HOCM.  Suspect ischemia is less likely.  Recommended cardiac stress MRI, pacemaker interrogation, per record however pacemaker is non conditional.  Cardiac device check order placed. Unclear of the exact model to obtain clearance from  MRI.     #Knee pain  -Will send for Uric acid  -S/p 1x dose of colchinine  -R knee is painful to touch, but no erythema or edema noted today. No other joint acutely affected.     #Tachy/shahram syndrome s/p duel champer PPM  -Per record 2/2024, device rep was called and patient has a [employer] pacemaker placed in 2013 with non-conditional [employer] leads  -Patient is recommended MRI, non- conditional pacemaker typically done on Thursdays, ordered device check for Monday, needs to be coordinated.  Will place order for MRI however will need to be cleared by device clinic and MRI, will need to be discussed need for MRI and timing with cardiology.  Device interrogation ordered primarily for percentage of pacing to see what the RV pacing burden is.  Typically 100% paced rhythm.    #hyperkalemia (mild)  - K 5.4 on admit  - Lokelma 10g x1 dose  - RFP ordered for AM, improving.      # OSEAS on CKD stage 3  - Cr 1.12 GFR 47 on admit, Cr 1.57 GFR 31 on 3/30  - Likely in the s/o diuresis, will obtain Urine electrolytes.   - renally dose meds as needed     #SUNDEEP  - Hgb 9.5 on admit, Hgb 10.4 on 3/24  - continue home dose of Ferrous sulfate 325 mg PO every day     #HLD  - continue home dose of Atorvastatin 20 mg PO at bedtime    # Severe OA  # Impaired mobility  -Mostly wheelchair-bound, remains at home, has home visiting primary care.  Has chronic pain issues.  Placed social work consult for home palliative care which she is interested in.    Scheduled outpatient appointments in system:   No future appointments.  Cydney Chris MD

## 2024-04-30 NOTE — PROGRESS NOTES
Alayna Dumont is a 89 y.o. female on day 5 of admission presenting with Acute on chronic diastolic congestive heart failure (Multi).    Transitional Care Coordination Progress Note:  Patient discussed during interdisciplinary rounds.   Team members present: MD and TCC  Plan per Medical/Surgical team: Pending Geriatric Discussion with the family for goals of care.  Payor: Medicare  Discharge disposition: Waiting on PT/OT recommendations.  Potential Barriers: None  ADOD: 05/02/24    CAROLYNN HINOJOSA RN

## 2024-04-30 NOTE — PROGRESS NOTES
Occupational Therapy    Evaluation/Treatment    Patient Name: Alayna Dumont  MRN: [id number]  : 5/10/1932  Today's Date: 24  Time Calculation  Start Time: 1009  Stop Time: 1038  Time Calculation (min): 29 min       Assessment:  OT Assessment: Pt presents to OT with increased falls risk, decreased safety and independence with functional transfers and impaired ADL/IADL performance.  Pt will continue to benefit from skilled OT services to address deficits and facilitate safe return to PLOF and home environment.   Prognosis: Good  Barriers to Discharge: None  Evaluation/Treatment Tolerance: Patient tolerated treatment well  Medical Staff Made Aware: Yes  End of Session Communication: Bedside nurse  End of Session Patient Position: Bed, 3 rail up, Alarm on  OT Assessment Results: Decreased ADL status, Decreased endurance, Decreased IADLs, Decreased functional mobility, Decreased upper extremity strength (Decreased transfers)  Prognosis: Good  Barriers to Discharge: None  Evaluation/Treatment Tolerance: Patient tolerated treatment well  Medical Staff Made Aware: Yes  Plan:  Treatment Interventions: ADL retraining, Functional transfer training, UE strengthening/ROM, Endurance training, Patient/family training, Equipment evaluation/education, Compensatory technique education  OT Frequency: 2 times per week  OT Discharge Recommendations: Moderate intensity level of continued care  OT Recommended Transfer Status: Assist of 2  OT - OK to Discharge: Yes  Treatment Interventions: ADL retraining, Functional transfer training, UE strengthening/ROM, Endurance training, Patient/family training, Equipment evaluation/education, Compensatory technique education    Subjective   Current Problem:  1. Dyspnea, unspecified type        2. Acute systolic heart failure (Multi)  CANCELED: Cardiac device check - MRI    CANCELED: Cardiac device check - Inpatient    CANCELED: Cardiac device check - MRI    CANCELED: Cardiac device check -  Inpatient      3. 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, pulmonary sarcoidosis, pHTN, COPD, GERD, HOCM, T2DM, and afib  Co-Treatment: PT  Co-Treatment Reason: skilled cotx with PT to maximize pt safety and therapeutic potential with RN ampac of 8 focusing on discipline specific goals  Prior to Session Communication: Bedside nurse  Patient Position Received: Bed, 3 rail up, Alarm off, not on at start of session  General Comment: Pt met in bed, very pleasant and cooperative, eager to participate, endorsed getting to EOB yestday and hoping to progress wtih therapy today  Precautions:  Medical Precautions: Fall precautions    Pain:  Pain Assessment  Pain Assessment:  (endorses painful R knee, not rated and did not limit participatio)    Objective   Cognition:  Overall Cognitive Status: Within Functional Limits  Arousal/Alertness: Appropriate responses to stimuli  Orientation Level: Oriented X4  Following Commands: Follows one step commands with increased time      Home Living:  Type of Home: House  Lives With: Adult children (son)  Home Adaptive Equipment: Wheelchair-manual, Hospital bed ([facility] [place])  Home Layout: One level  Home Access: Ramped entrance  Bathroom Shower/Tub: Tub/shower unit  Bathroom Equipment: Tub transfer bench  Prior Function:  Level of [place]: Independent with ADLs and functional transfers, Independent with homemaking with wheelchair  Receives Help From:  (Son)  ADL Assistance:  (pt endorses Mod I with ADLs at [facility] [place], seated on tub bench, Son A with tub transfer and washing back only)  Homemaking Assistance:  (pt endorsed mod I from w/c level with  simple IADLs like washing dishes)  Ambulatory Assistance:  (non-ambulatory, reports mod I with squat pivots)  Leisure: pt is an avid   Prior Function Comments: denies falls    ADL:  Eating Assistance: Independent  Grooming Assistance: Stand by (anticipated)  Grooming Deficit: Setup  Bathing Assistance: Moderate (anticipated)  UE Dressing Assistance: Minimal (anticipated)  LE Dressing Assistance: Maximal  LE Dressing Deficit: Don/doff R sock, Don/doff L sock  Toileting Assistance with Device:  (Nibu)  Activity Tolerance:  Endurance: Tolerates 10 - 20 min exercise with multiple rests  Functional Standing Tolerance:  Time: 2 min  Activity: static stand  Bed Mobility/Transfers: Bed Mobility  Bed Mobility: Yes  Bed Mobility 1  Bed Mobility 1: Supine to sitting  Level of Assistance 1: Maximum assistance  Bed Mobility Comments 1: heavy A to elevate trunk and increased time with draw sheet A to advance hips to EOB  Bed Mobility 2  Bed Mobility  2: Sitting to supine  Level of Assistance 2: Moderate assistance (x2)  Bed Mobility Comments 2: increased effort to manage BLEs into bed, A to lower trunk  Bed Mobility 3  Bed Mobility 3: Scooting  Level of Assistance 3: Dependent (x2)  Bed Mobility Comments 3: boost/positioning in bed    Transfers  Transfer: Yes  Transfer 1  Technique 1: Sit to stand, Stand to sit  Transfer Device 1: Walker  Transfer Level of Assistance 1: Maximum assistance, +2, Moderate verbal cues  Trials/Comments 1: from elevated EOB, maintains forward flexed posture      Functional Mobility:  Functional Mobility  Functional Mobility Performed: No  Sitting Balance:  Static Sitting Balance  Static Sitting-Comment/Number of Minutes: SBA  Dynamic Sitting Balance  Dynamic Sitting-Comments: CGA  Standing Balance:  Static Standing Balance  Static Standing-Comment/Number of Minutes: mod A    Therapy/Activity: Therapeutic Activity  Therapeutic Activity Performed: Yes  Therapeutic Activity 1: pt tolerated  sitting EOB x20 mins with SBA, increased toelrance to task  Therapeutic Activity 2: pt completed sit<>stand transfer from EOB with max A x2 however able to remain in standing with mod A x1 x2 mins for linen change 2/2 increased incontinence with standing, pt additionally perfomed scooting along EOB with CGA for improved positioning within bed     Vision:Vision - Basic Assessment  Current Vision: Wears glasses all the time  Sensation:  Light Touch: No apparent deficits  Strength:  Strength Comments: BUE >/=3+/5     Perception:  Inattention/Neglect: Appears intact  Initiation: Appears intact  Motor Planning: Appears intact  Perseveration: Not present  Coordination:  Movements are Fluid and Coordinated: Yes   Hand Function:  Hand Function  Gross Grasp: Functional  Coordination: Functional  Extremities: RUE   RUE : Within Functional Limits and LUE   LUE: Within Functional Limits    Outcome Measures: [facility] Daily Activity  Putting on and taking off regular lower body clothing: A lot  Bathing (including washing, rinsing, drying): A lot  Putting on and taking off regular upper body clothing: A little  Toileting, which includes using toilet, bedpan or urinal: Total  Taking care of personal grooming such as brushing teeth: A little  Eating Meals: None  Daily Activity - Total Score: 15         and Brief Confusion Assessment Method (bCAM)  CAM Result: CAM -    Education Documentation  Precautions, taught by Emma Atkinson OT at 4/30/2024 11:00 AM.  Learner: Patient  Readiness: Acceptance  Method: Explanation  Response: Verbalizes Understanding    ADL Training, taught by Emma Atkinson OT at 4/30/2024 11:00 AM.  Learner: Patient  Readiness: Acceptance  Method: Explanation  Response: Verbalizes Understanding    Education Comments  No comments found.        Goals:  Encounter Problems       Encounter Problems (Active)       ADLs       Patient will perform toileting tasks, including hygiene and clothing management with  modified independent level of assistance and bedside commode        Start:  04/30/24    Expected End:  05/21/24               EXERCISE/STRENGTHENING       pt will be IND with BUE HEP for increasing functional strength and activity tolerance required for ADL completion        Start:  04/30/24    Expected End:  05/21/24               TRANSFERS       Patient will perform bed mobility modified independent level of assistance and bed rails in order to improve safety and independence with mobility       Start:  04/30/24    Expected End:  05/21/24            Patient will demonstrate functional transfers with least restrictive device with contact guard assist level of assistance.       Start:  04/30/24    Expected End:  05/21/24 04/30/24 at 11:19 AM   Emma Atkinson OT   Rehab Office: [phone]

## 2024-05-01 LAB
ALBUMIN SERPL BCP-MCNC: 3.7 G/DL (ref 3.4–5)
ANION GAP SERPL CALC-SCNC: 14 MMOL/L (ref 10–20)
BUN SERPL-MCNC: 60 MG/DL (ref 6–23)
CALCIUM SERPL-MCNC: 9.3 MG/DL (ref 8.6–10.6)
CHLORIDE SERPL-SCNC: 101 MMOL/L (ref 98–107)
CO2 SERPL-SCNC: 29 MMOL/L (ref 21–32)
CREAT SERPL-MCNC: 1.47 MG/DL (ref 0.5–1.05)
EGFRCR SERPLBLD CKD-EPI 2021: 34 ML/MIN/1.73M*2
ERYTHROCYTE [DISTWIDTH] IN BLOOD BY AUTOMATED COUNT: 16.2 % (ref 11.5–14.5)
GLUCOSE BLD MANUAL STRIP-MCNC: 125 MG/DL (ref 74–99)
GLUCOSE BLD MANUAL STRIP-MCNC: 127 MG/DL (ref 74–99)
GLUCOSE BLD MANUAL STRIP-MCNC: 147 MG/DL (ref 74–99)
GLUCOSE BLD MANUAL STRIP-MCNC: 96 MG/DL (ref 74–99)
GLUCOSE SERPL-MCNC: 104 MG/DL (ref 74–99)
HCT VFR BLD AUTO: 37.2 % (ref 36–46)
HGB BLD-MCNC: 11.4 G/DL (ref 12–16)
MCH RBC QN AUTO: 29.6 PG (ref 26–34)
MCHC RBC AUTO-ENTMCNC: 30.6 G/DL (ref 32–36)
MCV RBC AUTO: 97 FL (ref 80–100)
NRBC BLD-RTO: 0 /100 WBCS (ref 0–0)
PHOSPHATE SERPL-MCNC: 4.3 MG/DL (ref 2.5–4.9)
PLATELET # BLD AUTO: 346 X10*3/UL (ref 150–450)
POTASSIUM SERPL-SCNC: 5 MMOL/L (ref 3.5–5.3)
RBC # BLD AUTO: 3.85 X10*6/UL (ref 4–5.2)
SODIUM SERPL-SCNC: 139 MMOL/L (ref 136–145)
WBC # BLD AUTO: 7.5 X10*3/UL (ref 4.4–11.3)

## 2024-05-01 PROCEDURE — 2500000006 HC RX 250 W HCPCS SELF ADMINISTERED DRUGS (ALT 637 FOR ALL PAYERS): Performed by: NURSE PRACTITIONER

## 2024-05-01 PROCEDURE — 2500000001 HC RX 250 WO HCPCS SELF ADMINISTERED DRUGS (ALT 637 FOR MEDICARE OP): Performed by: NURSE PRACTITIONER

## 2024-05-01 PROCEDURE — 2500000001 HC RX 250 WO HCPCS SELF ADMINISTERED DRUGS (ALT 637 FOR MEDICARE OP): Performed by: STUDENT IN AN ORGANIZED HEALTH CARE EDUCATION/TRAINING PROGRAM

## 2024-05-01 PROCEDURE — 36415 COLL VENOUS BLD VENIPUNCTURE: CPT | Performed by: STUDENT IN AN ORGANIZED HEALTH CARE EDUCATION/TRAINING PROGRAM

## 2024-05-01 PROCEDURE — 82947 ASSAY GLUCOSE BLOOD QUANT: CPT | Mod: 91

## 2024-05-01 PROCEDURE — 85027 COMPLETE CBC AUTOMATED: CPT | Performed by: STUDENT IN AN ORGANIZED HEALTH CARE EDUCATION/TRAINING PROGRAM

## 2024-05-01 PROCEDURE — 99232 SBSQ HOSP IP/OBS MODERATE 35: CPT | Performed by: STUDENT IN AN ORGANIZED HEALTH CARE EDUCATION/TRAINING PROGRAM

## 2024-05-01 PROCEDURE — 1200000002 HC GENERAL ROOM WITH TELEMETRY DAILY

## 2024-05-01 PROCEDURE — 82947 ASSAY GLUCOSE BLOOD QUANT: CPT

## 2024-05-01 PROCEDURE — 84100 ASSAY OF PHOSPHORUS: CPT | Performed by: STUDENT IN AN ORGANIZED HEALTH CARE EDUCATION/TRAINING PROGRAM

## 2024-05-01 RX ORDER — TRAMADOL HYDROCHLORIDE 100 MG/1
100 TABLET, COATED ORAL 2 TIMES DAILY PRN
Qty: 10 TABLET | Refills: 0 | Status: SHIPPED | OUTPATIENT
Start: 2024-05-01 | End: 2024-05-06

## 2024-05-01 RX ORDER — OXYCODONE HYDROCHLORIDE 5 MG/1
2.5 TABLET ORAL EVERY 6 HOURS PRN
Qty: 15 TABLET | Refills: 0 | Status: SHIPPED | OUTPATIENT
Start: 2024-05-01 | End: 2024-05-06

## 2024-05-01 RX ORDER — ASPIRIN 81 MG/1
81 TABLET ORAL DAILY
Start: 2024-05-02 | End: 2024-06-01

## 2024-05-01 RX ORDER — ACETAMINOPHEN 325 MG/1
975 TABLET ORAL EVERY 8 HOURS
Start: 2024-05-01

## 2024-05-01 RX ADMIN — FERROUS SULFATE TAB 325 MG (65 MG ELEMENTAL FE) 1 TABLET: 325 (65 FE) TAB at 10:01

## 2024-05-01 RX ADMIN — GABAPENTIN 100 MG: 100 CAPSULE ORAL at 10:02

## 2024-05-01 RX ADMIN — OXYCODONE HYDROCHLORIDE AND ACETAMINOPHEN 500 MG: 500 TABLET ORAL at 10:01

## 2024-05-01 RX ADMIN — TORSEMIDE 20 MG: 20 TABLET ORAL at 10:02

## 2024-05-01 RX ADMIN — SENNOSIDES AND DOCUSATE SODIUM 2 TABLET: 8.6; 5 TABLET ORAL at 21:18

## 2024-05-01 RX ADMIN — EMPAGLIFLOZIN 10 MG: 10 TABLET, FILM COATED ORAL at 10:01

## 2024-05-01 RX ADMIN — ACETAMINOPHEN 975 MG: 325 TABLET ORAL at 18:40

## 2024-05-01 RX ADMIN — GABAPENTIN 100 MG: 100 CAPSULE ORAL at 21:18

## 2024-05-01 RX ADMIN — PANTOPRAZOLE SODIUM 20 MG: 20 TABLET, DELAYED RELEASE ORAL at 10:01

## 2024-05-01 RX ADMIN — DULOXETINE HYDROCHLORIDE 30 MG: 30 CAPSULE, DELAYED RELEASE ORAL at 10:01

## 2024-05-01 RX ADMIN — SACUBITRIL AND VALSARTAN 1 TABLET: 24; 26 TABLET, FILM COATED ORAL at 21:18

## 2024-05-01 RX ADMIN — SPIRONOLACTONE 12.5 MG: 25 TABLET, FILM COATED ORAL at 10:02

## 2024-05-01 RX ADMIN — Medication 1000 UNITS: at 10:01

## 2024-05-01 RX ADMIN — ALLOPURINOL 100 MG: 100 TABLET ORAL at 10:01

## 2024-05-01 RX ADMIN — SACUBITRIL AND VALSARTAN 1 TABLET: 24; 26 TABLET, FILM COATED ORAL at 10:01

## 2024-05-01 RX ADMIN — ASPIRIN 81 MG: 81 TABLET, COATED ORAL at 10:01

## 2024-05-01 RX ADMIN — Medication 1 TABLET: at 10:02

## 2024-05-01 RX ADMIN — OXYCODONE HYDROCHLORIDE 2.5 MG: 5 TABLET ORAL at 10:01

## 2024-05-01 RX ADMIN — METOPROLOL TARTRATE 25 MG: 50 TABLET, FILM COATED ORAL at 21:18

## 2024-05-01 RX ADMIN — ACETAMINOPHEN 975 MG: 325 TABLET ORAL at 10:02

## 2024-05-01 RX ADMIN — ATORVASTATIN CALCIUM 40 MG: 40 TABLET, FILM COATED ORAL at 21:18

## 2024-05-01 RX ADMIN — METOPROLOL TARTRATE 25 MG: 50 TABLET, FILM COATED ORAL at 10:02

## 2024-05-01 RX ADMIN — SENNOSIDES AND DOCUSATE SODIUM 2 TABLET: 8.6; 5 TABLET ORAL at 10:02

## 2024-05-01 ASSESSMENT — PAIN SCALES - GENERAL: PAINLEVEL_OUTOF10: 10 - WORST POSSIBLE PAIN

## 2024-05-01 ASSESSMENT — COGNITIVE AND FUNCTIONAL STATUS - GENERAL
HELP NEEDED FOR BATHING: A LOT
TOILETING: TOTAL
PERSONAL GROOMING: A LITTLE
DAILY ACTIVITIY SCORE: 14
MOVING TO AND FROM BED TO CHAIR: A LOT
DRESSING REGULAR UPPER BODY CLOTHING: A LOT
STANDING UP FROM CHAIR USING ARMS: TOTAL
TURNING FROM BACK TO SIDE WHILE IN FLAT BAD: A LOT
MOBILITY SCORE: 9
CLIMB 3 TO 5 STEPS WITH RAILING: TOTAL
MOVING FROM LYING ON BACK TO SITTING ON SIDE OF FLAT BED WITH BEDRAILS: A LOT
DRESSING REGULAR LOWER BODY CLOTHING: A LOT
WALKING IN HOSPITAL ROOM: TOTAL

## 2024-05-01 ASSESSMENT — PAIN DESCRIPTION - ORIENTATION: ORIENTATION: LEFT;RIGHT

## 2024-05-01 ASSESSMENT — PAIN DESCRIPTION - LOCATION: LOCATION: LEG

## 2024-05-01 ASSESSMENT — PAIN - FUNCTIONAL ASSESSMENT: PAIN_FUNCTIONAL_ASSESSMENT: 0-10

## 2024-05-01 NOTE — PROGRESS NOTES
"---------------------------------------------------------------------------------------------------  Subjective   No new events.        ---------------------------------------------------------------------------------------------------  Objective   Last Recorded Vitals  Blood pressure (!) 143/42, pulse 60, temperature 36.8 °C (98.2 °F), resp. rate 18, height 1.6 m (5' 3\"), weight 92.2 kg (203 lb 4.2 oz), SpO2 94%.  Intake/Output last 3 Shifts:  I/O last 3 completed shifts:  In: - (0 mL/kg)   Out: 1300 (14.1 mL/kg) [Urine:1300 (0.4 mL/kg/hr)]  Weight: 92.2 kg     Physical Exam  Vitals and nursing note reviewed.   Constitutional:       General: She is not in acute distress.     Appearance: Normal appearance. She is ill-appearing. She is not toxic-appearing.      Comments: Elderly female, calm and appropriately interactive, engaged in her care   HENT:      Head: Normocephalic and atraumatic.      Mouth/Throat:      Mouth: Mucous membranes are moist.   Eyes:      General: No scleral icterus.     Extraocular Movements: Extraocular movements intact.      Conjunctiva/sclera: Conjunctivae normal.   Cardiovascular:      Rate and Rhythm: Normal rate and regular rhythm.      Heart sounds: S1 normal and S2 normal. No murmur heard.     Comments: Systolic murmur best at apex  Pulmonary:      Effort: Pulmonary effort is normal. No respiratory distress.      Breath sounds: Rales present. No wheezing or rhonchi.      Comments: Bibasilar crackles  Abdominal:      General: Bowel sounds are normal. There is no distension.      Palpations: Abdomen is soft.      Tenderness: There is no abdominal tenderness. There is no guarding or rebound.   Musculoskeletal:         General: Swelling and deformity present.      Cervical back: Neck supple.      Comments: Left leg/knee, peripheral edema is improving, mild.    Skin:     General: Skin is warm and dry.      Findings: No rash.   Neurological:      General: No focal deficit present.      Mental " Status: She is alert and oriented to person, place, and time. Mental status is at baseline.   Psychiatric:         Mood and Affect: Mood normal.         Behavior: Behavior normal.         Relevant Results  Lab Results   Component Value Date    WBC 7.5 05/01/2024    HGB 11.4 (L) 05/01/2024    HCT 37.2 05/01/2024    MCV 97 05/01/2024     05/01/2024      Lab Results   Component Value Date    GLUCOSE 104 (H) 05/01/2024    CALCIUM 9.3 05/01/2024     05/01/2024    K 5.0 05/01/2024    CO2 29 05/01/2024     05/01/2024    BUN 60 (H) 05/01/2024    CREATININE 1.47 (H) 05/01/2024     Scheduled medications  acetaminophen, 975 mg, oral, q8h  allopurinol, 100 mg, oral, Daily  ascorbic acid, 500 mg, oral, Daily  aspirin, 81 mg, oral, Daily  atorvastatin, 40 mg, oral, Nightly  baclofen, 5 mg, oral, Nightly  cholecalciferol, 1,000 Units, oral, Daily  DULoxetine, 30 mg, oral, Daily  empagliflozin, 10 mg, oral, Daily  ferrous sulfate (325 mg ferrous sulfate), 65 mg of iron, oral, Daily with breakfast  fluticasone furoate-vilanteroL, 1 puff, inhalation, Daily  gabapentin, 100 mg, oral, BID  metoprolol tartrate, 25 mg, oral, BID  multivitamin with minerals, 1 tablet, oral, Daily  pantoprazole, 20 mg, oral, Daily  sacubitriL-valsartan, 1 tablet, oral, BID  sennosides-docusate sodium, 2 tablet, oral, BID  spironolactone, 12.5 mg, oral, Daily  torsemide, 20 mg, oral, Daily      Continuous medications     PRN medications  PRN medications: dextrose, dextrose, glucagon, glucagon, ipratropium-albuteroL, melatonin, oxyCODONE, traMADol    Assessment/Plan         Alayna Dumont is a 89 y.o. 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 New o2 requirment of 2L noted. Labs obtained on admit notable for  anemia, hyponatremia, impaired renal function, elevated BNP, & mild hyperkalemia. Trop neg delta. CXR showed cardiomegaly with pulmonary vascular congestion and small left pleural effusion. EKG AV paced. S/p lasix. Noted last admit 3/2024 for ADHF and OSEAS.  Pt presenting from home, lives with son and is wheelchair bound due to severe left hip OA. Admitted to hospitalist service.  Patient continued on diuretics and her guideline directed medical therapy.  Overall improving after diuresis, diuretics on hold due to bump in creatinine now, symptoms improving.  Echo showed new reduction in EF, also with a new regional wall motion abnormality.  Cardiology consulted, suspect less likely due to ischemia more likely related to the pacemaker.      - Her device checks are current and she is pacemaker dependent and will pace 100% of the time. In terms of cardiac MRI , this would have to be completed on a non-conditional MRI day (typically Thursdays).       - Shortness of breath is improving, she is dry with bump in creatinine today, held diuretics, please see if we can resume maintenance dosing tomorrow  - Severe OA bilateral legs, does not walk, chronic pain., new right knee pain reported night, nursing that was swollen.  Do not feel need to increase narcotics at this time.  Started Tylenol scheduled and given 1 dose of colchicine.  Less likely gout - uric acid 6.9 monoarticular. This could just be OA. But can consider obtaining Knee Xr +/- ortho for arthrocentesis +/- prednisone given worsening SCr.  She seems to always asked for pain medicine  and talk about her pain and then I stepped out of the back and she goes to sleep.  I did put in for home palliative care through social work consult.   - Suspect OSEAS on CKD from diuresis, follow up urine electrolytes and hold off on diuresis             5/1/2024  -HDS, code status was changed to DNR/DNI, Goal of care discussion was completed. No plan for any further invasive  measurements.   -At this point, will c/w medical treatment, Pt should be good for discharge to facility, TCC was updated.   -OSEAS is stable at this point.   -Pain is controlled.   -------------------    #acute on chronic respiratory failure with hypoxemia  #acute on chronic diastolic congestive heart failure   #pulmonary sarcoidosis  #pulmonary hypertension  #COPD  -likely multifactorial primarily due to decomp HFpEF, however contributing is pulmonary htn, pulmonary sarcoidosis, COPD. ACS ruled out.   -  noted dyspnea, fluid retention, xray chest with pulm edema, elevated bnp. Noted cxr with left pl effusoin. Per records, has history of L pleural effusion not able to be tapped per [facility] IR, had been diuresed prev.   - missed last cards visit, do not see record of pulmonary visits.   - last echo 2/2024 EF 70-75%, mod cLVH, bioprosthetic AV moderate AV stenosis, moderate to severe aortic regurg, noted prosthetic valve dysfunction due to degeneration, no significant changes compared to 1/2023.  - repeat echo 4/26/2024 showed new reduction in EF to 40 to 45% and new regional wall motion abnormality.  - home regimen: Torsemide 40 mg PO every day (MWF) and 20 mg PO every day rest of wk, Aldactone 12.5 mg PO every day, Jardiance 10 mg PO every day, Lopressor 25 mg PO BID   - continue home dose of Aldactone, Lopressor and Jardiance   - lasix 40mg iv bid, dc given new increase in creatinine, breathing is improving.  No active chest pain.  Follow-up RFP prior to restarting any maintenance dosing of her diuretic.  -Started Entresto per cardiology recommendation  -Started aspirin 81 mg daily, increase statin to 40 mg daily.  If no evidence of ischemia patient can stop aspirin.  Can discuss with cardiology.  - continue home dose of Breo 1 puff every day and Duoneb nebulizer every 4 hrs PRN SOB   - salt restrict, monitor I/o, dialy weight  - tele, goal sat 89-92%  -  cardiology consulted, appreciate recommendations.  Discussed with  cardiology, reduction in EF could be from RV pacing or related to burned-out apical HOCM.  Suspect ischemia is less likely.  Recommended cardiac stress MRI, pacemaker interrogation, per record however pacemaker is non conditional.  Cardiac device check order placed. Unclear of the exact model to obtain clearance from MRI.     #Knee pain  -Will send for Uric acid  -S/p 1x dose of colchinine  -R knee is painful to touch, but no erythema or edema noted today. No other joint acutely affected.     #Tachy/shahram syndrome s/p duel champer PPM  -Per record 2/2024, device rep was called and patient has a [employer] pacemaker placed in 2013 with non-conditional [employer] leads  -Patient is recommended MRI, non- conditional pacemaker typically done on Thursdays, ordered device check for Monday, needs to be coordinated.  Will place order for MRI however will need to be cleared by device clinic and MRI, will need to be discussed need for MRI and timing with cardiology.  Device interrogation ordered primarily for percentage of pacing to see what the RV pacing burden is.  Typically 100% paced rhythm.    #hyperkalemia (mild)  - K 5.4 on admit  - Lokelma 10g x1 dose  - RFP ordered for AM, improving.      # OSEAS on CKD stage 3  - Cr 1.12 GFR 47 on admit, Cr 1.57 GFR 31 on 3/30  - Likely in the s/o diuresis, will obtain Urine electrolytes.   - renally dose meds as needed     #SUNDEEP  - Hgb 9.5 on admit, Hgb 10.4 on 3/24  - continue home dose of Ferrous sulfate 325 mg PO every day     #HLD  - continue home dose of Atorvastatin 20 mg PO at bedtime    # Severe OA  # Impaired mobility  -Mostly wheelchair-bound, remains at home, has home visiting primary care.  Has chronic pain issues.  Placed social work consult for home palliative care which she is interested in.    Scheduled outpatient appointments in system:   No future appointments.  Cydney Chris MD

## 2024-05-01 NOTE — PROGRESS NOTES
Alayna Dumont is a 89 y.o. female on day 6 of admission presenting with Acute on chronic diastolic congestive heart failure (Multi).    Transitional Care Coordination Progress Note:  Patient discussed during interdisciplinary rounds.   Team members present: MD and TCC  Plan per Medical/Surgical team: Had Goals of Care meeting with family and patient is now DNR, DNI.  Payor: Medicare  Discharge disposition: Son would like patient to go to  [facility], reached out to facility which is reviewing.  Potential Barriers: None  ADOD: 05/02/24    CAROLYNN HINOJOSA RN

## 2024-05-02 LAB
ALBUMIN SERPL BCP-MCNC: 3.4 G/DL (ref 3.4–5)
ANION GAP SERPL CALC-SCNC: 17 MMOL/L (ref 10–20)
BUN SERPL-MCNC: 59 MG/DL (ref 6–23)
CALCIUM SERPL-MCNC: 9.2 MG/DL (ref 8.6–10.6)
CHLORIDE SERPL-SCNC: 104 MMOL/L (ref 98–107)
CO2 SERPL-SCNC: 24 MMOL/L (ref 21–32)
CREAT SERPL-MCNC: 1.29 MG/DL (ref 0.5–1.05)
EGFRCR SERPLBLD CKD-EPI 2021: 39 ML/MIN/1.73M*2
ERYTHROCYTE [DISTWIDTH] IN BLOOD BY AUTOMATED COUNT: 16.1 % (ref 11.5–14.5)
GLUCOSE BLD MANUAL STRIP-MCNC: 112 MG/DL (ref 74–99)
GLUCOSE BLD MANUAL STRIP-MCNC: 113 MG/DL (ref 74–99)
GLUCOSE BLD MANUAL STRIP-MCNC: 129 MG/DL (ref 74–99)
GLUCOSE BLD MANUAL STRIP-MCNC: 168 MG/DL (ref 74–99)
GLUCOSE SERPL-MCNC: 86 MG/DL (ref 74–99)
HCT VFR BLD AUTO: 35.2 % (ref 36–46)
HGB BLD-MCNC: 10.7 G/DL (ref 12–16)
MCH RBC QN AUTO: 28.4 PG (ref 26–34)
MCHC RBC AUTO-ENTMCNC: 30.4 G/DL (ref 32–36)
MCV RBC AUTO: 93 FL (ref 80–100)
NRBC BLD-RTO: 0 /100 WBCS (ref 0–0)
PHOSPHATE SERPL-MCNC: 3.2 MG/DL (ref 2.5–4.9)
PLATELET # BLD AUTO: 341 X10*3/UL (ref 150–450)
POTASSIUM SERPL-SCNC: 4.6 MMOL/L (ref 3.5–5.3)
RBC # BLD AUTO: 3.77 X10*6/UL (ref 4–5.2)
SODIUM SERPL-SCNC: 140 MMOL/L (ref 136–145)
WBC # BLD AUTO: 6.8 X10*3/UL (ref 4.4–11.3)

## 2024-05-02 PROCEDURE — 2500000001 HC RX 250 WO HCPCS SELF ADMINISTERED DRUGS (ALT 637 FOR MEDICARE OP): Performed by: NURSE PRACTITIONER

## 2024-05-02 PROCEDURE — 2500000002 HC RX 250 W HCPCS SELF ADMINISTERED DRUGS (ALT 637 FOR MEDICARE OP, ALT 636 FOR OP/ED): Performed by: NURSE PRACTITIONER

## 2024-05-02 PROCEDURE — 2500000006 HC RX 250 W HCPCS SELF ADMINISTERED DRUGS (ALT 637 FOR ALL PAYERS): Performed by: NURSE PRACTITIONER

## 2024-05-02 PROCEDURE — 85027 COMPLETE CBC AUTOMATED: CPT | Performed by: STUDENT IN AN ORGANIZED HEALTH CARE EDUCATION/TRAINING PROGRAM

## 2024-05-02 PROCEDURE — 84100 ASSAY OF PHOSPHORUS: CPT | Performed by: STUDENT IN AN ORGANIZED HEALTH CARE EDUCATION/TRAINING PROGRAM

## 2024-05-02 PROCEDURE — 2500000001 HC RX 250 WO HCPCS SELF ADMINISTERED DRUGS (ALT 637 FOR MEDICARE OP): Performed by: STUDENT IN AN ORGANIZED HEALTH CARE EDUCATION/TRAINING PROGRAM

## 2024-05-02 PROCEDURE — 94640 AIRWAY INHALATION TREATMENT: CPT

## 2024-05-02 PROCEDURE — 82947 ASSAY GLUCOSE BLOOD QUANT: CPT

## 2024-05-02 PROCEDURE — 99232 SBSQ HOSP IP/OBS MODERATE 35: CPT | Performed by: STUDENT IN AN ORGANIZED HEALTH CARE EDUCATION/TRAINING PROGRAM

## 2024-05-02 PROCEDURE — 1200000002 HC GENERAL ROOM WITH TELEMETRY DAILY

## 2024-05-02 PROCEDURE — 36415 COLL VENOUS BLD VENIPUNCTURE: CPT | Performed by: STUDENT IN AN ORGANIZED HEALTH CARE EDUCATION/TRAINING PROGRAM

## 2024-05-02 RX ADMIN — PANTOPRAZOLE SODIUM 20 MG: 20 TABLET, DELAYED RELEASE ORAL at 09:23

## 2024-05-02 RX ADMIN — GABAPENTIN 100 MG: 100 CAPSULE ORAL at 09:23

## 2024-05-02 RX ADMIN — METOPROLOL TARTRATE 25 MG: 50 TABLET, FILM COATED ORAL at 20:52

## 2024-05-02 RX ADMIN — SACUBITRIL AND VALSARTAN 1 TABLET: 24; 26 TABLET, FILM COATED ORAL at 20:52

## 2024-05-02 RX ADMIN — DULOXETINE HYDROCHLORIDE 30 MG: 30 CAPSULE, DELAYED RELEASE ORAL at 09:23

## 2024-05-02 RX ADMIN — SENNOSIDES AND DOCUSATE SODIUM 2 TABLET: 8.6; 5 TABLET ORAL at 20:52

## 2024-05-02 RX ADMIN — ACETAMINOPHEN 975 MG: 325 TABLET ORAL at 17:55

## 2024-05-02 RX ADMIN — SENNOSIDES AND DOCUSATE SODIUM 2 TABLET: 8.6; 5 TABLET ORAL at 09:23

## 2024-05-02 RX ADMIN — ALLOPURINOL 100 MG: 100 TABLET ORAL at 09:23

## 2024-05-02 RX ADMIN — Medication 1 TABLET: at 09:23

## 2024-05-02 RX ADMIN — FLUTICASONE FUROATE AND VILANTEROL TRIFENATATE 1 PUFF: 200; 25 POWDER RESPIRATORY (INHALATION) at 07:58

## 2024-05-02 RX ADMIN — ACETAMINOPHEN 975 MG: 325 TABLET ORAL at 09:45

## 2024-05-02 RX ADMIN — OXYCODONE HYDROCHLORIDE AND ACETAMINOPHEN 500 MG: 500 TABLET ORAL at 09:23

## 2024-05-02 RX ADMIN — METOPROLOL TARTRATE 25 MG: 50 TABLET, FILM COATED ORAL at 09:23

## 2024-05-02 RX ADMIN — TRAMADOL HYDROCHLORIDE 100 MG: 50 TABLET, COATED ORAL at 20:52

## 2024-05-02 RX ADMIN — SPIRONOLACTONE 12.5 MG: 25 TABLET, FILM COATED ORAL at 09:23

## 2024-05-02 RX ADMIN — SACUBITRIL AND VALSARTAN 1 TABLET: 24; 26 TABLET, FILM COATED ORAL at 09:23

## 2024-05-02 RX ADMIN — ACETAMINOPHEN 975 MG: 325 TABLET ORAL at 02:45

## 2024-05-02 RX ADMIN — Medication 1000 UNITS: at 09:23

## 2024-05-02 RX ADMIN — FERROUS SULFATE TAB 325 MG (65 MG ELEMENTAL FE) 1 TABLET: 325 (65 FE) TAB at 09:23

## 2024-05-02 RX ADMIN — TORSEMIDE 20 MG: 20 TABLET ORAL at 09:23

## 2024-05-02 RX ADMIN — ATORVASTATIN CALCIUM 40 MG: 40 TABLET, FILM COATED ORAL at 20:52

## 2024-05-02 RX ADMIN — EMPAGLIFLOZIN 10 MG: 10 TABLET, FILM COATED ORAL at 09:23

## 2024-05-02 RX ADMIN — ASPIRIN 81 MG: 81 TABLET, COATED ORAL at 09:23

## 2024-05-02 RX ADMIN — GABAPENTIN 100 MG: 100 CAPSULE ORAL at 20:52

## 2024-05-02 RX ADMIN — IPRATROPIUM BROMIDE AND ALBUTEROL SULFATE 3 ML: .5; 3 SOLUTION RESPIRATORY (INHALATION) at 07:53

## 2024-05-02 ASSESSMENT — ACTIVITIES OF DAILY LIVING (ADL): LACK_OF_TRANSPORTATION: NO

## 2024-05-02 ASSESSMENT — COGNITIVE AND FUNCTIONAL STATUS - GENERAL
TURNING FROM BACK TO SIDE WHILE IN FLAT BAD: A LOT
MOBILITY SCORE: 9
CLIMB 3 TO 5 STEPS WITH RAILING: TOTAL
PERSONAL GROOMING: A LITTLE
DAILY ACTIVITIY SCORE: 14
MOVING TO AND FROM BED TO CHAIR: A LOT
DRESSING REGULAR LOWER BODY CLOTHING: A LOT
MOVING FROM LYING ON BACK TO SITTING ON SIDE OF FLAT BED WITH BEDRAILS: A LOT
STANDING UP FROM CHAIR USING ARMS: TOTAL
HELP NEEDED FOR BATHING: A LOT
WALKING IN HOSPITAL ROOM: TOTAL
DRESSING REGULAR UPPER BODY CLOTHING: A LOT
TOILETING: TOTAL

## 2024-05-02 ASSESSMENT — PAIN - FUNCTIONAL ASSESSMENT: PAIN_FUNCTIONAL_ASSESSMENT: 0-10

## 2024-05-02 ASSESSMENT — PAIN SCALES - GENERAL: PAINLEVEL_OUTOF10: 6

## 2024-05-02 NOTE — PROGRESS NOTES
05/02/24 1800   Discharge Planning   Living Arrangements Children   Support Systems Children   Assistance Needed yes   Type of Residence Private residence   Who is requesting discharge planning? Provider   Home or Post Acute Services Post acute facilities (Rehab/SNF/etc)   Type of Post Acute Facility Services Rehab   Patient expects to be discharged to: 05/03/24   Financial Resource Strain   How hard is it for you to pay for the very basics like food, housing, medical care, and heating? Not hard   Housing Stability   In the last 12 months, was there a time when you were not able to pay the mortgage or rent on time? N   In the last 12 months, how many places have you lived? 1   In the last 12 months, was there a time when you did not have a steady place to sleep or slept in a shelter (including now)? N   Transportation Needs   In the past 12 months, has lack of transportation kept you from medical appointments or from getting medications? no   In the past 12 months, has lack of transportation kept you from meetings, work, or from getting things needed for daily living? No     Assessment Note:  Met with patient and Introduced myself as care coordinator and member of the Care Transitions team for discharge planning. Pt feels safe at home.   Transportation: Patient will need ride to [facility]  Pharmacy: [facility] on [address] in [place]  DME: wheelchair and chair [employer] lift  Previous home care: No  Falls: Yes  PCP:Miladys Menon CNP  Dialysis: No    Address, phone number, and emergency contact verified. All questions and concerns answered. Will continue to follow for discharge needs.    Transitional Care Coordination Progress Note:  Patient discussed during interdisciplinary rounds.   Team members present: MD and TCC  Plan per Medical/Surgical team: Patient Medically ready  Payor: Medicare  Discharge disposition: SNF- Waiting on [facility] from son who is POA.  Potential Barriers: None  ADOD: 05/03/24

## 2024-05-02 NOTE — PROGRESS NOTES
MADI spoke with patient's daughter, Kelly, who gave additional SNF choices of [facility], [facility] and [facility] as [facility] does not have availability at this time. MADI left voicemail for patient's son, Praveen as he is POA to discuss additional choices as he is the ultimate decision maker.     Update 1608:  Spoke again with patient's daughter, Kelly, and updated on new accepting facilities. She says to add [facility] to the referral and then she will discuss with Praveen which facility they choose.    Sparkle Osullivan, BERNYW

## 2024-05-02 NOTE — PROGRESS NOTES
"---------------------------------------------------------------------------------------------------  Subjective   No new events.        ---------------------------------------------------------------------------------------------------  Objective   Last Recorded Vitals  Blood pressure (!) 128/41, pulse 67, temperature 37.1 °C (98.8 °F), resp. rate 19, height 1.6 m (5' 3\"), weight 92.2 kg (203 lb 4.2 oz), SpO2 99%.  Intake/Output last 3 Shifts:  I/O last 3 completed shifts:  In: - (0 mL/kg)   Out: 900 (9.8 mL/kg) [Urine:900 (0.3 mL/kg/hr)]  Weight: 92.2 kg     Physical Exam  Vitals and nursing note reviewed.   Constitutional:       General: She is not in acute distress.     Appearance: Normal appearance. She is ill-appearing. She is not toxic-appearing.      Comments: Elderly female, calm and appropriately interactive, engaged in her care   HENT:      Head: Normocephalic and atraumatic.      Mouth/Throat:      Mouth: Mucous membranes are moist.   Eyes:      General: No scleral icterus.     Extraocular Movements: Extraocular movements intact.      Conjunctiva/sclera: Conjunctivae normal.   Cardiovascular:      Rate and Rhythm: Normal rate and regular rhythm.      Heart sounds: S1 normal and S2 normal. No murmur heard.     Comments: Systolic murmur best at apex  Pulmonary:      Effort: Pulmonary effort is normal. No respiratory distress.      Breath sounds: Rales present. No wheezing or rhonchi.      Comments: Bibasilar crackles  Abdominal:      General: Bowel sounds are normal. There is no distension.      Palpations: Abdomen is soft.      Tenderness: There is no abdominal tenderness. There is no guarding or rebound.   Musculoskeletal:         General: Swelling and deformity present.      Cervical back: Neck supple.      Comments: Left leg/knee, peripheral edema is improving, mild.    Skin:     General: Skin is warm and dry.      Findings: No rash.   Neurological:      General: No focal deficit present.      Mental " Status: She is alert and oriented to person, place, and time. Mental status is at baseline.   Psychiatric:         Mood and Affect: Mood normal.         Behavior: Behavior normal.         Relevant Results  Lab Results   Component Value Date    WBC 6.8 05/02/2024    HGB 10.7 (L) 05/02/2024    HCT 35.2 (L) 05/02/2024    MCV 93 05/02/2024     05/02/2024      Lab Results   Component Value Date    GLUCOSE 86 05/02/2024    CALCIUM 9.2 05/02/2024     05/02/2024    K 4.6 05/02/2024    CO2 24 05/02/2024     05/02/2024    BUN 59 (H) 05/02/2024    CREATININE 1.29 (H) 05/02/2024     Scheduled medications  acetaminophen, 975 mg, oral, q8h  allopurinol, 100 mg, oral, Daily  ascorbic acid, 500 mg, oral, Daily  [Held by provider] aspirin, 81 mg, oral, Daily  atorvastatin, 40 mg, oral, Nightly  cholecalciferol, 1,000 Units, oral, Daily  DULoxetine, 30 mg, oral, Daily  empagliflozin, 10 mg, oral, Daily  ferrous sulfate (325 mg ferrous sulfate), 65 mg of iron, oral, Daily with breakfast  fluticasone furoate-vilanteroL, 1 puff, inhalation, Daily  gabapentin, 100 mg, oral, BID  metoprolol tartrate, 25 mg, oral, BID  multivitamin with minerals, 1 tablet, oral, Daily  pantoprazole, 20 mg, oral, Daily  sacubitriL-valsartan, 1 tablet, oral, BID  sennosides-docusate sodium, 2 tablet, oral, BID  spironolactone, 12.5 mg, oral, Daily  torsemide, 20 mg, oral, Daily      Continuous medications     PRN medications  PRN medications: dextrose, dextrose, glucagon, glucagon, ipratropium-albuteroL, melatonin, oxyCODONE, traMADol    Assessment/Plan         Alayna Dumont is a 89 y.o. 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 New o2 requirment of 2L noted. Labs obtained on admit notable for anemia,  hyponatremia, impaired renal function, elevated BNP, & mild hyperkalemia. Trop neg delta. CXR showed cardiomegaly with pulmonary vascular congestion and small left pleural effusion. EKG AV paced. S/p lasix. Noted last admit 3/2024 for ADHF and OSEAS.  Pt presenting from home, lives with son and is wheelchair bound due to severe left hip OA. Admitted to hospitalist service.  Patient continued on diuretics and her guideline directed medical therapy.  Overall improving after diuresis, diuretics on hold due to bump in creatinine now, symptoms improving.  Echo showed new reduction in EF, also with a new regional wall motion abnormality.  Cardiology consulted, suspect less likely due to ischemia more likely related to the pacemaker.      - Her device checks are current and she is pacemaker dependent and will pace 100% of the time. In terms of cardiac MRI , this would have to be completed on a non-conditional MRI day (typically Thursdays).       - Shortness of breath is improving, she is dry with bump in creatinine today, held diuretics, please see if we can resume maintenance dosing tomorrow  - Severe OA bilateral legs, does not walk, chronic pain., new right knee pain reported night, nursing that was swollen.  Do not feel need to increase narcotics at this time.  Started Tylenol scheduled and given 1 dose of colchicine.  Less likely gout - uric acid 6.9 monoarticular. This could just be OA. But can consider obtaining Knee Xr +/- ortho for arthrocentesis +/- prednisone given worsening SCr.  She seems to always asked for pain medicine  and talk about her pain and then I stepped out of the back and she goes to sleep.  I did put in for home palliative care through social work consult.   - Suspect OSEAS on CKD from diuresis, follow up urine electrolytes and hold off on diuresis             5/1/2024  -HDS, code status was changed to DNR/DNI, Goal of care discussion was completed. No plan for any further invasive measurements.    -At this point, will c/w medical treatment, Pt should be good for discharge to facility, TCC was updated.   -OSEAS is stable at this point.   -Pain is controlled.   -------------------    #acute on chronic respiratory failure with hypoxemia  #acute on chronic diastolic congestive heart failure   #pulmonary sarcoidosis  #pulmonary hypertension  #COPD  -likely multifactorial primarily due to decomp HFpEF, however contributing is pulmonary htn, pulmonary sarcoidosis, COPD. ACS ruled out.   -  noted dyspnea, fluid retention, xray chest with pulm edema, elevated bnp. Noted cxr with left pl effusoin. Per records, has history of L pleural effusion not able to be tapped per [facility] IR, had been diuresed prev.   - missed last cards visit, do not see record of pulmonary visits.   - last echo 2/2024 EF 70-75%, mod cLVH, bioprosthetic AV moderate AV stenosis, moderate to severe aortic regurg, noted prosthetic valve dysfunction due to degeneration, no significant changes compared to 1/2023.  - repeat echo 4/26/2024 showed new reduction in EF to 40 to 45% and new regional wall motion abnormality.  - home regimen: Torsemide 40 mg PO every day (MWF) and 20 mg PO every day rest of wk, Aldactone 12.5 mg PO every day, Jardiance 10 mg PO every day, Lopressor 25 mg PO BID   - continue home dose of Aldactone, Lopressor and Jardiance   - lasix 40mg iv bid, dc given new increase in creatinine, breathing is improving.  No active chest pain.  Follow-up RFP prior to restarting any maintenance dosing of her diuretic.  -Started Entresto per cardiology recommendation  -Started aspirin 81 mg daily, increase statin to 40 mg daily.  If no evidence of ischemia patient can stop aspirin.  Can discuss with cardiology.  - continue home dose of Breo 1 puff every day and Duoneb nebulizer every 4 hrs PRN SOB   - salt restrict, monitor I/o, dialy weight  - tele, goal sat 89-92%  -  cardiology consulted, appreciate recommendations.  Discussed with cardiology,  reduction in EF could be from RV pacing or related to burned-out apical HOCM.  Suspect ischemia is less likely.  Recommended cardiac stress MRI, pacemaker interrogation, per record however pacemaker is non conditional.  Cardiac device check order placed. Unclear of the exact model to obtain clearance from MRI.     #Knee pain  -Will send for Uric acid  -S/p 1x dose of colchinine  -R knee is painful to touch, but no erythema or edema noted today. No other joint acutely affected.     #Tachy/shahram syndrome s/p duel champer PPM  -Per record 2/2024, device rep was called and patient has a [employer] pacemaker placed in 2013 with non-conditional Medtronic leads  -Patient is recommended MRI, non- conditional pacemaker typically done on Thursdays, ordered device check for Monday, needs to be coordinated.  Will place order for MRI however will need to be cleared by device clinic and MRI, will need to be discussed need for MRI and timing with cardiology.  Device interrogation ordered primarily for percentage of pacing to see what the RV pacing burden is.  Typically 100% paced rhythm.    #hyperkalemia (mild)  - K 5.4 on admit  - Lokelma 10g x1 dose  - RFP ordered for AM, improving.      # OSEAS on CKD stage 3  - Cr 1.12 GFR 47 on admit, Cr 1.57 GFR 31 on 3/30  - Likely in the s/o diuresis, will obtain Urine electrolytes.   - renally dose meds as needed     #SUNDEEP  - Hgb 9.5 on admit, Hgb 10.4 on 3/24  - continue home dose of Ferrous sulfate 325 mg PO every day     #HLD  - continue home dose of Atorvastatin 20 mg PO at bedtime    # Severe OA  # Impaired mobility  -Mostly wheelchair-bound, remains at home, has home visiting primary care.  Has chronic pain issues.  Placed social work consult for home palliative care which she is interested in.    Scheduled outpatient appointments in system:   No future appointments.  Cydney Chris MD

## 2024-05-03 ENCOUNTER — TELEPHONE (OUTPATIENT)
Dept: GERIATRIC MEDICINE | Facility: CLINIC | Age: 89
End: 2024-05-03
Payer: MEDICARE

## 2024-05-03 LAB
GLUCOSE BLD MANUAL STRIP-MCNC: 121 MG/DL (ref 74–99)
GLUCOSE BLD MANUAL STRIP-MCNC: 134 MG/DL (ref 74–99)
GLUCOSE BLD MANUAL STRIP-MCNC: 91 MG/DL (ref 74–99)
GLUCOSE BLD MANUAL STRIP-MCNC: 97 MG/DL (ref 74–99)

## 2024-05-03 PROCEDURE — 99231 SBSQ HOSP IP/OBS SF/LOW 25: CPT | Performed by: STUDENT IN AN ORGANIZED HEALTH CARE EDUCATION/TRAINING PROGRAM

## 2024-05-03 PROCEDURE — 2500000001 HC RX 250 WO HCPCS SELF ADMINISTERED DRUGS (ALT 637 FOR MEDICARE OP): Performed by: STUDENT IN AN ORGANIZED HEALTH CARE EDUCATION/TRAINING PROGRAM

## 2024-05-03 PROCEDURE — 1210000001 HC SEMI-PRIVATE ROOM DAILY

## 2024-05-03 PROCEDURE — 2500000002 HC RX 250 W HCPCS SELF ADMINISTERED DRUGS (ALT 637 FOR MEDICARE OP, ALT 636 FOR OP/ED): Performed by: NURSE PRACTITIONER

## 2024-05-03 PROCEDURE — 2500000006 HC RX 250 W HCPCS SELF ADMINISTERED DRUGS (ALT 637 FOR ALL PAYERS): Performed by: NURSE PRACTITIONER

## 2024-05-03 PROCEDURE — 2500000001 HC RX 250 WO HCPCS SELF ADMINISTERED DRUGS (ALT 637 FOR MEDICARE OP): Performed by: NURSE PRACTITIONER

## 2024-05-03 PROCEDURE — 82947 ASSAY GLUCOSE BLOOD QUANT: CPT

## 2024-05-03 RX ADMIN — GABAPENTIN 100 MG: 100 CAPSULE ORAL at 20:16

## 2024-05-03 RX ADMIN — EMPAGLIFLOZIN 10 MG: 10 TABLET, FILM COATED ORAL at 10:02

## 2024-05-03 RX ADMIN — ACETAMINOPHEN 975 MG: 325 TABLET ORAL at 18:37

## 2024-05-03 RX ADMIN — IPRATROPIUM BROMIDE AND ALBUTEROL SULFATE 3 ML: .5; 3 SOLUTION RESPIRATORY (INHALATION) at 11:33

## 2024-05-03 RX ADMIN — OXYCODONE HYDROCHLORIDE 2.5 MG: 5 TABLET ORAL at 10:00

## 2024-05-03 RX ADMIN — ATORVASTATIN CALCIUM 40 MG: 40 TABLET, FILM COATED ORAL at 20:16

## 2024-05-03 RX ADMIN — OXYCODONE HYDROCHLORIDE AND ACETAMINOPHEN 500 MG: 500 TABLET ORAL at 10:01

## 2024-05-03 RX ADMIN — ALLOPURINOL 100 MG: 100 TABLET ORAL at 10:01

## 2024-05-03 RX ADMIN — ACETAMINOPHEN 975 MG: 325 TABLET ORAL at 10:01

## 2024-05-03 RX ADMIN — GABAPENTIN 100 MG: 100 CAPSULE ORAL at 10:02

## 2024-05-03 RX ADMIN — Medication 1 TABLET: at 10:01

## 2024-05-03 RX ADMIN — ACETAMINOPHEN 975 MG: 325 TABLET ORAL at 02:45

## 2024-05-03 RX ADMIN — SACUBITRIL AND VALSARTAN 1 TABLET: 24; 26 TABLET, FILM COATED ORAL at 20:15

## 2024-05-03 RX ADMIN — PANTOPRAZOLE SODIUM 20 MG: 20 TABLET, DELAYED RELEASE ORAL at 10:01

## 2024-05-03 RX ADMIN — SPIRONOLACTONE 12.5 MG: 25 TABLET, FILM COATED ORAL at 10:01

## 2024-05-03 RX ADMIN — SACUBITRIL AND VALSARTAN 1 TABLET: 24; 26 TABLET, FILM COATED ORAL at 10:01

## 2024-05-03 RX ADMIN — DULOXETINE HYDROCHLORIDE 30 MG: 30 CAPSULE, DELAYED RELEASE ORAL at 10:02

## 2024-05-03 RX ADMIN — FERROUS SULFATE TAB 325 MG (65 MG ELEMENTAL FE) 1 TABLET: 325 (65 FE) TAB at 10:02

## 2024-05-03 RX ADMIN — FLUTICASONE FUROATE AND VILANTEROL TRIFENATATE 1 PUFF: 200; 25 POWDER RESPIRATORY (INHALATION) at 11:35

## 2024-05-03 RX ADMIN — Medication 1000 UNITS: at 10:02

## 2024-05-03 RX ADMIN — METOPROLOL TARTRATE 25 MG: 50 TABLET, FILM COATED ORAL at 10:01

## 2024-05-03 RX ADMIN — TORSEMIDE 20 MG: 20 TABLET ORAL at 10:01

## 2024-05-03 RX ADMIN — METOPROLOL TARTRATE 25 MG: 50 TABLET, FILM COATED ORAL at 20:15

## 2024-05-03 ASSESSMENT — COGNITIVE AND FUNCTIONAL STATUS - GENERAL
MOVING FROM LYING ON BACK TO SITTING ON SIDE OF FLAT BED WITH BEDRAILS: A LOT
HELP NEEDED FOR BATHING: A LOT
DRESSING REGULAR UPPER BODY CLOTHING: A LOT
WALKING IN HOSPITAL ROOM: TOTAL
CLIMB 3 TO 5 STEPS WITH RAILING: TOTAL
DAILY ACTIVITIY SCORE: 14
TURNING FROM BACK TO SIDE WHILE IN FLAT BAD: A LOT
TOILETING: TOTAL
MOBILITY SCORE: 9
MOVING TO AND FROM BED TO CHAIR: A LOT
STANDING UP FROM CHAIR USING ARMS: TOTAL
DRESSING REGULAR LOWER BODY CLOTHING: A LOT
PERSONAL GROOMING: A LITTLE

## 2024-05-03 ASSESSMENT — PAIN SCALES - GENERAL
PAINLEVEL_OUTOF10: 5 - MODERATE PAIN
PAINLEVEL_OUTOF10: 5 - MODERATE PAIN
PAINLEVEL_OUTOF10: 10 - WORST POSSIBLE PAIN
PAINLEVEL_OUTOF10: 0 - NO PAIN

## 2024-05-03 ASSESSMENT — PAIN - FUNCTIONAL ASSESSMENT: PAIN_FUNCTIONAL_ASSESSMENT: 0-10

## 2024-05-03 NOTE — TELEPHONE ENCOUNTER
"NP received a telephone call from patient's son.  He states that his friend Scarlet told him that his mother was complaining of pain in her knee.  They inquired and was told the Mrs. Dumont has not been on the Keflex 500 mg q 12 hour.    Patient was started on this Per ID for chronic, indefinite therapy for infected hardware in her left hip.    He states he does not know who stopped it, and he believes possible the SNF \"because several medications were not being given there\".     NP spoke with nurse and asked that this be relayed to the covering team.   NP also left phone number if there are any other questions.   DB  "

## 2024-05-03 NOTE — PROGRESS NOTES
"---------------------------------------------------------------------------------------------------  Subjective   No new events.        ---------------------------------------------------------------------------------------------------  Objective   Last Recorded Vitals  Blood pressure (!) 126/40, pulse 60, temperature 36.5 °C (97.7 °F), resp. rate 15, height 1.6 m (5' 3\"), weight 92.2 kg (203 lb 4.2 oz), SpO2 96%.  Intake/Output last 3 Shifts:  I/O last 3 completed shifts:  In: - (0 mL/kg)   Out: 2300 (24.9 mL/kg) [Urine:2300 (0.7 mL/kg/hr)]  Weight: 92.2 kg     Physical Exam  Vitals and nursing note reviewed.   Constitutional:       General: She is not in acute distress.     Appearance: Normal appearance. She is ill-appearing. She is not toxic-appearing.      Comments: Elderly female, calm and appropriately interactive, engaged in her care   HENT:      Head: Normocephalic and atraumatic.      Mouth/Throat:      Mouth: Mucous membranes are moist.   Eyes:      General: No scleral icterus.     Extraocular Movements: Extraocular movements intact.      Conjunctiva/sclera: Conjunctivae normal.   Cardiovascular:      Rate and Rhythm: Normal rate and regular rhythm.      Heart sounds: S1 normal and S2 normal. No murmur heard.     Comments: Systolic murmur best at apex  Pulmonary:      Effort: Pulmonary effort is normal. No respiratory distress.      Breath sounds: Rales present. No wheezing or rhonchi.      Comments: Bibasilar crackles  Abdominal:      General: Bowel sounds are normal. There is no distension.      Palpations: Abdomen is soft.      Tenderness: There is no abdominal tenderness. There is no guarding or rebound.   Musculoskeletal:         General: Swelling and deformity present.      Cervical back: Neck supple.      Comments: Left leg/knee, peripheral edema is improving, mild.    Skin:     General: Skin is warm and dry.      Findings: No rash.   Neurological:      General: No focal deficit present.      Mental " Status: She is alert and oriented to person, place, and time. Mental status is at baseline.   Psychiatric:         Mood and Affect: Mood normal.         Behavior: Behavior normal.         Relevant Results  Lab Results   Component Value Date    WBC 6.8 05/02/2024    HGB 10.7 (L) 05/02/2024    HCT 35.2 (L) 05/02/2024    MCV 93 05/02/2024     05/02/2024      Lab Results   Component Value Date    GLUCOSE 86 05/02/2024    CALCIUM 9.2 05/02/2024     05/02/2024    K 4.6 05/02/2024    CO2 24 05/02/2024     05/02/2024    BUN 59 (H) 05/02/2024    CREATININE 1.29 (H) 05/02/2024     Scheduled medications  acetaminophen, 975 mg, oral, q8h  allopurinol, 100 mg, oral, Daily  ascorbic acid, 500 mg, oral, Daily  [Held by provider] aspirin, 81 mg, oral, Daily  atorvastatin, 40 mg, oral, Nightly  cholecalciferol, 1,000 Units, oral, Daily  DULoxetine, 30 mg, oral, Daily  empagliflozin, 10 mg, oral, Daily  ferrous sulfate (325 mg ferrous sulfate), 65 mg of iron, oral, Daily with breakfast  fluticasone furoate-vilanteroL, 1 puff, inhalation, Daily  gabapentin, 100 mg, oral, BID  metoprolol tartrate, 25 mg, oral, BID  multivitamin with minerals, 1 tablet, oral, Daily  pantoprazole, 20 mg, oral, Daily  sacubitriL-valsartan, 1 tablet, oral, BID  sennosides-docusate sodium, 2 tablet, oral, BID  spironolactone, 12.5 mg, oral, Daily  torsemide, 20 mg, oral, Daily      Continuous medications     PRN medications  PRN medications: dextrose, dextrose, glucagon, glucagon, ipratropium-albuteroL, melatonin, oxyCODONE, traMADol    Assessment/Plan         Alayna Dumont is a 89 y.o. 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 New o2 requirment of 2L noted. Labs obtained on admit notable for anemia,  hyponatremia, impaired renal function, elevated BNP, & mild hyperkalemia. Trop neg delta. CXR showed cardiomegaly with pulmonary vascular congestion and small left pleural effusion. EKG AV paced. S/p lasix. Noted last admit 3/2024 for ADHF and OSEAS.  Pt presenting from home, lives with son and is wheelchair bound due to severe left hip OA. Admitted to hospitalist service.  Patient continued on diuretics and her guideline directed medical therapy.  Overall improving after diuresis, diuretics on hold due to bump in creatinine now, symptoms improving.  Echo showed new reduction in EF, also with a new regional wall motion abnormality.  Cardiology consulted, suspect less likely due to ischemia more likely related to the pacemaker.      - Her device checks are current and she is pacemaker dependent and will pace 100% of the time. In terms of cardiac MRI , this would have to be completed on a non-conditional MRI day (typically Thursdays).       - Shortness of breath is improving, she is dry with bump in creatinine today, held diuretics, please see if we can resume maintenance dosing tomorrow  - Severe OA bilateral legs, does not walk, chronic pain., new right knee pain reported night, nursing that was swollen.  Do not feel need to increase narcotics at this time.  Started Tylenol scheduled and given 1 dose of colchicine.  Less likely gout - uric acid 6.9 monoarticular. This could just be OA. But can consider obtaining Knee Xr +/- ortho for arthrocentesis +/- prednisone given worsening SCr.  She seems to always asked for pain medicine  and talk about her pain and then I stepped out of the back and she goes to sleep.  I did put in for home palliative care through social work consult.   - Suspect OSEAS on CKD from diuresis, follow up urine electrolytes and hold off on diuresis             5/1/2024  -HDS, code status was changed to DNR/DNI, Goal of care discussion was completed. No plan for any further invasive measurements.    -At this point, will c/w medical treatment, Pt should be good for discharge to facility, TCC was updated.   -OSEAS is stable at this point.   -Pain is controlled.   -------------------    #acute on chronic respiratory failure with hypoxemia  #acute on chronic diastolic congestive heart failure   #pulmonary sarcoidosis  #pulmonary hypertension  #COPD  -likely multifactorial primarily due to decomp HFpEF, however contributing is pulmonary htn, pulmonary sarcoidosis, COPD. ACS ruled out.   -  noted dyspnea, fluid retention, xray chest with pulm edema, elevated bnp. Noted cxr with left pl effusoin. Per records, has history of L pleural effusion not able to be tapped per [facility] IR, had been diuresed prev.   - missed last cards visit, do not see record of pulmonary visits.   - last echo 2/2024 EF 70-75%, mod cLVH, bioprosthetic AV moderate AV stenosis, moderate to severe aortic regurg, noted prosthetic valve dysfunction due to degeneration, no significant changes compared to 1/2023.  - repeat echo 4/26/2024 showed new reduction in EF to 40 to 45% and new regional wall motion abnormality.  - home regimen: Torsemide 40 mg PO every day (MWF) and 20 mg PO every day rest of wk, Aldactone 12.5 mg PO every day, Jardiance 10 mg PO every day, Lopressor 25 mg PO BID   - continue home dose of Aldactone, Lopressor and Jardiance   - lasix 40mg iv bid, dc given new increase in creatinine, breathing is improving.  No active chest pain.  Follow-up RFP prior to restarting any maintenance dosing of her diuretic.  -Started Entresto per cardiology recommendation  -Started aspirin 81 mg daily, increase statin to 40 mg daily.  If no evidence of ischemia patient can stop aspirin.  Can discuss with cardiology.  - continue home dose of Breo 1 puff every day and Duoneb nebulizer every 4 hrs PRN SOB   - salt restrict, monitor I/o, dialy weight  - tele, goal sat 89-92%  -  cardiology consulted, appreciate recommendations.  Discussed with cardiology,  reduction in EF could be from RV pacing or related to burned-out apical HOCM.  Suspect ischemia is less likely.  Recommended cardiac stress MRI, pacemaker interrogation, per record however pacemaker is non conditional.  Cardiac device check order placed. Unclear of the exact model to obtain clearance from MRI.     #Knee pain  -Will send for Uric acid  -S/p 1x dose of colchinine  -R knee is painful to touch, but no erythema or edema noted today. No other joint acutely affected.     #Tachy/shahram syndrome s/p duel champer PPM  -Per record 2/2024, device rep was called and patient has a [employer] pacemaker placed in 2013 with non-conditional Medtronic leads  -Patient is recommended MRI, non- conditional pacemaker typically done on Thursdays, ordered device check for Monday, needs to be coordinated.  Will place order for MRI however will need to be cleared by device clinic and MRI, will need to be discussed need for MRI and timing with cardiology.  Device interrogation ordered primarily for percentage of pacing to see what the RV pacing burden is.  Typically 100% paced rhythm.    #hyperkalemia (mild)  - K 5.4 on admit  - Lokelma 10g x1 dose  - RFP ordered for AM, improving.      # OSEAS on CKD stage 3  - Cr 1.12 GFR 47 on admit, Cr 1.57 GFR 31 on 3/30  - Likely in the s/o diuresis, will obtain Urine electrolytes.   - renally dose meds as needed     #SUNDEEP  - Hgb 9.5 on admit, Hgb 10.4 on 3/24  - continue home dose of Ferrous sulfate 325 mg PO every day     #HLD  - continue home dose of Atorvastatin 20 mg PO at bedtime    # Severe OA  # Impaired mobility  -Mostly wheelchair-bound, remains at home, has home visiting primary care.  Has chronic pain issues.  Placed social work consult for home palliative care which she is interested in.    Scheduled outpatient appointments in system:   No future appointments.  Cydney Chris MD

## 2024-05-03 NOTE — PROGRESS NOTES
1000:  M[facility]I called [facility]ART[facility]NE[facility] to determine FOC. He did not answer. Voicemail left asking for urgent call back due to patient being medically ready. No return call received. Spoke with Kelly, patient's daughter, who states she will continue to try to call [facility] for an answer. She verbalizes understanding of discharge today.    1346:  Spoke with Kelly, patient's daughter again, she states that she spoke with [facility]veen and patient herself and they insist on her returning to the facility she came from which is [facility] instead of going to another facility. MADI sent return referral at this time.    BERNY LouW

## 2024-05-04 VITALS
BODY MASS INDEX: 36.02 KG/M2 | RESPIRATION RATE: 17 BRPM | OXYGEN SATURATION: 96 % | HEART RATE: 60 BPM | WEIGHT: 203.26 LBS | TEMPERATURE: 97.5 F | DIASTOLIC BLOOD PRESSURE: 55 MMHG | HEIGHT: 63 IN | SYSTOLIC BLOOD PRESSURE: 157 MMHG

## 2024-05-04 LAB
ALBUMIN SERPL BCP-MCNC: 3.5 G/DL (ref 3.4–5)
ANION GAP SERPL CALC-SCNC: 17 MMOL/L (ref 10–20)
BUN SERPL-MCNC: 56 MG/DL (ref 6–23)
CALCIUM SERPL-MCNC: 8.8 MG/DL (ref 8.6–10.6)
CHLORIDE SERPL-SCNC: 103 MMOL/L (ref 98–107)
CO2 SERPL-SCNC: 26 MMOL/L (ref 21–32)
CREAT SERPL-MCNC: 1.44 MG/DL (ref 0.5–1.05)
EGFRCR SERPLBLD CKD-EPI 2021: 34 ML/MIN/1.73M*2
ERYTHROCYTE [DISTWIDTH] IN BLOOD BY AUTOMATED COUNT: 16.3 % (ref 11.5–14.5)
GLUCOSE SERPL-MCNC: 82 MG/DL (ref 74–99)
HCT VFR BLD AUTO: 39.1 % (ref 36–46)
HGB BLD-MCNC: 12 G/DL (ref 12–16)
MCH RBC QN AUTO: 29.6 PG (ref 26–34)
MCHC RBC AUTO-ENTMCNC: 30.7 G/DL (ref 32–36)
MCV RBC AUTO: 96 FL (ref 80–100)
NRBC BLD-RTO: 0 /100 WBCS (ref 0–0)
PHOSPHATE SERPL-MCNC: 4.8 MG/DL (ref 2.5–4.9)
PLATELET # BLD AUTO: 356 X10*3/UL (ref 150–450)
POTASSIUM SERPL-SCNC: 5.3 MMOL/L (ref 3.5–5.3)
RBC # BLD AUTO: 4.06 X10*6/UL (ref 4–5.2)
SODIUM SERPL-SCNC: 141 MMOL/L (ref 136–145)
WBC # BLD AUTO: 6.9 X10*3/UL (ref 4.4–11.3)

## 2024-05-04 PROCEDURE — 2500000001 HC RX 250 WO HCPCS SELF ADMINISTERED DRUGS (ALT 637 FOR MEDICARE OP): Performed by: NURSE PRACTITIONER

## 2024-05-04 PROCEDURE — 94640 AIRWAY INHALATION TREATMENT: CPT

## 2024-05-04 PROCEDURE — 2500000001 HC RX 250 WO HCPCS SELF ADMINISTERED DRUGS (ALT 637 FOR MEDICARE OP): Performed by: STUDENT IN AN ORGANIZED HEALTH CARE EDUCATION/TRAINING PROGRAM

## 2024-05-04 PROCEDURE — 36415 COLL VENOUS BLD VENIPUNCTURE: CPT | Performed by: STUDENT IN AN ORGANIZED HEALTH CARE EDUCATION/TRAINING PROGRAM

## 2024-05-04 PROCEDURE — 99239 HOSP IP/OBS DSCHRG MGMT >30: CPT | Performed by: STUDENT IN AN ORGANIZED HEALTH CARE EDUCATION/TRAINING PROGRAM

## 2024-05-04 PROCEDURE — 2500000006 HC RX 250 W HCPCS SELF ADMINISTERED DRUGS (ALT 637 FOR ALL PAYERS): Performed by: NURSE PRACTITIONER

## 2024-05-04 PROCEDURE — 84100 ASSAY OF PHOSPHORUS: CPT | Performed by: STUDENT IN AN ORGANIZED HEALTH CARE EDUCATION/TRAINING PROGRAM

## 2024-05-04 PROCEDURE — 85027 COMPLETE CBC AUTOMATED: CPT | Performed by: STUDENT IN AN ORGANIZED HEALTH CARE EDUCATION/TRAINING PROGRAM

## 2024-05-04 RX ADMIN — METOPROLOL TARTRATE 25 MG: 50 TABLET, FILM COATED ORAL at 11:34

## 2024-05-04 RX ADMIN — FERROUS SULFATE TAB 325 MG (65 MG ELEMENTAL FE) 1 TABLET: 325 (65 FE) TAB at 10:14

## 2024-05-04 RX ADMIN — SPIRONOLACTONE 12.5 MG: 25 TABLET, FILM COATED ORAL at 10:13

## 2024-05-04 RX ADMIN — FLUTICASONE FUROATE AND VILANTEROL TRIFENATATE 1 PUFF: 200; 25 POWDER RESPIRATORY (INHALATION) at 10:45

## 2024-05-04 RX ADMIN — DULOXETINE HYDROCHLORIDE 30 MG: 30 CAPSULE, DELAYED RELEASE ORAL at 10:13

## 2024-05-04 RX ADMIN — TORSEMIDE 20 MG: 20 TABLET ORAL at 10:13

## 2024-05-04 RX ADMIN — SACUBITRIL AND VALSARTAN 1 TABLET: 24; 26 TABLET, FILM COATED ORAL at 10:13

## 2024-05-04 RX ADMIN — ACETAMINOPHEN 975 MG: 325 TABLET ORAL at 02:24

## 2024-05-04 RX ADMIN — ACETAMINOPHEN 975 MG: 325 TABLET ORAL at 10:14

## 2024-05-04 RX ADMIN — Medication 1000 UNITS: at 10:13

## 2024-05-04 RX ADMIN — GABAPENTIN 100 MG: 100 CAPSULE ORAL at 10:13

## 2024-05-04 RX ADMIN — PANTOPRAZOLE SODIUM 20 MG: 20 TABLET, DELAYED RELEASE ORAL at 10:14

## 2024-05-04 RX ADMIN — EMPAGLIFLOZIN 10 MG: 10 TABLET, FILM COATED ORAL at 10:14

## 2024-05-04 RX ADMIN — Medication 1 TABLET: at 10:14

## 2024-05-04 RX ADMIN — ALLOPURINOL 100 MG: 100 TABLET ORAL at 10:14

## 2024-05-04 RX ADMIN — OXYCODONE HYDROCHLORIDE AND ACETAMINOPHEN 500 MG: 500 TABLET ORAL at 10:14

## 2024-05-04 ASSESSMENT — COGNITIVE AND FUNCTIONAL STATUS - GENERAL
MOBILITY SCORE: 8
DRESSING REGULAR UPPER BODY CLOTHING: A LITTLE
TURNING FROM BACK TO SIDE WHILE IN FLAT BAD: A LOT
WALKING IN HOSPITAL ROOM: TOTAL
EATING MEALS: A LITTLE
CLIMB 3 TO 5 STEPS WITH RAILING: TOTAL
STANDING UP FROM CHAIR USING ARMS: TOTAL
DAILY ACTIVITIY SCORE: 14
HELP NEEDED FOR BATHING: A LOT
DRESSING REGULAR LOWER BODY CLOTHING: A LOT
MOVING FROM LYING ON BACK TO SITTING ON SIDE OF FLAT BED WITH BEDRAILS: A LOT
MOVING TO AND FROM BED TO CHAIR: TOTAL
TOILETING: A LOT
PERSONAL GROOMING: A LOT

## 2024-05-04 ASSESSMENT — PAIN SCALES - GENERAL
PAINLEVEL_OUTOF10: 0 - NO PAIN
PAINLEVEL_OUTOF10: 6

## 2024-05-04 NOTE — DISCHARGE SUMMARY
Discharge Diagnosis  Acute on chronic diastolic congestive heart failure (Multi)    Issues Requiring Follow-Up  Fu with PCP    Test Results Pending At Discharge  Pending Labs       Order Current Status    CBC Collected (05/04/24 0813)    Renal Function Panel Collected (05/04/24 0813)            Hospital Course              Alayna Dumont is a 89 y.o. 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 New o2 requirment of 2L noted. Labs obtained on admit notable for anemia, hyponatremia, impaired renal function, elevated BNP, & mild hyperkalemia. Trop neg delta. CXR showed cardiomegaly with pulmonary vascular congestion and small left pleural effusion. EKG AV paced. S/p lasix. Noted last admit 3/2024 for ADHF and OSEAS.  Pt presenting from home, lives with son and is wheelchair bound due to severe left hip OA. Admitted to hospitalist service.  Patient continued on diuretics and her guideline directed medical therapy.  Overall improving after diuresis, symptoms improving.  Echo showed new reduction in EF, also with a new regional wall motion abnormality.  Cardiology consulted, suspect less likely due to ischemia more likely related to the pacemaker.  Her device checks are current and she is pacemaker dependent and will pace 100% of the time. In terms of cardiac MRI , this would have to be completed as an outpatient. Geriatric care was consulted for goal of care discussion, her code status was changed to DNR/DNI, Goal of care discussion was completed. No plan for any further invasive measurements. At this point, will c/w medical treatment, Pt should be good for discharge to facility, TCC was updated. CKD is stable at this point. Pain is controlled.      Discharge plan of care discussed, education and  counseling provided involving active problem care plan, warning signs,  risks/benefits of new medications or medication changes, follow-up care and testing.  Patient advised to follow-up with her primary care within 1 week of discharge or the next available for posthospitalization transition of care.  There was verbalized understanding and agreement with discharge care plan.      Pt instructed to take all medications as prescribed.  Keep all follow-up appointments.  Contact their primary care physician with any questions or concerns that arise.  Come to the emergency department with worsening of your symptoms or any other medical emergency. Instructed that any outpatient tests that are ordered for outpatient follow up are meant to expedite outpatient work up and management, and that the results are not followed or managed by the inpatient ordering team and MUST be followed up with the outpatient primary care or outpatient specialist.  Verbal understanding and agreement obtained to order tests for outpatient follow up purposes.       Time conducting this discharge is >40 minutes         Pertinent Physical Exam At Time of Discharge  Physical Exam    Home Medications     Medication List      START taking these medications     acetaminophen 325 mg tablet; Commonly known as: Tylenol; Take 3 tablets   (975 mg) by mouth every 8 hours.   aspirin 81 mg EC tablet; Take 1 tablet (81 mg) by mouth once daily.   oxyCODONE 5 mg immediate release tablet; Commonly known as: Roxicodone;   Take 0.5 tablets (2.5 mg) by mouth every 6 hours if needed for moderate   pain (4 - 6) for up to 5 days.   sacubitriL-valsartan 24-26 mg tablet; Commonly known as: Entresto; Take   1 tablet by mouth 2 times a day.     CHANGE how you take these medications     * traMADol 100 mg tablet; Commonly known as: Ultram; Take 1 tablet (100   mg) by mouth 2 times a day as needed for severe pain (7 - 10).; What   changed: Another medication with the same name  was added. Make sure you   understand how and when to take each.   * traMADol 100 mg tablet; Commonly known as: Ultram; Take 1 tablet (100   mg) by mouth 2 times a day as needed for severe pain (7 - 10) for up to 5   days.; What changed: You were already taking a medication with the same   name, and this prescription was added. Make sure you understand how and   when to take each.  * This list has 2 medication(s) that are the same as other medications   prescribed for you. Read the directions carefully, and ask your doctor or   other care provider to review them with you.     CONTINUE taking these medications     alendronate 35 mg tablet; Commonly known as: Fosamax; Take 1 tablet (35   mg) by mouth every 7 days.   allopurinol 100 mg tablet; Commonly known as: Zyloprim; Take 1 tablet   (100 mg) by mouth once daily.   ascorbic acid 500 mg tablet; Commonly known as: Vitamin C   atorvastatin 20 mg tablet; Commonly known as: Lipitor; TAKE 1 TABLET BY   MOUTH AT BEDTIME   baclofen 5 mg tablet; Commonly known as: Lioresal; Take one tablet every   night for spasm in left hip. If needed, may take one tablet during the   day. STOP METHOCARBAMOL   Dodex 1,000 mcg/mL injection; Generic drug: cyanocobalamin; inject 1   milliliter intramuscularly every month   DULoxetine 30 mg DR capsule; Commonly known as: Cymbalta; TAKE 1 CAPSULE   BY MOUTH ONCE DAILY DISCONTINUE 20 MILLIGRAM CAPSULE   empagliflozin 10 mg; Commonly known as: Jardiance; Take 1 tablet (10 mg)   by mouth once daily.   ferrous sulfate (325 mg ferrous sulfate) tablet; Commonly known as:   FeroSuL; Take 1 tablet (65 mg of iron) by mouth once daily with a meal.   fluticasone furoate-vilanteroL 200-25 mcg/dose inhaler; Commonly known   as: Breo Ellipta; Inhale 1 puff once daily. Rinse mouth after each use   gabapentin 100 mg capsule; Commonly known as: Neurontin; Take 1 capsule   (100 mg) by mouth 2 times a day. For neuropathy (nerve pain)   ipratropium-albuteroL  0.5-2.5 mg/3 mL nebulizer solution; Commonly known   as: Duo-Neb   melatonin 2.5 mg tablet,chewable   metoprolol tartrate 25 mg tablet; Commonly known as: Lopressor   multivitamin with minerals tablet   naloxone 4 mg/0.1 mL nasal spray; Commonly known as: Narcan   pantoprazole 20 mg EC tablet; Commonly known as: ProtoNix; take 1 tablet   by mouth once daily   polyethylene glycol 17 gram packet; Commonly known as: Glycolax,   Miralax; Take 17 g by mouth 2 times a day.   PRESERVISION AREDS ORAL   sennosides 8.6 mg tablet; Commonly known as: Senokot; Take 1 tablet (8.6   mg) by mouth 2 times a day as needed for constipation (constipation).   spironolactone 25 mg tablet; Commonly known as: Aldactone; Take 0.5   tablets (12.5 mg) by mouth once daily. STOP TAKING POTASSIUM   torsemide 20 mg tablet; Commonly known as: Demadex   VITAMIN B COMPLEX ORAL   Vitamin D3 25 MCG (1000 UT) tablet; Generic drug: cholecalciferol   vitamin E 180 mg (400 unit) capsule     STOP taking these medications     dexAMETHasone 6 mg tablet; Commonly known as: Decadron   NON FORMULARY       Outpatient Follow-Up  No future appointments.    Cydney Chris MD

## 2024-05-04 NOTE — NURSING NOTE
Pt IV was intact upon removal. Pt was medically clear for discharge. [facility] Report was called and given to RN. Pt left with clothes, glasses, and toiletries.

## 2024-05-04 NOTE — PROGRESS NOTES
05/04/24 1121   Discharge Planning   Who is requesting discharge planning? Provider   Home or Post Acute Services Post acute facilities (Rehab/SNF/etc)   Type of Post Acute Facility Services Rehab   [facility]   Does the patient need discharge transport arranged? Yes   RoundTrip coordination needed? Yes   Has discharge transport been arranged? Yes   What day is the transport expected? 05/04/24   What time is the transport expected? 1230     Attempted to notify MAURICIO Morton [phone] of discharge today but he did not answer. Left voice mail message for return call. Care Transitions team will continue to follow for discharge planning needs.    Leda Parsons RN  Transitional Care Coordinator/TCC  [id number]

## 2024-05-04 NOTE — PROGRESS NOTES
Alayna Dumont is a 89 y.o. female on day 9 of admission presenting with Acute on chronic diastolic congestive heart failure (Multi).    Subjective   Patient placed on weekend sign out and noted that patient is scheduled to discharge to [facility] at 12pm today. Spoke to unit secretary who was unaware of planned discharge; notified her of transportation information. N2N report number provided to bedside RN.     -Lakia AQUINO MA, LSW  [phone] or [facility]  Care Transitions

## 2024-05-06 ASSESSMENT — ENCOUNTER SYMPTOMS
ARTHRALGIAS: 1
VOMITING: 0
PALPITATIONS: 0
CONSTIPATION: 0
ABDOMINAL PAIN: 0
COUGH: 0
FEVER: 0
SHORTNESS OF BREATH: 0
AGITATION: 0
SEIZURES: 0
DIZZINESS: 0
UNEXPECTED WEIGHT CHANGE: 0
HEADACHES: 0
NERVOUS/ANXIOUS: 0
NAUSEA: 0
SORE THROAT: 0
BACK PAIN: 0
FREQUENCY: 0
DIFFICULTY URINATING: 0
DIARRHEA: 0

## 2024-05-06 NOTE — PROGRESS NOTES
Subjective   Patient ID: Alayna Dumont is a 89 y.o. female who is acute skilled care being seen and evaluated for multiple medical problems.    Alert, pleasant. Denies any pain. Her appetite is fair.  He is complaining of bloating and constipation.  She is noted to have weight gain.  Ambulates short distances with walker. No recent falls.         Review of Systems   Constitutional:  Negative for fever and unexpected weight change.   HENT:  Negative for sore throat.    Respiratory:  Negative for cough and shortness of breath.    Cardiovascular:  Negative for chest pain and palpitations.   Gastrointestinal:  Negative for abdominal pain, constipation, diarrhea, nausea and vomiting.   Genitourinary:  Negative for difficulty urinating and frequency.   Musculoskeletal:  Positive for arthralgias. Negative for back pain.   Skin:  Negative for rash.   Neurological:  Negative for dizziness, seizures and headaches.   Psychiatric/Behavioral:  Negative for agitation. The patient is not nervous/anxious.        Objective   LMP  (LMP Unknown)     Physical Exam  Vitals reviewed.   Constitutional:       General: She is not in acute distress.  HENT:      Mouth/Throat:      Mouth: Mucous membranes are moist.   Cardiovascular:      Rate and Rhythm: Regular rhythm.      Heart sounds: Normal heart sounds.   Pulmonary:      Breath sounds: Normal breath sounds. No rales.   Abdominal:      Palpations: Abdomen is soft.      Tenderness: There is no abdominal tenderness.   Musculoskeletal:         General: No tenderness.      Cervical back: Neck supple. No tenderness.      Right lower leg: Edema present.      Left lower leg: Edema present.   Skin:     General: Skin is warm.   Neurological:      General: No focal deficit present.      Mental Status: She is alert.   Psychiatric:         Behavior: Behavior normal.         Assessment/Plan   Problem List Items Addressed This Visit       Heart failure with preserved left ventricular function (HFpEF)  (Multi) - Primary    Generalized osteoarthrosis, involving multiple sites    GERD (gastroesophageal reflux disease)     Will resume stool softener, increase the spironolactone dose, check labs

## 2024-05-07 ENCOUNTER — NURSING HOME VISIT (OUTPATIENT)
Dept: POST ACUTE CARE | Facility: EXTERNAL LOCATION | Age: 89
End: 2024-05-07
Payer: MEDICARE

## 2024-05-07 DIAGNOSIS — I50.30 HEART FAILURE WITH PRESERVED LEFT VENTRICULAR FUNCTION (HFPEF) (MULTI): ICD-10-CM

## 2024-05-07 DIAGNOSIS — K21.9 GASTROESOPHAGEAL REFLUX DISEASE WITHOUT ESOPHAGITIS: ICD-10-CM

## 2024-05-07 DIAGNOSIS — I10 PRIMARY HYPERTENSION: ICD-10-CM

## 2024-05-07 DIAGNOSIS — R26.2 DIFFICULTY IN WALKING: Primary | ICD-10-CM

## 2024-05-07 DIAGNOSIS — F41.1 GAD (GENERALIZED ANXIETY DISORDER): ICD-10-CM

## 2024-05-07 PROCEDURE — 99306 1ST NF CARE HIGH MDM 50: CPT | Performed by: INTERNAL MEDICINE

## 2024-05-07 NOTE — LETTER
Patient: Alayna Dumont  : 5/10/1932    Encounter Date: 2024    Subjective  Patient ID: Alayna Dumont is a 89 y.o. female who is acute skilled care being seen and evaluated for multiple medical problems.    H&P: Was hospitalized with acute CHF, has conservatively with medical management.  Breathing improved with diuretics.  Today she is alert, pleasant. Denies pain. Her appetite is okay.  She requires assistance with transfers and ambulation         Review of Systems   Constitutional:  Negative for fever and unexpected weight change.   HENT:  Negative for sore throat.    Respiratory:  Negative for cough and shortness of breath.    Cardiovascular:  Negative for chest pain and palpitations.   Gastrointestinal:  Negative for abdominal pain, constipation, diarrhea, nausea and vomiting.   Genitourinary:  Negative for difficulty urinating and frequency.   Musculoskeletal:  Positive for arthralgias. Negative for back pain.   Skin:  Negative for rash.   Neurological:  Negative for dizziness, seizures and headaches.   Psychiatric/Behavioral:  Negative for agitation. The patient is not nervous/anxious.        Objective  LMP  (LMP Unknown)     Physical Exam  Vitals reviewed.   Constitutional:       General: She is not in acute distress.  HENT:      Mouth/Throat:      Mouth: Mucous membranes are moist.   Cardiovascular:      Rate and Rhythm: Regular rhythm.      Heart sounds: Normal heart sounds.   Pulmonary:      Breath sounds: Normal breath sounds. No rales.   Abdominal:      Palpations: Abdomen is soft.      Tenderness: There is no abdominal tenderness.   Musculoskeletal:         General: No tenderness.      Cervical back: Neck supple. No tenderness.   Skin:     General: Skin is warm.   Neurological:      General: No focal deficit present.      Mental Status: She is alert.   Psychiatric:         Behavior: Behavior normal.         Assessment/Plan  Problem List Items Addressed This Visit       Heart failure with  preserved left ventricular function (HFpEF) (Multi)    LETICIA (generalized anxiety disorder)    GERD (gastroesophageal reflux disease)    Hypertension     Will monitor BP          Other Visit Diagnoses       Difficulty in walking    -  Primary             Goals       • Take your medication every day         Plan OT evaluation, fall precautions, will check labs      Electronically Signed By: Yvrose Fields MD   5/18/24  5:55 PM

## 2024-05-08 LAB
ATRIAL RATE: 60 BPM
P AXIS: 96 DEGREES
P OFFSET: 138 MS
P ONSET: 118 MS
PR INTERVAL: 176 MS
Q ONSET: 173 MS
QRS COUNT: 10 BEATS
QRS DURATION: 200 MS
QT INTERVAL: 528 MS
QTC CALCULATION(BAZETT): 528 MS
QTC FREDERICIA: 528 MS
R AXIS: -75 DEGREES
T AXIS: 102 DEGREES
T OFFSET: 437 MS
VENTRICULAR RATE: 60 BPM

## 2024-05-10 ENCOUNTER — NURSING HOME VISIT (OUTPATIENT)
Dept: POST ACUTE CARE | Facility: EXTERNAL LOCATION | Age: 89
End: 2024-05-10
Payer: MEDICARE

## 2024-05-10 DIAGNOSIS — I48.0 PAF (PAROXYSMAL ATRIAL FIBRILLATION) (MULTI): ICD-10-CM

## 2024-05-10 DIAGNOSIS — K21.9 GASTROESOPHAGEAL REFLUX DISEASE WITHOUT ESOPHAGITIS: ICD-10-CM

## 2024-05-10 DIAGNOSIS — F41.1 GAD (GENERALIZED ANXIETY DISORDER): ICD-10-CM

## 2024-05-10 DIAGNOSIS — M81.0 OSTEOPOROSIS WITHOUT CURRENT PATHOLOGICAL FRACTURE, UNSPECIFIED OSTEOPOROSIS TYPE: ICD-10-CM

## 2024-05-10 DIAGNOSIS — I50.30 HEART FAILURE WITH PRESERVED LEFT VENTRICULAR FUNCTION (HFPEF) (MULTI): Primary | ICD-10-CM

## 2024-05-10 PROCEDURE — 99308 SBSQ NF CARE LOW MDM 20: CPT | Performed by: INTERNAL MEDICINE

## 2024-05-10 NOTE — LETTER
Patient: Alayna Dumont  : 5/10/1932    Encounter Date: 05/10/2024    Subjective  Patient ID: Alayna Dumont is a 89 y.o. female who is acute skilled care being seen and evaluated for multiple medical problems.    Alert, pleasant. Denies pain. Her appetite is okay. Ambulates short distances with walker. No recent falls.         Review of Systems   Constitutional:  Negative for fever and unexpected weight change.   HENT:  Negative for sore throat.    Respiratory:  Negative for cough and shortness of breath.    Cardiovascular:  Negative for chest pain and palpitations.   Gastrointestinal:  Negative for abdominal pain, constipation, diarrhea, nausea and vomiting.   Genitourinary:  Negative for difficulty urinating and frequency.   Musculoskeletal:  Positive for arthralgias. Negative for back pain.   Skin:  Negative for rash.   Neurological:  Negative for dizziness, seizures and headaches.   Psychiatric/Behavioral:  Negative for agitation. The patient is not nervous/anxious.        Objective  LMP  (LMP Unknown)     Physical Exam  Vitals reviewed.   Constitutional:       General: She is not in acute distress.  HENT:      Mouth/Throat:      Mouth: Mucous membranes are moist.   Cardiovascular:      Rate and Rhythm: Regular rhythm.      Heart sounds: Normal heart sounds.   Pulmonary:      Breath sounds: Normal breath sounds. No rales.   Abdominal:      Palpations: Abdomen is soft.      Tenderness: There is no abdominal tenderness.   Musculoskeletal:         General: No tenderness.      Cervical back: Neck supple. No tenderness.   Skin:     General: Skin is warm.   Neurological:      General: No focal deficit present.      Mental Status: She is alert.   Psychiatric:         Behavior: Behavior normal.         Assessment/Plan  Problem List Items Addressed This Visit       Heart failure with preserved left ventricular function (HFpEF) (Multi) - Primary    LETICIA (generalized anxiety disorder)    GERD (gastroesophageal reflux  disease)    Osteoporosis    PAF (paroxysmal atrial fibrillation) (Multi)        Goals       • Take your medication every day         Continue physical therapy, fall precautions      Electronically Signed By: Yvrose Fields MD   5/18/24  5:56 PM

## 2024-05-13 DIAGNOSIS — G89.21 CHRONIC PAIN DUE TO TRAUMA: ICD-10-CM

## 2024-05-13 DIAGNOSIS — M15.9 PRIMARY OSTEOARTHRITIS INVOLVING MULTIPLE JOINTS: Primary | ICD-10-CM

## 2024-05-13 RX ORDER — TRAMADOL HYDROCHLORIDE 100 MG/1
100 TABLET, COATED ORAL 2 TIMES DAILY PRN
Qty: 60 TABLET | Refills: 5 | Status: SHIPPED | OUTPATIENT
Start: 2024-05-13 | End: 2024-11-09

## 2024-05-14 ENCOUNTER — NURSING HOME VISIT (OUTPATIENT)
Dept: POST ACUTE CARE | Facility: EXTERNAL LOCATION | Age: 89
End: 2024-05-14
Payer: MEDICARE

## 2024-05-14 DIAGNOSIS — K21.9 GASTROESOPHAGEAL REFLUX DISEASE WITHOUT ESOPHAGITIS: ICD-10-CM

## 2024-05-14 DIAGNOSIS — N18.32 STAGE 3B CHRONIC KIDNEY DISEASE (MULTI): ICD-10-CM

## 2024-05-14 DIAGNOSIS — M81.0 AGE-RELATED OSTEOPOROSIS WITHOUT CURRENT PATHOLOGICAL FRACTURE: Primary | ICD-10-CM

## 2024-05-14 DIAGNOSIS — M15.9 GENERALIZED OSTEOARTHROSIS, INVOLVING MULTIPLE SITES: Primary | ICD-10-CM

## 2024-05-14 PROCEDURE — 99308 SBSQ NF CARE LOW MDM 20: CPT | Performed by: INTERNAL MEDICINE

## 2024-05-14 ASSESSMENT — ENCOUNTER SYMPTOMS
UNEXPECTED WEIGHT CHANGE: 0
SHORTNESS OF BREATH: 0
FEVER: 0
FREQUENCY: 0
SEIZURES: 0
DIARRHEA: 0
DIFFICULTY URINATING: 0
UNEXPECTED WEIGHT CHANGE: 0
CONSTIPATION: 0
SHORTNESS OF BREATH: 0
ARTHRALGIAS: 1
PALPITATIONS: 0
ABDOMINAL PAIN: 0
NERVOUS/ANXIOUS: 0
VOMITING: 0
BACK PAIN: 0
HEADACHES: 0
COUGH: 0
HEADACHES: 0
DIZZINESS: 0
CONSTIPATION: 0
AGITATION: 0
SEIZURES: 0
AGITATION: 0
NAUSEA: 0
DIZZINESS: 0
ARTHRALGIAS: 1
FEVER: 0
VOMITING: 0
NERVOUS/ANXIOUS: 0
ABDOMINAL PAIN: 0
SORE THROAT: 0
FREQUENCY: 0
NAUSEA: 0
COUGH: 0
SORE THROAT: 0
PALPITATIONS: 0
DIFFICULTY URINATING: 0
BACK PAIN: 0
DIARRHEA: 0

## 2024-05-14 NOTE — PROGRESS NOTES
Subjective   Patient ID: Alayna Dumont is a 89 y.o. female who is acute skilled care being seen and evaluated for multiple medical problems.    H&P: Was hospitalized with acute CHF, has conservatively with medical management.  Breathing improved with diuretics.  Today she is alert, pleasant. Denies pain. Her appetite is okay.  She requires assistance with transfers and ambulation         Review of Systems   Constitutional:  Negative for fever and unexpected weight change.   HENT:  Negative for sore throat.    Respiratory:  Negative for cough and shortness of breath.    Cardiovascular:  Negative for chest pain and palpitations.   Gastrointestinal:  Negative for abdominal pain, constipation, diarrhea, nausea and vomiting.   Genitourinary:  Negative for difficulty urinating and frequency.   Musculoskeletal:  Positive for arthralgias. Negative for back pain.   Skin:  Negative for rash.   Neurological:  Negative for dizziness, seizures and headaches.   Psychiatric/Behavioral:  Negative for agitation. The patient is not nervous/anxious.        Objective   LMP  (LMP Unknown)     Physical Exam  Vitals reviewed.   Constitutional:       General: She is not in acute distress.  HENT:      Mouth/Throat:      Mouth: Mucous membranes are moist.   Cardiovascular:      Rate and Rhythm: Regular rhythm.      Heart sounds: Normal heart sounds.   Pulmonary:      Breath sounds: Normal breath sounds. No rales.   Abdominal:      Palpations: Abdomen is soft.      Tenderness: There is no abdominal tenderness.   Musculoskeletal:         General: No tenderness.      Cervical back: Neck supple. No tenderness.   Skin:     General: Skin is warm.   Neurological:      General: No focal deficit present.      Mental Status: She is alert.   Psychiatric:         Behavior: Behavior normal.         Assessment/Plan   Problem List Items Addressed This Visit       Heart failure with preserved left ventricular function (HFpEF) (Multi)    LETICIA (generalized  anxiety disorder)    GERD (gastroesophageal reflux disease)    Hypertension     Will monitor BP          Other Visit Diagnoses       Difficulty in walking    -  Primary             Goals        Take your medication every day         Plan OT evaluation, fall precautions, will check labs

## 2024-05-14 NOTE — LETTER
Patient: Alayna Dumont  : 5/10/1932    Encounter Date: 2024    Subjective  Patient ID: Alayna Dumont is a 89 y.o. female who is acute skilled care being seen and evaluated for multiple medical problems.    Alert, pleasant. Denies pain and shortness of breath. Her appetite is okay.  She participates physical therapy. No recent falls. Compliant with medications.  Recent labs reviewed, stable         Review of Systems   Constitutional:  Negative for fever and unexpected weight change.   HENT:  Negative for sore throat.    Respiratory:  Negative for cough and shortness of breath.    Cardiovascular:  Negative for chest pain and palpitations.   Gastrointestinal:  Negative for abdominal pain, constipation, diarrhea, nausea and vomiting.   Genitourinary:  Negative for difficulty urinating and frequency.   Musculoskeletal:  Positive for arthralgias. Negative for back pain.   Skin:  Negative for rash.   Neurological:  Negative for dizziness, seizures and headaches.   Psychiatric/Behavioral:  Negative for agitation. The patient is not nervous/anxious.        Objective  LMP  (LMP Unknown)     Physical Exam  Vitals reviewed.   Constitutional:       General: She is not in acute distress.  HENT:      Mouth/Throat:      Mouth: Mucous membranes are moist.   Cardiovascular:      Rate and Rhythm: Regular rhythm.      Heart sounds: Normal heart sounds.   Pulmonary:      Breath sounds: Normal breath sounds. No rales.   Abdominal:      Palpations: Abdomen is soft.      Tenderness: There is no abdominal tenderness.   Musculoskeletal:         General: No tenderness.      Cervical back: Neck supple. No tenderness.   Skin:     General: Skin is warm.   Neurological:      General: No focal deficit present.      Mental Status: She is alert.   Psychiatric:         Behavior: Behavior normal.         Assessment/Plan  Problem List Items Addressed This Visit       Gastroesophageal reflux disease    Osteoporosis    CKD (chronic kidney  disease) stage 3, GFR 30-59 ml/min (Multi)     Other Visit Diagnoses       Generalized osteoarthrosis, involving multiple sites    -  Primary              Continue physical therapy, fall precautions      Electronically Signed By: Yvrose Fields MD   7/18/24  2:03 PM

## 2024-05-14 NOTE — PROGRESS NOTES
Subjective   Patient ID: Alayna Dumont is a 89 y.o. female who is acute skilled care being seen and evaluated for multiple medical problems.    Alert, pleasant. Denies pain. Her appetite is okay. Ambulates short distances with walker. No recent falls.         Review of Systems   Constitutional:  Negative for fever and unexpected weight change.   HENT:  Negative for sore throat.    Respiratory:  Negative for cough and shortness of breath.    Cardiovascular:  Negative for chest pain and palpitations.   Gastrointestinal:  Negative for abdominal pain, constipation, diarrhea, nausea and vomiting.   Genitourinary:  Negative for difficulty urinating and frequency.   Musculoskeletal:  Positive for arthralgias. Negative for back pain.   Skin:  Negative for rash.   Neurological:  Negative for dizziness, seizures and headaches.   Psychiatric/Behavioral:  Negative for agitation. The patient is not nervous/anxious.        Objective   LMP  (LMP Unknown)     Physical Exam  Vitals reviewed.   Constitutional:       General: She is not in acute distress.  HENT:      Mouth/Throat:      Mouth: Mucous membranes are moist.   Cardiovascular:      Rate and Rhythm: Regular rhythm.      Heart sounds: Normal heart sounds.   Pulmonary:      Breath sounds: Normal breath sounds. No rales.   Abdominal:      Palpations: Abdomen is soft.      Tenderness: There is no abdominal tenderness.   Musculoskeletal:         General: No tenderness.      Cervical back: Neck supple. No tenderness.   Skin:     General: Skin is warm.   Neurological:      General: No focal deficit present.      Mental Status: She is alert.   Psychiatric:         Behavior: Behavior normal.         Assessment/Plan   Problem List Items Addressed This Visit       Heart failure with preserved left ventricular function (HFpEF) (Multi) - Primary    LETICIA (generalized anxiety disorder)    GERD (gastroesophageal reflux disease)    Osteoporosis    PAF (paroxysmal atrial fibrillation) (Multi)         Goals        Take your medication every day         Continue physical therapy, fall precautions

## 2024-05-17 ENCOUNTER — NURSING HOME VISIT (OUTPATIENT)
Dept: POST ACUTE CARE | Facility: EXTERNAL LOCATION | Age: 89
End: 2024-05-17
Payer: MEDICARE

## 2024-05-17 DIAGNOSIS — M15.9 GENERALIZED OSTEOARTHRITIS: ICD-10-CM

## 2024-05-17 DIAGNOSIS — I10 PRIMARY HYPERTENSION: ICD-10-CM

## 2024-05-17 DIAGNOSIS — E78.49 OTHER HYPERLIPIDEMIA: Primary | ICD-10-CM

## 2024-05-17 DIAGNOSIS — K21.9 GASTROESOPHAGEAL REFLUX DISEASE WITHOUT ESOPHAGITIS: ICD-10-CM

## 2024-05-17 DIAGNOSIS — M81.0 AGE-RELATED OSTEOPOROSIS WITHOUT CURRENT PATHOLOGICAL FRACTURE: ICD-10-CM

## 2024-05-17 PROCEDURE — 99308 SBSQ NF CARE LOW MDM 20: CPT | Performed by: INTERNAL MEDICINE

## 2024-05-17 NOTE — LETTER
Patient: Alayna Dumont  : 5/10/1932    Encounter Date: 2024    Subjective  Patient ID: Alayna Dumont is a 89 y.o. female who is acute skilled care being seen and evaluated for multiple medical problems.    Alert, pleasant. Denies pain and shortness of breath. Her appetite is okay. She participates in PT but unable to ambulate. No recent falls. Compliant with medications.         Review of Systems   Constitutional:  Negative for fever and unexpected weight change.   HENT:  Negative for sore throat.    Respiratory:  Negative for cough and shortness of breath.    Cardiovascular:  Negative for chest pain and palpitations.   Gastrointestinal:  Negative for abdominal pain, constipation, diarrhea, nausea and vomiting.   Genitourinary:  Negative for difficulty urinating and frequency.   Musculoskeletal:  Positive for arthralgias. Negative for back pain.   Skin:  Negative for rash.   Neurological:  Negative for dizziness, seizures and headaches.   Psychiatric/Behavioral:  Negative for agitation. The patient is not nervous/anxious.        Objective  LMP  (LMP Unknown)     Physical Exam  Vitals reviewed.   Constitutional:       General: She is not in acute distress.  HENT:      Mouth/Throat:      Mouth: Mucous membranes are moist.   Cardiovascular:      Rate and Rhythm: Regular rhythm.      Heart sounds: Normal heart sounds.   Pulmonary:      Breath sounds: Normal breath sounds. No rales.   Abdominal:      Palpations: Abdomen is soft.      Tenderness: There is no abdominal tenderness.   Musculoskeletal:         General: No tenderness.      Cervical back: Neck supple. No tenderness.   Neurological:      Mental Status: She is alert.         Assessment/Plan  Problem List Items Addressed This Visit       Generalized osteoarthritis    Gastroesophageal reflux disease    Hyperlipidemia - Primary    Hypertension     Will monitor BP         Osteoporosis         Continue physical therapy, fall precautions      Electronically  Signed By: Yvrose Fields MD   7/21/24  7:39 PM

## 2024-05-21 ENCOUNTER — NURSING HOME VISIT (OUTPATIENT)
Dept: POST ACUTE CARE | Facility: EXTERNAL LOCATION | Age: 89
End: 2024-05-21
Payer: MEDICARE

## 2024-05-21 DIAGNOSIS — M15.9 GENERALIZED OSTEOARTHRITIS: ICD-10-CM

## 2024-05-21 DIAGNOSIS — I10 PRIMARY HYPERTENSION: Primary | ICD-10-CM

## 2024-05-21 DIAGNOSIS — F41.1 GAD (GENERALIZED ANXIETY DISORDER): ICD-10-CM

## 2024-05-21 DIAGNOSIS — M81.0 AGE-RELATED OSTEOPOROSIS WITHOUT CURRENT PATHOLOGICAL FRACTURE: ICD-10-CM

## 2024-05-21 PROCEDURE — 99308 SBSQ NF CARE LOW MDM 20: CPT | Performed by: INTERNAL MEDICINE

## 2024-05-21 NOTE — LETTER
Patient: Alayna Dumont  : 5/10/1932    Encounter Date: 2024    Subjective  Patient ID: Alayna Dumont is a 89 y.o. female who is acute skilled care being seen and evaluated for multiple medical problems.    Alert, pleasant, sometimes anxious. Patient's appetite is fair. She denies pain. No respiratory distress noted. No recent falls. Compliant with medications.         Review of Systems   Constitutional:  Negative for fever and unexpected weight change.   HENT:  Negative for sore throat.    Respiratory:  Negative for cough and shortness of breath.    Cardiovascular:  Negative for chest pain and palpitations.   Gastrointestinal:  Negative for abdominal pain, constipation, diarrhea, nausea and vomiting.   Genitourinary:  Negative for difficulty urinating and frequency.   Musculoskeletal:  Positive for arthralgias. Negative for back pain.   Skin:  Negative for rash.   Neurological:  Negative for dizziness, seizures and headaches.   Psychiatric/Behavioral:  Negative for agitation. The patient is not nervous/anxious.        Objective  LMP  (LMP Unknown)     Physical Exam  Vitals reviewed.   Constitutional:       General: She is not in acute distress.  HENT:      Mouth/Throat:      Mouth: Mucous membranes are moist.   Cardiovascular:      Rate and Rhythm: Regular rhythm.      Heart sounds: Normal heart sounds.   Pulmonary:      Breath sounds: Normal breath sounds. No rales.   Abdominal:      Palpations: Abdomen is soft.      Tenderness: There is no abdominal tenderness.   Musculoskeletal:         General: No tenderness.      Cervical back: Neck supple. No tenderness.   Neurological:      Mental Status: She is alert.         Assessment/Plan  Problem List Items Addressed This Visit       LETICIA (generalized anxiety disorder)    Generalized osteoarthritis    Hypertension - Primary     Will monitor BP         Osteoporosis         Continue physical therapy, fall precautions      Electronically Signed By: Yvrose Fields  MD   7/21/24  7:41 PM

## 2024-05-24 PROBLEM — N30.00 ACUTE CYSTITIS: Status: ACTIVE | Noted: 2024-03-21

## 2024-05-24 PROBLEM — G89.21 CHRONIC PAIN DUE TO TRAUMA: Status: ACTIVE | Noted: 2023-09-18

## 2024-05-24 PROBLEM — K59.04 CHRONIC IDIOPATHIC CONSTIPATION: Status: ACTIVE | Noted: 2023-09-19

## 2024-05-24 PROBLEM — N17.9 ACUTE KIDNEY INJURY (CMS-HCC): Status: ACTIVE | Noted: 2024-03-21

## 2024-05-24 PROBLEM — J44.1 ACUTE EXACERBATION OF CHRONIC OBSTRUCTIVE PULMONARY DISEASE (MULTI): Status: ACTIVE | Noted: 2022-05-31

## 2024-05-28 ENCOUNTER — NURSING HOME VISIT (OUTPATIENT)
Dept: POST ACUTE CARE | Facility: EXTERNAL LOCATION | Age: 89
End: 2024-05-28
Payer: MEDICARE

## 2024-05-28 DIAGNOSIS — K21.9 GASTROESOPHAGEAL REFLUX DISEASE WITHOUT ESOPHAGITIS: ICD-10-CM

## 2024-05-28 DIAGNOSIS — M15.9 GENERALIZED OSTEOARTHROSIS, INVOLVING MULTIPLE SITES: Primary | ICD-10-CM

## 2024-05-28 DIAGNOSIS — M81.0 AGE-RELATED OSTEOPOROSIS WITHOUT CURRENT PATHOLOGICAL FRACTURE: ICD-10-CM

## 2024-05-28 DIAGNOSIS — F41.1 GAD (GENERALIZED ANXIETY DISORDER): ICD-10-CM

## 2024-05-28 DIAGNOSIS — I27.20 PULMONARY HYPERTENSION (MULTI): ICD-10-CM

## 2024-05-28 PROCEDURE — 99308 SBSQ NF CARE LOW MDM 20: CPT | Performed by: INTERNAL MEDICINE

## 2024-05-28 NOTE — LETTER
Patient: Alayna Dumont  : 5/10/1932    Encounter Date: 2024    Subjective  Patient ID: Alayna Dumont is a 89 y.o. female who is acute skilled care being seen and evaluated for multiple medical problems.    Alert, pleasant. Patient's appetite is good. She is c/o hip and low back pain, denies  shortness of breath. She participates in PT. No recent falls. Compliant with medications.         Review of Systems   Constitutional:  Negative for fever and unexpected weight change.   HENT:  Negative for sore throat.    Respiratory:  Negative for cough and shortness of breath.    Cardiovascular:  Negative for chest pain and palpitations.   Gastrointestinal:  Negative for abdominal pain, constipation, diarrhea, nausea and vomiting.   Genitourinary:  Negative for difficulty urinating and frequency.   Musculoskeletal:  Positive for arthralgias. Negative for back pain.   Skin:  Negative for rash.   Neurological:  Negative for dizziness, seizures and headaches.   Psychiatric/Behavioral:  Negative for agitation. The patient is not nervous/anxious.        Objective  LMP  (LMP Unknown)     Physical Exam  Vitals reviewed.   Constitutional:       General: She is not in acute distress.  HENT:      Mouth/Throat:      Mouth: Mucous membranes are moist.   Cardiovascular:      Rate and Rhythm: Regular rhythm.      Heart sounds: Normal heart sounds.   Pulmonary:      Breath sounds: Normal breath sounds. No rales.   Abdominal:      Palpations: Abdomen is soft.      Tenderness: There is no abdominal tenderness.   Musculoskeletal:         General: Tenderness present.      Cervical back: Neck supple. No tenderness.      Comments: Limited ROM both hips   Neurological:      Mental Status: She is alert.         Assessment/Plan  Problem List Items Addressed This Visit       LETICIA (generalized anxiety disorder)    Gastroesophageal reflux disease    Osteoporosis    Pulmonary hypertension (Multi)     Other Visit Diagnoses       Generalized  osteoarthrosis, involving multiple sites    -  Primary              Continue physical therapy, fall precautions      Electronically Signed By: Yvrose Fields MD   7/21/24  7:44 PM

## 2024-06-04 ENCOUNTER — NURSING HOME VISIT (OUTPATIENT)
Dept: POST ACUTE CARE | Facility: EXTERNAL LOCATION | Age: 89
End: 2024-06-04
Payer: MEDICARE

## 2024-06-04 DIAGNOSIS — M17.0 PRIMARY OSTEOARTHRITIS OF BOTH KNEES: ICD-10-CM

## 2024-06-04 DIAGNOSIS — K92.1 BLOOD IN STOOL: Primary | ICD-10-CM

## 2024-06-04 DIAGNOSIS — M81.0 AGE-RELATED OSTEOPOROSIS WITHOUT CURRENT PATHOLOGICAL FRACTURE: ICD-10-CM

## 2024-06-04 DIAGNOSIS — G62.9 NEUROPATHY: ICD-10-CM

## 2024-06-04 DIAGNOSIS — K21.9 GASTROESOPHAGEAL REFLUX DISEASE WITHOUT ESOPHAGITIS: ICD-10-CM

## 2024-06-04 DIAGNOSIS — F41.1 GAD (GENERALIZED ANXIETY DISORDER): ICD-10-CM

## 2024-06-04 PROCEDURE — 99309 SBSQ NF CARE MODERATE MDM 30: CPT | Performed by: INTERNAL MEDICINE

## 2024-06-05 ENCOUNTER — OFFICE VISIT (OUTPATIENT)
Dept: ORTHOPEDIC SURGERY | Facility: CLINIC | Age: 89
End: 2024-06-05
Payer: MEDICARE

## 2024-06-05 ENCOUNTER — HOSPITAL ENCOUNTER (OUTPATIENT)
Dept: RADIOLOGY | Facility: CLINIC | Age: 89
Discharge: HOME | End: 2024-06-05
Payer: MEDICARE

## 2024-06-05 VITALS — WEIGHT: 203.26 LBS | BODY MASS INDEX: 36.01 KG/M2

## 2024-06-05 DIAGNOSIS — M70.62 GREATER TROCHANTERIC BURSITIS OF LEFT HIP: ICD-10-CM

## 2024-06-05 DIAGNOSIS — M47.814 OSTEOARTHRITIS OF THORACIC SPINE, UNSPECIFIED SPINAL OSTEOARTHRITIS COMPLICATION STATUS: ICD-10-CM

## 2024-06-05 DIAGNOSIS — M86.9: ICD-10-CM

## 2024-06-05 DIAGNOSIS — M25.552 LEFT HIP PAIN: Primary | ICD-10-CM

## 2024-06-05 DIAGNOSIS — R52 PAIN: ICD-10-CM

## 2024-06-05 DIAGNOSIS — M00.062 STAPHYLOCOCCAL ARTHRITIS OF LEFT KNEE (MULTI): ICD-10-CM

## 2024-06-05 DIAGNOSIS — M47.817 OSTEOARTHRITIS OF LUMBOSACRAL SPINE: ICD-10-CM

## 2024-06-05 DIAGNOSIS — M16.12 PRIMARY OSTEOARTHRITIS OF LEFT HIP: ICD-10-CM

## 2024-06-05 PROCEDURE — 99213 OFFICE O/P EST LOW 20 MIN: CPT | Performed by: ORTHOPAEDIC SURGERY

## 2024-06-05 PROCEDURE — 1159F MED LIST DOCD IN RCRD: CPT | Performed by: ORTHOPAEDIC SURGERY

## 2024-06-05 PROCEDURE — 99203 OFFICE O/P NEW LOW 30 MIN: CPT | Performed by: ORTHOPAEDIC SURGERY

## 2024-06-05 PROCEDURE — 1036F TOBACCO NON-USER: CPT | Performed by: ORTHOPAEDIC SURGERY

## 2024-06-05 PROCEDURE — 1160F RVW MEDS BY RX/DR IN RCRD: CPT | Performed by: ORTHOPAEDIC SURGERY

## 2024-06-05 RX ORDER — CEPHALEXIN 500 MG/1
500 CAPSULE ORAL 2 TIMES DAILY
COMMUNITY

## 2024-06-05 ASSESSMENT — PAIN SCALES - GENERAL: PAINLEVEL_OUTOF10: 10 - WORST POSSIBLE PAIN

## 2024-06-05 ASSESSMENT — PAIN DESCRIPTION - DESCRIPTORS: DESCRIPTORS: SHARP

## 2024-06-05 ASSESSMENT — ENCOUNTER SYMPTOMS
LOSS OF SENSATION IN FEET: 1
DEPRESSION: 0
OCCASIONAL FEELINGS OF UNSTEADINESS: 1

## 2024-06-05 ASSESSMENT — LIFESTYLE VARIABLES: TOTAL SCORE: 0

## 2024-06-05 ASSESSMENT — PATIENT HEALTH QUESTIONNAIRE - PHQ9
2. FEELING DOWN, DEPRESSED OR HOPELESS: NOT AT ALL
1. LITTLE INTEREST OR PLEASURE IN DOING THINGS: NOT AT ALL
SUM OF ALL RESPONSES TO PHQ9 QUESTIONS 1 AND 2: 0

## 2024-06-05 ASSESSMENT — PAIN - FUNCTIONAL ASSESSMENT: PAIN_FUNCTIONAL_ASSESSMENT: 0-10

## 2024-06-05 NOTE — PROGRESS NOTES
Subjective      Chief Complaint   Patient presents with    Left Hip - Pain        No surgery found     HPI  Presents today with left hip pain (8/10). The patient states that this left hip pain has been worsening and persistent for months. She has a history of right total hip replacement. The patient denies trauma or injury to the left hip. The patient states that the left hip pain is worse with and aggravated by prolonged walking and standing. The patient states that this left hip pain impairs their ability to complete normal activities of daily living. The patient has tried tylenol and ibuprofen with no relief.    CARDIOLOGY:   Negative for chest pain, shortness of breath.   RESPIRATORY:   Negative for chest pain, shortness of breath.   MUSCULOSKELETAL:   See HPI for details.   NEUROLOGY:   Negative for tingling, numbness, weakness.    Objective      There were no vitals taken for this visit.     Physical Exam  Constitutional: Appears stated age. No apparent distress  Labored Breathing: No  Psychiatric: Normal mood and effect.   Neurological: alert and oriented x3  Skin: intact  HEENT: No bruising, otorrhea, rhinorrhea.  MUSCULOSKELETAL: Neck: No tenderness. No pain or limitation with range of motion. Back: No tenderness. Straight leg test negative bilaterally. left hip: There is no tenderness at the greater trochanter. There is  pain with gentle ROM in flexion and extension at the hip. There is pain with external rotation and abduction. left lower extremity is in good position. Nontender at the calf. Neurovascular is intact. The patient is seen today sitting in a wheelchair.     CT of the abdomen pelvis done on 8-  Pelvis: Right hip replacement. Colonic diverticulosis noted. Urinary bladder  unremarkable. Uterus is not seen and may have been removed. Degenerative  changes of the spine. Severe osteoarthritis of the left hip.  The x-rays and imaging studies listed above are reviewed with the patient in the  office today.  Assessment: See visit diagnoses.  Options are discussed with the patient in detail. The patient is given a prescription for physical therapy to evaluate and treat with gentle strengthening and ROM exercises with modalities as needed. The patient is instructed regarding activity modification and risk for further injury with falling or trauma, ice, physician directed at home gentle strengthening and ROM exercises, and the appropriate use of Tylenol as needed for pain with its potential adverse reactions and side effects. The patient understands.  Return as needed.  Please note that this report has been produced using speech recognition software.  It may contain errors related to grammar, punctuation or spelling.  Electronically signed, but not reviewed.

## 2024-06-05 NOTE — PATIENT INSTRUCTIONS
Thank you for coming to see us today!     Continue to use tylenol for pain control.   Rest, ice and elevate and remember to do exercises.   We are giving you a referral for physical therapy    Follow up  as needed

## 2024-06-07 ENCOUNTER — NURSING HOME VISIT (OUTPATIENT)
Dept: POST ACUTE CARE | Facility: EXTERNAL LOCATION | Age: 89
End: 2024-06-07
Payer: MEDICARE

## 2024-06-07 DIAGNOSIS — M15.9 GENERALIZED OSTEOARTHRITIS: Primary | ICD-10-CM

## 2024-06-07 DIAGNOSIS — K21.9 GASTROESOPHAGEAL REFLUX DISEASE WITHOUT ESOPHAGITIS: ICD-10-CM

## 2024-06-07 DIAGNOSIS — M81.0 AGE-RELATED OSTEOPOROSIS WITHOUT CURRENT PATHOLOGICAL FRACTURE: ICD-10-CM

## 2024-06-07 DIAGNOSIS — E53.8 VITAMIN B12 DEFICIENCY WITHOUT ANEMIA: ICD-10-CM

## 2024-06-07 PROCEDURE — 99308 SBSQ NF CARE LOW MDM 20: CPT | Performed by: INTERNAL MEDICINE

## 2024-06-07 NOTE — LETTER
Patient: Alayna Dumont  : 5/10/1932    Encounter Date: 2024    Subjective  Patient ID: Alayna Dumont is a 89 y.o. female who is acute skilled care being seen and evaluated for multiple medical problems.    Alert, pleasant. Patient's appetite is okay. She denies pain. She saw Dr Hassan for hip pain.  No recent falls. She is compliant with medications.         Review of Systems   Constitutional:  Negative for fever and unexpected weight change.   HENT:  Negative for sore throat.    Respiratory:  Negative for cough and shortness of breath.    Cardiovascular:  Negative for chest pain and palpitations.   Gastrointestinal:  Negative for abdominal pain, constipation, diarrhea, nausea and vomiting.   Genitourinary:  Negative for difficulty urinating and frequency.   Musculoskeletal:  Positive for arthralgias and back pain.   Skin:  Negative for rash.   Neurological:  Negative for dizziness, seizures and headaches.   Psychiatric/Behavioral:  Negative for agitation. The patient is not nervous/anxious.        Objective  LMP  (LMP Unknown)     Physical Exam  Vitals reviewed.   Constitutional:       General: She is not in acute distress.  HENT:      Mouth/Throat:      Mouth: Mucous membranes are moist.   Cardiovascular:      Rate and Rhythm: Regular rhythm.      Heart sounds: Normal heart sounds.   Pulmonary:      Breath sounds: Normal breath sounds. No rales.   Abdominal:      Palpations: Abdomen is soft.      Tenderness: There is no abdominal tenderness.   Musculoskeletal:         General: No tenderness.      Cervical back: Neck supple. No tenderness.   Skin:     General: Skin is warm.   Neurological:      General: No focal deficit present.      Mental Status: She is alert.         Assessment/Plan  Problem List Items Addressed This Visit       Generalized osteoarthritis - Primary    Gastroesophageal reflux disease    Osteoporosis    Vitamin B12 deficiency without anemia         Continue analgesics,  physical  therapy, fall precautions      Electronically Signed By: Yvrose Fields MD   7/21/24  7:51 PM

## 2024-06-08 DIAGNOSIS — M16.10 HIP ARTHRITIS: Primary | ICD-10-CM

## 2024-06-08 RX ORDER — TRAMADOL HYDROCHLORIDE 50 MG/1
50 TABLET ORAL EVERY 8 HOURS PRN
Qty: 90 TABLET | Refills: 5 | Status: SHIPPED | OUTPATIENT
Start: 2024-06-08 | End: 2024-12-05

## 2024-06-11 ENCOUNTER — NURSING HOME VISIT (OUTPATIENT)
Dept: POST ACUTE CARE | Facility: EXTERNAL LOCATION | Age: 89
End: 2024-06-11
Payer: MEDICARE

## 2024-06-11 DIAGNOSIS — I10 PRIMARY HYPERTENSION: ICD-10-CM

## 2024-06-11 DIAGNOSIS — K21.9 GASTROESOPHAGEAL REFLUX DISEASE WITHOUT ESOPHAGITIS: Primary | ICD-10-CM

## 2024-06-11 DIAGNOSIS — M81.0 AGE-RELATED OSTEOPOROSIS WITHOUT CURRENT PATHOLOGICAL FRACTURE: ICD-10-CM

## 2024-06-11 DIAGNOSIS — M15.9 GENERALIZED OSTEOARTHRITIS: ICD-10-CM

## 2024-06-11 PROCEDURE — 99308 SBSQ NF CARE LOW MDM 20: CPT | Performed by: INTERNAL MEDICINE

## 2024-06-11 ASSESSMENT — ENCOUNTER SYMPTOMS
DIFFICULTY URINATING: 0
AGITATION: 0
NAUSEA: 0
SORE THROAT: 0
SHORTNESS OF BREATH: 0
NERVOUS/ANXIOUS: 0
SEIZURES: 0
HEADACHES: 0
VOMITING: 0
COUGH: 0
FREQUENCY: 0
DIZZINESS: 0
ARTHRALGIAS: 1
CONSTIPATION: 0
DIARRHEA: 0
BACK PAIN: 0
UNEXPECTED WEIGHT CHANGE: 0
FEVER: 0
ABDOMINAL PAIN: 0
PALPITATIONS: 0

## 2024-06-11 NOTE — PROGRESS NOTES
Subjective   Patient ID: Alayna Dumont is a 89 y.o. female who is acute skilled care being seen and evaluated for multiple medical problems.    Alert, pleasant. Denies pain and shortness of breath. Her appetite is okay.  She participates physical therapy. No recent falls. Compliant with medications.  Recent labs reviewed, stable         Review of Systems   Constitutional:  Negative for fever and unexpected weight change.   HENT:  Negative for sore throat.    Respiratory:  Negative for cough and shortness of breath.    Cardiovascular:  Negative for chest pain and palpitations.   Gastrointestinal:  Negative for abdominal pain, constipation, diarrhea, nausea and vomiting.   Genitourinary:  Negative for difficulty urinating and frequency.   Musculoskeletal:  Positive for arthralgias. Negative for back pain.   Skin:  Negative for rash.   Neurological:  Negative for dizziness, seizures and headaches.   Psychiatric/Behavioral:  Negative for agitation. The patient is not nervous/anxious.        Objective   LMP  (LMP Unknown)     Physical Exam  Vitals reviewed.   Constitutional:       General: She is not in acute distress.  HENT:      Mouth/Throat:      Mouth: Mucous membranes are moist.   Cardiovascular:      Rate and Rhythm: Regular rhythm.      Heart sounds: Normal heart sounds.   Pulmonary:      Breath sounds: Normal breath sounds. No rales.   Abdominal:      Palpations: Abdomen is soft.      Tenderness: There is no abdominal tenderness.   Musculoskeletal:         General: No tenderness.      Cervical back: Neck supple. No tenderness.   Skin:     General: Skin is warm.   Neurological:      General: No focal deficit present.      Mental Status: She is alert.   Psychiatric:         Behavior: Behavior normal.         Assessment/Plan   Problem List Items Addressed This Visit       Gastroesophageal reflux disease    Osteoporosis    CKD (chronic kidney disease) stage 3, GFR 30-59 ml/min (Multi)     Other Visit Diagnoses        Generalized osteoarthrosis, involving multiple sites    -  Primary              Continue physical therapy, fall precautions

## 2024-06-11 NOTE — LETTER
Patient: Alayna Dumont  : 5/10/1932    Encounter Date: 2024    Subjective  Patient ID: Alayna Dumont is a 89 y.o. female who is acute skilled care being seen and evaluated for multiple medical problems.    Alert, pleasant. Patient's appetite is good. She denies pain and shortness of breath. She participates in PT. further blood in stool noted. No recent falls.          Review of Systems   Constitutional:  Negative for fever and unexpected weight change.   HENT:  Negative for sore throat.    Respiratory:  Negative for cough and shortness of breath.    Cardiovascular:  Negative for chest pain and palpitations.   Gastrointestinal:  Negative for abdominal pain, constipation, diarrhea, nausea and vomiting.   Genitourinary:  Negative for difficulty urinating and frequency.   Musculoskeletal:  Positive for arthralgias. Negative for back pain.   Skin:  Negative for rash.   Neurological:  Negative for dizziness, seizures and headaches.   Psychiatric/Behavioral:  Negative for agitation. The patient is not nervous/anxious.        Objective  LMP  (LMP Unknown)     Physical Exam  Vitals reviewed.   Constitutional:       General: She is not in acute distress.  HENT:      Mouth/Throat:      Mouth: Mucous membranes are moist.   Cardiovascular:      Rate and Rhythm: Regular rhythm.      Heart sounds: Normal heart sounds.   Pulmonary:      Breath sounds: Normal breath sounds. No rales.   Abdominal:      Palpations: Abdomen is soft.      Tenderness: There is no abdominal tenderness.   Musculoskeletal:         General: No tenderness.      Cervical back: Neck supple. No tenderness.   Neurological:      Mental Status: She is alert.         Assessment/Plan  Problem List Items Addressed This Visit       Generalized osteoarthritis    Gastroesophageal reflux disease - Primary    Hypertension     Will monitor BP         Osteoporosis         Continue physical therapy, fall precautions      Electronically Signed By: Yvrose Fields MD    7/21/24  7:52 PM

## 2024-06-12 ASSESSMENT — ENCOUNTER SYMPTOMS
BACK PAIN: 0
ABDOMINAL PAIN: 0
NERVOUS/ANXIOUS: 0
VOMITING: 0
CONSTIPATION: 0
FREQUENCY: 0
UNEXPECTED WEIGHT CHANGE: 0
FEVER: 0
DIZZINESS: 0
HEADACHES: 0
ARTHRALGIAS: 1
AGITATION: 0
PALPITATIONS: 0
DIARRHEA: 0
COUGH: 0
NAUSEA: 0
SORE THROAT: 0
DIFFICULTY URINATING: 0
SEIZURES: 0
SHORTNESS OF BREATH: 0

## 2024-06-12 NOTE — PROGRESS NOTES
Subjective   Patient ID: Alayna Dumont is a 89 y.o. female who is acute skilled care being seen and evaluated for multiple medical problems.    Alert, pleasant. Denies pain and shortness of breath. Her appetite is okay. She participates in PT but unable to ambulate. No recent falls. Compliant with medications.         Review of Systems   Constitutional:  Negative for fever and unexpected weight change.   HENT:  Negative for sore throat.    Respiratory:  Negative for cough and shortness of breath.    Cardiovascular:  Negative for chest pain and palpitations.   Gastrointestinal:  Negative for abdominal pain, constipation, diarrhea, nausea and vomiting.   Genitourinary:  Negative for difficulty urinating and frequency.   Musculoskeletal:  Positive for arthralgias. Negative for back pain.   Skin:  Negative for rash.   Neurological:  Negative for dizziness, seizures and headaches.   Psychiatric/Behavioral:  Negative for agitation. The patient is not nervous/anxious.        Objective   LMP  (LMP Unknown)     Physical Exam  Vitals reviewed.   Constitutional:       General: She is not in acute distress.  HENT:      Mouth/Throat:      Mouth: Mucous membranes are moist.   Cardiovascular:      Rate and Rhythm: Regular rhythm.      Heart sounds: Normal heart sounds.   Pulmonary:      Breath sounds: Normal breath sounds. No rales.   Abdominal:      Palpations: Abdomen is soft.      Tenderness: There is no abdominal tenderness.   Musculoskeletal:         General: No tenderness.      Cervical back: Neck supple. No tenderness.   Neurological:      Mental Status: She is alert.         Assessment/Plan   Problem List Items Addressed This Visit       Generalized osteoarthritis    Gastroesophageal reflux disease    Hyperlipidemia - Primary    Hypertension     Will monitor BP         Osteoporosis         Continue physical therapy, fall precautions

## 2024-06-14 ENCOUNTER — NURSING HOME VISIT (OUTPATIENT)
Dept: POST ACUTE CARE | Facility: EXTERNAL LOCATION | Age: 89
End: 2024-06-14

## 2024-06-14 ENCOUNTER — NURSING HOME VISIT (OUTPATIENT)
Dept: POST ACUTE CARE | Facility: EXTERNAL LOCATION | Age: 89
End: 2024-06-14
Payer: MEDICARE

## 2024-06-14 DIAGNOSIS — M15.9 GENERALIZED OSTEOARTHRITIS: ICD-10-CM

## 2024-06-14 DIAGNOSIS — K21.00 GASTROESOPHAGEAL REFLUX DISEASE WITH ESOPHAGITIS WITHOUT HEMORRHAGE: ICD-10-CM

## 2024-06-14 DIAGNOSIS — E78.49 OTHER HYPERLIPIDEMIA: ICD-10-CM

## 2024-06-14 DIAGNOSIS — I50.33 ACUTE ON CHRONIC DIASTOLIC CONGESTIVE HEART FAILURE (MULTI): ICD-10-CM

## 2024-06-14 DIAGNOSIS — I10 PRIMARY HYPERTENSION: Primary | ICD-10-CM

## 2024-06-14 PROCEDURE — 99308 SBSQ NF CARE LOW MDM 20: CPT | Performed by: INTERNAL MEDICINE

## 2024-06-14 NOTE — LETTER
Patient: Alayna Dumont  : 5/10/1932    Encounter Date: 2024    Subjective  Patient ID: Alayna Dumont is a 89 y.o. female who is acute skilled care being seen and evaluated for multiple medical problems.    Alert, pleasant. Patient's appetite is good. She is complaining of bilateral hip pain and back pain.  Denies shortness of breath. She participates in PT. further blood in stool noted. No recent falls.          Review of Systems   Constitutional:  Negative for fever and unexpected weight change.   HENT:  Negative for sore throat.    Respiratory:  Negative for cough and shortness of breath.    Cardiovascular:  Negative for chest pain and palpitations.   Gastrointestinal:  Negative for abdominal pain, constipation, diarrhea, nausea and vomiting.   Genitourinary:  Negative for difficulty urinating and frequency.   Musculoskeletal:  Positive for arthralgias and back pain.   Skin:  Negative for rash.   Neurological:  Negative for dizziness, seizures and headaches.   Psychiatric/Behavioral:  Negative for agitation. The patient is not nervous/anxious.        Objective  LMP  (LMP Unknown)     Physical Exam  Vitals reviewed.   Constitutional:       General: She is not in acute distress.  HENT:      Mouth/Throat:      Mouth: Mucous membranes are moist.   Cardiovascular:      Rate and Rhythm: Regular rhythm.      Heart sounds: Normal heart sounds.   Pulmonary:      Breath sounds: Normal breath sounds. No rales.   Abdominal:      Palpations: Abdomen is soft.      Tenderness: There is no abdominal tenderness.   Musculoskeletal:         General: No tenderness.      Cervical back: Neck supple. No tenderness.      Comments: Limited range of motion both hips   Neurological:      Mental Status: She is alert.         Assessment/Plan  Problem List Items Addressed This Visit       Generalized osteoarthritis    Hyperlipidemia    Hypertension - Primary           Continue physical therapy, fall precautions      Electronically  Signed By: Yvrose Fields MD   7/23/24  2:15 PM

## 2024-06-14 NOTE — LETTER
Patient: Alayna Dumont  : 5/10/1932    Encounter Date: 2024    Subjective  Patient ID: Alayna Dumont is a 89 y.o. female who is acute skilled care being seen and evaluated for multiple medical problems.    Alert, pleasant. Patient's appetite is good. She denies pain and shortness of breath. She ambulates short distances with walker. No recent falls. Compliant with medications.         Review of Systems   Constitutional:  Negative for fever and unexpected weight change.   HENT:  Negative for sore throat.    Respiratory:  Negative for cough and shortness of breath.    Cardiovascular:  Negative for chest pain and palpitations.   Gastrointestinal:  Negative for abdominal pain, constipation, diarrhea, nausea and vomiting.   Genitourinary:  Negative for difficulty urinating and frequency.   Musculoskeletal:  Positive for arthralgias. Negative for back pain.   Skin:  Negative for rash.   Neurological:  Negative for dizziness, seizures and headaches.   Psychiatric/Behavioral:  Negative for agitation. The patient is not nervous/anxious.        Objective  LMP  (LMP Unknown)     Physical Exam  Vitals reviewed.   Constitutional:       General: She is not in acute distress.  HENT:      Mouth/Throat:      Mouth: Mucous membranes are moist.   Cardiovascular:      Rate and Rhythm: Regular rhythm.      Heart sounds: Normal heart sounds.   Pulmonary:      Breath sounds: Normal breath sounds. No rales.   Abdominal:      Palpations: Abdomen is soft.      Tenderness: There is no abdominal tenderness.   Musculoskeletal:         General: No tenderness.      Cervical back: Neck supple. No tenderness.   Skin:     General: Skin is warm.   Neurological:      General: No focal deficit present.      Mental Status: She is alert.         Assessment/Plan  Problem List Items Addressed This Visit       Gastroesophageal reflux disease    Acute on chronic diastolic congestive heart failure (Multi)        Goals       • Take your medication  every day         Continue physical therapy, fall precautions      Electronically Signed By: Yvrose Fields MD   7/18/24  2:02 PM

## 2024-06-27 ASSESSMENT — ENCOUNTER SYMPTOMS
COUGH: 0
FREQUENCY: 0
AGITATION: 0
NERVOUS/ANXIOUS: 0
DIFFICULTY URINATING: 0
CONSTIPATION: 0
PALPITATIONS: 0
NAUSEA: 0
DIZZINESS: 0
VOMITING: 0
SHORTNESS OF BREATH: 0
ABDOMINAL PAIN: 0
HEADACHES: 0
UNEXPECTED WEIGHT CHANGE: 0
ARTHRALGIAS: 1
SORE THROAT: 0
SEIZURES: 0
FEVER: 0
BACK PAIN: 0
DIARRHEA: 0

## 2024-06-27 NOTE — PROGRESS NOTES
Subjective   Patient ID: Alayna Dumont is a 89 y.o. female who is acute skilled care being seen and evaluated for multiple medical problems.    Alert, pleasant, sometimes anxious. Patient's appetite is fair. She denies pain. No respiratory distress noted. No recent falls. Compliant with medications.         Review of Systems   Constitutional:  Negative for fever and unexpected weight change.   HENT:  Negative for sore throat.    Respiratory:  Negative for cough and shortness of breath.    Cardiovascular:  Negative for chest pain and palpitations.   Gastrointestinal:  Negative for abdominal pain, constipation, diarrhea, nausea and vomiting.   Genitourinary:  Negative for difficulty urinating and frequency.   Musculoskeletal:  Positive for arthralgias. Negative for back pain.   Skin:  Negative for rash.   Neurological:  Negative for dizziness, seizures and headaches.   Psychiatric/Behavioral:  Negative for agitation. The patient is not nervous/anxious.        Objective   LMP  (LMP Unknown)     Physical Exam  Vitals reviewed.   Constitutional:       General: She is not in acute distress.  HENT:      Mouth/Throat:      Mouth: Mucous membranes are moist.   Cardiovascular:      Rate and Rhythm: Regular rhythm.      Heart sounds: Normal heart sounds.   Pulmonary:      Breath sounds: Normal breath sounds. No rales.   Abdominal:      Palpations: Abdomen is soft.      Tenderness: There is no abdominal tenderness.   Musculoskeletal:         General: No tenderness.      Cervical back: Neck supple. No tenderness.   Neurological:      Mental Status: She is alert.         Assessment/Plan   Problem List Items Addressed This Visit       LETICIA (generalized anxiety disorder)    Generalized osteoarthritis    Hypertension - Primary     Will monitor BP         Osteoporosis         Continue physical therapy, fall precautions

## 2024-07-03 ENCOUNTER — TELEPHONE (OUTPATIENT)
Dept: BEHAVIORAL HEALTH | Facility: CLINIC | Age: 89
End: 2024-07-03
Payer: MEDICARE

## 2024-07-03 DIAGNOSIS — R11.0 NAUSEA: ICD-10-CM

## 2024-07-03 DIAGNOSIS — R05.9 COUGH, UNSPECIFIED TYPE: Primary | ICD-10-CM

## 2024-07-05 ENCOUNTER — TELEPHONE (OUTPATIENT)
Dept: GERIATRIC MEDICINE | Facility: CLINIC | Age: 89
End: 2024-07-05
Payer: MEDICARE

## 2024-07-05 DIAGNOSIS — J18.9 ATYPICAL PNEUMONIA: Primary | ICD-10-CM

## 2024-07-05 PROBLEM — R05.9 COUGH: Status: ACTIVE | Noted: 2024-07-05

## 2024-07-05 PROBLEM — R11.0 NAUSEA: Status: ACTIVE | Noted: 2024-07-05

## 2024-07-05 RX ORDER — AZITHROMYCIN 500 MG/1
500 TABLET, FILM COATED ORAL DAILY
Qty: 5 TABLET | Refills: 0 | Status: SHIPPED | OUTPATIENT
Start: 2024-07-05 | End: 2024-07-10

## 2024-07-05 RX ORDER — ONDANSETRON 4 MG/1
4 TABLET, FILM COATED ORAL EVERY 8 HOURS PRN
Qty: 20 TABLET | Refills: 0 | Status: SHIPPED | OUTPATIENT
Start: 2024-07-05 | End: 2024-07-12

## 2024-07-05 NOTE — TELEPHONE ENCOUNTER
"Telephone note from PCP- Miladys Menon CNP earlier today:  Patient recently discharged from SNF.  States she has lots of phlegm in her throat and she is coughing a lot. Did not have phlegm. States not much swelling in legs and no wheezing or sob.No chest pain.      States it is causing her stomach to hurt and feels like she has to throw up.  She wants something for that.   Son was not around at the time of call.     Will order CXR  Start zofran 4 mg q 8 hours as needed  20 tablets only      Patient has accepted an appointment for next Tuesday at 2:30 PM  DB\"    Cxr shows congestion versus viral or atypical pneumonia.   Patient does have history of heart failure with multiple hospitalizations for exacerbations.     I recommend that she take mucinex 600mg twice daily for 1 week   I will send a script for azithromycin 500mg 1 tab daily for 5 days to her pharmacy for possibly pneumonia.   Also is she taking torsemide 20mg daily? Has she missed any doses? If not, I may increase this to 2 tabs for a couple of days.   "

## 2024-07-05 NOTE — TELEPHONE ENCOUNTER
Patient recently discharged from SNF.  States she has lots of phlegm in her throat and she is coughing a lot. Did not have phlegm. States not much swelling in legs and no wheezing or sob.No chest pain.     States it is causing her stomach to hurt and feels like she has to throw up.  She wants something for that.   Son was not around at the time of call.    Will order CXR  Start zofran 4 mg q 8 hours as needed  20 tablets only     Patient has accepted an appointment for next Tuesday at 2:30 PM  DB

## 2024-07-06 ASSESSMENT — ENCOUNTER SYMPTOMS
CONSTIPATION: 0
SHORTNESS OF BREATH: 0
PALPITATIONS: 0
DIZZINESS: 0
ABDOMINAL PAIN: 0
COUGH: 0
DIFFICULTY URINATING: 0
UNEXPECTED WEIGHT CHANGE: 0
SORE THROAT: 0
DIARRHEA: 0
FEVER: 0
ARTHRALGIAS: 1
NERVOUS/ANXIOUS: 0
SEIZURES: 0
NAUSEA: 0
AGITATION: 0
BACK PAIN: 0
FREQUENCY: 0
VOMITING: 0
HEADACHES: 0

## 2024-07-06 NOTE — PROGRESS NOTES
Subjective   Patient ID: Alayna Dumont is a 89 y.o. female who is acute skilled care being seen and evaluated for multiple medical problems.    Alert, pleasant. Patient's appetite is good. She is c/o hip and low back pain, denies  shortness of breath. She participates in PT. No recent falls. Compliant with medications.         Review of Systems   Constitutional:  Negative for fever and unexpected weight change.   HENT:  Negative for sore throat.    Respiratory:  Negative for cough and shortness of breath.    Cardiovascular:  Negative for chest pain and palpitations.   Gastrointestinal:  Negative for abdominal pain, constipation, diarrhea, nausea and vomiting.   Genitourinary:  Negative for difficulty urinating and frequency.   Musculoskeletal:  Positive for arthralgias. Negative for back pain.   Skin:  Negative for rash.   Neurological:  Negative for dizziness, seizures and headaches.   Psychiatric/Behavioral:  Negative for agitation. The patient is not nervous/anxious.        Objective   LMP  (LMP Unknown)     Physical Exam  Vitals reviewed.   Constitutional:       General: She is not in acute distress.  HENT:      Mouth/Throat:      Mouth: Mucous membranes are moist.   Cardiovascular:      Rate and Rhythm: Regular rhythm.      Heart sounds: Normal heart sounds.   Pulmonary:      Breath sounds: Normal breath sounds. No rales.   Abdominal:      Palpations: Abdomen is soft.      Tenderness: There is no abdominal tenderness.   Musculoskeletal:         General: Tenderness present.      Cervical back: Neck supple. No tenderness.      Comments: Limited ROM both hips   Neurological:      Mental Status: She is alert.         Assessment/Plan   Problem List Items Addressed This Visit       LETICIA (generalized anxiety disorder)    Gastroesophageal reflux disease    Osteoporosis    Pulmonary hypertension (Multi)     Other Visit Diagnoses       Generalized osteoarthrosis, involving multiple sites    -  Primary               Continue physical therapy, fall precautions

## 2024-07-09 ENCOUNTER — OFFICE VISIT (OUTPATIENT)
Dept: GERIATRIC MEDICINE | Facility: CLINIC | Age: 89
End: 2024-07-09
Payer: MEDICARE

## 2024-07-09 DIAGNOSIS — N39.0 URINARY TRACT INFECTION WITHOUT HEMATURIA, SITE UNSPECIFIED: ICD-10-CM

## 2024-07-09 DIAGNOSIS — B37.31 VAGINAL CANDIDIASIS: ICD-10-CM

## 2024-07-09 DIAGNOSIS — U07.1 PNEUMONIA DUE TO 2019-NCOV: ICD-10-CM

## 2024-07-09 DIAGNOSIS — I50.30 HEART FAILURE WITH PRESERVED LEFT VENTRICULAR FUNCTION (HFPEF) (MULTI): ICD-10-CM

## 2024-07-09 DIAGNOSIS — U07.1: Primary | ICD-10-CM

## 2024-07-09 DIAGNOSIS — M16.52 POST-TRAUMATIC OSTEOARTHRITIS OF LEFT HIP: ICD-10-CM

## 2024-07-09 DIAGNOSIS — J12.82 PNEUMONIA DUE TO 2019-NCOV: ICD-10-CM

## 2024-07-09 DIAGNOSIS — R05.9 COUGH, UNSPECIFIED TYPE: ICD-10-CM

## 2024-07-09 DIAGNOSIS — G89.29 OTHER CHRONIC PAIN: ICD-10-CM

## 2024-07-09 DIAGNOSIS — J96.01: Primary | ICD-10-CM

## 2024-07-09 DIAGNOSIS — M00.9: ICD-10-CM

## 2024-07-09 PROCEDURE — 1160F RVW MEDS BY RX/DR IN RCRD: CPT | Performed by: NURSE PRACTITIONER

## 2024-07-09 PROCEDURE — 1036F TOBACCO NON-USER: CPT | Performed by: NURSE PRACTITIONER

## 2024-07-09 PROCEDURE — 1126F AMNT PAIN NOTED NONE PRSNT: CPT | Performed by: NURSE PRACTITIONER

## 2024-07-09 PROCEDURE — 3075F SYST BP GE 130 - 139MM HG: CPT | Performed by: NURSE PRACTITIONER

## 2024-07-09 PROCEDURE — 99349 HOME/RES VST EST MOD MDM 40: CPT | Performed by: NURSE PRACTITIONER

## 2024-07-09 PROCEDURE — 1159F MED LIST DOCD IN RCRD: CPT | Performed by: NURSE PRACTITIONER

## 2024-07-09 PROCEDURE — 3078F DIAST BP <80 MM HG: CPT | Performed by: NURSE PRACTITIONER

## 2024-07-09 ASSESSMENT — ENCOUNTER SYMPTOMS
COUGH: 0
FEVER: 0
DIZZINESS: 0
UNEXPECTED WEIGHT CHANGE: 0
NAUSEA: 0
AGITATION: 0
ARTHRALGIAS: 1
BACK PAIN: 0
HEADACHES: 0
DIARRHEA: 0
ABDOMINAL PAIN: 0
FREQUENCY: 0
SHORTNESS OF BREATH: 0
PALPITATIONS: 0
DIFFICULTY URINATING: 0
CONSTIPATION: 0
SEIZURES: 0
VOMITING: 0
SORE THROAT: 0
NERVOUS/ANXIOUS: 0

## 2024-07-09 NOTE — PROGRESS NOTES
Subjective   Patient ID: Alayna Dumont is a 89 y.o. female who is acute skilled care being seen and evaluated for multiple medical problems.    Alert, pleasant. Patient's appetite is okay. She denies pain. She saw Dr Hassan for hip pain.  No recent falls. She is compliant with medications.         Review of Systems   Constitutional:  Negative for fever and unexpected weight change.   HENT:  Negative for sore throat.    Respiratory:  Negative for cough and shortness of breath.    Cardiovascular:  Negative for chest pain and palpitations.   Gastrointestinal:  Negative for abdominal pain, constipation, diarrhea, nausea and vomiting.   Genitourinary:  Negative for difficulty urinating and frequency.   Musculoskeletal:  Positive for arthralgias and back pain.   Skin:  Negative for rash.   Neurological:  Negative for dizziness, seizures and headaches.   Psychiatric/Behavioral:  Negative for agitation. The patient is not nervous/anxious.        Objective   LMP  (LMP Unknown)     Physical Exam  Vitals reviewed.   Constitutional:       General: She is not in acute distress.  HENT:      Mouth/Throat:      Mouth: Mucous membranes are moist.   Cardiovascular:      Rate and Rhythm: Regular rhythm.      Heart sounds: Normal heart sounds.   Pulmonary:      Breath sounds: Normal breath sounds. No rales.   Abdominal:      Palpations: Abdomen is soft.      Tenderness: There is no abdominal tenderness.   Musculoskeletal:         General: No tenderness.      Cervical back: Neck supple. No tenderness.   Skin:     General: Skin is warm.   Neurological:      General: No focal deficit present.      Mental Status: She is alert.         Assessment/Plan   Problem List Items Addressed This Visit       Generalized osteoarthritis - Primary    Gastroesophageal reflux disease    Osteoporosis    Vitamin B12 deficiency without anemia         Continue analgesics,  physical therapy, fall precautions

## 2024-07-10 ASSESSMENT — ENCOUNTER SYMPTOMS
AGITATION: 0
NERVOUS/ANXIOUS: 0
ARTHRALGIAS: 1
HEADACHES: 0
PALPITATIONS: 0
VOMITING: 0
FREQUENCY: 0
FEVER: 0
BACK PAIN: 0
UNEXPECTED WEIGHT CHANGE: 0
COUGH: 0
SEIZURES: 0
NAUSEA: 0
CONSTIPATION: 0
ABDOMINAL PAIN: 0
DIARRHEA: 0
SHORTNESS OF BREATH: 0
SORE THROAT: 0
DIFFICULTY URINATING: 0
DIZZINESS: 0

## 2024-07-10 ASSESSMENT — PAIN SCALES - GENERAL: PAINLEVEL: 0-NO PAIN

## 2024-07-11 ASSESSMENT — ENCOUNTER SYMPTOMS
DIARRHEA: 0
COUGH: 0
HEADACHES: 0
AGITATION: 0
NAUSEA: 0
DIFFICULTY URINATING: 0
ARTHRALGIAS: 1
FEVER: 0
CONSTIPATION: 0
UNEXPECTED WEIGHT CHANGE: 0
FREQUENCY: 0
PALPITATIONS: 0
VOMITING: 0
BACK PAIN: 0
SORE THROAT: 0
NERVOUS/ANXIOUS: 0
SEIZURES: 0
SHORTNESS OF BREATH: 0
ABDOMINAL PAIN: 0
DIZZINESS: 0

## 2024-07-11 NOTE — PROGRESS NOTES
"Some elements may have been copied from prior note(s). The elements have been updated and reflect current evaluation, examination and decision making from today.           Reason for visit :  Follow up s/p discharged from SNF, PNA/Acute cough and management of chronic active illnesses.     Summary Statement: Mrs. 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      Reason for visit: Leaving her home requires a considerable and taxing amount of effort, and maximum resources due to inability to walk.     HPI: Mrs. Dumont is being seen today in her home  In the interim, patient was seen in the ED due to left-sided chest pain  Describes it as a pinch and it was around her AICD site.   \"Per ED Discharge Summary 2-;atient was admitted by ED team given chest pain and high HEART score of 4.  EKG av paced @60  Trops 21-->25, bnp 232  On the floor she was remained stable. Tele showed no events.   Troponins were negative.   Limited ECHO showed Hyperdynamic LV with EF 75%, Bioprosthetic Aortic valve, Mod aortic valve stenosis and severe AR, compared the study from 1/10/23, there was no significant change.      Clinical exam showed pain related to moving the left arm, which is likely musculo skeletal pain.   Advised " "tylenol.   Advised follow up with PCP\"   Patient was admitted for CHF exacerbation -treated and discharged to SNF.    On 3/21/2023 patient was admitted to the hospital and diagnosed with acute hypoxemic respiratory failure secondary to Covid 19  PNA . Per discharge summary note \"was addmited for OSEAS and AHRF 2/2 COVID 19. The patient received remdesivir and dexamethasone as well as nebulizers with significant improvement in her respiratory status.  Unfortunately, continues to require oxygen.  Patient also developed a cough as part of her viral syndrome, which continues.  Stay was complicated by multiple other medical issues including acute cystitis secondary to MDRO Proteus vulgaris which was treated with ciprofloxacin with improvement in the patient's symptoms.  Patient also had an OSEAS on her CKD (baseline creatinine 1.1).  Overall OSEAS had improved but fluctuation of creatinine was tolerated to due to fluid overload requiring diuresis.  Finally, patient continued to complain of constipation and received laxatives as well as suppositories to aid with her bowel movements.  She was deemed medically ready for discharge to skilled nursing facility 3/29/2024 but the patient objected as she said she expected that she needs to fully heal and feel better before she would be willing to leave the hospital. E(ND)     On 7/6/2024, patient called our office to report a very bad cough.  States it is causing her to feel like she is going to throw up.  She wanted something for nausea. A CXR was ordered and covering Provider also prescribed Mucinex.  Subsequently, CXR showed PNA vs pneumonia.   Covering Provider prescribed A Zpak.  Patient states she is feeling so much better \"I have never stopped coughing since having Covid.  They would always say, your lungs are clear and it can just take a long time\".  States she cough \"a little this morning\", but she did not cough once during the entire visit. States she took e one dose of " "Zofran and it relieved symptoms.       Praveen(son) reminded patient to discuss \"the problem\"  Patient states while at SNF she developed a \"very bad diaper rash\".  \"You couldn't even touch my genitals it was so painful\".  States it is still present.      Of note, patient was NOT discharged to home on Jardiance.  She was not aware of this, and has been taking. She was on multiple antibiotics.     Chronic Pain to left hip  She states the current pain medication regimen works: Tylenol, Baclofen at bedtime, Duloxetine 30 mg,, Gabapentin 100 mg bid, and Tramadol 50 mg -2 tablets tid prn as needed.  Patient states most times she takes the Tramadol, twice a day. Needs refill on Tramadol  Controlled Substance Monitoring Medication #1   Medication Name: Tramadol, Pill Count: Out of medication.  Recent Lab Results: 8/22/2023 confirms use of medication.   Signs of Misuse: no.   Signs of Abuse: no.   Signs of Diversion: no.       .  She was discharged to home with a 14 day supply (last filled on 6/2/2024 per [facility]):   nu  Miladys Menon   RxSearch >  Patient Request  [place] Board [employer] Pharmacy  Alayna Dumont 89F  Refine Search  Contact the [employer]  YOB: 1932  Recent Address:  [address] [place] [id number]  View Linked Records (3)  Other Tools/Metrics  [employer]  Report generated on 07/12/2024. Report Date Range: 07/13/2022 - 07/12/2024  PDF Report  [place]  UNINTENTIONAL OVERDOSE RISK SCORE MODEL  How should I use this information?  BELOW AVERAGE  060  NARX SCORES  NARCOTICS  380  ACTIVE RX  0  SEDATIVES  190  ACTIVE RX  0  STIMULANTS  000  ACTIVE RX  0  KEY CONTRIBUTING FACTORS TO OVERDOSE RISK SCORE MODEL  Greater than six dispensations  Yes  Benzo - Narcotics overlap  0 Days  Number of high risk scripts  0  Number of pharmacies where narcotics/sedatives/stimulants filled  3  Total days supply of short-acting drugs  148  i  State Indicators (0)  Details  RX Graph   Narcotic   " Buprenorphine   Sedative   Stimulant   Other  Learn how to use graph  All Prescribers  Prescribers  5 - Mary Jane Yadav  4 - Miladys Menon  3 - Hudson Michael  2 - Jessica Pickard,  1 - Venkata Burgess  Timeline  07/12  2m  6m  1y  2y  Disclaimer  Morphine Milligram Equivalent (MME) Prescribed Over Time  Last 30 Days  Last 60 Days  Last 90 Days  Last 1 Year  Last 2 Years  MME  Timeframe  0  15  30  6/13/24 6/28/24 7/12/24  14  Avg MME/day  420  MME per Rx  Disclaimer  Lorazepam MgEq (LME) Prescribed Over Time  Last 30 Days  Last 60 Days  Last 90 Days  Last 1 Year  Last 2 Years  LME  Timeframe  0  1  2  6/13/24 6/28/24 7/12/24  0  Avg LME/day  0  LME per Rx  Disclaimer  Buprenorphine (mg) Prescribed Over Time  Last 30 Days  Last 60 Days  Last 90 Days  Last 1 Year  Last 2 Years  mg  Timeframe  0  1  2  6/13/24 6/28/24 7/12/24  0  Avg mg/day  0  Avg mg per Rx  Disclaimer  RX Summary  Summary  Total Prescriptions 21  Total Private Pay 1  Total Prescribers 5  Total Pharmacies 3  Narcotics (excluding Buprenorphine)  Current MME/day 0.00  30 Day Avg MME/day 14.00  Current Qty 0  Buprenorphine  Current mg/day 0.00  30 Day Avg mg/day 0.00  Current Qty 0  RX Summary Expanded  Narcotics (excluding Buprenorphine)  Current MME/day 0.00  30 Day Avg MME/day 14.00  90 Day Avg MME/day 9.33  Rx Count/12 Months 7  Prescriber #/6 Months 3  Pharmacy #/6 Months 2  Current Qty 0  Buprenorphine  Current mg/day 0.00  30 Day Avg mg/day 0.00  90 Day Avg mg/day 0.00  Rx Count/12 Months 0  Prescriber #/6 Months 0  Pharmacy #/6 Months 0  Current Qty 0  Sedatives  30 Day Avg LME/day 0.00  90 Day Avg LME/day 0.00  Rx Count/12 Months 0  Prescriber #/6 Months 0  Pharmacy #/6 Months 0  Current Qty 0  Stimulants  30 Day Avg mg/day 0.00  90 Day Avg mg/day 0.00  Rx Count/12 Months 0  Prescriber #/6 Months 0  Pharmacy #/6 Months 0  Current Qty 0  Prescriptions  Total: 21  Private Pay: 1  Showing 1-15 of 21 Items View   15 Items      1   of 2   Filled   Written  ID  Drug  QTY  Days  Prescriber  RX #  Dispenser  Refill  Daily Dose*  Pymt Type     06/25/2024 06/25/2024 1   Tramadol Hcl 50 Mg Tablet  42.00 14 Ma Palomo [id number] Tracy ([id number]) 0 30.00 MME [place]  06/25/2024 06/25/2024 1   Gabapentin 100 Mg Capsule  60.00 30 Ma Palomo [id number] Tracy ([id number]) 0  [place]  03/31/2024 03/31/2024 2   Tramadol Hcl 50 Mg Tablet  28.00 7  Bur [id number] Med (7846) 0 40.00 MME [place]  03/22/2024 03/22/2024 1   Tramadol Hcl 50 Mg Tablet  180.00 30 De Bro [id number] Tracy ([id number]) 0 60.00 MME [place]  02/02/2024 06/24/2023 1   Gabapentin 100 Mg Capsule  180.00 90 De Bro [id number] Tracy ([id number]) 0  [place]  02/01/2024 11/06/2023 1   Tramadol Hcl 50 Mg Tablet  180.00 30 De Bro [id number] Tracy ([id number]) 0 60.00 [place]ME [place]  11/07/2023 11/06/2023 2   Tramadol Hcl 50 Mg Tablet  180.00 30 De Bro [id number] Rit (6242) 0 60.00 [place]ME [place]  11/06/2023 11/06/2023 3   Gabapentin 100 Mg Capsule  180.00 90 De Bro [id number] Rit (6242) 0  [place]  08/16/2023 08/15/2023 2   Tramadol Hcl 50 Mg Tablet  120.00 30 De Bro [id number] Rit (6242) 0 40.00 MME [place]  07/19/2023 07/18/2023 2   Gabapentin 100 Mg Capsule  180.00 90 De Bro [id number] Rit (6242) 0  [place]  07/19/2023 07/19/2023 2   Tramadol Hcl 50 Mg Tablet  28.00 7 De Bro [id number] Rit (6242) 0 40.00 MME [place]  06/14/2023 06/12/2023 2   Gabapentin 100 Mg Capsule  60.00 30 Am Lat [id number] Rit (6242) 0  [place]  05/16/2023 01/20/2023 2   Gabapentin 100 Mg Capsule  60.00 30 Am Lat [id number] Rit (6242) 3  [place]  04/08/2023 01/20/2023 2   Gabapentin 100 Mg Capsule  60.00 30 Am Lat [id number] Rit (6242) 2  [place]  02/22/2023 01/20/2023 2   Gabapentin 100 Mg Capsule  60.00 30 Am Lat [id number] Rit (6242) 1  [place]  Disclaimer  Showing 1-15 of 21 Items View   15 Items      1   of 2   Providers  Total: 5  Showing 1-5 of 5 Items View   15 Items    1 of 1   Name  Address  City  State  Zipcode  Phone   Mary Jane Yadav [address] [place] [address] -  Venkata Burgess [address] [facility] [address] -  Jess[place] [address] [place] [address] [facility] [address] -  Miladys Menon [address] [place] [address] [facility] [address] -  Hudson Michael [address] [place] [address] [phone]  Showing 1-5 of 5 Items View   15 Items    1 of 1   Pharmacies  Total: 3  Showing 1-3 of 3 Items View   15 Items    1 of 1   Name  Address  [facility]  State  Zipcode  Phone   [id number] ([phone]) [address] [facility] [phone]  [facility] (8990) [address] [facility] [phone]  [facility] ([address]39) [address] [facility] [phone]  Showing 1-3 of 3 Items View   15 Items    1 of 1   Column Settings  Integrated Patient Records  Total: 0  Showing 1-0 of 0 Items View   15 Items    1 of 0   Incident Date  Medication Given  Dosage  Administrated By  Zip code of Administration   Showing 1-0 of 0 Items View   15 Items    1 of 0   The State [facility] does not warrant the information contained in this report to be accurate or complete. The Report reflects the search criteria entered by the requestor, the data entered by the dispensing pharmacy, and the frequency at which the data is reported. For more information about any prescription, please contact the dispensing pharmacy or the prescriber.  This website uses APX, a web analytics service provided by [employer] (\"Google\"). APX uses \"cookies\", which are text files placed on your computer, to help the website analyze how users use the site. The information generated by the cookie about your use of the website will be transmitted to and stored by [employer] on servers in the [place].    In case IP-anonymization is activated on this website, your IP address will be truncated within the area of Member States of the  Union or other parties to the Agreement on the  Economic " Area. Only in exceptional cases the whole IP address will be first transferred to a Google  in the [place] and truncated there. The IP-anonymization is active on this website.    [employer] will use this information on behalf of the  of this website for the purpose of evaluating your use of the website, compiling reports on website activity for website operators and providing them other services relating to website activity and internet usage.    The IP-address, that your Browser conveys within the scope of [employer], will not be associated with any other data held by [employer]. You may refuse the use of cookies by selecting the appropriate settings on your browser, however please note that if you do this you may not be able to use the full functionality of this website. You can also opt-out from being tracked by [employer] with effect for the future by downloading and installing adBrite Opt-out Browser Addon for your current web browser: http://tools.[place].UXCam/dlpage/gaoptout?hl=en.        Powered By  idio  [place] [employer]  [address] [place] [id number]  [email or web address]  ©2024 idio. All Rights Reserved. Privacy Policy   ROS:  No chest pain or sob  See HPI   No fever or chills  No diarrhea or constipation  No falls  No dizziness or headache  No abdominal pain/ nausea or vomiting      Physical Exam  /66 (BP Location: Right arm, Patient Position: Sitting, BP Cuff Size: Adult)   Pulse 76   Resp 20   LMP  (LMP Unknown) /POX =94% RA        Constitutional   General appearance: Alert, cooperative and in no acute distress. Scooting around independently in manual wheelchair   Head and Face Examination/ inspection of hair and scalp: Normal.   Eyes   Inspection of eyes: Sclera and conjunctiva were normal.    Pupil exam: LILLIAN. Extraocular movements were intact.   Ears, Nose, Mouth, and Throat   Ears: Normal.    Oropharynx: Normal with  moist mucus membranes, tongue midline, no PND, no erythema or enlargement of tonsils.    Hearing: Slightly [place]      Lips, teeth, and gums: Normal.   Neck   Neck Exam: Appearance of the neck was normal. No neck masses observed. No jugular vein distension.   Pulmonary   Respiratory assessment: No respiratory distress, normal respiratory rhythm and effort.    Auscultation of Lungs: Diminished throughout , but otherwise clear.   Cardiovascular   Auscultation of heart: Abnormal.  The heart rate was normal. A grade 2 systolic murmur was heard at the LLSB.    Pedal pulses: Regular rate. 2+ bilaterally.  +AICD left upper chest.  Area non-tender   Examination of extremities for edema and/or varicosities: Abnormal.  trace  pedal edema. to left foot only   Chest   Chest: Symmetrical and normal appearance.   Abdomen   Abdominal Exam: No bruits normal bowel sounds, soft, non-tender, no abdominal masses palpated.    Liver and Spleen exam: No hepato-splenomegaly. wearing a diaper.   Genitourinary   Bladder: Normal on palpation. wearing a diaper.   Lymphatic   Palpation of lymph nodes in axillae: Normal.     Palpation of lymph nodes in neck: No cervical lymphadenopathy.    Palpation of lymph nodes in other areas: Normal.    Musculoskeletal   Digits and nails: Abnormal.     Inspection/palpation of joints: No joint swelling seen.    Range of motion: Abnormal.     Muscle strength/tone: Normal.  decrease ROM to left leg/hip. + Facial grimacing with ROM and repositioning in chair -unchanged  No CVA tenderness   Skin   Skin inspection: Skin dry , warm and intact. Normal skin color and pigmentation, normal skin turgor and no visible rash and no visible signs of pressure sores.   Neurologic   Cranial nerves: Nerves 2-12 were intact, no focal neuro defects, except for mild hearing deficit   Psychiatric   Judgment and insight: Intact and appropriate.    Orientation: Oriented to person, place, and time.    Mood and affect: Normal.    Recent  "and remote memory: Normal.  .         LABS/DIAGNOSTIC IMAGING    Chemistry    Lab Results   Component Value Date/Time     05/04/2024 0813    K 5.3 05/04/2024 0813     05/04/2024 0813    CO2 26 05/04/2024 0813    BUN 56 (H) 05/04/2024 0813    CREATININE 1.44 (H) 05/04/2024 0813    Lab Results   Component Value Date/Time    CALCIUM 8.8 05/04/2024 0813    ALKPHOS 66 04/25/2024 1331    AST 20 04/25/2024 1331    ALT 19 04/25/2024 1331    BILITOT 0.3 04/25/2024 1331        Lab Results   Component Value Date    WBC 6.9 05/04/2024    HGB 12.0 05/04/2024    HCT 39.1 05/04/2024    MCV 96 05/04/2024     05/04/2024 7/2/2024  CXR PA AND LATERAL -NOT YET SCANNED  Impression:\" PNA vs CHF\"    1. Acute hypoxemic respiratory failure due to severe acute respiratory syndrome coronavirus 2 (SARS-CoV-2) disease / Cough, unspecified type/ Pneumonia due to 2019-nCoV  -since returning home, responded well to 5 day course on Zpak  and Mucinex,   -High risk for hospital readmission  -NP will follow up in one week     2. Nausea  -associated with violent cough  -resolved   -has not required more than one dose of Zofran  -Moving bowels well.   3. Heart failure with preserved left ventricular function (HFpEF) (Multi)  -no s/s of volume overload  -Patient was NOT discharged to home on Jardiance from SNF  -Possibly due to severe vaginal candidiasis in the setting of multiple antibiotics  -Continue Entresto, Torsemide, and Metoprolol  -Also, she was not discharged to home on KCL or Spironolactone  -Possibly due to hypotension and OSEAS  -Plan of care is ongoing     4  Other chronic pain/Post-traumatic osteoarthritis of left hip/ Chronic infection of knee -  -reports good subjective relief on Tylenol, Baclofen at bedtime, Duloxetine 30 mg,, Gabapentin 100 mg bid, and -Tramadol 50 mg -2 tablets tid prn as needed. Needs refill on Tramadol  -Patient states most days she takes it twice a day.   -Will decrease frequency to bid prn and " quantity to 120  -New script for Narcan  [facility] report retrieved and reviewed  -No discrepancies  -Urine for Compliance Monitoring confirmed used of medication 8/2023  -Controlled Substance Agreement renewal due-bring to next visit  -No red flags   -Current pain medication regimen has improved patient's functional status   -Son inquired about a HHA-  Patient is able to have one while PT is involved- he states their BR is under renovation   At this time and to defer request.  Patient now has a wide bedside commode chair.   -Continue to work with PT- came today but son had stopped away and patient was unable to open door  -continue lifelong Keflex 500 mg bid per ID.       5. Vaginal candidiasis  -etiology multifactorial  -Jardiance use and multiple antibiotics during hospitalization /SNF stay  -Start  Nystatin as directed      6. UTI-  Treated  Symptoms resolved        Stable  -High risk for hospital readmission  -NP will follow up in one week

## 2024-07-11 NOTE — PROGRESS NOTES
Subjective   Patient ID: Alayna Dumont is a 89 y.o. female who is acute skilled care being seen and evaluated for multiple medical problems.    Alert, pleasant. Patient's appetite is fair. She denies pain and shows no respiratory distress. She states she noted blood in stool, has h/o hemorrhoids, deneis abdom pain. No recent falls. She is compliant with medications.         Review of Systems   Constitutional:  Negative for fever and unexpected weight change.   HENT:  Negative for sore throat.    Respiratory:  Negative for cough and shortness of breath.    Cardiovascular:  Negative for chest pain and palpitations.   Gastrointestinal:  Positive for blood in stool. Negative for abdominal pain, constipation, diarrhea, nausea and vomiting.   Genitourinary:  Negative for difficulty urinating and frequency.   Musculoskeletal:  Positive for arthralgias. Negative for back pain.   Skin:  Negative for rash.   Neurological:  Negative for dizziness, seizures and headaches.   Psychiatric/Behavioral:  Negative for agitation. The patient is not nervous/anxious.        Objective   LMP  (LMP Unknown)     Physical Exam  Vitals reviewed.   Constitutional:       General: She is not in acute distress.  HENT:      Mouth/Throat:      Mouth: Mucous membranes are moist.   Cardiovascular:      Rate and Rhythm: Regular rhythm.      Heart sounds: Normal heart sounds.   Pulmonary:      Breath sounds: Normal breath sounds. No rales.   Abdominal:      Palpations: Abdomen is soft.      Tenderness: There is no abdominal tenderness.   Musculoskeletal:         General: No tenderness.      Cervical back: Neck supple. No tenderness.   Neurological:      Mental Status: She is alert.         Assessment/Plan   Problem List Items Addressed This Visit       LETICIA (generalized anxiety disorder)    Gastroesophageal reflux disease    Osteoporosis    Neuropathy    Primary osteoarthritis of both knees     Other Visit Diagnoses       Blood in stool    -  Primary             Will check labs, monitor for further bleeding  Continue physical therapy, fall precautions

## 2024-07-12 VITALS — RESPIRATION RATE: 20 BRPM | SYSTOLIC BLOOD PRESSURE: 130 MMHG | HEART RATE: 76 BPM | DIASTOLIC BLOOD PRESSURE: 66 MMHG

## 2024-07-12 PROBLEM — B37.31 VAGINAL CANDIDIASIS: Status: ACTIVE | Noted: 2024-07-12

## 2024-07-12 PROBLEM — J12.82 PNEUMONIA DUE TO 2019-NCOV: Status: ACTIVE | Noted: 2024-03-25

## 2024-07-12 PROBLEM — N39.0 URINARY TRACT INFECTION WITHOUT HEMATURIA: Status: ACTIVE | Noted: 2024-07-12

## 2024-07-12 RX ORDER — TRAMADOL HYDROCHLORIDE 50 MG/1
100 TABLET ORAL 2 TIMES DAILY PRN
Qty: 120 TABLET | Refills: 1 | Status: SHIPPED | OUTPATIENT
Start: 2024-07-12 | End: 2024-08-11

## 2024-07-12 RX ORDER — NYSTATIN 100000 U/G
CREAM TOPICAL 2 TIMES DAILY
Qty: 90 G | Refills: 3 | Status: SHIPPED | OUTPATIENT
Start: 2024-07-12 | End: 2025-07-12

## 2024-07-12 RX ORDER — NALOXONE HYDROCHLORIDE 4 MG/.1ML
4 SPRAY NASAL AS NEEDED
Qty: 1 EACH | Refills: 3 | Status: SHIPPED | OUTPATIENT
Start: 2024-07-12

## 2024-07-12 RX ORDER — METOPROLOL TARTRATE 25 MG/1
25 TABLET, FILM COATED ORAL 2 TIMES DAILY
Qty: 180 TABLET | Refills: 3 | Status: SHIPPED | OUTPATIENT
Start: 2024-07-12

## 2024-07-12 RX ORDER — TORSEMIDE 20 MG/1
20 TABLET ORAL DAILY
Qty: 120 TABLET | Refills: 3 | Status: SHIPPED | OUTPATIENT
Start: 2024-07-12

## 2024-07-12 NOTE — PATIENT INSTRUCTIONS
Dear Mrs. Dumont,    It was such a pleasure seeing you today. [facility] home!!  I was happen to hear that the cough has finally improved.    I will follow up with you in one week.    Sincerely,    Miladys Menon MSN, APRN-BC

## 2024-07-18 ASSESSMENT — ENCOUNTER SYMPTOMS
FEVER: 0
FREQUENCY: 0
PALPITATIONS: 0
ABDOMINAL PAIN: 0
CONSTIPATION: 0
NAUSEA: 0
SORE THROAT: 0
DIFFICULTY URINATING: 0
HEADACHES: 0
SHORTNESS OF BREATH: 0
NERVOUS/ANXIOUS: 0
SEIZURES: 0
VOMITING: 0
COUGH: 0
BACK PAIN: 0
AGITATION: 0
DIARRHEA: 0
ARTHRALGIAS: 1
DIZZINESS: 0
UNEXPECTED WEIGHT CHANGE: 0

## 2024-07-18 NOTE — PROGRESS NOTES
Subjective   Patient ID: Alayna Dumont is a 89 y.o. female who is acute skilled care being seen and evaluated for multiple medical problems.    Alert, pleasant. Patient's appetite is good. She denies pain and shortness of breath. She ambulates short distances with walker. No recent falls. Compliant with medications.         Review of Systems   Constitutional:  Negative for fever and unexpected weight change.   HENT:  Negative for sore throat.    Respiratory:  Negative for cough and shortness of breath.    Cardiovascular:  Negative for chest pain and palpitations.   Gastrointestinal:  Negative for abdominal pain, constipation, diarrhea, nausea and vomiting.   Genitourinary:  Negative for difficulty urinating and frequency.   Musculoskeletal:  Positive for arthralgias. Negative for back pain.   Skin:  Negative for rash.   Neurological:  Negative for dizziness, seizures and headaches.   Psychiatric/Behavioral:  Negative for agitation. The patient is not nervous/anxious.        Objective   LMP  (LMP Unknown)     Physical Exam  Vitals reviewed.   Constitutional:       General: She is not in acute distress.  HENT:      Mouth/Throat:      Mouth: Mucous membranes are moist.   Cardiovascular:      Rate and Rhythm: Regular rhythm.      Heart sounds: Normal heart sounds.   Pulmonary:      Breath sounds: Normal breath sounds. No rales.   Abdominal:      Palpations: Abdomen is soft.      Tenderness: There is no abdominal tenderness.   Musculoskeletal:         General: No tenderness.      Cervical back: Neck supple. No tenderness.   Skin:     General: Skin is warm.   Neurological:      General: No focal deficit present.      Mental Status: She is alert.         Assessment/Plan   Problem List Items Addressed This Visit       Gastroesophageal reflux disease    Acute on chronic diastolic congestive heart failure (Multi)        Goals        Take your medication every day         Continue physical therapy, fall precautions

## 2024-07-19 PROCEDURE — G0180 MD CERTIFICATION HHA PATIENT: HCPCS | Performed by: NURSE PRACTITIONER

## 2024-07-21 ASSESSMENT — ENCOUNTER SYMPTOMS
BACK PAIN: 1
BLOOD IN STOOL: 1

## 2024-07-23 ENCOUNTER — OFFICE VISIT (OUTPATIENT)
Dept: GERIATRIC MEDICINE | Facility: CLINIC | Age: 89
End: 2024-07-23
Payer: MEDICARE

## 2024-07-23 DIAGNOSIS — R05.9 COUGH, UNSPECIFIED TYPE: ICD-10-CM

## 2024-07-23 DIAGNOSIS — E87.6 HYPOKALEMIA: ICD-10-CM

## 2024-07-23 DIAGNOSIS — I50.30 HEART FAILURE WITH PRESERVED LEFT VENTRICULAR FUNCTION (HFPEF) (MULTI): Primary | ICD-10-CM

## 2024-07-23 DIAGNOSIS — U07.1 PNEUMONIA DUE TO 2019-NCOV: ICD-10-CM

## 2024-07-23 DIAGNOSIS — G89.29 OTHER CHRONIC PAIN: ICD-10-CM

## 2024-07-23 DIAGNOSIS — E78.49 OTHER HYPERLIPIDEMIA: ICD-10-CM

## 2024-07-23 DIAGNOSIS — J12.82 PNEUMONIA DUE TO 2019-NCOV: ICD-10-CM

## 2024-07-23 DIAGNOSIS — E55.9 VITAMIN D DEFICIENCY: ICD-10-CM

## 2024-07-23 DIAGNOSIS — B37.31 VAGINAL CANDIDIASIS: ICD-10-CM

## 2024-07-23 PROCEDURE — 1160F RVW MEDS BY RX/DR IN RCRD: CPT | Performed by: NURSE PRACTITIONER

## 2024-07-23 PROCEDURE — 1159F MED LIST DOCD IN RCRD: CPT | Performed by: NURSE PRACTITIONER

## 2024-07-23 PROCEDURE — 99349 HOME/RES VST EST MOD MDM 40: CPT | Performed by: NURSE PRACTITIONER

## 2024-07-23 PROCEDURE — 3078F DIAST BP <80 MM HG: CPT | Performed by: NURSE PRACTITIONER

## 2024-07-23 PROCEDURE — 3074F SYST BP LT 130 MM HG: CPT | Performed by: NURSE PRACTITIONER

## 2024-07-23 PROCEDURE — 1036F TOBACCO NON-USER: CPT | Performed by: NURSE PRACTITIONER

## 2024-07-23 PROCEDURE — 1126F AMNT PAIN NOTED NONE PRSNT: CPT | Performed by: NURSE PRACTITIONER

## 2024-07-23 ASSESSMENT — ENCOUNTER SYMPTOMS
BACK PAIN: 1
FREQUENCY: 0
COUGH: 0
FEVER: 0
AGITATION: 0
PALPITATIONS: 0
UNEXPECTED WEIGHT CHANGE: 0
ABDOMINAL PAIN: 0
SHORTNESS OF BREATH: 0
HEADACHES: 0
SORE THROAT: 0
VOMITING: 0
NERVOUS/ANXIOUS: 0
CONSTIPATION: 0
SEIZURES: 0
ARTHRALGIAS: 1
DIZZINESS: 0
DIFFICULTY URINATING: 0
NAUSEA: 0
DIARRHEA: 0

## 2024-07-23 ASSESSMENT — PAIN SCALES - GENERAL: PAINLEVEL: 0-NO PAIN

## 2024-07-23 NOTE — PROGRESS NOTES
Subjective   Patient ID: Alayna Dumont is a 89 y.o. female who is acute skilled care being seen and evaluated for multiple medical problems.    Alert, pleasant. Patient's appetite is good. She is complaining of bilateral hip pain and back pain.  Denies shortness of breath. She participates in PT. further blood in stool noted. No recent falls.          Review of Systems   Constitutional:  Negative for fever and unexpected weight change.   HENT:  Negative for sore throat.    Respiratory:  Negative for cough and shortness of breath.    Cardiovascular:  Negative for chest pain and palpitations.   Gastrointestinal:  Negative for abdominal pain, constipation, diarrhea, nausea and vomiting.   Genitourinary:  Negative for difficulty urinating and frequency.   Musculoskeletal:  Positive for arthralgias and back pain.   Skin:  Negative for rash.   Neurological:  Negative for dizziness, seizures and headaches.   Psychiatric/Behavioral:  Negative for agitation. The patient is not nervous/anxious.        Objective   LMP  (LMP Unknown)     Physical Exam  Vitals reviewed.   Constitutional:       General: She is not in acute distress.  HENT:      Mouth/Throat:      Mouth: Mucous membranes are moist.   Cardiovascular:      Rate and Rhythm: Regular rhythm.      Heart sounds: Normal heart sounds.   Pulmonary:      Breath sounds: Normal breath sounds. No rales.   Abdominal:      Palpations: Abdomen is soft.      Tenderness: There is no abdominal tenderness.   Musculoskeletal:         General: No tenderness.      Cervical back: Neck supple. No tenderness.      Comments: Limited range of motion both hips   Neurological:      Mental Status: She is alert.         Assessment/Plan   Problem List Items Addressed This Visit       Generalized osteoarthritis    Hyperlipidemia    Hypertension - Primary           Continue physical therapy, fall precautions

## 2024-07-23 NOTE — PROGRESS NOTES
"Some elements may have been copied from prior note(s). The elements have been updated and reflect current evaluation, examination and decision making from today.             Reason for visit:  Follow up for HFpEF and management of chronic active illness        Summary Statement: Mrs. 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      Reason for visit: Leaving her home requires a considerable and taxing amount of effort, and maximum resources due to inability to walk.     HPI: Mrs. Dumont is being seen today in her home  Patient states she is doing well. She is concerned that she was discharged to home on the Spironolactone and wants to know what her potassium level is. Reports no adverse effects.    \"I finally stopped coughing\" Post infection/Covid 19 PNA cough which lingered is now resolved.   States the vaginal candidiasis is still present, but son never picked up [place] statin         Controlled Substance Monitoring Medication #1   Medication Name: Tramadol, Pill Count: Out of medication.  Recent Lab Results: 8/22/2023 confirms use of medication.   Signs of Misuse: no.   Signs of Abuse: no.   Signs of Diversion: no        ROS:  No chest pain or sob  No cough   No PND or orthopnea    No falls/ED visits or " hospitalizations   No fever or chills  No diarrhea or constipation  No falls  No dizziness or headache  No abdominal pain/ nausea or vomiting      Physical Exam   /60 (BP Location: Right arm, Patient Position: Sitting, BP Cuff Size: Adult)   Pulse 60   Resp 20   LMP  (LMP Unknown)   /POX=94% RA   Constitutional   General appearance: Alert, cooperative and in no acute distress. Scooting around independently in manual wheelchair   Head and Face Examination/ inspection of hair and scalp: Normal.   Eyes   Inspection of eyes: Sclera and conjunctiva were normal.    Pupil exam: LILLIAN. Extraocular movements were intact.   Ears, Nose, Mouth, and Throat   Ears: Normal.    Oropharynx: Normal with moist mucus membranes, tongue midline, no PND, no erythema or enlargement of tonsils.    Hearing: Slightly [place]      Lips, teeth, and gums: Normal.   Neck   Neck Exam: Appearance of the neck was normal. No neck masses observed. No jugular vein distension.   Pulmonary   Respiratory assessment: No respiratory distress, normal respiratory rhythm and effort.    Auscultation of Lungs: Diminished throughout , but otherwise clear.   Cardiovascular   Auscultation of heart: Abnormal.  The heart rate was normal. A grade 2 systolic murmur was heard at the LLSB.    Pedal pulses: Regular rate. 2+ bilaterally.  +AICD left upper chest.  Area non-tender   Examination of extremities for edema and/or varicosities: Abnormal.  trace  pedal edema bilaterally   Chest   Chest: Symmetrical and normal appearance.   Abdomen   Abdominal Exam: No bruits normal bowel sounds, soft, non-tender, no abdominal masses palpated.    Liver and Spleen exam: No hepato-splenomegaly. wearing a diaper.   Genitourinary   Bladder: Normal on palpation. wearing a diaper.   Lymphatic   Palpation of lymph nodes in axillae: Normal.     Palpation of lymph nodes in neck: No cervical lymphadenopathy.    Palpation of lymph nodes in other areas: Normal.    Musculoskeletal   Digits  "and nails: Abnormal.     Inspection/palpation of joints: No joint swelling seen.    Range of motion: Abnormal.     Muscle strength/tone: Normal.  decrease ROM to left leg/hip.   RUE.LUE 5/5 RLE 4/5  LLE 4/5  No CVA tenderness   Skin   Skin inspection: Skin dry , warm and intact. Normal skin color and pigmentation, normal skin turgor and no visible rash and no visible signs of pressure sores.   Neurologic   Cranial nerves: Nerves 2-12 were intact, no focal neuro defects, except for mild hearing deficit   Psychiatric   Judgment and insight: Intact and appropriate.    Orientation: Oriented to person, place, and time.    Mood and affect: Normal.    Recent and remote memory: Normal.  .           LABS/DIAGNOSTIC IMAGING    Chemistry           Lab Results   Component Value Date/Time      05/04/2024 0813     K 5.3 05/04/2024 0813      05/04/2024 0813     CO2 26 05/04/2024 0813     BUN 56 (H) 05/04/2024 0813     CREATININE 1.44 (H) 05/04/2024 0813          Lab Results   Component Value Date/Time     CALCIUM 8.8 05/04/2024 0813     ALKPHOS 66 04/25/2024 1331     AST 20 04/25/2024 1331     ALT 19 04/25/2024 1331     BILITOT 0.3 04/25/2024 1331               Lab Results   Component Value Date     WBC 6.9 05/04/2024     HGB 12.0 05/04/2024     HCT 39.1 05/04/2024     MCV 96 05/04/2024      05/04/2024 7/2/2024  CXR PA AND LATERAL -NOT YET SCANNED  Impression:\" PNA vs CHF\"         ASSESSMENT/PLAN  1. Heart failure with preserved left ventricular function (HFpEF) (Multi)  -No s/s of volume overload  -Spironolactone was discontinue at [facility]   --Farxiga was discontinued at [facility] possibly to due recurrent vaginal candidiasis  -will check K+  -Continue Torsemide, Entresto, and Metoprolol   - Comprehensive metabolic panel; Future  - Lipid panel; Future  - Tsh With Reflex To Free T4 If Abnormal; Future    2. Hypokalemia  -see Problem # 1    3. Other hyperlipidemia  -on statin   - Lipid panel; Future  - Tsh With Reflex " To Free T4 If Abnormal; Future    4. Vitamin D deficiency    - Vitamin D 25-Hydroxy,Total (for eval of Vitamin D levels); Future    5. Other chronic pain  -Controlled on Tramadol, Duloxetine,  Baclofen and Gabapentin   - Opiate/Opioid/Benzo Prescription Compliance; Future  - Tramadol Confirmation, Urine; Future    6. Vaginal Candidiasis  -son had not yet picked up Nystatin   -Pharmacy states it is ready for    -Farxiga was discontinued at [facility] possibly to due recurrent vaginal candidiasis    7. Covid 19 PNA/ Cough  -resolved    Miscellaneous:  At the time of visit -refill for Ferrous Sulfate and the  Keflex bid called in to pharmacist directly

## 2024-07-24 ENCOUNTER — HOME HEALTH ADMISSION (OUTPATIENT)
Dept: HOME HEALTH SERVICES | Facility: HOME HEALTH | Age: 89
End: 2024-07-24
Payer: MEDICARE

## 2024-07-24 VITALS — SYSTOLIC BLOOD PRESSURE: 120 MMHG | DIASTOLIC BLOOD PRESSURE: 60 MMHG | RESPIRATION RATE: 20 BRPM | HEART RATE: 60 BPM

## 2024-07-24 PROBLEM — E55.9 VITAMIN D DEFICIENCY: Status: ACTIVE | Noted: 2024-07-24

## 2024-07-25 ENCOUNTER — HOME CARE VISIT (OUTPATIENT)
Dept: HOME HEALTH SERVICES | Facility: HOME HEALTH | Age: 89
End: 2024-07-25
Payer: MEDICARE

## 2024-07-25 ENCOUNTER — LAB (OUTPATIENT)
Dept: LAB | Facility: LAB | Age: 89
End: 2024-07-25
Payer: MEDICARE

## 2024-07-25 DIAGNOSIS — G89.29 OTHER CHRONIC PAIN: ICD-10-CM

## 2024-07-25 DIAGNOSIS — I50.30 HEART FAILURE WITH PRESERVED LEFT VENTRICULAR FUNCTION (HFPEF) (MULTI): ICD-10-CM

## 2024-07-25 DIAGNOSIS — E78.49 OTHER HYPERLIPIDEMIA: ICD-10-CM

## 2024-07-25 DIAGNOSIS — E55.9 VITAMIN D DEFICIENCY: ICD-10-CM

## 2024-07-25 LAB
ALBUMIN SERPL BCP-MCNC: 3.8 G/DL (ref 3.4–5)
ALP SERPL-CCNC: 74 U/L (ref 33–136)
ALT SERPL W P-5'-P-CCNC: 19 U/L (ref 7–45)
AMPHETAMINES UR QL SCN: NORMAL
ANION GAP SERPL CALC-SCNC: 17 MMOL/L (ref 10–20)
AST SERPL W P-5'-P-CCNC: 24 U/L (ref 9–39)
BARBITURATES UR QL SCN: NORMAL
BILIRUB SERPL-MCNC: 0.5 MG/DL (ref 0–1.2)
BUN SERPL-MCNC: 47 MG/DL (ref 6–23)
BZE UR QL SCN: NORMAL
CALCIUM SERPL-MCNC: 8.9 MG/DL (ref 8.6–10.3)
CANNABINOIDS UR QL SCN: NORMAL
CHLORIDE SERPL-SCNC: 94 MMOL/L (ref 98–107)
CHOLEST SERPL-MCNC: 111 MG/DL (ref 0–199)
CHOLESTEROL/HDL RATIO: 2.8
CO2 SERPL-SCNC: 26 MMOL/L (ref 21–32)
CREAT SERPL-MCNC: 1.3 MG/DL (ref 0.5–1.05)
CREAT UR-MCNC: 23.2 MG/DL (ref 20–320)
EGFRCR SERPLBLD CKD-EPI 2021: 39 ML/MIN/1.73M*2
GLUCOSE SERPL-MCNC: 132 MG/DL (ref 74–99)
HDLC SERPL-MCNC: 39.9 MG/DL
LDLC SERPL CALC-MCNC: 38 MG/DL
NON HDL CHOLESTEROL: 71 MG/DL (ref 0–149)
PCP UR QL SCN: NORMAL
POTASSIUM SERPL-SCNC: 4.4 MMOL/L (ref 3.5–5.3)
PROT SERPL-MCNC: 6.3 G/DL (ref 6.4–8.2)
SODIUM SERPL-SCNC: 133 MMOL/L (ref 136–145)
TRIGL SERPL-MCNC: 168 MG/DL (ref 0–149)
TSH SERPL-ACNC: 2.62 MIU/L (ref 0.44–3.98)
VLDL: 34 MG/DL (ref 0–40)

## 2024-07-25 PROCEDURE — 82570 ASSAY OF URINE CREATININE: CPT

## 2024-07-25 PROCEDURE — 80361 OPIATES 1 OR MORE: CPT

## 2024-07-25 PROCEDURE — 80346 BENZODIAZEPINES1-12: CPT

## 2024-07-25 PROCEDURE — 82306 VITAMIN D 25 HYDROXY: CPT

## 2024-07-25 PROCEDURE — 80358 DRUG SCREENING METHADONE: CPT

## 2024-07-25 PROCEDURE — 80061 LIPID PANEL: CPT

## 2024-07-25 PROCEDURE — 80368 SEDATIVE HYPNOTICS: CPT

## 2024-07-25 PROCEDURE — 36415 COLL VENOUS BLD VENIPUNCTURE: CPT

## 2024-07-25 PROCEDURE — 80365 DRUG SCREENING OXYCODONE: CPT

## 2024-07-25 PROCEDURE — 80307 DRUG TEST PRSMV CHEM ANLYZR: CPT

## 2024-07-25 PROCEDURE — 80053 COMPREHEN METABOLIC PANEL: CPT

## 2024-07-25 PROCEDURE — 84443 ASSAY THYROID STIM HORMONE: CPT

## 2024-07-25 PROCEDURE — 80354 DRUG SCREENING FENTANYL: CPT

## 2024-07-25 PROCEDURE — 80373 DRUG SCREENING TRAMADOL: CPT

## 2024-07-26 LAB — 25(OH)D3 SERPL-MCNC: 39 NG/ML (ref 30–100)

## 2024-07-29 LAB
1OH-MIDAZOLAM UR CFM-MCNC: <25 NG/ML
6MAM UR CFM-MCNC: <25 NG/ML
7AMINOCLONAZEPAM UR CFM-MCNC: <25 NG/ML
A-OH ALPRAZ UR CFM-MCNC: <25 NG/ML
ALPRAZ UR CFM-MCNC: <25 NG/ML
CHLORDIAZEP UR CFM-MCNC: <25 NG/ML
CLONAZEPAM UR CFM-MCNC: <25 NG/ML
CODEINE UR CFM-MCNC: <50 NG/ML
DIAZEPAM UR CFM-MCNC: <25 NG/ML
EDDP UR CFM-MCNC: <25 NG/ML
FENTANYL UR CFM-MCNC: <2.5 NG/ML
HYDROCODONE CTO UR CFM-MCNC: <25 NG/ML
HYDROMORPHONE UR CFM-MCNC: <25 NG/ML
LORAZEPAM UR CFM-MCNC: <25 NG/ML
METHADONE UR CFM-MCNC: <25 NG/ML
MIDAZOLAM UR CFM-MCNC: <25 NG/ML
MORPHINE UR CFM-MCNC: <50 NG/ML
NORDIAZEPAM UR CFM-MCNC: <25 NG/ML
NORFENTANYL UR CFM-MCNC: <2.5 NG/ML
NORHYDROCODONE UR CFM-MCNC: <25 NG/ML
NOROXYCODONE UR CFM-MCNC: <25 NG/ML
NORTRAMADOL UR-MCNC: >1000 NG/ML
OXAZEPAM UR CFM-MCNC: <25 NG/ML
OXYCODONE UR CFM-MCNC: <25 NG/ML
OXYMORPHONE UR CFM-MCNC: <25 NG/ML
TEMAZEPAM UR CFM-MCNC: <25 NG/ML
TRAMADOL UR CFM-MCNC: >1000 NG/ML
ZOLPIDEM UR CFM-MCNC: <25 NG/ML
ZOLPIDEM UR-MCNC: <25 NG/ML

## 2024-08-19 ENCOUNTER — OFFICE VISIT (OUTPATIENT)
Dept: GERIATRIC MEDICINE | Facility: CLINIC | Age: 89
End: 2024-08-19
Payer: MEDICARE

## 2024-08-19 VITALS — DIASTOLIC BLOOD PRESSURE: 60 MMHG | RESPIRATION RATE: 20 BRPM | SYSTOLIC BLOOD PRESSURE: 120 MMHG | HEART RATE: 66 BPM

## 2024-08-19 DIAGNOSIS — G62.9 NEUROPATHY: ICD-10-CM

## 2024-08-19 DIAGNOSIS — M81.0 AGE-RELATED OSTEOPOROSIS WITHOUT CURRENT PATHOLOGICAL FRACTURE: ICD-10-CM

## 2024-08-19 DIAGNOSIS — J12.82 PNEUMONIA DUE TO COVID-19 VIRUS: ICD-10-CM

## 2024-08-19 DIAGNOSIS — M15.9 GENERALIZED OSTEOARTHRITIS: ICD-10-CM

## 2024-08-19 DIAGNOSIS — M47.817 OSTEOARTHRITIS OF LUMBOSACRAL SPINE: ICD-10-CM

## 2024-08-19 DIAGNOSIS — N18.32 STAGE 3B CHRONIC KIDNEY DISEASE (MULTI): ICD-10-CM

## 2024-08-19 DIAGNOSIS — M62.838 MUSCLE SPASM OF LEFT LOWER EXTREMITY: ICD-10-CM

## 2024-08-19 DIAGNOSIS — K59.09 OTHER CONSTIPATION: ICD-10-CM

## 2024-08-19 DIAGNOSIS — M47.894 OTHER OSTEOARTHRITIS OF SPINE, THORACIC REGION: ICD-10-CM

## 2024-08-19 DIAGNOSIS — B37.2 CUTANEOUS CANDIDIASIS: ICD-10-CM

## 2024-08-19 DIAGNOSIS — U07.1 PNEUMONIA DUE TO COVID-19 VIRUS: ICD-10-CM

## 2024-08-19 DIAGNOSIS — E78.1 HYPERTRIGLYCERIDEMIA: ICD-10-CM

## 2024-08-19 DIAGNOSIS — M25.552 LEFT HIP PAIN: ICD-10-CM

## 2024-08-19 DIAGNOSIS — I50.30 HEART FAILURE WITH PRESERVED LEFT VENTRICULAR FUNCTION (HFPEF) (MULTI): Primary | ICD-10-CM

## 2024-08-19 DIAGNOSIS — E87.6 HYPOKALEMIA: ICD-10-CM

## 2024-08-19 PROCEDURE — 1159F MED LIST DOCD IN RCRD: CPT | Performed by: NURSE PRACTITIONER

## 2024-08-19 PROCEDURE — 1160F RVW MEDS BY RX/DR IN RCRD: CPT | Performed by: NURSE PRACTITIONER

## 2024-08-19 PROCEDURE — 99349 HOME/RES VST EST MOD MDM 40: CPT | Performed by: NURSE PRACTITIONER

## 2024-08-19 PROCEDURE — 1126F AMNT PAIN NOTED NONE PRSNT: CPT | Performed by: NURSE PRACTITIONER

## 2024-08-19 PROCEDURE — 3074F SYST BP LT 130 MM HG: CPT | Performed by: NURSE PRACTITIONER

## 2024-08-19 PROCEDURE — 1036F TOBACCO NON-USER: CPT | Performed by: NURSE PRACTITIONER

## 2024-08-19 PROCEDURE — 3078F DIAST BP <80 MM HG: CPT | Performed by: NURSE PRACTITIONER

## 2024-08-19 RX ORDER — FLUCONAZOLE 150 MG/1
TABLET ORAL
Qty: 1 TABLET | Refills: 0 | Status: SHIPPED | OUTPATIENT
Start: 2024-08-19 | End: 2024-08-20 | Stop reason: DRUGHIGH

## 2024-08-19 ASSESSMENT — PAIN SCALES - GENERAL: PAINLEVEL: 0-NO PAIN

## 2024-08-19 NOTE — PROGRESS NOTES
"Some elements may have been copied from prior note(s). The elements have been updated and reflect current evaluation, examination and decision making from today.               Reason for visit:  Follow up for HFpEF and management of chronic active illnesses.            Summary Statement: Mrs. 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      Reason for visit: Leaving her home requires a considerable and taxing amount of effort, and maximum resources due to inability to walk.     HPI: Mrs. Dumont is being seen today in her home  In the interim she continues to do well.  No cough, edema or s/s of CHF.    She states, the cream for the candidiasis is not working. \"My scalp even itches\".   No new laundry detergents, soaps, perfumes, foods, etc.             Controlled Substance Monitoring Medication #1   Medication Name: Tramadol,   Pill Count: Yes  Recent Lab Results: 7/25/2024  confirms use of medication.   Signs of Misuse: no.   Signs of Abuse: no.   Signs of Diversion: no           ROS:  No chest pain or sob  No cough   No PND or orthopnea    No falls/ED visits or hospitalizations   No fever or chills  No diarrhea or constipation  No falls  No dizziness or headache  No abdominal pain/ nausea or vomiting    "   Physical Exam  /60 (BP Location: Right leg, Patient Position: Sitting, BP Cuff Size: Adult) Comment (BP Cuff Size): Large Cuff  Pulse 66   Resp 20   LMP  (LMP Unknown)   /POX= 95% RA   Constitutional   General appearance: Alert, cooperative and in no acute distress. Scooting around independently in manual wheelchair   Head and Face Examination/ inspection of hair and scalp: Normal.   Eyes   Inspection of eyes: Sclera and conjunctiva were normal.    Pupil exam: LILLIAN. Extraocular movements were intact.   Ears, Nose, Mouth, and Throat   Ears: Normal.    Oropharynx: Normal with moist mucus membranes, tongue midline, no PND, no erythema or enlargement of tonsils.    Hearing: Slightly [place]      Lips, teeth, and gums: Normal.   Neck   Neck Exam: Appearance of the neck was normal. No neck masses observed. No jugular vein distension.   Pulmonary   Respiratory assessment: No respiratory distress, normal respiratory rhythm and effort.    Auscultation of Lungs: Diminished throughout , but otherwise clear.   Cardiovascular   Auscultation of heart: Abnormal.  The heart rate was normal. A grade 2 systolic murmur was heard at the LLSB.    Pedal pulses: Regular rate. 2+ bilaterally.  +AICD left upper chest.  Area non-tender   Examination of extremities for edema and/or varicosities: Abnormal.  trace  pedal edema bilaterally   Chest   Chest: Symmetrical and normal appearance.   Abdomen   Abdominal Exam: No bruits normal bowel sounds, soft, non-tender, no abdominal masses palpated.    Liver and Spleen exam: No hepato-splenomegaly. wearing a diaper.   Genitourinary   Bladder: Normal on palpation. wearing a diaper.   Lymphatic   Palpation of lymph nodes in axillae: Normal.     Palpation of lymph nodes in neck: No cervical lymphadenopathy.    Palpation of lymph nodes in other areas: Normal.    Musculoskeletal   Digits and nails: Abnormal.     Inspection/palpation of joints: No joint swelling seen.    Range of motion: Abnormal.      Muscle strength/tone: Normal.  decrease ROM to left leg/hip.   RUE.LUE 5/5 RLE 4/5  LLE 4/5  No CVA tenderness   Gait:  Transferred off BSC to manual wheelchair without difficulty   Skin   Skin inspection: Skin dry , warm and intact. Normal skin color and pigmentation, and normal skin turgor .  + severe red rash ( yeast ) under both breast, patchy red rash under left arm, and bilateral groins.   Neurologic   Cranial nerves: Nerves 2-12 were intact, no focal neuro defects, except for mild hearing deficit   Psychiatric   Judgment and insight: Intact and appropriate.    Orientation: Oriented to person, place, and time.    Mood and affect: Normal.    Recent and remote memory: Normal.  .            LABS   Contains abnormal data Comprehensive metabolic panel  Order: [id number]   Status: Final result       Visible to patient: No (inaccessible in [facility])       Dx: Heart failure with preserved left meera...    0 Result Notes       2  Topics            Component  Ref Range & Units 3 wk ago  (7/25/24) 3 mo ago  (5/4/24) 3 mo ago  (5/2/24) 3 mo ago  (5/1/24) 3 mo ago  (4/30/24) 3 mo ago  (4/28/24) 3 mo ago  (4/27/24)   Glucose  74 - 99 mg/dL 132 High  82 86 104 High  134 High  85 161 High    Sodium  136 - 145 mmol/L 133 Low  141 140 139 134 Low  137 135 Low    Potassium  3.5 - 5.3 mmol/L 4.4 5.3 4.6 5.0 4.6 4.6 4.7   Chloride  98 - 107 mmol/L 94 Low  103 104 101 98 96 Low  95 Low    Bicarbonate  21 - 32 mmol/L 26 26 24 29 27 34 High  29   Anion Gap  10 - 20 mmol/L 17 17 17 14 14 12 16   Urea Nitrogen  6 - 23 mg/dL 47 High  56 High  59 High  60 High  56 High  44 High  39 High    Creatinine  0.50 - 1.05 mg/dL 1.30 High  1.44 High  1.29 High  1.47 High  1.41 High  1.54 High  1.39 High    eGFR  >60 mL/min/1.73m*2 39 Low  34 Low  CM 39 Low  CM 34 Low  CM 35 Low  CM 32 Low  CM 36 Low  CM   Comment: Calculations of estimated GFR are performed using the 2021 CKD-EPI Study Refit equation without the race variable for the  IDMS-Traceable creatinine methods.  https://jasn.asnjournals.org/content/early/2021/09/22/ASN.5834465588   Calcium  8.6 - 10.3 mg/dL 8.9 8.8 R 9.2 R 9.3 R 8.6 R 8.9 R 9.2 R   Albumin  3.4 - 5.0 g/dL 3.8 3.5 3.4 3.7 3.6 3.2 Low  3.6   Alkaline Phosphatase  33 - 136 U/L 74         Total Protein  6.4 - 8.2 g/dL 6.3 Low          AST  9 - 39 U/L 24         Bilirubin, Total  0.0 - 1.2 mg/dL 0.5         ALT  7 - 45 U/L 19         Comment: Patients treated with Sulfasalazine may generate falsely decreased results for ALT.   Resulting Agency [facility]              Specimen Collected: 07/25/24 12:26 Last Resulted: 07/25/24 19:35          ontains abnormal data Lipid panel  Order: [id number]   Status: Final result       Visible to patient: No (inaccessible in [facility])       Dx: Heart failure with preserved left meera...    0 Result Notes       1  Topic         Component  Ref Range & Units 3 wk ago 1 yr ago   Cholesterol  0 - 199 mg/dL 111 128 CM   Comment:      Age      Desirable   Borderline High   High     0-19 Y     0 - 169       170 - 199     >/= 200    20-24 Y     0 - 189       190 - 224     >/= 225        >24 Y     0 - 199       200 - 239     >/= 240   **All ranges are based on fasting samples. Specific   therapeutic targets will vary based on patient-specific   cardiac risk.    Pediatric guidelines reference:Pediatrics 2011, 128(S5).Adult guidelines reference: NCEP ATPIII Guidelines,DHEERAJ 2001, 258:2486-97    Venipuncture immediately after or during the administration of Metamizole may lead to falsely low results. Testing should be performed immediately prior to Metamizole dosing.   HDL-Cholesterol  mg/dL 39.9 43.2 CM   Comment:  Age       Very Low   Low     Normal    High    0-19 Y    < 35      < 40     40-45     ----  20-24 Y    ----     < 40      >45      ----        >24 Y      ----     < 40     40-60      >60   Cholesterol/HDL Ratio 2.8 3.0 CM   Comment:  Ref Values  Desirable  <  3.4  High Risk  > 5.0   LDL Calculated  <=99 mg/dL 38 55 R, CM   Comment:                            Near   Borderline      AGE      Desirable  Optimal    High     High     Very High     0-19 Y     0 - 109     ---    110-129   >/= 130     ----    20-24 Y     0 - 119     ---    120-159   >/= 160     ----      >24 Y     0 -  99   100-129  130-159   160-189     >/=190   VLDL  0 - 40 mg/dL 34 30   Triglycerides  0 - 149 mg/dL 168 High  149 CM   Comment:   Age         Desirable   Borderline High   High     Very High   0 D-90 D    19 - 174         ----         ----        ----  91 D- 9 Y     0 -  74        75 -  99     >/= 100      ----    10-19 Y     0 -  89        90 - 129     >/= 130      ----    20-24 Y     0 - 114       115 - 149     >/= 150      ----        >24 Y     0 - 149       150 - 199    200- 499    >/= 500    Venipuncture immediately after or during the administration of Metamizole may lead to falsely low results. Testing should be performed immediately prior to Metamizole dosing.   Non HDL Cholesterol  0 - 149 mg/dL 71    Comment:      Age       Desirable   Borderline High   High     Very High     0-19 Y     0 - 119       120 - 144     >/= 145    >/= 160    20-24 Y     0 - 149       150 - 189     >/= 190      ----        >24 Y    30 mg/dL above LDL Cholesterol goal   Resulting Agency           Contains abnormal data Opiate/Opioid/Benzo Prescription Compliance  Order: [id number]   Status: Final result       Visible to patient: No (inaccessible in [facility])       Dx: Other chronic pain    0 Result Notes        Specimen Collected: 07/25/24 12:26 Last Resulted: 07/29/24 18:36       Order Details        View Encounter        Lab and Collection Details        Routing        Result History     View All Conversations on this Encounter           Result Care Coordination      Patient Communication     Add Comments   Not seen Back to Top       Contains abnormal data Confirmation Opiate/Opioid/Benzo Prescription  Compliance  Order: [id number] - Part of Panel Order [id number]   Status: Final result         Visible to patient: No (inaccessible in [facility])         Dx: Other chronic pain      0 Result Notes           Component  Ref Range & Units 3 wk ago 12 mo ago   Clonazepam  <25 ng/mL <25 <25 R   7-Aminoclonazepam  <25 ng/mL <25 <25 R   Alprazolam  <25 ng/mL <25 <25 R   Alpha-Hydroxyalprazolam  <25 ng/mL <25 <25 R   Midazolam  <25 ng/mL <25 <25 R   Alpha-Hydroxymidazolam  <25 ng/mL <25 <25 R   Chlordiazepoxide  <25 ng/mL <25 <25 R   Diazepam  <25 ng/mL <25 <25 R   Nordiazepam  <25 ng/mL <25 <25 R   Temazepam  <25 ng/mL <25 <25 R, CM   Oxazepam  <25 ng/mL <25 <25 R   Lorazepam  <25 ng/mL <25 <25 R   Methadone  <25 ng/mL <25    EDDP  <25 ng/mL <25 <25 R, CM   6-Acetylmorphine  <25 ng/mL <25 <25 R   Codeine  <50 ng/mL <50 <50 R   Hydrocodone  <25 ng/mL <25 <25 R   Hydromorphone  <25 ng/mL <25 <25 R   Morphine  <50 ng/mL <50 <50 R   Norhydrocodone  <25 ng/mL <25 <25 R   Noroxycodone  <25 ng/mL <25 <25 R   Oxycodone  <25 ng/mL <25 <25 R   Oxymorphone  <25 ng/mL <25 <25 R, CM   Fentanyl  <2.5 ng/mL <2.5 <2.5 R   Norfentanyl  <2.5 ng/mL <2.5 <2.5 R, CM   Tramadol  <50 ng/mL >1,000 High  >1000 Abnormal  R, CM   Comment: Consistent with use of a drug containing tramadol, such as Ultram.   O-Desmethyltramadol  <50 ng/mL >1,000 High  >1000 Abnormal  R, CM   Comment: Tramadol metabolite; consistent with use of a drug containing tramadol, such as Ultram.   Zolpidem  <25 ng/mL <25 <25 R   Zolpidem Metabolite (ZCA)  <25 ng/mL <25 <25 R, CM   [facility]              Narrative  Performed by: [facility]  Methodology: Quantitative Liquid Chromatography - Tandem Mass Spectrometry    Identification of specific drug(s) taken by specimen donor is problematic  due to common metabolites, some of which are prescription drugs themselves.  The absence of expected drug(s) and/or drug metabolite(s) may indicate       non-compliance,inappropriate timing of specimen collection relative to drug  administration, poor drug absorption, diluted/adulterated urine, or  limitations of testing. All drug analytes covered are in the non-glucuronidated  (free) forms. The concentration value must be greater than or equal to the  cutoff to be reported as positive. Interpretive questions should be directed  to the laboratory.    The performance characteristics of this test has been validated by the individual   laboratory site where testing is performed. It has not been cleared or approved  by the FDA. However the FDA has determined that such clearance or approval is not  necessary. Our Laboratory is certified under the Clinical Laboratory Improvement  Amendments of 1988(CLIA)as qualified to perform high complexity clinical  laboratory testing.      Specimen Collected: 07/25/24 12:26 Last Resulted: 07/29/24 18:36            Cutaneous Candidiasis   -widespread  -did not response to Nystatin Cream  -Recent hospitalizations with multiple antibiotics  - Will start Fluconazole- medically is primarily cleared renally  -Creatinine Clrearance based upon a weight of 203 lbs, Age 89, female and and creatinine of 1.30 mg/DL,  Creatine Clearance is 40 ml/min (Cockcroft-Gault )  -Decreased dosing is recommend for a Creatinine Clearance  < 50 ml.min  -It is recommended for CrCl < or = to 50 ml/min to give 50% of dose   Will give 100 mg po on Day 1, then 50 mg on Day 4 and Day 7   (NP explained to patient a dose every 3 days)  -Patient verbalized a clear understanding of how to take medications   -Plan of care is ongoing    1.Heart failure with preserved left ventricular function (HFpEF) (Multi)  -no s/s of volume overload  -Continue Torsemide, Metoprolol, Entresto,     2. Cutaneous candidiasis  -widespread  -did not response to Nystatin Cream  -Recent hospitalizations with multiple antibiotics  - fluconazole (Diflucan) 150 mg tablet; Take one tablet on  Day 1, then repeat on Day 4, then repeat on Day 7  HOLD ATORVASTATIN (CHOLESTEROL PILL) while taking the medication  Dispense: 1 tablet; Refill: 0  -Patient verbalized a clear understanding of how to take medications   -Plan of care is ongoing        3. Age-related osteoporosis without current pathological fracture  -continue Vitamin D     4. Other constipation  -controlled on Senna     5. Generalized osteoarthritis/Left hip pain/Neuropathy/Osteoarthritis of lumbosacral spine/  Other osteoarthritis of spine, thoracic region/Muscle spasm of left hip   -good subjective relief on Tramadol 100 mg bid prn, Duloxetine 30 mg dial, Tylenol 325 mg -3 tabs q 8 hours prn or (500 mg 2 tablets tid prn), , Topical pain cream,   Gabapentin 100 mg bid, and Baclofen 5 mg at bedtime. May take one tablet during the day if needed.  No s/s of adverse effects. No falls. No changes in mental status.  -Lab for Controlled Substance Monitoring confirmed use of medication 7/2024   -No s/o abuse, misuse or diversion    6. Hypokalemia  Stable off Spironolactone    7. Hypertriglyceridemia  - triglycerides mildly elevated at 168  -Will add an additional medication at this time  -Continue Atorvastatin 20 mg every day      8. Stage 3b chronic kidney disease   -stable  -BP controlled    9. Pneumonia due to COVID-19 virus  -cough resolved    Stable  Routine follow up in one month  -Bring Controlled  Substance Agreement

## 2024-08-20 RX ORDER — FLUCONAZOLE 50 MG/1
TABLET ORAL
Qty: 4 TABLET | Refills: 0 | Status: SHIPPED | OUTPATIENT
Start: 2024-08-20 | End: 2024-08-20

## 2024-08-20 RX ORDER — FLUCONAZOLE 50 MG/1
TABLET ORAL
Qty: 4 TABLET | Refills: 0 | Status: SHIPPED | OUTPATIENT
Start: 2024-08-20

## 2024-08-20 NOTE — PATIENT INSTRUCTIONS
Dear Mrs. Dumont,    It was a pleasure seeing you today.  You have candidiasis (yeast infection) in multiple areas.  This is most likely due to the multiple antibiotics you were given when you were in the hospital and rehab  Please take the medication below as follows:  Cutaneous Candidiasis   -widespread  -did not response to Nystatin Cream  -Recent hospitalizations with multiple antibiotics  - fluconazole (Diflucan) 50 mg tablet; Take TWO tablets on Day 1, ONLY    Then  ONE tablet  Day 4 and  Day 7    HOLD ATORVASTATIN (CHOLESTEROL PILL) while taking the medication      I will follow up with you in one month.  Sincerely,    Miladys Menon, MSN, APRN-BC

## 2024-09-03 ENCOUNTER — APPOINTMENT (OUTPATIENT)
Dept: RADIOLOGY | Facility: HOSPITAL | Age: 89
DRG: 258 | End: 2024-09-03
Payer: MEDICARE

## 2024-09-03 ENCOUNTER — HOSPITAL ENCOUNTER (INPATIENT)
Facility: HOSPITAL | Age: 89
End: 2024-09-03
Attending: STUDENT IN AN ORGANIZED HEALTH CARE EDUCATION/TRAINING PROGRAM | Admitting: INTERNAL MEDICINE
Payer: MEDICARE

## 2024-09-03 ENCOUNTER — CLINICAL SUPPORT (OUTPATIENT)
Dept: EMERGENCY MEDICINE | Facility: HOSPITAL | Age: 89
DRG: 258 | End: 2024-09-03
Payer: MEDICARE

## 2024-09-03 DIAGNOSIS — M47.817 OSTEOARTHRITIS OF LUMBOSACRAL SPINE: ICD-10-CM

## 2024-09-03 DIAGNOSIS — I48.0 PAF (PAROXYSMAL ATRIAL FIBRILLATION) (MULTI): ICD-10-CM

## 2024-09-03 DIAGNOSIS — I44.2 CHB (COMPLETE HEART BLOCK): ICD-10-CM

## 2024-09-03 DIAGNOSIS — I50.33 ACUTE ON CHRONIC DIASTOLIC CONGESTIVE HEART FAILURE (MULTI): ICD-10-CM

## 2024-09-03 DIAGNOSIS — Z95.0 PACEMAKER: ICD-10-CM

## 2024-09-03 DIAGNOSIS — Z95.810 AICD (AUTOMATIC CARDIOVERTER/DEFIBRILLATOR) PRESENT: ICD-10-CM

## 2024-09-03 DIAGNOSIS — I50.30 HEART FAILURE WITH PRESERVED LEFT VENTRICULAR FUNCTION (HFPEF): ICD-10-CM

## 2024-09-03 DIAGNOSIS — J84.9 INTERSTITIAL LUNG DISEASE (MULTI): ICD-10-CM

## 2024-09-03 DIAGNOSIS — I35.1 NONRHEUMATIC AORTIC VALVE INSUFFICIENCY: ICD-10-CM

## 2024-09-03 DIAGNOSIS — Z95.0 PRESENCE OF CARDIAC PACEMAKER: ICD-10-CM

## 2024-09-03 DIAGNOSIS — Z74.09 MOBILITY IMPAIRED: ICD-10-CM

## 2024-09-03 DIAGNOSIS — M47.894 OTHER OSTEOARTHRITIS OF SPINE, THORACIC REGION: ICD-10-CM

## 2024-09-03 DIAGNOSIS — I35.0 NONRHEUMATIC AORTIC VALVE STENOSIS: ICD-10-CM

## 2024-09-03 DIAGNOSIS — E55.9 VITAMIN D DEFICIENCY: ICD-10-CM

## 2024-09-03 DIAGNOSIS — I51.9 CARDIOPATHY: ICD-10-CM

## 2024-09-03 DIAGNOSIS — M70.62 GREATER TROCHANTERIC BURSITIS OF LEFT HIP: ICD-10-CM

## 2024-09-03 DIAGNOSIS — I50.22 CHRONIC SYSTOLIC CONGESTIVE HEART FAILURE: ICD-10-CM

## 2024-09-03 DIAGNOSIS — M10.9 GOUT, UNSPECIFIED CAUSE, UNSPECIFIED CHRONICITY, UNSPECIFIED SITE: ICD-10-CM

## 2024-09-03 DIAGNOSIS — M62.838 MUSCLE SPASM OF LEFT LOWER EXTREMITY: ICD-10-CM

## 2024-09-03 DIAGNOSIS — N18.32 STAGE 3B CHRONIC KIDNEY DISEASE (MULTI): ICD-10-CM

## 2024-09-03 DIAGNOSIS — L29.9 GENERALIZED PRURITUS: ICD-10-CM

## 2024-09-03 DIAGNOSIS — G89.29 OTHER CHRONIC PAIN: ICD-10-CM

## 2024-09-03 DIAGNOSIS — R06.00 DYSPNEA, UNSPECIFIED TYPE: ICD-10-CM

## 2024-09-03 DIAGNOSIS — F41.9 ANXIETY: ICD-10-CM

## 2024-09-03 DIAGNOSIS — J44.1 ACUTE EXACERBATION OF CHRONIC OBSTRUCTIVE PULMONARY DISEASE (MULTI): ICD-10-CM

## 2024-09-03 DIAGNOSIS — E66.9 CLASS 2 OBESITY WITH BODY MASS INDEX (BMI) OF 35.0 TO 35.9 IN ADULT, UNSPECIFIED OBESITY TYPE, UNSPECIFIED WHETHER SERIOUS COMORBIDITY PRESENT: ICD-10-CM

## 2024-09-03 DIAGNOSIS — M25.552 LEFT HIP PAIN: ICD-10-CM

## 2024-09-03 DIAGNOSIS — I50.9 ACUTE ON CHRONIC CONGESTIVE HEART FAILURE, UNSPECIFIED HEART FAILURE TYPE (MULTI): Primary | ICD-10-CM

## 2024-09-03 DIAGNOSIS — E78.1 HYPERTRIGLYCERIDEMIA: ICD-10-CM

## 2024-09-03 LAB
ALBUMIN SERPL BCP-MCNC: 4.1 G/DL (ref 3.4–5)
ALP SERPL-CCNC: 82 U/L (ref 33–136)
ALT SERPL W P-5'-P-CCNC: 40 U/L (ref 7–45)
ANION GAP SERPL CALC-SCNC: 17 MMOL/L (ref 10–20)
APTT PPP: 35 SECONDS (ref 27–38)
AST SERPL W P-5'-P-CCNC: 28 U/L (ref 9–39)
ATRIAL RATE: 60 BPM
BILIRUB SERPL-MCNC: 0.5 MG/DL (ref 0–1.2)
BUN SERPL-MCNC: 44 MG/DL (ref 6–23)
CALCIUM SERPL-MCNC: 9.3 MG/DL (ref 8.6–10.6)
CARDIAC TROPONIN I PNL SERPL HS: 39 NG/L (ref 0–34)
CHLORIDE SERPL-SCNC: 98 MMOL/L (ref 98–107)
CO2 SERPL-SCNC: 28 MMOL/L (ref 21–32)
CREAT SERPL-MCNC: 1.46 MG/DL (ref 0.5–1.05)
EGFRCR SERPLBLD CKD-EPI 2021: 34 ML/MIN/1.73M*2
GLUCOSE SERPL-MCNC: 101 MG/DL (ref 74–99)
INR PPP: 1 (ref 0.9–1.1)
MAGNESIUM SERPL-MCNC: 2.15 MG/DL (ref 1.6–2.4)
P AXIS: 34 DEGREES
P OFFSET: 124 MS
P ONSET: 104 MS
POTASSIUM SERPL-SCNC: 4.5 MMOL/L (ref 3.5–5.3)
PR INTERVAL: 174 MS
PROT SERPL-MCNC: 6.9 G/DL (ref 6.4–8.2)
PROTHROMBIN TIME: 11.1 SECONDS (ref 9.8–12.8)
Q ONSET: 169 MS
QRS COUNT: 10 BEATS
QRS DURATION: 206 MS
QT INTERVAL: 562 MS
QTC CALCULATION(BAZETT): 562 MS
QTC FREDERICIA: 562 MS
R AXIS: -82 DEGREES
SODIUM SERPL-SCNC: 138 MMOL/L (ref 136–145)
T AXIS: 96 DEGREES
T OFFSET: 450 MS
VENTRICULAR RATE: 60 BPM

## 2024-09-03 PROCEDURE — 71046 X-RAY EXAM CHEST 2 VIEWS: CPT | Performed by: RADIOLOGY

## 2024-09-03 PROCEDURE — 93010 ELECTROCARDIOGRAM REPORT: CPT

## 2024-09-03 PROCEDURE — 2500000004 HC RX 250 GENERAL PHARMACY W/ HCPCS (ALT 636 FOR OP/ED)

## 2024-09-03 PROCEDURE — 71046 X-RAY EXAM CHEST 2 VIEWS: CPT

## 2024-09-03 PROCEDURE — 36415 COLL VENOUS BLD VENIPUNCTURE: CPT | Performed by: EMERGENCY MEDICINE

## 2024-09-03 PROCEDURE — 85730 THROMBOPLASTIN TIME PARTIAL: CPT | Performed by: STUDENT IN AN ORGANIZED HEALTH CARE EDUCATION/TRAINING PROGRAM

## 2024-09-03 PROCEDURE — 84484 ASSAY OF TROPONIN QUANT: CPT | Performed by: STUDENT IN AN ORGANIZED HEALTH CARE EDUCATION/TRAINING PROGRAM

## 2024-09-03 PROCEDURE — 83735 ASSAY OF MAGNESIUM: CPT | Performed by: EMERGENCY MEDICINE

## 2024-09-03 PROCEDURE — 85730 THROMBOPLASTIN TIME PARTIAL: CPT | Performed by: EMERGENCY MEDICINE

## 2024-09-03 PROCEDURE — 2500000002 HC RX 250 W HCPCS SELF ADMINISTERED DRUGS (ALT 637 FOR MEDICARE OP, ALT 636 FOR OP/ED): Performed by: STUDENT IN AN ORGANIZED HEALTH CARE EDUCATION/TRAINING PROGRAM

## 2024-09-03 PROCEDURE — 94640 AIRWAY INHALATION TREATMENT: CPT

## 2024-09-03 PROCEDURE — 80053 COMPREHEN METABOLIC PANEL: CPT | Performed by: STUDENT IN AN ORGANIZED HEALTH CARE EDUCATION/TRAINING PROGRAM

## 2024-09-03 PROCEDURE — 83880 ASSAY OF NATRIURETIC PEPTIDE: CPT

## 2024-09-03 PROCEDURE — 96374 THER/PROPH/DIAG INJ IV PUSH: CPT

## 2024-09-03 PROCEDURE — 85025 COMPLETE CBC W/AUTO DIFF WBC: CPT | Performed by: STUDENT IN AN ORGANIZED HEALTH CARE EDUCATION/TRAINING PROGRAM

## 2024-09-03 PROCEDURE — 85610 PROTHROMBIN TIME: CPT | Performed by: EMERGENCY MEDICINE

## 2024-09-03 PROCEDURE — 84484 ASSAY OF TROPONIN QUANT: CPT | Performed by: EMERGENCY MEDICINE

## 2024-09-03 PROCEDURE — 99285 EMERGENCY DEPT VISIT HI MDM: CPT | Performed by: STUDENT IN AN ORGANIZED HEALTH CARE EDUCATION/TRAINING PROGRAM

## 2024-09-03 PROCEDURE — 99285 EMERGENCY DEPT VISIT HI MDM: CPT

## 2024-09-03 PROCEDURE — 85610 PROTHROMBIN TIME: CPT | Performed by: STUDENT IN AN ORGANIZED HEALTH CARE EDUCATION/TRAINING PROGRAM

## 2024-09-03 PROCEDURE — 83735 ASSAY OF MAGNESIUM: CPT | Performed by: STUDENT IN AN ORGANIZED HEALTH CARE EDUCATION/TRAINING PROGRAM

## 2024-09-03 PROCEDURE — 93005 ELECTROCARDIOGRAM TRACING: CPT

## 2024-09-03 PROCEDURE — 84075 ASSAY ALKALINE PHOSPHATASE: CPT | Performed by: EMERGENCY MEDICINE

## 2024-09-03 RX ORDER — FUROSEMIDE 10 MG/ML
40 INJECTION INTRAMUSCULAR; INTRAVENOUS ONCE
Status: COMPLETED | OUTPATIENT
Start: 2024-09-03 | End: 2024-09-03

## 2024-09-03 RX ORDER — IPRATROPIUM BROMIDE AND ALBUTEROL SULFATE 2.5; .5 MG/3ML; MG/3ML
3 SOLUTION RESPIRATORY (INHALATION) ONCE
Status: COMPLETED | OUTPATIENT
Start: 2024-09-03 | End: 2024-09-03

## 2024-09-03 RX ORDER — IPRATROPIUM BROMIDE AND ALBUTEROL SULFATE 2.5; .5 MG/3ML; MG/3ML
SOLUTION RESPIRATORY (INHALATION)
Status: COMPLETED
Start: 2024-09-03 | End: 2024-09-03

## 2024-09-03 RX ORDER — FUROSEMIDE 10 MG/ML
INJECTION INTRAMUSCULAR; INTRAVENOUS
Status: COMPLETED
Start: 2024-09-03 | End: 2024-09-03

## 2024-09-03 RX ADMIN — IPRATROPIUM BROMIDE AND ALBUTEROL SULFATE 3 ML: .5; 3 SOLUTION RESPIRATORY (INHALATION) at 21:10

## 2024-09-03 RX ADMIN — FUROSEMIDE 40 MG: 10 INJECTION INTRAMUSCULAR; INTRAVENOUS at 23:56

## 2024-09-03 RX ADMIN — FUROSEMIDE 40 MG: 10 INJECTION, SOLUTION INTRAMUSCULAR; INTRAVENOUS at 23:56

## 2024-09-03 ASSESSMENT — PAIN DESCRIPTION - PAIN TYPE: TYPE: CHRONIC PAIN

## 2024-09-03 ASSESSMENT — LIFESTYLE VARIABLES
EVER HAD A DRINK FIRST THING IN THE MORNING TO STEADY YOUR NERVES TO GET RID OF A HANGOVER: NO
TOTAL SCORE: 0
HAVE YOU EVER FELT YOU SHOULD CUT DOWN ON YOUR DRINKING: NO
EVER FELT BAD OR GUILTY ABOUT YOUR DRINKING: NO
HAVE PEOPLE ANNOYED YOU BY CRITICIZING YOUR DRINKING: NO

## 2024-09-03 ASSESSMENT — COLUMBIA-SUICIDE SEVERITY RATING SCALE - C-SSRS
6. HAVE YOU EVER DONE ANYTHING, STARTED TO DO ANYTHING, OR PREPARED TO DO ANYTHING TO END YOUR LIFE?: NO
1. IN THE PAST MONTH, HAVE YOU WISHED YOU WERE DEAD OR WISHED YOU COULD GO TO SLEEP AND NOT WAKE UP?: NO
2. HAVE YOU ACTUALLY HAD ANY THOUGHTS OF KILLING YOURSELF?: NO

## 2024-09-03 ASSESSMENT — PAIN - FUNCTIONAL ASSESSMENT: PAIN_FUNCTIONAL_ASSESSMENT: 0-10

## 2024-09-03 ASSESSMENT — PAIN DESCRIPTION - LOCATION: LOCATION: HIP

## 2024-09-03 ASSESSMENT — PAIN SCALES - GENERAL: PAINLEVEL_OUTOF10: 4

## 2024-09-03 NOTE — ED TRIAGE NOTES
Pt presented to ED for c/o SOB that started a couple of days ago. Denies CP. Pt breathing is at a controlled rate but talking in short sentences. SpO2 98% on RA in triage. PMHx COPD, CHF, DM, GERD, Gout.

## 2024-09-03 NOTE — Clinical Note
The PACEMAKER, ACCOLADE MRI PULSE, DR EL - [id number] device was inserted. The leads were placed into the connector and visually verified to be in correct position.

## 2024-09-04 ENCOUNTER — APPOINTMENT (OUTPATIENT)
Dept: RADIOLOGY | Facility: HOSPITAL | Age: 89
DRG: 258 | End: 2024-09-04
Payer: MEDICARE

## 2024-09-04 PROBLEM — I50.9 ACUTE ON CHRONIC CONGESTIVE HEART FAILURE, UNSPECIFIED HEART FAILURE TYPE (MULTI): Status: ACTIVE | Noted: 2024-09-04

## 2024-09-04 LAB
ANION GAP SERPL CALC-SCNC: 16 MMOL/L (ref 10–20)
AORTIC VALVE MEAN GRADIENT: 15.1 MMHG
AORTIC VALVE PEAK VELOCITY: 2.83 M/S
AV PEAK GRADIENT: 31.9 MMHG
AVA (PEAK VEL): 1.34 CM2
AVA (VTI): 1.51 CM2
BASOPHILS # BLD AUTO: 0.02 X10*3/UL (ref 0–0.1)
BASOPHILS NFR BLD AUTO: 0.3 %
BNP SERPL-MCNC: 885 PG/ML (ref 0–99)
BUN SERPL-MCNC: 41 MG/DL (ref 6–23)
CALCIUM SERPL-MCNC: 9 MG/DL (ref 8.6–10.6)
CARDIAC TROPONIN I PNL SERPL HS: 40 NG/L (ref 0–34)
CARDIAC TROPONIN I PNL SERPL HS: 40 NG/L (ref 0–34)
CHLORIDE SERPL-SCNC: 101 MMOL/L (ref 98–107)
CO2 SERPL-SCNC: 28 MMOL/L (ref 21–32)
CREAT SERPL-MCNC: 1.29 MG/DL (ref 0.5–1.05)
EGFRCR SERPLBLD CKD-EPI 2021: 39 ML/MIN/1.73M*2
EJECTION FRACTION APICAL 4 CHAMBER: 33
EJECTION FRACTION: 43 %
EOSINOPHIL # BLD AUTO: 0.23 X10*3/UL (ref 0–0.4)
EOSINOPHIL NFR BLD AUTO: 3.7 %
ERYTHROCYTE [DISTWIDTH] IN BLOOD BY AUTOMATED COUNT: 15.2 % (ref 11.5–14.5)
ERYTHROCYTE [DISTWIDTH] IN BLOOD BY AUTOMATED COUNT: 15.5 % (ref 11.5–14.5)
GLUCOSE SERPL-MCNC: 88 MG/DL (ref 74–99)
HCT VFR BLD AUTO: 30.5 % (ref 36–46)
HCT VFR BLD AUTO: 33.2 % (ref 36–46)
HGB BLD-MCNC: 10.8 G/DL (ref 12–16)
HGB BLD-MCNC: 9.5 G/DL (ref 12–16)
IMM GRANULOCYTES # BLD AUTO: 0.02 X10*3/UL (ref 0–0.5)
IMM GRANULOCYTES NFR BLD AUTO: 0.3 % (ref 0–0.9)
LEFT ATRIUM VOLUME AREA LENGTH INDEX BSA: 39.6 ML/M2
LEFT VENTRICLE INTERNAL DIMENSION DIASTOLE: 4.75 CM (ref 3.5–6)
LEFT VENTRICULAR OUTFLOW TRACT DIAMETER: 2.02 CM
LYMPHOCYTES # BLD AUTO: 0.88 X10*3/UL (ref 0.8–3)
LYMPHOCYTES NFR BLD AUTO: 14.2 %
MCH RBC QN AUTO: 28.4 PG (ref 26–34)
MCH RBC QN AUTO: 28.6 PG (ref 26–34)
MCHC RBC AUTO-ENTMCNC: 31.1 G/DL (ref 32–36)
MCHC RBC AUTO-ENTMCNC: 32.5 G/DL (ref 32–36)
MCV RBC AUTO: 88 FL (ref 80–100)
MCV RBC AUTO: 91 FL (ref 80–100)
MITRAL VALVE E/A RATIO: 1.53
MITRAL VALVE E/E' RATIO: 24.33
MONOCYTES # BLD AUTO: 0.62 X10*3/UL (ref 0.05–0.8)
MONOCYTES NFR BLD AUTO: 10 %
NEUTROPHILS # BLD AUTO: 4.44 X10*3/UL (ref 1.6–5.5)
NEUTROPHILS NFR BLD AUTO: 71.5 %
NRBC BLD-RTO: 0 /100 WBCS (ref 0–0)
NRBC BLD-RTO: 0 /100 WBCS (ref 0–0)
PLATELET # BLD AUTO: 190 X10*3/UL (ref 150–450)
PLATELET # BLD AUTO: 197 X10*3/UL (ref 150–450)
POTASSIUM SERPL-SCNC: 3.8 MMOL/L (ref 3.5–5.3)
RBC # BLD AUTO: 3.34 X10*6/UL (ref 4–5.2)
RBC # BLD AUTO: 3.77 X10*6/UL (ref 4–5.2)
RIGHT VENTRICLE FREE WALL PEAK S': 5 CM/S
RIGHT VENTRICLE PEAK SYSTOLIC PRESSURE: 42.5 MMHG
SODIUM SERPL-SCNC: 141 MMOL/L (ref 136–145)
TRICUSPID ANNULAR PLANE SYSTOLIC EXCURSION: 1.5 CM
WBC # BLD AUTO: 5.7 X10*3/UL (ref 4.4–11.3)
WBC # BLD AUTO: 6.2 X10*3/UL (ref 4.4–11.3)

## 2024-09-04 PROCEDURE — 36415 COLL VENOUS BLD VENIPUNCTURE: CPT

## 2024-09-04 PROCEDURE — 2500000004 HC RX 250 GENERAL PHARMACY W/ HCPCS (ALT 636 FOR OP/ED)

## 2024-09-04 PROCEDURE — 2550000001 HC RX 255 CONTRASTS: Performed by: EMERGENCY MEDICINE

## 2024-09-04 PROCEDURE — 2500000001 HC RX 250 WO HCPCS SELF ADMINISTERED DRUGS (ALT 637 FOR MEDICARE OP)

## 2024-09-04 PROCEDURE — 71275 CT ANGIOGRAPHY CHEST: CPT

## 2024-09-04 PROCEDURE — 99223 1ST HOSP IP/OBS HIGH 75: CPT | Performed by: INTERNAL MEDICINE

## 2024-09-04 PROCEDURE — 2500000002 HC RX 250 W HCPCS SELF ADMINISTERED DRUGS (ALT 637 FOR MEDICARE OP, ALT 636 FOR OP/ED)

## 2024-09-04 PROCEDURE — 80048 BASIC METABOLIC PNL TOTAL CA: CPT

## 2024-09-04 PROCEDURE — 2500000002 HC RX 250 W HCPCS SELF ADMINISTERED DRUGS (ALT 637 FOR MEDICARE OP, ALT 636 FOR OP/ED): Performed by: INTERNAL MEDICINE

## 2024-09-04 PROCEDURE — 1200000002 HC GENERAL ROOM WITH TELEMETRY DAILY

## 2024-09-04 PROCEDURE — 85027 COMPLETE CBC AUTOMATED: CPT

## 2024-09-04 PROCEDURE — 96375 TX/PRO/DX INJ NEW DRUG ADDON: CPT

## 2024-09-04 PROCEDURE — 71275 CT ANGIOGRAPHY CHEST: CPT | Performed by: STUDENT IN AN ORGANIZED HEALTH CARE EDUCATION/TRAINING PROGRAM

## 2024-09-04 PROCEDURE — 84484 ASSAY OF TROPONIN QUANT: CPT

## 2024-09-04 PROCEDURE — 94640 AIRWAY INHALATION TREATMENT: CPT

## 2024-09-04 PROCEDURE — 2500000001 HC RX 250 WO HCPCS SELF ADMINISTERED DRUGS (ALT 637 FOR MEDICARE OP): Performed by: STUDENT IN AN ORGANIZED HEALTH CARE EDUCATION/TRAINING PROGRAM

## 2024-09-04 RX ORDER — PANTOPRAZOLE SODIUM 20 MG/1
20 TABLET, DELAYED RELEASE ORAL DAILY
Status: DISCONTINUED | OUTPATIENT
Start: 2024-09-04 | End: 2024-09-04

## 2024-09-04 RX ORDER — FUROSEMIDE 20 MG/1
40 TABLET ORAL
Status: DISCONTINUED | OUTPATIENT
Start: 2024-09-04 | End: 2024-09-04

## 2024-09-04 RX ORDER — ACETAMINOPHEN 325 MG/1
975 TABLET ORAL EVERY 8 HOURS
Status: DISCONTINUED | OUTPATIENT
Start: 2024-09-04 | End: 2024-09-04

## 2024-09-04 RX ORDER — DULOXETIN HYDROCHLORIDE 20 MG/1
20 CAPSULE, DELAYED RELEASE ORAL DAILY
Status: DISCONTINUED | OUTPATIENT
Start: 2024-09-04 | End: 2024-09-04

## 2024-09-04 RX ORDER — CEPHALEXIN 500 MG/1
500 CAPSULE ORAL EVERY 8 HOURS
Status: DISCONTINUED | OUTPATIENT
Start: 2024-09-04 | End: 2024-09-04

## 2024-09-04 RX ORDER — ALENDRONATE SODIUM 35 MG/1
35 TABLET ORAL
Status: DISPENSED | OUTPATIENT
Start: 2024-09-04

## 2024-09-04 RX ORDER — NALOXONE HYDROCHLORIDE 4 MG/.1ML
4 SPRAY NASAL AS NEEDED
Status: DISCONTINUED | OUTPATIENT
Start: 2024-09-04 | End: 2024-09-04

## 2024-09-04 RX ORDER — TORSEMIDE 20 MG/1
TABLET ORAL
Status: ON HOLD | COMMUNITY

## 2024-09-04 RX ORDER — FUROSEMIDE 20 MG/1
40 TABLET ORAL
Status: DISCONTINUED | OUTPATIENT
Start: 2024-09-05 | End: 2024-09-05

## 2024-09-04 RX ORDER — ICOSAPENT ETHYL 1 G/1
2 CAPSULE ORAL
Status: DISPENSED | OUTPATIENT
Start: 2024-09-04

## 2024-09-04 RX ORDER — DOCUSATE SODIUM 100 MG/1
100 CAPSULE, LIQUID FILLED ORAL 2 TIMES DAILY
Status: DISPENSED | OUTPATIENT
Start: 2024-09-04

## 2024-09-04 RX ORDER — FLUTICASONE FUROATE AND VILANTEROL 200; 25 UG/1; UG/1
1 POWDER RESPIRATORY (INHALATION) DAILY
Status: DISPENSED | OUTPATIENT
Start: 2024-09-04

## 2024-09-04 RX ORDER — MULTIVITAMIN/IRON/FOLIC ACID 18MG-0.4MG
1 TABLET ORAL DAILY
Status: ON HOLD | COMMUNITY

## 2024-09-04 RX ORDER — PANTOPRAZOLE SODIUM 20 MG/1
20 TABLET, DELAYED RELEASE ORAL
Status: DISPENSED | OUTPATIENT
Start: 2024-09-05

## 2024-09-04 RX ORDER — ASCORBIC ACID 500 MG
500 TABLET ORAL DAILY
Status: DISPENSED | OUTPATIENT
Start: 2024-09-04

## 2024-09-04 RX ORDER — TALC
3 POWDER (GRAM) TOPICAL NIGHTLY PRN
Status: DISPENSED | OUTPATIENT
Start: 2024-09-04

## 2024-09-04 RX ORDER — CHOLECALCIFEROL (VITAMIN D3) 25 MCG
1000 TABLET ORAL DAILY
Status: DISPENSED | OUTPATIENT
Start: 2024-09-04

## 2024-09-04 RX ORDER — BACLOFEN 10 MG/1
5 TABLET ORAL NIGHTLY
Status: DISPENSED | OUTPATIENT
Start: 2024-09-04

## 2024-09-04 RX ORDER — METOPROLOL TARTRATE 25 MG/1
25 TABLET, FILM COATED ORAL 2 TIMES DAILY
Status: DISCONTINUED | OUTPATIENT
Start: 2024-09-04 | End: 2024-09-08

## 2024-09-04 RX ORDER — DULOXETIN HYDROCHLORIDE 30 MG/1
30 CAPSULE, DELAYED RELEASE ORAL DAILY
Status: DISPENSED | OUTPATIENT
Start: 2024-09-05

## 2024-09-04 RX ORDER — BACLOFEN 10 MG/1
5 TABLET ORAL 2 TIMES DAILY
Status: DISCONTINUED | OUTPATIENT
Start: 2024-09-04 | End: 2024-09-04

## 2024-09-04 RX ORDER — ENOXAPARIN SODIUM 100 MG/ML
30 INJECTION SUBCUTANEOUS EVERY 24 HOURS
Status: DISPENSED | OUTPATIENT
Start: 2024-09-04

## 2024-09-04 RX ORDER — VITAMIN E MIXED 400 UNIT
400 CAPSULE ORAL DAILY
Status: DISPENSED | OUTPATIENT
Start: 2024-09-04

## 2024-09-04 RX ORDER — TRAMADOL HYDROCHLORIDE 50 MG/1
100 TABLET ORAL 2 TIMES DAILY PRN
Status: DISPENSED | OUTPATIENT
Start: 2024-09-04

## 2024-09-04 RX ORDER — ALLOPURINOL 100 MG/1
50 TABLET ORAL EVERY 24 HOURS
Status: DISPENSED | OUTPATIENT
Start: 2024-09-04

## 2024-09-04 RX ORDER — GLUCOSAM/CHONDRO/HERB 149/HYAL 750-100 MG
1 TABLET ORAL DAILY
Status: ON HOLD | COMMUNITY

## 2024-09-04 RX ORDER — CYANOCOBALAMIN 1000 UG/ML
1000 INJECTION, SOLUTION INTRAMUSCULAR; SUBCUTANEOUS
Status: DISPENSED | OUTPATIENT
Start: 2024-09-04

## 2024-09-04 RX ORDER — POLYETHYLENE GLYCOL 3350 17 G/17G
17 POWDER, FOR SOLUTION ORAL DAILY
Status: DISPENSED | OUTPATIENT
Start: 2024-09-05

## 2024-09-04 RX ORDER — ATORVASTATIN CALCIUM 20 MG/1
20 TABLET, FILM COATED ORAL NIGHTLY
Status: DISPENSED | OUTPATIENT
Start: 2024-09-04

## 2024-09-04 RX ORDER — GLUCOSAM/CHONDRO/HERB 149/HYAL 750-100 MG
1 TABLET ORAL DAILY
Status: DISCONTINUED | OUTPATIENT
Start: 2024-09-05 | End: 2024-09-04 | Stop reason: CLARIF

## 2024-09-04 RX ORDER — HYDROXYZINE HYDROCHLORIDE 25 MG/1
25 TABLET, FILM COATED ORAL ONCE
Status: COMPLETED | OUTPATIENT
Start: 2024-09-04 | End: 2024-09-04

## 2024-09-04 RX ORDER — POLYETHYLENE GLYCOL 3350 17 G/17G
17 POWDER, FOR SOLUTION ORAL 2 TIMES DAILY
Status: DISCONTINUED | OUTPATIENT
Start: 2024-09-04 | End: 2024-09-04

## 2024-09-04 RX ORDER — IPRATROPIUM BROMIDE AND ALBUTEROL SULFATE 2.5; .5 MG/3ML; MG/3ML
3 SOLUTION RESPIRATORY (INHALATION) EVERY 4 HOURS
Status: DISCONTINUED | OUTPATIENT
Start: 2024-09-04 | End: 2024-09-04

## 2024-09-04 RX ORDER — FERROUS SULFATE 325(65) MG
65 TABLET ORAL
Status: DISPENSED | OUTPATIENT
Start: 2024-09-04

## 2024-09-04 RX ORDER — CEPHALEXIN 500 MG/1
500 CAPSULE ORAL 2 TIMES DAILY
Status: DISPENSED | OUTPATIENT
Start: 2024-09-05

## 2024-09-04 RX ORDER — HYDROMORPHONE HYDROCHLORIDE 1 MG/ML
0.5 INJECTION, SOLUTION INTRAMUSCULAR; INTRAVENOUS; SUBCUTANEOUS ONCE
Status: COMPLETED | OUTPATIENT
Start: 2024-09-04 | End: 2024-09-04

## 2024-09-04 RX ORDER — IPRATROPIUM BROMIDE AND ALBUTEROL SULFATE 2.5; .5 MG/3ML; MG/3ML
3 SOLUTION RESPIRATORY (INHALATION) 3 TIMES DAILY
Status: DISPENSED | OUTPATIENT
Start: 2024-09-04

## 2024-09-04 RX ORDER — SENNOSIDES 8.6 MG/1
1 TABLET ORAL 2 TIMES DAILY PRN
Status: DISPENSED | OUTPATIENT
Start: 2024-09-04

## 2024-09-04 RX ORDER — NYSTATIN 100000 U/G
CREAM TOPICAL 2 TIMES DAILY
Status: DISCONTINUED | OUTPATIENT
Start: 2024-09-04 | End: 2024-09-04

## 2024-09-04 RX ORDER — ACETAMINOPHEN 325 MG/1
975 TABLET ORAL EVERY 8 HOURS PRN
Status: DISPENSED | OUTPATIENT
Start: 2024-09-04

## 2024-09-04 RX ORDER — NALOXONE HYDROCHLORIDE 0.4 MG/ML
0.2 INJECTION, SOLUTION INTRAMUSCULAR; INTRAVENOUS; SUBCUTANEOUS EVERY 5 MIN PRN
Status: DISCONTINUED | OUTPATIENT
Start: 2024-09-04 | End: 2024-09-04

## 2024-09-04 RX ORDER — GABAPENTIN 100 MG/1
100 CAPSULE ORAL 2 TIMES DAILY
Status: DISPENSED | OUTPATIENT
Start: 2024-09-04

## 2024-09-04 RX ADMIN — FUROSEMIDE 40 MG: 20 TABLET ORAL at 13:29

## 2024-09-04 RX ADMIN — BACLOFEN 5 MG: 10 TABLET ORAL at 11:18

## 2024-09-04 RX ADMIN — METOPROLOL TARTRATE 25 MG: 25 TABLET, FILM COATED ORAL at 20:47

## 2024-09-04 RX ADMIN — BACLOFEN 5 MG: 10 TABLET ORAL at 20:47

## 2024-09-04 RX ADMIN — FERROUS SULFATE TAB 325 MG (65 MG ELEMENTAL FE) 325 MG: 325 (65 FE) TAB at 09:13

## 2024-09-04 RX ADMIN — CYANOCOBALAMIN 1000 MCG: 1000 INJECTION, SOLUTION INTRAMUSCULAR at 10:59

## 2024-09-04 RX ADMIN — IOHEXOL 80 ML: 350 INJECTION, SOLUTION INTRAVENOUS at 02:52

## 2024-09-04 RX ADMIN — ATORVASTATIN CALCIUM 20 MG: 20 TABLET, FILM COATED ORAL at 20:46

## 2024-09-04 RX ADMIN — NYSTATIN: 100000 CREAM TOPICAL at 11:39

## 2024-09-04 RX ADMIN — DOCUSATE SODIUM 100 MG: 100 CAPSULE, LIQUID FILLED ORAL at 11:25

## 2024-09-04 RX ADMIN — ICOSAPENT ETHYL 2 G: 1 CAPSULE ORAL at 20:47

## 2024-09-04 RX ADMIN — DULOXETINE HYDROCHLORIDE 20 MG: 20 CAPSULE, DELAYED RELEASE ORAL at 11:57

## 2024-09-04 RX ADMIN — ALLOPURINOL 50 MG: 100 TABLET ORAL at 11:58

## 2024-09-04 RX ADMIN — ACETAMINOPHEN 975 MG: 325 TABLET ORAL at 16:41

## 2024-09-04 RX ADMIN — Medication 1000 UNITS: at 11:29

## 2024-09-04 RX ADMIN — CEPHALEXIN 500 MG: 500 CAPSULE ORAL at 16:42

## 2024-09-04 RX ADMIN — HYDROXYZINE HYDROCHLORIDE 25 MG: 25 TABLET ORAL at 23:50

## 2024-09-04 RX ADMIN — IPRATROPIUM BROMIDE AND ALBUTEROL SULFATE 3 ML: .5; 3 SOLUTION RESPIRATORY (INHALATION) at 14:13

## 2024-09-04 RX ADMIN — DOCUSATE SODIUM 100 MG: 100 CAPSULE, LIQUID FILLED ORAL at 20:47

## 2024-09-04 RX ADMIN — OXYCODONE HYDROCHLORIDE AND ACETAMINOPHEN 500 MG: 500 TABLET ORAL at 11:26

## 2024-09-04 RX ADMIN — ALENDRONATE SODIUM 35 MG: 35 TABLET ORAL at 09:13

## 2024-09-04 RX ADMIN — Medication 400 UNITS: at 11:58

## 2024-09-04 RX ADMIN — ENOXAPARIN SODIUM 30 MG: 100 INJECTION SUBCUTANEOUS at 11:05

## 2024-09-04 RX ADMIN — CEPHALEXIN 500 MG: 500 CAPSULE ORAL at 11:57

## 2024-09-04 RX ADMIN — SACUBITRIL AND VALSARTAN 1 TABLET: 24; 26 TABLET, FILM COATED ORAL at 20:47

## 2024-09-04 RX ADMIN — PANTOPRAZOLE SODIUM 20 MG: 20 TABLET, DELAYED RELEASE ORAL at 11:57

## 2024-09-04 RX ADMIN — FUROSEMIDE 40 MG: 20 TABLET ORAL at 09:14

## 2024-09-04 RX ADMIN — METOPROLOL TARTRATE 25 MG: 25 TABLET, FILM COATED ORAL at 11:28

## 2024-09-04 RX ADMIN — IPRATROPIUM BROMIDE AND ALBUTEROL SULFATE 3 ML: .5; 3 SOLUTION RESPIRATORY (INHALATION) at 20:54

## 2024-09-04 RX ADMIN — HYDROMORPHONE HYDROCHLORIDE 0.5 MG: 1 INJECTION, SOLUTION INTRAMUSCULAR; INTRAVENOUS; SUBCUTANEOUS at 01:23

## 2024-09-04 RX ADMIN — ACETAMINOPHEN 975 MG: 325 TABLET ORAL at 09:13

## 2024-09-04 RX ADMIN — GABAPENTIN 100 MG: 100 CAPSULE ORAL at 20:46

## 2024-09-04 RX ADMIN — GABAPENTIN 100 MG: 100 CAPSULE ORAL at 11:27

## 2024-09-04 RX ADMIN — Medication 3 MG: at 23:50

## 2024-09-04 SDOH — SOCIAL STABILITY: SOCIAL INSECURITY: HAVE YOU HAD THOUGHTS OF HARMING ANYONE ELSE?: NO

## 2024-09-04 SDOH — SOCIAL STABILITY: SOCIAL INSECURITY: DO YOU FEEL UNSAFE GOING BACK TO THE PLACE WHERE YOU ARE LIVING?: NO

## 2024-09-04 SDOH — SOCIAL STABILITY: SOCIAL INSECURITY: DOES ANYONE TRY TO KEEP YOU FROM HAVING/CONTACTING OTHER FRIENDS OR DOING THINGS OUTSIDE YOUR HOME?: NO

## 2024-09-04 SDOH — SOCIAL STABILITY: SOCIAL INSECURITY: DO YOU FEEL ANYONE HAS EXPLOITED OR TAKEN ADVANTAGE OF YOU FINANCIALLY OR OF YOUR PERSONAL PROPERTY?: NO

## 2024-09-04 SDOH — SOCIAL STABILITY: SOCIAL INSECURITY: HAVE YOU HAD ANY THOUGHTS OF HARMING ANYONE ELSE?: NO

## 2024-09-04 SDOH — ECONOMIC STABILITY: INCOME INSECURITY: HOW HARD IS IT FOR YOU TO PAY FOR THE VERY BASICS LIKE FOOD, HOUSING, MEDICAL CARE, AND HEATING?: NOT VERY HARD

## 2024-09-04 SDOH — SOCIAL STABILITY: SOCIAL INSECURITY: ARE THERE ANY APPARENT SIGNS OF INJURIES/BEHAVIORS THAT COULD BE RELATED TO ABUSE/NEGLECT?: NO

## 2024-09-04 SDOH — SOCIAL STABILITY: SOCIAL INSECURITY: ABUSE: ADULT

## 2024-09-04 SDOH — ECONOMIC STABILITY: INCOME INSECURITY: IN THE LAST 12 MONTHS, WAS THERE A TIME WHEN YOU WERE NOT ABLE TO PAY THE MORTGAGE OR RENT ON TIME?: NO

## 2024-09-04 SDOH — ECONOMIC STABILITY: HOUSING INSECURITY: AT ANY TIME IN THE PAST 12 MONTHS, WERE YOU HOMELESS OR LIVING IN A SHELTER (INCLUDING NOW)?: NO

## 2024-09-04 SDOH — ECONOMIC STABILITY: TRANSPORTATION INSECURITY
IN THE PAST 12 MONTHS, HAS LACK OF TRANSPORTATION KEPT YOU FROM MEETINGS, WORK, OR FROM GETTING THINGS NEEDED FOR DAILY LIVING?: NO

## 2024-09-04 SDOH — ECONOMIC STABILITY: HOUSING INSECURITY: IN THE PAST 12 MONTHS, HOW MANY TIMES HAVE YOU MOVED WHERE YOU WERE LIVING?: 0

## 2024-09-04 SDOH — ECONOMIC STABILITY: TRANSPORTATION INSECURITY
IN THE PAST 12 MONTHS, HAS THE LACK OF TRANSPORTATION KEPT YOU FROM MEDICAL APPOINTMENTS OR FROM GETTING MEDICATIONS?: NO

## 2024-09-04 SDOH — SOCIAL STABILITY: SOCIAL INSECURITY: ARE YOU OR HAVE YOU BEEN THREATENED OR ABUSED PHYSICALLY, EMOTIONALLY, OR SEXUALLY BY ANYONE?: NO

## 2024-09-04 SDOH — SOCIAL STABILITY: SOCIAL INSECURITY: HAS ANYONE EVER THREATENED TO HURT YOUR FAMILY OR YOUR PETS?: NO

## 2024-09-04 ASSESSMENT — COGNITIVE AND FUNCTIONAL STATUS - GENERAL
TURNING FROM BACK TO SIDE WHILE IN FLAT BAD: A LITTLE
MOVING FROM LYING ON BACK TO SITTING ON SIDE OF FLAT BED WITH BEDRAILS: A LITTLE
MOVING TO AND FROM BED TO CHAIR: A LOT
TOILETING: A LITTLE
STANDING UP FROM CHAIR USING ARMS: A LOT
CLIMB 3 TO 5 STEPS WITH RAILING: TOTAL
MOBILITY SCORE: 13
HELP NEEDED FOR BATHING: A LOT
TURNING FROM BACK TO SIDE WHILE IN FLAT BAD: A LITTLE
MOVING FROM LYING ON BACK TO SITTING ON SIDE OF FLAT BED WITH BEDRAILS: A LITTLE
EATING MEALS: A LITTLE
PERSONAL GROOMING: A LITTLE
TURNING FROM BACK TO SIDE WHILE IN FLAT BAD: A LITTLE
DRESSING REGULAR UPPER BODY CLOTHING: A LITTLE
CLIMB 3 TO 5 STEPS WITH RAILING: TOTAL
MOVING TO AND FROM BED TO CHAIR: A LITTLE
DAILY ACTIVITIY SCORE: 18
STANDING UP FROM CHAIR USING ARMS: A LOT
MOVING FROM LYING ON BACK TO SITTING ON SIDE OF FLAT BED WITH BEDRAILS: A LITTLE
DAILY ACTIVITIY SCORE: 15
DAILY ACTIVITIY SCORE: 16
TOILETING: A LITTLE
PERSONAL GROOMING: A LITTLE
HELP NEEDED FOR BATHING: A LOT
STANDING UP FROM CHAIR USING ARMS: A LOT
DRESSING REGULAR UPPER BODY CLOTHING: A LITTLE
MOBILITY SCORE: 13
DRESSING REGULAR UPPER BODY CLOTHING: A LITTLE
WALKING IN HOSPITAL ROOM: A LOT
DRESSING REGULAR LOWER BODY CLOTHING: A LOT
EATING MEALS: A LITTLE
HELP NEEDED FOR BATHING: A LOT
DRESSING REGULAR LOWER BODY CLOTHING: A LOT
TOILETING: A LOT
DRESSING REGULAR LOWER BODY CLOTHING: A LOT
PATIENT BASELINE BEDBOUND: NO
MOVING TO AND FROM BED TO CHAIR: A LOT
WALKING IN HOSPITAL ROOM: A LOT

## 2024-09-04 ASSESSMENT — LIFESTYLE VARIABLES
AUDIT-C TOTAL SCORE: 0
AUDIT-C TOTAL SCORE: 0
SKIP TO QUESTIONS 9-10: 1
HOW MANY STANDARD DRINKS CONTAINING ALCOHOL DO YOU HAVE ON A TYPICAL DAY: PATIENT DOES NOT DRINK
HOW OFTEN DO YOU HAVE A DRINK CONTAINING ALCOHOL: NEVER
HOW OFTEN DO YOU HAVE 6 OR MORE DRINKS ON ONE OCCASION: NEVER

## 2024-09-04 ASSESSMENT — ACTIVITIES OF DAILY LIVING (ADL)
JUDGMENT_ADEQUATE_SAFELY_COMPLETE_DAILY_ACTIVITIES: YES
TOILETING: NEEDS ASSISTANCE
GROOMING: NEEDS ASSISTANCE
ADEQUATE_TO_COMPLETE_ADL: YES
PATIENT'S MEMORY ADEQUATE TO SAFELY COMPLETE DAILY ACTIVITIES?: YES
ASSISTIVE_DEVICE: EYEGLASSES;WHEELCHAIR
HEARING - LEFT EAR: FUNCTIONAL
HEARING - RIGHT EAR: FUNCTIONAL
FEEDING YOURSELF: INDEPENDENT
WALKS IN HOME: NEEDS ASSISTANCE
BATHING: NEEDS ASSISTANCE
DRESSING YOURSELF: NEEDS ASSISTANCE
LACK_OF_TRANSPORTATION: NO

## 2024-09-04 ASSESSMENT — PATIENT HEALTH QUESTIONNAIRE - PHQ9
SUM OF ALL RESPONSES TO PHQ9 QUESTIONS 1 & 2: 0
2. FEELING DOWN, DEPRESSED OR HOPELESS: NOT AT ALL
1. LITTLE INTEREST OR PLEASURE IN DOING THINGS: NOT AT ALL

## 2024-09-04 ASSESSMENT — PAIN - FUNCTIONAL ASSESSMENT: PAIN_FUNCTIONAL_ASSESSMENT: 0-10

## 2024-09-04 ASSESSMENT — PAIN SCALES - GENERAL
PAINLEVEL_OUTOF10: 0 - NO PAIN
PAINLEVEL_OUTOF10: 0 - NO PAIN
PAINLEVEL_OUTOF10: 6

## 2024-09-04 ASSESSMENT — PAIN DESCRIPTION - LOCATION: LOCATION: HIP

## 2024-09-04 ASSESSMENT — PAIN DESCRIPTION - ORIENTATION: ORIENTATION: LEFT

## 2024-09-04 NOTE — ED PROVIDER NOTES
Emergency Department Provider Note        History of Present Illness     History provided by: Patient  Limitations to History: None    HPI:  Patient is a 89-year-old female with a past medical history of CHF, AVR, A-fib, ICD placement, COPD, diabetes, GERD presented to the ED for shortness of breath.  Patient states for the past few days she has had shortness of breath that is worse at night and in the morning.  Patient states she feels a chest pressure that comes and goes however is not radiating.  Patient denying any dizziness, lightheadedness, abdominal pain, nausea, vomiting, diarrhea.  Patient denies any history of a blood clot or PE and is not currently on a blood thinner.  Physical Exam   Triage vitals:  T 36.1 °C (96.9 °F)  HR 82  /77  RR 16  O2 96 % None (Room air)    Physical Exam  Constitutional:       Appearance: Normal appearance.   HENT:      Head: Normocephalic.   Eyes:      Extraocular Movements: Extraocular movements intact.   Cardiovascular:      Rate and Rhythm: Normal rate.   Pulmonary:      Effort: Pulmonary effort is normal.      Breath sounds: Normal breath sounds. No wheezing.   Abdominal:      General: Abdomen is flat.      Palpations: Abdomen is soft.   Musculoskeletal:         General: Normal range of motion.      Cervical back: Normal range of motion.   Skin:     General: Skin is warm.   Neurological:      Mental Status: She is alert. Mental status is at baseline.   Psychiatric:         Mood and Affect: Mood normal.         Behavior: Behavior normal.          Medical Decision Making & ED Course   Medical Decision Making:  Patient is a 89-year-old female presenting to the ED for chest pain and shortness of breath.  On arrival patient was hemodynamically stable point-of-care ultrasound shows concern for B-lines and reduced ejection fraction.  Point-of-care ultrasound was negative for pericardial effusion, dissection.  Patient was also seen to have a plethoric IVC.  Patient's labs  shows troponin of 39 with a repeat troponin 40, 40.  Patient elevated .  Patient's chest x-ray shows pulmonary edema.  Concern for heart failure exacerbation patient was given 40 mg IV Lasix.  Due to shortness of breath and chest pain there was suspicion for pulmonary embolism CT PE was negative for pulmonary embolism.  Low concern for ACS as patient's EKG showed Heart rate 60, AV dual paced rhythm, no STEMI or signs of ischemia.  Similar to previous EKG patient was admitted to cardiology for heart failure exacerbation.  ----         EKG Independent Interpretation:  In MDM        The patient was discussed with the following consultants/services: Admission Coordinator who accepted the patient for admission           Disposition   As a result of their workup, the patient will require admission to the hospital.  The patient was informed of her diagnosis.  The patient was given the opportunity to ask questions and I answered them. The patient agreed to be admitted to the hospital.    Procedures   Procedures    Patient seen and discussed with ED attending physician.    Carine Jimenez MD  Emergency Medicine       Carine Jimenez MD  Resident  09/05/24 0036

## 2024-09-04 NOTE — CARE PLAN
The patient's goals for the shift include      The clinical goals for the shift include patient will maintain spo2 above 90%

## 2024-09-04 NOTE — PROGRESS NOTES
09/04/24 1544   Discharge Planning   Living Arrangements Family members  (son - Praveen (DHARMESHA))   Support Systems Family members  (son - Praveen (POA), son - Contreras)   Assistance Needed ADL's   Type of Residence Private residence   Number of Stairs to Enter Residence 0  (w/c bound)   Number of Stairs Within Residence 0  (w/c bound)   Do you have animals or pets at home? No   Who is requesting discharge planning? Provider   Home or Post Acute Services None   Expected Discharge Disposition Home  (son is caretaker (24/7))   Does the patient need discharge transport arranged? No   Financial Resource Strain   How hard is it for you to pay for the very basics like food, housing, medical care, and heating? Not very   Housing Stability   In the last 12 months, was there a time when you were not able to pay the mortgage or rent on time? N   In the past 12 months, how many times have you moved where you were living? 0   At any time in the past 12 months, were you homeless or living in a shelter (including now)? N   Transportation Needs   In the past 12 months, has lack of transportation kept you from medical appointments or from getting medications? no   In the past 12 months, has lack of transportation kept you from meetings, work, or from getting things needed for daily living? No     89 y.o. female admitted for SOB.    Plan: diuresis   EDOD:  9/7    Met w/ pt to introduce myself, discuss role & discharge planning. Denies recent falls. Requires assistance with ADL's, lives with her son (Praveen MARTÍNEZ). Pt is w/c bounds and utilizes various forms or equipment at home (shower chair, grabber). Feels safe & supported at home. No SW needs at this time.     PCP: Miladys Menon APRN-CNP  Pharmacy: [facility], [place], [place]  DME: N/A    This TCC will continue to follow for home going needs and safe DC plan.     Soledad Han RN

## 2024-09-04 NOTE — PROGRESS NOTES
"  HPI  Alayna Dumont is a 89 y.o. 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     She presented to ED for c/o SOB that started a \"couple of days ago\" and got improved since admission and treated with diuretics.     Subjective   Patient is felling better with improvement since admission and IV diuretics.  Patient presented overloaded with decompensated heart failure.         Objective     Physical Exam  Constitutional:       Appearance: Normal appearance. She is obese.   HENT:      Head: Atraumatic.      Mouth/Throat:      Mouth: Mucous membranes are moist.   Cardiovascular:      Rate and Rhythm: Rhythm irregular.      Heart sounds: Murmur heard.      Systolic murmur is present.      Diastolic murmur is present with a grade of 2/4.      Gallop present. S3 sounds present.   Pulmonary:      Breath sounds: Examination of the right-middle field reveals rales. Examination of the left-middle field reveals rales. Examination of the right-lower field reveals decreased breath sounds and rales. Examination of the left-lower field reveals decreased breath sounds and rales. Decreased breath sounds and rales present.   Abdominal:      General: Abdomen is flat. " "Bowel sounds are normal.   Musculoskeletal:      Right lower leg: 3+ Edema present.      Left lower le+ Edema present.         Last Recorded Vitals  Blood pressure 155/52, pulse 60, temperature 36.4 °C (97.5 °F), temperature source Temporal, resp. rate 18, height 1.6 m (5' 3\"), weight 92 kg (202 lb 13.2 oz), SpO2 94%.  Intake/Output last 3 Shifts:  No intake/output data recorded.    Relevant Results    Results for orders placed or performed during the hospital encounter of 24 (from the past 24 hour(s))   ECG 12 lead   Result Value Ref Range    Ventricular Rate 60 BPM    Atrial Rate 60 BPM    MO Interval 174 ms    QRS Duration 206 ms    QT Interval 562 ms    QTC Calculation(Bazett) 562 ms    P Axis 34 degrees    R Axis -82 degrees    T Axis 96 degrees    QRS Count 10 beats    Q Onset 169 ms    P Onset 104 ms    P Offset 124 ms    T Offset 450 ms    QTC Fredericia 562 ms   Comprehensive metabolic panel   Result Value Ref Range    Glucose 101 (H) 74 - 99 mg/dL    Sodium 138 136 - 145 mmol/L    Potassium 4.5 3.5 - 5.3 mmol/L    Chloride 98 98 - 107 mmol/L    Bicarbonate 28 21 - 32 mmol/L    Anion Gap 17 10 - 20 mmol/L    Urea Nitrogen 44 (H) 6 - 23 mg/dL    Creatinine 1.46 (H) 0.50 - 1.05 mg/dL    eGFR 34 (L) >60 mL/min/1.73m*2    Calcium 9.3 8.6 - 10.6 mg/dL    Albumin 4.1 3.4 - 5.0 g/dL    Alkaline Phosphatase 82 33 - 136 U/L    Total Protein 6.9 6.4 - 8.2 g/dL    AST 28 9 - 39 U/L    Bilirubin, Total 0.5 0.0 - 1.2 mg/dL    ALT 40 7 - 45 U/L   Magnesium   Result Value Ref Range    Magnesium 2.15 1.60 - 2.40 mg/dL   Protime-INR   Result Value Ref Range    Protime 11.1 9.8 - 12.8 seconds    INR 1.0 0.9 - 1.1   Troponin I, High Sensitivity   Result Value Ref Range    Troponin I, High Sensitivity (CMC) 39 (H) 0 - 34 ng/L   APTT   Result Value Ref Range    aPTT 35 27 - 38 seconds   Troponin I, High Sensitivity   Result Value Ref Range    Troponin I, High Sensitivity (CMC) 40 (H) 0 - 34 ng/L   CBC and Auto " Differential   Result Value Ref Range    WBC 6.2 4.4 - 11.3 x10*3/uL    nRBC 0.0 0.0 - 0.0 /100 WBCs    RBC 3.77 (L) 4.00 - 5.20 x10*6/uL    Hemoglobin 10.8 (L) 12.0 - 16.0 g/dL    Hematocrit 33.2 (L) 36.0 - 46.0 %    MCV 88 80 - 100 fL    MCH 28.6 26.0 - 34.0 pg    MCHC 32.5 32.0 - 36.0 g/dL    RDW 15.2 (H) 11.5 - 14.5 %    Platelets 197 150 - 450 x10*3/uL    Neutrophils % 71.5 40.0 - 80.0 %    Immature Granulocytes %, Automated 0.3 0.0 - 0.9 %    Lymphocytes % 14.2 13.0 - 44.0 %    Monocytes % 10.0 2.0 - 10.0 %    Eosinophils % 3.7 0.0 - 6.0 %    Basophils % 0.3 0.0 - 2.0 %    Neutrophils Absolute 4.44 1.60 - 5.50 x10*3/uL    Immature Granulocytes Absolute, Automated 0.02 0.00 - 0.50 x10*3/uL    Lymphocytes Absolute 0.88 0.80 - 3.00 x10*3/uL    Monocytes Absolute 0.62 0.05 - 0.80 x10*3/uL    Eosinophils Absolute 0.23 0.00 - 0.40 x10*3/uL    Basophils Absolute 0.02 0.00 - 0.10 x10*3/uL   B-type natriuretic peptide   Result Value Ref Range     (H) 0 - 99 pg/mL   Troponin I, High Sensitivity   Result Value Ref Range    Troponin I, High Sensitivity (CMC) 40 (H) 0 - 34 ng/L        Assessment/Plan   Assessment & Plan  Acute on chronic congestive heart failure, unspecified heart failure type (Multi)    IV diuretics   Heart failure management     I spent 40 minutes in the professional and overall care of this patient.      Ascencion Ramos MD

## 2024-09-04 NOTE — PROGRESS NOTES
Pharmacy Medication History Review    Alayna Dumont is a 89 y.o. female admitted for Acute on chronic congestive heart failure, unspecified heart failure type (Multi). Pharmacy reviewed the patient's [id number] medications and allergies for accuracy.    Medications ADDED:  Torsemide 20mg (re-added)  Fish Oil  B-Complex  Medications CHANGED:  Vitamin C to 1000mg  Vitamin D to 50mcg (2000u)  Medications REMOVED:   none  The list below reflects the updated PTA list. Comments regarding how patient may be taking medications differently can be found in the Admit Orders Activity  Prior to Admission Medications   Prescriptions Last Dose Informant   DULoxetine (Cymbalta) 30 mg DR capsule     Sig: TAKE 1 CAPSULE BY MOUTH ONCE DAILY DISCONTINUE 20 MILLIGRAM CAPSULE   acetaminophen (Tylenol) 325 mg tablet Not Taking    Sig: Take 3 tablets (975 mg) by mouth every 8 hours.   Patient not taking: Reported on 9/4/2024   alendronate (Fosamax) 35 mg tablet  Self   Sig: Take 1 tablet (35 mg) by mouth every 7 days.   allopurinol (Zyloprim) 100 mg tablet  Self   Sig: Take 1 tablet (100 mg) by mouth once daily.   ascorbic acid (Vitamin C) 1,000 mg tablet  Self   Sig: Take 1 tablet (1,000 mg) by mouth once daily.   atorvastatin (Lipitor) 20 mg tablet  Self   Sig: TAKE 1 TABLET BY MOUTH AT BEDTIME   b complex 0.4 mg tablet     Sig: Take 1 tablet by mouth once daily.   baclofen (Lioresal) 5 mg tablet  Self   Sig: Take one tablet every night for spasm in left hip. If needed, may take one tablet during the day. STOP METHOCARBAMOL   cephalexin (Keflex) 500 mg capsule     Sig: Take 1 capsule (500 mg) by mouth 2 times a day.   cholecalciferol (Vitamin D-3) 50 MCG (2000 UT) tablet  Self   Sig: Take 1 tablet (2,000 Units) by mouth once daily.   cyanocobalamin (Dodex) 1,000 mcg/mL injection  Self   Sig: inject 1 milliliter intramuscularly every month   ferrous sulfate (FeroSuL) 325 (65 Fe) MG tablet  Self   Sig: Take 1 tablet (65 mg of  iron) by mouth once daily with a meal.   fluticasone furoate-vilanteroL (Breo Ellipta) 200-25 mcg/dose inhaler  Self   Sig: Inhale 1 puff once daily. Rinse mouth after each use   gabapentin (Neurontin) 100 mg capsule  Self   Sig: Take 1 capsule (100 mg) by mouth 2 times a day. For neuropathy (nerve pain)   ipratropium-albuteroL (Duo-Neb) 0.5-2.5 mg/3 mL nebulizer solution  Self   Sig: Inhale 3 mL every 4 hours. INHALE CONTENTS OF 1 VIAL IN NEBULIZER EVERY 4 HOURS WHILE AWAKE   metoprolol tartrate (Lopressor) 25 mg tablet     Sig: Take 1 tablet (25 mg) by mouth 2 times a day.   multivitamin with minerals tablet  Self   Sig: Take 1 tablet by mouth once daily.   naloxone (Narcan) 4 mg/0.1 mL nasal spray     Sig: Administer 1 spray (4 mg) into affected nostril(s) if needed for opioid reversal. Instill one spray into nostril and dial 911. If no response may repeat x 1 in 2 minutes. Only to be used for suspected opioid overdose.   nystatin (Mycostatin) cream Not Taking    Sig: Apply topically 2 times a day. To Vaginal area until healed   Patient not taking: Reported on 9/4/2024      omega 3-dha-epa-fish oil (Fish OiL) 1,000 mg (120 mg-180 mg) capsule     Sig: Take 1 capsule (1,000 mg) by mouth once daily.   pantoprazole (ProtoNix) 20 mg EC tablet  Self   Sig: take 1 tablet by mouth once daily   polyethylene glycol (Glycolax, Miralax) 17 gram packet     Sig: Take 17 g by mouth 2 times a day.  Patient not taking: Reported on 9/4/2024      sacubitriL-valsartan (Entresto) 24-26 mg tablet Not Taking    Sig: Take 1 tablet by mouth 2 times a day.   Patient not taking: Reported on 9/4/2024   sennosides (Senokot) 8.6 mg tablet Not Taking    Sig: Take 1 tablet (8.6 mg) by mouth 2 times a day as needed for constipation (constipation).   Patient not taking: Reported on 9/4/2024      traMADol (Ultram) 50 mg tablet     Sig: Take 2 tablets (100 mg) by mouth 2 times a day as needed for severe pain (7 - 10) or moderate pain (4 -  "6).  Patient takes differently: 2 tablets 3 times a day as needed    vit A/vit C/vit E/zinc/copper (PRESERVISION AREDS ORAL)  Self   Sig: Take 1 capsule by mouth 2 times a day.   vitamin E 180 mg (400 unit) capsule  Self   Sig: Take 1 capsule (400 Units) by mouth once daily.  torsemide (Demadex) 20 mg tablet  Take 1 tablet (20mg) by mouth daily. Take 2 tablets on Mondays-Wednesdays and Fridays, and 1 tablet all remaining days of the week      Facility-Administered Medications: None       The list below reflects the updated allergy list. Please review each documented allergy for additional clarification and justification.  Allergies  Reviewed by Mikhail Oliver RN on 9/3/2024        Severity Reactions Comments    Naproxen High Anaphylaxis, Other Per pt \"cold sweats\"  patient denies 6/5/2024, patient states no allergies to medications            Patient declines M2B at discharge. Pharmacy has been updated to [facility] in [place].    Sources used to complete the med history include out patient fill history, OARRS     Patients son, Praveen, kind enough to bring patients home medications, including over the counter ones. Not all home meds were brought in, however was able to tell me the rest that were not brought to patients room. PTA list updated via this encounter  Current admission progress notes 9/4/2024    Below are additional concerns with the patient's PTA list.  Bottle of Torsemide 20mg was brought from home, re-added on PTA list     Wilber Lr PharmD  Transitions of Care Pharmacist   Meds Ambulatory and Retail Services  Please reach out via Secure Chat for questions, or if no response call [facility] or [facility]    "

## 2024-09-05 ENCOUNTER — APPOINTMENT (OUTPATIENT)
Dept: CARDIOLOGY | Facility: HOSPITAL | Age: 89
End: 2024-09-05
Payer: MEDICARE

## 2024-09-05 ENCOUNTER — APPOINTMENT (OUTPATIENT)
Dept: CARDIOLOGY | Facility: HOSPITAL | Age: 89
DRG: 258 | End: 2024-09-05
Payer: MEDICARE

## 2024-09-05 LAB
ALBUMIN SERPL BCP-MCNC: 3.9 G/DL (ref 3.4–5)
ANION GAP SERPL CALC-SCNC: 10 MMOL/L (ref 10–20)
BUN SERPL-MCNC: 41 MG/DL (ref 6–23)
CALCIUM SERPL-MCNC: 9.3 MG/DL (ref 8.6–10.6)
CHLORIDE SERPL-SCNC: 99 MMOL/L (ref 98–107)
CO2 SERPL-SCNC: 33 MMOL/L (ref 21–32)
CREAT SERPL-MCNC: 1.48 MG/DL (ref 0.5–1.05)
EGFRCR SERPLBLD CKD-EPI 2021: 33 ML/MIN/1.73M*2
ERYTHROCYTE [DISTWIDTH] IN BLOOD BY AUTOMATED COUNT: 15.5 % (ref 11.5–14.5)
GLUCOSE SERPL-MCNC: 123 MG/DL (ref 74–99)
HCT VFR BLD AUTO: 37.9 % (ref 36–46)
HGB BLD-MCNC: 11.1 G/DL (ref 12–16)
MAGNESIUM SERPL-MCNC: 2.09 MG/DL (ref 1.6–2.4)
MCH RBC QN AUTO: 27.8 PG (ref 26–34)
MCHC RBC AUTO-ENTMCNC: 29.3 G/DL (ref 32–36)
MCV RBC AUTO: 95 FL (ref 80–100)
NRBC BLD-RTO: 0 /100 WBCS (ref 0–0)
PHOSPHATE SERPL-MCNC: 3.9 MG/DL (ref 2.5–4.9)
PLATELET # BLD AUTO: 228 X10*3/UL (ref 150–450)
POTASSIUM SERPL-SCNC: 4.2 MMOL/L (ref 3.5–5.3)
RBC # BLD AUTO: 4 X10*6/UL (ref 4–5.2)
SODIUM SERPL-SCNC: 138 MMOL/L (ref 136–145)
WBC # BLD AUTO: 8.2 X10*3/UL (ref 4.4–11.3)

## 2024-09-05 PROCEDURE — 36415 COLL VENOUS BLD VENIPUNCTURE: CPT

## 2024-09-05 PROCEDURE — 83735 ASSAY OF MAGNESIUM: CPT

## 2024-09-05 PROCEDURE — 4B02XSZ MEASUREMENT OF CARDIAC PACEMAKER, EXTERNAL APPROACH: ICD-10-PCS | Performed by: STUDENT IN AN ORGANIZED HEALTH CARE EDUCATION/TRAINING PROGRAM

## 2024-09-05 PROCEDURE — 85027 COMPLETE CBC AUTOMATED: CPT

## 2024-09-05 PROCEDURE — 2500000004 HC RX 250 GENERAL PHARMACY W/ HCPCS (ALT 636 FOR OP/ED)

## 2024-09-05 PROCEDURE — 2500000002 HC RX 250 W HCPCS SELF ADMINISTERED DRUGS (ALT 637 FOR MEDICARE OP, ALT 636 FOR OP/ED): Performed by: INTERNAL MEDICINE

## 2024-09-05 PROCEDURE — C9786 TRANSTHORACIC ECHO (TTE) COMPLETE: HCPCS

## 2024-09-05 PROCEDURE — 2500000001 HC RX 250 WO HCPCS SELF ADMINISTERED DRUGS (ALT 637 FOR MEDICARE OP)

## 2024-09-05 PROCEDURE — 2500000004 HC RX 250 GENERAL PHARMACY W/ HCPCS (ALT 636 FOR OP/ED): Performed by: PHYSICIAN ASSISTANT

## 2024-09-05 PROCEDURE — 93288 INTERROG EVL PM/LDLS PM IP: CPT

## 2024-09-05 PROCEDURE — 93306 TTE W/DOPPLER COMPLETE: CPT

## 2024-09-05 PROCEDURE — 2500000002 HC RX 250 W HCPCS SELF ADMINISTERED DRUGS (ALT 637 FOR MEDICARE OP, ALT 636 FOR OP/ED)

## 2024-09-05 PROCEDURE — 80069 RENAL FUNCTION PANEL: CPT

## 2024-09-05 PROCEDURE — 94640 AIRWAY INHALATION TREATMENT: CPT

## 2024-09-05 PROCEDURE — 1200000002 HC GENERAL ROOM WITH TELEMETRY DAILY

## 2024-09-05 PROCEDURE — 93306 TTE W/DOPPLER COMPLETE: CPT | Performed by: STUDENT IN AN ORGANIZED HEALTH CARE EDUCATION/TRAINING PROGRAM

## 2024-09-05 PROCEDURE — 99233 SBSQ HOSP IP/OBS HIGH 50: CPT | Performed by: INTERNAL MEDICINE

## 2024-09-05 RX ORDER — FUROSEMIDE 10 MG/ML
40 INJECTION INTRAMUSCULAR; INTRAVENOUS ONCE
Status: COMPLETED | OUTPATIENT
Start: 2024-09-05 | End: 2024-09-05

## 2024-09-05 RX ORDER — FUROSEMIDE 20 MG/1
40 TABLET ORAL
Status: DISCONTINUED | OUTPATIENT
Start: 2024-09-05 | End: 2024-09-05

## 2024-09-05 RX ORDER — FUROSEMIDE 10 MG/ML
40 INJECTION INTRAMUSCULAR; INTRAVENOUS 2 TIMES DAILY
Status: DISCONTINUED | OUTPATIENT
Start: 2024-09-06 | End: 2024-09-06

## 2024-09-05 RX ADMIN — TRAMADOL HYDROCHLORIDE 100 MG: 50 TABLET, COATED ORAL at 17:20

## 2024-09-05 RX ADMIN — PERFLUTREN 1 ML OF DILUTION: 6.52 INJECTION, SUSPENSION INTRAVENOUS at 15:19

## 2024-09-05 RX ADMIN — FUROSEMIDE 40 MG: 10 INJECTION, SOLUTION INTRAMUSCULAR; INTRAVENOUS at 17:18

## 2024-09-05 RX ADMIN — CEPHALEXIN 500 MG: 500 CAPSULE ORAL at 09:12

## 2024-09-05 RX ADMIN — DOCUSATE SODIUM 100 MG: 100 CAPSULE, LIQUID FILLED ORAL at 21:06

## 2024-09-05 RX ADMIN — Medication 3 MG: at 21:06

## 2024-09-05 RX ADMIN — CEPHALEXIN 500 MG: 500 CAPSULE ORAL at 21:05

## 2024-09-05 RX ADMIN — FUROSEMIDE 40 MG: 10 INJECTION, SOLUTION INTRAMUSCULAR; INTRAVENOUS at 13:11

## 2024-09-05 RX ADMIN — ENOXAPARIN SODIUM 30 MG: 100 INJECTION SUBCUTANEOUS at 09:13

## 2024-09-05 RX ADMIN — ALLOPURINOL 50 MG: 100 TABLET ORAL at 09:11

## 2024-09-05 RX ADMIN — TRAMADOL HYDROCHLORIDE 100 MG: 50 TABLET, COATED ORAL at 11:01

## 2024-09-05 RX ADMIN — IPRATROPIUM BROMIDE AND ALBUTEROL SULFATE 3 ML: .5; 3 SOLUTION RESPIRATORY (INHALATION) at 21:45

## 2024-09-05 RX ADMIN — GABAPENTIN 100 MG: 100 CAPSULE ORAL at 21:06

## 2024-09-05 RX ADMIN — SACUBITRIL AND VALSARTAN 1 TABLET: 49; 51 TABLET, FILM COATED ORAL at 13:10

## 2024-09-05 RX ADMIN — FERROUS SULFATE TAB 325 MG (65 MG ELEMENTAL FE) 325 MG: 325 (65 FE) TAB at 09:16

## 2024-09-05 RX ADMIN — DULOXETINE HYDROCHLORIDE 30 MG: 30 CAPSULE, DELAYED RELEASE ORAL at 09:13

## 2024-09-05 RX ADMIN — Medication 400 UNITS: at 09:13

## 2024-09-05 RX ADMIN — ICOSAPENT ETHYL 2 G: 1 CAPSULE ORAL at 17:18

## 2024-09-05 RX ADMIN — Medication 1000 UNITS: at 09:16

## 2024-09-05 RX ADMIN — METOPROLOL TARTRATE 25 MG: 25 TABLET, FILM COATED ORAL at 09:13

## 2024-09-05 RX ADMIN — TRAMADOL HYDROCHLORIDE 100 MG: 50 TABLET, COATED ORAL at 03:02

## 2024-09-05 RX ADMIN — IPRATROPIUM BROMIDE AND ALBUTEROL SULFATE 3 ML: .5; 3 SOLUTION RESPIRATORY (INHALATION) at 08:41

## 2024-09-05 RX ADMIN — SACUBITRIL AND VALSARTAN 1 TABLET: 49; 51 TABLET, FILM COATED ORAL at 21:05

## 2024-09-05 RX ADMIN — ICOSAPENT ETHYL 2 G: 1 CAPSULE ORAL at 09:11

## 2024-09-05 RX ADMIN — BACLOFEN 5 MG: 10 TABLET ORAL at 21:06

## 2024-09-05 RX ADMIN — FUROSEMIDE 40 MG: 20 TABLET ORAL at 11:00

## 2024-09-05 RX ADMIN — GABAPENTIN 100 MG: 100 CAPSULE ORAL at 09:13

## 2024-09-05 RX ADMIN — ATORVASTATIN CALCIUM 20 MG: 20 TABLET, FILM COATED ORAL at 21:06

## 2024-09-05 RX ADMIN — METOPROLOL TARTRATE 25 MG: 25 TABLET, FILM COATED ORAL at 21:06

## 2024-09-05 RX ADMIN — PANTOPRAZOLE SODIUM 20 MG: 20 TABLET, DELAYED RELEASE ORAL at 09:12

## 2024-09-05 RX ADMIN — OXYCODONE HYDROCHLORIDE AND ACETAMINOPHEN 500 MG: 500 TABLET ORAL at 09:12

## 2024-09-05 ASSESSMENT — COGNITIVE AND FUNCTIONAL STATUS - GENERAL
STANDING UP FROM CHAIR USING ARMS: A LOT
PERSONAL GROOMING: A LITTLE
MOBILITY SCORE: 13
EATING MEALS: A LITTLE
DAILY ACTIVITIY SCORE: 16
MOVING TO AND FROM BED TO CHAIR: A LOT
HELP NEEDED FOR BATHING: A LOT
TOILETING: A LITTLE
WALKING IN HOSPITAL ROOM: A LOT
DRESSING REGULAR LOWER BODY CLOTHING: A LOT
DRESSING REGULAR UPPER BODY CLOTHING: A LITTLE
MOVING FROM LYING ON BACK TO SITTING ON SIDE OF FLAT BED WITH BEDRAILS: A LITTLE
CLIMB 3 TO 5 STEPS WITH RAILING: TOTAL
TURNING FROM BACK TO SIDE WHILE IN FLAT BAD: A LITTLE

## 2024-09-05 ASSESSMENT — PAIN DESCRIPTION - LOCATION: LOCATION: HIP

## 2024-09-05 ASSESSMENT — PAIN SCALES - GENERAL
PAINLEVEL_OUTOF10: 7
PAINLEVEL_OUTOF10: 10 - WORST POSSIBLE PAIN
PAINLEVEL_OUTOF10: 6
PAINLEVEL_OUTOF10: 3

## 2024-09-05 ASSESSMENT — PAIN DESCRIPTION - ORIENTATION: ORIENTATION: LEFT

## 2024-09-05 NOTE — PROGRESS NOTES
Subjective Data:  She is seen and examined this morning sitting up at the side of her bed. She states that she had an okay night. They gave her a sleeping pill but she still was unable to fall asleep until several hours after they gave it and now she feels more tired this morning. She denies chest pain and continues to have some shortness of breath but does feel that it has improved since admission.    - Still some shortness of breath, continue diuresis with 40 mg IV Lasix BID  - Device check from 02/2024 with estimated battery life of 1 year, missed follow up device check. Will order an inpatient device check  - Increase Entresto to 49/51 BID  - Repeat echocardiogram pending    Overnight Events:    No acute events overnight     Objective Data:  Last Recorded Vitals:  Vitals:    09/04/24 2310 09/05/24 0400 09/05/24 0845 09/05/24 1128   BP: 174/61  158/72 152/51   BP Location: Left arm      Patient Position: Sitting      Pulse: 60  60 60   Resp: 16  18 18   Temp: 36.7 °C (98.1 °F)  36.5 °C (97.7 °F) 36.3 °C (97.3 °F)   TempSrc: Temporal      SpO2: 94% 94% 99% 94%   Weight:       Height:           Last Labs:  CBC - 9/4/2024: 10:28 AM  5.7 9.5 190    30.5      CMP - 9/4/2024:  8:25 AM  9.0 6.9 28 --- 0.5   4.8 4.1 40 82      PTT - 9/3/2024:  6:58 PM  1.0   11.1 35     TROPHS   Date/Time Value Ref Range Status   09/04/2024 01:06 AM 40 0 - 34 ng/L Final   09/03/2024 11:47 PM 40 0 - 34 ng/L Final   09/03/2024 03:51 PM 39 0 - 34 ng/L Final   04/26/2024 06:56 PM 41 0 - 34 ng/L Final   04/25/2024 06:39 PM 35 0 - 34 ng/L Final   04/25/2024 01:31 PM 29 0 - 34 ng/L Final     BNP   Date/Time Value Ref Range Status   09/03/2024 11:47  0 - 99 pg/mL Final   04/25/2024 01:31  0 - 99 pg/mL Final     HGBA1C   Date/Time Value Ref Range Status   04/25/2024 01:31 PM 6.1 see below % Final   09/09/2022 05:36 PM 5.4 % Final     Comment:          Diagnosis of Diabetes-Adults   Non-Diabetic: < or = 5.6%   Increased risk for  developing diabetes: 5.7-6.4%   Diagnostic of diabetes: > or = 6.5%  .       Monitoring of Diabetes                Age (y)     Therapeutic Goal (%)   Adults:          >18           <7.0   Pediatrics:    13-18           <7.5                   7-12           <8.0                   0- 6            7.5-8.5   American Diabetes Association. Diabetes Care 33(S1), Jan 2010.     01/08/2022 11:40 AM 5.9 4.3 - 5.6 % Final     Comment:     American Diabetes Association guidelines indicate that patients with HgbA1c in   the range 5.7-6.4% are at increased risk for development of diabetes, and   intervention by lifestyle modification may be beneficial. HgbA1c greater or   equal to 6.5% is considered diagnostic of diabetes.   09/02/2021 01:28 PM 5.6 4.3 - 5.6 % Final     Comment:     American Diabetes Association guidelines indicate that patients with HgbA1c in   the range 5.7-6.4% are at increased risk for development of diabetes, and   intervention by lifestyle modification may be beneficial. HgbA1c greater or   equal to 6.5% is considered diagnostic of diabetes.     LDLCALC   Date/Time Value Ref Range Status   07/25/2024 12:26 PM 38 <=99 mg/dL Final     Comment:                                 Near   Borderline      AGE      Desirable  Optimal    High     High     Very High     0-19 Y     0 - 109     ---    110-129   >/= 130     ----    20-24 Y     0 - 119     ---    120-159   >/= 160     ----      >24 Y     0 -  99   100-129  130-159   160-189     >/=190       VLDL   Date/Time Value Ref Range Status   07/25/2024 12:26 PM 34 0 - 40 mg/dL Final   09/09/2022 05:36 PM 30 0 - 40 mg/dL Final      Last I/O:  I/O last 3 completed shifts:  In: 1320 (14.3 mL/kg) [P.O.:1320]  Out: 1203 (13.1 mL/kg) [Urine:1200 (0.4 mL/kg/hr); Stool:3]  Weight: 92 kg     Past Cardiology Tests (Last 3 Years):  EKG:  ECG 12 lead 09/03/2024      ECG 12 Lead 04/26/2024      ECG 12 lead 04/25/2024      ECG 12 Lead 03/25/2024      ECG 12 lead  03/22/2024      ECG 12 lead 02/16/2024    Echo:  Transthoracic Echo (TTE) Limited 04/26/2024      Transthoracic Echo (TTE) Limited 02/17/2024    Ejection Fractions:  EF   Date/Time Value Ref Range Status   04/26/2024 02:06 PM 43 %    02/17/2024 02:12 PM 72 %      Cath:  No results found for this or any previous visit from the past 1095 days.    Stress Test:  No results found for this or any previous visit from the past 1095 days.    Cardiac Imaging:  No results found for this or any previous visit from the past 1095 days.      Inpatient Medications:  Scheduled medications   Medication Dose Route Frequency    alendronate  35 mg oral q7 days    allopurinol  50 mg oral q24h    ascorbic acid  500 mg oral Daily    atorvastatin  20 mg oral Nightly    baclofen  5 mg oral Nightly    cephalexin  500 mg oral BID    cholecalciferol  1,000 Units oral Daily    cyanocobalamin  1,000 mcg intramuscular q30 days    docusate sodium  100 mg oral BID    DULoxetine  30 mg oral Daily    enoxaparin  30 mg subcutaneous q24h    ferrous sulfate (325 mg ferrous sulfate)  65 mg of iron oral Daily with breakfast    fluticasone furoate-vilanteroL  1 puff inhalation Daily    gabapentin  100 mg oral BID    icosapent ethyL  2 g oral BID    ipratropium-albuteroL  3 mL nebulization TID    metoprolol tartrate  25 mg oral BID    pantoprazole  20 mg oral Daily before breakfast    polyethylene glycol  17 g oral Daily    sacubitriL-valsartan  1 tablet oral BID    vitamin E  400 Units oral Daily     PRN medications   Medication    acetaminophen    melatonin    sennosides    traMADol     Continuous Medications   Medication Dose Last Rate       Physical Exam:  Physical Exam  Vitals and nursing note reviewed.   Constitutional:       General: She is not in acute distress.     Appearance: She is not ill-appearing.   HENT:      Head: Normocephalic and atraumatic.      Ears:      Comments: Hard of hearing  Eyes:      General: No scleral icterus.     Extraocular  "Movements: Extraocular movements intact.      Conjunctiva/sclera: Conjunctivae normal.   Cardiovascular:      Rate and Rhythm: Normal rate and regular rhythm.      Heart sounds: Murmur heard.      No friction rub. No gallop.   Pulmonary:      Effort: Pulmonary effort is normal. No respiratory distress.      Breath sounds: No wheezing or rhonchi.   Skin:     General: Skin is warm and dry.   Neurological:      General: No focal deficit present.      Mental Status: She is alert.          Assessment/Plan   Alayna Dumont is a 89 y.o. 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 She presented to ED for c/o SOB that started a \"couple of days ago\" and got improved since admission and treated with diuretics.      Acute on chronic Diastolic heart failure  Valvular Heart Disease, s/p AVR  HCM, s/p septal myomectomy    - 01/2023 TTE: EF 50-55%, no WMA, LV septal wall severely increased, sev LVH, bioprosthetic AV. Moderate to severe prosthetic AI, peak/mean gradient 38.0/21.3  - 2/17/24 TTE: EF 70-75%, no WMA, mod LVH, normal DF, bioprosthetic AV, mod to sev AI, peak/mean 40.4/22.0, moderate Aortic stenosis  - 4/26/24 TTE: LV function is mildly to moderately " decreased, WMA, sev LVH. Dyssynchrony consistent with RV pacing is noted. Moderate aortic valve stenosis.  DI 0.47, moderate AI. Peak/mean  31.9/15.1.   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 PNA in the differential. A component of pulmonary edema is present  - prev dry wt ~90-93 kg  - admit wt: 92 kg  - continue diuresis, 40 mg IV Lasix BID  - further GDMT/optimization for HF  - of note, prev admit 4/25 -5/4 for ADHF, cardiology consulted, suspect less likely due to ischemia more likely pacemaker induced, Geriatrics was consulted for GOC discussion, code status was changed to DNR/DNI with no plan for any further invasive measures  - previously seen by Dr. Alfaro, 2/2023, referred to heart valve clinic, did not follow up  - increase Entresto to 49/51 today for better BP control   - SGLT2i likely contraindicated given functional urinary incontinence  - cont home Metop Tartrate 25 mg BID, consider transition to Toprol XL  - 2gram sodium diet, 2L fluid restriction, daily standing weights, strict I&O's     Hx Tachy Shahram Syndrome  S/p [employer] Pacemaker  - Missed her most recent device check  - Device check on 02/16/2024 estimated remaining battery life to be 1 year  - Inpatient device check pending today     HTN  - admit BP up to 194/66 in ED  - SBP last 24 hrs: 152-174  - meds as above, titrate as indicated     CKD stage 3 (stable)  - b/lCr ~1.3-1.6, GFR ~30-40  - admit Cr 1.46, GFR 34  - Daily Cr 1.29, GFR 39  - Avoid nephrotoxins and hypotension, renally dose meds as indicated  - trend daily RFP while admitted      Anemia/SUNDEEP  Vit B12 deficiency  - b/l hgb ~10-11  - admit hgb 10.8  Daily hgb 9.5  - no acute sign of bleeding  - cont home PO iron and B-12 injections and  supplements     HLD  - cont home Atorvastatin and Vascepa     Chronic back pain  Peripheral neuropathy  - cont home Baclofen, gabapentin, Tramadol PRN  - PRN XS Tylenol     COPD/?Asthma  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 Stanislav Bolaños PA-C

## 2024-09-05 NOTE — CARE PLAN
The patient's goals for the shift include  rest  Problem: Nutrition  Goal: Less than 5 days NPO/clear liquids  Outcome: Progressing  Goal: Oral intake greater than 50%  Outcome: Progressing  Goal: Oral intake greater 75%  Outcome: Progressing  Goal: Consume prescribed supplement  Outcome: Progressing  Goal: Adequate PO fluid intake  Outcome: Progressing  Goal: Nutrition support goals are met within 48 hrs  Outcome: Progressing  Goal: Nutrition support is meeting 75% of nutrient needs  Outcome: Progressing  Goal: Tube feed tolerance  Outcome: Progressing  Goal: BG  mg/dL  Outcome: Progressing  Goal: Lab values WNL  Outcome: Progressing  Goal: Electrolytes WNL  Outcome: Progressing  Goal: Promote healing  Outcome: Progressing  Goal: Maintain stable weight  Outcome: Progressing  Goal: Reduce weight from edema/fluid  Outcome: Progressing  Goal: Gradual weight gain  Outcome: Progressing  Goal: Improve ostomy output  Outcome: Progressing     Problem: Pain - Adult  Goal: Verbalizes/displays adequate comfort level or baseline comfort level  Outcome: Progressing     Problem: Safety - Adult  Goal: Free from fall injury  Outcome: Progressing     Problem: Discharge Planning  Goal: Discharge to home or other facility with appropriate resources  Outcome: Progressing     Problem: Chronic Conditions and Co-morbidities  Goal: Patient's chronic conditions and co-morbidity symptoms are monitored and maintained or improved  Outcome: Progressing     Problem: Skin  Goal: Decreased wound size/increased tissue granulation at next dressing change  9/5/2024 0459 by Katt Weathers RN  Flowsheets (Taken 9/5/2024 0459)  Decreased wound size/increased tissue granulation at next dressing change:   Promote sleep for wound healing   Protective dressings over bony prominences  9/5/2024 0454 by Katt Weathers RN  Outcome: Progressing  Goal: Participates in plan/prevention/treatment measures  9/5/2024 0459 by Katt Weathers RN  Flowsheets  (Taken 9/5/2024 0459)  Participates in plan/prevention/treatment measures:   Discuss with provider PT/OT consult   Elevate heels  9/5/2024 0454 by Katt Weathers RN  Outcome: Progressing  Goal: Prevent/manage excess moisture  9/5/2024 0459 by Katt Weathers RN  Flowsheets (Taken 9/5/2024 0459)  Prevent/manage excess moisture:   Cleanse incontinence/protect with barrier cream   Monitor for/manage infection if present   Follow provider orders for dressing changes  9/5/2024 0454 by Katt Wetahers RN  Outcome: Progressing  Goal: Prevent/minimize sheer/friction injuries  9/5/2024 0459 by Katt Weathers RN  Flowsheets (Taken 9/5/2024 0459)  Prevent/minimize sheer/friction injuries:   Complete micro-shifts as needed if patient unable. Adjust patient position to relieve pressure points, not a full turn   Increase activity/out of bed for meals   Use pull sheet  9/5/2024 0454 by Katt Weathers RN  Outcome: Progressing  Goal: Promote/optimize nutrition  9/5/2024 0459 by Katt Weathers RN  Flowsheets (Taken 9/5/2024 0459)  Promote/optimize nutrition:   Assist with feeding   Offer water/supplements/favorite foods   Consume > 50% meals/supplements   Reassess MST if dietician not consulted  9/5/2024 0454 by Katt Weathers RN  Outcome: Progressing  Goal: Promote skin healing  9/5/2024 0459 by Katt Weathers RN  Flowsheets (Taken 9/5/2024 0459)  Promote skin healing:   Protective dressings over bony prominences   Assess skin/pad under line(s)/device(s)  9/5/2024 0454 by Katt Weathers RN  Outcome: Progressing     Problem: Diabetes  Goal: Achieve decreasing blood glucose levels by end of shift  Outcome: Progressing  Goal: Increase stability of blood glucose readings by end of shift  Outcome: Progressing  Goal: Decrease in ketones present in urine by end of shift  Outcome: Progressing  Goal: Maintain electrolyte levels within acceptable range throughout shift  Outcome: Progressing  Goal: Maintain glucose levels >70mg/dl to  <250mg/dl throughout shift  Outcome: Progressing  Goal: No changes in neurological exam by end of shift  Outcome: Progressing  Goal: Learn about and adhere to nutrition recommendations by end of shift  Outcome: Progressing  Goal: Vital signs within normal range for age by end of shift  Outcome: Progressing  Goal: Increase self care and/or family involovement by end of shift  Outcome: Progressing  Goal: Receive DSME education by end of shift  Outcome: Progressing     Problem: Heart Failure  Goal: Improved gas exchange this shift  Outcome: Progressing  Goal: Improved urinary output this shift  Outcome: Progressing  Goal: Reduction in peripheral edema within 24 hours  Outcome: Progressing  Goal: Report improvement of dyspnea/breathlessness this shift  Outcome: Progressing  Goal: Weight from fluid excess reduced over 2-3 days, then stabilize  Outcome: Progressing  Goal: Increase self care and/or family involvement in 24 hours  Outcome: Progressing  Flowsheets (Taken 9/5/2024 2893)  Increase self care and/or family involvement in 24 hours:   Assess for signs/symptoms of depression   Organize tasks/allow time for rest       The clinical goals for the shift include remain hds  \

## 2024-09-05 NOTE — CARE PLAN
The patient's goals for the shift include  pain control and rest  Problem: Nutrition  Goal: Less than 5 days NPO/clear liquids  Outcome: Progressing  Goal: Oral intake greater than 50%  Outcome: Progressing  Goal: Oral intake greater 75%  Outcome: Progressing  Goal: Consume prescribed supplement  Outcome: Progressing  Goal: Adequate PO fluid intake  Outcome: Progressing  Goal: Nutrition support goals are met within 48 hrs  Outcome: Progressing  Goal: Nutrition support is meeting 75% of nutrient needs  Outcome: Progressing  Goal: Tube feed tolerance  Outcome: Progressing  Goal: BG  mg/dL  Outcome: Progressing  Goal: Lab values WNL  Outcome: Progressing  Goal: Electrolytes WNL  Outcome: Progressing  Goal: Promote healing  Outcome: Progressing  Goal: Maintain stable weight  Outcome: Progressing  Goal: Reduce weight from edema/fluid  Outcome: Progressing  Goal: Gradual weight gain  Outcome: Progressing  Goal: Improve ostomy output  Outcome: Progressing     Problem: Pain - Adult  Goal: Verbalizes/displays adequate comfort level or baseline comfort level  Outcome: Progressing     Problem: Safety - Adult  Goal: Free from fall injury  Outcome: Progressing     Problem: Discharge Planning  Goal: Discharge to home or other facility with appropriate resources  Outcome: Progressing     Problem: Chronic Conditions and Co-morbidities  Goal: Patient's chronic conditions and co-morbidity symptoms are monitored and maintained or improved  Outcome: Progressing     Problem: Skin  Goal: Decreased wound size/increased tissue granulation at next dressing change  Outcome: Progressing  Goal: Participates in plan/prevention/treatment measures  Outcome: Progressing  Goal: Prevent/manage excess moisture  Outcome: Progressing  Goal: Prevent/minimize sheer/friction injuries  Outcome: Progressing  Goal: Promote/optimize nutrition  Outcome: Progressing  Goal: Promote skin healing  Outcome: Progressing     Problem: Diabetes  Goal: Achieve  decreasing blood glucose levels by end of shift  Outcome: Progressing  Goal: Increase stability of blood glucose readings by end of shift  Outcome: Progressing  Goal: Decrease in ketones present in urine by end of shift  Outcome: Progressing  Goal: Maintain electrolyte levels within acceptable range throughout shift  Outcome: Progressing  Goal: Maintain glucose levels >70mg/dl to <250mg/dl throughout shift  Outcome: Progressing  Goal: No changes in neurological exam by end of shift  Outcome: Progressing  Goal: Learn about and adhere to nutrition recommendations by end of shift  Outcome: Progressing  Goal: Vital signs within normal range for age by end of shift  Outcome: Progressing  Goal: Increase self care and/or family involovement by end of shift  Outcome: Progressing  Goal: Receive DSME education by end of shift  Outcome: Progressing     Problem: Heart Failure  Goal: Improved gas exchange this shift  Outcome: Progressing  Goal: Improved urinary output this shift  Outcome: Progressing  Goal: Reduction in peripheral edema within 24 hours  Outcome: Progressing  Goal: Report improvement of dyspnea/breathlessness this shift  Outcome: Progressing  Goal: Weight from fluid excess reduced over 2-3 days, then stabilize  Outcome: Progressing  Goal: Increase self care and/or family involvement in 24 hours  Outcome: Progressing  Flowsheets (Taken 9/5/2024 7814)  Increase self care and/or family involvement in 24 hours:   Assess for signs/symptoms of depression   Organize tasks/allow time for rest       The clinical goals for the shift include patient will remnain hemodynamically stable for the remainder of the shift

## 2024-09-05 NOTE — PROGRESS NOTES
"Subjective Data:  Patient seen and assessed this morning, lying in bed, NAD. Denies any CP, reports SOB as improved since admission, but still with conversational dyspnea. During rounds, patient sitting at edge of bed, endorsing chest/breathing \"heaviness\". Code status discussed with patient, per discussion with patient, code status updated to full code.    Today's Plan:  - continue diuresis, 40 mg IV Lasix BID  - further GDMT/optimization for HF  - consider ischemic evaluation given newly reduced EF and WMA    Overnight Events:    No acute events overnight. Admitted to HVI service from ED.     Objective Data:  Last Recorded Vitals:  Vitals:    09/04/24 1052 09/04/24 1413 09/04/24 1451 09/04/24 2041   BP: 167/53  174/65 168/54   BP Location:       Patient Position:       Pulse: 60  60 60   Resp: 18  18 17   Temp: 36.6 °C (97.9 °F)  36.5 °C (97.7 °F) 36.6 °C (97.9 °F)   TempSrc:       SpO2: 95% 93% 93% 94%   Weight:       Height:           Last Labs:  Results for orders placed or performed during the hospital encounter of 09/03/24 (from the past 24 hour(s))   Troponin I, High Sensitivity   Result Value Ref Range    Troponin I, High Sensitivity (CMC) 40 (H) 0 - 34 ng/L   CBC and Auto Differential   Result Value Ref Range    WBC 6.2 4.4 - 11.3 x10*3/uL    nRBC 0.0 0.0 - 0.0 /100 WBCs    RBC 3.77 (L) 4.00 - 5.20 x10*6/uL    Hemoglobin 10.8 (L) 12.0 - 16.0 g/dL    Hematocrit 33.2 (L) 36.0 - 46.0 %    MCV 88 80 - 100 fL    MCH 28.6 26.0 - 34.0 pg    MCHC 32.5 32.0 - 36.0 g/dL    RDW 15.2 (H) 11.5 - 14.5 %    Platelets 197 150 - 450 x10*3/uL    Neutrophils % 71.5 40.0 - 80.0 %    Immature Granulocytes %, Automated 0.3 0.0 - 0.9 %    Lymphocytes % 14.2 13.0 - 44.0 %    Monocytes % 10.0 2.0 - 10.0 %    Eosinophils % 3.7 0.0 - 6.0 %    Basophils % 0.3 0.0 - 2.0 %    Neutrophils Absolute 4.44 1.60 - 5.50 x10*3/uL    Immature Granulocytes Absolute, Automated 0.02 0.00 - 0.50 x10*3/uL    Lymphocytes Absolute 0.88 0.80 - 3.00 " x10*3/uL    Monocytes Absolute 0.62 0.05 - 0.80 x10*3/uL    Eosinophils Absolute 0.23 0.00 - 0.40 x10*3/uL    Basophils Absolute 0.02 0.00 - 0.10 x10*3/uL   B-type natriuretic peptide   Result Value Ref Range     (H) 0 - 99 pg/mL   Troponin I, High Sensitivity   Result Value Ref Range    Troponin I, High Sensitivity (CMC) 40 (H) 0 - 34 ng/L   Basic Metabolic Panel   Result Value Ref Range    Glucose 88 74 - 99 mg/dL    Sodium 141 136 - 145 mmol/L    Potassium 3.8 3.5 - 5.3 mmol/L    Chloride 101 98 - 107 mmol/L    Bicarbonate 28 21 - 32 mmol/L    Anion Gap 16 10 - 20 mmol/L    Urea Nitrogen 41 (H) 6 - 23 mg/dL    Creatinine 1.29 (H) 0.50 - 1.05 mg/dL    eGFR 39 (L) >60 mL/min/1.73m*2    Calcium 9.0 8.6 - 10.6 mg/dL   CBC   Result Value Ref Range    WBC 5.7 4.4 - 11.3 x10*3/uL    nRBC 0.0 0.0 - 0.0 /100 WBCs    RBC 3.34 (L) 4.00 - 5.20 x10*6/uL    Hemoglobin 9.5 (L) 12.0 - 16.0 g/dL    Hematocrit 30.5 (L) 36.0 - 46.0 %    MCV 91 80 - 100 fL    MCH 28.4 26.0 - 34.0 pg    MCHC 31.1 (L) 32.0 - 36.0 g/dL    RDW 15.5 (H) 11.5 - 14.5 %    Platelets 190 150 - 450 x10*3/uL        TROPHS   Date/Time Value Ref Range Status   09/04/2024 01:06 AM 40 0 - 34 ng/L Final   09/03/2024 11:47 PM 40 0 - 34 ng/L Final   09/03/2024 03:51 PM 39 0 - 34 ng/L Final   04/26/2024 06:56 PM 41 0 - 34 ng/L Final   04/25/2024 06:39 PM 35 0 - 34 ng/L Final   04/25/2024 01:31 PM 29 0 - 34 ng/L Final     BNP   Date/Time Value Ref Range Status   09/03/2024 11:47  0 - 99 pg/mL Final   04/25/2024 01:31  0 - 99 pg/mL Final     HGBA1C   Date/Time Value Ref Range Status   04/25/2024 01:31 PM 6.1 see below % Final   09/09/2022 05:36 PM 5.4 % Final     Comment:          Diagnosis of Diabetes-Adults   Non-Diabetic: < or = 5.6%   Increased risk for developing diabetes: 5.7-6.4%   Diagnostic of diabetes: > or = 6.5%  .       Monitoring of Diabetes                Age (y)     Therapeutic Goal (%)   Adults:          >18           <7.0    Pediatrics:    13-18           <7.5                   7-12           <8.0                   0- 6            7.5-8.5   American Diabetes Association. Diabetes Care 33(S1), Jan 2010.     01/08/2022 11:40 AM 5.9 4.3 - 5.6 % Final     Comment:     American Diabetes Association guidelines indicate that patients with HgbA1c in   the range 5.7-6.4% are at increased risk for development of diabetes, and   intervention by lifestyle modification may be beneficial. HgbA1c greater or   equal to 6.5% is considered diagnostic of diabetes.   09/02/2021 01:28 PM 5.6 4.3 - 5.6 % Final     Comment:     American Diabetes Association guidelines indicate that patients with HgbA1c in   the range 5.7-6.4% are at increased risk for development of diabetes, and   intervention by lifestyle modification may be beneficial. HgbA1c greater or   equal to 6.5% is considered diagnostic of diabetes.     LDLCALC   Date/Time Value Ref Range Status   07/25/2024 12:26 PM 38 <=99 mg/dL Final     Comment:                                 Near   Borderline      AGE      Desirable  Optimal    High     High     Very High     0-19 Y     0 - 109     ---    110-129   >/= 130     ----    20-24 Y     0 - 119     ---    120-159   >/= 160     ----      >24 Y     0 -  99   100-129  130-159   160-189     >/=190       VLDL   Date/Time Value Ref Range Status   07/25/2024 12:26 PM 34 0 - 40 mg/dL Final   09/09/2022 05:36 PM 30 0 - 40 mg/dL Final      Last I/O:  I/O last 3 completed shifts:  In: 1320 (14.3 mL/kg) [P.O.:1320]  Out: 1203 (13.1 mL/kg) [Urine:1200 (0.4 mL/kg/hr); Stool:3]  Weight: 92 kg     Past Cardiology Tests (Last 3 Years):  EKG:  ECG 12 lead 09/03/2024      ECG 12 Lead 04/26/2024      ECG 12 lead 04/25/2024      ECG 12 Lead 03/25/2024      ECG 12 lead 03/22/2024      ECG 12 lead 02/16/2024    Echo:  Transthoracic Echo (TTE) Limited 04/26/2024      Transthoracic Echo (TTE) Limited 02/17/2024    Ejection Fractions:  EF   Date/Time Value Ref Range Status    04/26/2024 02:06 PM 43 %    02/17/2024 02:12 PM 72 %      Cath:  No results found for this or any previous visit from the past 1095 days.    Stress Test:  No results found for this or any previous visit from the past 1095 days.    Cardiac Imaging:  No results found for this or any previous visit from the past 1095 days.      Inpatient Medications:  Scheduled medications   Medication Dose Route Frequency    alendronate  35 mg oral q7 days    allopurinol  50 mg oral q24h    ascorbic acid  500 mg oral Daily    atorvastatin  20 mg oral Nightly    baclofen  5 mg oral Nightly    [START ON 9/5/2024] cephalexin  500 mg oral BID    cholecalciferol  1,000 Units oral Daily    cyanocobalamin  1,000 mcg intramuscular q30 days    docusate sodium  100 mg oral BID    [START ON 9/5/2024] DULoxetine  30 mg oral Daily    enoxaparin  30 mg subcutaneous q24h    ferrous sulfate (325 mg ferrous sulfate)  65 mg of iron oral Daily with breakfast    fluticasone furoate-vilanteroL  1 puff inhalation Daily    [START ON 9/5/2024] furosemide  40 mg oral BID    gabapentin  100 mg oral BID    icosapent ethyL  2 g oral BID    ipratropium-albuteroL  3 mL nebulization TID    metoprolol tartrate  25 mg oral BID    [START ON 9/5/2024] pantoprazole  20 mg oral Daily before breakfast    [START ON 9/5/2024] polyethylene glycol  17 g oral Daily    sacubitriL-valsartan  1 tablet oral BID    vitamin E  400 Units oral Daily     PRN medications   Medication    acetaminophen    melatonin    sennosides    traMADol     Continuous Medications   Medication Dose Last Rate     Physical Exam:  Physical Exam  Vitals reviewed.   Constitutional:       General: She is not in acute distress.     Appearance: She is obese.   HENT:      Head: Atraumatic.      Mouth/Throat:      Mouth: Mucous membranes are moist.   Eyes:      General: No scleral icterus.     Extraocular Movements: Extraocular movements intact.      Conjunctiva/sclera: Conjunctivae normal.   Neck:       "Comments: JVD to mid-neck at 45 degrees  Cardiovascular:      Rate and Rhythm: Normal rate and regular rhythm.      Pulses: Normal pulses.      Heart sounds: Murmur heard.      Comments: 100% A paced on telemetry  Pulmonary:      Effort: No respiratory distress.      Breath sounds: Normal breath sounds.      Comments: Conversationally dyspneic  Abdominal:      General: Bowel sounds are normal.      Palpations: Abdomen is soft.   Musculoskeletal:         General: Normal range of motion.      Cervical back: Normal range of motion.      Right lower leg: No edema.      Left lower leg: No edema.   Skin:     General: Skin is warm and dry.   Neurological:      General: No focal deficit present.      Mental Status: She is alert and oriented to person, place, and time.   Psychiatric:         Mood and Affect: Mood normal.         Behavior: Behavior normal.         Assessment/Plan   Alayna Dumont is a 89 y.o. 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 She presented to ED for c/o SOB that started a \"couple of days ago\" and got improved since admission and treated with diuretics.     Acute on chronic Diastolic heart failure  Valvular Heart Disease, s/p AVR  Hx " Tachy Shahram Syndrome  HCM, s/p septal myomectomy   - s/p septal myomectomy and AVR ~2013  - 01/3/2023 TTE EF 40-45%  - 01/10/2023 TTE: EF 50-55%, no WMA, LV septal wall severely increased, sev LVH, bioprosthetic AV. Moderate to severe prosthetic AI, peak/mean gradient 38.0/21.3  - 2/17/24 TTE: EF 70-75%, no WMA, mod LVH, normal DF, bioprosthetic AV, mod to sev AI, peak/mean 40.4/22.0, moderate Aortic stenosis  - 4/26/24 TTE: LV function is mildly to moderately decreased, WMA, sev LVH. Dyssynchrony consistent with RV pacing is noted. Moderate aortic valve stenosis.  DI 0.47, moderate AI. Peak/mean  31.9/15.1.   - hx tachy shahram syndrome s/p dc PPM placement  - s/p LHC 6/2022: normal coronary arteries, but very tortuous in their course  - admit BNP: 885 (previously 952 4/2024)  - admit CXR: dense left basilar airspace disease with Lt pleural effusion. PNA in the differential. A component of pulmonary edema is present  - prev dry wt ~90-93 kg  - admit wt: 92 kg  - continue diuresis, 40 mg IV Lasix BID  - further GDMT/optimization for HF  - consider ischemic evaluation  - of note, prev admit 4/25 -5/4 for ADHF, cardiology consulted, suspect less likely due to ischemia more likely pacemaker induced, Geriatrics was consulted for GOC discussion, code status was changed to DNR/DNI with no plan for any further invasive measures  - previously seen by Dr. Alfaro, 2/2023, referred to heart valve clinic, did not follow up  - resume Entresto 24/26 mg BID, previously prescribed, but not taking    - SGLT2i likely contraindicated given functional urinary incontinence  - cont home Metop Tartrate 25 mg BID, consider transition to Toprol XL  - 2gram sodium diet, 2L fluid restriction, daily standing weights, strict I&O's     Hx pAF   - postsurgery, which resolved  - has been seen by Dr. Guerra outpatient   - currently 100% A paced on telemetry    HTN  - admit BP up to 194/66 in ED  - SBP last 24 hrs: 147-194  - meds as above, titrate  as indicated     CKD stage 3 (stable)  - b/lCr ~1.3-1.6, GFR ~30-40  - admit Cr 1.46, GFR 34  - Avoid nephrotoxins and hypotension, renally dose meds as indicated  - trend daily RFP while admitted     Anemia/SUNDEEP  Vit B12 deficiency  - b/l hgb ~10-11  - admit hgb 10.8  - no acute sign of bleeding  - cont home PO iron and B-12 injections and supplements    HLD  - cont home Atorvastatin and Vascepa    Chronic back pain  Peripheral neuropathy  - cont home Baclofen, gabapentin, Tramadol PRN  - PRN XS Tylenol    COPD/?Asthma  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 Stanislav Knutson, APRN-CNP

## 2024-09-05 NOTE — PROGRESS NOTES
Transitional Care Coordination Progress Note    Team members: TCC, LISSET CHOU    Discharge disposition: Home - needs TBD    Status: IP    Payer: Medicare     Potential Barriers: iv diuresis      ADOD: 9/7    This TCC will continue to follow for home going needs and safe DC plan.    Soledad Han RN

## 2024-09-05 NOTE — H&P
"Note corresponding admission on HHVI.    HPI  Alayna Dumont is a 89 y.o. 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     She presented to ED for c/o SOB that started a \"couple of days ago\" and got improved since admission and treated with diuretics.     Subjective   Patient is felling better with improvement since admission and IV diuretics.  Patient presented overloaded with decompensated heart failure.         Objective     Physical Exam  Constitutional:       Appearance: Normal appearance. She is obese.   HENT:      Head: Atraumatic.      Mouth/Throat:      Mouth: Mucous membranes are moist.   Cardiovascular:      Rate and Rhythm: Rhythm irregular.      Heart sounds: Murmur heard.      Systolic murmur is present.      Diastolic murmur is present with a grade of 2/4.      Gallop present. S3 sounds present.   Pulmonary:      Breath sounds: Examination of the right-middle field reveals rales. Examination of the left-middle field reveals rales. Examination of the right-lower field reveals decreased breath sounds and rales. Examination of the left-lower field reveals decreased breath sounds and rales. Decreased breath sounds and rales present. " "  Abdominal:      General: Abdomen is flat. Bowel sounds are normal.   Musculoskeletal:      Right lower leg: 3+ Edema present.      Left lower le+ Edema present.         Last Recorded Vitals  Blood pressure 155/52, pulse 60, temperature 36.4 °C (97.5 °F), temperature source Temporal, resp. rate 18, height 1.6 m (5' 3\"), weight 92 kg (202 lb 13.2 oz), SpO2 94%.  Intake/Output last 3 Shifts:  No intake/output data recorded.    Relevant Results    Results for orders placed or performed during the hospital encounter of 24 (from the past 24 hour(s))   ECG 12 lead   Result Value Ref Range    Ventricular Rate 60 BPM    Atrial Rate 60 BPM    VT Interval 174 ms    QRS Duration 206 ms    QT Interval 562 ms    QTC Calculation(Bazett) 562 ms    P Axis 34 degrees    R Axis -82 degrees    T Axis 96 degrees    QRS Count 10 beats    Q Onset 169 ms    P Onset 104 ms    P Offset 124 ms    T Offset 450 ms    QTC Fredericia 562 ms   Comprehensive metabolic panel   Result Value Ref Range    Glucose 101 (H) 74 - 99 mg/dL    Sodium 138 136 - 145 mmol/L    Potassium 4.5 3.5 - 5.3 mmol/L    Chloride 98 98 - 107 mmol/L    Bicarbonate 28 21 - 32 mmol/L    Anion Gap 17 10 - 20 mmol/L    Urea Nitrogen 44 (H) 6 - 23 mg/dL    Creatinine 1.46 (H) 0.50 - 1.05 mg/dL    eGFR 34 (L) >60 mL/min/1.73m*2    Calcium 9.3 8.6 - 10.6 mg/dL    Albumin 4.1 3.4 - 5.0 g/dL    Alkaline Phosphatase 82 33 - 136 U/L    Total Protein 6.9 6.4 - 8.2 g/dL    AST 28 9 - 39 U/L    Bilirubin, Total 0.5 0.0 - 1.2 mg/dL    ALT 40 7 - 45 U/L   Magnesium   Result Value Ref Range    Magnesium 2.15 1.60 - 2.40 mg/dL   Protime-INR   Result Value Ref Range    Protime 11.1 9.8 - 12.8 seconds    INR 1.0 0.9 - 1.1   Troponin I, High Sensitivity   Result Value Ref Range    Troponin I, High Sensitivity (CMC) 39 (H) 0 - 34 ng/L   APTT   Result Value Ref Range    aPTT 35 27 - 38 seconds   Troponin I, High Sensitivity   Result Value Ref Range    Troponin I, High Sensitivity " (CMC) 40 (H) 0 - 34 ng/L   CBC and Auto Differential   Result Value Ref Range    WBC 6.2 4.4 - 11.3 x10*3/uL    nRBC 0.0 0.0 - 0.0 /100 WBCs    RBC 3.77 (L) 4.00 - 5.20 x10*6/uL    Hemoglobin 10.8 (L) 12.0 - 16.0 g/dL    Hematocrit 33.2 (L) 36.0 - 46.0 %    MCV 88 80 - 100 fL    MCH 28.6 26.0 - 34.0 pg    MCHC 32.5 32.0 - 36.0 g/dL    RDW 15.2 (H) 11.5 - 14.5 %    Platelets 197 150 - 450 x10*3/uL    Neutrophils % 71.5 40.0 - 80.0 %    Immature Granulocytes %, Automated 0.3 0.0 - 0.9 %    Lymphocytes % 14.2 13.0 - 44.0 %    Monocytes % 10.0 2.0 - 10.0 %    Eosinophils % 3.7 0.0 - 6.0 %    Basophils % 0.3 0.0 - 2.0 %    Neutrophils Absolute 4.44 1.60 - 5.50 x10*3/uL    Immature Granulocytes Absolute, Automated 0.02 0.00 - 0.50 x10*3/uL    Lymphocytes Absolute 0.88 0.80 - 3.00 x10*3/uL    Monocytes Absolute 0.62 0.05 - 0.80 x10*3/uL    Eosinophils Absolute 0.23 0.00 - 0.40 x10*3/uL    Basophils Absolute 0.02 0.00 - 0.10 x10*3/uL   B-type natriuretic peptide   Result Value Ref Range     (H) 0 - 99 pg/mL   Troponin I, High Sensitivity   Result Value Ref Range    Troponin I, High Sensitivity (CMC) 40 (H) 0 - 34 ng/L        Assessment/Plan   Assessment & Plan  Acute on chronic congestive heart failure, unspecified heart failure type (Multi)    IV diuretics   Heart failure management     I spent 40 minutes in the professional and overall care of this patient.      Ascencion Ramos MD

## 2024-09-05 NOTE — CONSULTS
"Nutrition Initial Assessment:   Nutrition Assessment    Reason for Assessment: Admission nursing screening    Patient is a 89 y.o. female presenting with Acute on chronic congestive heart failure    PMHx acute hypoxemic respiratory failure secondary to Covid 19 ,CHF/HF, HFPpF (per Echo EF 70-75% 2/2024) , Aortic Valve Stenosis- with replacement(bioprosthetic) , PACEMAKER, HTN, DM, Hypokalemia, anemia, Vit B12 deficiency, hyperlipidemia, chronic pain, chronic back pain, peripheral neuropathy, COPD/Asthma/Sarcoidosi , LETICIA, carotid vascular disease, infected hip (left) s.p surgery, left total knee replacement (2011) and revision (2014) , gout, bilateral cataracts extractions, functional urinary incontinence and inability to walk.        Nutrition History:  Energy Intake: Good > 75 %  Food and Nutrient History: Pt reports good appetite and intake. States that her son lives with her and does most of the cooking and shopping. States that they both are very careful with salt at home.  Food Allergies/Intolerances:  None  GI Symptoms: None  Oral Problems: None       Anthropometrics:  Height: 160 cm (5' 3\")   Weight: 92 kg (202 lb 13.2 oz)   BMI (Calculated): 35.94             Weight History:   Wt Readings from Last 6 Encounters:   09/04/24 92 kg (202 lb 13.2 oz)   06/05/24 92.2 kg (203 lb 4.2 oz)   04/30/24 92.2 kg (203 lb 4.2 oz)   03/22/24 95.9 kg (211 lb 6.7 oz)   02/16/24 100 kg (220 lb 10.9 oz)   04/20/23 84.4 kg (186 lb)      Weight Change %:   Pt screened at nutrition risk secondary to weight loss. No significant weight loss seen, but weight shifts make accurate assessment difficult. Pt remains overweight/obese    Nutrition Focused Physical Exam Findings:  defer: no concern for malnutrition    Nutrition Significant Labs:  CBC Trend:   Results from last 7 days   Lab Units 09/04/24  1028 09/03/24  2347   WBC AUTO x10*3/uL 5.7 6.2   RBC AUTO x10*6/uL 3.34* 3.77*   HEMOGLOBIN g/dL 9.5* 10.8*   HEMATOCRIT % 30.5* 33.2* "   MCV fL 91 88   PLATELETS AUTO x10*3/uL 190 197    , BMP Trend:   Results from last 7 days   Lab Units 09/04/24  0825 09/03/24  1551   GLUCOSE mg/dL 88 101*   CALCIUM mg/dL 9.0 9.3   SODIUM mmol/L 141 138   POTASSIUM mmol/L 3.8 4.5   CO2 mmol/L 28 28   CHLORIDE mmol/L 101 98   BUN mg/dL 41* 44*   CREATININE mg/dL 1.29* 1.46*        Nutrition Specific Medications:  Vit C, Vit D, Vit B12, ferrous sulfate, Vit E    I/O:    ; Stool Appearance: Loose (09/04/24 1600)    Dietary Orders (From admission, onward)       Start     Ordered    09/04/24 1827  Adult diet 2-3 grams sodium; 2000 mL fluid  Diet effective now        Question Answer Comment   Diet type 2-3 grams sodium    Dietary fluid restriction / 24h: 2000 mL fluid        09/04/24 1826                      Nutrition Diagnosis   Malnutrition Diagnosis  Patient has Malnutrition Diagnosis: No    Nutrition Diagnosis  Patient has Nutrition Diagnosis: No       Nutrition Interventions/Recommendations         Nutrition Prescription:  Individualized Nutrition Prescription Provided for : Continue with 2 gm Na diet        Nutrition Interventions:   Interventions: Meals and snacks  Goal: Consume >75% of meals.      Nutrition Education:   Encouraged continued adherence to low sodium diet at home.        Nutrition Monitoring and Evaluation   Food/Nutrient Related History Monitoring  Monitoring and Evaluation Plan: Energy intake, Amount of food  Criteria: PO intake is adequate to meet estimated needs        Time Spent (min): 30 minutes

## 2024-09-06 ENCOUNTER — APPOINTMENT (OUTPATIENT)
Dept: RADIOLOGY | Facility: HOSPITAL | Age: 89
DRG: 258 | End: 2024-09-06
Payer: MEDICARE

## 2024-09-06 LAB
ALBUMIN SERPL BCP-MCNC: 4 G/DL (ref 3.4–5)
ANION GAP SERPL CALC-SCNC: 14 MMOL/L (ref 10–20)
AORTIC VALVE MEAN GRADIENT: 15 MMHG
AORTIC VALVE PEAK VELOCITY: 2.56 M/S
AV PEAK GRADIENT: 26.2 MMHG
AVA (PEAK VEL): 1.54 CM2
AVA (VTI): 1.78 CM2
BODY SURFACE AREA: 2.02 M2
BUN SERPL-MCNC: 44 MG/DL (ref 6–23)
CALCIUM SERPL-MCNC: 9.5 MG/DL (ref 8.6–10.6)
CHLORIDE SERPL-SCNC: 99 MMOL/L (ref 98–107)
CO2 SERPL-SCNC: 28 MMOL/L (ref 21–32)
CREAT SERPL-MCNC: 1.45 MG/DL (ref 0.5–1.05)
EGFRCR SERPLBLD CKD-EPI 2021: 34 ML/MIN/1.73M*2
EJECTION FRACTION APICAL 4 CHAMBER: 47.2
EJECTION FRACTION: 50 %
ERYTHROCYTE [DISTWIDTH] IN BLOOD BY AUTOMATED COUNT: 15.4 % (ref 11.5–14.5)
GLUCOSE SERPL-MCNC: 110 MG/DL (ref 74–99)
HCT VFR BLD AUTO: 39.4 % (ref 36–46)
HGB BLD-MCNC: 12.2 G/DL (ref 12–16)
LEFT ATRIUM VOLUME AREA LENGTH INDEX BSA: 55.5 ML/M2
LEFT VENTRICLE INTERNAL DIMENSION DIASTOLE: 5.54 CM (ref 3.5–6)
LEFT VENTRICULAR OUTFLOW TRACT DIAMETER: 1.9 CM
MAGNESIUM SERPL-MCNC: 2.18 MG/DL (ref 1.6–2.4)
MCH RBC QN AUTO: 28.6 PG (ref 26–34)
MCHC RBC AUTO-ENTMCNC: 31 G/DL (ref 32–36)
MCV RBC AUTO: 92 FL (ref 80–100)
MITRAL VALVE E/A RATIO: 1.09
NRBC BLD-RTO: 0 /100 WBCS (ref 0–0)
PHOSPHATE SERPL-MCNC: 3.9 MG/DL (ref 2.5–4.9)
PLATELET # BLD AUTO: 250 X10*3/UL (ref 150–450)
POTASSIUM SERPL-SCNC: 4.4 MMOL/L (ref 3.5–5.3)
RBC # BLD AUTO: 4.27 X10*6/UL (ref 4–5.2)
RIGHT VENTRICLE FREE WALL PEAK S': 8.51 CM/S
RIGHT VENTRICLE PEAK SYSTOLIC PRESSURE: 33.9 MMHG
SODIUM SERPL-SCNC: 137 MMOL/L (ref 136–145)
TRICUSPID ANNULAR PLANE SYSTOLIC EXCURSION: 1.4 CM
WBC # BLD AUTO: 9 X10*3/UL (ref 4.4–11.3)

## 2024-09-06 PROCEDURE — 94640 AIRWAY INHALATION TREATMENT: CPT

## 2024-09-06 PROCEDURE — 36415 COLL VENOUS BLD VENIPUNCTURE: CPT | Performed by: PHYSICIAN ASSISTANT

## 2024-09-06 PROCEDURE — 99233 SBSQ HOSP IP/OBS HIGH 50: CPT | Performed by: INTERNAL MEDICINE

## 2024-09-06 PROCEDURE — 2500000001 HC RX 250 WO HCPCS SELF ADMINISTERED DRUGS (ALT 637 FOR MEDICARE OP)

## 2024-09-06 PROCEDURE — 99222 1ST HOSP IP/OBS MODERATE 55: CPT | Performed by: INTERNAL MEDICINE

## 2024-09-06 PROCEDURE — 2500000004 HC RX 250 GENERAL PHARMACY W/ HCPCS (ALT 636 FOR OP/ED): Performed by: PHYSICIAN ASSISTANT

## 2024-09-06 PROCEDURE — 1200000002 HC GENERAL ROOM WITH TELEMETRY DAILY

## 2024-09-06 PROCEDURE — 83735 ASSAY OF MAGNESIUM: CPT

## 2024-09-06 PROCEDURE — 87040 BLOOD CULTURE FOR BACTERIA: CPT | Performed by: PHYSICIAN ASSISTANT

## 2024-09-06 PROCEDURE — 36415 COLL VENOUS BLD VENIPUNCTURE: CPT

## 2024-09-06 PROCEDURE — 2500000004 HC RX 250 GENERAL PHARMACY W/ HCPCS (ALT 636 FOR OP/ED)

## 2024-09-06 PROCEDURE — 71046 X-RAY EXAM CHEST 2 VIEWS: CPT | Performed by: RADIOLOGY

## 2024-09-06 PROCEDURE — 85027 COMPLETE CBC AUTOMATED: CPT

## 2024-09-06 PROCEDURE — 2500000002 HC RX 250 W HCPCS SELF ADMINISTERED DRUGS (ALT 637 FOR MEDICARE OP, ALT 636 FOR OP/ED): Performed by: INTERNAL MEDICINE

## 2024-09-06 PROCEDURE — 84100 ASSAY OF PHOSPHORUS: CPT

## 2024-09-06 PROCEDURE — 71046 X-RAY EXAM CHEST 2 VIEWS: CPT

## 2024-09-06 PROCEDURE — 2500000001 HC RX 250 WO HCPCS SELF ADMINISTERED DRUGS (ALT 637 FOR MEDICARE OP): Performed by: PHYSICIAN ASSISTANT

## 2024-09-06 RX ORDER — FUROSEMIDE 10 MG/ML
40 INJECTION INTRAMUSCULAR; INTRAVENOUS DAILY
Status: DISCONTINUED | OUTPATIENT
Start: 2024-09-07 | End: 2024-09-06

## 2024-09-06 RX ORDER — FUROSEMIDE 10 MG/ML
40 INJECTION INTRAMUSCULAR; INTRAVENOUS 2 TIMES DAILY
Status: DISCONTINUED | OUTPATIENT
Start: 2024-09-06 | End: 2024-09-07

## 2024-09-06 RX ADMIN — BACLOFEN 5 MG: 10 TABLET ORAL at 21:36

## 2024-09-06 RX ADMIN — OXYCODONE HYDROCHLORIDE AND ACETAMINOPHEN 500 MG: 500 TABLET ORAL at 10:55

## 2024-09-06 RX ADMIN — IPRATROPIUM BROMIDE AND ALBUTEROL SULFATE 3 ML: .5; 3 SOLUTION RESPIRATORY (INHALATION) at 22:08

## 2024-09-06 RX ADMIN — FUROSEMIDE 40 MG: 10 INJECTION, SOLUTION INTRAMUSCULAR; INTRAVENOUS at 10:56

## 2024-09-06 RX ADMIN — CEPHALEXIN 500 MG: 500 CAPSULE ORAL at 21:29

## 2024-09-06 RX ADMIN — ACETAMINOPHEN 975 MG: 325 TABLET ORAL at 21:28

## 2024-09-06 RX ADMIN — Medication 1000 UNITS: at 10:55

## 2024-09-06 RX ADMIN — SACUBITRIL AND VALSARTAN 1 TABLET: 97; 103 TABLET, FILM COATED ORAL at 10:55

## 2024-09-06 RX ADMIN — FERROUS SULFATE TAB 325 MG (65 MG ELEMENTAL FE) 325 MG: 325 (65 FE) TAB at 10:55

## 2024-09-06 RX ADMIN — SACUBITRIL AND VALSARTAN 1 TABLET: 97; 103 TABLET, FILM COATED ORAL at 21:36

## 2024-09-06 RX ADMIN — FLUTICASONE FUROATE AND VILANTEROL TRIFENATATE 1 PUFF: 200; 25 POWDER RESPIRATORY (INHALATION) at 08:16

## 2024-09-06 RX ADMIN — ATORVASTATIN CALCIUM 20 MG: 20 TABLET, FILM COATED ORAL at 21:29

## 2024-09-06 RX ADMIN — GABAPENTIN 100 MG: 100 CAPSULE ORAL at 21:29

## 2024-09-06 RX ADMIN — DULOXETINE HYDROCHLORIDE 30 MG: 30 CAPSULE, DELAYED RELEASE ORAL at 10:55

## 2024-09-06 RX ADMIN — GABAPENTIN 100 MG: 100 CAPSULE ORAL at 10:56

## 2024-09-06 RX ADMIN — IPRATROPIUM BROMIDE AND ALBUTEROL SULFATE 3 ML: .5; 3 SOLUTION RESPIRATORY (INHALATION) at 08:11

## 2024-09-06 RX ADMIN — ICOSAPENT ETHYL 2 G: 1 CAPSULE ORAL at 18:20

## 2024-09-06 RX ADMIN — ICOSAPENT ETHYL 2 G: 1 CAPSULE ORAL at 10:55

## 2024-09-06 RX ADMIN — Medication 400 UNITS: at 10:55

## 2024-09-06 RX ADMIN — ALLOPURINOL 50 MG: 100 TABLET ORAL at 10:55

## 2024-09-06 RX ADMIN — METOPROLOL TARTRATE 25 MG: 25 TABLET, FILM COATED ORAL at 21:29

## 2024-09-06 RX ADMIN — METOPROLOL TARTRATE 25 MG: 25 TABLET, FILM COATED ORAL at 10:55

## 2024-09-06 RX ADMIN — CEPHALEXIN 500 MG: 500 CAPSULE ORAL at 10:55

## 2024-09-06 RX ADMIN — DOCUSATE SODIUM 100 MG: 100 CAPSULE, LIQUID FILLED ORAL at 10:55

## 2024-09-06 RX ADMIN — FUROSEMIDE 40 MG: 10 INJECTION, SOLUTION INTRAVENOUS at 21:30

## 2024-09-06 ASSESSMENT — COGNITIVE AND FUNCTIONAL STATUS - GENERAL
MOBILITY SCORE: 15
STANDING UP FROM CHAIR USING ARMS: A LOT
DRESSING REGULAR UPPER BODY CLOTHING: A LITTLE
MOBILITY SCORE: 14
PERSONAL GROOMING: A LITTLE
TOILETING: A LITTLE
DAILY ACTIVITIY SCORE: 18
DAILY ACTIVITIY SCORE: 17
DRESSING REGULAR UPPER BODY CLOTHING: A LITTLE
WALKING IN HOSPITAL ROOM: A LOT
MOBILITY SCORE: 15
PERSONAL GROOMING: A LITTLE
MOVING FROM LYING ON BACK TO SITTING ON SIDE OF FLAT BED WITH BEDRAILS: A LITTLE
PERSONAL GROOMING: A LITTLE
STANDING UP FROM CHAIR USING ARMS: A LOT
DRESSING REGULAR LOWER BODY CLOTHING: A LOT
DRESSING REGULAR LOWER BODY CLOTHING: A LOT
TURNING FROM BACK TO SIDE WHILE IN FLAT BAD: A LITTLE
MOVING TO AND FROM BED TO CHAIR: A LOT
WALKING IN HOSPITAL ROOM: A LOT
HELP NEEDED FOR BATHING: A LITTLE
MOVING TO AND FROM BED TO CHAIR: A LOT
CLIMB 3 TO 5 STEPS WITH RAILING: A LOT
DRESSING REGULAR UPPER BODY CLOTHING: A LITTLE
MOVING FROM LYING ON BACK TO SITTING ON SIDE OF FLAT BED WITH BEDRAILS: A LITTLE
CLIMB 3 TO 5 STEPS WITH RAILING: A LOT
HELP NEEDED FOR BATHING: A LITTLE
CLIMB 3 TO 5 STEPS WITH RAILING: A LOT
HELP NEEDED FOR BATHING: A LITTLE
DAILY ACTIVITIY SCORE: 18
TOILETING: A LITTLE
DRESSING REGULAR LOWER BODY CLOTHING: A LOT
STANDING UP FROM CHAIR USING ARMS: A LOT
TOILETING: A LOT
MOVING FROM LYING ON BACK TO SITTING ON SIDE OF FLAT BED WITH BEDRAILS: A LITTLE
WALKING IN HOSPITAL ROOM: A LOT
MOVING TO AND FROM BED TO CHAIR: A LOT

## 2024-09-06 ASSESSMENT — PAIN SCALES - GENERAL
PAINLEVEL_OUTOF10: 10 - WORST POSSIBLE PAIN
PAINLEVEL_OUTOF10: 0 - NO PAIN
PAINLEVEL_OUTOF10: 0 - NO PAIN

## 2024-09-06 ASSESSMENT — PAIN - FUNCTIONAL ASSESSMENT: PAIN_FUNCTIONAL_ASSESSMENT: 0-10

## 2024-09-06 NOTE — HOSPITAL COURSE
"Alayna Dumont is a 89 y.o. female presenting with acute on chronic congestive heart failure in the setting of severe prosthetic aortic regurgitation.    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      She presented to ED for c/o SOB that started a \"couple of days ago\" and was admitted for diuresis and heart failure management.     Floor course:  Echocardiogram showed EF 50%, GHK, LV sev dilated, severe LVH, LA sev dilated, RV mod enlarged/mildly reduced function, severe AI of prosthetic valve, mild AS, DI 0.63, peak/mn gradients 26.2/15.0, severe MAC, mild to mod MR, mild TR, RVSP 33.9.     Structural team consulted (Dr. Balderas) and patient deemed not a candidate for structural interventions due to high frailty, chronic infection status, limited mobility, and some degree of cognitive impairment. They recommended symptom treatment and palliative care. GDMT optimized as indicated for HTN and afterload reduction iso severe AI. Patient was hypervolemic and diuresed with IVP Lasix with reported improvement in symptoms and eventual transition to PO.     Device check from 2/2024 with estimated battery life of ~1 year, missed follow up device check. Repeat PPM device check with < 6 month battery life remaining. Consulted EP for generator change during admission. Given her chronic infection, they recommended blood " cultures to rule out infective endocarditis given severe AI. Blood cultures negative, no s/s of infection. Successful generator change performed 9/10. Back up rate set to HR 70 iso severe AI. Her device bandage remained CDI without evidence of drainage or hematoma.    Concern for acute gout flare with arthritis type pain and uric acid levels at upper limit of normal. Treated with short course of colchicine.    PT/OT consulted given patient's inability to walk at baseline, home/wheelchair bound. PT/OT recommended moderate intensity therapy and patient accepted to SNF.    On day of discharge, she is feeling overall better since admit.     Upon review of medications, lab values, and vital signs.  Pt was deemed stable for discharge in satisfactory condition.   Discharge weight:90.9 kg  More than 60 minutes were spent in coordinating patient discharge.      _________________________________________    Telemetry 9/17/2024 (personally reviewed): ApVp 70s     Physical exam:  General: NAD  Head/ neck: atraumatic  Cardiac: RRR, regular S1 S2 , 1/6 systolic murmur, 2/6 diastolic murmur no rub, no gallop  Pulm: Mild cough. No WOB. CTA bilaterally, no wheezes, rales or rhonchi.    Vascular: Radial 2+ bilaterally  GI: Non distended  Extremities: no LE edema   Neuro: no focal neuro deficits   Psych: appropriate mood and behavior   Skin: warm and dry

## 2024-09-06 NOTE — CONSULTS
Inpatient consult to Electrophysiology  Consult performed by: Wilber Sotomayor MD  Consult ordered by: Shannan Bolaños PA-C        History Of Present Illness:    Alayna Dumont is a 89 y.o. female presenting with chest discomfort.    89F hx pulmonary sarcoidosis, DM, s/p AVR 2013 with #23 Trifecta and septal myectomy c/b CHB s/p BSC dcPPM admitted with CP found to have ADHF with severe bioprosthetic AI. EP c/s for PPM at [facility].    -admit with ADHF, feels better with medical therapy for HF  -has prior knee infection, difficult ambulation - limited mobility even with wheelchair, does not leave her house  -no fevers or chills  -admitted in spring 2024, had long goals of care discussion with geriatrics service and generally not interested in invasive procedures, was DNR/DNI     Last Recorded Vitals:  Vitals:    09/05/24 2329 09/06/24 0448 09/06/24 0801 09/06/24 1110   BP:  154/52 (!) 158/49 (!) 157/46   BP Location:  Left arm     Patient Position:  Lying     Pulse:  60 60 60   Resp:  16 19 18   Temp:  36.5 °C (97.7 °F) 36.5 °C (97.7 °F) 36.5 °C (97.7 °F)   TempSrc:  Temporal     SpO2: 92% 91% 94% 92%   Weight:       Height:           Last Labs:  CBC - 9/6/2024:  6:35 PM  9.0 12.2 250    39.4      CMP - 9/5/2024:  7:27 PM  9.3 6.9 28 --- 0.5   3.9 3.9 40 82      PTT - 9/3/2024:  6:58 PM  1.0   11.1 35     Troponin I, High Sensitivity   Date/Time Value Ref Range Status   04/26/2024 06:56 PM 41 (H) 0 - 34 ng/L Final   04/25/2024 06:39 PM 35 (H) 0 - 34 ng/L Final   04/25/2024 01:31 PM 29 0 - 34 ng/L Final     Troponin I, High Sensitivity ([facility])   Date/Time Value Ref Range Status   09/04/2024 01:06 AM 40 (H) 0 - 34 ng/L Final   09/03/2024 11:47 PM 40 (H) 0 - 34 ng/L Final   09/03/2024 03:51 PM 39 (H) 0 - 34 ng/L Final     BNP   Date/Time Value Ref Range Status   09/03/2024 11:47  (H) 0 - 99 pg/mL Final   04/25/2024 01:31  (H) 0 - 99 pg/mL Final     Hemoglobin A1C   Date/Time Value Ref Range Status   04/25/2024 01:31 PM  6.1 (H) see below % Final   09/09/2022 05:36 PM 5.4 % Final     Comment:          Diagnosis of Diabetes-Adults   Non-Diabetic: < or = 5.6%   Increased risk for developing diabetes: 5.7-6.4%   Diagnostic of diabetes: > or = 6.5%  .       Monitoring of Diabetes                Age (y)     Therapeutic Goal (%)   Adults:          >18           <7.0   Pediatrics:    13-18           <7.5                   7-12           <8.0                   0- 6            7.5-8.5   American Diabetes Association. Diabetes Care 33(S1), Jan 2010.     01/08/2022 11:40 AM 5.9 (H) 4.3 - 5.6 % Final     Comment:     American Diabetes Association guidelines indicate that patients with HgbA1c in   the range 5.7-6.4% are at increased risk for development of diabetes, and   intervention by lifestyle modification may be beneficial. HgbA1c greater or   equal to 6.5% is considered diagnostic of diabetes.   09/02/2021 01:28 PM 5.6 4.3 - 5.6 % Final     Comment:     American Diabetes Association guidelines indicate that patients with HgbA1c in   the range 5.7-6.4% are at increased risk for development of diabetes, and   intervention by lifestyle modification may be beneficial. HgbA1c greater or   equal to 6.5% is considered diagnostic of diabetes.     LDL Calculated   Date/Time Value Ref Range Status   07/25/2024 12:26 PM 38 <=99 mg/dL Final     Comment:                                 Near   Borderline      AGE      Desirable  Optimal    High     High     Very High     0-19 Y     0 - 109     ---    110-129   >/= 130     ----    20-24 Y     0 - 119     ---    120-159   >/= 160     ----      >24 Y     0 -  99   100-129  130-159   160-189     >/=190       VLDL   Date/Time Value Ref Range Status   07/25/2024 12:26 PM 34 0 - 40 mg/dL Final   09/09/2022 05:36 PM 30 0 - 40 mg/dL Final      Last I/O:  I/O last 3 completed shifts:  In: 520 (5.7 mL/kg) [P.O.:520]  Out: 1950 (21.2 mL/kg) [Urine:1950 (0.6 mL/kg/hr)]  Weight: 92 kg     Past Cardiology Tests (Last  3 Years):  EKG:  ECG 12 lead 09/03/2024      ECG 12 Lead 04/26/2024      ECG 12 lead 04/25/2024      ECG 12 Lead 03/25/2024      ECG 12 lead 03/22/2024      ECG 12 lead 02/16/2024    Echo:  Transthoracic Echo (TTE) Complete 09/05/2024      Transthoracic Echo (TTE) Limited 04/26/2024      Transthoracic Echo (TTE) Limited 02/17/2024    Ejection Fractions:  EF   Date/Time Value Ref Range Status   09/05/2024 03:20 PM 50 %    04/26/2024 02:06 PM 43 %    02/17/2024 02:12 PM 72 %      Cath:  No results found for this or any previous visit from the past 1095 days.    Stress Test:  No results found for this or any previous visit from the past 1095 days.    Cardiac Imaging:  No results found for this or any previous visit from the past 1095 days.      Past Medical History:  She has a past medical history of Anemia, Bone infection of knee (Multi), CHF (congestive heart failure) (Multi), COPD (chronic obstructive pulmonary disease) (Multi), DM (diabetes mellitus) (Multi), GERD (gastroesophageal reflux disease), Gout, Hypertension, Left hip pain, Left knee pain, and Osteoarthritis.    Past Surgical History:  She has a past surgical history that includes Other surgical history (Left, 11/10/2022); Other surgical history (Right, 11/10/2022); Other surgical history (11/10/2022); and Other surgical history (11/10/2022).      Social History:  She reports that she has never smoked. She has never used smokeless tobacco. She reports that she does not drink alcohol and does not use drugs.    Family History:  Family History   Problem Relation Name Age of Onset    No Known Problems Mother      No Known Problems Father      No Known Problems Other Child         Allergies:  Naproxen    Inpatient Medications:  Scheduled medications   Medication Dose Route Frequency    alendronate  35 mg oral q7 days    allopurinol  50 mg oral q24h    ascorbic acid  500 mg oral Daily    atorvastatin  20 mg oral Nightly    baclofen  5 mg oral Nightly     cephalexin  500 mg oral BID    cholecalciferol  1,000 Units oral Daily    cyanocobalamin  1,000 mcg intramuscular q30 days    docusate sodium  100 mg oral BID    DULoxetine  30 mg oral Daily    [Held by provider] enoxaparin  30 mg subcutaneous q24h    ferrous sulfate (325 mg ferrous sulfate)  65 mg of iron oral Daily with breakfast    fluticasone furoate-vilanteroL  1 puff inhalation Daily    furosemide  40 mg intravenous BID    gabapentin  100 mg oral BID    icosapent ethyL  2 g oral BID    ipratropium-albuteroL  3 mL nebulization TID    metoprolol tartrate  25 mg oral BID    pantoprazole  20 mg oral Daily before breakfast    polyethylene glycol  17 g oral Daily    sacubitriL-valsartan  1 tablet oral BID    vitamin E  400 Units oral Daily     PRN medications   Medication    acetaminophen    melatonin    sennosides    traMADol     Continuous Medications   Medication Dose Last Rate     Outpatient Medications:  Current Outpatient Medications   Medication Instructions    acetaminophen (TYLENOL) 975 mg, oral, Every 8 hours    alendronate (FOSAMAX) 35 mg, oral, Every 7 days    allopurinol (ZYLOPRIM) 100 mg, oral, Daily    ascorbic acid (VITAMIN C) 1,000 mg, oral, Daily    atorvastatin (LIPITOR) 20 mg, oral, Nightly    b complex 0.4 mg tablet 1 tablet, oral, Daily    baclofen (Lioresal) 5 mg tablet Take one tablet every night for spasm in left hip. If needed, may take one tablet during the day. STOP METHOCARBAMOL    cephalexin (KEFLEX) 500 mg, oral, 2 times daily    cholecalciferol (VITAMIN D-3) 2,000 Units, oral, Daily    Dodex 1,000 mcg, intramuscular, Every 30 days    DULoxetine (Cymbalta) 30 mg DR capsule TAKE 1 CAPSULE BY MOUTH ONCE DAILY DISCONTINUE 20 MILLIGRAM CAPSULE    ferrous sulfate (FeroSuL) 325 (65 Fe) MG tablet 65 mg of iron, oral, Daily with breakfast    fluticasone furoate-vilanteroL (Breo Ellipta) 200-25 mcg/dose inhaler 1 puff, inhalation, Daily, Rinse mouth after each use    gabapentin (NEURONTIN) 100  mg, oral, 2 times daily, For neuropathy (nerve pain)     ipratropium-albuteroL (Duo-Neb) 0.5-2.5 mg/3 mL nebulizer solution 3 mL, inhalation, Every 4 hours, INHALE CONTENTS OF 1 VIAL IN NEBULIZER EVERY 4 HOURS WHILE AWAKE    metoprolol tartrate (LOPRESSOR) 25 mg, oral, 2 times daily    multivitamin with minerals tablet 1 tablet, oral, Daily    naloxone (NARCAN) 4 mg, nasal, As needed, Instill one spray into nostril and dial 911. If no response may repeat x 1 in 2 minutes. Only to be used for suspected opioid overdose.    nystatin (Mycostatin) cream Topical, 2 times daily, To Vaginal area until healed    omega 3-dha-epa-fish oil (Fish OiL) 1,000 mg (120 mg-180 mg) capsule 1 capsule, oral, Daily    pantoprazole (PROTONIX) 20 mg, oral, Daily    polyethylene glycol (GLYCOLAX, MIRALAX) 17 g, oral, 2 times daily    sacubitriL-valsartan (Entresto) 24-26 mg tablet 1 tablet, oral, 2 times daily    sennosides (SENOKOT) 8.6 mg, oral, 2 times daily PRN    torsemide (Demadex) 20 mg tablet Take 1 tablet (20mg) by mouth daily. Take 2 tablets on Mondays-Wednesdays and Fridays, and 1 tablet all remaining days of the week    traMADol (ULTRAM) 100 mg, oral, 2 times daily PRN    vit A/vit C/vit E/zinc/copper (PRESERVISION AREDS ORAL) 1 capsule, oral, 2 times daily    vitamin E 400 Units, oral, Daily       Physical Exam:    Constitutional: well appearing, no distress  Eyes: no conjunctival injection  ENT: moist mucous , no apparent injury  Respiratory/Thorax: normal work of breathing on room air  Cardiovascular: normal rate, regular rhythm, extremities warm, JVP not elevated  Extremities: warm, intact      Assessment/Plan       89F hx pulmonary sarcoidosis, DM, s/p AVR 2013 with #23 Trifecta and septal myectomy c/b CHB s/p [facility] dcPP[facility] admitted with CP found to have ADHF with severe bioprosthetic AI. EP c/s for PPM at [facility].    Impression:    #frailty with limited scope of care (see 4/29/2024 geriatrics note)  #s/p AVR 2013 with #23 [place]  and septal myectomy  #c/b CHB s/p BSC dcPPM at [facility]  #severe bioprosthetic AI  #HFmrEF (LVEF 45%)    PPM at [facility], given difficulty leaving house can facilitate inpatient generator change. Prior wishes to avoid invasive procedures noted - will clarify with patient and family if generator change in line with goals of care (likely yes - she is pacemaker dependant), temporarily reverse code status for procedure.    Given AI of unclear mechanism and history of chronic leg infection, will get Bcx prior to generator change.    No CRT given LVEF only mildly reduced and has competing hazards of age, frailty, comorbidities, and elevated procedural risk. No ICD given her heart block is likely from her AVR + myectomy and not cardiac sarcoidosis and her competing hazards of age, frailty, comorbidities      Recommendations:  -Bcx x2  -will clarify with patient and family if generator change in line with goals of care, temporarily reverse code status for procedure  -if amenable to proceed and Bcx no growth at 48h:  -NPO@MN Sunday night  -hold heparin products after midnight Sunday night  -tentative BSC generator change with possible temporary pacing wire 9/9 (cause of CHB is AVR and septal myectomy 2013)      Thank you for the opportunity to contribute to the care of this patient. Above recommendations discussed with Dr. Guerra. If further questions arise, please page the EP consult pager at [phone] on weekdays 7AM - 6PM and weekends 7AM - 2PM, or at [phone] at all other times. The EP device nurse can be reached at pager [phone] during regular business hours M-F.           Code Status:  Full Code    I spent 35 minutes in the professional and overall care of this patient.        Wilber Sotomayor MD

## 2024-09-06 NOTE — PROGRESS NOTES
Transitional Care Coordination Progress Note     Team members: TCC, LISSET CHOU     Discharge disposition: Home - no needs anticipated      Status: IP     Payer: Medicare      Potential Barriers: continue to diuresis, EP poss replacing device battery      ADOD: 9/7 to 9/8     This TCC will continue to follow for home going needs and safe DC plan.     Soledad Han RN

## 2024-09-06 NOTE — H&P (VIEW-ONLY)
Inpatient consult to Electrophysiology  Consult performed by: Wilber Sotomayor MD  Consult ordered by: Shannan Bolaños PA-C        History Of Present Illness:    Alayna Dumont is a 89 y.o. female presenting with chest discomfort.    89F hx pulmonary sarcoidosis, DM, s/p AVR 2013 with #23 Trifecta and septal myectomy c/b CHB s/p [facility] dcPP[facility] admitted with CP found to have ADHF with severe bioprosthetic AI. EP c/s for PPM at [facility].    -admit with ADHF, feels better with medical therapy for HF  -has prior knee infection, difficult ambulation - limited mobility even with wheelchair, does not leave her house  -no fevers or chills  -admitted in spring 2024, had long goals of care discussion with geriatrics service and generally not interested in invasive procedures, was DNR/DNI     Last Recorded Vitals:  Vitals:    09/05/24 2329 09/06/24 0448 09/06/24 0801 09/06/24 1110   BP:  154/52 (!) 158/49 (!) 157/46   BP Location:  Left arm     Patient Position:  Lying     Pulse:  60 60 60   Resp:  16 19 18   Temp:  36.5 °C (97.7 °F) 36.5 °C (97.7 °F) 36.5 °C (97.7 °F)   TempSrc:  Temporal     SpO2: 92% 91% 94% 92%   Weight:       Height:           Last Labs:  CBC - 9/6/2024:  6:35 PM  9.0 12.2 250    39.4      CMP - 9/5/2024:  7:27 PM  9.3 6.9 28 --- 0.5   3.9 3.9 40 82      PTT - 9/3/2024:  6:58 PM  1.0   11.1 35     Troponin I, High Sensitivity   Date/Time Value Ref Range Status   04/26/2024 06:56 PM 41 (H) 0 - 34 ng/L Final   04/25/2024 06:39 PM 35 (H) 0 - 34 ng/L Final   04/25/2024 01:31 PM 29 0 - 34 ng/L Final     Troponin I, High Sensitivity ([facility])   Date/Time Value Ref Range Status   09/04/2024 01:06 AM 40 (H) 0 - 34 ng/L Final   09/03/2024 11:47 PM 40 (H) 0 - 34 ng/L Final   09/03/2024 03:51 PM 39 (H) 0 - 34 ng/L Final     BNP   Date/Time Value Ref Range Status   09/03/2024 11:47  (H) 0 - 99 pg/mL Final   04/25/2024 01:31  (H) 0 - 99 pg/mL Final     Hemoglobin A1C   Date/Time Value Ref Range Status   04/25/2024 01:31 PM  6.1 (H) see below % Final   09/09/2022 05:36 PM 5.4 % Final     Comment:          Diagnosis of Diabetes-Adults   Non-Diabetic: < or = 5.6%   Increased risk for developing diabetes: 5.7-6.4%   Diagnostic of diabetes: > or = 6.5%  .       Monitoring of Diabetes                Age (y)     Therapeutic Goal (%)   Adults:          >18           <7.0   Pediatrics:    13-18           <7.5                   7-12           <8.0                   0- 6            7.5-8.5   American Diabetes Association. Diabetes Care 33(S1), Jan 2010.     01/08/2022 11:40 AM 5.9 (H) 4.3 - 5.6 % Final     Comment:     American Diabetes Association guidelines indicate that patients with HgbA1c in   the range 5.7-6.4% are at increased risk for development of diabetes, and   intervention by lifestyle modification may be beneficial. HgbA1c greater or   equal to 6.5% is considered diagnostic of diabetes.   09/02/2021 01:28 PM 5.6 4.3 - 5.6 % Final     Comment:     American Diabetes Association guidelines indicate that patients with HgbA1c in   the range 5.7-6.4% are at increased risk for development of diabetes, and   intervention by lifestyle modification may be beneficial. HgbA1c greater or   equal to 6.5% is considered diagnostic of diabetes.     LDL Calculated   Date/Time Value Ref Range Status   07/25/2024 12:26 PM 38 <=99 mg/dL Final     Comment:                                 Near   Borderline      AGE      Desirable  Optimal    High     High     Very High     0-19 Y     0 - 109     ---    110-129   >/= 130     ----    20-24 Y     0 - 119     ---    120-159   >/= 160     ----      >24 Y     0 -  99   100-129  130-159   160-189     >/=190       VLDL   Date/Time Value Ref Range Status   07/25/2024 12:26 PM 34 0 - 40 mg/dL Final   09/09/2022 05:36 PM 30 0 - 40 mg/dL Final      Last I/O:  I/O last 3 completed shifts:  In: 520 (5.7 mL/kg) [P.O.:520]  Out: 1950 (21.2 mL/kg) [Urine:1950 (0.6 mL/kg/hr)]  Weight: 92 kg     Past Cardiology Tests (Last  3 Years):  EKG:  ECG 12 lead 09/03/2024      ECG 12 Lead 04/26/2024      ECG 12 lead 04/25/2024      ECG 12 Lead 03/25/2024      ECG 12 lead 03/22/2024      ECG 12 lead 02/16/2024    Echo:  Transthoracic Echo (TTE) Complete 09/05/2024      Transthoracic Echo (TTE) Limited 04/26/2024      Transthoracic Echo (TTE) Limited 02/17/2024    Ejection Fractions:  EF   Date/Time Value Ref Range Status   09/05/2024 03:20 PM 50 %    04/26/2024 02:06 PM 43 %    02/17/2024 02:12 PM 72 %      Cath:  No results found for this or any previous visit from the past 1095 days.    Stress Test:  No results found for this or any previous visit from the past 1095 days.    Cardiac Imaging:  No results found for this or any previous visit from the past 1095 days.      Past Medical History:  She has a past medical history of Anemia, Bone infection of knee (Multi), CHF (congestive heart failure) (Multi), COPD (chronic obstructive pulmonary disease) (Multi), DM (diabetes mellitus) (Multi), GERD (gastroesophageal reflux disease), Gout, Hypertension, Left hip pain, Left knee pain, and Osteoarthritis.    Past Surgical History:  She has a past surgical history that includes Other surgical history (Left, 11/10/2022); Other surgical history (Right, 11/10/2022); Other surgical history (11/10/2022); and Other surgical history (11/10/2022).      Social History:  She reports that she has never smoked. She has never used smokeless tobacco. She reports that she does not drink alcohol and does not use drugs.    Family History:  Family History   Problem Relation Name Age of Onset    No Known Problems Mother      No Known Problems Father      No Known Problems Other Child         Allergies:  Naproxen    Inpatient Medications:  Scheduled medications   Medication Dose Route Frequency    alendronate  35 mg oral q7 days    allopurinol  50 mg oral q24h    ascorbic acid  500 mg oral Daily    atorvastatin  20 mg oral Nightly    baclofen  5 mg oral Nightly     cephalexin  500 mg oral BID    cholecalciferol  1,000 Units oral Daily    cyanocobalamin  1,000 mcg intramuscular q30 days    docusate sodium  100 mg oral BID    DULoxetine  30 mg oral Daily    [Held by provider] enoxaparin  30 mg subcutaneous q24h    ferrous sulfate (325 mg ferrous sulfate)  65 mg of iron oral Daily with breakfast    fluticasone furoate-vilanteroL  1 puff inhalation Daily    furosemide  40 mg intravenous BID    gabapentin  100 mg oral BID    icosapent ethyL  2 g oral BID    ipratropium-albuteroL  3 mL nebulization TID    metoprolol tartrate  25 mg oral BID    pantoprazole  20 mg oral Daily before breakfast    polyethylene glycol  17 g oral Daily    sacubitriL-valsartan  1 tablet oral BID    vitamin E  400 Units oral Daily     PRN medications   Medication    acetaminophen    melatonin    sennosides    traMADol     Continuous Medications   Medication Dose Last Rate     Outpatient Medications:  Current Outpatient Medications   Medication Instructions    acetaminophen (TYLENOL) 975 mg, oral, Every 8 hours    alendronate (FOSAMAX) 35 mg, oral, Every 7 days    allopurinol (ZYLOPRIM) 100 mg, oral, Daily    ascorbic acid (VITAMIN C) 1,000 mg, oral, Daily    atorvastatin (LIPITOR) 20 mg, oral, Nightly    b complex 0.4 mg tablet 1 tablet, oral, Daily    baclofen (Lioresal) 5 mg tablet Take one tablet every night for spasm in left hip. If needed, may take one tablet during the day. STOP METHOCARBAMOL    cephalexin (KEFLEX) 500 mg, oral, 2 times daily    cholecalciferol (VITAMIN D-3) 2,000 Units, oral, Daily    Dodex 1,000 mcg, intramuscular, Every 30 days    DULoxetine (Cymbalta) 30 mg DR capsule TAKE 1 CAPSULE BY MOUTH ONCE DAILY DISCONTINUE 20 MILLIGRAM CAPSULE    ferrous sulfate (FeroSuL) 325 (65 Fe) MG tablet 65 mg of iron, oral, Daily with breakfast    fluticasone furoate-vilanteroL (Breo Ellipta) 200-25 mcg/dose inhaler 1 puff, inhalation, Daily, Rinse mouth after each use    gabapentin (NEURONTIN) 100  mg, oral, 2 times daily, For neuropathy (nerve pain)     ipratropium-albuteroL (Duo-Neb) 0.5-2.5 mg/3 mL nebulizer solution 3 mL, inhalation, Every 4 hours, INHALE CONTENTS OF 1 VIAL IN NEBULIZER EVERY 4 HOURS WHILE AWAKE    metoprolol tartrate (LOPRESSOR) 25 mg, oral, 2 times daily    multivitamin with minerals tablet 1 tablet, oral, Daily    naloxone (NARCAN) 4 mg, nasal, As needed, Instill one spray into nostril and dial 911. If no response may repeat x 1 in 2 minutes. Only to be used for suspected opioid overdose.    nystatin (Mycostatin) cream Topical, 2 times daily, To Vaginal area until healed    omega 3-dha-epa-fish oil (Fish OiL) 1,000 mg (120 mg-180 mg) capsule 1 capsule, oral, Daily    pantoprazole (PROTONIX) 20 mg, oral, Daily    polyethylene glycol (GLYCOLAX, MIRALAX) 17 g, oral, 2 times daily    sacubitriL-valsartan (Entresto) 24-26 mg tablet 1 tablet, oral, 2 times daily    sennosides (SENOKOT) 8.6 mg, oral, 2 times daily PRN    torsemide (Demadex) 20 mg tablet Take 1 tablet (20mg) by mouth daily. Take 2 tablets on Mondays-Wednesdays and Fridays, and 1 tablet all remaining days of the week    traMADol (ULTRAM) 100 mg, oral, 2 times daily PRN    vit A/vit C/vit E/zinc/copper (PRESERVISION AREDS ORAL) 1 capsule, oral, 2 times daily    vitamin E 400 Units, oral, Daily       Physical Exam:    Constitutional: well appearing, no distress  Eyes: no conjunctival injection  ENT: moist mucous , no apparent injury  Respiratory/Thorax: normal work of breathing on room air  Cardiovascular: normal rate, regular rhythm, extremities warm, JVP not elevated  Extremities: warm, intact      Assessment/Plan       89F hx pulmonary sarcoidosis, DM, s/p AVR 2013 with #23 Trifecta and septal myectomy c/b CHB s/p [facility] dcPP[facility] admitted with CP found to have ADHF with severe bioprosthetic AI. EP c/s for PPM at [facility].    Impression:    #frailty with limited scope of care (see 4/29/2024 geriatrics note)  #s/p AVR 2013 with #23 [place]  and septal myectomy  #c/b CHB s/p BSC dcPPM at [facility]  #severe bioprosthetic AI  #HFmrEF (LVEF 45%)    PPM at [facility], given difficulty leaving house can facilitate inpatient generator change. Prior wishes to avoid invasive procedures noted - will clarify with patient and family if generator change in line with goals of care (likely yes - she is pacemaker dependant), temporarily reverse code status for procedure.    Given AI of unclear mechanism and history of chronic leg infection, will get Bcx prior to generator change.    No CRT given LVEF only mildly reduced and has competing hazards of age, frailty, comorbidities, and elevated procedural risk. No ICD given her heart block is likely from her AVR + myectomy and not cardiac sarcoidosis and her competing hazards of age, frailty, comorbidities      Recommendations:  -Bcx x2  -will clarify with patient and family if generator change in line with goals of care, temporarily reverse code status for procedure  -if amenable to proceed and Bcx no growth at 48h:  -NPO@MN Sunday night  -hold heparin products after midnight Sunday night  -tentative BSC generator change with possible temporary pacing wire 9/9 (cause of CHB is AVR and septal myectomy 2013)      Thank you for the opportunity to contribute to the care of this patient. Above recommendations discussed with Dr. Guerra. If further questions arise, please page the EP consult pager at [phone] on weekdays 7AM - 6PM and weekends 7AM - 2PM, or at [phone] at all other times. The EP device nurse can be reached at pager [phone] during regular business hours M-F.           Code Status:  Full Code    I spent 35 minutes in the professional and overall care of this patient.        Wilber Sotomayor MD

## 2024-09-06 NOTE — CONSULTS
Inpatient consult to Structural Heart/TAVR  Consult performed by: Ford Bryson MD  Consult ordered by: Shannan Bolaños PA-C        History Of Present Illness:    Alayna Dumont is a 89 y.o. 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 Echo during admission revealed degeneration of bioprosthetic valve with severe AR and EF 50%.  She lives at her son's and cannot walk due to left hip and knee pain. She ambulates in a wheelchair at home and does not leave the house due to mobility issues.    ROS:  Constitutional: fatigue  Eyes: no acute eye problems, no blurred vision, no diplopia, no eye pain  ENT: no nosebleeds, no acute hearing loss, no earache, no sore throat  Cardiovascular: dyspnea on exertion, no chest pain  Respiratory: SOB,no chronic cough, not coughing up sputum, no wheezing that is consistent with asthma  Gastrointestinal: no acute bowel complaints  Musculoskeletal: Pain in lower back, left hip and knee, no acute myalgias, no acute joint swelling  Skin: no skin rashes, no change in skin color and pigmentation, no skin lesions and no skin lumps.   Neurological: no headaches, no dizziness, no tingling, no fainting and no limb weakness.   Psychiatric:  no suicidal ideation, no confusion, no personality change and no emotional problems.   Hematologic/Lymphatic: no bleeding issues.  ROS complete and negative except as noted above.     Past Medical History:  She has a past medical history of Anemia, Bone infection of knee (Multi), CHF (congestive heart failure) (Multi), COPD (chronic obstructive pulmonary disease) (Multi), DM (diabetes mellitus) (Multi), GERD (gastroesophageal reflux disease), Gout, Hypertension, Left hip pain, Left  knee pain, and Osteoarthritis.    Past Surgical History:  She has a past surgical history that includes Other surgical history (Left, 11/10/2022); Other surgical history (Right, 11/10/2022); Other surgical history (11/10/2022); and Other surgical history (11/10/2022).      Social History:  She reports that she has never smoked. She has never used smokeless tobacco. She reports that she does not drink alcohol and does not use drugs.     Allergies:  Naproxen    Outpatient Medications:  Current Outpatient Medications   Medication Instructions    acetaminophen (TYLENOL) 975 mg, oral, Every 8 hours    alendronate (FOSAMAX) 35 mg, oral, Every 7 days    allopurinol (ZYLOPRIM) 100 mg, oral, Daily    ascorbic acid (VITAMIN C) 1,000 mg, oral, Daily    atorvastatin (LIPITOR) 20 mg, oral, Nightly    b complex 0.4 mg tablet 1 tablet, oral, Daily    baclofen (Lioresal) 5 mg tablet Take one tablet every night for spasm in left hip. If needed, may take one tablet during the day. STOP METHOCARBAMOL    cephalexin (KEFLEX) 500 mg, oral, 2 times daily    cholecalciferol (VITAMIN D-3) 2,000 Units, oral, Daily    Dodex 1,000 mcg, intramuscular, Every 30 days    DULoxetine (Cymbalta) 30 mg DR capsule TAKE 1 CAPSULE BY MOUTH ONCE DAILY DISCONTINUE 20 MILLIGRAM CAPSULE    ferrous sulfate (FeroSuL) 325 (65 Fe) MG tablet 65 mg of iron, oral, Daily with breakfast    fluticasone furoate-vilanteroL (Breo Ellipta) 200-25 mcg/dose inhaler 1 puff, inhalation, Daily, Rinse mouth after each use    gabapentin (NEURONTIN) 100 mg, oral, 2 times daily, For neuropathy (nerve pain)     ipratropium-albuteroL (Duo-Neb) 0.5-2.5 mg/3 mL nebulizer solution 3 mL, inhalation, Every 4 hours, INHALE CONTENTS OF 1 VIAL IN NEBULIZER EVERY 4 HOURS WHILE AWAKE    metoprolol tartrate (LOPRESSOR) 25 mg, oral, 2 times daily    multivitamin with minerals tablet 1 tablet, oral, Daily    naloxone (NARCAN) 4 mg, nasal, As needed, Instill one spray into nostril and dial 911.  If no response may repeat x 1 in 2 minutes. Only to be used for suspected opioid overdose.    nystatin (Mycostatin) cream Topical, 2 times daily, To Vaginal area until healed    omega 3-dha-epa-fish oil (Fish OiL) 1,000 mg (120 mg-180 mg) capsule 1 capsule, oral, Daily    pantoprazole (PROTONIX) 20 mg, oral, Daily    polyethylene glycol (GLYCOLAX, MIRALAX) 17 g, oral, 2 times daily    sacubitriL-valsartan (Entresto) 24-26 mg tablet 1 tablet, oral, 2 times daily    sennosides (SENOKOT) 8.6 mg, oral, 2 times daily PRN    torsemide (Demadex) 20 mg tablet Take 1 tablet (20mg) by mouth daily. Take 2 tablets on Mondays-Wednesdays and Fridays, and 1 tablet all remaining days of the week    traMADol (ULTRAM) 100 mg, oral, 2 times daily PRN    vit A/vit C/vit E/zinc/copper (PRESERVISION AREDS ORAL) 1 capsule, oral, 2 times daily    vitamin E 400 Units, oral, Daily       TAVR Workup:   - NYHA: III  - Frailty: 4/5  - CTS consult: pending      Physical Exam:  Constitutional: alert and in no acute distress.   Eyes: no erythema, swelling or discharge from the eye .   ENT: no erythema, edema, exudate or lesions .   Neck: neck is supple, no JVD  Pulmonary: no increased work of breathing or signs of respiratory distress , mild bibasilar crakles  Cardiovascular: RRR, 3/6 SILKE RUSB,  mild pitting edema, non-displaced PMI, no S3 or S4  Abdomen: abdomen non-tender, no masses  and no hepatomegaly .   Musculoskeletal: Decreased ROM of left hip and knee with tenderness.  Neurologic: non-focal neurologic examination.       Last Recorded Vitals:  Vitals:    09/05/24 2329 09/06/24 0448 09/06/24 0801 09/06/24 1110   BP:  154/52 (!) 158/49 (!) 157/46   BP Location:  Left arm     Patient Position:  Lying     Pulse:  60 60 60   Resp:  16 19 18   Temp:  36.5 °C (97.7 °F) 36.5 °C (97.7 °F) 36.5 °C (97.7 °F)   TempSrc:  Temporal     SpO2: 92% 91% 94% 92%   Weight:       Height:           Last Labs:  CBC - 9/5/2024:  7:27 PM  8.2 11.1 228    37.9       BMP  138  99  41                  ----------------<123     4.2  33  1.48     CMP - 9/5/2024:  7:27 PM  9.3 6.9 28 --- 0.5   3.9 3.9 40 82      PTT - 9/3/2024:  6:58 PM  1.0   11.1 35     Troponin I, High Sensitivity   Date/Time Value Ref Range Status   04/26/2024 06:56 PM 41 (H) 0 - 34 ng/L Final     Troponin I, High Sensitivity ([facility])   Date/Time Value Ref Range Status   09/04/2024 01:06 AM 40 (H) 0 - 34 ng/L Final     BNP   Date/Time Value Ref Range Status   09/03/2024 11:47  (H) 0 - 99 pg/mL Final        Last I/O:  I/O last 3 completed shifts:  In: 400 (4.3 mL/kg) [P.O.:400]  Out: 1850 (20.1 mL/kg) [Urine:1850 (0.6 mL/kg/hr)]  Weight: 92 kg     Ejection Fractions:  EF   Date/Time Value Ref Range Status   09/05/2024 03:20 PM 50 %    04/26/2024 02:06 PM 43 %    02/17/2024 02:12 PM 72 %        Inpatient Medications:  Scheduled medications   Medication Dose Route Frequency    alendronate  35 mg oral q7 days    allopurinol  50 mg oral q24h    ascorbic acid  500 mg oral Daily    atorvastatin  20 mg oral Nightly    baclofen  5 mg oral Nightly    cephalexin  500 mg oral BID    cholecalciferol  1,000 Units oral Daily    cyanocobalamin  1,000 mcg intramuscular q30 days    docusate sodium  100 mg oral BID    DULoxetine  30 mg oral Daily    [Held by provider] enoxaparin  30 mg subcutaneous q24h    ferrous sulfate (325 mg ferrous sulfate)  65 mg of iron oral Daily with breakfast    fluticasone furoate-vilanteroL  1 puff inhalation Daily    furosemide  40 mg intravenous BID    gabapentin  100 mg oral BID    icosapent ethyL  2 g oral BID    ipratropium-albuteroL  3 mL nebulization TID    metoprolol tartrate  25 mg oral BID    pantoprazole  20 mg oral Daily before breakfast    polyethylene glycol  17 g oral Daily    sacubitriL-valsartan  1 tablet oral BID    vitamin E  400 Units oral Daily     PRN medications   Medication    acetaminophen    melatonin    sennosides    traMADol     Continuous Medications   Medication  Dose Last Rate       Impression:  Alayna Dumont is a 89 y.o. 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 Echo during admission revealed degeneration of bioprosthetic valve with severe AR and EF 50%.     Due to high frailty, chronic infection status as well as mobility and some degree of cognitive impairment, the patient is not a candidate for any intervention to her aortic valve.    Plan:  -Symptomatic treatment and palliative care       Ford Bryson MD

## 2024-09-06 NOTE — PROGRESS NOTES
Subjective Data:  She is seen and examined this morning resting comfortably in her bed. She states that she had a good night. She denies chest pain and states that her breathing continues to improve.    - PPM device check with less than 6 month battery life remaining. Consulted EP for generator change during admission. Given her chronic infection, they recommended blood cultures and if negative over the weekend they will plan for generator change on Monday 9/9  - Echo yesterday with severe AI of bioprosthetic valve, structural team consulted, awaiting recs  - CXR from 9/3 with mild to moderate pulmonary edema, repeat CXR today.  - Likely change to PO Lasix tomorrow    Overnight Events:    No acute events overnight     Objective Data:  Last Recorded Vitals:  Vitals:    09/05/24 2329 09/06/24 0448 09/06/24 0801 09/06/24 1110   BP:  154/52 (!) 158/49 (!) 157/46   BP Location:  Left arm     Patient Position:  Lying     Pulse:  60 60 60   Resp:  16 19 18   Temp:  36.5 °C (97.7 °F) 36.5 °C (97.7 °F) 36.5 °C (97.7 °F)   TempSrc:  Temporal     SpO2: 92% 91% 94% 92%   Weight:       Height:           Last Labs:  CBC - 9/5/2024:  7:27 PM  8.2 11.1 228    37.9      CMP - 9/5/2024:  7:27 PM  9.3 6.9 28 --- 0.5   3.9 3.9 40 82      PTT - 9/3/2024:  6:58 PM  1.0   11.1 35     TROPHS   Date/Time Value Ref Range Status   09/04/2024 01:06 AM 40 0 - 34 ng/L Final   09/03/2024 11:47 PM 40 0 - 34 ng/L Final   09/03/2024 03:51 PM 39 0 - 34 ng/L Final   04/26/2024 06:56 PM 41 0 - 34 ng/L Final   04/25/2024 06:39 PM 35 0 - 34 ng/L Final   04/25/2024 01:31 PM 29 0 - 34 ng/L Final     BNP   Date/Time Value Ref Range Status   09/03/2024 11:47  0 - 99 pg/mL Final   04/25/2024 01:31  0 - 99 pg/mL Final     HGBA1C   Date/Time Value Ref Range Status   04/25/2024 01:31 PM 6.1 see below % Final   09/09/2022 05:36 PM 5.4 % Final     Comment:          Diagnosis of Diabetes-Adults   Non-Diabetic: < or = 5.6%   Increased risk for developing  diabetes: 5.7-6.4%   Diagnostic of diabetes: > or = 6.5%  .       Monitoring of Diabetes                Age (y)     Therapeutic Goal (%)   Adults:          >18           <7.0   Pediatrics:    13-18           <7.5                   7-12           <8.0                   0- 6            7.5-8.5   American Diabetes Association. Diabetes Care 33(S1), Jan 2010.     01/08/2022 11:40 AM 5.9 4.3 - 5.6 % Final     Comment:     American Diabetes Association guidelines indicate that patients with HgbA1c in   the range 5.7-6.4% are at increased risk for development of diabetes, and   intervention by lifestyle modification may be beneficial. HgbA1c greater or   equal to 6.5% is considered diagnostic of diabetes.   09/02/2021 01:28 PM 5.6 4.3 - 5.6 % Final     Comment:     American Diabetes Association guidelines indicate that patients with HgbA1c in   the range 5.7-6.4% are at increased risk for development of diabetes, and   intervention by lifestyle modification may be beneficial. HgbA1c greater or   equal to 6.5% is considered diagnostic of diabetes.     LDLCALC   Date/Time Value Ref Range Status   07/25/2024 12:26 PM 38 <=99 mg/dL Final     Comment:                                 Near   Borderline      AGE      Desirable  Optimal    High     High     Very High     0-19 Y     0 - 109     ---    110-129   >/= 130     ----    20-24 Y     0 - 119     ---    120-159   >/= 160     ----      >24 Y     0 -  99   100-129  130-159   160-189     >/=190       VLDL   Date/Time Value Ref Range Status   07/25/2024 12:26 PM 34 0 - 40 mg/dL Final   09/09/2022 05:36 PM 30 0 - 40 mg/dL Final      Last I/O:  I/O last 3 completed shifts:  In: 400 (4.3 mL/kg) [P.O.:400]  Out: 1850 (20.1 mL/kg) [Urine:1850 (0.6 mL/kg/hr)]  Weight: 92 kg     Past Cardiology Tests (Last 3 Years):  EKG:  ECG 12 lead 09/03/2024      ECG 12 Lead 04/26/2024      ECG 12 lead 04/25/2024      ECG 12 Lead 03/25/2024      ECG 12 lead 03/22/2024      ECG 12 lead  02/16/2024    Echo:  Transthoracic Echo (TTE) Limited 04/26/2024      Transthoracic Echo (TTE) Limited 02/17/2024    Ejection Fractions:  EF   Date/Time Value Ref Range Status   09/05/2024 03:20 PM 50 %    04/26/2024 02:06 PM 43 %    02/17/2024 02:12 PM 72 %      Cath:  No results found for this or any previous visit from the past 1095 days.    Stress Test:  No results found for this or any previous visit from the past 1095 days.    Cardiac Imaging:  No results found for this or any previous visit from the past 1095 days.      Inpatient Medications:  Scheduled medications   Medication Dose Route Frequency    alendronate  35 mg oral q7 days    allopurinol  50 mg oral q24h    ascorbic acid  500 mg oral Daily    atorvastatin  20 mg oral Nightly    baclofen  5 mg oral Nightly    cephalexin  500 mg oral BID    cholecalciferol  1,000 Units oral Daily    cyanocobalamin  1,000 mcg intramuscular q30 days    docusate sodium  100 mg oral BID    DULoxetine  30 mg oral Daily    [Held by provider] enoxaparin  30 mg subcutaneous q24h    ferrous sulfate (325 mg ferrous sulfate)  65 mg of iron oral Daily with breakfast    fluticasone furoate-vilanteroL  1 puff inhalation Daily    furosemide  40 mg intravenous BID    gabapentin  100 mg oral BID    icosapent ethyL  2 g oral BID    ipratropium-albuteroL  3 mL nebulization TID    metoprolol tartrate  25 mg oral BID    pantoprazole  20 mg oral Daily before breakfast    polyethylene glycol  17 g oral Daily    sacubitriL-valsartan  1 tablet oral BID    vitamin E  400 Units oral Daily     PRN medications   Medication    acetaminophen    melatonin    sennosides    traMADol     Continuous Medications   Medication Dose Last Rate       Physical Exam:  Physical Exam  Vitals and nursing note reviewed.   Constitutional:       General: She is not in acute distress.     Appearance: She is not ill-appearing.   HENT:      Head: Normocephalic and atraumatic.      Ears:      Comments: Hard of  "hearing  Eyes:      General: No scleral icterus.     Extraocular Movements: Extraocular movements intact.      Conjunctiva/sclera: Conjunctivae normal.   Cardiovascular:      Rate and Rhythm: Normal rate and regular rhythm.      Heart sounds: Murmur heard.      No friction rub. No gallop.   Pulmonary:      Effort: Pulmonary effort is normal. No respiratory distress.      Breath sounds: No wheezing or rhonchi.   Skin:     General: Skin is warm and dry.   Neurological:      General: No focal deficit present.      Mental Status: She is alert.          Assessment/Plan   Alayna Dumont is a 89 y.o. 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 She presented to ED for c/o SOB that started a \"couple of days ago\" and got improved since admission and treated with diuretics.      Acute on chronic Diastolic heart failure  Valvular Heart Disease, s/p AVR  HCM, s/p septal myomectomy    - 01/2023 TTE: EF 50-55%, no WMA, LV septal wall severely increased, sev LVH, bioprosthetic AV. Moderate to severe prosthetic AI, peak/mean gradient 38.0/21.3  - 2/17/24 TTE: EF 70-75%, no WMA, mod LVH, normal DF, bioprosthetic AV, mod to sev AI, peak/mean 40.4/22.0, moderate Aortic " stenosis  - 4/26/24 TTE: LV function is mildly to moderately decreased, WMA, sev LVH. Dyssynchrony consistent with RV pacing is noted. Moderate aortic valve stenosis.  DI 0.47, moderate AI. Peak/mean  31.9/15.1.   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 PNA in the differential. A component of pulmonary edema is present  - prev dry wt ~90-93 kg  - admit wt: 92 kg  - continue diuresis, 40 mg IV Lasix BID. Pending fluid status in the morning, will likely change to PO Lasix tomorrow  - Repeat CXR today  - further GDMT/optimization for HF  - of note, prev admit 4/25 -5/4 for ADHF, cardiology consulted, suspect less likely due to ischemia more likely pacemaker induced, Geriatrics was consulted for GOC discussion, code status was changed to DNR/DNI with no plan for any further invasive measures  - previously seen by Dr. Alfaro, 2/2023, referred to heart valve clinic, did not follow up  - increase Entresto to 97/102 today for better BP control   - SGLT2i likely contraindicated given functional urinary incontinence  - cont home Metop Tartrate 25 mg BID, consider transition to Toprol XL  - 2gram sodium diet, 2L fluid restriction, daily standing weights, strict I&O's     Hx Tachy Shahram Syndrome  S/p [employer] Pacemaker  - Missed her most recent device check  - Device check on 02/16/2024 estimated remaining battery life to be 1 year  - PPM device check yesterday showed battery life is less than 6 months, EP consulted who recommended blood cultures and if they remain negative over the weekend she is on the schedule for generator replacement on Monday 9/9.     HTN  - admit BP up to 194/66  in ED  - SBP last 24 hrs: 147-158  - meds as above, titrate as indicated     CKD stage 3 (stable)  - b/lCr ~1.3-1.6, GFR ~30-40  - admit Cr 1.46, GFR 34  - Daily Cr 1.48, GFR 33  - Avoid nephrotoxins and hypotension, renally dose meds as indicated  - trend daily RFP while admitted      Anemia/SUNDEEP  Vit B12 deficiency  - b/l hgb ~10-11  - admit hgb 10.8  Daily hgb 11.1  - no acute sign of bleeding  - cont home PO iron and B-12 injections and supplements     HLD  - cont home Atorvastatin and Vascepa     Chronic back pain  Peripheral neuropathy  - cont home Baclofen, gabapentin, Tramadol PRN  - PRN XS Tylenol     COPD/?Asthma  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 Stanislav Bolaños PA-C

## 2024-09-07 LAB
ALBUMIN SERPL BCP-MCNC: 3.7 G/DL (ref 3.4–5)
ANION GAP SERPL CALC-SCNC: 16 MMOL/L (ref 10–20)
BUN SERPL-MCNC: 48 MG/DL (ref 6–23)
CALCIUM SERPL-MCNC: 9.1 MG/DL (ref 8.6–10.6)
CHLORIDE SERPL-SCNC: 100 MMOL/L (ref 98–107)
CO2 SERPL-SCNC: 26 MMOL/L (ref 21–32)
CREAT SERPL-MCNC: 1.38 MG/DL (ref 0.5–1.05)
EGFRCR SERPLBLD CKD-EPI 2021: 36 ML/MIN/1.73M*2
ERYTHROCYTE [DISTWIDTH] IN BLOOD BY AUTOMATED COUNT: 15.2 % (ref 11.5–14.5)
GLUCOSE SERPL-MCNC: 141 MG/DL (ref 74–99)
HCT VFR BLD AUTO: 36.1 % (ref 36–46)
HGB BLD-MCNC: 11.2 G/DL (ref 12–16)
MAGNESIUM SERPL-MCNC: 2.27 MG/DL (ref 1.6–2.4)
MCH RBC QN AUTO: 28.1 PG (ref 26–34)
MCHC RBC AUTO-ENTMCNC: 31 G/DL (ref 32–36)
MCV RBC AUTO: 91 FL (ref 80–100)
NRBC BLD-RTO: 0 /100 WBCS (ref 0–0)
PHOSPHATE SERPL-MCNC: 3.3 MG/DL (ref 2.5–4.9)
PLATELET # BLD AUTO: 209 X10*3/UL (ref 150–450)
POTASSIUM SERPL-SCNC: 4 MMOL/L (ref 3.5–5.3)
RBC # BLD AUTO: 3.98 X10*6/UL (ref 4–5.2)
SODIUM SERPL-SCNC: 138 MMOL/L (ref 136–145)
WBC # BLD AUTO: 5.8 X10*3/UL (ref 4.4–11.3)

## 2024-09-07 PROCEDURE — 85027 COMPLETE CBC AUTOMATED: CPT

## 2024-09-07 PROCEDURE — 2500000004 HC RX 250 GENERAL PHARMACY W/ HCPCS (ALT 636 FOR OP/ED)

## 2024-09-07 PROCEDURE — 2500000001 HC RX 250 WO HCPCS SELF ADMINISTERED DRUGS (ALT 637 FOR MEDICARE OP): Performed by: PHYSICIAN ASSISTANT

## 2024-09-07 PROCEDURE — 2500000001 HC RX 250 WO HCPCS SELF ADMINISTERED DRUGS (ALT 637 FOR MEDICARE OP)

## 2024-09-07 PROCEDURE — 1200000002 HC GENERAL ROOM WITH TELEMETRY DAILY

## 2024-09-07 PROCEDURE — 2500000004 HC RX 250 GENERAL PHARMACY W/ HCPCS (ALT 636 FOR OP/ED): Performed by: PHYSICIAN ASSISTANT

## 2024-09-07 PROCEDURE — 99233 SBSQ HOSP IP/OBS HIGH 50: CPT | Performed by: INTERNAL MEDICINE

## 2024-09-07 PROCEDURE — 83735 ASSAY OF MAGNESIUM: CPT

## 2024-09-07 PROCEDURE — 84100 ASSAY OF PHOSPHORUS: CPT

## 2024-09-07 PROCEDURE — 2500000002 HC RX 250 W HCPCS SELF ADMINISTERED DRUGS (ALT 637 FOR MEDICARE OP, ALT 636 FOR OP/ED): Performed by: INTERNAL MEDICINE

## 2024-09-07 PROCEDURE — 94640 AIRWAY INHALATION TREATMENT: CPT

## 2024-09-07 PROCEDURE — 2500000002 HC RX 250 W HCPCS SELF ADMINISTERED DRUGS (ALT 637 FOR MEDICARE OP, ALT 636 FOR OP/ED)

## 2024-09-07 PROCEDURE — 36415 COLL VENOUS BLD VENIPUNCTURE: CPT

## 2024-09-07 RX ORDER — FUROSEMIDE 20 MG/1
40 TABLET ORAL DAILY
Status: DISPENSED | OUTPATIENT
Start: 2024-09-08

## 2024-09-07 RX ADMIN — GABAPENTIN 100 MG: 100 CAPSULE ORAL at 20:42

## 2024-09-07 RX ADMIN — Medication 1000 UNITS: at 10:20

## 2024-09-07 RX ADMIN — ALLOPURINOL 50 MG: 100 TABLET ORAL at 10:20

## 2024-09-07 RX ADMIN — TRAMADOL HYDROCHLORIDE 100 MG: 50 TABLET, COATED ORAL at 23:45

## 2024-09-07 RX ADMIN — Medication 400 UNITS: at 10:20

## 2024-09-07 RX ADMIN — CEPHALEXIN 500 MG: 500 CAPSULE ORAL at 10:20

## 2024-09-07 RX ADMIN — PANTOPRAZOLE SODIUM 20 MG: 20 TABLET, DELAYED RELEASE ORAL at 06:11

## 2024-09-07 RX ADMIN — FERROUS SULFATE TAB 325 MG (65 MG ELEMENTAL FE) 325 MG: 325 (65 FE) TAB at 10:21

## 2024-09-07 RX ADMIN — ICOSAPENT ETHYL 2 G: 1 CAPSULE ORAL at 10:20

## 2024-09-07 RX ADMIN — DULOXETINE HYDROCHLORIDE 30 MG: 30 CAPSULE, DELAYED RELEASE ORAL at 10:20

## 2024-09-07 RX ADMIN — METOPROLOL TARTRATE 25 MG: 25 TABLET, FILM COATED ORAL at 10:20

## 2024-09-07 RX ADMIN — FLUTICASONE FUROATE AND VILANTEROL TRIFENATATE 1 PUFF: 200; 25 POWDER RESPIRATORY (INHALATION) at 10:45

## 2024-09-07 RX ADMIN — BACLOFEN 5 MG: 10 TABLET ORAL at 20:42

## 2024-09-07 RX ADMIN — DOCUSATE SODIUM 100 MG: 100 CAPSULE, LIQUID FILLED ORAL at 10:20

## 2024-09-07 RX ADMIN — IPRATROPIUM BROMIDE AND ALBUTEROL SULFATE 3 ML: .5; 3 SOLUTION RESPIRATORY (INHALATION) at 14:59

## 2024-09-07 RX ADMIN — ENOXAPARIN SODIUM 30 MG: 100 INJECTION SUBCUTANEOUS at 10:20

## 2024-09-07 RX ADMIN — IPRATROPIUM BROMIDE AND ALBUTEROL SULFATE 3 ML: .5; 3 SOLUTION RESPIRATORY (INHALATION) at 10:45

## 2024-09-07 RX ADMIN — CEPHALEXIN 500 MG: 500 CAPSULE ORAL at 20:43

## 2024-09-07 RX ADMIN — METOPROLOL TARTRATE 25 MG: 25 TABLET, FILM COATED ORAL at 20:42

## 2024-09-07 RX ADMIN — ICOSAPENT ETHYL 2 G: 1 CAPSULE ORAL at 18:00

## 2024-09-07 RX ADMIN — FUROSEMIDE 40 MG: 10 INJECTION, SOLUTION INTRAVENOUS at 10:20

## 2024-09-07 RX ADMIN — IPRATROPIUM BROMIDE AND ALBUTEROL SULFATE 3 ML: .5; 3 SOLUTION RESPIRATORY (INHALATION) at 21:55

## 2024-09-07 RX ADMIN — SACUBITRIL AND VALSARTAN 1 TABLET: 97; 103 TABLET, FILM COATED ORAL at 22:22

## 2024-09-07 RX ADMIN — GABAPENTIN 100 MG: 100 CAPSULE ORAL at 10:20

## 2024-09-07 RX ADMIN — ATORVASTATIN CALCIUM 20 MG: 20 TABLET, FILM COATED ORAL at 20:42

## 2024-09-07 RX ADMIN — OXYCODONE HYDROCHLORIDE AND ACETAMINOPHEN 500 MG: 500 TABLET ORAL at 10:20

## 2024-09-07 RX ADMIN — SACUBITRIL AND VALSARTAN 1 TABLET: 97; 103 TABLET, FILM COATED ORAL at 10:20

## 2024-09-07 RX ADMIN — ACETAMINOPHEN 975 MG: 325 TABLET ORAL at 20:43

## 2024-09-07 ASSESSMENT — COGNITIVE AND FUNCTIONAL STATUS - GENERAL
STANDING UP FROM CHAIR USING ARMS: A LOT
EATING MEALS: A LITTLE
TURNING FROM BACK TO SIDE WHILE IN FLAT BAD: A LITTLE
DAILY ACTIVITIY SCORE: 14
STANDING UP FROM CHAIR USING ARMS: A LOT
HELP NEEDED FOR BATHING: A LOT
MOVING FROM LYING ON BACK TO SITTING ON SIDE OF FLAT BED WITH BEDRAILS: A LITTLE
CLIMB 3 TO 5 STEPS WITH RAILING: A LOT
CLIMB 3 TO 5 STEPS WITH RAILING: TOTAL
DRESSING REGULAR LOWER BODY CLOTHING: A LOT
WALKING IN HOSPITAL ROOM: A LOT
MOVING FROM LYING ON BACK TO SITTING ON SIDE OF FLAT BED WITH BEDRAILS: A LITTLE
MOBILITY SCORE: 14
MOVING TO AND FROM BED TO CHAIR: A LOT
EATING MEALS: A LITTLE
WALKING IN HOSPITAL ROOM: TOTAL
HELP NEEDED FOR BATHING: A LOT
DAILY ACTIVITIY SCORE: 16
MOBILITY SCORE: 12
TOILETING: A LITTLE
PERSONAL GROOMING: A LITTLE
DRESSING REGULAR UPPER BODY CLOTHING: A LITTLE
TOILETING: A LOT
MOVING TO AND FROM BED TO CHAIR: A LOT
TURNING FROM BACK TO SIDE WHILE IN FLAT BAD: A LITTLE
DRESSING REGULAR UPPER BODY CLOTHING: A LOT
PERSONAL GROOMING: A LITTLE
DRESSING REGULAR LOWER BODY CLOTHING: A LOT

## 2024-09-07 ASSESSMENT — PAIN - FUNCTIONAL ASSESSMENT: PAIN_FUNCTIONAL_ASSESSMENT: 0-10

## 2024-09-07 ASSESSMENT — PAIN SCALES - GENERAL
PAINLEVEL_OUTOF10: 7
PAINLEVEL_OUTOF10: 2
PAINLEVEL_OUTOF10: 0 - NO PAIN

## 2024-09-07 NOTE — CARE PLAN
Problem: Pain - Adult  Goal: Verbalizes/displays adequate comfort level or baseline comfort level  Outcome: Progressing     Problem: Safety - Adult  Goal: Free from fall injury  Outcome: Progressing     Problem: Discharge Planning  Goal: Discharge to home or other facility with appropriate resources  Outcome: Progressing     Problem: Chronic Conditions and Co-morbidities  Goal: Patient's chronic conditions and co-morbidity symptoms are monitored and maintained or improved  Outcome: Progressing     Problem: Skin  Goal: Participates in plan/prevention/treatment measures  Outcome: Progressing  Goal: Prevent/manage excess moisture  Outcome: Progressing  Goal: Prevent/minimize sheer/friction injuries  Outcome: Progressing  Goal: Promote/optimize nutrition  Outcome: Progressing  Goal: Promote skin healing  Outcome: Progressing   The patient's goals for the shift include      The clinical goals for the shift include Pt. will not have any cardiac or respiratory complaints throughout shift.    Over the shift, the patient did make progress toward the following goals.

## 2024-09-07 NOTE — CARE PLAN
The patient's goals for the shift include      The clinical goals for the shift include Pt. will not have any cardiac or respiratory complaints throughout shift.    Over the shift, the patient did make progress toward the following goals.    Problem: Nutrition  Goal: Less than 5 days NPO/clear liquids  Outcome: Progressing  Goal: Oral intake greater than 50%  Outcome: Progressing  Goal: Oral intake greater 75%  Outcome: Progressing  Goal: Consume prescribed supplement  Outcome: Progressing  Goal: Adequate PO fluid intake  Outcome: Progressing  Goal: Nutrition support goals are met within 48 hrs  Outcome: Progressing  Goal: Nutrition support is meeting 75% of nutrient needs  Outcome: Progressing  Goal: Tube feed tolerance  Outcome: Progressing  Goal: BG  mg/dL  Outcome: Progressing  Goal: Lab values WNL  Outcome: Progressing  Goal: Electrolytes WNL  Outcome: Progressing  Goal: Promote healing  Outcome: Progressing  Goal: Maintain stable weight  Outcome: Progressing  Goal: Reduce weight from edema/fluid  Outcome: Progressing  Goal: Gradual weight gain  Outcome: Progressing  Goal: Improve ostomy output  Outcome: Progressing     Problem: Pain - Adult  Goal: Verbalizes/displays adequate comfort level or baseline comfort level  Outcome: Progressing     Problem: Safety - Adult  Goal: Free from fall injury  Outcome: Progressing     Problem: Discharge Planning  Goal: Discharge to home or other facility with appropriate resources  Outcome: Progressing     Problem: Chronic Conditions and Co-morbidities  Goal: Patient's chronic conditions and co-morbidity symptoms are monitored and maintained or improved  Outcome: Progressing     Problem: Skin  Goal: Decreased wound size/increased tissue granulation at next dressing change  Outcome: Progressing  Goal: Participates in plan/prevention/treatment measures  Outcome: Progressing  Goal: Prevent/manage excess moisture  Outcome: Progressing  Goal: Prevent/minimize sheer/friction  injuries  Outcome: Progressing  Goal: Promote/optimize nutrition  Outcome: Progressing  Goal: Promote skin healing  Outcome: Progressing     Problem: Diabetes  Goal: Achieve decreasing blood glucose levels by end of shift  Outcome: Progressing  Goal: Increase stability of blood glucose readings by end of shift  Outcome: Progressing  Goal: Decrease in ketones present in urine by end of shift  Outcome: Progressing  Goal: Maintain electrolyte levels within acceptable range throughout shift  Outcome: Progressing  Goal: Maintain glucose levels >70mg/dl to <250mg/dl throughout shift  Outcome: Progressing  Goal: No changes in neurological exam by end of shift  Outcome: Progressing  Goal: Learn about and adhere to nutrition recommendations by end of shift  Outcome: Progressing  Goal: Vital signs within normal range for age by end of shift  Outcome: Progressing  Goal: Increase self care and/or family involovement by end of shift  Outcome: Progressing  Goal: Receive DSME education by end of shift  Outcome: Progressing     Problem: Heart Failure  Goal: Improved gas exchange this shift  Outcome: Progressing  Goal: Improved urinary output this shift  Outcome: Progressing  Goal: Reduction in peripheral edema within 24 hours  Outcome: Progressing  Goal: Report improvement of dyspnea/breathlessness this shift  Outcome: Progressing  Goal: Weight from fluid excess reduced over 2-3 days, then stabilize  Outcome: Progressing  Goal: Increase self care and/or family involvement in 24 hours  Outcome: Progressing

## 2024-09-07 NOTE — CARE PLAN
The patient's goals for the shift include  be free from injury    The clinical goals for the shift include pt will remain HMDS throughout shift      Problem: Nutrition  Goal: Less than 5 days NPO/clear liquids  Outcome: Progressing  Goal: Oral intake greater than 50%  Outcome: Progressing  Goal: Oral intake greater 75%  Outcome: Progressing  Goal: Consume prescribed supplement  Outcome: Progressing  Goal: Adequate PO fluid intake  Outcome: Progressing  Goal: Nutrition support goals are met within 48 hrs  Outcome: Progressing  Goal: Nutrition support is meeting 75% of nutrient needs  Outcome: Progressing  Goal: Tube feed tolerance  Outcome: Progressing  Goal: BG  mg/dL  Outcome: Progressing  Goal: Lab values WNL  Outcome: Progressing  Goal: Electrolytes WNL  Outcome: Progressing  Goal: Promote healing  Outcome: Progressing  Goal: Maintain stable weight  Outcome: Progressing  Goal: Reduce weight from edema/fluid  Outcome: Progressing  Goal: Gradual weight gain  Outcome: Progressing  Goal: Improve ostomy output  Outcome: Progressing     Problem: Pain - Adult  Goal: Verbalizes/displays adequate comfort level or baseline comfort level  Outcome: Progressing     Problem: Safety - Adult  Goal: Free from fall injury  Outcome: Progressing     Problem: Discharge Planning  Goal: Discharge to home or other facility with appropriate resources  Outcome: Progressing     Problem: Chronic Conditions and Co-morbidities  Goal: Patient's chronic conditions and co-morbidity symptoms are monitored and maintained or improved  Outcome: Progressing     Problem: Skin  Goal: Decreased wound size/increased tissue granulation at next dressing change  Outcome: Progressing  Goal: Participates in plan/prevention/treatment measures  Outcome: Progressing  Goal: Prevent/manage excess moisture  Outcome: Progressing  Goal: Prevent/minimize sheer/friction injuries  Outcome: Progressing  Goal: Promote/optimize nutrition  Outcome: Progressing  Goal:  Promote skin healing  Outcome: Progressing     Problem: Diabetes  Goal: Achieve decreasing blood glucose levels by end of shift  Outcome: Progressing  Goal: Increase stability of blood glucose readings by end of shift  Outcome: Progressing  Goal: Decrease in ketones present in urine by end of shift  Outcome: Progressing  Goal: Maintain electrolyte levels within acceptable range throughout shift  Outcome: Progressing  Goal: Maintain glucose levels >70mg/dl to <250mg/dl throughout shift  Outcome: Progressing  Goal: No changes in neurological exam by end of shift  Outcome: Progressing  Goal: Learn about and adhere to nutrition recommendations by end of shift  Outcome: Progressing  Goal: Vital signs within normal range for age by end of shift  Outcome: Progressing  Goal: Increase self care and/or family involovement by end of shift  Outcome: Progressing  Goal: Receive DSME education by end of shift  Outcome: Progressing     Problem: Heart Failure  Goal: Improved gas exchange this shift  Outcome: Progressing  Goal: Improved urinary output this shift  Outcome: Progressing  Goal: Reduction in peripheral edema within 24 hours  Outcome: Progressing  Goal: Report improvement of dyspnea/breathlessness this shift  Outcome: Progressing  Goal: Weight from fluid excess reduced over 2-3 days, then stabilize  Outcome: Progressing  Goal: Increase self care and/or family involvement in 24 hours  Outcome: Progressing

## 2024-09-07 NOTE — CARE PLAN
The patient's goals for the shift include  be free from injury    The clinical goals for the shift include pt will remain HMDS throughout shift      Problem: Nutrition  Goal: Less than 5 days NPO/clear liquids  9/7/2024 1146 by Leigh Ann Salgado RN  Outcome: Progressing  9/7/2024 1146 by Leigh Ann Salgado RN  Outcome: Progressing  Goal: Oral intake greater than 50%  9/7/2024 1146 by Leigh Ann Salgado RN  Outcome: Progressing  9/7/2024 1146 by Leigh Ann Salgado RN  Outcome: Progressing  Goal: Oral intake greater 75%  9/7/2024 1146 by Leigh Ann Salgado RN  Outcome: Progressing  9/7/2024 1146 by Leigh Ann Salgado RN  Outcome: Progressing  Goal: Consume prescribed supplement  9/7/2024 1146 by Leigh Ann Salgado RN  Outcome: Progressing  9/7/2024 1146 by Leigh Ann Salgado RN  Outcome: Progressing  Goal: Adequate PO fluid intake  9/7/2024 1146 by Leigh Ann Salgado RN  Outcome: Progressing  9/7/2024 1146 by Leigh Ann Salgado RN  Outcome: Progressing  Goal: Nutrition support goals are met within 48 hrs  9/7/2024 1146 by Leigh Ann Salgado RN  Outcome: Progressing  9/7/2024 1146 by Leigh Ann Salgado RN  Outcome: Progressing  Goal: Nutrition support is meeting 75% of nutrient needs  9/7/2024 1146 by Leigh Ann Salgado RN  Outcome: Progressing  9/7/2024 1146 by Liegh Ann Salgado RN  Outcome: Progressing  Goal: Tube feed tolerance  9/7/2024 1146 by Leigh Ann Salgado RN  Outcome: Progressing  9/7/2024 1146 by Leigh Ann Salgado RN  Outcome: Progressing  Goal: BG  mg/dL  9/7/2024 1146 by Leigh Ann Salgado RN  Outcome: Progressing  9/7/2024 1146 by Leigh Ann Salgado RN  Outcome: Progressing  Goal: Lab values WNL  9/7/2024 1146 by Leigh Ann Salgado RN  Outcome: Progressing  9/7/2024 1146 by Leigh Ann Salgado RN  Outcome: Progressing  Goal: Electrolytes WNL  9/7/2024 1146 by Leigh Ann Salgado RN  Outcome: Progressing  9/7/2024 1146 by Leigh Ann Salgado RN  Outcome: Progressing  Goal: Promote healing  9/7/2024 1146 by Leigh Ann Damian, RN  Outcome: Progressing  9/7/2024 1146 by Leigh Ann Salgado,  RN  Outcome: Progressing  Goal: Maintain stable weight  9/7/2024 1146 by Leigh Ann Salgado RN  Outcome: Progressing  9/7/2024 1146 by Leigh Ann Salgado RN  Outcome: Progressing  Goal: Reduce weight from edema/fluid  9/7/2024 1146 by Leigh Ann Salgado RN  Outcome: Progressing  9/7/2024 1146 by Leigh Ann Salgado RN  Outcome: Progressing  Goal: Gradual weight gain  9/7/2024 1146 by Leigh Ann Salgado RN  Outcome: Progressing  9/7/2024 1146 by Leigh Ann Salgado RN  Outcome: Progressing  Goal: Improve ostomy output  9/7/2024 1146 by Leigh Ann Salgado RN  Outcome: Progressing  9/7/2024 1146 by Leigh Ann Salgado RN  Outcome: Progressing     Problem: Pain - Adult  Goal: Verbalizes/displays adequate comfort level or baseline comfort level  9/7/2024 1146 by Leigh Ann Salgado RN  Outcome: Progressing  9/7/2024 1146 by Leigh Ann Salgado RN  Outcome: Progressing     Problem: Safety - Adult  Goal: Free from fall injury  9/7/2024 1146 by Leigh Ann Salgado RN  Outcome: Progressing  9/7/2024 1146 by Leigh Ann Salgado RN  Outcome: Progressing     Problem: Discharge Planning  Goal: Discharge to home or other facility with appropriate resources  9/7/2024 1146 by Leigh Ann Salgado RN  Outcome: Progressing  9/7/2024 1146 by Leigh Ann Salgado RN  Outcome: Progressing     Problem: Chronic Conditions and Co-morbidities  Goal: Patient's chronic conditions and co-morbidity symptoms are monitored and maintained or improved  9/7/2024 1146 by Leigh Ann Salgado RN  Outcome: Progressing  9/7/2024 1146 by Leigh Ann Salgado RN  Outcome: Progressing     Problem: Skin  Goal: Decreased wound size/increased tissue granulation at next dressing change  9/7/2024 1146 by Leigh Ann Salgado RN  Outcome: Progressing  9/7/2024 1146 by Leigh Ann Salgado RN  Outcome: Progressing  Goal: Participates in plan/prevention/treatment measures  9/7/2024 1146 by Leigh Ann Salgado RN  Outcome: Progressing  9/7/2024 1146 by Leigh Ann Salgado RN  Outcome: Progressing  Goal: Prevent/manage excess moisture  9/7/2024 1146 by Leigh Ann  COLE Salgado  Outcome: Progressing  9/7/2024 1146 by Leigh Ann Salgado RN  Outcome: Progressing  Goal: Prevent/minimize sheer/friction injuries  9/7/2024 1146 by Leigh Ann Salgado RN  Outcome: Progressing  9/7/2024 1146 by Leigh Ann Salgado RN  Outcome: Progressing  Goal: Promote/optimize nutrition  9/7/2024 1146 by Leigh Ann Salgado RN  Outcome: Progressing  9/7/2024 1146 by Leigh Ann Salgado RN  Outcome: Progressing  Goal: Promote skin healing  9/7/2024 1146 by Leigh Ann Salgado RN  Outcome: Progressing  9/7/2024 1146 by Leigh Ann Salgado RN  Outcome: Progressing     Problem: Diabetes  Goal: Achieve decreasing blood glucose levels by end of shift  9/7/2024 1146 by Leigh Ann Salgado RN  Outcome: Progressing  9/7/2024 1146 by Leigh Ann Salgado RN  Outcome: Progressing  Goal: Increase stability of blood glucose readings by end of shift  9/7/2024 1146 by Leigh Ann Salgado RN  Outcome: Progressing  9/7/2024 1146 by Leigh Ann Salgado RN  Outcome: Progressing  Goal: Decrease in ketones present in urine by end of shift  9/7/2024 1146 by Leigh Ann Salgado RN  Outcome: Progressing  9/7/2024 1146 by Leigh Ann Salgado RN  Outcome: Progressing  Goal: Maintain electrolyte levels within acceptable range throughout shift  9/7/2024 1146 by Leigh Ann Salgado RN  Outcome: Progressing  9/7/2024 1146 by Leigh Ann Salgado RN  Outcome: Progressing  Goal: Maintain glucose levels >70mg/dl to <250mg/dl throughout shift  9/7/2024 1146 by LeighA nn Salgado RN  Outcome: Progressing  9/7/2024 1146 by Leigh Ann Salgado RN  Outcome: Progressing  Goal: No changes in neurological exam by end of shift  9/7/2024 1146 by Leigh Ann Salgado RN  Outcome: Progressing  9/7/2024 1146 by Leigh Ann Salgado RN  Outcome: Progressing  Goal: Learn about and adhere to nutrition recommendations by end of shift  9/7/2024 1146 by Leigh Ann Salgado RN  Outcome: Progressing  9/7/2024 1146 by Leigh Ann Salgado RN  Outcome: Progressing  Goal: Vital signs within normal range for age by end of shift  9/7/2024 1146 by Leigh Ann Salgado  RN  Outcome: Progressing  9/7/2024 1146 by Leigh Ann Salgado RN  Outcome: Progressing  Goal: Increase self care and/or family involovement by end of shift  9/7/2024 1146 by Leigh Ann Salgado RN  Outcome: Progressing  9/7/2024 1146 by Leigh Ann Salgado RN  Outcome: Progressing  Goal: Receive DSME education by end of shift  9/7/2024 1146 by Leigh Ann Salgado RN  Outcome: Progressing  9/7/2024 1146 by Leigh Ann Salgado RN  Outcome: Progressing     Problem: Heart Failure  Goal: Improved gas exchange this shift  9/7/2024 1146 by Leigh Ann Salgado RN  Outcome: Progressing  9/7/2024 1146 by Leigh Ann Salgado RN  Outcome: Progressing  Goal: Improved urinary output this shift  9/7/2024 1146 by Leigh Ann Salgado RN  Outcome: Progressing  9/7/2024 1146 by Leigh Ann Salgado RN  Outcome: Progressing  Goal: Reduction in peripheral edema within 24 hours  9/7/2024 1146 by Leigh Ann Salgado RN  Outcome: Progressing  9/7/2024 1146 by Leigh Ann Salgado RN  Outcome: Progressing  Goal: Report improvement of dyspnea/breathlessness this shift  9/7/2024 1146 by Leigh Ann Salgado RN  Outcome: Progressing  9/7/2024 1146 by Leigh Ann Salgado RN  Outcome: Progressing  Goal: Weight from fluid excess reduced over 2-3 days, then stabilize  9/7/2024 1146 by Leigh Ann Salgado RN  Outcome: Progressing  9/7/2024 1146 by Leigh Ann Salgado RN  Outcome: Progressing  Goal: Increase self care and/or family involvement in 24 hours  9/7/2024 1146 by Leigh Ann Salgado RN  Outcome: Progressing  9/7/2024 1146 by Leigh Ann Salgado RN  Outcome: Progressing

## 2024-09-07 NOTE — PROGRESS NOTES
Subjective Data:  - EP following, if BC remain negative PPM Monday   - Hold 2nd 40mg iV lasix today, transition to 40mg PO tomorrow  - not a candidate for structural interventions, discussed it Dr. Balderas, formal note pending     Overnight Events:    No acute events overnight     Objective Data:  Last Recorded Vitals:  Vitals:    09/07/24 0006 09/07/24 0422 09/07/24 0618 09/07/24 0736   BP: (!) 128/46 (!) 130/44  163/59   BP Location:       Patient Position:       Pulse: 60 60  60   Resp: 16 18  14   Temp: 36.2 °C (97.2 °F) 36.6 °C (97.9 °F)  36.5 °C (97.7 °F)   TempSrc:       SpO2: 94% 95%  93%   Weight:   90.8 kg (200 lb 2.8 oz)    Height:           Last Labs:  CBC - 9/6/2024:  6:35 PM  9.0 12.2 250    39.4      CMP - 9/6/2024:  6:35 PM  9.5 6.9 28 --- 0.5   3.9 4.0 40 82      PTT - 9/3/2024:  6:58 PM  1.0   11.1 35     TROPHS   Date/Time Value Ref Range Status   09/04/2024 01:06 AM 40 0 - 34 ng/L Final   09/03/2024 11:47 PM 40 0 - 34 ng/L Final   09/03/2024 03:51 PM 39 0 - 34 ng/L Final   04/26/2024 06:56 PM 41 0 - 34 ng/L Final   04/25/2024 06:39 PM 35 0 - 34 ng/L Final   04/25/2024 01:31 PM 29 0 - 34 ng/L Final     BNP   Date/Time Value Ref Range Status   09/03/2024 11:47  0 - 99 pg/mL Final   04/25/2024 01:31  0 - 99 pg/mL Final     HGBA1C   Date/Time Value Ref Range Status   04/25/2024 01:31 PM 6.1 see below % Final   09/09/2022 05:36 PM 5.4 % Final     Comment:          Diagnosis of Diabetes-Adults   Non-Diabetic: < or = 5.6%   Increased risk for developing diabetes: 5.7-6.4%   Diagnostic of diabetes: > or = 6.5%  .       Monitoring of Diabetes                Age (y)     Therapeutic Goal (%)   Adults:          >18           <7.0   Pediatrics:    13-18           <7.5                   7-12           <8.0                   0- 6            7.5-8.5   American Diabetes Association. Diabetes Care 33(S1), Jan 2010.     01/08/2022 11:40 AM 5.9 4.3 - 5.6 % Final     Comment:     American Diabetes  Association guidelines indicate that patients with HgbA1c in   the range 5.7-6.4% are at increased risk for development of diabetes, and   intervention by lifestyle modification may be beneficial. HgbA1c greater or   equal to 6.5% is considered diagnostic of diabetes.   09/02/2021 01:28 PM 5.6 4.3 - 5.6 % Final     Comment:     American Diabetes Association guidelines indicate that patients with HgbA1c in   the range 5.7-6.4% are at increased risk for development of diabetes, and   intervention by lifestyle modification may be beneficial. HgbA1c greater or   equal to 6.5% is considered diagnostic of diabetes.     LDLCALC   Date/Time Value Ref Range Status   07/25/2024 12:26 PM 38 <=99 mg/dL Final     Comment:                                 Near   Borderline      AGE      Desirable  Optimal    High     High     Very High     0-19 Y     0 - 109     ---    110-129   >/= 130     ----    20-24 Y     0 - 119     ---    120-159   >/= 160     ----      >24 Y     0 -  99   100-129  130-159   160-189     >/=190       VLDL   Date/Time Value Ref Range Status   07/25/2024 12:26 PM 34 0 - 40 mg/dL Final   09/09/2022 05:36 PM 30 0 - 40 mg/dL Final      Last I/O:  I/O last 3 completed shifts:  In: 1140 (12.6 mL/kg) [P.O.:1140]  Out: 940 (10.4 mL/kg) [Urine:940 (0.3 mL/kg/hr)]  Weight: 90.8 kg     Past Cardiology Tests (Last 3 Years):  EKG:  ECG 12 lead 09/03/2024      ECG 12 Lead 04/26/2024      ECG 12 lead 04/25/2024      ECG 12 Lead 03/25/2024      ECG 12 lead 03/22/2024      ECG 12 lead 02/16/2024    Echo:  Transthoracic Echo (TTE) Limited 04/26/2024      Transthoracic Echo (TTE) Limited 02/17/2024    Ejection Fractions:  EF   Date/Time Value Ref Range Status   09/05/2024 03:20 PM 50 %    04/26/2024 02:06 PM 43 %    02/17/2024 02:12 PM 72 %      Cath:  No results found for this or any previous visit from the past 1095 days.    Stress Test:  No results found for this or any previous visit from the past 1095 days.    Cardiac  Imaging:  No results found for this or any previous visit from the past 1095 days.      Inpatient Medications:  Scheduled medications   Medication Dose Route Frequency    alendronate  35 mg oral q7 days    allopurinol  50 mg oral q24h    ascorbic acid  500 mg oral Daily    atorvastatin  20 mg oral Nightly    baclofen  5 mg oral Nightly    cephalexin  500 mg oral BID    cholecalciferol  1,000 Units oral Daily    cyanocobalamin  1,000 mcg intramuscular q30 days    docusate sodium  100 mg oral BID    DULoxetine  30 mg oral Daily    enoxaparin  30 mg subcutaneous q24h    ferrous sulfate (325 mg ferrous sulfate)  65 mg of iron oral Daily with breakfast    fluticasone furoate-vilanteroL  1 puff inhalation Daily    furosemide  40 mg intravenous BID    gabapentin  100 mg oral BID    icosapent ethyL  2 g oral BID    ipratropium-albuteroL  3 mL nebulization TID    metoprolol tartrate  25 mg oral BID    pantoprazole  20 mg oral Daily before breakfast    polyethylene glycol  17 g oral Daily    sacubitriL-valsartan  1 tablet oral BID    vitamin E  400 Units oral Daily     PRN medications   Medication    acetaminophen    melatonin    sennosides    traMADol     Continuous Medications   Medication Dose Last Rate       Physical Exam:  Physical Exam  Vitals and nursing note reviewed.   Constitutional:       General: She is not in acute distress.     Appearance: She is not ill-appearing.   HENT:      Head: Normocephalic and atraumatic.      Ears:      Comments: Hard of hearing  Eyes:      General: No scleral icterus.     Extraocular Movements: Extraocular movements intact.      Conjunctiva/sclera: Conjunctivae normal.   Cardiovascular:      Rate and Rhythm: Normal rate and regular rhythm.      Heart sounds: Murmur heard.      No friction rub. No gallop.   Pulmonary:      Effort: Pulmonary effort is normal. No respiratory distress.      Breath sounds: No wheezing or rhonchi.   Skin:     General: Skin is warm and dry.   Neurological:     "  General: No focal deficit present.      Mental Status: She is alert.            Assessment/Plan   Alayna Dumont is a 89 y.o. 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 She presented to ED for c/o SOB that started a \"couple of days ago\" and got improved since admission and treated with diuretics.      Acute on chronic Diastolic heart failure  Valvular Heart Disease, s/p AVR  HCM, s/p septal myomectomy    - 01/2023 TTE: EF 50-55%, no WMA, LV septal wall severely increased, sev LVH, bioprosthetic AV. Moderate to severe prosthetic AI, peak/mean gradient 38.0/21.3  - 2/17/24 TTE: EF 70-75%, no WMA, mod LVH, normal DF, bioprosthetic AV, mod to sev AI, peak/mean 40.4/22.0, moderate Aortic stenosis  - 4/26/24 TTE: LV function is mildly to moderately decreased, WMA, sev LVH. Dyssynchrony consistent with RV pacing is noted. Moderate aortic valve stenosis.  DI 0.47, moderate AI. Peak/mean  31.9/15.1.   - 9/5/24 TTE: LVEF 50%, severe AI  - Echo yesterday with severe AI of bioprosthetic valve  -  structural team consulted, not a candidate for structural interventions, discussed it Dr. Balderas, formal note pending   - hx tachy shahram syndrome s/p dc PPM " placement  - s/p C 6/2022: normal coronary arteries, but very tortuous in their course  - admit BNP: 885 (previously 952 4/2024)  - admit CXR: dense left basilar airspace disease with Lt pleural effusion. PNA in the differential. A component of pulmonary edema is present  - prev dry wt ~90-93 kg  - admit wt: 92 kg  - s/p diuresis, 40 mg IV Lasix BID.   - 40mg IV once today and transition to 40mg PO daily tomorrow and assess for response.  - Repeat CXR 9/6: Cardiomegaly and interstitial pulmonary edema.  - further GDMT/optimization for HF  - of note, prev admit 4/25 -5/4 for ADHF, cardiology consulted, suspect less likely due to ischemia more likely pacemaker induced, Geriatrics was consulted for GOC discussion, code status was changed to DNR/DNI with no plan for any further invasive measures  - previously seen by Dr. Alfaro, 2/2023, referred to heart valve clinic, did not follow up  - 9/6 increased Entresto to 97/102 today for better BP control   - SGLT2i likely contraindicated given functional urinary incontinence  - cont home Metop Tartrate 25 mg BID, consider transition to Toprol XL  - 2gram sodium diet, 2L fluid restriction, daily standing weights, strict I&O's     Hx Tachy Martir Syndrome  S/p [employer] Pacemaker  - Missed her most recent device check  - Device check on 02/16/2024 estimated remaining battery life to be 1 year  - PPM device check yesterday showed battery life is less than 6 months, EP consulted who recommended blood cultures and if they remain negative over the weekend she is on the schedule for generator replacement on Monday 9/9.  - NPO@[place] Sunday night  - hold heparin products after midnight Sunday night  - tentative BSC generator change with possible temporary pacing wire 9/9 (cause of CHB is AVR and septal myectomy 2013)     HTN  - admit BP up to 194/66 in ED  - SBP last 24 hrs: 120s-160s  - meds as above, titrate as indicated     CKD stage 3 (stable)  - b/lCr ~1.3-1.6, GFR  ~30-40  - admit Cr 1.46, GFR 34  - Daily Cr 1.45 (1.48)   - Avoid nephrotoxins and hypotension, renally dose meds as indicated  - trend daily RFP while admitted      Anemia/SUNDEEP  Vit B12 deficiency  - b/l hgb ~10-11  - admit hgb 10.8  - Daily hgb 12.2 (11.1)  - no acute sign of bleeding  - cont home PO iron and B-12 injections and supplements     HLD  - cont home Atorvastatin and Vascepa     Chronic back pain  Peripheral neuropathy  - cont home Baclofen, gabapentin, Tramadol PRN  - PRN XS Tylenol     COPD/?Asthma  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 Stanislav Yan, APRN-CNP, DNP

## 2024-09-08 VITALS
HEIGHT: 63 IN | TEMPERATURE: 98.1 F | RESPIRATION RATE: 18 BRPM | BODY MASS INDEX: 35.16 KG/M2 | HEART RATE: 60 BPM | SYSTOLIC BLOOD PRESSURE: 130 MMHG | OXYGEN SATURATION: 95 % | WEIGHT: 198.41 LBS | DIASTOLIC BLOOD PRESSURE: 62 MMHG

## 2024-09-08 LAB
ALBUMIN SERPL BCP-MCNC: 3.6 G/DL (ref 3.4–5)
ANION GAP SERPL CALC-SCNC: 17 MMOL/L (ref 10–20)
BACTERIA BLD CULT: NORMAL
BACTERIA BLD CULT: NORMAL
BUN SERPL-MCNC: 47 MG/DL (ref 6–23)
CALCIUM SERPL-MCNC: 9.2 MG/DL (ref 8.6–10.6)
CHLORIDE SERPL-SCNC: 101 MMOL/L (ref 98–107)
CO2 SERPL-SCNC: 25 MMOL/L (ref 21–32)
CREAT SERPL-MCNC: 1.27 MG/DL (ref 0.5–1.05)
EGFRCR SERPLBLD CKD-EPI 2021: 40 ML/MIN/1.73M*2
ERYTHROCYTE [DISTWIDTH] IN BLOOD BY AUTOMATED COUNT: 15.1 % (ref 11.5–14.5)
GLUCOSE SERPL-MCNC: 117 MG/DL (ref 74–99)
HCT VFR BLD AUTO: 38.1 % (ref 36–46)
HGB BLD-MCNC: 11.3 G/DL (ref 12–16)
MAGNESIUM SERPL-MCNC: 2.5 MG/DL (ref 1.6–2.4)
MCH RBC QN AUTO: 28.5 PG (ref 26–34)
MCHC RBC AUTO-ENTMCNC: 29.7 G/DL (ref 32–36)
MCV RBC AUTO: 96 FL (ref 80–100)
NRBC BLD-RTO: 0 /100 WBCS (ref 0–0)
PHOSPHATE SERPL-MCNC: 3.8 MG/DL (ref 2.5–4.9)
PLATELET # BLD AUTO: 234 X10*3/UL (ref 150–450)
POTASSIUM SERPL-SCNC: 4.1 MMOL/L (ref 3.5–5.3)
RBC # BLD AUTO: 3.97 X10*6/UL (ref 4–5.2)
SODIUM SERPL-SCNC: 139 MMOL/L (ref 136–145)
WBC # BLD AUTO: 5.8 X10*3/UL (ref 4.4–11.3)

## 2024-09-08 PROCEDURE — 2500000004 HC RX 250 GENERAL PHARMACY W/ HCPCS (ALT 636 FOR OP/ED)

## 2024-09-08 PROCEDURE — 2500000001 HC RX 250 WO HCPCS SELF ADMINISTERED DRUGS (ALT 637 FOR MEDICARE OP)

## 2024-09-08 PROCEDURE — 99233 SBSQ HOSP IP/OBS HIGH 50: CPT | Performed by: INTERNAL MEDICINE

## 2024-09-08 PROCEDURE — 83735 ASSAY OF MAGNESIUM: CPT

## 2024-09-08 PROCEDURE — 2500000002 HC RX 250 W HCPCS SELF ADMINISTERED DRUGS (ALT 637 FOR MEDICARE OP, ALT 636 FOR OP/ED): Performed by: INTERNAL MEDICINE

## 2024-09-08 PROCEDURE — 2500000001 HC RX 250 WO HCPCS SELF ADMINISTERED DRUGS (ALT 637 FOR MEDICARE OP): Performed by: NURSE PRACTITIONER

## 2024-09-08 PROCEDURE — 1200000002 HC GENERAL ROOM WITH TELEMETRY DAILY

## 2024-09-08 PROCEDURE — 94640 AIRWAY INHALATION TREATMENT: CPT

## 2024-09-08 PROCEDURE — 80069 RENAL FUNCTION PANEL: CPT

## 2024-09-08 PROCEDURE — 36415 COLL VENOUS BLD VENIPUNCTURE: CPT

## 2024-09-08 PROCEDURE — 2500000001 HC RX 250 WO HCPCS SELF ADMINISTERED DRUGS (ALT 637 FOR MEDICARE OP): Performed by: PHYSICIAN ASSISTANT

## 2024-09-08 PROCEDURE — 85027 COMPLETE CBC AUTOMATED: CPT

## 2024-09-08 PROCEDURE — 2500000002 HC RX 250 W HCPCS SELF ADMINISTERED DRUGS (ALT 637 FOR MEDICARE OP, ALT 636 FOR OP/ED)

## 2024-09-08 RX ORDER — METOPROLOL TARTRATE 25 MG/1
50 TABLET, FILM COATED ORAL 2 TIMES DAILY
Status: DISPENSED | OUTPATIENT
Start: 2024-09-08

## 2024-09-08 RX ORDER — CETIRIZINE HYDROCHLORIDE 10 MG/1
10 TABLET ORAL DAILY
OUTPATIENT
Start: 2024-09-08

## 2024-09-08 RX ORDER — CETIRIZINE HYDROCHLORIDE 10 MG/1
10 TABLET ORAL DAILY
Status: DISPENSED | OUTPATIENT
Start: 2024-09-08

## 2024-09-08 RX ORDER — PRAMOXINE HYDROCHLORIDE 10 MG/ML
LOTION TOPICAL 3 TIMES DAILY PRN
Status: DISPENSED | OUTPATIENT
Start: 2024-09-08

## 2024-09-08 RX ADMIN — METOPROLOL TARTRATE 50 MG: 25 TABLET, FILM COATED ORAL at 21:54

## 2024-09-08 RX ADMIN — FLUTICASONE FUROATE AND VILANTEROL TRIFENATATE 1 PUFF: 200; 25 POWDER RESPIRATORY (INHALATION) at 11:06

## 2024-09-08 RX ADMIN — CEPHALEXIN 500 MG: 500 CAPSULE ORAL at 21:54

## 2024-09-08 RX ADMIN — PRAMOXINE HYDROCHLORIDE: 10 LOTION TOPICAL at 12:55

## 2024-09-08 RX ADMIN — FUROSEMIDE 40 MG: 20 TABLET ORAL at 09:21

## 2024-09-08 RX ADMIN — SACUBITRIL AND VALSARTAN 1 TABLET: 97; 103 TABLET, FILM COATED ORAL at 21:54

## 2024-09-08 RX ADMIN — ICOSAPENT ETHYL 2 G: 1 CAPSULE ORAL at 17:09

## 2024-09-08 RX ADMIN — FERROUS SULFATE TAB 325 MG (65 MG ELEMENTAL FE) 325 MG: 325 (65 FE) TAB at 09:20

## 2024-09-08 RX ADMIN — DOCUSATE SODIUM 100 MG: 100 CAPSULE, LIQUID FILLED ORAL at 09:21

## 2024-09-08 RX ADMIN — PANTOPRAZOLE SODIUM 20 MG: 20 TABLET, DELAYED RELEASE ORAL at 09:21

## 2024-09-08 RX ADMIN — ICOSAPENT ETHYL 2 G: 1 CAPSULE ORAL at 09:22

## 2024-09-08 RX ADMIN — IPRATROPIUM BROMIDE AND ALBUTEROL SULFATE 3 ML: .5; 3 SOLUTION RESPIRATORY (INHALATION) at 14:51

## 2024-09-08 RX ADMIN — Medication 400 UNITS: at 09:21

## 2024-09-08 RX ADMIN — CETIRIZINE HYDROCHLORIDE 10 MG: 10 TABLET, FILM COATED ORAL at 12:55

## 2024-09-08 RX ADMIN — GABAPENTIN 100 MG: 100 CAPSULE ORAL at 09:22

## 2024-09-08 RX ADMIN — Medication 1000 UNITS: at 09:22

## 2024-09-08 RX ADMIN — GABAPENTIN 100 MG: 100 CAPSULE ORAL at 21:54

## 2024-09-08 RX ADMIN — ALLOPURINOL 50 MG: 100 TABLET ORAL at 09:20

## 2024-09-08 RX ADMIN — SACUBITRIL AND VALSARTAN 1 TABLET: 97; 103 TABLET, FILM COATED ORAL at 09:22

## 2024-09-08 RX ADMIN — METOPROLOL TARTRATE 25 MG: 25 TABLET, FILM COATED ORAL at 09:21

## 2024-09-08 RX ADMIN — ENOXAPARIN SODIUM 30 MG: 100 INJECTION SUBCUTANEOUS at 09:20

## 2024-09-08 RX ADMIN — DULOXETINE HYDROCHLORIDE 30 MG: 30 CAPSULE, DELAYED RELEASE ORAL at 09:21

## 2024-09-08 RX ADMIN — IPRATROPIUM BROMIDE AND ALBUTEROL SULFATE 3 ML: .5; 3 SOLUTION RESPIRATORY (INHALATION) at 20:11

## 2024-09-08 RX ADMIN — OXYCODONE HYDROCHLORIDE AND ACETAMINOPHEN 500 MG: 500 TABLET ORAL at 09:21

## 2024-09-08 RX ADMIN — ATORVASTATIN CALCIUM 20 MG: 20 TABLET, FILM COATED ORAL at 21:54

## 2024-09-08 RX ADMIN — BACLOFEN 5 MG: 10 TABLET ORAL at 21:54

## 2024-09-08 RX ADMIN — IPRATROPIUM BROMIDE AND ALBUTEROL SULFATE 3 ML: .5; 3 SOLUTION RESPIRATORY (INHALATION) at 11:06

## 2024-09-08 RX ADMIN — CEPHALEXIN 500 MG: 500 CAPSULE ORAL at 09:21

## 2024-09-08 ASSESSMENT — COGNITIVE AND FUNCTIONAL STATUS - GENERAL
MOVING FROM LYING ON BACK TO SITTING ON SIDE OF FLAT BED WITH BEDRAILS: A LITTLE
HELP NEEDED FOR BATHING: A LOT
WALKING IN HOSPITAL ROOM: TOTAL
DRESSING REGULAR UPPER BODY CLOTHING: A LOT
CLIMB 3 TO 5 STEPS WITH RAILING: TOTAL
DRESSING REGULAR LOWER BODY CLOTHING: A LOT
MOVING TO AND FROM BED TO CHAIR: A LOT
MOVING TO AND FROM BED TO CHAIR: A LOT
TURNING FROM BACK TO SIDE WHILE IN FLAT BAD: A LITTLE
STANDING UP FROM CHAIR USING ARMS: A LOT
PERSONAL GROOMING: A LITTLE
TURNING FROM BACK TO SIDE WHILE IN FLAT BAD: A LITTLE
TOILETING: A LOT
MOVING FROM LYING ON BACK TO SITTING ON SIDE OF FLAT BED WITH BEDRAILS: A LITTLE
CLIMB 3 TO 5 STEPS WITH RAILING: TOTAL
HELP NEEDED FOR BATHING: A LOT
PERSONAL GROOMING: A LITTLE
STANDING UP FROM CHAIR USING ARMS: A LOT
DAILY ACTIVITIY SCORE: 15
MOBILITY SCORE: 12
DRESSING REGULAR UPPER BODY CLOTHING: A LOT
DRESSING REGULAR LOWER BODY CLOTHING: A LOT
WALKING IN HOSPITAL ROOM: TOTAL
TOILETING: A LOT
MOBILITY SCORE: 12

## 2024-09-08 ASSESSMENT — PAIN SCALES - GENERAL
PAINLEVEL_OUTOF10: 0 - NO PAIN

## 2024-09-08 ASSESSMENT — PAIN - FUNCTIONAL ASSESSMENT: PAIN_FUNCTIONAL_ASSESSMENT: 0-10

## 2024-09-08 NOTE — CARE PLAN
The patient's goals for the shift include  report decreased itching.    The clinical goals for the shift include patient will remain hemodynamically stable throughout shift    Over the shift, the patient did make progress toward the following goals.       Problem: Pain - Adult  Goal: Verbalizes/displays adequate comfort level or baseline comfort level  Outcome: Progressing     Problem: Safety - Adult  Goal: Free from fall injury  Outcome: Progressing     Problem: Skin  Goal: Decreased wound size/increased tissue granulation at next dressing change  Outcome: Progressing  Goal: Participates in plan/prevention/treatment measures  Outcome: Progressing  Goal: Prevent/manage excess moisture  Outcome: Progressing  Goal: Prevent/minimize sheer/friction injuries  Outcome: Progressing  Goal: Promote/optimize nutrition  Outcome: Progressing  Goal: Promote skin healing  Outcome: Progressing

## 2024-09-08 NOTE — PROGRESS NOTES
Subjective Data:  - EP following, if BC remain negative PPM Monday, NPO at midnight holding lovenox   - C/w PO lasix today  - complaining of itching, add anti itch cream and antihistamine     Overnight Events:    No acute events overnight     Objective Data:  Last Recorded Vitals:  Vitals:    09/08/24 0419 09/08/24 0810 09/08/24 1106 09/08/24 1134   BP:  167/53  (!) 127/44   BP Location:       Patient Position:       Pulse:  60 60 60   Resp:  18 16 18   Temp:  36.5 °C (97.7 °F)  36.6 °C (97.9 °F)   TempSrc:       SpO2:  94% 96% 94%   Weight: 90 kg (198 lb 6.6 oz)      Height:           Last Labs:  CBC - 9/7/2024:  5:30 PM  5.8 11.2 209    36.1      CMP - 9/7/2024:  5:30 PM  9.1 6.9 28 --- 0.5   3.3 3.7 40 82      PTT - 9/3/2024:  6:58 PM  1.0   11.1 35     TROPHS   Date/Time Value Ref Range Status   09/04/2024 01:06 AM 40 0 - 34 ng/L Final   09/03/2024 11:47 PM 40 0 - 34 ng/L Final   09/03/2024 03:51 PM 39 0 - 34 ng/L Final   04/26/2024 06:56 PM 41 0 - 34 ng/L Final   04/25/2024 06:39 PM 35 0 - 34 ng/L Final   04/25/2024 01:31 PM 29 0 - 34 ng/L Final     BNP   Date/Time Value Ref Range Status   09/03/2024 11:47  0 - 99 pg/mL Final   04/25/2024 01:31  0 - 99 pg/mL Final     HGBA1C   Date/Time Value Ref Range Status   04/25/2024 01:31 PM 6.1 see below % Final   09/09/2022 05:36 PM 5.4 % Final     Comment:          Diagnosis of Diabetes-Adults   Non-Diabetic: < or = 5.6%   Increased risk for developing diabetes: 5.7-6.4%   Diagnostic of diabetes: > or = 6.5%  .       Monitoring of Diabetes                Age (y)     Therapeutic Goal (%)   Adults:          >18           <7.0   Pediatrics:    13-18           <7.5                   7-12           <8.0                   0- 6            7.5-8.5   American Diabetes Association. Diabetes Care 33(S1), Jan 2010.     01/08/2022 11:40 AM 5.9 4.3 - 5.6 % Final     Comment:     American Diabetes Association guidelines indicate that patients with HgbA1c in   the range  5.7-6.4% are at increased risk for development of diabetes, and   intervention by lifestyle modification may be beneficial. HgbA1c greater or   equal to 6.5% is considered diagnostic of diabetes.   09/02/2021 01:28 PM 5.6 4.3 - 5.6 % Final     Comment:     American Diabetes Association guidelines indicate that patients with HgbA1c in   the range 5.7-6.4% are at increased risk for development of diabetes, and   intervention by lifestyle modification may be beneficial. HgbA1c greater or   equal to 6.5% is considered diagnostic of diabetes.     LDLCALC   Date/Time Value Ref Range Status   07/25/2024 12:26 PM 38 <=99 mg/dL Final     Comment:                                 Near   Borderline      AGE      Desirable  Optimal    High     High     Very High     0-19 Y     0 - 109     ---    110-129   >/= 130     ----    20-24 Y     0 - 119     ---    120-159   >/= 160     ----      >24 Y     0 -  99   100-129  130-159   160-189     >/=190       VLDL   Date/Time Value Ref Range Status   07/25/2024 12:26 PM 34 0 - 40 mg/dL Final   09/09/2022 05:36 PM 30 0 - 40 mg/dL Final      Last I/O:  I/O last 3 completed shifts:  In: 1520 (16.9 mL/kg) [P.O.:1520]  Out: 1790 (19.9 mL/kg) [Urine:1790 (0.6 mL/kg/hr)]  Weight: 90 kg     Past Cardiology Tests (Last 3 Years):  EKG:  ECG 12 lead 09/03/2024      ECG 12 Lead 04/26/2024      ECG 12 lead 04/25/2024      ECG 12 Lead 03/25/2024      ECG 12 lead 03/22/2024      ECG 12 lead 02/16/2024    Echo:  Transthoracic Echo (TTE) Limited 04/26/2024      Transthoracic Echo (TTE) Limited 02/17/2024    Ejection Fractions:  EF   Date/Time Value Ref Range Status   09/05/2024 03:20 PM 50 %    04/26/2024 02:06 PM 43 %    02/17/2024 02:12 PM 72 %      Cath:  No results found for this or any previous visit from the past 1095 days.    Stress Test:  No results found for this or any previous visit from the past 1095 days.    Cardiac Imaging:  No results found for this or any previous visit from the past 1095  days.      Inpatient Medications:  Scheduled medications   Medication Dose Route Frequency    alendronate  35 mg oral q7 days    allopurinol  50 mg oral q24h    ascorbic acid  500 mg oral Daily    atorvastatin  20 mg oral Nightly    baclofen  5 mg oral Nightly    cephalexin  500 mg oral BID    cetirizine  10 mg oral Daily    cholecalciferol  1,000 Units oral Daily    cyanocobalamin  1,000 mcg intramuscular q30 days    docusate sodium  100 mg oral BID    DULoxetine  30 mg oral Daily    [Held by provider] enoxaparin  30 mg subcutaneous q24h    ferrous sulfate (325 mg ferrous sulfate)  65 mg of iron oral Daily with breakfast    fluticasone furoate-vilanteroL  1 puff inhalation Daily    furosemide  40 mg oral Daily    gabapentin  100 mg oral BID    icosapent ethyL  2 g oral BID    ipratropium-albuteroL  3 mL nebulization TID    metoprolol tartrate  50 mg oral BID    pantoprazole  20 mg oral Daily before breakfast    polyethylene glycol  17 g oral Daily    sacubitriL-valsartan  1 tablet oral BID    vitamin E  400 Units oral Daily     PRN medications   Medication    acetaminophen    melatonin    pramoxine    sennosides    traMADol     Continuous Medications   Medication Dose Last Rate       Physical Exam:  Physical Exam  Vitals and nursing note reviewed.   Constitutional:       General: She is not in acute distress.     Appearance: She is not ill-appearing.   HENT:      Head: Normocephalic and atraumatic.      Ears:      Comments: Hard of hearing  Eyes:      General: No scleral icterus.     Extraocular Movements: Extraocular movements intact.      Conjunctiva/sclera: Conjunctivae normal.   Cardiovascular:      Rate and Rhythm: Normal rate and regular rhythm.      Heart sounds: Murmur heard.      No friction rub. No gallop.   Pulmonary:      Effort: Pulmonary effort is normal. No respiratory distress.      Breath sounds: No wheezing or rhonchi.   Skin:     General: Skin is warm and dry.   Neurological:      General: No  "focal deficit present.      Mental Status: She is alert.            Assessment/Plan   Alayna Dumont is a 89 y.o. 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 She presented to ED for c/o SOB that started a \"couple of days ago\" and got improved since admission and treated with diuretics.      Acute on chronic Diastolic heart failure  Valvular Heart Disease, s/p AVR  HCM, s/p septal myomectomy    - 01/2023 TTE: EF 50-55%, no WMA, LV septal wall severely increased, sev LVH, bioprosthetic AV. Moderate to severe prosthetic AI, peak/mean gradient 38.0/21.3  - 2/17/24 TTE: EF 70-75%, no WMA, mod LVH, normal DF, bioprosthetic AV, mod to sev AI, peak/mean 40.4/22.0, moderate Aortic stenosis  - 4/26/24 TTE: LV function is mildly to moderately decreased, WMA, sev LVH. Dyssynchrony consistent with RV pacing is noted. Moderate aortic valve stenosis.  DI 0.47, moderate AI. Peak/mean  31.9/15.1.   - 9/5/24 TTE: LVEF 50%, severe AI  - Echo yesterday with severe AI of bioprosthetic valve  -  structural team consulted, not a candidate for structural interventions, discussed it Dr. Balderas, formal note pending   - hx tachy shahram syndrome s/p dc PPM placement  - s/p LHC " 6/2022: normal coronary arteries, but very tortuous in their course  - admit BNP: 885 (previously 952 4/2024)  - admit CXR: dense left basilar airspace disease with Lt pleural effusion. PNA in the differential. A component of pulmonary edema is present  - prev dry wt ~90-93 kg  - admit wt: 92 kg  - s/p diuresis, 40 mg IV Lasix BID.   - 9/7 40mg IV once today and transition to 40mg PO daily tomorrow and assess for response.  - 9/8 start PO lasix 40mg daily, assess for response   - Repeat CXR 9/6: Cardiomegaly and interstitial pulmonary edema.  - further GDMT/optimization for HF  - of note, prev admit 4/25 -5/4 for ADHF, cardiology consulted, suspect less likely due to ischemia more likely pacemaker induced, Geriatrics was consulted for GOC discussion, code status was changed to DNR/DNI with no plan for any further invasive measures  - previously seen by Dr. Alfaro, 2/2023, referred to heart valve clinic, did not follow up  - 9/6 increased Entresto to 97/102 today for better BP control   - SGLT2i likely contraindicated given functional urinary incontinence  - cont home Metop Tartrate 25 mg BID, consider transition to Toprol XL  - 2gram sodium diet, 2L fluid restriction, daily standing weights, strict I&O's     Hx Tachy Martir Syndrome  S/p [employer] Pacemaker  - Missed her most recent device check  - Device check on 02/16/2024 estimated remaining battery life to be 1 year  - PPM device check yesterday showed battery life is less than 6 months, EP consulted who recommended blood cultures and if they remain negative over the weekend she is on the schedule for generator replacement on Monday 9/9.  - NPO@MN Sunday night  - hold heparin products after midnight Sunday night  - tentative BSC generator change with possible temporary pacing wire 9/9 (cause of CHB is AVR and septal myectomy 2013)     HTN  - admit BP up to 194/66 in ED  - SBP last 24 hrs: 120s-160s  - meds as above, titrate as indicated     CKD stage 3  (stable)  - b/lCr ~1.3-1.6, GFR ~30-40  - admit Cr 1.46, GFR 34  - Daily Cr 1.38 (1.45,1.48)   - Avoid nephrotoxins and hypotension, renally dose meds as indicated  - trend daily RFP while admitted      Anemia/SUNDEEP  Vit B12 deficiency  - b/l hgb ~10-11  - admit hgb 10.8  - Daily hgb 11.2 (12.2,11.1)  - no acute sign of bleeding  - cont home PO iron and B-12 injections and supplements     HLD  - cont home Atorvastatin and Vascepa     Chronic back pain  Peripheral neuropathy  - cont home Baclofen, gabapentin, Tramadol PRN  - PRN XS Tylenol     COPD/?Asthma  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 Farrah Yan, APRN-CNP, DNP

## 2024-09-08 NOTE — CARE PLAN
The patient's goals for the shift include      The clinical goals for the shift include pt lacey remain HDS throughout the shift    Over the shift, the patient did make progress toward the following goals.     Problem: Nutrition  Goal: Less than 5 days NPO/clear liquids  9/8/2024 0634 by Kalpana Palafox RN  Outcome: Progressing  9/8/2024 0633 by Kalpana Palafox RN  Outcome: Progressing  Goal: Oral intake greater than 50%  9/8/2024 0634 by Kalpana Palafox RN  Outcome: Progressing  9/8/2024 0633 by Kalpana Palafox RN  Outcome: Progressing  Goal: Oral intake greater 75%  9/8/2024 0634 by Kalpana Palafox RN  Outcome: Progressing  9/8/2024 0633 by Kalpana Palafox RN  Outcome: Progressing  Goal: Consume prescribed supplement  9/8/2024 0634 by Kalpana Palafox RN  Outcome: Progressing  9/8/2024 0633 by Kalpana Palafox RN  Outcome: Progressing  Goal: Adequate PO fluid intake  9/8/2024 0634 by Kalpana Palafox RN  Outcome: Progressing  9/8/2024 0633 by Kalpana Palafox RN  Outcome: Progressing  Goal: Nutrition support goals are met within 48 hrs  9/8/2024 0634 by Kalpana Palafox RN  Outcome: Progressing  9/8/2024 0633 by Kalpana Palafox RN  Outcome: Progressing  Goal: Nutrition support is meeting 75% of nutrient needs  9/8/2024 0634 by Kalpana Palafox RN  Outcome: Progressing  9/8/2024 0633 by Kalpana Palafox RN  Outcome: Progressing  Goal: Tube feed tolerance  9/8/2024 0634 by Kalpana Palafox RN  Outcome: Progressing  9/8/2024 0633 by Kalpana Palafox RN  Outcome: Progressing  Goal: BG  mg/dL  9/8/2024 0634 by Kalpana Palafox RN  Outcome: Progressing  9/8/2024 0633 by Kalpana Palafox RN  Outcome: Progressing  Goal: Lab values WNL  9/8/2024 0634 by Kalpana Palafox RN  Outcome: Progressing  9/8/2024 0633 by Kalpana Palafox RN  Outcome: Progressing  Goal: Electrolytes WNL  9/8/2024 0634 by Kalpana Palaofx RN  Outcome: Progressing  9/8/2024 0633 by Kaplana CONCEPCION  COLE Palafox  Outcome: Progressing  Goal: Promote healing  9/8/2024 0634 by Kalpana Palafox RN  Outcome: Progressing  9/8/2024 0633 by Kalpana Palafox RN  Outcome: Progressing  Goal: Maintain stable weight  9/8/2024 0634 by Kalpana Palafox RN  Outcome: Progressing  9/8/2024 0633 by Kalpana aPlafox RN  Outcome: Progressing  Goal: Reduce weight from edema/fluid  9/8/2024 0634 by Kalpana Palafox RN  Outcome: Progressing  9/8/2024 0633 by Kalpana Palafox RN  Outcome: Progressing  Goal: Gradual weight gain  9/8/2024 0634 by Kalpana Palafox RN  Outcome: Progressing  9/8/2024 0633 by Kalpana Palafox RN  Outcome: Progressing  Goal: Improve ostomy output  9/8/2024 0634 by Kalpana Palafox RN  Outcome: Progressing  9/8/2024 0633 by Kalpana Palafox RN  Outcome: Progressing     Problem: Pain - Adult  Goal: Verbalizes/displays adequate comfort level or baseline comfort level  9/8/2024 0634 by Kalpana Palafox RN  Outcome: Progressing  9/8/2024 0633 by Kalpana Palafox RN  Outcome: Progressing     Problem: Safety - Adult  Goal: Free from fall injury  9/8/2024 0634 by Kalpana Palafox RN  Outcome: Progressing  9/8/2024 0633 by Kalpana Palafox RN  Outcome: Progressing     Problem: Discharge Planning  Goal: Discharge to home or other facility with appropriate resources  9/8/2024 0634 by Kalpana Palafox RN  Outcome: Progressing  9/8/2024 0633 by Kalpana Palafox RN  Outcome: Progressing     Problem: Chronic Conditions and Co-morbidities  Goal: Patient's chronic conditions and co-morbidity symptoms are monitored and maintained or improved  9/8/2024 0634 by Kalpana Palafox RN  Outcome: Progressing  9/8/2024 0633 by Kalpana Palafox RN  Outcome: Progressing     Problem: Skin  Goal: Decreased wound size/increased tissue granulation at next dressing change  9/8/2024 0634 by Kalpana Palafox RN  Outcome: Progressing  9/8/2024 0633 by Kalpana Palafox RN  Outcome: Progressing  Goal:  Participates in plan/prevention/treatment measures  9/8/2024 0634 by Kalpana Palafox RN  Outcome: Progressing  9/8/2024 0633 by Kalpana Palafox RN  Outcome: Progressing  Goal: Prevent/manage excess moisture  9/8/2024 0634 by Kalpana Palafox RN  Outcome: Progressing  9/8/2024 0633 by Kalpana Palafox RN  Outcome: Progressing  Goal: Prevent/minimize sheer/friction injuries  9/8/2024 0634 by Kalpana Palafox RN  Outcome: Progressing  9/8/2024 0633 by Kalpana Palafox RN  Outcome: Progressing  Goal: Promote/optimize nutrition  9/8/2024 0634 by Kalpana Palafox RN  Outcome: Progressing  9/8/2024 0633 by Kalpana Palafox RN  Outcome: Progressing  Goal: Promote skin healing  9/8/2024 0634 by Kalpana Palafox RN  Outcome: Progressing  9/8/2024 0633 by Kalpana Palafox RN  Outcome: Progressing     Problem: Diabetes  Goal: Achieve decreasing blood glucose levels by end of shift  9/8/2024 0634 by Kalpana Palafox RN  Outcome: Progressing  9/8/2024 0633 by Kalpana Palafox RN  Outcome: Progressing  Goal: Increase stability of blood glucose readings by end of shift  9/8/2024 0634 by Kalpana Palafox RN  Outcome: Progressing  9/8/2024 0633 by Kalpana Palafox RN  Outcome: Progressing  Goal: Decrease in ketones present in urine by end of shift  9/8/2024 0634 by Kalpana Palafox RN  Outcome: Progressing  9/8/2024 0633 by Kalpana Palafox RN  Outcome: Progressing  Goal: Maintain electrolyte levels within acceptable range throughout shift  9/8/2024 0634 by Kalpana Palafox RN  Outcome: Progressing  9/8/2024 0633 by Kalpana Palafox RN  Outcome: Progressing  Goal: Maintain glucose levels >70mg/dl to <250mg/dl throughout shift  9/8/2024 0634 by Kalpana Palafox RN  Outcome: Progressing  9/8/2024 0633 by Kalpana Palafox RN  Outcome: Progressing  Goal: No changes in neurological exam by end of shift  9/8/2024 0634 by Kalpana Palafox RN  Outcome: Progressing  9/8/2024 0633 by Kalpana CONCEPCION  COLE Palafox  Outcome: Progressing  Goal: Learn about and adhere to nutrition recommendations by end of shift  9/8/2024 0634 by Kalpana Palafox RN  Outcome: Progressing  9/8/2024 0633 by Kalpana Palafox RN  Outcome: Progressing  Goal: Vital signs within normal range for age by end of shift  9/8/2024 0634 by Kalpana Palafox RN  Outcome: Progressing  9/8/2024 0633 by Kalpana Palafox RN  Outcome: Progressing  Goal: Increase self care and/or family involovement by end of shift  9/8/2024 0634 by Kalpana Palafox RN  Outcome: Progressing  9/8/2024 0633 by Kalpana Palafox RN  Outcome: Progressing  Goal: Receive DSME education by end of shift  9/8/2024 0634 by Kalpana Palafox RN  Outcome: Progressing  9/8/2024 0633 by Kalpana Palafox RN  Outcome: Progressing     Problem: Heart Failure  Goal: Improved gas exchange this shift  9/8/2024 0634 by Kalpana Palafox RN  Outcome: Progressing  9/8/2024 0633 by Kalpana Palafox RN  Outcome: Progressing  Goal: Improved urinary output this shift  9/8/2024 0634 by Kalpana Palafox RN  Outcome: Progressing  9/8/2024 0633 by Kalpana Palafox RN  Outcome: Progressing  Goal: Reduction in peripheral edema within 24 hours  9/8/2024 0634 by Kalpana Palafox RN  Outcome: Progressing  9/8/2024 0633 by Kalpana Palafox RN  Outcome: Progressing  Goal: Report improvement of dyspnea/breathlessness this shift  9/8/2024 0634 by Kalpana Palafox RN  Outcome: Progressing  9/8/2024 0633 by Kalpana Palafox RN  Outcome: Progressing  Goal: Weight from fluid excess reduced over 2-3 days, then stabilize  9/8/2024 0634 by Kalpana Palafox RN  Outcome: Progressing  9/8/2024 0633 by Kalpana Palafox RN  Outcome: Progressing  Goal: Increase self care and/or family involvement in 24 hours  9/8/2024 0634 by Kalpana Palafox, RN  Outcome: Progressing  9/8/2024 0633 by Kalpana Palafox, RN  Outcome: Progressing

## 2024-09-08 NOTE — CARE PLAN
The patient's goals for the shift include      The clinical goals for the shift include pt lacey remain HDS throughout the shift    Over the shift, the patient did make progress toward the following goals.     Problem: Nutrition  Goal: Less than 5 days NPO/clear liquids  Outcome: Progressing  Goal: Oral intake greater than 50%  Outcome: Progressing  Goal: Oral intake greater 75%  Outcome: Progressing  Goal: Consume prescribed supplement  Outcome: Progressing  Goal: Adequate PO fluid intake  Outcome: Progressing  Goal: Nutrition support goals are met within 48 hrs  Outcome: Progressing  Goal: Nutrition support is meeting 75% of nutrient needs  Outcome: Progressing  Goal: Tube feed tolerance  Outcome: Progressing  Goal: BG  mg/dL  Outcome: Progressing  Goal: Lab values WNL  Outcome: Progressing  Goal: Electrolytes WNL  Outcome: Progressing  Goal: Promote healing  Outcome: Progressing  Goal: Maintain stable weight  Outcome: Progressing  Goal: Reduce weight from edema/fluid  Outcome: Progressing  Goal: Gradual weight gain  Outcome: Progressing  Goal: Improve ostomy output  Outcome: Progressing     Problem: Pain - Adult  Goal: Verbalizes/displays adequate comfort level or baseline comfort level  Outcome: Progressing     Problem: Safety - Adult  Goal: Free from fall injury  Outcome: Progressing     Problem: Discharge Planning  Goal: Discharge to home or other facility with appropriate resources  Outcome: Progressing     Problem: Chronic Conditions and Co-morbidities  Goal: Patient's chronic conditions and co-morbidity symptoms are monitored and maintained or improved  Outcome: Progressing     Problem: Skin  Goal: Decreased wound size/increased tissue granulation at next dressing change  Outcome: Progressing  Goal: Participates in plan/prevention/treatment measures  Outcome: Progressing  Goal: Prevent/manage excess moisture  Outcome: Progressing  Goal: Prevent/minimize sheer/friction injuries  Outcome:  Progressing  Goal: Promote/optimize nutrition  Outcome: Progressing  Goal: Promote skin healing  Outcome: Progressing     Problem: Diabetes  Goal: Achieve decreasing blood glucose levels by end of shift  Outcome: Progressing  Goal: Increase stability of blood glucose readings by end of shift  Outcome: Progressing  Goal: Decrease in ketones present in urine by end of shift  Outcome: Progressing  Goal: Maintain electrolyte levels within acceptable range throughout shift  Outcome: Progressing  Goal: Maintain glucose levels >70mg/dl to <250mg/dl throughout shift  Outcome: Progressing  Goal: No changes in neurological exam by end of shift  Outcome: Progressing  Goal: Learn about and adhere to nutrition recommendations by end of shift  Outcome: Progressing  Goal: Vital signs within normal range for age by end of shift  Outcome: Progressing  Goal: Increase self care and/or family involovement by end of shift  Outcome: Progressing  Goal: Receive DSME education by end of shift  Outcome: Progressing     Problem: Heart Failure  Goal: Improved gas exchange this shift  Outcome: Progressing  Goal: Improved urinary output this shift  Outcome: Progressing  Goal: Reduction in peripheral edema within 24 hours  Outcome: Progressing  Goal: Report improvement of dyspnea/breathlessness this shift  Outcome: Progressing  Goal: Weight from fluid excess reduced over 2-3 days, then stabilize  Outcome: Progressing  Goal: Increase self care and/or family involvement in 24 hours  Outcome: Progressing

## 2024-09-08 NOTE — SIGNIFICANT EVENT
89F hx pulmonary sarcoidosis, DM, s/p AVR 2013 with #23 Trifecta and septal myectomy c/b CHB s/p BSC dcPPM admitted with CP found to have ADHF with severe bioprosthetic AI. EP c/s for PPM at [facility].     Impressions:  #frailty with limited scope of care (see 4/29/2024 geriatrics note)  #s/p AVR 2013 with #23 Trifecta and septal myectomy  #c/b CHB s/p BSC dcPPM at RRT  #severe bioprosthetic AI  #HFmrEF (LVEF 45%)     PPM at RRT, given difficulty leaving house can facilitate inpatient generator change. Prior wishes to avoid invasive procedures noted - will clarify with patient and family if generator change in line with goals of care (likely yes - she is pacemaker dependant), temporarily reverse code status for procedure.     Given AI of unclear mechanism and history of chronic leg infection, will get Bcx prior to generator change.     No CRT given LVEF only mildly reduced and has competing hazards of age, frailty, comorbidities, and elevated procedural risk. No ICD given her heart block is likely from her AVR + myectomy and not cardiac sarcoidosis and her competing hazards of age, frailty, comorbidities     Recommendations:  - Because patient agrees with the PPM gen change out and her Bcx has been negative for 48 hours, EP will plan her PPM generator changeout tomorrow late PM. Please place NPO MN.  - Tentative BSC generator change with possible temporary pacing wire 9/9 (cause of CHB is AVR and septal myectomy 2013)     EP will follow this case later.    Thank you for the opportunity to contribute to the care of this patient. Above recommendations discussed with Dr. Guerra. If further questions arise, please page the EP consult pager at [phone] on weekdays 7AM - 6PM and weekends 7AM - 2PM, or at [phone] at all other times. The EP device nurse can be reached at pager [phone] during regular business hours JOSE LUIS Kong MD  Clinical Cardiac Electrophysiology Fellow

## 2024-09-09 LAB
ALBUMIN SERPL BCP-MCNC: 3.6 G/DL (ref 3.4–5)
ANION GAP SERPL CALC-SCNC: 13 MMOL/L (ref 10–20)
BUN SERPL-MCNC: 45 MG/DL (ref 6–23)
CALCIUM SERPL-MCNC: 9.1 MG/DL (ref 8.6–10.6)
CHLORIDE SERPL-SCNC: 104 MMOL/L (ref 98–107)
CO2 SERPL-SCNC: 25 MMOL/L (ref 21–32)
CREAT SERPL-MCNC: 1.3 MG/DL (ref 0.5–1.05)
EGFRCR SERPLBLD CKD-EPI 2021: 39 ML/MIN/1.73M*2
ERYTHROCYTE [DISTWIDTH] IN BLOOD BY AUTOMATED COUNT: 15.2 % (ref 11.5–14.5)
GLUCOSE SERPL-MCNC: 132 MG/DL (ref 74–99)
HCT VFR BLD AUTO: 38.7 % (ref 36–46)
HGB BLD-MCNC: 11.6 G/DL (ref 12–16)
MAGNESIUM SERPL-MCNC: 2.46 MG/DL (ref 1.6–2.4)
MCH RBC QN AUTO: 28 PG (ref 26–34)
MCHC RBC AUTO-ENTMCNC: 30 G/DL (ref 32–36)
MCV RBC AUTO: 93 FL (ref 80–100)
NRBC BLD-RTO: 0 /100 WBCS (ref 0–0)
PHOSPHATE SERPL-MCNC: 4 MG/DL (ref 2.5–4.9)
PLATELET # BLD AUTO: 289 X10*3/UL (ref 150–450)
POTASSIUM SERPL-SCNC: 4.4 MMOL/L (ref 3.5–5.3)
RBC # BLD AUTO: 4.15 X10*6/UL (ref 4–5.2)
SODIUM SERPL-SCNC: 138 MMOL/L (ref 136–145)
WBC # BLD AUTO: 6.5 X10*3/UL (ref 4.4–11.3)

## 2024-09-09 PROCEDURE — 2500000002 HC RX 250 W HCPCS SELF ADMINISTERED DRUGS (ALT 637 FOR MEDICARE OP, ALT 636 FOR OP/ED): Performed by: INTERNAL MEDICINE

## 2024-09-09 PROCEDURE — 2500000002 HC RX 250 W HCPCS SELF ADMINISTERED DRUGS (ALT 637 FOR MEDICARE OP, ALT 636 FOR OP/ED)

## 2024-09-09 PROCEDURE — 2500000004 HC RX 250 GENERAL PHARMACY W/ HCPCS (ALT 636 FOR OP/ED)

## 2024-09-09 PROCEDURE — 36415 COLL VENOUS BLD VENIPUNCTURE: CPT

## 2024-09-09 PROCEDURE — 80069 RENAL FUNCTION PANEL: CPT

## 2024-09-09 PROCEDURE — 94640 AIRWAY INHALATION TREATMENT: CPT

## 2024-09-09 PROCEDURE — 85027 COMPLETE CBC AUTOMATED: CPT

## 2024-09-09 PROCEDURE — 2500000001 HC RX 250 WO HCPCS SELF ADMINISTERED DRUGS (ALT 637 FOR MEDICARE OP): Performed by: NURSE PRACTITIONER

## 2024-09-09 PROCEDURE — 83735 ASSAY OF MAGNESIUM: CPT

## 2024-09-09 PROCEDURE — 2500000001 HC RX 250 WO HCPCS SELF ADMINISTERED DRUGS (ALT 637 FOR MEDICARE OP)

## 2024-09-09 PROCEDURE — 99233 SBSQ HOSP IP/OBS HIGH 50: CPT | Performed by: INTERNAL MEDICINE

## 2024-09-09 PROCEDURE — 2500000001 HC RX 250 WO HCPCS SELF ADMINISTERED DRUGS (ALT 637 FOR MEDICARE OP): Performed by: PHYSICIAN ASSISTANT

## 2024-09-09 PROCEDURE — 1200000002 HC GENERAL ROOM WITH TELEMETRY DAILY

## 2024-09-09 RX ORDER — AMLODIPINE BESYLATE 5 MG/1
5 TABLET ORAL DAILY
Status: DISCONTINUED | OUTPATIENT
Start: 2024-09-09 | End: 2024-09-10

## 2024-09-09 RX ORDER — ENOXAPARIN SODIUM 100 MG/ML
30 INJECTION SUBCUTANEOUS EVERY 24 HOURS
Status: DISCONTINUED | OUTPATIENT
Start: 2024-09-09 | End: 2024-09-12

## 2024-09-09 RX ORDER — MULTIVIT-MIN/IRON FUM/FOLIC AC 7.5 MG-4
1 TABLET ORAL DAILY
Status: DISCONTINUED | OUTPATIENT
Start: 2024-09-09 | End: 2024-09-17 | Stop reason: HOSPADM

## 2024-09-09 RX ADMIN — METOPROLOL TARTRATE 50 MG: 25 TABLET, FILM COATED ORAL at 20:57

## 2024-09-09 RX ADMIN — BACLOFEN 5 MG: 10 TABLET ORAL at 20:57

## 2024-09-09 RX ADMIN — Medication 400 UNITS: at 09:59

## 2024-09-09 RX ADMIN — FLUTICASONE FUROATE AND VILANTEROL TRIFENATATE 1 PUFF: 200; 25 POWDER RESPIRATORY (INHALATION) at 09:08

## 2024-09-09 RX ADMIN — GABAPENTIN 100 MG: 100 CAPSULE ORAL at 20:57

## 2024-09-09 RX ADMIN — IPRATROPIUM BROMIDE AND ALBUTEROL SULFATE 3 ML: .5; 3 SOLUTION RESPIRATORY (INHALATION) at 20:11

## 2024-09-09 RX ADMIN — CETIRIZINE HYDROCHLORIDE 10 MG: 10 TABLET, FILM COATED ORAL at 09:59

## 2024-09-09 RX ADMIN — SACUBITRIL AND VALSARTAN 1 TABLET: 97; 103 TABLET, FILM COATED ORAL at 09:59

## 2024-09-09 RX ADMIN — SACUBITRIL AND VALSARTAN 1 TABLET: 97; 103 TABLET, FILM COATED ORAL at 20:57

## 2024-09-09 RX ADMIN — OXYCODONE HYDROCHLORIDE AND ACETAMINOPHEN 500 MG: 500 TABLET ORAL at 09:59

## 2024-09-09 RX ADMIN — FERROUS SULFATE TAB 325 MG (65 MG ELEMENTAL FE) 325 MG: 325 (65 FE) TAB at 09:58

## 2024-09-09 RX ADMIN — ICOSAPENT ETHYL 2 G: 1 CAPSULE ORAL at 18:16

## 2024-09-09 RX ADMIN — TRAMADOL HYDROCHLORIDE 100 MG: 50 TABLET, COATED ORAL at 09:58

## 2024-09-09 RX ADMIN — METOPROLOL TARTRATE 50 MG: 25 TABLET, FILM COATED ORAL at 09:59

## 2024-09-09 RX ADMIN — CEPHALEXIN 500 MG: 500 CAPSULE ORAL at 20:57

## 2024-09-09 RX ADMIN — Medication 1000 UNITS: at 09:59

## 2024-09-09 RX ADMIN — CEPHALEXIN 500 MG: 500 CAPSULE ORAL at 09:59

## 2024-09-09 RX ADMIN — ATORVASTATIN CALCIUM 20 MG: 20 TABLET, FILM COATED ORAL at 20:57

## 2024-09-09 RX ADMIN — ALLOPURINOL 50 MG: 100 TABLET ORAL at 09:59

## 2024-09-09 RX ADMIN — GABAPENTIN 100 MG: 100 CAPSULE ORAL at 09:59

## 2024-09-09 RX ADMIN — AMLODIPINE BESYLATE 5 MG: 5 TABLET ORAL at 11:20

## 2024-09-09 RX ADMIN — ICOSAPENT ETHYL 2 G: 1 CAPSULE ORAL at 09:59

## 2024-09-09 RX ADMIN — Medication 1 TABLET: at 11:20

## 2024-09-09 RX ADMIN — IPRATROPIUM BROMIDE AND ALBUTEROL SULFATE 3 ML: .5; 3 SOLUTION RESPIRATORY (INHALATION) at 09:07

## 2024-09-09 RX ADMIN — PANTOPRAZOLE SODIUM 20 MG: 20 TABLET, DELAYED RELEASE ORAL at 09:59

## 2024-09-09 RX ADMIN — DULOXETINE HYDROCHLORIDE 30 MG: 30 CAPSULE, DELAYED RELEASE ORAL at 09:58

## 2024-09-09 RX ADMIN — DOCUSATE SODIUM 100 MG: 100 CAPSULE, LIQUID FILLED ORAL at 09:59

## 2024-09-09 RX ADMIN — FUROSEMIDE 40 MG: 20 TABLET ORAL at 09:59

## 2024-09-09 RX ADMIN — IPRATROPIUM BROMIDE AND ALBUTEROL SULFATE 3 ML: .5; 3 SOLUTION RESPIRATORY (INHALATION) at 14:27

## 2024-09-09 RX ADMIN — ENOXAPARIN SODIUM 30 MG: 30 INJECTION SUBCUTANEOUS at 11:20

## 2024-09-09 ASSESSMENT — PAIN - FUNCTIONAL ASSESSMENT
PAIN_FUNCTIONAL_ASSESSMENT: 0-10
PAIN_FUNCTIONAL_ASSESSMENT: 0-10

## 2024-09-09 ASSESSMENT — COGNITIVE AND FUNCTIONAL STATUS - GENERAL
MOVING FROM LYING ON BACK TO SITTING ON SIDE OF FLAT BED WITH BEDRAILS: A LITTLE
MOBILITY SCORE: 12
STANDING UP FROM CHAIR USING ARMS: A LOT
DRESSING REGULAR UPPER BODY CLOTHING: A LOT
PERSONAL GROOMING: A LITTLE
DAILY ACTIVITIY SCORE: 15
CLIMB 3 TO 5 STEPS WITH RAILING: TOTAL
CLIMB 3 TO 5 STEPS WITH RAILING: TOTAL
TURNING FROM BACK TO SIDE WHILE IN FLAT BAD: A LITTLE
TURNING FROM BACK TO SIDE WHILE IN FLAT BAD: A LITTLE
PERSONAL GROOMING: A LITTLE
HELP NEEDED FOR BATHING: A LOT
MOVING TO AND FROM BED TO CHAIR: A LOT
DAILY ACTIVITIY SCORE: 15
MOBILITY SCORE: 12
WALKING IN HOSPITAL ROOM: TOTAL
STANDING UP FROM CHAIR USING ARMS: A LOT
DRESSING REGULAR LOWER BODY CLOTHING: A LOT
MOVING TO AND FROM BED TO CHAIR: A LOT
TOILETING: A LOT
DRESSING REGULAR LOWER BODY CLOTHING: A LOT
WALKING IN HOSPITAL ROOM: TOTAL
TOILETING: A LOT
MOVING FROM LYING ON BACK TO SITTING ON SIDE OF FLAT BED WITH BEDRAILS: A LITTLE
DRESSING REGULAR UPPER BODY CLOTHING: A LOT
HELP NEEDED FOR BATHING: A LOT

## 2024-09-09 ASSESSMENT — PAIN SCALES - GENERAL
PAINLEVEL_OUTOF10: 0 - NO PAIN
PAINLEVEL_OUTOF10: 9
PAINLEVEL_OUTOF10: 4

## 2024-09-09 NOTE — TREATMENT PLAN
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  Echo during admission revealed degeneration of bioprosthetic valve with severe AR and EF 50%.     Patient was evaluated by the structural heart team and given her high frailty, poor mobility, chronic infection and some degree of cognitive impairment, the patient is not a candidate for any transcatheter intervention for her severe aortic valve regurgitation.      Plan  - recommend palliative care consult   - Heart failure symptom management     We appreciate the ability to participate in the patient care,  at this point we will  sign off.   Please page [phone] if further questions and concerns.     Pilidebo Alfaro, ADARSH-CNP

## 2024-09-09 NOTE — CARE PLAN
The patient's goals for the shift include      The clinical goals for the shift include Patient will remain HDS throughout shift      Problem: Nutrition  Goal: Less than 5 days NPO/clear liquids  Outcome: Progressing  Goal: Oral intake greater than 50%  Outcome: Progressing  Goal: Oral intake greater 75%  Outcome: Progressing  Goal: Consume prescribed supplement  Outcome: Progressing  Goal: Adequate PO fluid intake  Outcome: Progressing  Goal: Nutrition support goals are met within 48 hrs  Outcome: Progressing  Goal: Nutrition support is meeting 75% of nutrient needs  Outcome: Progressing  Goal: Tube feed tolerance  Outcome: Progressing  Goal: BG  mg/dL  Outcome: Progressing  Goal: Lab values WNL  Outcome: Progressing  Goal: Electrolytes WNL  Outcome: Progressing  Goal: Promote healing  Outcome: Progressing  Goal: Maintain stable weight  Outcome: Progressing  Goal: Reduce weight from edema/fluid  Outcome: Progressing  Goal: Gradual weight gain  Outcome: Progressing  Goal: Improve ostomy output  Outcome: Progressing     Problem: Pain - Adult  Goal: Verbalizes/displays adequate comfort level or baseline comfort level  Outcome: Progressing     Problem: Safety - Adult  Goal: Free from fall injury  Outcome: Progressing     Problem: Discharge Planning  Goal: Discharge to home or other facility with appropriate resources  Outcome: Progressing     Problem: Chronic Conditions and Co-morbidities  Goal: Patient's chronic conditions and co-morbidity symptoms are monitored and maintained or improved  Outcome: Progressing     Problem: Skin  Goal: Decreased wound size/increased tissue granulation at next dressing change  Outcome: Progressing  Goal: Participates in plan/prevention/treatment measures  Outcome: Progressing  Goal: Prevent/manage excess moisture  Outcome: Progressing  Goal: Prevent/minimize sheer/friction injuries  Outcome: Progressing  Goal: Promote/optimize nutrition  Outcome: Progressing  Goal: Promote skin  healing  Outcome: Progressing     Problem: Diabetes  Goal: Achieve decreasing blood glucose levels by end of shift  Outcome: Progressing  Goal: Increase stability of blood glucose readings by end of shift  Outcome: Progressing  Goal: Decrease in ketones present in urine by end of shift  Outcome: Progressing  Goal: Maintain electrolyte levels within acceptable range throughout shift  Outcome: Progressing  Goal: Maintain glucose levels >70mg/dl to <250mg/dl throughout shift  Outcome: Progressing  Goal: No changes in neurological exam by end of shift  Outcome: Progressing  Goal: Learn about and adhere to nutrition recommendations by end of shift  Outcome: Progressing  Goal: Vital signs within normal range for age by end of shift  Outcome: Progressing  Goal: Increase self care and/or family involovement by end of shift  Outcome: Progressing  Goal: Receive DSME education by end of shift  Outcome: Progressing     Problem: Heart Failure  Goal: Improved gas exchange this shift  Outcome: Progressing  Goal: Improved urinary output this shift  Outcome: Progressing  Goal: Reduction in peripheral edema within 24 hours  Outcome: Progressing  Goal: Report improvement of dyspnea/breathlessness this shift  Outcome: Progressing  Goal: Weight from fluid excess reduced over 2-3 days, then stabilize  Outcome: Progressing  Goal: Increase self care and/or family involvement in 24 hours  Outcome: Progressing

## 2024-09-09 NOTE — PROGRESS NOTES
Subjective Data:  Patient seen and assessed this morning, lying in bed, NAD. Denies any CP or SOB, dizziness or lightheadedness. Initially reporting left hip pain and being uncomfortably positioned, much improved during rounds. Updated on plan of care, agreeable to plan.     - per EP, due to scheduling, will not be able to perform generator change out today, will plan for tomorrow, NPO at Mn, hold AM Lovenox  - add Amlodipine 5 mg daily for further afterload reduction/BP control  - cont Lasix 40 mg PO starting today, appears euvolemic    Overnight Events:    No acute events overnight     Objective Data:  Last Recorded Vitals:  Vitals:    09/09/24 0042 09/09/24 0558 09/09/24 0706 09/09/24 1102   BP: 126/50 (!) 136/49 159/57 162/50   Pulse: 60 60 60 57   Resp: 18 18 18 17   Temp:  36.2 °C (97.2 °F) 36.5 °C (97.7 °F) 36.5 °C (97.7 °F)   TempSrc:       SpO2: 92% 93% 97% 94%   Weight:       Height:           Last Labs:  Results for orders placed or performed during the hospital encounter of 09/03/24 (from the past 24 hour(s))   CBC   Result Value Ref Range    WBC 5.8 4.4 - 11.3 x10*3/uL    nRBC 0.0 0.0 - 0.0 /100 WBCs    RBC 3.97 (L) 4.00 - 5.20 x10*6/uL    Hemoglobin 11.3 (L) 12.0 - 16.0 g/dL    Hematocrit 38.1 36.0 - 46.0 %    MCV 96 80 - 100 fL    MCH 28.5 26.0 - 34.0 pg    MCHC 29.7 (L) 32.0 - 36.0 g/dL    RDW 15.1 (H) 11.5 - 14.5 %    Platelets 234 150 - 450 x10*3/uL   Renal Function Panel   Result Value Ref Range    Glucose 117 (H) 74 - 99 mg/dL    Sodium 139 136 - 145 mmol/L    Potassium 4.1 3.5 - 5.3 mmol/L    Chloride 101 98 - 107 mmol/L    Bicarbonate 25 21 - 32 mmol/L    Anion Gap 17 10 - 20 mmol/L    Urea Nitrogen 47 (H) 6 - 23 mg/dL    Creatinine 1.27 (H) 0.50 - 1.05 mg/dL    eGFR 40 (L) >60 mL/min/1.73m*2    Calcium 9.2 8.6 - 10.6 mg/dL    Phosphorus 3.8 2.5 - 4.9 mg/dL    Albumin 3.6 3.4 - 5.0 g/dL   Magnesium   Result Value Ref Range    Magnesium 2.50 (H) 1.60 - 2.40 mg/dL        TROPHS   Date/Time Value  Ref Range Status   09/04/2024 01:06 AM 40 0 - 34 ng/L Final   09/03/2024 11:47 PM 40 0 - 34 ng/L Final   09/03/2024 03:51 PM 39 0 - 34 ng/L Final   04/26/2024 06:56 PM 41 0 - 34 ng/L Final   04/25/2024 06:39 PM 35 0 - 34 ng/L Final   04/25/2024 01:31 PM 29 0 - 34 ng/L Final     BNP   Date/Time Value Ref Range Status   09/03/2024 11:47  0 - 99 pg/mL Final   04/25/2024 01:31  0 - 99 pg/mL Final     HGBA1C   Date/Time Value Ref Range Status   04/25/2024 01:31 PM 6.1 see below % Final   09/09/2022 05:36 PM 5.4 % Final     Comment:          Diagnosis of Diabetes-Adults   Non-Diabetic: < or = 5.6%   Increased risk for developing diabetes: 5.7-6.4%   Diagnostic of diabetes: > or = 6.5%  .       Monitoring of Diabetes                Age (y)     Therapeutic Goal (%)   Adults:          >18           <7.0   Pediatrics:    13-18           <7.5                   7-12           <8.0                   0- 6            7.5-8.5   American Diabetes Association. Diabetes Care 33(S1), Jan 2010.     01/08/2022 11:40 AM 5.9 4.3 - 5.6 % Final     Comment:     American Diabetes Association guidelines indicate that patients with HgbA1c in   the range 5.7-6.4% are at increased risk for development of diabetes, and   intervention by lifestyle modification may be beneficial. HgbA1c greater or   equal to 6.5% is considered diagnostic of diabetes.   09/02/2021 01:28 PM 5.6 4.3 - 5.6 % Final     Comment:     American Diabetes Association guidelines indicate that patients with HgbA1c in   the range 5.7-6.4% are at increased risk for development of diabetes, and   intervention by lifestyle modification may be beneficial. HgbA1c greater or   equal to 6.5% is considered diagnostic of diabetes.     LDLCALC   Date/Time Value Ref Range Status   07/25/2024 12:26 PM 38 <=99 mg/dL Final     Comment:                                 Near   Borderline      AGE      Desirable  Optimal    High     High     Very High     0-19 Y     0 - 109     ---     110-129   >/= 130     ----    20-24 Y     0 - 119     ---    120-159   >/= 160     ----      >24 Y     0 -  99   100-129  130-159   160-189     >/=190       VLDL   Date/Time Value Ref Range Status   07/25/2024 12:26 PM 34 0 - 40 mg/dL Final   09/09/2022 05:36 PM 30 0 - 40 mg/dL Final      Last I/O:  I/O last 3 completed shifts:  In: 860 (9.6 mL/kg) [P.O.:860]  Out: 1100 (12.2 mL/kg) [Urine:1100 (0.3 mL/kg/hr)]  Weight: 90 kg     Past Cardiology Tests (Last 3 Years):  EKG:  ECG 12 lead 09/03/2024      ECG 12 Lead 04/26/2024      ECG 12 lead 04/25/2024      ECG 12 Lead 03/25/2024      ECG 12 lead 03/22/2024      ECG 12 lead 02/16/2024    Echo:  Transthoracic Echo (TTE) Limited 04/26/2024      Transthoracic Echo (TTE) Limited 02/17/2024    Ejection Fractions:  EF   Date/Time Value Ref Range Status   09/05/2024 03:20 PM 50 %    04/26/2024 02:06 PM 43 %    02/17/2024 02:12 PM 72 %      Cath:  No results found for this or any previous visit from the past 1095 days.    Stress Test:  No results found for this or any previous visit from the past 1095 days.    Cardiac Imaging:  No results found for this or any previous visit from the past 1095 days.      Inpatient Medications:  Scheduled medications   Medication Dose Route Frequency    alendronate  35 mg oral q7 days    allopurinol  50 mg oral q24h    amLODIPine  5 mg oral Daily    ascorbic acid  500 mg oral Daily    atorvastatin  20 mg oral Nightly    baclofen  5 mg oral Nightly    cephalexin  500 mg oral BID    cetirizine  10 mg oral Daily    cholecalciferol  1,000 Units oral Daily    cyanocobalamin  1,000 mcg intramuscular q30 days    docusate sodium  100 mg oral BID    DULoxetine  30 mg oral Daily    enoxaparin  30 mg subcutaneous q24h    ferrous sulfate (325 mg ferrous sulfate)  65 mg of iron oral Daily with breakfast    fluticasone furoate-vilanteroL  1 puff inhalation Daily    furosemide  40 mg oral Daily    gabapentin  100 mg oral BID    icosapent ethyL  2 g oral BID     ipratropium-albuteroL  3 mL nebulization TID    metoprolol tartrate  50 mg oral BID    multivitamin with minerals  1 tablet oral Daily    pantoprazole  20 mg oral Daily before breakfast    polyethylene glycol  17 g oral Daily    sacubitriL-valsartan  1 tablet oral BID    vitamin E  400 Units oral Daily     PRN medications   Medication    acetaminophen    melatonin    pramoxine    sennosides    traMADol     Continuous Medications   Medication Dose Last Rate       Physical Exam:  Physical Exam  Vitals and nursing note reviewed.   Constitutional:       General: She is not in acute distress.     Appearance: She is not ill-appearing.   HENT:      Head: Normocephalic and atraumatic.      Ears:      Comments: Hard of hearing  Eyes:      General: No scleral icterus.     Extraocular Movements: Extraocular movements intact.      Conjunctiva/sclera: Conjunctivae normal.   Cardiovascular:      Rate and Rhythm: Normal rate and regular rhythm.      Heart sounds: Murmur heard.      No friction rub. No gallop.      Comments: 100% AV paced  Pulmonary:      Effort: Pulmonary effort is normal. No respiratory distress.      Breath sounds: No wheezing or rhonchi.   Musculoskeletal:      Right lower leg: No edema.      Left lower leg: No edema.   Skin:     General: Skin is warm and dry.   Neurological:      General: No focal deficit present.      Mental Status: She is alert and oriented to person, place, and time.   Psychiatric:         Mood and Affect: Mood normal.         Behavior: Behavior normal.            Assessment/Plan   Alayna Dumont is a 89 y.o. 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 She presented to ED for c/o SOB that started a \"couple of days ago\" and got improved since admission and treated with diuretics.      Acute on chronic Diastolic heart failure  Valvular Heart Disease, s/p AVR  HCM, s/p septal myomectomy    - 01/2023 TTE: EF 50-55%, no WMA, LV septal wall severely increased, sev LVH, bioprosthetic AV. Moderate to severe prosthetic AI, peak/mean gradient 38.0/21.3  - 2/17/24 TTE: EF 70-75%, no WMA, mod LVH, normal DF, bioprosthetic AV, mod to sev AI, peak/mean 40.4/22.0, moderate Aortic stenosis  - 4/26/24 TTE: LV function is mildly to moderately decreased, WMA, sev LVH. Dyssynchrony consistent with RV pacing is noted. Moderate aortic valve stenosis.  DI 0.47, moderate AI. Peak/mean  31.9/15.1.   - 9/5/24 TTE: LVEF 50%, GHK, LV sev dilated, severe LVH, LA sev dilated, RV mod enlarged/mildly reduced function, severe AI of prosthetic valve, mild AS, DI 0.63, peak/mean gradients 26.2/15.0, severe Mitral calcification, mild to mod MR, mild TR, RVSP 33.9  - Structural team consulted, discussed with Dr. 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 PNA in " the differential. A component of pulmonary edema is present  - Repeat CXR 9/6: Cardiomegaly and interstitial pulmonary edema.  - prev dry wt ~90-93 kg  - admit wt: 92 kg  - daily (bed) wt: pending (90, 90.8, 92 kg)   - s/p diuresis, 40 mg IV Lasix BID  - add Amlodipine 5 mg daily for further afterload reduction/BP control  - cont Lasix 40 mg PO starting today, appears euvolemic  - further GDMT/optimization for HF as indicated  - of note, prev admit 4/25 -5/4 for ADHF, cardiology consulted, suspect less likely due to ischemia more likely pacemaker induced, Geriatrics was consulted for GOC discussion, code status was changed to DNR/DNI with no plan for any further invasive measures  - previously seen by Dr. Alfaro, 2/2023, referred to heart valve clinic, did not follow up  - 9/6 increased Entresto to 97/102 today for better BP control   - SGLT2i likely contraindicated given functional urinary incontinence  - cont home Metop Tartrate 25 mg BID, consider transition to Toprol XL  - 2gram sodium diet, 2L fluid restriction, daily standing weights, strict I&O's     Hx Tachy Martir Syndrome  S/p [employer] Pacemaker  - missed her most recent device check  - Device check on 02/16/2024 estimated remaining battery life to be 1 year  - PPM device check 9/5 showed battery life is less than 6 months  - EP consulted, blood cultures to r/o infection iso sev AI, if they remain negative over the weekend will plan for generator replacement this admission  - 9/6 BC x2: NTD x2 days  - per EP, plan for generator change out tomorrow, NPO at Mn, hold AM Lovenox  - tentative BSC generator change with possible temporary pacing wire  (cause of CHB is AVR and septal myectomy 2013)    HTN  - admit BP up to 194/66 in ED  - SBP last 24 hrs: 125-167  - meds as above, titrate as indicated     CKD stage 3 (stable)  - b/lCr ~1.3-1.6, GFR ~30-40  - admit Cr 1.46, GFR 34  - Daily Cr 1.27 (1.38, 1.45, 1.48, 1.29)   - Avoid nephrotoxins and  hypotension, renally dose meds as indicated  - trend daily RFP while admitted      Anemia/SUNDEEP  Vit B12 deficiency  - b/l hgb ~10-11  - admit hgb 10.8  - Daily hgb 11.3 (11.2, 12.2, 11.1, 9.5)  - no acute sign of bleeding  - cont home PO iron and B-12 injections and supplements     HLD  - cont home Atorvastatin and Vascepa     Chronic back pain  Peripheral neuropathy  - cont home Baclofen, gabapentin, Tramadol PRN  - PRN XS Tylenol     COPD/?Asthma  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  - continue home Breo 1 puff, Duoneb nebulizer TID  - maintain Pox>92%, supplemental oxygen as needed     LETICIA  - cont home Duloxetine      Infected hip (left)   Lt total knee replacement (2011) and revision (2014)   - s/p surgery, on chronic suppressive antibiotic therapy   - cont home Keflex     Gout  - cont home allopurinol     DVT Prophylaxis: subcutaneous Lovenox daily    Dispo  Pending PPM generator change out 9/10  - functional urinary incontinence and inability to walk at b/l, lives with son    Code Status: Full Code, confirmed with patient on 9/3    Patient seen and discussed with Dr. Joseph Knutson, APRN-CNP

## 2024-09-10 ENCOUNTER — APPOINTMENT (OUTPATIENT)
Dept: CARDIOLOGY | Facility: HOSPITAL | Age: 89
DRG: 258 | End: 2024-09-10
Payer: MEDICARE

## 2024-09-10 LAB
BACTERIA BLD CULT: NORMAL
BACTERIA BLD CULT: NORMAL

## 2024-09-10 PROCEDURE — 99152 MOD SED SAME PHYS/QHP 5/>YRS: CPT | Performed by: INTERNAL MEDICINE

## 2024-09-10 PROCEDURE — 33228 REMV&REPLC PM GEN DUAL LEAD: CPT | Performed by: INTERNAL MEDICINE

## 2024-09-10 PROCEDURE — 0JPT0PZ REMOVAL OF CARDIAC RHYTHM RELATED DEVICE FROM TRUNK SUBCUTANEOUS TISSUE AND FASCIA, OPEN APPROACH: ICD-10-PCS | Performed by: STUDENT IN AN ORGANIZED HEALTH CARE EDUCATION/TRAINING PROGRAM

## 2024-09-10 PROCEDURE — 99153 MOD SED SAME PHYS/QHP EA: CPT | Performed by: INTERNAL MEDICINE

## 2024-09-10 PROCEDURE — 2500000001 HC RX 250 WO HCPCS SELF ADMINISTERED DRUGS (ALT 637 FOR MEDICARE OP): Performed by: NURSE PRACTITIONER

## 2024-09-10 PROCEDURE — 2500000001 HC RX 250 WO HCPCS SELF ADMINISTERED DRUGS (ALT 637 FOR MEDICARE OP)

## 2024-09-10 PROCEDURE — 94640 AIRWAY INHALATION TREATMENT: CPT

## 2024-09-10 PROCEDURE — 99233 SBSQ HOSP IP/OBS HIGH 50: CPT | Performed by: INTERNAL MEDICINE

## 2024-09-10 PROCEDURE — C1785 PMKR, DUAL, RATE-RESP: HCPCS | Performed by: INTERNAL MEDICINE

## 2024-09-10 PROCEDURE — 2720000007 HC OR 272 NO HCPCS: Performed by: INTERNAL MEDICINE

## 2024-09-10 PROCEDURE — 2750000001 HC OR 275 NO HCPCS: Performed by: INTERNAL MEDICINE

## 2024-09-10 PROCEDURE — 2780000003 HC OR 278 NO HCPCS: Performed by: INTERNAL MEDICINE

## 2024-09-10 PROCEDURE — C1781 MESH (IMPLANTABLE): HCPCS | Performed by: INTERNAL MEDICINE

## 2024-09-10 PROCEDURE — 2500000001 HC RX 250 WO HCPCS SELF ADMINISTERED DRUGS (ALT 637 FOR MEDICARE OP): Performed by: PHYSICIAN ASSISTANT

## 2024-09-10 PROCEDURE — 97165 OT EVAL LOW COMPLEX 30 MIN: CPT | Mod: GO

## 2024-09-10 PROCEDURE — 2500000004 HC RX 250 GENERAL PHARMACY W/ HCPCS (ALT 636 FOR OP/ED)

## 2024-09-10 PROCEDURE — 2500000002 HC RX 250 W HCPCS SELF ADMINISTERED DRUGS (ALT 637 FOR MEDICARE OP, ALT 636 FOR OP/ED): Performed by: INTERNAL MEDICINE

## 2024-09-10 PROCEDURE — 1200000002 HC GENERAL ROOM WITH TELEMETRY DAILY

## 2024-09-10 PROCEDURE — 2500000004 HC RX 250 GENERAL PHARMACY W/ HCPCS (ALT 636 FOR OP/ED): Performed by: INTERNAL MEDICINE

## 2024-09-10 PROCEDURE — 0JH606Z INSERTION OF PACEMAKER, DUAL CHAMBER INTO CHEST SUBCUTANEOUS TISSUE AND FASCIA, OPEN APPROACH: ICD-10-PCS | Performed by: STUDENT IN AN ORGANIZED HEALTH CARE EDUCATION/TRAINING PROGRAM

## 2024-09-10 PROCEDURE — 2500000002 HC RX 250 W HCPCS SELF ADMINISTERED DRUGS (ALT 637 FOR MEDICARE OP, ALT 636 FOR OP/ED)

## 2024-09-10 PROCEDURE — 2500000005 HC RX 250 GENERAL PHARMACY W/O HCPCS: Performed by: INTERNAL MEDICINE

## 2024-09-10 DEVICE — PACEMAKER
Type: IMPLANTABLE DEVICE | Site: CHEST  WALL | Status: FUNCTIONAL
Brand: ACCOLADE™ MRI EL DR

## 2024-09-10 RX ORDER — BUPIVACAINE HYDROCHLORIDE 5 MG/ML
INJECTION, SOLUTION EPIDURAL; INTRACAUDAL AS NEEDED
Status: DISCONTINUED | OUTPATIENT
Start: 2024-09-10 | End: 2024-09-10 | Stop reason: HOSPADM

## 2024-09-10 RX ORDER — FENTANYL CITRATE 50 UG/ML
INJECTION, SOLUTION INTRAMUSCULAR; INTRAVENOUS AS NEEDED
Status: DISCONTINUED | OUTPATIENT
Start: 2024-09-10 | End: 2024-09-10 | Stop reason: HOSPADM

## 2024-09-10 RX ORDER — MIDAZOLAM HYDROCHLORIDE 1 MG/ML
INJECTION, SOLUTION INTRAMUSCULAR; INTRAVENOUS AS NEEDED
Status: DISCONTINUED | OUTPATIENT
Start: 2024-09-10 | End: 2024-09-10 | Stop reason: HOSPADM

## 2024-09-10 RX ORDER — CEFAZOLIN 1 G/1
INJECTION, POWDER, FOR SOLUTION INTRAVENOUS AS NEEDED
Status: DISCONTINUED | OUTPATIENT
Start: 2024-09-10 | End: 2024-09-10 | Stop reason: HOSPADM

## 2024-09-10 RX ORDER — SODIUM CHLORIDE 9 MG/ML
INJECTION, SOLUTION INTRAVENOUS CONTINUOUS PRN
Status: COMPLETED | OUTPATIENT
Start: 2024-09-10 | End: 2024-09-10

## 2024-09-10 RX ORDER — AMLODIPINE BESYLATE 10 MG/1
10 TABLET ORAL DAILY
Status: DISCONTINUED | OUTPATIENT
Start: 2024-09-11 | End: 2024-09-15

## 2024-09-10 RX ORDER — VANCOMYCIN HYDROCHLORIDE 1 G/20ML
INJECTION, POWDER, LYOPHILIZED, FOR SOLUTION INTRAVENOUS AS NEEDED
Status: DISCONTINUED | OUTPATIENT
Start: 2024-09-10 | End: 2024-09-10 | Stop reason: HOSPADM

## 2024-09-10 RX ORDER — METOPROLOL TARTRATE 25 MG/1
25 TABLET, FILM COATED ORAL 2 TIMES DAILY
Status: DISCONTINUED | OUTPATIENT
Start: 2024-09-10 | End: 2024-09-17 | Stop reason: HOSPADM

## 2024-09-10 RX ADMIN — SACUBITRIL AND VALSARTAN 1 TABLET: 97; 103 TABLET, FILM COATED ORAL at 09:07

## 2024-09-10 RX ADMIN — IPRATROPIUM BROMIDE AND ALBUTEROL SULFATE 3 ML: .5; 3 SOLUTION RESPIRATORY (INHALATION) at 20:04

## 2024-09-10 RX ADMIN — AMLODIPINE BESYLATE 5 MG: 5 TABLET ORAL at 09:03

## 2024-09-10 RX ADMIN — ICOSAPENT ETHYL 2 G: 1 CAPSULE ORAL at 18:07

## 2024-09-10 RX ADMIN — IPRATROPIUM BROMIDE AND ALBUTEROL SULFATE 3 ML: .5; 3 SOLUTION RESPIRATORY (INHALATION) at 09:21

## 2024-09-10 RX ADMIN — GABAPENTIN 100 MG: 100 CAPSULE ORAL at 09:03

## 2024-09-10 RX ADMIN — Medication 400 UNITS: at 09:07

## 2024-09-10 RX ADMIN — BACLOFEN 5 MG: 10 TABLET ORAL at 21:55

## 2024-09-10 RX ADMIN — METOPROLOL TARTRATE 50 MG: 25 TABLET, FILM COATED ORAL at 09:06

## 2024-09-10 RX ADMIN — Medication 1 TABLET: at 09:03

## 2024-09-10 RX ADMIN — METOPROLOL TARTRATE 25 MG: 25 TABLET, FILM COATED ORAL at 21:55

## 2024-09-10 RX ADMIN — CETIRIZINE HYDROCHLORIDE 10 MG: 10 TABLET, FILM COATED ORAL at 09:07

## 2024-09-10 RX ADMIN — CEPHALEXIN 500 MG: 500 CAPSULE ORAL at 10:34

## 2024-09-10 RX ADMIN — FUROSEMIDE 40 MG: 20 TABLET ORAL at 09:03

## 2024-09-10 RX ADMIN — ALLOPURINOL 50 MG: 100 TABLET ORAL at 09:03

## 2024-09-10 RX ADMIN — SACUBITRIL AND VALSARTAN 1 TABLET: 97; 103 TABLET, FILM COATED ORAL at 21:56

## 2024-09-10 RX ADMIN — FLUTICASONE FUROATE AND VILANTEROL TRIFENATATE 1 PUFF: 200; 25 POWDER RESPIRATORY (INHALATION) at 09:21

## 2024-09-10 RX ADMIN — ATORVASTATIN CALCIUM 20 MG: 20 TABLET, FILM COATED ORAL at 21:57

## 2024-09-10 RX ADMIN — CEPHALEXIN 500 MG: 500 CAPSULE ORAL at 21:55

## 2024-09-10 RX ADMIN — GABAPENTIN 100 MG: 100 CAPSULE ORAL at 21:55

## 2024-09-10 RX ADMIN — DULOXETINE HYDROCHLORIDE 30 MG: 30 CAPSULE, DELAYED RELEASE ORAL at 09:03

## 2024-09-10 RX ADMIN — ICOSAPENT ETHYL 2 G: 1 CAPSULE ORAL at 09:07

## 2024-09-10 RX ADMIN — OXYCODONE HYDROCHLORIDE AND ACETAMINOPHEN 500 MG: 500 TABLET ORAL at 09:03

## 2024-09-10 RX ADMIN — Medication 1000 UNITS: at 09:03

## 2024-09-10 RX ADMIN — PANTOPRAZOLE SODIUM 20 MG: 20 TABLET, DELAYED RELEASE ORAL at 09:07

## 2024-09-10 ASSESSMENT — COGNITIVE AND FUNCTIONAL STATUS - GENERAL
PERSONAL GROOMING: A LOT
DRESSING REGULAR LOWER BODY CLOTHING: A LOT
MOBILITY SCORE: 12
DAILY ACTIVITIY SCORE: 15
TOILETING: A LOT
HELP NEEDED FOR BATHING: A LOT
DRESSING REGULAR LOWER BODY CLOTHING: A LOT
MOVING FROM LYING ON BACK TO SITTING ON SIDE OF FLAT BED WITH BEDRAILS: A LITTLE
EATING MEALS: A LITTLE
PERSONAL GROOMING: A LITTLE
DRESSING REGULAR UPPER BODY CLOTHING: A LOT
CLIMB 3 TO 5 STEPS WITH RAILING: TOTAL
CLIMB 3 TO 5 STEPS WITH RAILING: TOTAL
PERSONAL GROOMING: A LOT
TURNING FROM BACK TO SIDE WHILE IN FLAT BAD: A LITTLE
DAILY ACTIVITIY SCORE: 13
MOVING TO AND FROM BED TO CHAIR: A LOT
HELP NEEDED FOR BATHING: A LOT
DAILY ACTIVITIY SCORE: 13
MOBILITY SCORE: 12
MOVING TO AND FROM BED TO CHAIR: A LOT
WALKING IN HOSPITAL ROOM: TOTAL
DRESSING REGULAR UPPER BODY CLOTHING: A LOT
TOILETING: A LOT
EATING MEALS: A LITTLE
TURNING FROM BACK TO SIDE WHILE IN FLAT BAD: A LITTLE
TOILETING: A LOT
STANDING UP FROM CHAIR USING ARMS: A LOT
WALKING IN HOSPITAL ROOM: TOTAL
HELP NEEDED FOR BATHING: A LOT
MOVING FROM LYING ON BACK TO SITTING ON SIDE OF FLAT BED WITH BEDRAILS: A LITTLE
DRESSING REGULAR UPPER BODY CLOTHING: A LOT
DRESSING REGULAR LOWER BODY CLOTHING: A LOT
STANDING UP FROM CHAIR USING ARMS: A LOT

## 2024-09-10 ASSESSMENT — PAIN - FUNCTIONAL ASSESSMENT
PAIN_FUNCTIONAL_ASSESSMENT: 0-10

## 2024-09-10 ASSESSMENT — PAIN SCALES - GENERAL
PAINLEVEL_OUTOF10: 0 - NO PAIN

## 2024-09-10 ASSESSMENT — ACTIVITIES OF DAILY LIVING (ADL): BATHING_ASSISTANCE: MAXIMAL

## 2024-09-10 NOTE — PROGRESS NOTES
Subjective Data:  Patient seen and assessed this morning, lying in bed, NAD. Denies any CP or SOB, dizziness or lightheadedness. Eager to get procedure done. Updated on plan of care, agreeable to plan.     - NPO pending generator change out today, holding Lovenox  - requested EP to increase PPM setting to HR 70 iso severe AI  - increase Amlodipine to 10 mg daily, reduce Metoprolol to 25 mg BID for further afterload reduction/BP control  - per pt's son, would decline SNF at discharge, goal is to bring patient home to live the rest of her life, would be agreeable to HC  - encourage pt to get OOB to chair, PT/OT consulted    Overnight Events:    No acute events overnight     Objective Data:  Last Recorded Vitals:  Vitals:    09/09/24 2300 09/10/24 0500 09/10/24 0721 09/10/24 1102   BP: 131/58 (!) 136/45 (!) 149/49 (!) 141/49   Pulse: 60 60 60 60   Resp: 18 16 18 18   Temp:   36.5 °C (97.7 °F) 36.5 °C (97.7 °F)   TempSrc:       SpO2: 93% 94% 94% 92%   Weight:       Height:           Last Labs:  Results for orders placed or performed during the hospital encounter of 09/03/24 (from the past 24 hour(s))   CBC   Result Value Ref Range    WBC 6.5 4.4 - 11.3 x10*3/uL    nRBC 0.0 0.0 - 0.0 /100 WBCs    RBC 4.15 4.00 - 5.20 x10*6/uL    Hemoglobin 11.6 (L) 12.0 - 16.0 g/dL    Hematocrit 38.7 36.0 - 46.0 %    MCV 93 80 - 100 fL    MCH 28.0 26.0 - 34.0 pg    MCHC 30.0 (L) 32.0 - 36.0 g/dL    RDW 15.2 (H) 11.5 - 14.5 %    Platelets 289 150 - 450 x10*3/uL   Renal Function Panel   Result Value Ref Range    Glucose 132 (H) 74 - 99 mg/dL    Sodium 138 136 - 145 mmol/L    Potassium 4.4 3.5 - 5.3 mmol/L    Chloride 104 98 - 107 mmol/L    Bicarbonate 25 21 - 32 mmol/L    Anion Gap 13 10 - 20 mmol/L    Urea Nitrogen 45 (H) 6 - 23 mg/dL    Creatinine 1.30 (H) 0.50 - 1.05 mg/dL    eGFR 39 (L) >60 mL/min/1.73m*2    Calcium 9.1 8.6 - 10.6 mg/dL    Phosphorus 4.0 2.5 - 4.9 mg/dL    Albumin 3.6 3.4 - 5.0 g/dL   Magnesium   Result Value Ref Range     Magnesium 2.46 (H) 1.60 - 2.40 mg/dL        TROPHS   Date/Time Value Ref Range Status   09/04/2024 01:06 AM 40 0 - 34 ng/L Final   09/03/2024 11:47 PM 40 0 - 34 ng/L Final   09/03/2024 03:51 PM 39 0 - 34 ng/L Final   04/26/2024 06:56 PM 41 0 - 34 ng/L Final   04/25/2024 06:39 PM 35 0 - 34 ng/L Final   04/25/2024 01:31 PM 29 0 - 34 ng/L Final     BNP   Date/Time Value Ref Range Status   09/03/2024 11:47  0 - 99 pg/mL Final   04/25/2024 01:31  0 - 99 pg/mL Final     HGBA1C   Date/Time Value Ref Range Status   04/25/2024 01:31 PM 6.1 see below % Final   09/09/2022 05:36 PM 5.4 % Final     Comment:          Diagnosis of Diabetes-Adults   Non-Diabetic: < or = 5.6%   Increased risk for developing diabetes: 5.7-6.4%   Diagnostic of diabetes: > or = 6.5%  .       Monitoring of Diabetes                Age (y)     Therapeutic Goal (%)   Adults:          >18           <7.0   Pediatrics:    13-18           <7.5                   7-12           <8.0                   0- 6            7.5-8.5   American Diabetes Association. Diabetes Care 33(S1), Jan 2010.     01/08/2022 11:40 AM 5.9 4.3 - 5.6 % Final     Comment:     American Diabetes Association guidelines indicate that patients with HgbA1c in   the range 5.7-6.4% are at increased risk for development of diabetes, and   intervention by lifestyle modification may be beneficial. HgbA1c greater or   equal to 6.5% is considered diagnostic of diabetes.   09/02/2021 01:28 PM 5.6 4.3 - 5.6 % Final     Comment:     American Diabetes Association guidelines indicate that patients with HgbA1c in   the range 5.7-6.4% are at increased risk for development of diabetes, and   intervention by lifestyle modification may be beneficial. HgbA1c greater or   equal to 6.5% is considered diagnostic of diabetes.     LDLCALC   Date/Time Value Ref Range Status   07/25/2024 12:26 PM 38 <=99 mg/dL Final     Comment:                                 Near   Borderline      AGE      Desirable   Optimal    High     High     Very High     0-19 Y     0 - 109     ---    110-129   >/= 130     ----    20-24 Y     0 - 119     ---    120-159   >/= 160     ----      >24 Y     0 -  99   100-129  130-159   160-189     >/=190       VLDL   Date/Time Value Ref Range Status   07/25/2024 12:26 PM 34 0 - 40 mg/dL Final   09/09/2022 05:36 PM 30 0 - 40 mg/dL Final      Last I/O:  I/O last 3 completed shifts:  In: - (0 mL/kg)   Out: 750 (8.3 mL/kg) [Urine:750 (0.2 mL/kg/hr)]  Weight: 90 kg     Past Cardiology Tests (Last 3 Years):  EKG:  ECG 12 lead 09/03/2024      ECG 12 Lead 04/26/2024      ECG 12 lead 04/25/2024      ECG 12 Lead 03/25/2024      ECG 12 lead 03/22/2024      ECG 12 lead 02/16/2024    Echo:  Transthoracic Echo (TTE) Limited 04/26/2024      Transthoracic Echo (TTE) Limited 02/17/2024    Ejection Fractions:  EF   Date/Time Value Ref Range Status   09/05/2024 03:20 PM 50 %    04/26/2024 02:06 PM 43 %    02/17/2024 02:12 PM 72 %      Cath:  No results found for this or any previous visit from the past 1095 days.    Stress Test:  No results found for this or any previous visit from the past 1095 days.    Cardiac Imaging:  No results found for this or any previous visit from the past 1095 days.      Inpatient Medications:  Scheduled medications   Medication Dose Route Frequency    alendronate  35 mg oral q7 days    allopurinol  50 mg oral q24h    [START ON 9/11/2024] amLODIPine  10 mg oral Daily    ascorbic acid  500 mg oral Daily    atorvastatin  20 mg oral Nightly    baclofen  5 mg oral Nightly    cephalexin  500 mg oral BID    cetirizine  10 mg oral Daily    cholecalciferol  1,000 Units oral Daily    cyanocobalamin  1,000 mcg intramuscular q30 days    docusate sodium  100 mg oral BID    DULoxetine  30 mg oral Daily    [Held by provider] enoxaparin  30 mg subcutaneous q24h    ferrous sulfate (325 mg ferrous sulfate)  65 mg of iron oral Daily with breakfast    fluticasone furoate-vilanteroL  1 puff inhalation Daily     furosemide  40 mg oral Daily    gabapentin  100 mg oral BID    icosapent ethyL  2 g oral BID    ipratropium-albuteroL  3 mL nebulization TID    metoprolol tartrate  25 mg oral BID    multivitamin with minerals  1 tablet oral Daily    pantoprazole  20 mg oral Daily before breakfast    polyethylene glycol  17 g oral Daily    sacubitriL-valsartan  1 tablet oral BID    vitamin E  400 Units oral Daily     PRN medications   Medication    acetaminophen    melatonin    pramoxine    sennosides    traMADol     Continuous Medications   Medication Dose Last Rate       Physical Exam:  Physical Exam  Vitals and nursing note reviewed.   Constitutional:       General: She is not in acute distress.     Appearance: She is not ill-appearing.   HENT:      Head: Normocephalic and atraumatic.      Ears:      Comments: Hard of hearing  Eyes:      General: No scleral icterus.     Extraocular Movements: Extraocular movements intact.      Conjunctiva/sclera: Conjunctivae normal.   Cardiovascular:      Rate and Rhythm: Normal rate and regular rhythm.      Heart sounds: Murmur heard.      No friction rub. No gallop.      Comments: 100% AV paced  Pulmonary:      Effort: Pulmonary effort is normal. No respiratory distress.      Breath sounds: No wheezing or rhonchi.   Musculoskeletal:      Right lower leg: No edema.      Left lower leg: No edema.   Skin:     General: Skin is warm and dry.   Neurological:      General: No focal deficit present.      Mental Status: She is alert and oriented to person, place, and time.   Psychiatric:         Mood and Affect: Mood normal.         Behavior: Behavior normal.            Assessment/Plan   Alayna Dumont is a 89 y.o. 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 She presented to ED for c/o SOB that started a \"couple of days ago\" and got improved since admission and treated with diuretics.      Acute on chronic Diastolic heart failure  Valvular Heart Disease, s/p AVR  HCM, s/p septal myomectomy    - 01/2023 TTE: EF 50-55%, no WMA, LV septal wall severely increased, sev LVH, bioprosthetic AV. Moderate to severe prosthetic AI, peak/mean gradient 38.0/21.3  - 2/17/24 TTE: EF 70-75%, no WMA, mod LVH, normal DF, bioprosthetic AV, mod to sev AI, peak/mean 40.4/22.0, moderate Aortic stenosis  - 4/26/24 TTE: LV function is mildly to moderately decreased, WMA, sev LVH. Dyssynchrony consistent with RV pacing is noted. Moderate aortic valve stenosis.  DI 0.47, moderate AI. Peak/mean  31.9/15.1.   - 9/5/24 TTE: LVEF 50%, GHK, LV sev dilated, severe LVH, LA sev dilated, RV mod enlarged/mildly reduced function, severe AI of prosthetic valve, mild AS, DI 0.63, peak/mean gradients 26.2/15.0, severe Mitral calcification, mild to mod MR, mild TR, RVSP 33.9  - Structural team consulted, discussed with Dr. Balderas, not a candidate for structural interventions due to high frailty, chronic infection status, limited mobility, and some degree of cognitive impairment, symptom treatment and palliative care, see note 9/6 & 9/9  - hx tachy shahram syndrome s/p dc PPM placement  - s/p LHC 6/2022: normal coronary arteries, but very tortuous in their course  - admit BNP: 885 " (previously 952 4/2024)  - admit CXR: dense left basilar airspace disease with Lt pleural effusion. PNA in the differential. A component of pulmonary edema is present  - Repeat CXR 9/6: Cardiomegaly and interstitial pulmonary edema.  - prev dry wt ~90-93 kg  - admit wt: 92 kg  - daily (bed) wt: pending (90, 90.8, 92 kg)   - s/p diuresis, 40 mg IV Lasix BID  - appears euvolemic, warm and dry  - increase Amlodipine to 10 mg daily, reduce Metoprolol Tartrate to 25 mg BID for further afterload reduction/BP control  - further GDMT/optimization as indicated, consider MRA  - SGLT2i likely contraindicated given functional urinary incontinence  - of note, prev admit 4/25 -5/4 for ADHF, cardiology consulted, suspect less likely due to ischemia more likely pacemaker induced, Geriatrics was consulted for GOC discussion, code status was changed to DNR/DNI with no plan for any further invasive measures  - previously seen by Dr. Alfaro, 2/2023, referred to heart valve clinic, did not follow up  - cont Entresto 97/103, PO Lasix 40 mg daily  - cont home Metop Tartrate 25 mg BID,   - 2gram sodium diet, 2L fluid restriction, daily standing weights, strict I&O's     Hx Tachy Martir Syndrome  S/p [employer] Pacemaker  - missed her most recent device check  - Device check on 02/16/2024 estimated remaining battery life to be 1 year  - PPM device check 9/5 showed battery life is less than 6 months  - EP consulted, blood cultures to r/o infection iso sev AI, will plan for generator replacement this admission, appreciate recs  - 9/6 BC x2: NTD x3 days  - per EP, NPO pending generator change out today, holding Lovenox  - requested EP to increase PPM setting to HR 70 iso severe AI  - tentative BSC generator change with possible temporary pacing wire  (cause of CHB is AVR and septal myectomy 2013)    HTN  - admit BP up to 194/66 in ED  - SBP last 24 hrs: 120-140's  - meds as above, titrate as indicated     CKD stage 3 (stable)  - b/lCr  ~1.3-1.6, GFR ~30-40  - admit Cr 1.46, GFR 34  - Daily Cr 1.30 (1.27, 1.38, 1.45, 1.48, 1.29)   - Avoid nephrotoxins and hypotension, renally dose meds as indicated  - trend daily RFP while admitted      Anemia/SUNDEEP  Vit B12 deficiency  - b/l hgb ~10-11  - admit hgb 10.8  - Daily hgb 11. 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  - continue home Breo 1 puff, Duoneb nebulizer TID  - maintain Pox>92%, supplemental oxygen as needed     LETICIA  - cont home Duloxetine      Infected hip (left)   Lt total knee replacement (2011) and revision (2014)   - s/p surgery, on chronic suppressive antibiotic therapy   - cont home Keflex     Gout  - cont home allopurinol     DVT Prophylaxis: subcutaneous Lovenox daily    Dispo  - Pending PPM generator change out 9/10  - functional urinary incontinence and inability to walk at b/l, lives with son  - per pt's son, would decline SNF at discharge, goal is to bring patient home to live the rest of her life, would be agreeable to [facility]  - encourage pt to get OOB to chair, PT/OT consulted    Code Status: Full Code, confirmed with patient on 9/3    Patient seen and discussed with Dr. Joseph Knutson, APRN-CNP

## 2024-09-10 NOTE — POST-PROCEDURE NOTE
Physician Transition of Care Summary  Invasive Cardiovascular Lab    Procedure Date: 9/10/2024  Attending:    * Efe Guerra - Primary  Resident/Fellow/Other Assistant: Surgeons and Role:     * Erick Alvarado MD - Fellow    Indications:   Pre-op Diagnosis      * Pacemaker [Z95.0]    Post-procedure diagnosis:   Post-op Diagnosis     * Pacemaker [Z95.0]    Procedure(s):   PPM Generator Change  86715 - VA REMVL PERM PM PLSE GEN W/REPL PLSE GEN SNGL LEAD        Procedure Findings:   Successful change out of left dual chamber pacemaker generator    Description of the Procedure:   Moderate sedation given  Local lidocaine over existing site  Dissection with bovie down to capsule, old device freed from pocket  Leads were retained, swapped to new device under telemetry without incident   Pocket closed with 0-0, 3-0, 4-0 and dermabond    Complications:   none    Stents/Implants:   Implants       Pacemaker    Pacemaker, [employer] Mri Pulse, Dr Amaya - [id number] - Implanted        Inventory item: PACEMAKER, ACCOLADE MRI PULSE, DR AMAYA Model/Cat number: L331    Serial number: [id number] : Hang w/    Lot number: [id number] Device identifier: [id number]    Implant Date: 9/10/2024        As of 9/10/2024       Status: Implanted                              Plan:   Hold all heparin products for 48 hours  Patient to continue on lifelong antibiotics with keflex   Device interrogation tomorrow AM prior to dc  Contreras (son) updated    Estimated Blood Loss:   10 mL    Anesthesia: Moderate Sedation Anesthesia Staff: No anesthesia staff entered.    Any Specimen(s) Removed:   No specimens collected during this procedure.    Disposition:   Back to unit      Electronically signed by: Erick Alvarado MD, 9/10/2024 5:18 PM

## 2024-09-10 NOTE — PROGRESS NOTES
Occupational Therapy    Evaluation    Patient Name: Alayna Dumont  MRN: [id number]  Today's Date: 9/10/2024  Time Calculation  Start Time: 1403  Stop Time: 1420  Time Calculation (min): 17 min    Assessment  IP OT Assessment  OT Assessment: difficulty I/ADLs, safety, fxnl mob  Prognosis: Good  Barriers to Discharge: Decreased caregiver support  Evaluation/Treatment Tolerance: Patient limited by fatigue, Patient limited by pain  Medical Staff Made Aware: Yes  End of Session Communication: Bedside nurse  End of Session Patient Position: Bed, 3 rail up, Alarm on  Plan:  Treatment Interventions: ADL retraining, Functional transfer training, UE strengthening/ROM, Endurance training, Patient/family training, Equipment evaluation/education, Compensatory technique education  OT Frequency: 3 times per week  OT Discharge Recommendations: Moderate intensity level of continued care  OT Recommended Transfer Status: Assist of 2  OT - OK to Discharge: Yes    Subjective   Current Problem:  1. Acute on chronic congestive heart failure, unspecified heart failure type (Multi)        2. Dyspnea, unspecified type  Transthoracic Echo (TTE) Complete    Transthoracic Echo (TTE) Complete      3. Acute on chronic diastolic congestive heart failure (Multi)  Transthoracic Echo (TTE) Complete    Transthoracic Echo (TTE) Complete      4. Acute exacerbation of chronic obstructive pulmonary disease (Multi)  Transthoracic Echo (TTE) Complete    Transthoracic Echo (TTE) Complete      5. Pacemaker  Cardiac device check - Inpatient    Cardiac device check - Inpatient    Case Request EP Lab: PPM Generator Change    Case Request EP Lab: PPM Generator Change    Electrophysiology procedure    Electrophysiology procedure    Cardiac Device Check - Inpatient    Cardiac Device Check - Inpatient      6. 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  Echo during admission revealed degeneration of bioprosthetic valve with severe AR and EF 50%.  Family/Caregiver Present: No  Prior to Session Communication: Bedside nurse  Patient Position Received: Up in chair, Alarm on  Precautions:  Medical Precautions: Fall precautions, Cardiac precautions    Vital Sign (Past 2hrs)        Date/Time Vitals Session Patient Position Pulse Resp SpO2 BP MAP (mmHg)    09/10/24 1403 --  --  60  --  --  --  --                        Pain:  Pain Assessment  Pain Assessment: 0-10  0-10 (Numeric) Pain Score: 0 - No pain    Objective   Cognition:  Overall Cognitive Status: Within Functional Limits  Orientation Level: Oriented X4  Insight: Moderate           Home Living:  Type of Home: House  Lives With:  (son and family who work)  Home Adaptive Equipment:  (wheelchair, WHW, tub bench, reacher, dressing stick, sock aid)  Home Layout:  (4 SHIELA 1st floor setup)  Bathroom Shower/Tub: Tub/shower unit  Bathroom Toilet:  (BSC)   Prior Function:  Level of [place]:  (A bathing in /out tub, I dressing with AE)  Homemaking Assistance: Needs assistance  Ambulatory Assistance:  (reports I transfers BSC and wheelchair)  Vocational: Retired  Leisure: garden  Hand Dominance: Right  IADL History:  IADL Comments: I dishes, A driving A IADLs  ADL:  Eating Assistance: Independent  Grooming Assistance:  (SBA anticipated seated)  Bathing Assistance: Maximal (anticipated)  UE Dressing Assistance: Moderate  LE Dressing Assistance: Maximal  Toileting Assistance with Device:  (max A x2 anticipated WHW)  Functional Deficit: Setup, Steadying, Verbal cueing,  Supervision/safety, Increased time to complete  Activity Tolerance:  Endurance:  (fair)  Bed Mobility/Transfers: Bed Mobility  Bed Mobility:  (chair to bed max A x2 WhW, sit to sup max A x2, boost and roll max A x2)    Transfers  Transfer:  (sit to stand max A WHW, stand to sit max A x2 WHW)       Sitting Balance:  Dynamic Sitting Balance  Dynamic Sitting-Level of Assistance: Minimum assistance  Standing Balance:  Dynamic Standing Balance  Dynamic Standing-Level of Assistance: Maximum assistance (WHW)    IADL's:   IADL Comments: I dishes, A driving A IADLs  Vision: Vision - Basic Assessment  Current Vision: Wears glasses all the time  Sensation:  Light Touch:  (n/t BUE fingers)  Strength:  Strength Comments: B shoulder flex 3+/5, distal 4/5    Coordination:  Movements are Fluid and Coordinated: No   Hand Function:  Hand Function  Gross Grasp: Functional  Extremities: RUE   RUE : Within Functional Limits and LUE   LUE: Within Functional Limits      Outcome Measures: [facility] Daily Activity  Putting on and taking off regular lower body clothing: A lot  Bathing (including washing, rinsing, drying): A lot  Putting on and taking off regular upper body clothing: A lot  Toileting, which includes using toilet, bedpan or urinal: A lot  Taking care of personal grooming such as brushing teeth: A little  Eating Meals: None  Daily Activity - Total Score: 15         and OT Adult Other Outcome Measures  4AT: -  Education Documentation  Precautions, taught by Cristina Vasquez OT at 9/10/2024  2:34 PM.  Learner: Patient  Readiness: Acceptance  Method: Explanation  Response: Verbalizes Understanding, Needs Reinforcement    ADL Training, taught by Cristina Vasquez OT at 9/10/2024  2:34 PM.  Learner: Patient  Readiness: Acceptance  Method: Explanation  Response: Verbalizes Understanding, Needs Reinforcement    Education Comments  No comments found.      Goals:   Encounter Problems       Encounter Problems (Active)       ADLs        Patient will perform UB and LB bathing  with min A level of assistance and AE. (Progressing)       Start:  09/10/24    Expected End:  10/01/24            Patient with complete upper body dressing with independent level of assistance  (Progressing)       Start:  09/10/24    Expected End:  10/01/24            Patient with complete lower body dressing with contact guard assist level of assistance donning and doffing all LE clothes  with PRN adaptive equipment  (Progressing)       Start:  09/10/24    Expected End:  10/01/24            Patient will complete daily grooming tasks  with independent level of assistance  (Progressing)       Start:  09/10/24    Expected End:  10/01/24            Patient will complete toileting including hygiene clothing management/hygiene with minimal assist  level of assistance and LRD. (Progressing)       Start:  09/10/24    Expected End:  10/01/24               EXERCISE/STRENGTHENING       Patient with increase BUE to WFL strength. (Progressing)       Start:  09/10/24    Expected End:  10/01/24               MOBILITY       Patient will perform Functional mobility min Household distances/Community Distances with minimal assist  level of assistance and least restrictive device in order to improve safety and functional mobility. (Progressing)       Start:  09/10/24    Expected End:  10/01/24               TRANSFERS       Patient will perform bed mobility contact guard assist level of assistance and bed rails in order to improve safety and independence with mobility (Progressing)       Start:  09/10/24    Expected End:  10/01/24            Patient will complete functional transfer with least restrictive device with minimal assist  level of assistance. (Progressing)       Start:  09/10/24    Expected End:  10/01/24

## 2024-09-10 NOTE — CARE PLAN
The patient's goals for the shift include  pacemaker upgrade today    The clinical goals for the shift include remain hds    Over the shift, the patient has remained NPO for procedure.  Denies SOB or CP.  Assisted to the chair with 2 person transfer.  Using call light for assistance

## 2024-09-11 ENCOUNTER — APPOINTMENT (OUTPATIENT)
Dept: CARDIOLOGY | Facility: CLINIC | Age: 89
DRG: 258 | End: 2024-09-11
Payer: MEDICARE

## 2024-09-11 LAB
ALBUMIN SERPL BCP-MCNC: 3.4 G/DL (ref 3.4–5)
ALBUMIN SERPL BCP-MCNC: 3.8 G/DL (ref 3.4–5)
ANION GAP SERPL CALC-SCNC: 15 MMOL/L (ref 10–20)
ANION GAP SERPL CALC-SCNC: 18 MMOL/L (ref 10–20)
BUN SERPL-MCNC: 39 MG/DL (ref 6–23)
BUN SERPL-MCNC: 44 MG/DL (ref 6–23)
CALCIUM SERPL-MCNC: 8.8 MG/DL (ref 8.6–10.6)
CALCIUM SERPL-MCNC: 9.3 MG/DL (ref 8.6–10.6)
CHLORIDE SERPL-SCNC: 101 MMOL/L (ref 98–107)
CHLORIDE SERPL-SCNC: 103 MMOL/L (ref 98–107)
CO2 SERPL-SCNC: 21 MMOL/L (ref 21–32)
CO2 SERPL-SCNC: 23 MMOL/L (ref 21–32)
CREAT SERPL-MCNC: 1.12 MG/DL (ref 0.5–1.05)
CREAT SERPL-MCNC: 1.31 MG/DL (ref 0.5–1.05)
EGFRCR SERPLBLD CKD-EPI 2021: 38 ML/MIN/1.73M*2
EGFRCR SERPLBLD CKD-EPI 2021: 46 ML/MIN/1.73M*2
ERYTHROCYTE [DISTWIDTH] IN BLOOD BY AUTOMATED COUNT: 14.9 % (ref 11.5–14.5)
ERYTHROCYTE [DISTWIDTH] IN BLOOD BY AUTOMATED COUNT: 15.1 % (ref 11.5–14.5)
GLUCOSE SERPL-MCNC: 124 MG/DL (ref 74–99)
GLUCOSE SERPL-MCNC: 151 MG/DL (ref 74–99)
HCT VFR BLD AUTO: 34.9 % (ref 36–46)
HCT VFR BLD AUTO: 41.7 % (ref 36–46)
HGB BLD-MCNC: 10.5 G/DL (ref 12–16)
HGB BLD-MCNC: 12.5 G/DL (ref 12–16)
MAGNESIUM SERPL-MCNC: 2.21 MG/DL (ref 1.6–2.4)
MAGNESIUM SERPL-MCNC: 2.46 MG/DL (ref 1.6–2.4)
MCH RBC QN AUTO: 27.6 PG (ref 26–34)
MCH RBC QN AUTO: 28.7 PG (ref 26–34)
MCHC RBC AUTO-ENTMCNC: 30 G/DL (ref 32–36)
MCHC RBC AUTO-ENTMCNC: 30.1 G/DL (ref 32–36)
MCV RBC AUTO: 92 FL (ref 80–100)
MCV RBC AUTO: 96 FL (ref 80–100)
NRBC BLD-RTO: 0 /100 WBCS (ref 0–0)
NRBC BLD-RTO: 0 /100 WBCS (ref 0–0)
PHOSPHATE SERPL-MCNC: 3.8 MG/DL (ref 2.5–4.9)
PHOSPHATE SERPL-MCNC: 4.1 MG/DL (ref 2.5–4.9)
PLATELET # BLD AUTO: 258 X10*3/UL (ref 150–450)
PLATELET # BLD AUTO: 281 X10*3/UL (ref 150–450)
POTASSIUM SERPL-SCNC: 4.1 MMOL/L (ref 3.5–5.3)
POTASSIUM SERPL-SCNC: 4.9 MMOL/L (ref 3.5–5.3)
RBC # BLD AUTO: 3.8 X10*6/UL (ref 4–5.2)
RBC # BLD AUTO: 4.36 X10*6/UL (ref 4–5.2)
SODIUM SERPL-SCNC: 135 MMOL/L (ref 136–145)
SODIUM SERPL-SCNC: 137 MMOL/L (ref 136–145)
URATE SERPL-MCNC: 6.4 MG/DL (ref 2.3–6.7)
WBC # BLD AUTO: 6.7 X10*3/UL (ref 4.4–11.3)
WBC # BLD AUTO: 8.1 X10*3/UL (ref 4.4–11.3)

## 2024-09-11 PROCEDURE — 99233 SBSQ HOSP IP/OBS HIGH 50: CPT | Performed by: INTERNAL MEDICINE

## 2024-09-11 PROCEDURE — 2500000004 HC RX 250 GENERAL PHARMACY W/ HCPCS (ALT 636 FOR OP/ED)

## 2024-09-11 PROCEDURE — 2500000001 HC RX 250 WO HCPCS SELF ADMINISTERED DRUGS (ALT 637 FOR MEDICARE OP)

## 2024-09-11 PROCEDURE — 2500000002 HC RX 250 W HCPCS SELF ADMINISTERED DRUGS (ALT 637 FOR MEDICARE OP, ALT 636 FOR OP/ED): Performed by: INTERNAL MEDICINE

## 2024-09-11 PROCEDURE — 1200000002 HC GENERAL ROOM WITH TELEMETRY DAILY

## 2024-09-11 PROCEDURE — 85027 COMPLETE CBC AUTOMATED: CPT

## 2024-09-11 PROCEDURE — 94640 AIRWAY INHALATION TREATMENT: CPT

## 2024-09-11 PROCEDURE — 36415 COLL VENOUS BLD VENIPUNCTURE: CPT

## 2024-09-11 PROCEDURE — 84550 ASSAY OF BLOOD/URIC ACID: CPT

## 2024-09-11 PROCEDURE — 2500000001 HC RX 250 WO HCPCS SELF ADMINISTERED DRUGS (ALT 637 FOR MEDICARE OP): Performed by: NURSE PRACTITIONER

## 2024-09-11 PROCEDURE — 83735 ASSAY OF MAGNESIUM: CPT

## 2024-09-11 PROCEDURE — 2500000002 HC RX 250 W HCPCS SELF ADMINISTERED DRUGS (ALT 637 FOR MEDICARE OP, ALT 636 FOR OP/ED)

## 2024-09-11 PROCEDURE — 97162 PT EVAL MOD COMPLEX 30 MIN: CPT | Mod: GP

## 2024-09-11 PROCEDURE — 2500000001 HC RX 250 WO HCPCS SELF ADMINISTERED DRUGS (ALT 637 FOR MEDICARE OP): Performed by: PHYSICIAN ASSISTANT

## 2024-09-11 PROCEDURE — 80069 RENAL FUNCTION PANEL: CPT

## 2024-09-11 RX ORDER — COLCHICINE 0.6 MG/1
0.6 TABLET ORAL ONCE
Status: COMPLETED | OUTPATIENT
Start: 2024-09-11 | End: 2024-09-11

## 2024-09-11 RX ORDER — PETROLATUM 420 MG/G
OINTMENT TOPICAL
Status: DISCONTINUED | OUTPATIENT
Start: 2024-09-11 | End: 2024-09-17 | Stop reason: HOSPADM

## 2024-09-11 RX ORDER — COLCHICINE 0.6 MG/1
0.6 TABLET ORAL DAILY
Status: COMPLETED | OUTPATIENT
Start: 2024-09-12 | End: 2024-09-17

## 2024-09-11 RX ORDER — HYDROXYZINE HYDROCHLORIDE 25 MG/1
25 TABLET, FILM COATED ORAL EVERY 6 HOURS PRN
Status: DISCONTINUED | OUTPATIENT
Start: 2024-09-11 | End: 2024-09-17 | Stop reason: HOSPADM

## 2024-09-11 RX ADMIN — METOPROLOL TARTRATE 25 MG: 25 TABLET, FILM COATED ORAL at 08:05

## 2024-09-11 RX ADMIN — Medication 400 UNITS: at 10:10

## 2024-09-11 RX ADMIN — Medication 1 TABLET: at 10:09

## 2024-09-11 RX ADMIN — CEPHALEXIN 500 MG: 500 CAPSULE ORAL at 22:50

## 2024-09-11 RX ADMIN — FUROSEMIDE 40 MG: 20 TABLET ORAL at 08:05

## 2024-09-11 RX ADMIN — CETIRIZINE HYDROCHLORIDE 10 MG: 10 TABLET, FILM COATED ORAL at 10:10

## 2024-09-11 RX ADMIN — TRAMADOL HYDROCHLORIDE 100 MG: 50 TABLET, COATED ORAL at 22:50

## 2024-09-11 RX ADMIN — FLUTICASONE FUROATE AND VILANTEROL TRIFENATATE 1 PUFF: 200; 25 POWDER RESPIRATORY (INHALATION) at 08:56

## 2024-09-11 RX ADMIN — Medication 1000 UNITS: at 10:09

## 2024-09-11 RX ADMIN — HYDROPHOR: 42 OINTMENT TOPICAL at 22:52

## 2024-09-11 RX ADMIN — ICOSAPENT ETHYL 2 G: 1 CAPSULE ORAL at 16:29

## 2024-09-11 RX ADMIN — BACLOFEN 5 MG: 10 TABLET ORAL at 22:50

## 2024-09-11 RX ADMIN — COLCHICINE 0.6 MG: 0.6 TABLET ORAL at 10:32

## 2024-09-11 RX ADMIN — OXYCODONE HYDROCHLORIDE AND ACETAMINOPHEN 500 MG: 500 TABLET ORAL at 10:09

## 2024-09-11 RX ADMIN — GABAPENTIN 100 MG: 100 CAPSULE ORAL at 08:05

## 2024-09-11 RX ADMIN — IPRATROPIUM BROMIDE AND ALBUTEROL SULFATE 3 ML: .5; 3 SOLUTION RESPIRATORY (INHALATION) at 08:57

## 2024-09-11 RX ADMIN — AMLODIPINE BESYLATE 10 MG: 10 TABLET ORAL at 08:05

## 2024-09-11 RX ADMIN — GABAPENTIN 100 MG: 100 CAPSULE ORAL at 22:51

## 2024-09-11 RX ADMIN — DOCUSATE SODIUM 100 MG: 100 CAPSULE, LIQUID FILLED ORAL at 22:51

## 2024-09-11 RX ADMIN — PANTOPRAZOLE SODIUM 20 MG: 20 TABLET, DELAYED RELEASE ORAL at 08:12

## 2024-09-11 RX ADMIN — CEPHALEXIN 500 MG: 500 CAPSULE ORAL at 10:10

## 2024-09-11 RX ADMIN — METOPROLOL TARTRATE 25 MG: 25 TABLET, FILM COATED ORAL at 22:51

## 2024-09-11 RX ADMIN — ALLOPURINOL 50 MG: 100 TABLET ORAL at 08:04

## 2024-09-11 RX ADMIN — ACETAMINOPHEN 975 MG: 325 TABLET ORAL at 16:35

## 2024-09-11 RX ADMIN — FERROUS SULFATE TAB 325 MG (65 MG ELEMENTAL FE) 325 MG: 325 (65 FE) TAB at 10:10

## 2024-09-11 RX ADMIN — IPRATROPIUM BROMIDE AND ALBUTEROL SULFATE 3 ML: .5; 3 SOLUTION RESPIRATORY (INHALATION) at 16:41

## 2024-09-11 RX ADMIN — SACUBITRIL AND VALSARTAN 1 TABLET: 97; 103 TABLET, FILM COATED ORAL at 22:51

## 2024-09-11 RX ADMIN — TRAMADOL HYDROCHLORIDE 100 MG: 50 TABLET, COATED ORAL at 08:03

## 2024-09-11 RX ADMIN — SACUBITRIL AND VALSARTAN 1 TABLET: 97; 103 TABLET, FILM COATED ORAL at 10:10

## 2024-09-11 RX ADMIN — DULOXETINE HYDROCHLORIDE 30 MG: 30 CAPSULE, DELAYED RELEASE ORAL at 10:32

## 2024-09-11 RX ADMIN — ALENDRONATE SODIUM 35 MG: 35 TABLET ORAL at 08:07

## 2024-09-11 RX ADMIN — COLCHICINE 0.6 MG: 0.6 TABLET ORAL at 13:21

## 2024-09-11 RX ADMIN — ICOSAPENT ETHYL 2 G: 1 CAPSULE ORAL at 10:09

## 2024-09-11 RX ADMIN — ATORVASTATIN CALCIUM 20 MG: 20 TABLET, FILM COATED ORAL at 22:51

## 2024-09-11 RX ADMIN — IPRATROPIUM BROMIDE AND ALBUTEROL SULFATE 3 ML: .5; 3 SOLUTION RESPIRATORY (INHALATION) at 22:32

## 2024-09-11 ASSESSMENT — COGNITIVE AND FUNCTIONAL STATUS - GENERAL
EATING MEALS: A LITTLE
CLIMB 3 TO 5 STEPS WITH RAILING: TOTAL
DAILY ACTIVITIY SCORE: 13
STANDING UP FROM CHAIR USING ARMS: TOTAL
DRESSING REGULAR LOWER BODY CLOTHING: A LOT
DRESSING REGULAR LOWER BODY CLOTHING: A LOT
MOBILITY SCORE: 8
MOVING FROM LYING ON BACK TO SITTING ON SIDE OF FLAT BED WITH BEDRAILS: A LOT
CLIMB 3 TO 5 STEPS WITH RAILING: TOTAL
DAILY ACTIVITIY SCORE: 18
WALKING IN HOSPITAL ROOM: TOTAL
STANDING UP FROM CHAIR USING ARMS: TOTAL
HELP NEEDED FOR BATHING: A LOT
TURNING FROM BACK TO SIDE WHILE IN FLAT BAD: A LOT
WALKING IN HOSPITAL ROOM: TOTAL
DRESSING REGULAR UPPER BODY CLOTHING: A LOT
TOILETING: A LITTLE
TURNING FROM BACK TO SIDE WHILE IN FLAT BAD: A LOT
CLIMB 3 TO 5 STEPS WITH RAILING: TOTAL
MOVING FROM LYING ON BACK TO SITTING ON SIDE OF FLAT BED WITH BEDRAILS: A LITTLE
STANDING UP FROM CHAIR USING ARMS: TOTAL
MOVING TO AND FROM BED TO CHAIR: A LOT
MOBILITY SCORE: 9
MOVING TO AND FROM BED TO CHAIR: TOTAL
DRESSING REGULAR UPPER BODY CLOTHING: A LITTLE
MOVING TO AND FROM BED TO CHAIR: TOTAL
MOVING FROM LYING ON BACK TO SITTING ON SIDE OF FLAT BED WITH BEDRAILS: A LOT
TOILETING: A LOT
WALKING IN HOSPITAL ROOM: TOTAL
TURNING FROM BACK TO SIDE WHILE IN FLAT BAD: A LOT
PERSONAL GROOMING: A LOT
MOBILITY SCORE: 9
HELP NEEDED FOR BATHING: A LOT

## 2024-09-11 ASSESSMENT — PAIN SCALES - GENERAL
PAINLEVEL_OUTOF10: 7
PAINLEVEL_OUTOF10: 1
PAINLEVEL_OUTOF10: 0 - NO PAIN
PAINLEVEL_OUTOF10: 0 - NO PAIN
PAINLEVEL_OUTOF10: 5 - MODERATE PAIN
PAINLEVEL_OUTOF10: 5 - MODERATE PAIN

## 2024-09-11 ASSESSMENT — PAIN - FUNCTIONAL ASSESSMENT
PAIN_FUNCTIONAL_ASSESSMENT: 0-10

## 2024-09-11 ASSESSMENT — PAIN SCALES - PAIN ASSESSMENT IN ADVANCED DEMENTIA (PAINAD)
TOTALSCORE: MEDICATION (SEE MAR);REPOSITIONED
TOTALSCORE: MEDICATION (SEE MAR)

## 2024-09-11 ASSESSMENT — ACTIVITIES OF DAILY LIVING (ADL): ADL_ASSISTANCE: NEEDS ASSISTANCE

## 2024-09-11 ASSESSMENT — PAIN DESCRIPTION - LOCATION: LOCATION: LEG

## 2024-09-11 NOTE — PROGRESS NOTES
Subjective Data:  Patient seen and assessed this morning, lying in bed, NAD. Denies any CP or SOB. Reports significant pain to Rt leg including toe and knee, pt concerned for gout glare. Updated on plan of care, agreeable to plan.     - s/p successful generator change out 9/10, device interrogation pending holding Lovenox for 48 hrs post-procedure per EP, back up rate set to HR 70  - concern for acute gout flare, Uric acid 6.4 (upper limit of normal) will give colchicine 0.6 mg BID today, and follow with 0.6 mg daily for 1 week  - encourage pt to get OOB to chair, PT/OT consulted    Overnight Events:    No acute events overnight     Objective Data:  Last Recorded Vitals:  Vitals:    09/11/24 0008 09/11/24 0440 09/11/24 0630 09/11/24 0735   BP: 130/52 143/53  153/62   Pulse: 70 72  70   Resp:  18  19   Temp: 35.9 °C (96.6 °F) 36.8 °C (98.2 °F)  36.7 °C (98.1 °F)   TempSrc:  Temporal     SpO2: 95% 95%  95%   Weight:   90.9 kg (200 lb 6.4 oz)    Height:           Last Labs:  Results for orders placed or performed during the hospital encounter of 09/03/24 (from the past 24 hour(s))   CBC   Result Value Ref Range    WBC 8.1 4.4 - 11.3 x10*3/uL    nRBC 0.0 0.0 - 0.0 /100 WBCs    RBC 4.36 4.00 - 5.20 x10*6/uL    Hemoglobin 12.5 12.0 - 16.0 g/dL    Hematocrit 41.7 36.0 - 46.0 %    MCV 96 80 - 100 fL    MCH 28.7 26.0 - 34.0 pg    MCHC 30.0 (L) 32.0 - 36.0 g/dL    RDW 15.1 (H) 11.5 - 14.5 %    Platelets 281 150 - 450 x10*3/uL   Renal Function Panel   Result Value Ref Range    Glucose 124 (H) 74 - 99 mg/dL    Sodium 137 136 - 145 mmol/L    Potassium 4.9 3.5 - 5.3 mmol/L    Chloride 103 98 - 107 mmol/L    Bicarbonate 21 21 - 32 mmol/L    Anion Gap 18 10 - 20 mmol/L    Urea Nitrogen 39 (H) 6 - 23 mg/dL    Creatinine 1.12 (H) 0.50 - 1.05 mg/dL    eGFR 46 (L) >60 mL/min/1.73m*2    Calcium 9.3 8.6 - 10.6 mg/dL    Phosphorus 3.8 2.5 - 4.9 mg/dL    Albumin 3.8 3.4 - 5.0 g/dL   Magnesium   Result Value Ref Range    Magnesium 2.46 (H)  1.60 - 2.40 mg/dL   Uric Acid   Result Value Ref Range    Uric Acid 6.4 2.3 - 6.7 mg/dL        TROPHS   Date/Time Value Ref Range Status   09/04/2024 01:06 AM 40 0 - 34 ng/L Final   09/03/2024 11:47 PM 40 0 - 34 ng/L Final   09/03/2024 03:51 PM 39 0 - 34 ng/L Final   04/26/2024 06:56 PM 41 0 - 34 ng/L Final   04/25/2024 06:39 PM 35 0 - 34 ng/L Final   04/25/2024 01:31 PM 29 0 - 34 ng/L Final     BNP   Date/Time Value Ref Range Status   09/03/2024 11:47  0 - 99 pg/mL Final   04/25/2024 01:31  0 - 99 pg/mL Final     HGBA1C   Date/Time Value Ref Range Status   04/25/2024 01:31 PM 6.1 see below % Final   09/09/2022 05:36 PM 5.4 % Final     Comment:          Diagnosis of Diabetes-Adults   Non-Diabetic: < or = 5.6%   Increased risk for developing diabetes: 5.7-6.4%   Diagnostic of diabetes: > or = 6.5%  .       Monitoring of Diabetes                Age (y)     Therapeutic Goal (%)   Adults:          >18           <7.0   Pediatrics:    13-18           <7.5                   7-12           <8.0                   0- 6            7.5-8.5   American Diabetes Association. Diabetes Care 33(S1), Jan 2010.     01/08/2022 11:40 AM 5.9 4.3 - 5.6 % Final     Comment:     American Diabetes Association guidelines indicate that patients with HgbA1c in   the range 5.7-6.4% are at increased risk for development of diabetes, and   intervention by lifestyle modification may be beneficial. HgbA1c greater or   equal to 6.5% is considered diagnostic of diabetes.   09/02/2021 01:28 PM 5.6 4.3 - 5.6 % Final     Comment:     American Diabetes Association guidelines indicate that patients with HgbA1c in   the range 5.7-6.4% are at increased risk for development of diabetes, and   intervention by lifestyle modification may be beneficial. HgbA1c greater or   equal to 6.5% is considered diagnostic of diabetes.     LDLCALC   Date/Time Value Ref Range Status   07/25/2024 12:26 PM 38 <=99 mg/dL Final     Comment:                                  Near   Borderline      AGE      Desirable  Optimal    High     High     Very High     0-19 Y     0 - 109     ---    110-129   >/= 130     ----    20-24 Y     0 - 119     ---    120-159   >/= 160     ----      >24 Y     0 -  99   100-129  130-159   160-189     >/=190       VLDL   Date/Time Value Ref Range Status   07/25/2024 12:26 PM 34 0 - 40 mg/dL Final   09/09/2022 05:36 PM 30 0 - 40 mg/dL Final      Last I/O:  I/O last 3 completed shifts:  In: 0 (0 mL/kg)   Out: 1860 (20.5 mL/kg) [Urine:1850 (0.6 mL/kg/hr); Blood:10]  Weight: 90.9 kg     Past Cardiology Tests (Last 3 Years):  EKG:  ECG 12 lead 09/03/2024      ECG 12 Lead 04/26/2024      ECG 12 lead 04/25/2024      ECG 12 Lead 03/25/2024      ECG 12 lead 03/22/2024      ECG 12 lead 02/16/2024    Echo:  Transthoracic Echo (TTE) Limited 04/26/2024      Transthoracic Echo (TTE) Limited 02/17/2024    Ejection Fractions:  EF   Date/Time Value Ref Range Status   09/05/2024 03:20 PM 50 %    04/26/2024 02:06 PM 43 %    02/17/2024 02:12 PM 72 %      Cath:  No results found for this or any previous visit from the past 1095 days.    Stress Test:  No results found for this or any previous visit from the past 1095 days.    Cardiac Imaging:  No results found for this or any previous visit from the past 1095 days.      Inpatient Medications:  Scheduled medications   Medication Dose Route Frequency    alendronate  35 mg oral q7 days    allopurinol  50 mg oral q24h    amLODIPine  10 mg oral Daily    ascorbic acid  500 mg oral Daily    atorvastatin  20 mg oral Nightly    baclofen  5 mg oral Nightly    cephalexin  500 mg oral BID    cetirizine  10 mg oral Daily    cholecalciferol  1,000 Units oral Daily    cyanocobalamin  1,000 mcg intramuscular q30 days    docusate sodium  100 mg oral BID    DULoxetine  30 mg oral Daily    [Held by provider] enoxaparin  30 mg subcutaneous q24h    ferrous sulfate (325 mg ferrous sulfate)  65 mg of iron oral Daily with breakfast    fluticasone  furoate-vilanteroL  1 puff inhalation Daily    furosemide  40 mg oral Daily    gabapentin  100 mg oral BID    icosapent ethyL  2 g oral BID    ipratropium-albuteroL  3 mL nebulization TID    metoprolol tartrate  25 mg oral BID    multivitamin with minerals  1 tablet oral Daily    pantoprazole  20 mg oral Daily before breakfast    polyethylene glycol  17 g oral Daily    sacubitriL-valsartan  1 tablet oral BID    vitamin E  400 Units oral Daily     PRN medications   Medication    acetaminophen    melatonin    pramoxine    sennosides    traMADol     Continuous Medications   Medication Dose Last Rate       Physical Exam:  Physical Exam  Vitals and nursing note reviewed.   Constitutional:       General: She is not in acute distress.     Appearance: She is not ill-appearing.   HENT:      Head: Normocephalic and atraumatic.      Ears:      Comments: Hard of hearing  Eyes:      General: No scleral icterus.     Extraocular Movements: Extraocular movements intact.      Conjunctiva/sclera: Conjunctivae normal.   Cardiovascular:      Rate and Rhythm: Normal rate and regular rhythm.      Heart sounds: Murmur heard.      No friction rub. No gallop.      Comments: 100% AV paced  Pulmonary:      Effort: Pulmonary effort is normal. No respiratory distress.      Breath sounds: No wheezing or rhonchi.   Musculoskeletal:      Right lower leg: No edema.      Left lower leg: No edema.   Skin:     General: Skin is warm and dry.   Neurological:      General: No focal deficit present.      Mental Status: She is alert and oriented to person, place, and time.   Psychiatric:         Mood and Affect: Mood normal.         Behavior: Behavior normal.            Assessment/Plan   Alayna Dumont is a 89 y.o. 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 She presented to ED for c/o SOB that started a \"couple of days ago\" and got improved since admission and treated with diuretics.      Acute on chronic Diastolic heart failure  Valvular Heart Disease, s/p AVR  HCM, s/p septal myomectomy    - 01/2023 TTE: EF 50-55%, no WMA, LV septal wall severely increased, sev LVH, bioprosthetic AV. Moderate to severe prosthetic AI, peak/mean gradient 38.0/21.3  - 2/17/24 TTE: EF 70-75%, no WMA, mod LVH, normal DF, bioprosthetic AV, mod to sev AI, peak/mean 40.4/22.0, moderate Aortic stenosis  - 4/26/24 TTE: LV function is mildly to moderately decreased, WMA, sev LVH. Dyssynchrony consistent with RV pacing is noted. Moderate aortic valve stenosis.  DI 0.47, moderate AI. Peak/mean  31.9/15.1.   - 9/5/24 TTE: LVEF 50%, GHK, LV sev dilated, severe LVH, LA sev dilated, RV mod enlarged/mildly reduced function, severe AI of prosthetic valve, mild AS, DI 0.63, peak/mean gradients 26.2/15.0, severe Mitral calcification, mild to mod MR, mild TR, RVSP 33.9  - Structural team consulted, discussed with Dr. 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 PNA in the differential. A component of pulmonary edema is present  - Repeat CXR 9/6: Cardiomegaly and interstitial pulmonary edema.  - prev dry wt ~90-93 kg  - admit wt: 92 kg  - daily (bed) wt: 90.9 (90.8, 90, 90.8, 92 kg)   - s/p diuresis, 40 mg IV Lasix BID  - remains euvolemic, warm and dry  - cont Amlodipine 10 mg daily (new), Metoprolol Tartrate to 25 mg BID (new dose) for further afterload reduction/BP control  - further GDMT/optimization as indicated, consider MRA  - SGLT2i likely contraindicated given functional urinary incontinence  - of note, prev admit 4/25 -5/4 for ADHF, cardiology consulted, suspect less likely due to ischemia more likely pacemaker induced, Geriatrics was consulted for GOC discussion, code status was changed to DNR/DNI with no plan for any further invasive measures  - previously seen by Dr. Alfaro, 2/2023, referred to heart valve clinic, did not follow up  - cont Entresto 97/103, PO Lasix 40 mg daily  - cont home Metop Tartrate 25 mg BID,   - 2gram sodium diet, 2L fluid restriction, daily standing weights, strict I&O's     Hx Tachy Martir Syndrome  S/p [employer] Pacemaker  - missed her most recent device check  - Device check on 02/16/2024 estimated remaining battery life to be 1 year  - PPM device check 9/5 showed battery life is less than 6 months  - EP consulted, blood cultures to r/o infection iso sev AI, will plan for generator replacement this admission, appreciate recs  - 9/6 BC x2: NTD x3 days  - s/p successful generator change out 9/10, device interrogation pending holding Lovenox for 48 hrs post-procedure per EP, back up rate set to HR 70 iso severe AI    HTN  - admit BP up to 194/66 in ED  - SBP last 24 hrs: 130-149  - meds as above, titrate as indicated     CKD stage 3 (stable)  - b/lCr ~1.3-1.6, GFR ~30-40  - admit Cr  1.46, GFR 34  - Daily Cr 1.12 (1.30, 1.27, 1.38, 1.45, 1.48, 1.29)   - Avoid nephrotoxins and hypotension, renally dose meds as indicated  - trend daily RFP while admitted      Anemia/SUNDEEP  Vit B12 deficiency  - b/l hgb ~10-11  - admit hgb 10.8  - Daily hgb 12.5 (11.6, 11.3, 11.2, 12.2, 11.1, 9.5)  - no acute sign of bleeding  - cont home PO iron and B-12 injections and supplements     HLD  - cont home Atorvastatin and Vascepa     Chronic back pain  Peripheral neuropathy  - cont home Baclofen, gabapentin, Tramadol PRN  - PRN XS Tylenol     COPD/?Asthma  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  - continue home Breo 1 puff, Duoneb nebulizer TID  - maintain Pox>92%, supplemental oxygen as needed     LETICIA  - cont home Duloxetine      Infected hip (left)   Lt total knee replacement (2011) and revision (2014)   - s/p surgery, on chronic suppressive antibiotic therapy   - cont home Keflex     Gout  - cont home allopurinol  - concern for acute gout flare, Uric acid 6.4 (upper limit of normal) will give colchicine 0.6 mg BID today, and follow with 0.6 mg daily for 1 week  - encourage pt to get OOB to chair, PT/OT consulted     DVT Prophylaxis: subcutaneous Lovenox daily    Dispo  - Pending PPM generator change out 9/10  - functional urinary incontinence and inability to walk at b/l, lives with son  - per pt's son, would decline SNF at discharge, goal is to bring patient home to live the rest of her life, would be agreeable to [facility]  - encourage pt to get OOB to chair, PT rec Mod vs home with 24/hr supervision, OT pending    Code Status: Full Code, confirmed with patient on 9/3    Patient seen and discussed with Dr. Joseph Knutson, APRN-CNP

## 2024-09-11 NOTE — CARE PLAN
The patient's goals for the shift include    Increase activity today  The clinical goals for the shift include remain hds    Over the shift, the patient  sat up in the chair for approx 3 hours with a heavy transfer. Pt with c/o right knee and leg pain today. Medicated with decrease in pain. Tolerating diet. Incision dressing dry and intact. Using call light for assistance

## 2024-09-11 NOTE — PROGRESS NOTES
Physical Therapy    Physical Therapy Evaluation    Patient Name: Alayna Dumont  MRN: [id number]  Department: [place] [address]  Room: [facility]  Today's Date: 9/11/2024   Time Calculation  Start Time: 0822  Stop Time: 0839  Time Calculation (min): 17 min    Assessment/Plan   PT Assessment  PT Assessment Results: Decreased strength, Decreased range of motion, Decreased endurance, Impaired balance, Decreased mobility  Rehab Prognosis: Fair  Barriers to Discharge: none  End of Session Communication: Bedside nurse  Assessment Comment: 89 y.o. F admitted for HF demonstrating impaired strength, balance, and function. Patient will benefit from continued PT while in house to further progress mobility as pt tolerates.  End of Session Patient Position: Up in chair, Alarm on  IP OR SWING BED PT PLAN  Inpatient or Swing Bed: Inpatient  PT Plan  Treatment/Interventions: Bed mobility, Transfer training, Gait training, Balance training, Strengthening, Range of motion, Therapeutic exercise, Therapeutic activity  PT Plan: Ongoing PT  PT Frequency: 2 times per week  PT Discharge Recommendations: Low intensity level of continued care (and 24 hr continued assist of son. If son/family cannot provide assistance, patient will benefit from continued PT at MOD intensity to restore function.)  PT Recommended Transfer Status: Assist x1 (max assist)  PT - OK to Discharge: Yes      Subjective   General Visit Information:  General  Reason for Referral: CHF/CP  Past Medical History Relevant to Rehab: 89F hx pulmonary sarcoidosis, DM, s/p AVR 2013 and septal myectomy c/b [facility] s/p [facility] admitted with CP found to have ADHF with severe bioprosthetic AI.  Missed Visit: No  Prior to Session Communication: Bedside nurse  Patient Position Received: Bed, 2 rail up, Alarm on  Preferred Learning Style: verbal, auditory  General Comment: Pt seated at EOB upon PT's entry. Very pleasant and cooperative. Willing to participate with PT.  Home Living:  Home  Living  Type of Home: House  Lives With: Adult children  Home Adaptive Equipment: Wheelchair-manual, Walker rolling or standard  Home Layout: One level  Home Access: Stairs to enter with rails  Entrance Stairs-Number of Steps: 4  Prior Level of Function:  Prior Function Per Pt/Caregiver Report  Level of [place]: Needs assistance with functional transfers, Needs assistance with ADLs, Needs assistance with homemaking  Receives Help From:  (Son)  ADL Assistance: Needs assistance  Homemaking Assistance: Needs assistance  Ambulatory Assistance:  (Most recently non-ambulatory due to LLE pain. Has been pivoting to wheelchair with son's assistance.)  Precautions:  Precautions  Medical Precautions: Fall precautions, Cardiac precautions    Objective   Pain:  Pain Assessment  Pain Assessment: 0-10  0-10 (Numeric) Pain Score: 5 - Moderate pain  Pain Type: Chronic pain (Pt reports chronic LLE pain and now with R knee pain as she feels could be gout flare.)  Pain Location: Leg  Pain Interventions: Medication (See MAR)  Cognition:  Cognition  Overall Cognitive Status: Within Functional Limits  Orientation Level: Oriented X4  Attention: Within Functional Limits  Memory: Within Funtional Limits  Insight: Within function limits  Impulsive: Within functional limits  Processing Speed: Within funtional limits    General Assessments:     Activity Tolerance  Endurance: Decreased tolerance for upright activites  Activity Tolerance Comments: Limited by chronic arthritis pain/discomfort.    Sensation  Light Touch: No apparent deficits    Strength  Strength Comments: Grossly >3+,4-/5 throughout BLE.  Strength  Strength Comments: Grossly >3+,4-/5 throughout BLE.    Perception  Inattention/Neglect: Appears intact    Coordination  Movements are Fluid and Coordinated: Yes    Postural Control  Postural Control: Within Functional Limits    Static Sitting Balance  Static Sitting-Balance Support: Bilateral upper extremity supported  Static  Sitting-Level of Assistance: Distant supervision    Static Standing Balance  Static Standing-Comment/Number of Minutes: Pt unable to tolerate static stance due to LLE pain and now with potential R knee gout pain.  Functional Assessments:    Bed Mobility  Bed Mobility: No (Pt seated EOB pre visit and in chair post visit.)    Transfers  Transfer: Yes  Transfer 1  Transfer From 1: Bed to, Chair with arms to  Transfer to 1: Chair with arms, Bed  Technique 1: Squat pivot  Transfer Device 1:  (No AD)  Transfer Level of Assistance 1: Maximum assistance, Minimal verbal cues, Minimal tactile cues    Ambulation/Gait Training  Ambulation/Gait Training Performed: No (Pt unable to tolerate due to BLE pain/weakness.)    Outcome Measures:  [facility] Basic Mobility  Turning from your back to your side while in a flat bed without using bedrails: A lot  Moving from lying on your back to sitting on the side of a flat bed without using bedrails: A lot  Moving to and from bed to chair (including a wheelchair): A lot  Standing up from a chair using your arms (e.g. wheelchair or bedside chair): Total  To walk in hospital room: Total  Climbing 3-5 steps with railing: Total  Basic Mobility - Total Score: 9    Encounter Problems       Encounter Problems (Active)       Mobility       STG - Patient will ambulate >3ft, wheeled walker, min assist       Start:  09/11/24    Expected End:  09/18/24               PT Transfers       STG - Patient to transfer to and from sit to supine min assist       Start:  09/11/24    Expected End:  09/18/24            STG - Patient will transfer sit to and from stand min assist       Start:  09/11/24    Expected End:  09/18/24               Pain - Adult              Education Documentation  Mobility Training, taught by Harvinder Be, PT at 9/11/2024  9:09 AM.  Learner: Patient  Readiness: Acceptance  Method: Explanation  Response: Verbalizes Understanding    Education Comments  No comments found.

## 2024-09-12 LAB
ALBUMIN SERPL BCP-MCNC: 3.4 G/DL (ref 3.4–5)
ANION GAP SERPL CALC-SCNC: 16 MMOL/L (ref 10–20)
BUN SERPL-MCNC: 49 MG/DL (ref 6–23)
CALCIUM SERPL-MCNC: 8.9 MG/DL (ref 8.6–10.6)
CHLORIDE SERPL-SCNC: 103 MMOL/L (ref 98–107)
CO2 SERPL-SCNC: 24 MMOL/L (ref 21–32)
CREAT SERPL-MCNC: 1.4 MG/DL (ref 0.5–1.05)
EGFRCR SERPLBLD CKD-EPI 2021: 35 ML/MIN/1.73M*2
ERYTHROCYTE [DISTWIDTH] IN BLOOD BY AUTOMATED COUNT: 14.7 % (ref 11.5–14.5)
GLUCOSE SERPL-MCNC: 121 MG/DL (ref 74–99)
HCT VFR BLD AUTO: 36.4 % (ref 36–46)
HGB BLD-MCNC: 11.1 G/DL (ref 12–16)
MAGNESIUM SERPL-MCNC: 2.43 MG/DL (ref 1.6–2.4)
MCH RBC QN AUTO: 28.1 PG (ref 26–34)
MCHC RBC AUTO-ENTMCNC: 30.5 G/DL (ref 32–36)
MCV RBC AUTO: 92 FL (ref 80–100)
NRBC BLD-RTO: 0 /100 WBCS (ref 0–0)
PHOSPHATE SERPL-MCNC: 5 MG/DL (ref 2.5–4.9)
PLATELET # BLD AUTO: 272 X10*3/UL (ref 150–450)
POTASSIUM SERPL-SCNC: 4.6 MMOL/L (ref 3.5–5.3)
RBC # BLD AUTO: 3.95 X10*6/UL (ref 4–5.2)
SODIUM SERPL-SCNC: 138 MMOL/L (ref 136–145)
WBC # BLD AUTO: 6.6 X10*3/UL (ref 4.4–11.3)

## 2024-09-12 PROCEDURE — 80069 RENAL FUNCTION PANEL: CPT

## 2024-09-12 PROCEDURE — 2500000001 HC RX 250 WO HCPCS SELF ADMINISTERED DRUGS (ALT 637 FOR MEDICARE OP): Performed by: PHYSICIAN ASSISTANT

## 2024-09-12 PROCEDURE — 99233 SBSQ HOSP IP/OBS HIGH 50: CPT | Performed by: INTERNAL MEDICINE

## 2024-09-12 PROCEDURE — 94640 AIRWAY INHALATION TREATMENT: CPT

## 2024-09-12 PROCEDURE — 2500000001 HC RX 250 WO HCPCS SELF ADMINISTERED DRUGS (ALT 637 FOR MEDICARE OP): Performed by: NURSE PRACTITIONER

## 2024-09-12 PROCEDURE — 2500000004 HC RX 250 GENERAL PHARMACY W/ HCPCS (ALT 636 FOR OP/ED)

## 2024-09-12 PROCEDURE — 1200000002 HC GENERAL ROOM WITH TELEMETRY DAILY

## 2024-09-12 PROCEDURE — 85027 COMPLETE CBC AUTOMATED: CPT

## 2024-09-12 PROCEDURE — 36415 COLL VENOUS BLD VENIPUNCTURE: CPT

## 2024-09-12 PROCEDURE — 2500000002 HC RX 250 W HCPCS SELF ADMINISTERED DRUGS (ALT 637 FOR MEDICARE OP, ALT 636 FOR OP/ED): Performed by: INTERNAL MEDICINE

## 2024-09-12 PROCEDURE — 2500000001 HC RX 250 WO HCPCS SELF ADMINISTERED DRUGS (ALT 637 FOR MEDICARE OP)

## 2024-09-12 PROCEDURE — 2500000004 HC RX 250 GENERAL PHARMACY W/ HCPCS (ALT 636 FOR OP/ED): Performed by: PHYSICIAN ASSISTANT

## 2024-09-12 PROCEDURE — 83735 ASSAY OF MAGNESIUM: CPT

## 2024-09-12 RX ORDER — ENOXAPARIN SODIUM 100 MG/ML
30 INJECTION SUBCUTANEOUS EVERY 24 HOURS
Status: DISCONTINUED | OUTPATIENT
Start: 2024-09-12 | End: 2024-09-17 | Stop reason: HOSPADM

## 2024-09-12 RX ADMIN — BACLOFEN 5 MG: 10 TABLET ORAL at 20:32

## 2024-09-12 RX ADMIN — GABAPENTIN 100 MG: 100 CAPSULE ORAL at 20:32

## 2024-09-12 RX ADMIN — METOPROLOL TARTRATE 25 MG: 25 TABLET, FILM COATED ORAL at 09:22

## 2024-09-12 RX ADMIN — PANTOPRAZOLE SODIUM 20 MG: 20 TABLET, DELAYED RELEASE ORAL at 06:23

## 2024-09-12 RX ADMIN — DOCUSATE SODIUM 100 MG: 100 CAPSULE, LIQUID FILLED ORAL at 20:32

## 2024-09-12 RX ADMIN — GABAPENTIN 100 MG: 100 CAPSULE ORAL at 09:21

## 2024-09-12 RX ADMIN — ICOSAPENT ETHYL 2 G: 1 CAPSULE ORAL at 17:30

## 2024-09-12 RX ADMIN — IPRATROPIUM BROMIDE AND ALBUTEROL SULFATE 3 ML: .5; 3 SOLUTION RESPIRATORY (INHALATION) at 21:45

## 2024-09-12 RX ADMIN — CEPHALEXIN 500 MG: 500 CAPSULE ORAL at 09:22

## 2024-09-12 RX ADMIN — COLCHICINE 0.6 MG: 0.6 TABLET ORAL at 09:22

## 2024-09-12 RX ADMIN — OXYCODONE HYDROCHLORIDE AND ACETAMINOPHEN 500 MG: 500 TABLET ORAL at 09:22

## 2024-09-12 RX ADMIN — ACETAMINOPHEN 975 MG: 325 TABLET ORAL at 17:30

## 2024-09-12 RX ADMIN — FERROUS SULFATE TAB 325 MG (65 MG ELEMENTAL FE) 325 MG: 325 (65 FE) TAB at 09:22

## 2024-09-12 RX ADMIN — ALLOPURINOL 50 MG: 100 TABLET ORAL at 06:24

## 2024-09-12 RX ADMIN — Medication 400 UNITS: at 09:21

## 2024-09-12 RX ADMIN — HYDROXYZINE HYDROCHLORIDE 25 MG: 25 TABLET ORAL at 21:39

## 2024-09-12 RX ADMIN — IPRATROPIUM BROMIDE AND ALBUTEROL SULFATE 3 ML: .5; 3 SOLUTION RESPIRATORY (INHALATION) at 08:57

## 2024-09-12 RX ADMIN — DULOXETINE HYDROCHLORIDE 30 MG: 30 CAPSULE, DELAYED RELEASE ORAL at 09:22

## 2024-09-12 RX ADMIN — TRAMADOL HYDROCHLORIDE 100 MG: 50 TABLET, COATED ORAL at 09:22

## 2024-09-12 RX ADMIN — ICOSAPENT ETHYL 2 G: 1 CAPSULE ORAL at 09:22

## 2024-09-12 RX ADMIN — Medication 1000 UNITS: at 09:22

## 2024-09-12 RX ADMIN — ATORVASTATIN CALCIUM 20 MG: 20 TABLET, FILM COATED ORAL at 20:32

## 2024-09-12 RX ADMIN — CETIRIZINE HYDROCHLORIDE 10 MG: 10 TABLET, FILM COATED ORAL at 09:21

## 2024-09-12 RX ADMIN — SACUBITRIL AND VALSARTAN 1 TABLET: 97; 103 TABLET, FILM COATED ORAL at 09:21

## 2024-09-12 RX ADMIN — FLUTICASONE FUROATE AND VILANTEROL TRIFENATATE 1 PUFF: 200; 25 POWDER RESPIRATORY (INHALATION) at 08:57

## 2024-09-12 RX ADMIN — METOPROLOL TARTRATE 25 MG: 25 TABLET, FILM COATED ORAL at 20:32

## 2024-09-12 RX ADMIN — FUROSEMIDE 40 MG: 20 TABLET ORAL at 09:22

## 2024-09-12 RX ADMIN — DOCUSATE SODIUM 100 MG: 100 CAPSULE, LIQUID FILLED ORAL at 09:21

## 2024-09-12 RX ADMIN — CEPHALEXIN 500 MG: 500 CAPSULE ORAL at 20:32

## 2024-09-12 RX ADMIN — Medication 1 TABLET: at 09:22

## 2024-09-12 RX ADMIN — ENOXAPARIN SODIUM 30 MG: 100 INJECTION SUBCUTANEOUS at 20:33

## 2024-09-12 RX ADMIN — AMLODIPINE BESYLATE 10 MG: 10 TABLET ORAL at 09:22

## 2024-09-12 ASSESSMENT — COGNITIVE AND FUNCTIONAL STATUS - GENERAL
WALKING IN HOSPITAL ROOM: TOTAL
MOVING FROM LYING ON BACK TO SITTING ON SIDE OF FLAT BED WITH BEDRAILS: A LITTLE
DRESSING REGULAR UPPER BODY CLOTHING: A LOT
DAILY ACTIVITIY SCORE: 16
HELP NEEDED FOR BATHING: A LOT
CLIMB 3 TO 5 STEPS WITH RAILING: TOTAL
MOVING TO AND FROM BED TO CHAIR: TOTAL
MOBILITY SCORE: 10
DRESSING REGULAR LOWER BODY CLOTHING: A LOT
STANDING UP FROM CHAIR USING ARMS: TOTAL
TURNING FROM BACK TO SIDE WHILE IN FLAT BAD: A LITTLE
TOILETING: A LOT

## 2024-09-12 ASSESSMENT — PAIN DESCRIPTION - ORIENTATION: ORIENTATION: LEFT

## 2024-09-12 ASSESSMENT — PAIN SCALES - GENERAL
PAINLEVEL_OUTOF10: 5 - MODERATE PAIN
PAINLEVEL_OUTOF10: 3
PAINLEVEL_OUTOF10: 8
PAINLEVEL_OUTOF10: 4

## 2024-09-12 ASSESSMENT — PAIN DESCRIPTION - LOCATION: LOCATION: HIP

## 2024-09-12 ASSESSMENT — PAIN - FUNCTIONAL ASSESSMENT
PAIN_FUNCTIONAL_ASSESSMENT: 0-10
PAIN_FUNCTIONAL_ASSESSMENT: 0-10

## 2024-09-12 NOTE — CARE PLAN
The patient's goals for the shift include  pt to remain hds throughout shift    The clinical goals for the shift include Patient's pain will remain managed throughout shift

## 2024-09-12 NOTE — PROGRESS NOTES
Interval events:    No acute events overnight    Subjective:  Joint paint. No chest pain or SOB    Today in brief:  - SNF vs HCC planning    Objective:  Vitals:    09/12/24 0713   BP: 127/50   Pulse: 71   Resp: 18   Temp: 36.4 °C (97.5 °F)   SpO2: 91%     Weight         9/4/2024  0538 9/7/2024  0618 9/8/2024  0419 9/10/2024  1419 9/11/2024  0630    Weight: 92 kg (202 lb 13.2 oz) 90.8 kg (200 lb 2.8 oz) 90 kg (198 lb 6.6 oz) 90.8 kg (200 lb 2.8 oz) 90.9 kg (200 lb 6.4 oz)            Intake/Output Summary (Last 24 hours) at 9/12/2024 0742  Last data filed at 9/12/2024 0636  Gross per 24 hour   Intake 840 ml   Output 901 ml   Net -61 ml     Recent Results (from the past 24 hour(s))   CBC    Collection Time: 09/11/24  9:32 AM   Result Value Ref Range    WBC 8.1 4.4 - 11.3 x10*3/uL    nRBC 0.0 0.0 - 0.0 /100 WBCs    RBC 4.36 4.00 - 5.20 x10*6/uL    Hemoglobin 12.5 12.0 - 16.0 g/dL    Hematocrit 41.7 36.0 - 46.0 %    MCV 96 80 - 100 fL    MCH 28.7 26.0 - 34.0 pg    MCHC 30.0 (L) 32.0 - 36.0 g/dL    RDW 15.1 (H) 11.5 - 14.5 %    Platelets 281 150 - 450 x10*3/uL   Renal Function Panel    Collection Time: 09/11/24  9:32 AM   Result Value Ref Range    Glucose 124 (H) 74 - 99 mg/dL    Sodium 137 136 - 145 mmol/L    Potassium 4.9 3.5 - 5.3 mmol/L    Chloride 103 98 - 107 mmol/L    Bicarbonate 21 21 - 32 mmol/L    Anion Gap 18 10 - 20 mmol/L    Urea Nitrogen 39 (H) 6 - 23 mg/dL    Creatinine 1.12 (H) 0.50 - 1.05 mg/dL    eGFR 46 (L) >60 mL/min/1.73m*2    Calcium 9.3 8.6 - 10.6 mg/dL    Phosphorus 3.8 2.5 - 4.9 mg/dL    Albumin 3.8 3.4 - 5.0 g/dL   Magnesium    Collection Time: 09/11/24  9:32 AM   Result Value Ref Range    Magnesium 2.46 (H) 1.60 - 2.40 mg/dL   Uric Acid    Collection Time: 09/11/24  9:32 AM   Result Value Ref Range    Uric Acid 6.4 2.3 - 6.7 mg/dL   CBC    Collection Time: 09/11/24  8:05 PM   Result Value Ref Range    WBC 6.7 4.4 - 11.3 x10*3/uL    nRBC 0.0 0.0 - 0.0 /100 WBCs    RBC 3.80 (L) 4.00 - 5.20  x10*6/uL    Hemoglobin 10.5 (L) 12.0 - 16.0 g/dL    Hematocrit 34.9 (L) 36.0 - 46.0 %    MCV 92 80 - 100 fL    MCH 27.6 26.0 - 34.0 pg    MCHC 30.1 (L) 32.0 - 36.0 g/dL    RDW 14.9 (H) 11.5 - 14.5 %    Platelets 258 150 - 450 x10*3/uL   Renal Function Panel    Collection Time: 09/11/24  8:05 PM   Result Value Ref Range    Glucose 151 (H) 74 - 99 mg/dL    Sodium 135 (L) 136 - 145 mmol/L    Potassium 4.1 3.5 - 5.3 mmol/L    Chloride 101 98 - 107 mmol/L    Bicarbonate 23 21 - 32 mmol/L    Anion Gap 15 10 - 20 mmol/L    Urea Nitrogen 44 (H) 6 - 23 mg/dL    Creatinine 1.31 (H) 0.50 - 1.05 mg/dL    eGFR 38 (L) >60 mL/min/1.73m*2    Calcium 8.8 8.6 - 10.6 mg/dL    Phosphorus 4.1 2.5 - 4.9 mg/dL    Albumin 3.4 3.4 - 5.0 g/dL   Magnesium    Collection Time: 09/11/24  8:05 PM   Result Value Ref Range    Magnesium 2.21 1.60 - 2.40 mg/dL     Inpatient Medications:  Scheduled medications   Medication Dose Route Frequency    alendronate  35 mg oral q7 days    allopurinol  50 mg oral q24h    amLODIPine  10 mg oral Daily    ascorbic acid  500 mg oral Daily    atorvastatin  20 mg oral Nightly    baclofen  5 mg oral Nightly    cephalexin  500 mg oral BID    cetirizine  10 mg oral Daily    cholecalciferol  1,000 Units oral Daily    colchicine  0.6 mg oral Daily    cyanocobalamin  1,000 mcg intramuscular q30 days    docusate sodium  100 mg oral BID    DULoxetine  30 mg oral Daily    [Held by provider] enoxaparin  30 mg subcutaneous q24h    ferrous sulfate (325 mg ferrous sulfate)  65 mg of iron oral Daily with breakfast    fluticasone furoate-vilanteroL  1 puff inhalation Daily    furosemide  40 mg oral Daily    gabapentin  100 mg oral BID    icosapent ethyL  2 g oral BID    ipratropium-albuteroL  3 mL nebulization TID    metoprolol tartrate  25 mg oral BID    multivitamin with minerals  1 tablet oral Daily    pantoprazole  20 mg oral Daily before breakfast    polyethylene glycol  17 g oral Daily    sacubitriL-valsartan  1 tablet oral  BID    vitamin E  400 Units oral Daily     PRN medications   Medication    acetaminophen    hydrOXYzine HCL    melatonin    pramoxine    sennosides    traMADol    white petrolatum     Continuous Medications   Medication Dose Last Rate       Telemetry 9/12/2024 (personally reviewed): ApVp 70s    Physical exam:  General: NAD  Head/ neck: atraumatic  Cardiac: RRR, regular S1 S2 , 3/6 systolic murmur, no rub, no gallop  Pulm: CTA bilaterally, no wheezes, rales or rhonchi.    Vascular: Radial 2+ bilaterally  GI: Non distended  Extremities: no LE edema   Neuro: no focal neuro deficits   Psych: appropriate mood and behavior   Skin: warm and dry     Assessment/Plan   Alayna Dumont is a 89 y.o. 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 No admitted for acute decompensated diastolic heart failure.     Severe aortic stenosis s/p bioprosthetic AVR, now with severe AI  HCM s/p septal myomectomy   - s/p [facility] 6/2022: normal coronary arteries, but very tortuous in their course   - 01/2023 TTE: EF 50-55%, no WMA, LV septal wall severely increased, sev LVH, bioprosthetic AV. Moderate to severe prosthetic AI, peak/mean gradient 38.0/21.3  - 2/17/24 TTE: EF 70-75%, no WMA, mod LVH, normal DF, bioprosthetic AV, mod to sev AI, peak/mean 40.4/22.0, moderate Aortic stenosis  - 4/26/24 TTE: LV function is mildly to moderately decreased, WMA, sev LVH. Dyssynchrony  consistent with RV pacing is noted. Moderate aortic valve stenosis.  DI 0.47, moderate AI. Peak/mean  31.9/15.1.   - 9/5/24 TTE: LVEF 50%, GHK, LV sev dilated, severe LVH, LA sev dilated, RV mod enlarged/mildly reduced function, severe AI of prosthetic valve, mild AS, DI 0.63, mnn grad 15.0, severe Mitral calcification, mild to mod MR, mild TR, RVSP 33.9  - Structural team, Dr. Balderas, deemed patient not a candidate for structural interventions due to high frailty, chronic infection status, limited mobility, and some degree of cognitive impairment  - of note, prev admit 4/25 -5/4 for ADHF, cardiology consulted, suspect less likely due to ischemia more likely pacemaker induced, Geriatrics was consulted for GOC discussion, code status was changed to DNR/DNI with no plan for any further invasive measures  - Admit BNP: 885 elevated (previously 952 4/2024)  - admit CXR: dense left basilar airspace disease with Lt pleural effusion. PNA in the differential. A component of pulmonary edema present  - Repeat CXR 9/6: Cardiomegaly and interstitial pulmonary edema.  - prev dry wt ~90-93 kg. Admit wt: 92 kg. Daily (bed) wt: 90.9 (90.8, 90, 90.8, 92 kg)   - s/p diuresis, 40 mg IV Lasix BID  - remains euvolemic, warm and dry  - 2gram sodium diet, 2L fluid restriction, daily standing weights, strict I&O's   - PO Lasix 40 mg daily  - cont Amlodipine 10 mg daily (new), Metoprolol Tartrate to 25 mg BID (new dose), entresto for further afterload reduction/BP control  - No SGLT2i given functional urinary incontinence and hx of vaginal candidiasis       S/p pacemaker generator change 9/10  Tachy Martir Syndrome S/p dual chamber PM   - PPM device check 9/5 showed battery life< 6 months  - s/p successful generator change out 9/10 (chronic medtronic leads, [place] tv[employer]ass generator)  - Resume Lovenox for 48 hrs post-procedure (resume tonight 9/12)  - Lower heart rate set to HR 70 iso severe AI for preload dependency     HTN  - admit BP up to  194/66 in ED  - SBP last 24 hrs: 120-140s, reasonably controlled given advanced age  - meds as above, titrate as indicated     CKD stage 3 (stable)  - B/lCr ~1.3-1.6, GFR ~30-40  - Admit Cr 1.46, GFR 34  - Daily Cr 1.31 stable (1.12,1.30, 1.27, 1.38, 1.45, 1.48, 1.29)   - Avoid nephrotoxins and hypotension, renally dose meds as indicated  - Trend daily RFP while admitted      Anemia/SUNDEEP  Vit B12 deficiency  -Baseline Hgb ~10-11  - admit Hgb 10.8  - Daily hgb 10.5 overall stable (12.5, 11.6, 11.3)  - no acute sign of bleeding  - cont home PO iron and B-12 injections and supplements     HLD  - cont home Atorvastatin and Vascepa     Chronic back pain  Peripheral neuropathy  - cont home Baclofen, gabapentin, Tramadol PRN  - PRN Tylenol     COPD/Possible Asthma  Pulmonary Sarcoidosis  - 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 Gave colchicine 0.6 mg BID 9/11, and follow with 0.6 mg daily for 1 week  - encourage pt to get OOB to chair, PT/OT consulted     DVT ppx: lovenox subcutaneous  DISPO: SNF per PT/OT recs (patient prefers this.) TCC to talk to son (who wants HHC plus his own 24/7 monitoring of his mom)  Code status: Full Code    Patient seen and discussed with Dr. Riley Ruiz.  Izzy Shrestha PA-C

## 2024-09-12 NOTE — PROGRESS NOTES
9/12/24 @1427 Transitional Care Coordinator Note  Made aware by team that pt would like SNF and they would reach out to the pt's son to inquire. I let the team know that I would reach out to the pt's son. I called the pt's son Praveen [phone] and left a voicemail. I also informed Keisha Bustillo RN at [facility] that I would update her with pt's status of HC vs SNF. Per Yonathan BAILEY she completed pt's IMM. Will continue to follow.

## 2024-09-12 NOTE — CONSULTS
"Nutrition Follow Up Assessment:   Nutrition Assessment    Reason for Assessment: Dietitian discretion (nutrition follow up)    Patient is a 89 y.o. female presenting with acute decompensated diastolic heart failure.     S/p pacemaker generator change 9/10     Nutrition History:  Energy Intake: Fair 50-75 %  Food and Nutrient History: Pt states that she is trying to eat but that sometimes the food is too salty.  For lunch today ate ~2/3 of a chicken breast and 1/2 a serving of mashed potatoes.  Vitamin/Herbal Supplement Use: VIt C, Vit E, MVI, Ferrous sulfate, Vit D, Vit B12       Anthropometrics:  Height: 160 cm (5' 3\")   Weight: 90.9 kg (200 lb 6.4 oz)   BMI (Calculated): 35.51  IBW/kg (Dietitian Calculated): 52.3 kg  Percent of IBW: 174 %       Weight History:   Weight         9/4/2024  0538 9/7/2024  0618 9/8/2024  0419 9/10/2024  1419 9/11/2024  0630    Weight: 92 kg (202 lb 13.2 oz) 90.8 kg (200 lb 2.8 oz) 90 kg (198 lb 6.6 oz) 90.8 kg (200 lb 2.8 oz) 90.9 kg (200 lb 6.4 oz)           Weight fairly stable.     Nutrition Focused Physical Exam Findings:  defer: no concern for malnutrition    Nutrition Significant Labs:  CBC Trend:   Results from last 7 days   Lab Units 09/11/24 2005 09/11/24 0932 09/09/24 2052 09/08/24  1837   WBC AUTO x10*3/uL 6.7 8.1 6.5 5.8   RBC AUTO x10*6/uL 3.80* 4.36 4.15 3.97*   HEMOGLOBIN g/dL 10.5* 12.5 11.6* 11.3*   HEMATOCRIT % 34.9* 41.7 38.7 38.1   MCV fL 92 96 93 96   PLATELETS AUTO x10*3/uL 258 281 289 234    , BMP Trend:   Results from last 7 days   Lab Units 09/11/24 2005 09/11/24 0932 09/09/24 2052 09/08/24  1837   GLUCOSE mg/dL 151* 124* 132* 117*   CALCIUM mg/dL 8.8 9.3 9.1 9.2   SODIUM mmol/L 135* 137 138 139   POTASSIUM mmol/L 4.1 4.9 4.4 4.1   CO2 mmol/L 23 21 25 25   CHLORIDE mmol/L 101 103 104 101   BUN mg/dL 44* 39* 45* 47*   CREATININE mg/dL 1.31* 1.12* 1.30* 1.27*        Nutrition Specific Medications:  .meds    I/O:   Last BM Date: 09/08/24; Stool Appearance: " Soft, Formed (09/07/24 1522)    Dietary Orders (From admission, onward)       Start     Ordered    09/10/24 1523  Adult diet 2-3 grams sodium; 2000 mL fluid  Diet effective now        Question Answer Comment   Diet type 2-3 grams sodium    Dietary fluid restriction / 24h: 2000 mL fluid        09/10/24 1522                         Nutrition Diagnosis   Malnutrition Diagnosis  Patient has Malnutrition Diagnosis: No    Nutrition Diagnosis  Patient has Nutrition Diagnosis: No       Nutrition Interventions/Recommendations         Nutrition Prescription:  Individualized Nutrition Prescription Provided for : Continue with 2gm Na diet. Encourage PO. Consider reviewing vitamin and mineral supplementation for necessity.         Nutrition Interventions:   Interventions: Meals and snacks  Goal: Consume >75% of meals      Nutrition Education:   Encouraged continued adherence to low sodium diet.        Nutrition Monitoring and Evaluation   Food/Nutrient Related History Monitoring  Monitoring and Evaluation Plan: Energy intake  Criteria: PO intake is adequate to meet estimated needs           Time Spent (min): 45 minutes

## 2024-09-13 LAB
ALBUMIN SERPL BCP-MCNC: 3.9 G/DL (ref 3.4–5)
ANION GAP SERPL CALC-SCNC: 16 MMOL/L (ref 10–20)
BUN SERPL-MCNC: 59 MG/DL (ref 6–23)
CALCIUM SERPL-MCNC: 9.3 MG/DL (ref 8.6–10.6)
CHLORIDE SERPL-SCNC: 102 MMOL/L (ref 98–107)
CO2 SERPL-SCNC: 24 MMOL/L (ref 21–32)
CREAT SERPL-MCNC: 1.52 MG/DL (ref 0.5–1.05)
EGFRCR SERPLBLD CKD-EPI 2021: 32 ML/MIN/1.73M*2
ERYTHROCYTE [DISTWIDTH] IN BLOOD BY AUTOMATED COUNT: 14.8 % (ref 11.5–14.5)
GLUCOSE BLD MANUAL STRIP-MCNC: 141 MG/DL (ref 74–99)
GLUCOSE SERPL-MCNC: 117 MG/DL (ref 74–99)
HCT VFR BLD AUTO: 40.2 % (ref 36–46)
HGB BLD-MCNC: 12 G/DL (ref 12–16)
MAGNESIUM SERPL-MCNC: 2.65 MG/DL (ref 1.6–2.4)
MCH RBC QN AUTO: 27.6 PG (ref 26–34)
MCHC RBC AUTO-ENTMCNC: 29.9 G/DL (ref 32–36)
MCV RBC AUTO: 92 FL (ref 80–100)
NRBC BLD-RTO: 0 /100 WBCS (ref 0–0)
PHOSPHATE SERPL-MCNC: 4.9 MG/DL (ref 2.5–4.9)
PLATELET # BLD AUTO: 347 X10*3/UL (ref 150–450)
POTASSIUM SERPL-SCNC: 4.8 MMOL/L (ref 3.5–5.3)
RBC # BLD AUTO: 4.35 X10*6/UL (ref 4–5.2)
SODIUM SERPL-SCNC: 137 MMOL/L (ref 136–145)
WBC # BLD AUTO: 6.9 X10*3/UL (ref 4.4–11.3)

## 2024-09-13 PROCEDURE — 2500000002 HC RX 250 W HCPCS SELF ADMINISTERED DRUGS (ALT 637 FOR MEDICARE OP, ALT 636 FOR OP/ED): Performed by: INTERNAL MEDICINE

## 2024-09-13 PROCEDURE — 83880 ASSAY OF NATRIURETIC PEPTIDE: CPT | Performed by: PHYSICIAN ASSISTANT

## 2024-09-13 PROCEDURE — 99233 SBSQ HOSP IP/OBS HIGH 50: CPT | Performed by: INTERNAL MEDICINE

## 2024-09-13 PROCEDURE — 2500000001 HC RX 250 WO HCPCS SELF ADMINISTERED DRUGS (ALT 637 FOR MEDICARE OP)

## 2024-09-13 PROCEDURE — 85027 COMPLETE CBC AUTOMATED: CPT

## 2024-09-13 PROCEDURE — 2500000001 HC RX 250 WO HCPCS SELF ADMINISTERED DRUGS (ALT 637 FOR MEDICARE OP): Performed by: NURSE PRACTITIONER

## 2024-09-13 PROCEDURE — 2500000004 HC RX 250 GENERAL PHARMACY W/ HCPCS (ALT 636 FOR OP/ED): Performed by: PHYSICIAN ASSISTANT

## 2024-09-13 PROCEDURE — 2500000001 HC RX 250 WO HCPCS SELF ADMINISTERED DRUGS (ALT 637 FOR MEDICARE OP): Performed by: PHYSICIAN ASSISTANT

## 2024-09-13 PROCEDURE — 1200000002 HC GENERAL ROOM WITH TELEMETRY DAILY

## 2024-09-13 PROCEDURE — 36415 COLL VENOUS BLD VENIPUNCTURE: CPT

## 2024-09-13 PROCEDURE — 80069 RENAL FUNCTION PANEL: CPT

## 2024-09-13 PROCEDURE — 2500000002 HC RX 250 W HCPCS SELF ADMINISTERED DRUGS (ALT 637 FOR MEDICARE OP, ALT 636 FOR OP/ED)

## 2024-09-13 PROCEDURE — 2500000004 HC RX 250 GENERAL PHARMACY W/ HCPCS (ALT 636 FOR OP/ED)

## 2024-09-13 PROCEDURE — 94640 AIRWAY INHALATION TREATMENT: CPT

## 2024-09-13 PROCEDURE — 97535 SELF CARE MNGMENT TRAINING: CPT | Mod: GO

## 2024-09-13 PROCEDURE — 82947 ASSAY GLUCOSE BLOOD QUANT: CPT

## 2024-09-13 PROCEDURE — 97530 THERAPEUTIC ACTIVITIES: CPT | Mod: GO

## 2024-09-13 PROCEDURE — 83735 ASSAY OF MAGNESIUM: CPT

## 2024-09-13 RX ADMIN — CETIRIZINE HYDROCHLORIDE 10 MG: 10 TABLET, FILM COATED ORAL at 08:40

## 2024-09-13 RX ADMIN — GABAPENTIN 100 MG: 100 CAPSULE ORAL at 20:41

## 2024-09-13 RX ADMIN — METOPROLOL TARTRATE 25 MG: 25 TABLET, FILM COATED ORAL at 08:40

## 2024-09-13 RX ADMIN — ICOSAPENT ETHYL 2 G: 1 CAPSULE ORAL at 17:39

## 2024-09-13 RX ADMIN — Medication 1000 UNITS: at 08:40

## 2024-09-13 RX ADMIN — ENOXAPARIN SODIUM 30 MG: 100 INJECTION SUBCUTANEOUS at 20:41

## 2024-09-13 RX ADMIN — METOPROLOL TARTRATE 25 MG: 25 TABLET, FILM COATED ORAL at 20:41

## 2024-09-13 RX ADMIN — GABAPENTIN 100 MG: 100 CAPSULE ORAL at 08:40

## 2024-09-13 RX ADMIN — PANTOPRAZOLE SODIUM 20 MG: 20 TABLET, DELAYED RELEASE ORAL at 06:05

## 2024-09-13 RX ADMIN — DOCUSATE SODIUM 100 MG: 100 CAPSULE, LIQUID FILLED ORAL at 20:41

## 2024-09-13 RX ADMIN — COLCHICINE 0.6 MG: 0.6 TABLET ORAL at 08:40

## 2024-09-13 RX ADMIN — ALLOPURINOL 50 MG: 100 TABLET ORAL at 08:39

## 2024-09-13 RX ADMIN — CEPHALEXIN 500 MG: 500 CAPSULE ORAL at 08:40

## 2024-09-13 RX ADMIN — CEPHALEXIN 500 MG: 500 CAPSULE ORAL at 20:41

## 2024-09-13 RX ADMIN — ICOSAPENT ETHYL 2 G: 1 CAPSULE ORAL at 08:40

## 2024-09-13 RX ADMIN — HYDROXYZINE HYDROCHLORIDE 25 MG: 25 TABLET ORAL at 18:18

## 2024-09-13 RX ADMIN — Medication 3 MG: at 20:50

## 2024-09-13 RX ADMIN — DOCUSATE SODIUM 100 MG: 100 CAPSULE, LIQUID FILLED ORAL at 08:40

## 2024-09-13 RX ADMIN — TRAMADOL HYDROCHLORIDE 100 MG: 50 TABLET, COATED ORAL at 20:50

## 2024-09-13 RX ADMIN — IPRATROPIUM BROMIDE AND ALBUTEROL SULFATE 3 ML: .5; 3 SOLUTION RESPIRATORY (INHALATION) at 23:23

## 2024-09-13 RX ADMIN — FUROSEMIDE 40 MG: 20 TABLET ORAL at 08:40

## 2024-09-13 RX ADMIN — BACLOFEN 5 MG: 10 TABLET ORAL at 20:41

## 2024-09-13 RX ADMIN — Medication 400 UNITS: at 08:40

## 2024-09-13 RX ADMIN — IPRATROPIUM BROMIDE AND ALBUTEROL SULFATE 3 ML: .5; 3 SOLUTION RESPIRATORY (INHALATION) at 14:24

## 2024-09-13 RX ADMIN — IPRATROPIUM BROMIDE AND ALBUTEROL SULFATE 3 ML: .5; 3 SOLUTION RESPIRATORY (INHALATION) at 08:23

## 2024-09-13 RX ADMIN — OXYCODONE HYDROCHLORIDE AND ACETAMINOPHEN 500 MG: 500 TABLET ORAL at 08:39

## 2024-09-13 RX ADMIN — FLUTICASONE FUROATE AND VILANTEROL TRIFENATATE 1 PUFF: 200; 25 POWDER RESPIRATORY (INHALATION) at 08:26

## 2024-09-13 RX ADMIN — DULOXETINE HYDROCHLORIDE 30 MG: 30 CAPSULE, DELAYED RELEASE ORAL at 08:40

## 2024-09-13 RX ADMIN — Medication 1 TABLET: at 08:40

## 2024-09-13 RX ADMIN — FERROUS SULFATE TAB 325 MG (65 MG ELEMENTAL FE) 325 MG: 325 (65 FE) TAB at 08:40

## 2024-09-13 RX ADMIN — SACUBITRIL AND VALSARTAN 1 TABLET: 97; 103 TABLET, FILM COATED ORAL at 08:40

## 2024-09-13 RX ADMIN — TRAMADOL HYDROCHLORIDE 100 MG: 50 TABLET, COATED ORAL at 08:41

## 2024-09-13 RX ADMIN — ATORVASTATIN CALCIUM 20 MG: 20 TABLET, FILM COATED ORAL at 20:41

## 2024-09-13 RX ADMIN — SACUBITRIL AND VALSARTAN 1 TABLET: 97; 103 TABLET, FILM COATED ORAL at 20:41

## 2024-09-13 ASSESSMENT — COGNITIVE AND FUNCTIONAL STATUS - GENERAL
DRESSING REGULAR LOWER BODY CLOTHING: A LOT
WALKING IN HOSPITAL ROOM: TOTAL
STANDING UP FROM CHAIR USING ARMS: TOTAL
MOVING TO AND FROM BED TO CHAIR: TOTAL
CLIMB 3 TO 5 STEPS WITH RAILING: TOTAL
CLIMB 3 TO 5 STEPS WITH RAILING: TOTAL
TOILETING: A LOT
TOILETING: A LOT
DAILY ACTIVITIY SCORE: 16
DAILY ACTIVITIY SCORE: 16
HELP NEEDED FOR BATHING: A LOT
DRESSING REGULAR LOWER BODY CLOTHING: A LOT
DRESSING REGULAR UPPER BODY CLOTHING: A LITTLE
DAILY ACTIVITIY SCORE: 16
HELP NEEDED FOR BATHING: A LOT
HELP NEEDED FOR BATHING: A LOT
PERSONAL GROOMING: A LITTLE
STANDING UP FROM CHAIR USING ARMS: TOTAL
MOBILITY SCORE: 11
TOILETING: A LOT
DRESSING REGULAR UPPER BODY CLOTHING: A LOT
TURNING FROM BACK TO SIDE WHILE IN FLAT BAD: A LITTLE
MOVING FROM LYING ON BACK TO SITTING ON SIDE OF FLAT BED WITH BEDRAILS: A LITTLE
MOBILITY SCORE: 10
DRESSING REGULAR UPPER BODY CLOTHING: A LOT
MOVING TO AND FROM BED TO CHAIR: TOTAL
WALKING IN HOSPITAL ROOM: TOTAL
TURNING FROM BACK TO SIDE WHILE IN FLAT BAD: A LITTLE
DRESSING REGULAR LOWER BODY CLOTHING: A LOT

## 2024-09-13 ASSESSMENT — ACTIVITIES OF DAILY LIVING (ADL): HOME_MANAGEMENT_TIME_ENTRY: 13

## 2024-09-13 ASSESSMENT — PAIN SCALES - GENERAL
PAINLEVEL_OUTOF10: 10 - WORST POSSIBLE PAIN
PAINLEVEL_OUTOF10: 0 - NO PAIN
PAINLEVEL_OUTOF10: 10 - WORST POSSIBLE PAIN
PAINLEVEL_OUTOF10: 10 - WORST POSSIBLE PAIN

## 2024-09-13 ASSESSMENT — PAIN - FUNCTIONAL ASSESSMENT
PAIN_FUNCTIONAL_ASSESSMENT: 0-10
PAIN_FUNCTIONAL_ASSESSMENT: FLACC (FACE, LEGS, ACTIVITY, CRY, CONSOLABILITY)
PAIN_FUNCTIONAL_ASSESSMENT: 0-10
PAIN_FUNCTIONAL_ASSESSMENT: 0-10

## 2024-09-13 ASSESSMENT — PAIN DESCRIPTION - LOCATION: LOCATION: HIP

## 2024-09-13 ASSESSMENT — PAIN DESCRIPTION - ORIENTATION: ORIENTATION: LEFT

## 2024-09-13 NOTE — PROGRESS NOTES
9/13/2024 0826  Care Coordination  Message left for Joseph Morton to discuss dispo plans.    ADDENDUM:    Spoke to Joseph Morton-he selected [facility], [facility], and [facility].    [facility] , no beds till Monday, [facility] full, message left for Yumi Parr.

## 2024-09-13 NOTE — CARE PLAN
The patient's goals for the shift include      The clinical goals for the shift include Pain will be controled overnight.      Problem: Nutrition  Goal: Less than 5 days NPO/clear liquids  Outcome: Progressing  Goal: Oral intake greater than 50%  Outcome: Progressing  Goal: Oral intake greater 75%  Outcome: Progressing  Goal: Consume prescribed supplement  Outcome: Progressing  Goal: Adequate PO fluid intake  Outcome: Progressing  Goal: Nutrition support goals are met within 48 hrs  Outcome: Progressing  Goal: Nutrition support is meeting 75% of nutrient needs  Outcome: Progressing  Goal: Tube feed tolerance  Outcome: Progressing  Goal: BG  mg/dL  Outcome: Progressing  Goal: Lab values WNL  Outcome: Progressing  Goal: Electrolytes WNL  Outcome: Progressing  Goal: Promote healing  Outcome: Progressing  Goal: Maintain stable weight  Outcome: Progressing  Goal: Reduce weight from edema/fluid  Outcome: Progressing  Goal: Gradual weight gain  Outcome: Progressing  Goal: Improve ostomy output  Outcome: Progressing     Problem: Pain - Adult  Goal: Verbalizes/displays adequate comfort level or baseline comfort level  Outcome: Progressing     Problem: Safety - Adult  Goal: Free from fall injury  Outcome: Progressing     Problem: Discharge Planning  Goal: Discharge to home or other facility with appropriate resources  Outcome: Progressing     Problem: Chronic Conditions and Co-morbidities  Goal: Patient's chronic conditions and co-morbidity symptoms are monitored and maintained or improved  Outcome: Progressing     Problem: Skin  Goal: Decreased wound size/increased tissue granulation at next dressing change  Outcome: Progressing  Goal: Participates in plan/prevention/treatment measures  Outcome: Progressing  Goal: Prevent/manage excess moisture  Outcome: Progressing  Goal: Prevent/minimize sheer/friction injuries  Outcome: Progressing  Goal: Promote/optimize nutrition  Outcome: Progressing  Goal: Promote skin  healing  Outcome: Progressing     Problem: Diabetes  Goal: Achieve decreasing blood glucose levels by end of shift  Outcome: Progressing  Goal: Increase stability of blood glucose readings by end of shift  Outcome: Progressing  Goal: Decrease in ketones present in urine by end of shift  Outcome: Progressing  Goal: Maintain electrolyte levels within acceptable range throughout shift  Outcome: Progressing  Goal: Maintain glucose levels >70mg/dl to <250mg/dl throughout shift  Outcome: Progressing  Goal: No changes in neurological exam by end of shift  Outcome: Progressing  Goal: Learn about and adhere to nutrition recommendations by end of shift  Outcome: Progressing  Goal: Vital signs within normal range for age by end of shift  Outcome: Progressing  Goal: Increase self care and/or family involovement by end of shift  Outcome: Progressing  Goal: Receive DSME education by end of shift  Outcome: Progressing     Problem: Heart Failure  Goal: Improved gas exchange this shift  Outcome: Progressing  Goal: Improved urinary output this shift  Outcome: Progressing  Goal: Reduction in peripheral edema within 24 hours  Outcome: Progressing  Goal: Report improvement of dyspnea/breathlessness this shift  Outcome: Progressing  Goal: Weight from fluid excess reduced over 2-3 days, then stabilize  Outcome: Progressing  Goal: Increase self care and/or family involvement in 24 hours  Outcome: Progressing

## 2024-09-13 NOTE — PROGRESS NOTES
Occupational Therapy    Occupational Therapy Treatment    Name: Alayna Dumont  MRN: [id number]  Department: [place] [address]  Room: [id number]  Date: 09/13/24  Time Calculation  Start Time: 1220  Stop Time: 1243  Time Calculation (min): 23 min    Assessment:  OT Assessment: difficulty I/ADLs, safety, fxnl mob  Prognosis: Good  Barriers to Discharge: Decreased caregiver support  Evaluation/Treatment Tolerance: Patient limited by fatigue, Patient limited by pain  Medical Staff Made Aware: Yes  End of Session Communication: Bedside nurse  End of Session Patient Position: Bed, 3 rail up, Alarm on  Plan:  Treatment Interventions: ADL retraining, Functional transfer training, UE strengthening/ROM, Endurance training, Patient/family training, Equipment evaluation/education, Compensatory technique education  OT Frequency: 3 times per week  OT Discharge Recommendations: Moderate intensity level of continued care  OT Recommended Transfer Status: Assist of 2  OT - OK to Discharge: Yes    Subjective   Previous Visit Info:  OT Last Visit  OT Received On: 09/13/24  General:  General  Reason for Referral: CHF/CP  Past Medical History Relevant to Rehab: 89F hx pulmonary sarcoidosis, DM, s/p AVR 2013 and septal myectomy c/b CHB s/p [facility] dcM admitted with CP found to have ADHF with severe bioprosthetic AI.  Family/Caregiver Present: No  Prior to Session Communication: Bedside nurse  Patient Position Received: Bed, 3 rail up, Alarm on  Precautions:  Medical Precautions: Fall precautions, Cardiac precautions    Vital Signs (Past 2hrs)        Date/Time Vitals Session Patient Position Pulse Resp SpO2 BP MAP (mmHg)    09/13/24 1424 --  --  --  --  93 %  --  --                        Pain Assessment:  Pain Assessment  Pain Assessment: 0-10  0-10 (Numeric) Pain Score: 10 - Worst possible pain  Pain Type: Chronic pain  Pain Location:  (L HIP)     Objective   Cognition:  Overall Cognitive Status: Within Functional Limits  Orientation Level: Oriented  X4  Insight: Mild  Activities of Daily Living:      Grooming  Grooming Level of Assistance:  (pt performed oral care supine min A setup, SBA, extended time rinsing. pt performed face washing and hand washing wipes SBA setup supine. OT applied lotion pt's back max A side lying each side)    UE Bathing  UE Bathing Level of Assistance:  (pt bathed BUE wipes supine SBA setup)    LE Bathing  LE Bathing Level of Assistance:  (total A bathing BLE)    UE Dressing  UE Dressing Level of Assistance:  (min A adjust gown supine)    LE Dressing  LE Dressing:  (max A doff socks supine)          Bed Mobility/Transfers: Bed Mobility  Bed Mobility:  (rolling each way 2x mod A, scoot in bed max A)    Outcome Measures:  [facility] Daily Activity  Putting on and taking off regular lower body clothing: A lot  Bathing (including washing, rinsing, drying): A lot  Putting on and taking off regular upper body clothing: A little  Toileting, which includes using toilet, bedpan or urinal: A lot  Taking care of personal grooming such as brushing teeth: A little  Eating Meals: None  Daily Activity - Total Score: 16        Education Documentation  Body Mechanics, taught by Cristina Vasquez OT at 9/13/2024  2:54 PM.  Learner: Patient  Readiness: Acceptance  Method: Explanation  Response: Verbalizes Understanding, Needs Reinforcement    Precautions, taught by Cristina Vasquez OT at 9/13/2024  2:54 PM.  Learner: Patient  Readiness: Acceptance  Method: Explanation  Response: Verbalizes Understanding, Needs Reinforcement    ADL Training, taught by Cristina Vasquez OT at 9/13/2024  2:54 PM.  Learner: Patient  Readiness: Acceptance  Method: Explanation  Response: Verbalizes Understanding, Needs Reinforcement    Education Comments  No comments found.      Goals:  Encounter Problems       Encounter Problems (Active)       ADLs       Patient will perform UB and LB bathing  with min A level of assistance and AE. (Progressing)       Start:  09/10/24    Expected  End:  10/01/24            Patient with complete upper body dressing with independent level of assistance  (Progressing)       Start:  09/10/24    Expected End:  10/01/24            Patient with complete lower body dressing with contact guard assist level of assistance donning and doffing all LE clothes  with PRN adaptive equipment  (Progressing)       Start:  09/10/24    Expected End:  10/01/24            Patient will complete daily grooming tasks  with independent level of assistance  (Progressing)       Start:  09/10/24    Expected End:  10/01/24            Patient will complete toileting including hygiene clothing management/hygiene with minimal assist  level of assistance and LRD. (Progressing)       Start:  09/10/24    Expected End:  10/01/24               EXERCISE/STRENGTHENING       Patient with increase BUE to WFL strength. (Progressing)       Start:  09/10/24    Expected End:  10/01/24               MOBILITY       Patient will perform Functional mobility min Household distances/Community Distances with minimal assist  level of assistance and least restrictive device in order to improve safety and functional mobility. (Progressing)       Start:  09/10/24    Expected End:  10/01/24               TRANSFERS       Patient will perform bed mobility contact guard assist level of assistance and bed rails in order to improve safety and independence with mobility (Progressing)       Start:  09/10/24    Expected End:  10/01/24            Patient will complete functional transfer with least restrictive device with minimal assist  level of assistance. (Progressing)       Start:  09/10/24    Expected End:  10/01/24

## 2024-09-13 NOTE — PROGRESS NOTES
Interval events:    No acute events overnight    Subjective:  Chronic skin itchiness for years. No SOB.    Today in brief:  -  TCC attempting to reach son. Pt prefers SNF as opposed to HHC with son assist  - Hold amlodpine in light of hypotension. If recurrent hypertension this afternoon, re add at lower dose of 5mg aily.    Objective:  Vitals:    09/13/24 0826   BP:    Pulse:    Resp:    Temp:    SpO2: 92%     Weight         9/4/2024  0538 9/7/2024  0618 9/8/2024  0419 9/10/2024  1419 9/11/2024  0630    Weight: 92 kg (202 lb 13.2 oz) 90.8 kg (200 lb 2.8 oz) 90 kg (198 lb 6.6 oz) 90.8 kg (200 lb 2.8 oz) 90.9 kg (200 lb 6.4 oz)            Intake/Output Summary (Last 24 hours) at 9/13/2024 1126  Last data filed at 9/12/2024 1425  Gross per 24 hour   Intake --   Output 350 ml   Net -350 ml     Recent Results (from the past 24 hour(s))   CBC    Collection Time: 09/12/24  7:41 PM   Result Value Ref Range    WBC 6.6 4.4 - 11.3 x10*3/uL    nRBC 0.0 0.0 - 0.0 /100 WBCs    RBC 3.95 (L) 4.00 - 5.20 x10*6/uL    Hemoglobin 11.1 (L) 12.0 - 16.0 g/dL    Hematocrit 36.4 36.0 - 46.0 %    MCV 92 80 - 100 fL    MCH 28.1 26.0 - 34.0 pg    MCHC 30.5 (L) 32.0 - 36.0 g/dL    RDW 14.7 (H) 11.5 - 14.5 %    Platelets 272 150 - 450 x10*3/uL   Renal Function Panel    Collection Time: 09/12/24  7:41 PM   Result Value Ref Range    Glucose 121 (H) 74 - 99 mg/dL    Sodium 138 136 - 145 mmol/L    Potassium 4.6 3.5 - 5.3 mmol/L    Chloride 103 98 - 107 mmol/L    Bicarbonate 24 21 - 32 mmol/L    Anion Gap 16 10 - 20 mmol/L    Urea Nitrogen 49 (H) 6 - 23 mg/dL    Creatinine 1.40 (H) 0.50 - 1.05 mg/dL    eGFR 35 (L) >60 mL/min/1.73m*2    Calcium 8.9 8.6 - 10.6 mg/dL    Phosphorus 5.0 (H) 2.5 - 4.9 mg/dL    Albumin 3.4 3.4 - 5.0 g/dL   Magnesium    Collection Time: 09/12/24  7:41 PM   Result Value Ref Range    Magnesium 2.43 (H) 1.60 - 2.40 mg/dL     Inpatient Medications:  Scheduled medications   Medication Dose Route Frequency    alendronate  35 mg  oral q7 days    allopurinol  50 mg oral q24h    [Held by provider] amLODIPine  10 mg oral Daily    ascorbic acid  500 mg oral Daily    atorvastatin  20 mg oral Nightly    baclofen  5 mg oral Nightly    cephalexin  500 mg oral BID    cetirizine  10 mg oral Daily    cholecalciferol  1,000 Units oral Daily    colchicine  0.6 mg oral Daily    cyanocobalamin  1,000 mcg intramuscular q30 days    docusate sodium  100 mg oral BID    DULoxetine  30 mg oral Daily    enoxaparin  30 mg subcutaneous q24h    ferrous sulfate (325 mg ferrous sulfate)  65 mg of iron oral Daily with breakfast    fluticasone furoate-vilanteroL  1 puff inhalation Daily    furosemide  40 mg oral Daily    gabapentin  100 mg oral BID    icosapent ethyL  2 g oral BID    ipratropium-albuteroL  3 mL nebulization TID    metoprolol tartrate  25 mg oral BID    multivitamin with minerals  1 tablet oral Daily    pantoprazole  20 mg oral Daily before breakfast    polyethylene glycol  17 g oral Daily    sacubitriL-valsartan  1 tablet oral BID    vitamin E  400 Units oral Daily     PRN medications   Medication    acetaminophen    hydrOXYzine HCL    melatonin    pramoxine    sennosides    traMADol    white petrolatum     Continuous Medications   Medication Dose Last Rate       Telemetry 9/13/2024 (personally reviewed): ApVp 70s    Physical exam:  General: NAD  Head/ neck: atraumatic  Cardiac: RRR, regular S1 S2 , 3/6 systolic murmur, no rub, no gallop  Pulm: CTA bilaterally, no wheezes, rales or rhonchi.    Vascular: Radial 2+ bilaterally  GI: Non distended  Extremities: no LE edema   Neuro: no focal neuro deficits   Psych: appropriate mood and behavior   Skin: warm and dry       Assessment/Plan   Alayna Dumont is a 89 y.o. 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 No admitted for acute decompensated diastolic heart failure.     Severe aortic stenosis s/p bioprosthetic AVR, now with severe AI  HCM s/p septal myomectomy   - s/p [facility] 6/2022: normal coronary arteries, but very tortuous   - 01/2023 TTE: EF 50-55%, no WMA, LV septal wall severely increased, sev LVH, bioprosthetic AV. Moderate to severe prosthetic AI, peak/mean gradient 38.0/21.3  - 2/17/24 TTE: EF 70-75%, no WMA, mod LVH, normal DF, bioprosthetic AV, mod to sev AI, peak/mean 40.4/22.0, moderate Aortic stenosis  - 4/26/24 TTE: LV function is mildly to mod decreased, WMA, sev LVH. Dyssynchrony consistent with RV pacing is noted. Mod aortic valve stenosis.DI 0.47, moderate AI. Peak/mean  31.9/15.1.   - 9/5/24 TTE: LVEF 50%, GHK, LV sev dilated, severe LVH, LA sev dilated, RV mod enlarged/mildly reduced function, severe AI of prosthetic valve, mild AS, DI 0.63, mnn grad 15.0, severe Mitral calcification, mild to mod MR, mild TR, RVSP 33.9  - Structural team, Dr. Balderas, deemed patient not a candidate for structural interventions due to high frailty, chronic infection status, limited mobility, and some degree of cognitive impairment  - of note, prev admit 4/25 -5/4 for ADHF, cardiology consulted, suspect less likely due to ischemia more likely pacemaker induced, Geriatrics was consulted for GOC discussion, code status was changed to DNR/DNI with no plan for any further invasive measures  - Admit BNP: 885 elevated (previously 952 4/2024)  - Admit CXR: dense left basilar airspace disease with Lt pleural effusion. PNA in the differential. A  component of pulmonary edema present  - Repeat CXR 9/6: Cardiomegaly and interstitial pulmonary edema.  - prev dry wt ~90-93 kg. Admit wt: 92 kg. Daily (bed) wt: 90.0  - s/p diuresis, 40 mg IV Lasix BID. Now on PO lasix 40 daily  - remains euvolemic, warm and dry  - 2gram sodium diet, 2L fluid restriction, daily standing weights, strict I&O's   - c/w Metoprolol Tartrate to 25 mg BID (new dose), entresto for further afterload reduction/BP control  - No SGLT2i given functional urinary incontinence and hx of vaginal candidiasis    S/p pacemaker generator change 9/10  Tachy Martir Syndrome S/p dual chamber PM   - PPM device check 9/5 showed battery life< 6 months  - s/p successful generator change out 9/10 (chronic medtronic leads, [employer] generator)  - Resumed Lovenox 48 hrs post-procedure  - Lower heart rate set to HR 70 iso severe AI for preload dependency     HTN  - admit BP up to 194/66 in ED  - SBP last 24 hrs:90-100s. Hold new amlodipine (was started ~2 days ago). If hypertensive this afternoon, can add back at lower dose of 5mg daily.  - meds as above, titrate as indicated     CKD stage 3 (stable)  - B/lCr ~1.3-1.6, GFR ~30-40  - Admit Cr 1.46, GFR 34  - Daily Cr 1.40 stable (1.31, 1.12,1.30, ...1.29)   - Avoid nephrotoxins and hypotension, renally dose meds as indicated  - Trend daily RFP while admitted      Anemia/SUNDEEP  Vit B12 deficiency  -Baseline Hgb ~10-11  - admit Hgb 10.8  - Daily hgb 11.1 overall stable (12.5, 11.6, 11.3)  - no acute sign of bleeding  - cont home PO iron and B-12 injections and supplements     HLD  - cont home Atorvastatin and Vascepa     Chronic back pain  Peripheral neuropathy  - cont home Baclofen, gabapentin, Tramadol PRN  - PRN Tylenol     COPD/Possible Asthma  Pulmonary Sarcoidosis  - 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 Gave colchicine 0.6 mg BID 9/11, and follow with 0.6 mg daily for 1 week  - encourage pt to get OOB to chair, PT/OT consulted     DVT ppx: lovenox subcutaneous  DISPO: SNF per PT/OT recs (patient prefers this.) TCC to talk to son (who wants HHC plus his own 24/7 monitoring of his mom). Unable to contact son despite repeat attempts. Will plan for SNF per patient preferences.  Code status: Full Code    Patient seen and discussed with Dr. Riley Ruiz.  Izzy Shrestha PA-C

## 2024-09-14 ENCOUNTER — DOCUMENTATION (OUTPATIENT)
Dept: HOME HEALTH SERVICES | Facility: HOME HEALTH | Age: 89
End: 2024-09-14
Payer: MEDICARE

## 2024-09-14 LAB
ALBUMIN SERPL BCP-MCNC: 3.5 G/DL (ref 3.4–5)
ANION GAP SERPL CALC-SCNC: 17 MMOL/L (ref 10–20)
BNP SERPL-MCNC: 383 PG/ML (ref 0–99)
BUN SERPL-MCNC: 62 MG/DL (ref 6–23)
CALCIUM SERPL-MCNC: 8.9 MG/DL (ref 8.6–10.6)
CHLORIDE SERPL-SCNC: 106 MMOL/L (ref 98–107)
CO2 SERPL-SCNC: 21 MMOL/L (ref 21–32)
CREAT SERPL-MCNC: 1.44 MG/DL (ref 0.5–1.05)
EGFRCR SERPLBLD CKD-EPI 2021: 34 ML/MIN/1.73M*2
ERYTHROCYTE [DISTWIDTH] IN BLOOD BY AUTOMATED COUNT: 14.8 % (ref 11.5–14.5)
GLUCOSE SERPL-MCNC: 118 MG/DL (ref 74–99)
HCT VFR BLD AUTO: 37.1 % (ref 36–46)
HGB BLD-MCNC: 11.1 G/DL (ref 12–16)
MAGNESIUM SERPL-MCNC: 2.51 MG/DL (ref 1.6–2.4)
MCH RBC QN AUTO: 27.8 PG (ref 26–34)
MCHC RBC AUTO-ENTMCNC: 29.9 G/DL (ref 32–36)
MCV RBC AUTO: 93 FL (ref 80–100)
NRBC BLD-RTO: 0 /100 WBCS (ref 0–0)
PHOSPHATE SERPL-MCNC: 4.1 MG/DL (ref 2.5–4.9)
PLATELET # BLD AUTO: 286 X10*3/UL (ref 150–450)
POTASSIUM SERPL-SCNC: 4.9 MMOL/L (ref 3.5–5.3)
RBC # BLD AUTO: 3.99 X10*6/UL (ref 4–5.2)
SODIUM SERPL-SCNC: 139 MMOL/L (ref 136–145)
WBC # BLD AUTO: 5.5 X10*3/UL (ref 4.4–11.3)

## 2024-09-14 PROCEDURE — 2500000004 HC RX 250 GENERAL PHARMACY W/ HCPCS (ALT 636 FOR OP/ED): Performed by: PHYSICIAN ASSISTANT

## 2024-09-14 PROCEDURE — 2500000002 HC RX 250 W HCPCS SELF ADMINISTERED DRUGS (ALT 637 FOR MEDICARE OP, ALT 636 FOR OP/ED): Performed by: INTERNAL MEDICINE

## 2024-09-14 PROCEDURE — 2500000002 HC RX 250 W HCPCS SELF ADMINISTERED DRUGS (ALT 637 FOR MEDICARE OP, ALT 636 FOR OP/ED)

## 2024-09-14 PROCEDURE — 85027 COMPLETE CBC AUTOMATED: CPT

## 2024-09-14 PROCEDURE — 94640 AIRWAY INHALATION TREATMENT: CPT

## 2024-09-14 PROCEDURE — 2500000001 HC RX 250 WO HCPCS SELF ADMINISTERED DRUGS (ALT 637 FOR MEDICARE OP)

## 2024-09-14 PROCEDURE — 36415 COLL VENOUS BLD VENIPUNCTURE: CPT

## 2024-09-14 PROCEDURE — 83735 ASSAY OF MAGNESIUM: CPT

## 2024-09-14 PROCEDURE — 2500000001 HC RX 250 WO HCPCS SELF ADMINISTERED DRUGS (ALT 637 FOR MEDICARE OP): Performed by: NURSE PRACTITIONER

## 2024-09-14 PROCEDURE — 1200000002 HC GENERAL ROOM WITH TELEMETRY DAILY

## 2024-09-14 PROCEDURE — 2500000001 HC RX 250 WO HCPCS SELF ADMINISTERED DRUGS (ALT 637 FOR MEDICARE OP): Performed by: PHYSICIAN ASSISTANT

## 2024-09-14 PROCEDURE — 2500000004 HC RX 250 GENERAL PHARMACY W/ HCPCS (ALT 636 FOR OP/ED)

## 2024-09-14 PROCEDURE — 80069 RENAL FUNCTION PANEL: CPT

## 2024-09-14 PROCEDURE — 99233 SBSQ HOSP IP/OBS HIGH 50: CPT | Performed by: INTERNAL MEDICINE

## 2024-09-14 RX ADMIN — IPRATROPIUM BROMIDE AND ALBUTEROL SULFATE 3 ML: .5; 3 SOLUTION RESPIRATORY (INHALATION) at 22:07

## 2024-09-14 RX ADMIN — CETIRIZINE HYDROCHLORIDE 10 MG: 10 TABLET, FILM COATED ORAL at 09:03

## 2024-09-14 RX ADMIN — Medication 1 TABLET: at 09:03

## 2024-09-14 RX ADMIN — ICOSAPENT ETHYL 2 G: 1 CAPSULE ORAL at 09:03

## 2024-09-14 RX ADMIN — PANTOPRAZOLE SODIUM 20 MG: 20 TABLET, DELAYED RELEASE ORAL at 06:02

## 2024-09-14 RX ADMIN — GABAPENTIN 100 MG: 100 CAPSULE ORAL at 09:03

## 2024-09-14 RX ADMIN — SACUBITRIL AND VALSARTAN 1 TABLET: 97; 103 TABLET, FILM COATED ORAL at 09:03

## 2024-09-14 RX ADMIN — FERROUS SULFATE TAB 325 MG (65 MG ELEMENTAL FE) 325 MG: 325 (65 FE) TAB at 09:03

## 2024-09-14 RX ADMIN — FUROSEMIDE 40 MG: 20 TABLET ORAL at 09:03

## 2024-09-14 RX ADMIN — ALLOPURINOL 50 MG: 100 TABLET ORAL at 09:06

## 2024-09-14 RX ADMIN — FLUTICASONE FUROATE AND VILANTEROL TRIFENATATE 1 PUFF: 200; 25 POWDER RESPIRATORY (INHALATION) at 09:40

## 2024-09-14 RX ADMIN — METOPROLOL TARTRATE 25 MG: 25 TABLET, FILM COATED ORAL at 20:37

## 2024-09-14 RX ADMIN — OXYCODONE HYDROCHLORIDE AND ACETAMINOPHEN 500 MG: 500 TABLET ORAL at 09:03

## 2024-09-14 RX ADMIN — DULOXETINE HYDROCHLORIDE 30 MG: 30 CAPSULE, DELAYED RELEASE ORAL at 09:03

## 2024-09-14 RX ADMIN — BACLOFEN 5 MG: 10 TABLET ORAL at 20:37

## 2024-09-14 RX ADMIN — CEPHALEXIN 500 MG: 500 CAPSULE ORAL at 20:37

## 2024-09-14 RX ADMIN — HYDROXYZINE HYDROCHLORIDE 25 MG: 25 TABLET ORAL at 12:38

## 2024-09-14 RX ADMIN — IPRATROPIUM BROMIDE AND ALBUTEROL SULFATE 3 ML: .5; 3 SOLUTION RESPIRATORY (INHALATION) at 09:34

## 2024-09-14 RX ADMIN — GABAPENTIN 100 MG: 100 CAPSULE ORAL at 20:37

## 2024-09-14 RX ADMIN — SACUBITRIL AND VALSARTAN 1 TABLET: 97; 103 TABLET, FILM COATED ORAL at 20:37

## 2024-09-14 RX ADMIN — Medication 400 UNITS: at 09:03

## 2024-09-14 RX ADMIN — METOPROLOL TARTRATE 25 MG: 25 TABLET, FILM COATED ORAL at 09:03

## 2024-09-14 RX ADMIN — ATORVASTATIN CALCIUM 20 MG: 20 TABLET, FILM COATED ORAL at 20:37

## 2024-09-14 RX ADMIN — ENOXAPARIN SODIUM 30 MG: 100 INJECTION SUBCUTANEOUS at 20:37

## 2024-09-14 RX ADMIN — DOCUSATE SODIUM 100 MG: 100 CAPSULE, LIQUID FILLED ORAL at 09:03

## 2024-09-14 RX ADMIN — ICOSAPENT ETHYL 2 G: 1 CAPSULE ORAL at 17:41

## 2024-09-14 RX ADMIN — IPRATROPIUM BROMIDE AND ALBUTEROL SULFATE 3 ML: .5; 3 SOLUTION RESPIRATORY (INHALATION) at 14:18

## 2024-09-14 RX ADMIN — TRAMADOL HYDROCHLORIDE 100 MG: 50 TABLET, COATED ORAL at 09:03

## 2024-09-14 RX ADMIN — Medication 1000 UNITS: at 09:03

## 2024-09-14 RX ADMIN — CEPHALEXIN 500 MG: 500 CAPSULE ORAL at 09:04

## 2024-09-14 RX ADMIN — COLCHICINE 0.6 MG: 0.6 TABLET ORAL at 09:03

## 2024-09-14 ASSESSMENT — COGNITIVE AND FUNCTIONAL STATUS - GENERAL
TOILETING: A LOT
MOBILITY SCORE: 10
WALKING IN HOSPITAL ROOM: TOTAL
MOBILITY SCORE: 10
DRESSING REGULAR UPPER BODY CLOTHING: A LOT
DRESSING REGULAR LOWER BODY CLOTHING: A LOT
TURNING FROM BACK TO SIDE WHILE IN FLAT BAD: A LITTLE
MOVING FROM LYING ON BACK TO SITTING ON SIDE OF FLAT BED WITH BEDRAILS: A LITTLE
DAILY ACTIVITIY SCORE: 16
TOILETING: A LOT
HELP NEEDED FOR BATHING: A LOT
HELP NEEDED FOR BATHING: A LOT
MOVING FROM LYING ON BACK TO SITTING ON SIDE OF FLAT BED WITH BEDRAILS: A LITTLE
DAILY ACTIVITIY SCORE: 16
CLIMB 3 TO 5 STEPS WITH RAILING: TOTAL
MOVING TO AND FROM BED TO CHAIR: TOTAL
WALKING IN HOSPITAL ROOM: TOTAL
TURNING FROM BACK TO SIDE WHILE IN FLAT BAD: A LITTLE
STANDING UP FROM CHAIR USING ARMS: TOTAL
STANDING UP FROM CHAIR USING ARMS: TOTAL
DRESSING REGULAR UPPER BODY CLOTHING: A LOT
CLIMB 3 TO 5 STEPS WITH RAILING: TOTAL
DRESSING REGULAR LOWER BODY CLOTHING: A LOT
MOVING TO AND FROM BED TO CHAIR: TOTAL

## 2024-09-14 ASSESSMENT — PAIN - FUNCTIONAL ASSESSMENT: PAIN_FUNCTIONAL_ASSESSMENT: 0-10

## 2024-09-14 ASSESSMENT — PAIN DESCRIPTION - ORIENTATION: ORIENTATION: LEFT

## 2024-09-14 ASSESSMENT — PAIN SCALES - GENERAL
PAINLEVEL_OUTOF10: 0 - NO PAIN
PAINLEVEL_OUTOF10: 10 - WORST POSSIBLE PAIN

## 2024-09-14 ASSESSMENT — PAIN DESCRIPTION - LOCATION: LOCATION: LEG

## 2024-09-14 NOTE — PROGRESS NOTES
Interval events:    No acute events overnight    Subjective:  No SOB or chest pain.    Today in brief:  - SNF planning    Objective:  Vitals:    09/14/24 0540   BP: (!) 123/48   Pulse: 70   Resp: 18   Temp: 36.4 °C (97.5 °F)   SpO2: 92%     Weight         9/4/2024  0538 9/7/2024  0618 9/8/2024  0419 9/10/2024  1419 9/11/2024  0630    Weight: 92 kg (202 lb 13.2 oz) 90.8 kg (200 lb 2.8 oz) 90 kg (198 lb 6.6 oz) 90.8 kg (200 lb 2.8 oz) 90.9 kg (200 lb 6.4 oz)            Intake/Output Summary (Last 24 hours) at 9/14/2024 0707  Last data filed at 9/13/2024 2045  Gross per 24 hour   Intake 120 ml   Output 1050 ml   Net -930 ml     Recent Results (from the past 24 hour(s))   POCT GLUCOSE    Collection Time: 09/13/24 11:40 AM   Result Value Ref Range    POCT Glucose 141 (H) 74 - 99 mg/dL   CBC    Collection Time: 09/13/24  6:07 PM   Result Value Ref Range    WBC 6.9 4.4 - 11.3 x10*3/uL    nRBC 0.0 0.0 - 0.0 /100 WBCs    RBC 4.35 4.00 - 5.20 x10*6/uL    Hemoglobin 12.0 12.0 - 16.0 g/dL    Hematocrit 40.2 36.0 - 46.0 %    MCV 92 80 - 100 fL    MCH 27.6 26.0 - 34.0 pg    MCHC 29.9 (L) 32.0 - 36.0 g/dL    RDW 14.8 (H) 11.5 - 14.5 %    Platelets 347 150 - 450 x10*3/uL   Renal Function Panel    Collection Time: 09/13/24  6:07 PM   Result Value Ref Range    Glucose 117 (H) 74 - 99 mg/dL    Sodium 137 136 - 145 mmol/L    Potassium 4.8 3.5 - 5.3 mmol/L    Chloride 102 98 - 107 mmol/L    Bicarbonate 24 21 - 32 mmol/L    Anion Gap 16 10 - 20 mmol/L    Urea Nitrogen 59 (H) 6 - 23 mg/dL    Creatinine 1.52 (H) 0.50 - 1.05 mg/dL    eGFR 32 (L) >60 mL/min/1.73m*2    Calcium 9.3 8.6 - 10.6 mg/dL    Phosphorus 4.9 2.5 - 4.9 mg/dL    Albumin 3.9 3.4 - 5.0 g/dL   Magnesium    Collection Time: 09/13/24  6:07 PM   Result Value Ref Range    Magnesium 2.65 (H) 1.60 - 2.40 mg/dL     Inpatient Medications:  Scheduled medications   Medication Dose Route Frequency    alendronate  35 mg oral q7 days    allopurinol  50 mg oral q24h    [Held by provider]  amLODIPine  10 mg oral Daily    ascorbic acid  500 mg oral Daily    atorvastatin  20 mg oral Nightly    baclofen  5 mg oral Nightly    cephalexin  500 mg oral BID    cetirizine  10 mg oral Daily    cholecalciferol  1,000 Units oral Daily    colchicine  0.6 mg oral Daily    cyanocobalamin  1,000 mcg intramuscular q30 days    docusate sodium  100 mg oral BID    DULoxetine  30 mg oral Daily    enoxaparin  30 mg subcutaneous q24h    ferrous sulfate (325 mg ferrous sulfate)  65 mg of iron oral Daily with breakfast    fluticasone furoate-vilanteroL  1 puff inhalation Daily    furosemide  40 mg oral Daily    gabapentin  100 mg oral BID    icosapent ethyL  2 g oral BID    ipratropium-albuteroL  3 mL nebulization TID    metoprolol tartrate  25 mg oral BID    multivitamin with minerals  1 tablet oral Daily    pantoprazole  20 mg oral Daily before breakfast    polyethylene glycol  17 g oral Daily    sacubitriL-valsartan  1 tablet oral BID    vitamin E  400 Units oral Daily     PRN medications   Medication    acetaminophen    hydrOXYzine HCL    melatonin    pramoxine    sennosides    traMADol    white petrolatum     Continuous Medications   Medication Dose Last Rate       Telemetry 9/14/2024 (personally reviewed): ApVp 70s    Physical exam:  General: NAD  Head/ neck: atraumatic  Cardiac: RRR, regular S1 S2 , 3/6 systolic murmur, no rub, no gallop  Pulm: CTA bilaterally, no wheezes, rales or rhonchi.    Vascular: Radial 2+ bilaterally  GI: Non distended  Extremities: no LE edema   Neuro: no focal neuro deficits   Psych: appropriate mood and behavior   Skin: warm and dry       Assessment/Plan   Alayna Dumont is a 89 y.o. 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 No admitted for acute decompensated diastolic heart failure.     Severe aortic stenosis s/p bioprosthetic AVR, now with severe AI  HCM s/p septal myomectomy   - s/p [facility] 6/2022: normal coronary arteries, but very tortuous   - 1/2023 TTE: EF 50-55%, no WMA, LV septal wall severely increased, sev LVH, bioprosthetic AV. Moderate to severe prosthetic AI, peak/mean gradient 38.0/21.3  - 2/17/24 TTE: EF 70-75%, no WMA, mod LVH, normal DF, bioprosthetic AV, mod to sev AI, peak/mean 40.4/22.0, moderate Aortic stenosis  - 4/26/24 TTE: LV function is mildly to mod decreased, WMA, sev LVH. Dyssynchrony consistent with RV pacing is noted. Mod aortic valve stenosis.DI 0.47, moderate AI. Peak/mean  31.9/15.1.   - 9/5/24 TTE: LVEF 50%, GHK, LV sev dilated, severe LVH, LA sev dilated, RV mod enlarged/mildly reduced function, severe AI of prosthetic valve, mild AS, DI 0.63, mnn grad 15.0, severe Mitral calcification, mild to mod MR, mild TR, RVSP 33.9  - Structural team, Dr. Balderas, deemed patient not a candidate for structural interventions due to high frailty, chronic infection status, limited mobility, and some degree of cognitive impairment  - of note, prev admit 4/25 -5/4 for ADHF, cardiology consulted, suspect less likely due to ischemia more likely pacemaker induced, Geriatrics was consulted for GOC discussion, code status was changed to DNR/DNI with no plan for any further invasive measures  - Admit BNP: 885 elevated (previously 952 4/2024)  - Admit CXR: dense left basilar airspace disease with Lt pleural effusion. PNA in the differential. A component of pulmonary edema present  - Repeat CXR 9/6: Cardiomegaly and  interstitial pulmonary edema.  - prev dry wt ~90-93 kg. Admit wt: 92 kg. Daily (bed) wt: 90.9  - s/p diuresis, 40 mg IV Lasix BID. Now on PO lasix 40 daily  - remains euvolemic, warm and dry. Repeat BMP 9/13 down to 383  - 2gram sodium diet, 2L fluid restriction, daily standing weights, strict I&O's   - c/w Metoprolol Tartrate to 25 mg BID (new dose), entresto for further afterload reduction/BP control  - New amlodipine initiated but BP then low to SBP 90s and it was discontinued 2 days later  - No SGLT2i given functional urinary incontinence and hx of vaginal candidiasis    S/p pacemaker generator change 9/10  Tachy Martir Syndrome S/p dual chamber PM   - PPM device check 9/5 showed battery life< 6 months  - s/p successful generator change out 9/10 (chronic medtronic leads, [place] [employer] generator)  - Resumed Lovenox 48 hrs post-procedure  - Lower heart rate set to HR 70 iso severe AI for preload dependency     HTN  - admit BP up to 194/66 in ED  - SBP last 24 hrs: 110s-140/~40-80s. BP labile but ovreall controlled. Continue to hold amlodipine  - meds as above, titrate as indicated     CKD stage 3 (stable)  - B/lCr ~1.3-1.6, GFR ~30-40  - Admit Cr 1.46, GFR 34  - Creatine has overall remained stable  - Avoid nephrotoxins and ,hypotension, renally dose meds as indicated  - Trend daily RFP while admitted      Anemia/SUNDEEP  Vit B12 deficiency  -Baseline Hgb ~10-11  - Admit Hgb 10.8  - Daily hgb Overall stable   - no acute sign of bleeding  - cont home PO iron and B-12 injections and supplements     HLD  - cont home Atorvastatin and Vascepa     Chronic back pain  Peripheral neuropathy  - cont home Baclofen, gabapentin, Tramadol PRN  - PRN Tylenol     COPD/Possible Asthma  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 Gave colchicine 0.6 mg BID 9/11, and follow with 0.6 mg daily for 1 week  - encourage pt to get OOB to chair, PT/OT consulted     DVT ppx: lovenox subcutaneous  DISPO: SNF planning underway with TCC  Code status: Full Code    Patient seen and discussed with Dr. Riley Ruiz.  Izzy Shrestha PA-C

## 2024-09-14 NOTE — PROGRESS NOTES
Per medical team, pt medically ready. Still no response from [facility] SNF. TCC called [facility] and was told admissions coordinator is not in on the weekend but was transferred to , Luciano RICHARDS and left a message to see if able to accept with TCC info.   1100-TCC spoke to pt's son Praveen and updated that [facility] and [facility] unable to accept at this time and still awaiting a response from [facility]. TCC inquired about more SNF preferences. Per Praveen he will discuss with his brother and will call TCC back later today with more preferences. Will continue to follow.

## 2024-09-14 NOTE — CARE PLAN
The patient's goals for the shift include      The clinical goals for the shift include pain will be controled overnight      Problem: Nutrition  Goal: Less than 5 days NPO/clear liquids  Outcome: Progressing  Goal: Oral intake greater than 50%  Outcome: Progressing  Goal: Oral intake greater 75%  Outcome: Progressing  Goal: Consume prescribed supplement  Outcome: Progressing  Goal: Adequate PO fluid intake  Outcome: Progressing  Goal: Nutrition support goals are met within 48 hrs  Outcome: Progressing  Goal: Nutrition support is meeting 75% of nutrient needs  Outcome: Progressing  Goal: Tube feed tolerance  Outcome: Progressing  Goal: BG  mg/dL  Outcome: Progressing  Goal: Lab values WNL  Outcome: Progressing  Goal: Electrolytes WNL  Outcome: Progressing  Goal: Promote healing  Outcome: Progressing  Goal: Maintain stable weight  Outcome: Progressing  Goal: Reduce weight from edema/fluid  Outcome: Progressing  Goal: Gradual weight gain  Outcome: Progressing  Goal: Improve ostomy output  Outcome: Progressing     Problem: Pain - Adult  Goal: Verbalizes/displays adequate comfort level or baseline comfort level  Outcome: Progressing     Problem: Safety - Adult  Goal: Free from fall injury  Outcome: Progressing     Problem: Discharge Planning  Goal: Discharge to home or other facility with appropriate resources  Outcome: Progressing     Problem: Chronic Conditions and Co-morbidities  Goal: Patient's chronic conditions and co-morbidity symptoms are monitored and maintained or improved  Outcome: Progressing     Problem: Skin  Goal: Decreased wound size/increased tissue granulation at next dressing change  Outcome: Progressing  Goal: Participates in plan/prevention/treatment measures  Outcome: Progressing  Goal: Prevent/manage excess moisture  Outcome: Progressing  Goal: Prevent/minimize sheer/friction injuries  Outcome: Progressing  Goal: Promote/optimize nutrition  Outcome: Progressing  Goal: Promote skin  healing  Outcome: Progressing     Problem: Diabetes  Goal: Achieve decreasing blood glucose levels by end of shift  Outcome: Progressing  Goal: Increase stability of blood glucose readings by end of shift  Outcome: Progressing  Goal: Decrease in ketones present in urine by end of shift  Outcome: Progressing  Goal: Maintain electrolyte levels within acceptable range throughout shift  Outcome: Progressing  Goal: Maintain glucose levels >70mg/dl to <250mg/dl throughout shift  Outcome: Progressing  Goal: No changes in neurological exam by end of shift  Outcome: Progressing  Goal: Learn about and adhere to nutrition recommendations by end of shift  Outcome: Progressing  Goal: Vital signs within normal range for age by end of shift  Outcome: Progressing  Goal: Increase self care and/or family involovement by end of shift  Outcome: Progressing  Goal: Receive DSME education by end of shift  Outcome: Progressing     Problem: Heart Failure  Goal: Improved gas exchange this shift  Outcome: Progressing  Goal: Improved urinary output this shift  Outcome: Progressing  Goal: Reduction in peripheral edema within 24 hours  Outcome: Progressing  Goal: Report improvement of dyspnea/breathlessness this shift  Outcome: Progressing  Goal: Weight from fluid excess reduced over 2-3 days, then stabilize  Outcome: Progressing  Goal: Increase self care and/or family involvement in 24 hours  Outcome: Progressing

## 2024-09-15 VITALS
TEMPERATURE: 97.3 F | BODY MASS INDEX: 35.51 KG/M2 | HEIGHT: 63 IN | SYSTOLIC BLOOD PRESSURE: 126 MMHG | DIASTOLIC BLOOD PRESSURE: 51 MMHG | HEART RATE: 70 BPM | OXYGEN SATURATION: 94 % | WEIGHT: 200.4 LBS | RESPIRATION RATE: 17 BRPM

## 2024-09-15 LAB
ALBUMIN SERPL BCP-MCNC: 3.4 G/DL (ref 3.4–5)
ANION GAP SERPL CALC-SCNC: 15 MMOL/L (ref 10–20)
BUN SERPL-MCNC: 51 MG/DL (ref 6–23)
CALCIUM SERPL-MCNC: 8.8 MG/DL (ref 8.6–10.6)
CHLORIDE SERPL-SCNC: 106 MMOL/L (ref 98–107)
CO2 SERPL-SCNC: 24 MMOL/L (ref 21–32)
CREAT SERPL-MCNC: 1.11 MG/DL (ref 0.5–1.05)
EGFRCR SERPLBLD CKD-EPI 2021: 47 ML/MIN/1.73M*2
ERYTHROCYTE [DISTWIDTH] IN BLOOD BY AUTOMATED COUNT: 14.7 % (ref 11.5–14.5)
GLUCOSE SERPL-MCNC: 104 MG/DL (ref 74–99)
HCT VFR BLD AUTO: 37 % (ref 36–46)
HGB BLD-MCNC: 11 G/DL (ref 12–16)
MAGNESIUM SERPL-MCNC: 2.51 MG/DL (ref 1.6–2.4)
MCH RBC QN AUTO: 27.8 PG (ref 26–34)
MCHC RBC AUTO-ENTMCNC: 29.7 G/DL (ref 32–36)
MCV RBC AUTO: 93 FL (ref 80–100)
NRBC BLD-RTO: 0 /100 WBCS (ref 0–0)
PHOSPHATE SERPL-MCNC: 3.6 MG/DL (ref 2.5–4.9)
PLATELET # BLD AUTO: 283 X10*3/UL (ref 150–450)
POTASSIUM SERPL-SCNC: 4.8 MMOL/L (ref 3.5–5.3)
RBC # BLD AUTO: 3.96 X10*6/UL (ref 4–5.2)
SODIUM SERPL-SCNC: 140 MMOL/L (ref 136–145)
WBC # BLD AUTO: 4.8 X10*3/UL (ref 4.4–11.3)

## 2024-09-15 PROCEDURE — 99233 SBSQ HOSP IP/OBS HIGH 50: CPT | Performed by: INTERNAL MEDICINE

## 2024-09-15 PROCEDURE — 2500000001 HC RX 250 WO HCPCS SELF ADMINISTERED DRUGS (ALT 637 FOR MEDICARE OP)

## 2024-09-15 PROCEDURE — 83735 ASSAY OF MAGNESIUM: CPT

## 2024-09-15 PROCEDURE — 80069 RENAL FUNCTION PANEL: CPT

## 2024-09-15 PROCEDURE — 2500000002 HC RX 250 W HCPCS SELF ADMINISTERED DRUGS (ALT 637 FOR MEDICARE OP, ALT 636 FOR OP/ED): Performed by: INTERNAL MEDICINE

## 2024-09-15 PROCEDURE — 2500000004 HC RX 250 GENERAL PHARMACY W/ HCPCS (ALT 636 FOR OP/ED): Performed by: PHYSICIAN ASSISTANT

## 2024-09-15 PROCEDURE — 94640 AIRWAY INHALATION TREATMENT: CPT

## 2024-09-15 PROCEDURE — 1200000002 HC GENERAL ROOM WITH TELEMETRY DAILY

## 2024-09-15 PROCEDURE — 2500000004 HC RX 250 GENERAL PHARMACY W/ HCPCS (ALT 636 FOR OP/ED)

## 2024-09-15 PROCEDURE — 2500000002 HC RX 250 W HCPCS SELF ADMINISTERED DRUGS (ALT 637 FOR MEDICARE OP, ALT 636 FOR OP/ED)

## 2024-09-15 PROCEDURE — 85027 COMPLETE CBC AUTOMATED: CPT

## 2024-09-15 PROCEDURE — 36415 COLL VENOUS BLD VENIPUNCTURE: CPT

## 2024-09-15 PROCEDURE — 2500000001 HC RX 250 WO HCPCS SELF ADMINISTERED DRUGS (ALT 637 FOR MEDICARE OP): Performed by: NURSE PRACTITIONER

## 2024-09-15 PROCEDURE — 2500000001 HC RX 250 WO HCPCS SELF ADMINISTERED DRUGS (ALT 637 FOR MEDICARE OP): Performed by: PHYSICIAN ASSISTANT

## 2024-09-15 RX ADMIN — METOPROLOL TARTRATE 25 MG: 25 TABLET, FILM COATED ORAL at 20:06

## 2024-09-15 RX ADMIN — Medication 1 TABLET: at 09:14

## 2024-09-15 RX ADMIN — GABAPENTIN 100 MG: 100 CAPSULE ORAL at 20:06

## 2024-09-15 RX ADMIN — ALLOPURINOL 50 MG: 100 TABLET ORAL at 09:23

## 2024-09-15 RX ADMIN — HYDROXYZINE HYDROCHLORIDE 25 MG: 25 TABLET ORAL at 09:23

## 2024-09-15 RX ADMIN — BACLOFEN 5 MG: 10 TABLET ORAL at 20:06

## 2024-09-15 RX ADMIN — Medication 1000 UNITS: at 09:14

## 2024-09-15 RX ADMIN — PANTOPRAZOLE SODIUM 20 MG: 20 TABLET, DELAYED RELEASE ORAL at 09:14

## 2024-09-15 RX ADMIN — DULOXETINE HYDROCHLORIDE 30 MG: 30 CAPSULE, DELAYED RELEASE ORAL at 09:14

## 2024-09-15 RX ADMIN — Medication 400 UNITS: at 09:14

## 2024-09-15 RX ADMIN — SACUBITRIL AND VALSARTAN 1 TABLET: 97; 103 TABLET, FILM COATED ORAL at 20:06

## 2024-09-15 RX ADMIN — HYDROXYZINE HYDROCHLORIDE 25 MG: 25 TABLET ORAL at 17:30

## 2024-09-15 RX ADMIN — METOPROLOL TARTRATE 25 MG: 25 TABLET, FILM COATED ORAL at 09:14

## 2024-09-15 RX ADMIN — ENOXAPARIN SODIUM 30 MG: 100 INJECTION SUBCUTANEOUS at 20:09

## 2024-09-15 RX ADMIN — FERROUS SULFATE TAB 325 MG (65 MG ELEMENTAL FE) 325 MG: 325 (65 FE) TAB at 09:23

## 2024-09-15 RX ADMIN — ICOSAPENT ETHYL 2 G: 1 CAPSULE ORAL at 10:42

## 2024-09-15 RX ADMIN — ICOSAPENT ETHYL 2 G: 1 CAPSULE ORAL at 17:30

## 2024-09-15 RX ADMIN — ATORVASTATIN CALCIUM 20 MG: 20 TABLET, FILM COATED ORAL at 20:06

## 2024-09-15 RX ADMIN — IPRATROPIUM BROMIDE AND ALBUTEROL SULFATE 3 ML: .5; 3 SOLUTION RESPIRATORY (INHALATION) at 10:25

## 2024-09-15 RX ADMIN — FUROSEMIDE 40 MG: 20 TABLET ORAL at 09:14

## 2024-09-15 RX ADMIN — GABAPENTIN 100 MG: 100 CAPSULE ORAL at 09:14

## 2024-09-15 RX ADMIN — OXYCODONE HYDROCHLORIDE AND ACETAMINOPHEN 500 MG: 500 TABLET ORAL at 09:14

## 2024-09-15 RX ADMIN — IPRATROPIUM BROMIDE AND ALBUTEROL SULFATE 3 ML: .5; 3 SOLUTION RESPIRATORY (INHALATION) at 20:03

## 2024-09-15 RX ADMIN — FLUTICASONE FUROATE AND VILANTEROL TRIFENATATE 1 PUFF: 200; 25 POWDER RESPIRATORY (INHALATION) at 10:28

## 2024-09-15 RX ADMIN — CETIRIZINE HYDROCHLORIDE 10 MG: 10 TABLET, FILM COATED ORAL at 09:14

## 2024-09-15 RX ADMIN — CEPHALEXIN 500 MG: 500 CAPSULE ORAL at 20:06

## 2024-09-15 RX ADMIN — CEPHALEXIN 500 MG: 500 CAPSULE ORAL at 09:14

## 2024-09-15 RX ADMIN — TRAMADOL HYDROCHLORIDE 100 MG: 50 TABLET, COATED ORAL at 09:16

## 2024-09-15 RX ADMIN — SACUBITRIL AND VALSARTAN 1 TABLET: 97; 103 TABLET, FILM COATED ORAL at 09:14

## 2024-09-15 RX ADMIN — IPRATROPIUM BROMIDE AND ALBUTEROL SULFATE 3 ML: .5; 3 SOLUTION RESPIRATORY (INHALATION) at 15:03

## 2024-09-15 RX ADMIN — COLCHICINE 0.6 MG: 0.6 TABLET ORAL at 09:14

## 2024-09-15 ASSESSMENT — PAIN - FUNCTIONAL ASSESSMENT: PAIN_FUNCTIONAL_ASSESSMENT: 0-10

## 2024-09-15 ASSESSMENT — COGNITIVE AND FUNCTIONAL STATUS - GENERAL
CLIMB 3 TO 5 STEPS WITH RAILING: TOTAL
MOVING TO AND FROM BED TO CHAIR: TOTAL
TOILETING: A LOT
MOBILITY SCORE: 10
TURNING FROM BACK TO SIDE WHILE IN FLAT BAD: A LITTLE
DAILY ACTIVITIY SCORE: 16
WALKING IN HOSPITAL ROOM: TOTAL
STANDING UP FROM CHAIR USING ARMS: TOTAL
DRESSING REGULAR UPPER BODY CLOTHING: A LOT
MOVING FROM LYING ON BACK TO SITTING ON SIDE OF FLAT BED WITH BEDRAILS: A LITTLE
HELP NEEDED FOR BATHING: A LOT
DRESSING REGULAR LOWER BODY CLOTHING: A LOT

## 2024-09-15 ASSESSMENT — PAIN DESCRIPTION - ORIENTATION: ORIENTATION: LEFT

## 2024-09-15 ASSESSMENT — PAIN DESCRIPTION - LOCATION: LOCATION: HIP

## 2024-09-15 ASSESSMENT — PAIN SCALES - GENERAL: PAINLEVEL_OUTOF10: 10 - WORST POSSIBLE PAIN

## 2024-09-15 NOTE — PROGRESS NOTES
Transitional Care Coordinator Progress Note:   Spoke with pt's son Praveen ([phone]) to review SNF acceptances.  FOC is [facility].  Sent updates to facility and inquired if they can accept pt tomorrow.  [facility]  TCC will follow up tomorrow.      Nell Chang MSN, RN-BC  Transitional Care Coordinator (TCC)  [phone]

## 2024-09-15 NOTE — PROGRESS NOTES
Interval events:    No acute events overnight    Subjective:  No CP or SOB    Today in brief:  - SNF planning    Objective:  Vitals:    09/15/24 0415   BP: (!) 135/49   Pulse: 70   Resp:    Temp: (!) 28.1 °C (82.6 °F)   SpO2: 92%     Weight         9/4/2024  0538 9/7/2024  0618 9/8/2024  0419 9/10/2024  1419 9/11/2024  0630    Weight: 92 kg (202 lb 13.2 oz) 90.8 kg (200 lb 2.8 oz) 90 kg (198 lb 6.6 oz) 90.8 kg (200 lb 2.8 oz) 90.9 kg (200 lb 6.4 oz)            Intake/Output Summary (Last 24 hours) at 9/15/2024 0720  Last data filed at 9/15/2024 0118  Gross per 24 hour   Intake --   Output 1700 ml   Net -1700 ml     Recent Results (from the past 24 hour(s))   CBC    Collection Time: 09/14/24  4:24 PM   Result Value Ref Range    WBC 5.5 4.4 - 11.3 x10*3/uL    nRBC 0.0 0.0 - 0.0 /100 WBCs    RBC 3.99 (L) 4.00 - 5.20 x10*6/uL    Hemoglobin 11.1 (L) 12.0 - 16.0 g/dL    Hematocrit 37.1 36.0 - 46.0 %    MCV 93 80 - 100 fL    MCH 27.8 26.0 - 34.0 pg    MCHC 29.9 (L) 32.0 - 36.0 g/dL    RDW 14.8 (H) 11.5 - 14.5 %    Platelets 286 150 - 450 x10*3/uL   Renal Function Panel    Collection Time: 09/14/24  4:24 PM   Result Value Ref Range    Glucose 118 (H) 74 - 99 mg/dL    Sodium 139 136 - 145 mmol/L    Potassium 4.9 3.5 - 5.3 mmol/L    Chloride 106 98 - 107 mmol/L    Bicarbonate 21 21 - 32 mmol/L    Anion Gap 17 10 - 20 mmol/L    Urea Nitrogen 62 (H) 6 - 23 mg/dL    Creatinine 1.44 (H) 0.50 - 1.05 mg/dL    eGFR 34 (L) >60 mL/min/1.73m*2    Calcium 8.9 8.6 - 10.6 mg/dL    Phosphorus 4.1 2.5 - 4.9 mg/dL    Albumin 3.5 3.4 - 5.0 g/dL   Magnesium    Collection Time: 09/14/24  4:24 PM   Result Value Ref Range    Magnesium 2.51 (H) 1.60 - 2.40 mg/dL     Inpatient Medications:  Scheduled medications   Medication Dose Route Frequency    alendronate  35 mg oral q7 days    allopurinol  50 mg oral q24h    [Held by provider] amLODIPine  10 mg oral Daily    ascorbic acid  500 mg oral Daily    atorvastatin  20 mg oral Nightly    baclofen  5  mg oral Nightly    cephalexin  500 mg oral BID    cetirizine  10 mg oral Daily    cholecalciferol  1,000 Units oral Daily    colchicine  0.6 mg oral Daily    cyanocobalamin  1,000 mcg intramuscular q30 days    docusate sodium  100 mg oral BID    DULoxetine  30 mg oral Daily    enoxaparin  30 mg subcutaneous q24h    ferrous sulfate (325 mg ferrous sulfate)  65 mg of iron oral Daily with breakfast    fluticasone furoate-vilanteroL  1 puff inhalation Daily    furosemide  40 mg oral Daily    gabapentin  100 mg oral BID    icosapent ethyL  2 g oral BID    ipratropium-albuteroL  3 mL nebulization TID    metoprolol tartrate  25 mg oral BID    multivitamin with minerals  1 tablet oral Daily    pantoprazole  20 mg oral Daily before breakfast    polyethylene glycol  17 g oral Daily    sacubitriL-valsartan  1 tablet oral BID    vitamin E  400 Units oral Daily     PRN medications   Medication    acetaminophen    hydrOXYzine HCL    melatonin    pramoxine    sennosides    traMADol    white petrolatum     Continuous Medications   Medication Dose Last Rate       Telemetry 9/15/2024 (personally reviewed): [id number]    Physical exam:  General: NAD  Head/ neck: atraumatic  Cardiac: RRR, regular S1 S2 , 3/6 systolic murmur, no rub, no gallop  Pulm: Mild cough. No WOB. CTA bilaterally, no wheezes, rales or rhonchi.    Vascular: Radial 2+ bilaterally  GI: Non distended  Extremities: no LE edema   Neuro: no focal neuro deficits   Psych: appropriate mood and behavior   Skin: warm and dry       Assessment/Plan   Alayna Dumont is a 89 y.o. 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 No admitted for acute decompensated diastolic heart failure.     Severe aortic stenosis s/p bioprosthetic AVR, now with severe AI  HCM s/p septal myomectomy   - s/p C 6/2022: normal coronary arteries, but very tortuous   - 1/2023 TTE: EF 50-55%, no WMA, LV septal wall severely increased, sev LVH, bioprosthetic AV. Moderate to severe prosthetic AI, peak/mean gradient 38.0/21.3  - 2/17/24 TTE: EF 70-75%, no WMA, mod LVH, normal DF, bioprosthetic AV, mod to sev AI, peak/mean 40.4/22.0, moderate Aortic stenosis  - 4/26/24 TTE: LV function is mildly to mod decreased, WMA, sev LVH. Dyssynchrony consistent with RV pacing is noted. Mod aortic valve stenosis.DI 0.47, moderate AI. Peak/mean  31.9/15.1.   - 9/5/24 TTE: LVEF 50%, GHK, LV sev dilated, severe LVH, LA sev dilated, RV mod enlarged/mildly reduced function, severe AI of prosthetic valve, mild AS, DI 0.63, mnn grad 15.0, severe Mitral calcification, mild to mod MR, mild TR, RVSP 33.9  - Structural team, Dr. Balderas, deemed patient not a candidate for structural interventions due to high frailty, chronic infection status, limited mobility, and some degree of cognitive impairment  - of note, prev admit 4/25 -5/4 for ADHF, cardiology consulted, suspect less likely due to ischemia more likely pacemaker induced, Geriatrics was consulted for GOC discussion, code status was changed to DNR/DNI with no plan for any further invasive measures  - Admit BNP: 885 elevated (previously 952 4/2024)  - Admit CXR: dense left basilar airspace disease with Lt pleural effusion. PNA in the differential. A component of pulmonary edema present  - Repeat CXR 9/6: Cardiomegaly and interstitial pulmonary edema.  - prev dry wt ~90-93 kg. Admit wt: 92 kg.   - s/p diuresis, 40 mg IV  Lasix BID. Now on PO lasix 40 daily  - Remains euvolemic, warm and dry.   - Repeat BMP 9/13 down to 383  - 2gram sodium diet, 2L fluid restriction, daily standing weights, strict I&O's   - c/w Metoprolol Tartrate to 25 mg BID (new dose), entresto for further afterload reduction/BP control  - New amlodipine initiated but BP then low to SBP 90s and it was discontinued 2 days later  - No SGLT2i given functional urinary incontinence and hx of vaginal candidiasis    S/p pacemaker generator change 9/10  Tachy Martir Syndrome S/p dual chamber PM   - PPM device check 9/5 showed battery life< 6 months  - s/p successful generator change out 9/10 (chronic medtronic leads, [place] [employer] generator)  - Resumed Lovenox 48 hrs post-procedure  - Lower heart rate set to HR 70 iso severe AI for preload dependency     HTN  - admit BP up to 194/66 in ED  - SBP last 24 hrs: 110s-130/~40s. BP labile but overall controlled. Continue to hold amlodipine  - meds as above, titrate as indicated     CKD stage 3 (stable)  - B/lCr ~1.3-1.6, GFR ~30-40  - Admit Cr 1.46, GFR 34  - Creatinine has overall remained stable  - Avoid nephrotoxins and ,hypotension, renally dose meds as indicated  - Trend daily RFP while admitted      Anemia/SUNDEEP  Vit B12 deficiency  -Baseline Hgb ~10-11  - Admit Hgb 10.8  - Daily Hgb overall stable   - No acute sign of bleeding  - cont home PO iron and B-12 injections and supplements     HLD  - cont home Atorvastatin and Vascepa     Chronic back pain  Peripheral neuropathy  - cont home Baclofen, gabapentin, Tramadol PRN  - PRN Tylenol     COPD/Possible Asthma  Pulmonary Sarcoidosis  - 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 Gave colchicine 0.6 mg BID 9/11, and follow with 0.6 mg daily for 1 week  - encourage pt to get OOB to chair, PT/OT consulted     DVT ppx: lovenox subcutaneous  DISPO: SNF planning underway with [facility]  Code status: Full Code    Patient seen and discussed with Dr. Riley Ruiz.  Izzy Shrestha PA-C

## 2024-09-16 LAB
ALBUMIN SERPL BCP-MCNC: 3.9 G/DL (ref 3.4–5)
ANION GAP SERPL CALC-SCNC: 16 MMOL/L (ref 10–20)
BUN SERPL-MCNC: 46 MG/DL (ref 6–23)
CALCIUM SERPL-MCNC: 9.6 MG/DL (ref 8.6–10.6)
CHLORIDE SERPL-SCNC: 104 MMOL/L (ref 98–107)
CO2 SERPL-SCNC: 25 MMOL/L (ref 21–32)
CREAT SERPL-MCNC: 1.32 MG/DL (ref 0.5–1.05)
EGFRCR SERPLBLD CKD-EPI 2021: 38 ML/MIN/1.73M*2
ERYTHROCYTE [DISTWIDTH] IN BLOOD BY AUTOMATED COUNT: 14.6 % (ref 11.5–14.5)
GLUCOSE SERPL-MCNC: 117 MG/DL (ref 74–99)
HCT VFR BLD AUTO: 41.7 % (ref 36–46)
HGB BLD-MCNC: 12.6 G/DL (ref 12–16)
MAGNESIUM SERPL-MCNC: 2.51 MG/DL (ref 1.6–2.4)
MCH RBC QN AUTO: 28.3 PG (ref 26–34)
MCHC RBC AUTO-ENTMCNC: 30.2 G/DL (ref 32–36)
MCV RBC AUTO: 94 FL (ref 80–100)
NRBC BLD-RTO: 0 /100 WBCS (ref 0–0)
PHOSPHATE SERPL-MCNC: 3.6 MG/DL (ref 2.5–4.9)
PLATELET # BLD AUTO: 349 X10*3/UL (ref 150–450)
POTASSIUM SERPL-SCNC: 4.5 MMOL/L (ref 3.5–5.3)
RBC # BLD AUTO: 4.45 X10*6/UL (ref 4–5.2)
SODIUM SERPL-SCNC: 140 MMOL/L (ref 136–145)
WBC # BLD AUTO: 6.5 X10*3/UL (ref 4.4–11.3)

## 2024-09-16 PROCEDURE — 2500000002 HC RX 250 W HCPCS SELF ADMINISTERED DRUGS (ALT 637 FOR MEDICARE OP, ALT 636 FOR OP/ED)

## 2024-09-16 PROCEDURE — 2500000004 HC RX 250 GENERAL PHARMACY W/ HCPCS (ALT 636 FOR OP/ED): Performed by: PHYSICIAN ASSISTANT

## 2024-09-16 PROCEDURE — 36415 COLL VENOUS BLD VENIPUNCTURE: CPT

## 2024-09-16 PROCEDURE — 97530 THERAPEUTIC ACTIVITIES: CPT | Mod: GP

## 2024-09-16 PROCEDURE — 97110 THERAPEUTIC EXERCISES: CPT | Mod: GO

## 2024-09-16 PROCEDURE — 80069 RENAL FUNCTION PANEL: CPT

## 2024-09-16 PROCEDURE — 2500000004 HC RX 250 GENERAL PHARMACY W/ HCPCS (ALT 636 FOR OP/ED)

## 2024-09-16 PROCEDURE — 2500000001 HC RX 250 WO HCPCS SELF ADMINISTERED DRUGS (ALT 637 FOR MEDICARE OP): Performed by: NURSE PRACTITIONER

## 2024-09-16 PROCEDURE — 85027 COMPLETE CBC AUTOMATED: CPT

## 2024-09-16 PROCEDURE — 94640 AIRWAY INHALATION TREATMENT: CPT

## 2024-09-16 PROCEDURE — 2500000002 HC RX 250 W HCPCS SELF ADMINISTERED DRUGS (ALT 637 FOR MEDICARE OP, ALT 636 FOR OP/ED): Performed by: INTERNAL MEDICINE

## 2024-09-16 PROCEDURE — 2500000001 HC RX 250 WO HCPCS SELF ADMINISTERED DRUGS (ALT 637 FOR MEDICARE OP)

## 2024-09-16 PROCEDURE — 2500000001 HC RX 250 WO HCPCS SELF ADMINISTERED DRUGS (ALT 637 FOR MEDICARE OP): Performed by: PHYSICIAN ASSISTANT

## 2024-09-16 PROCEDURE — 1200000002 HC GENERAL ROOM WITH TELEMETRY DAILY

## 2024-09-16 PROCEDURE — 99233 SBSQ HOSP IP/OBS HIGH 50: CPT | Performed by: STUDENT IN AN ORGANIZED HEALTH CARE EDUCATION/TRAINING PROGRAM

## 2024-09-16 PROCEDURE — 83735 ASSAY OF MAGNESIUM: CPT

## 2024-09-16 RX ORDER — ACETAMINOPHEN 325 MG/1
975 TABLET ORAL EVERY 8 HOURS PRN
Status: CANCELLED
Start: 2024-09-16

## 2024-09-16 RX ORDER — CETIRIZINE HYDROCHLORIDE 10 MG/1
10 TABLET ORAL DAILY
Status: CANCELLED
Start: 2024-09-17

## 2024-09-16 RX ORDER — PRAMOXINE HYDROCHLORIDE 10 MG/ML
1 LOTION TOPICAL 3 TIMES DAILY PRN
Status: CANCELLED
Start: 2024-09-16

## 2024-09-16 RX ORDER — TALC
3 POWDER (GRAM) TOPICAL NIGHTLY PRN
Status: CANCELLED
Start: 2024-09-16

## 2024-09-16 RX ORDER — ENOXAPARIN SODIUM 100 MG/ML
30 INJECTION SUBCUTANEOUS EVERY 24 HOURS
Status: CANCELLED
Start: 2024-09-16

## 2024-09-16 RX ORDER — DOCUSATE SODIUM 100 MG/1
100 CAPSULE, LIQUID FILLED ORAL 2 TIMES DAILY
Status: CANCELLED
Start: 2024-09-16

## 2024-09-16 RX ORDER — ALLOPURINOL 100 MG/1
50 TABLET ORAL EVERY 24 HOURS
Status: CANCELLED
Start: 2024-09-17

## 2024-09-16 RX ORDER — PETROLATUM 420 MG/G
1 OINTMENT TOPICAL
Status: CANCELLED
Start: 2024-09-16

## 2024-09-16 RX ORDER — FUROSEMIDE 40 MG/1
40 TABLET ORAL DAILY
Status: CANCELLED
Start: 2024-09-17

## 2024-09-16 RX ORDER — HYDROXYZINE HYDROCHLORIDE 25 MG/1
25 TABLET, FILM COATED ORAL EVERY 6 HOURS PRN
Status: CANCELLED
Start: 2024-09-16

## 2024-09-16 RX ADMIN — ICOSAPENT ETHYL 2 G: 1 CAPSULE ORAL at 08:41

## 2024-09-16 RX ADMIN — SACUBITRIL AND VALSARTAN 1 TABLET: 97; 103 TABLET, FILM COATED ORAL at 20:40

## 2024-09-16 RX ADMIN — FERROUS SULFATE TAB 325 MG (65 MG ELEMENTAL FE) 325 MG: 325 (65 FE) TAB at 08:40

## 2024-09-16 RX ADMIN — CETIRIZINE HYDROCHLORIDE 10 MG: 10 TABLET, FILM COATED ORAL at 08:41

## 2024-09-16 RX ADMIN — BACLOFEN 5 MG: 10 TABLET ORAL at 20:40

## 2024-09-16 RX ADMIN — GABAPENTIN 100 MG: 100 CAPSULE ORAL at 20:40

## 2024-09-16 RX ADMIN — METOPROLOL TARTRATE 25 MG: 25 TABLET, FILM COATED ORAL at 20:39

## 2024-09-16 RX ADMIN — GABAPENTIN 100 MG: 100 CAPSULE ORAL at 08:40

## 2024-09-16 RX ADMIN — DOCUSATE SODIUM 100 MG: 100 CAPSULE, LIQUID FILLED ORAL at 20:39

## 2024-09-16 RX ADMIN — DULOXETINE HYDROCHLORIDE 30 MG: 30 CAPSULE, DELAYED RELEASE ORAL at 08:40

## 2024-09-16 RX ADMIN — CEPHALEXIN 500 MG: 500 CAPSULE ORAL at 20:40

## 2024-09-16 RX ADMIN — OXYCODONE HYDROCHLORIDE AND ACETAMINOPHEN 500 MG: 500 TABLET ORAL at 08:40

## 2024-09-16 RX ADMIN — PANTOPRAZOLE SODIUM 20 MG: 20 TABLET, DELAYED RELEASE ORAL at 06:12

## 2024-09-16 RX ADMIN — FUROSEMIDE 40 MG: 20 TABLET ORAL at 08:40

## 2024-09-16 RX ADMIN — IPRATROPIUM BROMIDE AND ALBUTEROL SULFATE 3 ML: .5; 3 SOLUTION RESPIRATORY (INHALATION) at 20:55

## 2024-09-16 RX ADMIN — ATORVASTATIN CALCIUM 20 MG: 20 TABLET, FILM COATED ORAL at 20:39

## 2024-09-16 RX ADMIN — ENOXAPARIN SODIUM 30 MG: 100 INJECTION SUBCUTANEOUS at 20:39

## 2024-09-16 RX ADMIN — FLUTICASONE FUROATE AND VILANTEROL TRIFENATATE 1 PUFF: 200; 25 POWDER RESPIRATORY (INHALATION) at 10:13

## 2024-09-16 RX ADMIN — Medication 1000 UNITS: at 08:40

## 2024-09-16 RX ADMIN — COLCHICINE 0.6 MG: 0.6 TABLET ORAL at 08:40

## 2024-09-16 RX ADMIN — SACUBITRIL AND VALSARTAN 1 TABLET: 97; 103 TABLET, FILM COATED ORAL at 08:41

## 2024-09-16 RX ADMIN — IPRATROPIUM BROMIDE AND ALBUTEROL SULFATE 3 ML: .5; 3 SOLUTION RESPIRATORY (INHALATION) at 10:13

## 2024-09-16 RX ADMIN — ICOSAPENT ETHYL 2 G: 1 CAPSULE ORAL at 17:51

## 2024-09-16 RX ADMIN — CEPHALEXIN 500 MG: 500 CAPSULE ORAL at 08:40

## 2024-09-16 RX ADMIN — METOPROLOL TARTRATE 25 MG: 25 TABLET, FILM COATED ORAL at 08:41

## 2024-09-16 RX ADMIN — ALLOPURINOL 50 MG: 100 TABLET ORAL at 08:40

## 2024-09-16 RX ADMIN — IPRATROPIUM BROMIDE AND ALBUTEROL SULFATE 3 ML: .5; 3 SOLUTION RESPIRATORY (INHALATION) at 14:59

## 2024-09-16 RX ADMIN — Medication 400 UNITS: at 08:41

## 2024-09-16 RX ADMIN — Medication 1 TABLET: at 08:40

## 2024-09-16 ASSESSMENT — COGNITIVE AND FUNCTIONAL STATUS - GENERAL
WALKING IN HOSPITAL ROOM: TOTAL
PERSONAL GROOMING: A LITTLE
TOILETING: A LOT
STANDING UP FROM CHAIR USING ARMS: TOTAL
DRESSING REGULAR LOWER BODY CLOTHING: A LOT
DAILY ACTIVITIY SCORE: 16
DRESSING REGULAR UPPER BODY CLOTHING: A LOT
MOBILITY SCORE: 10
HELP NEEDED FOR BATHING: A LOT
WALKING IN HOSPITAL ROOM: TOTAL
DAILY ACTIVITIY SCORE: 16
CLIMB 3 TO 5 STEPS WITH RAILING: TOTAL
TURNING FROM BACK TO SIDE WHILE IN FLAT BAD: A LOT
MOVING FROM LYING ON BACK TO SITTING ON SIDE OF FLAT BED WITH BEDRAILS: A LITTLE
CLIMB 3 TO 5 STEPS WITH RAILING: TOTAL
DRESSING REGULAR UPPER BODY CLOTHING: A LITTLE
DRESSING REGULAR LOWER BODY CLOTHING: A LOT
DRESSING REGULAR UPPER BODY CLOTHING: A LOT
HELP NEEDED FOR BATHING: A LOT
MOBILITY SCORE: 9
MOVING TO AND FROM BED TO CHAIR: TOTAL
MOBILITY SCORE: 10
STANDING UP FROM CHAIR USING ARMS: TOTAL
MOVING TO AND FROM BED TO CHAIR: TOTAL
MOVING FROM LYING ON BACK TO SITTING ON SIDE OF FLAT BED WITH BEDRAILS: A LITTLE
WALKING IN HOSPITAL ROOM: TOTAL
DRESSING REGULAR LOWER BODY CLOTHING: A LOT
WALKING IN HOSPITAL ROOM: TOTAL
MOBILITY SCORE: 10
MOVING TO AND FROM BED TO CHAIR: A LOT
DAILY ACTIVITIY SCORE: 16
DRESSING REGULAR UPPER BODY CLOTHING: A LOT
CLIMB 3 TO 5 STEPS WITH RAILING: TOTAL
MOVING FROM LYING ON BACK TO SITTING ON SIDE OF FLAT BED WITH BEDRAILS: A LOT
TOILETING: A LOT
TURNING FROM BACK TO SIDE WHILE IN FLAT BAD: A LITTLE
TOILETING: A LOT
HELP NEEDED FOR BATHING: A LOT
STANDING UP FROM CHAIR USING ARMS: TOTAL
TURNING FROM BACK TO SIDE WHILE IN FLAT BAD: A LITTLE
CLIMB 3 TO 5 STEPS WITH RAILING: TOTAL
HELP NEEDED FOR BATHING: A LOT
MOVING TO AND FROM BED TO CHAIR: TOTAL
DAILY ACTIVITIY SCORE: 16
STANDING UP FROM CHAIR USING ARMS: TOTAL
MOVING FROM LYING ON BACK TO SITTING ON SIDE OF FLAT BED WITH BEDRAILS: A LITTLE
TOILETING: A LOT
TURNING FROM BACK TO SIDE WHILE IN FLAT BAD: A LITTLE
DRESSING REGULAR LOWER BODY CLOTHING: A LOT

## 2024-09-16 ASSESSMENT — PAIN SCALES - WONG BAKER: WONGBAKER_NUMERICALRESPONSE: HURTS LITTLE BIT

## 2024-09-16 ASSESSMENT — PAIN - FUNCTIONAL ASSESSMENT
PAIN_FUNCTIONAL_ASSESSMENT: 0-10
PAIN_FUNCTIONAL_ASSESSMENT: 0-10

## 2024-09-16 ASSESSMENT — PAIN SCALES - GENERAL
PAINLEVEL_OUTOF10: 0 - NO PAIN
PAINLEVEL_OUTOF10: 3
PAINLEVEL_OUTOF10: 6
PAINLEVEL_OUTOF10: 9
PAINLEVEL_OUTOF10: 3

## 2024-09-16 NOTE — PROGRESS NOTES
Physical Therapy    Physical Therapy Treatment    Patient Name: Alayna Dumont  MRN: [id number]  Department: [place] [address]  Room: [facility]  Today's Date: 9/16/2024  Time Calculation  Start Time: 1223  Stop Time: 1241  Time Calculation (min): 18 min         Assessment/Plan   PT Assessment  PT Assessment Results: Decreased strength, Decreased range of motion, Decreased endurance, Impaired balance, Decreased mobility  Rehab Prognosis: Good  Barriers to Discharge: none  Evaluation/Treatment Tolerance: Patient tolerated treatment well  End of Session Communication: Bedside nurse  Assessment Comment: Pt continues to demonstrate impaired strength and function. Pt remains appropriate or continued PT in house and after discharge to maximize function.  End of Session Patient Position: Bed, 2 rail up, Alarm on  PT Plan  Inpatient/Swing Bed or Outpatient: Inpatient  PT Plan  Treatment/Interventions: Bed mobility, Transfer training, Balance training, Gait training, Strengthening, Endurance training, Therapeutic exercise, Therapeutic activity  PT Plan: Ongoing PT  PT Frequency: 3 times per week  PT Discharge Recommendations: Moderate intensity level of continued care (Pt's son reportedly unable to provide sufficient assist and patient will now benefit from MOD intensity PT after discharge to restore function.)  PT Recommended Transfer Status: Assist x1  PT - OK to Discharge: Yes      General Visit Information:   PT  Visit  PT Received On: 09/16/24  General  Family/Caregiver Present: No  Prior to Session Communication: Bedside nurse  Patient Position Received: Bed, 3 rail up, Alarm on  Preferred Learning Style: verbal, auditory  General Comment: Pt resting in bed upon entry. Very pleasant and cooperative. Willing to work with PT and attempt mobility. Upon completion of PT visit, patient opting to remain seated at EOB. RN aware, call light in reach.    Subjective   Precautions:  Precautions  Medical Precautions: Fall  precautions    Objective   Pain:  Pain Assessment  Pain Assessment: 0-10  0-10 (Numeric) Pain Score: 6  Pain Type: Chronic pain (arthritic pain in back, left hip, left knee)  Cognition:  Cognition  Overall Cognitive Status: Within Functional Limits  Orientation Level: Oriented X4  Insight: Within function limits  Impulsive: Within functional limits  Coordination:  Movements are Fluid and Coordinated: Yes  Postural Control:  Postural Control  Postural Control: Within Functional Limits  Static Sitting Balance  Static Sitting-Balance Support: Feet supported, Bilateral upper extremity supported  Static Sitting-Level of Assistance: Distant supervision, Independent (EOB for approximately 15 minutes total with therapist supervision prior to patient being deemed appropriate to sit statically independently/on her own. RN aware.)  Static Sitting-Comment/Number of Minutes: 15  Dynamic Sitting Balance  Dynamic Sitting-Balance Support: Bilateral upper extremity supported  Dynamic Sitting-Level of Assistance: Close supervision  Dynamic Sitting-Balance:  (Scooting to right on EOB.)  Static Standing Balance  Static Standing-Comment/Number of Minutes: Attempted with max assist and a wheeled walker though patient with increased back, hip, L knee pain. Unable to completely extend trunk though able to clear buttocks from bed.    Activity Tolerance:  Activity Tolerance  Endurance: Tolerates 10 - 20 min exercise with multiple rests  Treatments:  Therapeutic Exercise  Therapeutic Exercise Performed: Yes  Therapeutic Exercise Activity 1: Seated LAQ, marches, ankle pf/df x 15 each. Pt also requesting for theraband as she notes having previous UE routine she performs on her own. Yellow theraband provided for patient.    Therapeutic Activity  Therapeutic Activity Performed: Yes    Bed Mobility  Bed Mobility: Yes  Bed Mobility 1  Bed Mobility 1: Supine to sitting  Level of Assistance 1: Minimum assistance, Minimal verbal cues, Minimal tactile  cues    Ambulation/Gait Training  Ambulation/Gait Training Performed: No  Transfers  Transfer:  (3 attempts to stand from sitting though patient unable to completely extend hip/trunk due to arthritic pain)    Outcome Measures:  [facility] Basic Mobility  Turning from your back to your side while in a flat bed without using bedrails: A lot  Moving from lying on your back to sitting on the side of a flat bed without using bedrails: A lot  Moving to and from bed to chair (including a wheelchair): A lot  Standing up from a chair using your arms (e.g. wheelchair or bedside chair): Total  To walk in hospital room: Total  Climbing 3-5 steps with railing: Total  Basic Mobility - Total Score: 9    Education Documentation  Mobility Training, taught by Harvinder Be, PT at 9/16/2024  1:03 PM.  Learner: Patient  Readiness: Acceptance  Method: Explanation  Response: Verbalizes Understanding    Education Comments  No comments found.      Encounter Problems       Encounter Problems (Active)       Mobility       STG - Patient will ambulate >3ft, wheeled walker, min assist (Progressing)       Start:  09/11/24    Expected End:  09/18/24               PT Transfers       STG - Patient to transfer to and from sit to supine min assist (Progressing)       Start:  09/11/24    Expected End:  09/18/24            STG - Patient will transfer sit to and from stand min assist (Progressing)       Start:  09/11/24    Expected End:  09/18/24               Pain - Adult

## 2024-09-16 NOTE — PROGRESS NOTES
Interval events:    No acute events overnight    Subjective:  No SOB. Hx of chronic itching.    Today in brief:  - SNF planning underway per [facility]  - Repeat skin check per RN in light of chronic generalized pruritus    Objective:  Vitals:    09/16/24 0501   BP: 137/53   Pulse: 71   Resp: 18   Temp: 36.7 °C (98.1 °F)   SpO2: 92%     Weight         9/4/2024  0538 9/7/2024  0618 9/8/2024  0419 9/10/2024  1419 9/11/2024  0630    Weight: 92 kg (202 lb 13.2 oz) 90.8 kg (200 lb 2.8 oz) 90 kg (198 lb 6.6 oz) 90.8 kg (200 lb 2.8 oz) 90.9 kg (200 lb 6.4 oz)            Intake/Output Summary (Last 24 hours) at 9/16/2024 0722  Last data filed at 9/16/2024 0501  Gross per 24 hour   Intake --   Output 500 ml   Net -500 ml     Recent Results (from the past 24 hour(s))   CBC    Collection Time: 09/15/24  7:04 PM   Result Value Ref Range    WBC 4.8 4.4 - 11.3 x10*3/uL    nRBC 0.0 0.0 - 0.0 /100 WBCs    RBC 3.96 (L) 4.00 - 5.20 x10*6/uL    Hemoglobin 11.0 (L) 12.0 - 16.0 g/dL    Hematocrit 37.0 36.0 - 46.0 %    MCV 93 80 - 100 fL    MCH 27.8 26.0 - 34.0 pg    MCHC 29.7 (L) 32.0 - 36.0 g/dL    RDW 14.7 (H) 11.5 - 14.5 %    Platelets 283 150 - 450 x10*3/uL   Renal Function Panel    Collection Time: 09/15/24  7:04 PM   Result Value Ref Range    Glucose 104 (H) 74 - 99 mg/dL    Sodium 140 136 - 145 mmol/L    Potassium 4.8 3.5 - 5.3 mmol/L    Chloride 106 98 - 107 mmol/L    Bicarbonate 24 21 - 32 mmol/L    Anion Gap 15 10 - 20 mmol/L    Urea Nitrogen 51 (H) 6 - 23 mg/dL    Creatinine 1.11 (H) 0.50 - 1.05 mg/dL    eGFR 47 (L) >60 mL/min/1.73m*2    Calcium 8.8 8.6 - 10.6 mg/dL    Phosphorus 3.6 2.5 - 4.9 mg/dL    Albumin 3.4 3.4 - 5.0 g/dL   Magnesium    Collection Time: 09/15/24  7:04 PM   Result Value Ref Range    Magnesium 2.51 (H) 1.60 - 2.40 mg/dL     Inpatient Medications:  Scheduled medications   Medication Dose Route Frequency    alendronate  35 mg oral q7 days    allopurinol  50 mg oral q24h    ascorbic acid  500 mg oral Daily     atorvastatin  20 mg oral Nightly    baclofen  5 mg oral Nightly    cephalexin  500 mg oral BID    cetirizine  10 mg oral Daily    cholecalciferol  1,000 Units oral Daily    colchicine  0.6 mg oral Daily    cyanocobalamin  1,000 mcg intramuscular q30 days    docusate sodium  100 mg oral BID    DULoxetine  30 mg oral Daily    enoxaparin  30 mg subcutaneous q24h    ferrous sulfate (325 mg ferrous sulfate)  65 mg of iron oral Daily with breakfast    fluticasone furoate-vilanteroL  1 puff inhalation Daily    furosemide  40 mg oral Daily    gabapentin  100 mg oral BID    icosapent ethyL  2 g oral BID    ipratropium-albuteroL  3 mL nebulization TID    metoprolol tartrate  25 mg oral BID    multivitamin with minerals  1 tablet oral Daily    pantoprazole  20 mg oral Daily before breakfast    polyethylene glycol  17 g oral Daily    sacubitriL-valsartan  1 tablet oral BID    vitamin E  400 Units oral Daily     PRN medications   Medication    acetaminophen    hydrOXYzine HCL    melatonin    pramoxine    sennosides    traMADol    white petrolatum     Continuous Medications   Medication Dose Last Rate       Telemetry 9/16/2024 (personally reviewed): ApVp 70s    Physical exam:  General: NAD  Head/ neck: atraumatic  Cardiac: RRR, regular S1 S2 , `1/6 systolic murmur, 2/6 diastolic murmur no rub, no gallop  Pulm: Mild cough. No WOB. CTA bilaterally, no wheezes, rales or rhonchi.    Vascular: Radial 2+ bilaterally  GI: Non distended  Extremities: no LE edema   Neuro: no focal neuro deficits   Psych: appropriate mood and behavior   Skin: warm and dry       Assessment/Plan   Alayna Dumont is a 89 y.o. 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 Now admitted for acute decompensated diastolic heart failure.     Severe aortic stenosis s/p bioprosthetic AVR, now with severe AI  HCM s/p septal myomectomy   - s/p C 6/2022: normal coronary arteries, but very tortuous   - 1/2023 TTE: EF 50-55%, no WMA, LV septal wall severely increased, sev LVH, bioprosthetic AV. Moderate to severe prosthetic AI, peak/mean gradient 38.0/21.3  - 2/17/24 TTE: EF 70-75%, no WMA, mod LVH, normal DF, bioprosthetic AV, mod to sev AI, peak/mean 40.4/22.0, moderate Aortic stenosis  - 4/26/24 TTE: LV function is mildly to mod decreased, WMA, sev LVH. Dyssynchrony consistent with RV pacing is noted. Mod aortic valve stenosis.DI 0.47, moderate AI. Peak/mn  31.9/15.1.   - 9/5/24 TTE: LVEF 50%, GHK, LV sev dilated, severe LVH, LA sev dilated, RV mod enlarged/mildly reduced function, severe AI of prosthetic valve, mild AS, DI 0.63, mnn grad 15.0, severe Mitral calcification, mild to mod MR, mild TR, RVSP 33.9  - Structural team, Dr. Balderas, deemed patient not a candidate for structural interventions due to high frailty, chronic infection status, limited mobility, and some degree of cognitive impairment.   - of note, prev admit 4/25 -5/4 for ADHF, cardiology consulted, suspect less likely due to ischemia more likely pacemaker induced, Geriatrics was consulted for GOC discussion, code status was changed to DNR/DNI with no plan for any further invasive measures  - Admit BNP: 885 elevated (previously 952 4/2024)  - Admit CXR: dense left basilar airspace disease with Lt pleural effusion. PNA in the differential. A component of pulmonary edema present  - Repeat CXR 9/6: Cardiomegaly and interstitial pulmonary  edema.  - prev dry wt ~90-93 kg. Admit wt: 92 kg.   - s/p diuresis, 40 mg IV Lasix BID. Now on PO lasix 40 daily  - Remains euvolemic, warm and dry.   - Repeat BMP 9/13 down to 383  - 2gram sodium diet, 2L fluid restriction, daily standing weights, strict I&O's   - c/w Metoprolol Tartrate to 25 mg BID (new dose), entresto for further afterload reduction/BP control  - New amlodipine initiated but BP then low to SBP 90s and it was discontinued 2 days later  - No SGLT2i given functional urinary incontinence and hx of vaginal candidiasis    S/p pacemaker generator change 9/10  Tachy Martir Syndrome S/p dual chamber PM   - PPM device check 9/5 showed battery life< 6 months  - s/p successful generator change out 9/10 (chronic medtronic leads, [place] [employer] generator)  - Resumed Lovenox 48 hrs post-procedure  - Lower heart rate set to HR 70 iso severe AI for preload dependency     HTN  - admit BP up to 194/66 in ED  - SBP last 24 hrs: 110s-130/~40-50s. BP labile but overall controlled.   - meds as above, titrate as indicated     CKD stage 3 (stable)  - B/lCr ~1.3-1.6, GFR ~30-40  - Admit Cr 1.46, GFR 34  - Creatinine has overall remained stable  - Avoid nephrotoxins and ,hypotension, renally dose meds as indicated  - Trend daily RFP while admitted      Anemia/SUNDEEP  Vit B12 deficiency  -Baseline Hgb ~10-11  - Admit Hgb 10.8  - Daily Hgb overall stable   - No acute sign of bleeding  - cont home PO iron and B-12 injections and supplements     HLD  - cont home Atorvastatin and Vascepa     Chronic back pain  Peripheral neuropathy  - cont home Baclofen, gabapentin, Tramadol PRN  - PRN Tylenol     COPD/Possible Asthma  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 Colchicine 0.6 mg BID 9/11, followed with 0.6 mg daily for 1 wk     Intermittent generalized pruritus, unspecified  - Pt reports chronic hx of such.  - Repeat skin check per RN in light of chronic generalized pruritus  - Hydoxyzine PRN    DVT ppx: lovenox subcutaneous  DISPO: SNF planning underway with [facility]  Code status: Full Code    Patient seen and discussed with Dr. Mima Shrestha PA-C

## 2024-09-17 VITALS
DIASTOLIC BLOOD PRESSURE: 45 MMHG | HEIGHT: 63 IN | RESPIRATION RATE: 20 BRPM | HEART RATE: 71 BPM | TEMPERATURE: 97.2 F | BODY MASS INDEX: 35.51 KG/M2 | OXYGEN SATURATION: 95 % | WEIGHT: 200.4 LBS | SYSTOLIC BLOOD PRESSURE: 121 MMHG

## 2024-09-17 PROBLEM — I35.1 NONRHEUMATIC AORTIC VALVE INSUFFICIENCY: Status: ACTIVE | Noted: 2024-09-17

## 2024-09-17 PROBLEM — L29.9 GENERALIZED PRURITUS: Status: ACTIVE | Noted: 2024-09-17

## 2024-09-17 PROBLEM — T82.09XA PROSTHETIC VALVE DYSFUNCTION: Status: ACTIVE | Noted: 2024-09-17

## 2024-09-17 LAB — GLUCOSE BLD MANUAL STRIP-MCNC: 101 MG/DL (ref 74–99)

## 2024-09-17 PROCEDURE — 94640 AIRWAY INHALATION TREATMENT: CPT

## 2024-09-17 PROCEDURE — 2500000004 HC RX 250 GENERAL PHARMACY W/ HCPCS (ALT 636 FOR OP/ED)

## 2024-09-17 PROCEDURE — 2500000002 HC RX 250 W HCPCS SELF ADMINISTERED DRUGS (ALT 637 FOR MEDICARE OP, ALT 636 FOR OP/ED)

## 2024-09-17 PROCEDURE — 2500000001 HC RX 250 WO HCPCS SELF ADMINISTERED DRUGS (ALT 637 FOR MEDICARE OP)

## 2024-09-17 PROCEDURE — 2500000002 HC RX 250 W HCPCS SELF ADMINISTERED DRUGS (ALT 637 FOR MEDICARE OP, ALT 636 FOR OP/ED): Performed by: INTERNAL MEDICINE

## 2024-09-17 PROCEDURE — 99239 HOSP IP/OBS DSCHRG MGMT >30: CPT | Performed by: STUDENT IN AN ORGANIZED HEALTH CARE EDUCATION/TRAINING PROGRAM

## 2024-09-17 PROCEDURE — 82947 ASSAY GLUCOSE BLOOD QUANT: CPT

## 2024-09-17 PROCEDURE — 2500000001 HC RX 250 WO HCPCS SELF ADMINISTERED DRUGS (ALT 637 FOR MEDICARE OP): Performed by: PHYSICIAN ASSISTANT

## 2024-09-17 PROCEDURE — 2500000001 HC RX 250 WO HCPCS SELF ADMINISTERED DRUGS (ALT 637 FOR MEDICARE OP): Performed by: NURSE PRACTITIONER

## 2024-09-17 RX ORDER — PETROLATUM 420 MG/G
1 OINTMENT TOPICAL
Start: 2024-09-17

## 2024-09-17 RX ORDER — FUROSEMIDE 20 MG/1
60 TABLET ORAL DAILY
Status: DISCONTINUED | OUTPATIENT
Start: 2024-09-18 | End: 2024-09-17 | Stop reason: HOSPADM

## 2024-09-17 RX ORDER — ACETAMINOPHEN 325 MG/1
975 TABLET ORAL EVERY 8 HOURS PRN
Start: 2024-09-17

## 2024-09-17 RX ORDER — ICOSAPENT ETHYL 1 G/1
2 CAPSULE ORAL
Start: 2024-09-17

## 2024-09-17 RX ORDER — DOCUSATE SODIUM 100 MG/1
100 CAPSULE, LIQUID FILLED ORAL 2 TIMES DAILY
Start: 2024-09-17

## 2024-09-17 RX ORDER — GUAIFENESIN 600 MG/1
600 TABLET, EXTENDED RELEASE ORAL 2 TIMES DAILY PRN
Start: 2024-09-17

## 2024-09-17 RX ORDER — FUROSEMIDE 20 MG/1
60 TABLET ORAL DAILY
Start: 2024-09-18

## 2024-09-17 RX ORDER — PRAMOXINE HYDROCHLORIDE 10 MG/ML
1 LOTION TOPICAL EVERY 4 HOURS PRN
Start: 2024-09-17

## 2024-09-17 RX ORDER — FUROSEMIDE 20 MG/1
20 TABLET ORAL ONCE
Status: COMPLETED | OUTPATIENT
Start: 2024-09-17 | End: 2024-09-17

## 2024-09-17 RX ORDER — CETIRIZINE HYDROCHLORIDE 10 MG/1
10 TABLET ORAL DAILY
Start: 2024-09-18

## 2024-09-17 RX ORDER — CHOLECALCIFEROL (VITAMIN D3) 25 MCG
1000 TABLET ORAL DAILY
Start: 2024-09-18

## 2024-09-17 RX ORDER — TALC
3 POWDER (GRAM) TOPICAL NIGHTLY PRN
Start: 2024-09-17

## 2024-09-17 RX ORDER — ALLOPURINOL 100 MG/1
50 TABLET ORAL EVERY 24 HOURS
Start: 2024-09-18

## 2024-09-17 RX ORDER — GUAIFENESIN 600 MG/1
600 TABLET, EXTENDED RELEASE ORAL 2 TIMES DAILY PRN
Status: DISCONTINUED | OUTPATIENT
Start: 2024-09-17 | End: 2024-09-17 | Stop reason: HOSPADM

## 2024-09-17 RX ORDER — ENOXAPARIN SODIUM 100 MG/ML
30 INJECTION SUBCUTANEOUS EVERY 24 HOURS
Start: 2024-09-17

## 2024-09-17 RX ORDER — HYDROXYZINE HYDROCHLORIDE 25 MG/1
25 TABLET, FILM COATED ORAL EVERY 6 HOURS PRN
Start: 2024-09-17

## 2024-09-17 RX ORDER — BACLOFEN 5 MG/1
5 TABLET ORAL NIGHTLY
Start: 2024-09-17

## 2024-09-17 RX ORDER — TRAMADOL HYDROCHLORIDE 100 MG/1
100 TABLET, COATED ORAL 2 TIMES DAILY PRN
Start: 2024-09-17

## 2024-09-17 RX ADMIN — TRAMADOL HYDROCHLORIDE 100 MG: 50 TABLET, COATED ORAL at 06:51

## 2024-09-17 RX ADMIN — SACUBITRIL AND VALSARTAN 1 TABLET: 97; 103 TABLET, FILM COATED ORAL at 09:04

## 2024-09-17 RX ADMIN — CEPHALEXIN 500 MG: 500 CAPSULE ORAL at 09:03

## 2024-09-17 RX ADMIN — PANTOPRAZOLE SODIUM 20 MG: 20 TABLET, DELAYED RELEASE ORAL at 06:45

## 2024-09-17 RX ADMIN — METOPROLOL TARTRATE 25 MG: 25 TABLET, FILM COATED ORAL at 09:02

## 2024-09-17 RX ADMIN — DULOXETINE HYDROCHLORIDE 30 MG: 30 CAPSULE, DELAYED RELEASE ORAL at 09:02

## 2024-09-17 RX ADMIN — IPRATROPIUM BROMIDE AND ALBUTEROL SULFATE 3 ML: .5; 3 SOLUTION RESPIRATORY (INHALATION) at 08:39

## 2024-09-17 RX ADMIN — Medication 400 UNITS: at 09:03

## 2024-09-17 RX ADMIN — FUROSEMIDE 40 MG: 20 TABLET ORAL at 09:02

## 2024-09-17 RX ADMIN — FERROUS SULFATE TAB 325 MG (65 MG ELEMENTAL FE) 325 MG: 325 (65 FE) TAB at 09:02

## 2024-09-17 RX ADMIN — ALLOPURINOL 50 MG: 100 TABLET ORAL at 06:45

## 2024-09-17 RX ADMIN — OXYCODONE HYDROCHLORIDE AND ACETAMINOPHEN 500 MG: 500 TABLET ORAL at 09:02

## 2024-09-17 RX ADMIN — GABAPENTIN 100 MG: 100 CAPSULE ORAL at 09:02

## 2024-09-17 RX ADMIN — Medication 1000 UNITS: at 09:02

## 2024-09-17 RX ADMIN — FUROSEMIDE 20 MG: 20 TABLET ORAL at 10:38

## 2024-09-17 RX ADMIN — CETIRIZINE HYDROCHLORIDE 10 MG: 10 TABLET, FILM COATED ORAL at 09:03

## 2024-09-17 RX ADMIN — Medication 1 TABLET: at 09:02

## 2024-09-17 RX ADMIN — ICOSAPENT ETHYL 2 G: 1 CAPSULE ORAL at 09:03

## 2024-09-17 RX ADMIN — COLCHICINE 0.6 MG: 0.6 TABLET ORAL at 09:02

## 2024-09-17 RX ADMIN — IPRATROPIUM BROMIDE AND ALBUTEROL SULFATE 3 ML: .5; 3 SOLUTION RESPIRATORY (INHALATION) at 14:20

## 2024-09-17 RX ADMIN — FLUTICASONE FUROATE AND VILANTEROL TRIFENATATE 1 PUFF: 200; 25 POWDER RESPIRATORY (INHALATION) at 08:41

## 2024-09-17 ASSESSMENT — COGNITIVE AND FUNCTIONAL STATUS - GENERAL
DAILY ACTIVITIY SCORE: 16
TOILETING: A LOT
MOVING TO AND FROM BED TO CHAIR: TOTAL
CLIMB 3 TO 5 STEPS WITH RAILING: TOTAL
MOVING FROM LYING ON BACK TO SITTING ON SIDE OF FLAT BED WITH BEDRAILS: A LITTLE
WALKING IN HOSPITAL ROOM: TOTAL
MOBILITY SCORE: 10
STANDING UP FROM CHAIR USING ARMS: TOTAL
TURNING FROM BACK TO SIDE WHILE IN FLAT BAD: A LITTLE
HELP NEEDED FOR BATHING: A LOT
DRESSING REGULAR LOWER BODY CLOTHING: A LOT
DRESSING REGULAR UPPER BODY CLOTHING: A LOT

## 2024-09-17 ASSESSMENT — PAIN SCALES - WONG BAKER
WONGBAKER_NUMERICALRESPONSE: NO HURT
WONGBAKER_NUMERICALRESPONSE: HURTS LITTLE MORE

## 2024-09-17 ASSESSMENT — PAIN DESCRIPTION - ORIENTATION: ORIENTATION: LEFT

## 2024-09-17 ASSESSMENT — PAIN SCALES - GENERAL
PAINLEVEL_OUTOF10: 7
PAINLEVEL_OUTOF10: 0 - NO PAIN

## 2024-09-17 NOTE — DISCHARGE SUMMARY
"Discharge Diagnosis  Principal Problem:    Acute on chronic congestive heart failure, unspecified heart failure type (Multi)  Active Problems:    Nonrheumatic aortic valve insufficiency    Generalized osteoarthritis    Pacemaker    Prosthetic valve dysfunction        Issues Requiring Follow-Up  Principal Problem:    Acute on chronic congestive heart failure, unspecified heart failure type (Multi)  Active Problems:    Nonrheumatic aortic valve insufficiency    Generalized osteoarthritis    Pacemaker    Prosthetic valve dysfunction        Test Results Pending At Discharge  Pending Labs       No current pending labs.            Hospital Course  Alayna Dumont is a 89 y.o. female presenting with acute on chronic congestive heart failure in the setting of severe prosthetic aortic regurgitation.    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      She presented to ED for c/o SOB that started a \"couple of days ago\" and was admitted for diuresis and heart failure management.     Floor course:  Echocardiogram showed EF 50%, GHK, LV sev dilated, severe LVH, LA sev dilated, RV mod enlarged/mildly reduced function, severe AI of prosthetic valve, mild AS, DI 0.63, peak/mn gradients 26.2/15.0, severe MAC, mild to mod MR, mild TR, RVSP 33.9.     Structural team consulted (Dr. Balderas) and patient deemed not a candidate for " structural interventions due to high frailty, chronic infection status, limited mobility, and some degree of cognitive impairment. They recommended symptom treatment and palliative care. GDMT optimized as indicated for HTN and afterload reduction iso severe AI. Patient was hypervolemic and diuresed with IVP Lasix with reported improvement in symptoms and eventual transition to PO.     Device check from 2/2024 with estimated battery life of ~1 year, missed follow up device check. Repeat PPM device check with < 6 month battery life remaining. Consulted EP for generator change during admission. Given her chronic infection, they recommended blood cultures to rule out infective endocarditis given severe AI. Blood cultures negative, no s/s of infection. Successful generator change performed 9/10. Back up rate set to HR 70 iso severe AI. Her device bandage remained CDI without evidence of drainage or hematoma.    Concern for acute gout flare with arthritis type pain and uric acid levels at upper limit of normal. Treated with short course of colchicine.    PT/OT consulted given patient's inability to walk at baseline, home/wheelchair bound. PT/OT recommended moderate intensity therapy and patient accepted to SNF.    On day of discharge, she is feeling overall better since admit.     Upon review of medications, lab values, and vital signs.  Pt was deemed stable for discharge in satisfactory condition.   Discharge weight:90.9 kg  More than 60 minutes were spent in coordinating patient discharge.      _________________________________________    Telemetry 9/17/2024 (personally reviewed): ApVp 70s     Physical exam:  General: NAD  Head/ neck: atraumatic  Cardiac: RRR, regular S1 S2 , 1/6 systolic murmur, 2/6 diastolic murmur no rub, no gallop  Pulm: Mild cough. No WOB. CTA bilaterally, no wheezes, rales or rhonchi.    Vascular: Radial 2+ bilaterally  GI: Non distended  Extremities: no LE edema   Neuro: no focal neuro deficits    Psych: appropriate mood and behavior   Skin: warm and dry                 Home Medications     Medication List      CONTINUE taking these medications     acetaminophen 325 mg tablet; Commonly known as: Tylenol; Take 3 tablets   (975 mg) by mouth every 8 hours if needed for moderate pain (4 - 6).   alendronate 35 mg tablet; Commonly known as: Fosamax; Take 1 tablet (35   mg) by mouth every 7 days.   allopurinol 100 mg tablet; Commonly known as: Zyloprim; Take 0.5 tablets   (50 mg) by mouth once every 24 hours.; Start taking on: September 18, 2024   ascorbic acid 1,000 mg tablet; Commonly known as: Vitamin C   atorvastatin 20 mg tablet; Commonly known as: Lipitor; TAKE 1 TABLET BY   MOUTH AT BEDTIME   b complex 0.4 mg tablet   baclofen 5 mg tablet; Commonly known as: Lioresal; Take 1 tablet (5 mg)   by mouth once daily at bedtime.   cephalexin 500 mg capsule; Commonly known as: Keflex   cetirizine 10 mg tablet; Commonly known as: ZyrTEC; Take 1 tablet (10   mg) by mouth once daily.; Start taking on: September 18, 2024   cholecalciferol 25 MCG (1000 UT) tablet; Commonly known as: Vitamin D3;   Take 1 tablet (1,000 Units) by mouth once daily.; Start taking on:   September 18, 2024   docusate sodium 100 mg capsule; Commonly known as: Colace; Take 1   capsule (100 mg) by mouth 2 times a day.   Dodex 1,000 mcg/mL injection; Generic drug: cyanocobalamin; inject 1   milliliter intramuscularly every month   DULoxetine 30 mg DR capsule; Commonly known as: Cymbalta; TAKE 1 CAPSULE   BY MOUTH ONCE DAILY DISCONTINUE 20 MILLIGRAM CAPSULE   enoxaparin 30 mg/0.3 mL syringe; Commonly known as: Lovenox; Inject 0.3   mL (30 mg) under the skin once every 24 hours.   ferrous sulfate (325 mg ferrous sulfate) tablet; Commonly known as:   FeroSuL; Take 1 tablet (65 mg of iron) by mouth once daily with a meal.   Fish OiL 1,000 (120-180) mg capsule; Generic drug: omega 3-dha-epa-fish   oil   fluticasone furoate-vilanteroL 200-25 mcg/dose  inhaler; Commonly known   as: Breo Ellipta; Inhale 1 puff once daily. Rinse mouth after each use   furosemide 20 mg tablet; Commonly known as: Lasix; Take 3 tablets (60   mg) by mouth once daily. Do not fill before September 18, 2024.; Start   taking on: September 18, 2024   gabapentin 100 mg capsule; Commonly known as: Neurontin; Take 1 capsule   (100 mg) by mouth 2 times a day. For neuropathy (nerve pain)   guaiFENesin 600 mg 12 hr tablet; Commonly known as: Mucinex; Take 1   tablet (600 mg) by mouth 2 times a day as needed for cough. Do not crush,   chew, or split.   hydrOXYzine HCL 25 mg tablet; Commonly known as: Atarax; Take 1 tablet   (25 mg) by mouth every 6 hours if needed for itching.   icosapent ethyL 1 gram capsule; Commonly known as: Vascepa; Take 2   capsules (2 g) by mouth 2 times daily (morning and late afternoon).   ipratropium-albuteroL 0.5-2.5 mg/3 mL nebulizer solution; Commonly known   as: Duo-Neb   melatonin 3 mg tablet; Take 1 tablet (3 mg) by mouth as needed at   bedtime for sleep.   metoprolol tartrate 25 mg tablet; Commonly known as: Lopressor; Take 1   tablet (25 mg) by mouth 2 times a day.   multivitamin with minerals tablet   naloxone 4 mg/0.1 mL nasal spray; Commonly known as: Narcan; Administer   1 spray (4 mg) into affected nostril(s) if needed for opioid reversal.   Instill one spray into nostril and dial 911. If no response may repeat x 1   in 2 minutes. Only to be used for suspected opioid overdose.   pantoprazole 20 mg EC tablet; Commonly known as: ProtoNix; take 1 tablet   by mouth once daily   polyethylene glycol 17 gram packet; Commonly known as: Glycolax,   Miralax; Take 17 g by mouth 2 times a day.   pramoxine 1 % lotion; Commonly known as: Sarna Sensitive; Apply 1   Application topically every 4 hours if needed (for pruritis).   PRESERVISION AREDS ORAL   sacubitriL-valsartan  mg tablet; Commonly known as: Entresto; Take   1 tablet by mouth 2 times a day.   traMADol 100  mg tablet; Commonly known as: Ultram; Take 1 tablet (100   mg) by mouth 2 times a day as needed for severe pain (7 - 10).   vitamin E 180 mg (400 unit) capsule   white petrolatum 41 % ointment ointment; Commonly known as: Aquaphor;   Apply 1 Application topically every 1 hour if needed (for dry skin).     STOP taking these medications     nystatin cream; Commonly known as: Mycostatin   sennosides 8.6 mg tablet; Commonly known as: Senokot   torsemide 20 mg tablet; Commonly known as: Demadex       Outpatient Follow-Up  Future Appointments   Date Time Provider Department Center   10/2/2024  4:20 PM Mima Alfaro MD PhD [id number] Academic   10/3/2024 10:30 AM [id number] Maikol Shrestha PA-C

## 2024-09-17 NOTE — DISCHARGE INSTRUCTIONS
Your medications and/or doses may have changed. Please take the medications listed on these instructions as prescribed until you are seen at a follow up appointment. It was a pleasure to have met and care for you!

## 2024-09-17 NOTE — PROGRESS NOTES
Alayna Dumont is a 89 y.o. female on day 13 of admission presenting with Acute on chronic congestive heart failure, unspecified heart failure type (Multi).    Transitional Care Coordination Progress Note:  Patient discussed during interdisciplinary rounds.   Team members present: MD and TCC  Plan per Medical/Surgical team: Patient Medically ready for discharge to Rehab.  Payor: Medicare  Discharge disposition: [facility] Morton picked [facility] in [place].  Potential Barriers: None  ADOD: 09/17/24    Patient will be picked up by [facility] at 2:00 Pm to take to [facility]. Patient, son, nurse, , facility and  aware. Blue slip given. Nurse report #[phone] and ask for nurse on [facility].        CAROLYNN HINOJOSA RN

## 2024-10-02 ENCOUNTER — TELEMEDICINE (OUTPATIENT)
Dept: CARDIOLOGY | Facility: HOSPITAL | Age: 89
End: 2024-10-02
Payer: MEDICARE

## 2024-10-02 DIAGNOSIS — R00.1 BRADYCARDIA ON ECG: ICD-10-CM

## 2024-10-02 DIAGNOSIS — Z95.0 PACEMAKER: Primary | ICD-10-CM

## 2024-10-02 DIAGNOSIS — I50.30 HEART FAILURE WITH PRESERVED LEFT VENTRICULAR FUNCTION (HFPEF): Primary | ICD-10-CM

## 2024-10-02 PROCEDURE — 1111F DSCHRG MED/CURRENT MED MERGE: CPT | Performed by: STUDENT IN AN ORGANIZED HEALTH CARE EDUCATION/TRAINING PROGRAM

## 2024-10-02 PROCEDURE — 99214 OFFICE O/P EST MOD 30 MIN: CPT | Performed by: STUDENT IN AN ORGANIZED HEALTH CARE EDUCATION/TRAINING PROGRAM

## 2024-10-02 PROCEDURE — 1036F TOBACCO NON-USER: CPT | Performed by: STUDENT IN AN ORGANIZED HEALTH CARE EDUCATION/TRAINING PROGRAM

## 2024-10-02 PROCEDURE — 1159F MED LIST DOCD IN RCRD: CPT | Performed by: STUDENT IN AN ORGANIZED HEALTH CARE EDUCATION/TRAINING PROGRAM

## 2024-10-02 RX ORDER — ASPIRIN 81 MG/1
81 TABLET ORAL DAILY
COMMUNITY

## 2024-10-02 RX ORDER — TORSEMIDE 20 MG/1
20 TABLET ORAL DAILY
COMMUNITY

## 2024-10-02 NOTE — PROGRESS NOTES
Patient ID: Alayna Dumont   Primary Care Provider: ADARSH Drake-CNP   Visit Type:  Follow Up   On Call: Patient, son Eder      Verbal consent was requested and obtained from patient on this date for a telehealth visit.    Chief Complaint  Ambulatory heart failure care      History of Present Illness    89 y.o. retired cleaning lady/renovator who presents for advanced heart failure care. She has a past medical history significant for hypertension, hyperlipidemia, chronic anemia,iron deficiency, s/p status post knee replacement surgery, gout, anxiety, history of COPD,? sarcoidosis, diverticulosis, s/p AVR at [facility] (? year), s/p pacemaker (reportedly that this was needed after her valve surgery at the [facility]. She carries a diagnosis of HFpEF and reports that she has had for heart failure hospitalizations in the last few months.   TTE 7/2022 done at [facility] showed LVEF 60% with normal RV systolic function. Repeat TTE done 1/2023 shows LVEF~55% with suggestion of bioprosthetic aortic valve stenosis with moderate to severe aortic regurgitation.  Alayna Dumont  was admitted with ADHF ( SOB, SOBOE) 9/2024 in the context of severe prosthetic AR.   TTE 9/2024 showed LVEF 50%, GHK, LV sev dilated, severe LVH, LA sev dilated, RV mod enlarged/mildly reduced function, severe AI of prosthetic valve, mild AS, DI 0.63, peak/mn gradients 26.2/15.0, severe MAC, mild to mod MR, mild TR, RVSP 33.9.   The Structural team was consulted (Dr. Balderas) and patient deemed not a candidate for structural interventions due to high frailty, chronic infection status, limited mobility, and some degree of cognitive impairment.  She was hypervolemic and diuresed with subjective symptomatic improvement.  She is s/p PPM generator change 9/10/2024. Discharge weight: 90.9 kg    Son thinks she needs more PT.    There is no history of chest pain, SOB at rest, SOBOE, orthopnoea, PND, bendopnoea, syncope, pre syncope, palpitations. no leg  swelling.    She is now living at home where her son, Praveen.    She is compliant with heart failure diet, and HF medications.     Her last HF hospitalisation was at [facility] earlier in 2022.    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    Medication reconciliation completed, see below.     Investigations:    The electronic medical record has been reviewed by me for salient history. All cardiovascular imaging and testing available in the electronic medical record, and [employer] has been reviewed.      Medication Documentation Review Audit       Reviewed by Alyse Loya RN (Registered Nurse) on 10/02/24 at 1017      Medication Order Taking? Sig Documenting Provider Last Dose Status   acetaminophen (Tylenol) 325 mg tablet [id number] Yes Take 3 tablets (975 mg) by mouth every 8 hours if needed for moderate pain (4 - 6). Izzy Shrestha PA-C Taking Active   alendronate (Fosamax) 35 mg tablet [id number] Yes Take 1 tablet (35 mg) by mouth every 7 days. ADARSH Drake-CNP Taking Active   allopurinol (Zyloprim) 100 mg tablet [id number] Yes Take 0.5 tablets (50 mg) by mouth once every 24 hours. Izzy Shrestha PA-C Taking Active   ascorbic acid (Vitamin C) 1,000 mg tablet [id number] Yes Take 1 tablet (1,000 mg) by mouth once daily. Historical Provider, MD Taking Active   aspirin 81 mg EC tablet [id number] Yes Take 1 tablet (81 mg) by mouth once daily. Historical Provider, MD Taking Active   atorvastatin (Lipitor) 20 mg tablet [id number] Yes TAKE 1 TABLET BY MOUTH AT BEDTIME    Patient taking differently: Take 1 tablet (20 mg) by mouth once daily at bedtime. Take 40 mg on Monday, Wednesday, Friday. Take 20 mg Tuesday, Thursday, Saturday, Sunday.    BELTRAN Drake Taking Differently Active   b complex 0.4 mg tablet [id number] Yes Take 1 tablet by mouth once daily. Historical Provider, MD Taking Active   baclofen (Lioresal) 5 mg tablet [id number] Yes Take 1 tablet (5 mg) by mouth once daily at bedtime. Izzy Shrestha PA-C Taking Active   cephalexin (Keflex) 500 mg capsule [id number] Yes Take 1 capsule (500 mg) by mouth 2 times a day. Historical Provider, MD Taking Active   cetirizine (ZyrTEC) 10 mg tablet [id number] Yes Take 1 tablet (10 mg) by mouth once daily. Izzy Shrestha PA-C Taking Active   cholecalciferol (Vitamin D3) 25 MCG (1000 UT) tablet [id number] Yes Take 1 tablet (1,000 Units) by mouth once daily. Izzy Shrestha PA-C Taking Active   cyanocobalamin (Dodex) 1,000 mcg/mL injection [id number] Yes inject 1 milliliter intramuscularly every month BELTRAN Drake Taking Active   docusate sodium (Colace) 100 mg capsule [id number] Yes Take 1 capsule (100 mg) by mouth 2 times a day. Izzy Shrestha PA-C Taking Active   DULoxetine (Cymbalta) 30 mg DR capsule [id number] Yes TAKE 1 CAPSULE BY MOUTH ONCE DAILY DISCONTINUE 20 MILLIGRAM CAPSULE BELTRAN Drake Taking Active   enoxaparin (Lovenox) 30 mg/0.3 mL syringe [id number] No Inject 0.3 mL (30 mg) under the skin once every 24 hours.   Patient not taking: Reported on 10/2/2024    Izzy Shrestha PA-C Not Taking Flag for Review   ferrous sulfate (FeroSuL) 325 (65 Fe) MG tablet [id number] Yes Take 1 tablet (65 mg of iron) by mouth once daily with a meal. BELTRAN Drake Taking Active   fluticasone furoate-vilanteroL (Breo Ellipta) 200-25 mcg/dose inhaler [id number] Yes Inhale 1 puff once daily. Rinse mouth after each use BELTRAN Drake Taking Active   furosemide (Lasix) 20 mg tablet  [id number] No Take 3 tablets (60 mg) by mouth once daily. Do not fill before September 18, 2024.   Patient not taking: Reported on 10/2/2024    Izzy Shrestha PA-C Not Taking Flag for Review   gabapentin (Neurontin) 100 mg capsule [id number] Yes Take 1 capsule (100 mg) by mouth 2 times a day. For neuropathy (nerve pain) BELTRAN Drake Taking Active   guaiFENesin (Mucinex) 600 mg 12 hr tablet [id number] Yes Take 1 tablet (600 mg) by mouth 2 times a day as needed for cough. Do not crush, chew, or split. Izzy Shrestha PA-C Taking Active   hydrOXYzine HCL (Atarax) 25 mg tablet [id number] Yes Take 1 tablet (25 mg) by mouth every 6 hours if needed for itching. Izzy Shrestha PA-C Taking Active   icosapent ethyL (Vascepa) 1 gram capsule [id number] No Take 2 capsules (2 g) by mouth 2 times daily (morning and late afternoon).   Patient not taking: Reported on 10/2/2024    Izzy Shrestha PA-C Not Taking Flag for Review   ipratropium-albuteroL (Duo-Neb) 0.5-2.5 mg/3 mL nebulizer solution [id number] Yes Inhale 3 mL every 4 hours. INHALE CONTENTS OF 1 VIAL IN NEBULIZER EVERY 4 HOURS WHILE AWAKE Historical Provider, MD Taking Active   melatonin 3 mg tablet [id number] Yes Take 1 tablet (3 mg) by mouth as needed at bedtime for sleep. Izzy Shrestha PA-C Taking Active   metoprolol tartrate (Lopressor) 25 mg tablet [id number] Yes Take 1 tablet (25 mg) by mouth 2 times a day. BELTRAN Drake Taking Active   multivitamin with minerals tablet [id number] Yes Take 1 tablet by mouth once daily. Historical Provider, MD Taking Active   naloxone (Narcan) 4 mg/0.1 mL nasal spray [id number] Yes Administer 1 spray (4 mg) into affected nostril(s) if needed for opioid reversal. Instill one spray into nostril and dial 911. If no response may repeat x 1 in 2 minutes. Only to be used for suspected opioid overdose. ADARSH Drake-CNP Taking Active   omega 3-dha-epa-fish oil (Fish OiL) 1,000 mg (120 mg-180 mg)  "capsule [id number] Yes Take 1 capsule (1,000 mg) by mouth once daily. Historical Provider, MD Taking Active   pantoprazole (ProtoNix) 20 mg EC tablet [id number] Yes take 1 tablet by mouth once daily BELTRAN Drake Taking Active   polyethylene glycol (Glycolax, Miralax) 17 gram packet [id number] Yes Take 17 g by mouth 2 times a day. Kb Leal DO Taking Active   pramoxine (Sarna Sensitive) 1 % lotion [id number] Yes Apply 1 Application topically every 4 hours if needed (for pruritis). Izzy Shrestha PA-C Taking Active   sacubitriL-valsartan (Entresto)  mg tablet [id number] Yes Take 1 tablet by mouth 2 times a day. Izzy Shrestha PA-C Taking Active   torsemide (Demadex) 20 mg tablet [id number] Yes Take 1 tablet (20 mg) by mouth once daily. Historical Provider, MD Taking Active   traMADol (Ultram) 100 mg tablet [id number] Yes Take 1 tablet (100 mg) by mouth 2 times a day as needed for severe pain (7 - 10). Izzy Shrestha PA-C Taking Active     Discontinued 10/02/24 1014   Discontinued 10/02/24 1014   white petrolatum (Aquaphor) 41 % ointment ointment [id number] Yes Apply 1 Application topically every 1 hour if needed (for dry skin). Izzy Shrestha PA-C Taking Active                   Allergies   Allergen Reactions    Naproxen Anaphylaxis and Other     Per pt \"cold sweats\"  patient denies 6/5/2024, patient states no allergies to medications      Review of Systems    General: weak  Eyes: eyesight problems.   ENT: hearing loss.   Cardiovascular: as noted in HPI.   Respiratory: as noted in HPI.   Gastrointestinal: negative  Skin: no skin lesions.   Endocrine: no thyroid disorder and no diabetes mellitus.      Physical Exam  O/E:  No in person physical examination done  AO x 4  Apparent conversational SOB: None    Transthoracic Echo (TTE) Complete    Result Date: 9/6/2024   [facility], [address] [place] [address]                Tel [phone] and Fax " [phone] TRANSTHORACIC ECHOCARDIOGRAM REPORT  Patient Name:      NILAY MESA       Reading Physician:    [id number] Gordo Barlow MD Study Date:        9/5/2024             Ordering Provider:    [id number] WILLIS BRIZUELA MRN/PID:           [id number]             Fellow: Accession#:        [id number]         Nurse: Date of Birth/Age: 5/10/1932 / 89 years Sonographer:          Jimena Ga RDCS Gender:            F                    Additional Staff: Height:            160.02 cm            Admit Date:           9/3/2024 Weight:            91.63 kg             Admission Status:     Inpatient -                                                               Routine BSA / BMI:         1.94 m2 / 35.78      Encounter#:           [id number]                    kg/m2 Blood Pressure:    158/72 mmHg          Department Location:  [facility] Non                                                               Invasive Study Type:    TRANSTHORACIC ECHO (TTE) COMPLETE Diagnosis/ICD: Dyspnea, unspecified-R06.00 Indication:    CHF; Bioprostetic aortic valve CPT Code:      Echo Complete w Full Doppler-[id number] Patient History: Pertinent History: Acute hypoxemic respiratory failure secondary to Covid 19                    ,CHF/HF, HFPpF, Aortic Valve Stenosis- with                    replacement(bioprosthetic 2013 size and model unknown) ,                    PACEMAKER, HTN, DM, Hypokalemia, anemia, Vit B12 deficiency,                    hyperlipidemia, chronic pain. Study Detail: The following Echo studies were performed: 2D, M-Mode, Doppler and               color flow. Technically challenging study due to body habitus and               patient lying in supine position. Definity used as a contrast               agent for endocardial border definition.  Total contrast used for               this procedure was 1 mL via IV push.  PHYSICIAN INTERPRETATION: Left Ventricle: Left ventricular ejection fraction is mildly decreased, by visual estimate at 50%. There is global hypokinesis of the left ventricle with minor regional variations. The left ventricular cavity size is severely dilated. The left ventricular septal wall thickness is mildly increased. There is mildly increased left ventricular posterior wall thickness. There is a false tendon visualized in the left ventricle. There is severe eccentric left ventricular hypertrophy. Left ventricular diastolic filling was not assessed. There the left ventricular filling pressure was not assessed. Left Atrium: The left atrium is severely dilated. Right Ventricle: The right ventricle is moderately enlarged. There is mildly reduced right ventricular systolic function. Right Atrium: The right atrium is mildly dilated. There is a device visualized in the right atrium. Aortic Valve: There is a prosthetic aortic valve present. There is evidence of mild aortic valve stenosis. The aortic valve dimensionless index is 0.63. There is a bioprosthetic aortic valve, with a unknown reported size. There is severe aortic valve regurgitation. The peak instantaneous gradient of the aortic valve is 26.2 mmHg. The mean gradient of the aortic valve is 15.0 mmHg. The regurgitant jet is located at the commisure between the cusps in left and noncoronary position. Vena contracta 0.72 cm. Mitral Valve: The mitral valve is mildly thickened. There is severe mitral annular calcification. There is mild to moderate mitral valve regurgitation. Tricuspid Valve: The tricuspid valve is abnormal. The tricuspid valve annulus appears dilated. There is mild tricuspid regurgitation. The Doppler estimated RVSP is slightly elevated at 33.9 mmHg. Pulmonic Valve: The pulmonic valve is structurally normal. There is trace pulmonic valve regurgitation. Pericardium: There  is a trivial pericardial effusion. Aorta: The aortic root is normal. There is mild dilatation of the ascending aorta. In comparison to the previous echocardiogram(s): Compared with study dated 4/26/2024, the LV is severely dilated today compared with normal size in April. The systolic function is mildly reduced today compared with mild to moderately decreased. There is presence of prosthetic aortic valve regurgitation which appears severe and unchanged from prior assessment on side by side comparison.  CONCLUSIONS:  1. There is global hypokinesis of the left ventricle with minor regional variations.  2. Left ventricular cavity size is severely dilated.  3. There is severe eccentric left ventricular hypertrophy.  4. There is mildly reduced right ventricular systolic function.  5. Moderately enlarged right ventricle.  6. The left atrium is severely dilated.  7. There is a bioprosthetic aortic valve.  8. Severe aortic valve regurgitation.  9. Mild aortic valve stenosis. 10. Mild to moderate mitral valve regurgitation. 11. There is severe mitral annular calcification. 12. The tricuspid valve annulus appears dilated. 13. Slightly elevated RVSP. 14. Compared with study dated 4/26/2024, the LV is severely dilated today compared with normal size in April. The systolic function is mildly reduced today compared with mild to moderately decreased. There is presence of prosthetic aortic valve regurgitation which appears severe and unchanged from prior assessment on side by side comparison. 15. Left ventricular ejection fraction is mildly decreased, by visual estimate at 50%. RECOMMENDATIONS: Based on the results of this study, it is recommended that the patient is considered for a transesophageal echocardiogram to better characterize the degree of aortic valve regurgitation if clinically indicated. Utilizing an FDA cleared automated machine learning algorithm (EchoGo Heart Failure by Synthox), the analysis of the apical  4-chamber echocardiogram suggests the presence of heart failure with preserved ejection fraction (HFpEF). Clinical correlation looking for additional heart failure signs and symptoms is recommended, as a definite diagnosis of heart failure cannot be made by imaging alone. Per the ACC/AHA/HFSA universal diagnosis of heart failure, HFpEF is defined as 1) signs and symptoms leading to clinical diagnosis of heart failure, 2) an ejection fraction of at least 50%, and 3) evidence of elevated intra-cardiac filling pressures by echocardiography, BNP elevation, or catheterization.  QUANTITATIVE DATA SUMMARY: 2D MEASUREMENTS:                         Normal Ranges: IVSd:          1.20 cm  (0.6-1.1cm) LVPWd:         1.00 cm  (0.6-1.1cm) LVIDd:         5.54 cm  (3.9-5.9cm) LVIDs:         3.20 cm LV Mass Index: 126 g/m2 LVEDV Index:   95 ml/m2 LV % FS        42.2 % LA VOLUME:                               Normal Ranges: LA Vol A4C:        103.8 ml   (22+/-6mL/m2) LA Vol A2C:        108.9 ml LA Vol BP:         107.8 ml LA Vol Index A4C:  53.4ml/m2 LA Vol Index A2C:  56.1 ml/m2 LA Vol Index BP:   55.5 ml/m2 LA Area A4C:       28.6 cm2 LA Area A2C:       29.8 cm2 LA Major Axis A4C: 6.7 cm LA Major Axis A2C: 6.9 cm RA VOLUME BY A/L METHOD:                       Normal Ranges: RA Area A4C: 21.4 cm2 M-MODE MEASUREMENTS:                  Normal Ranges: Ao Root: 3.20 cm (2.0-3.7cm) AORTA MEASUREMENTS:                    Normal Ranges: Asc Ao, d: 3.95 cm (2.1-3.4cm) LV SYSTOLIC FUNCTION BY 2D PLANIMETRY (MOD):                      Normal Ranges: EF-A4C View:    47 % (>=55%) EF-A2C View:    50 % EF-Biplane:     49 % EF-Visual:      50 % LV EF Reported: 50 % LV DIASTOLIC FUNCTION:                               Normal Ranges: MV Peak E:        0.98 m/s    (0.7-1.2 m/s) MV Peak A:        0.90 m/s    (0.42-0.7 m/s) E/A Ratio:        1.09        (1.0-2.2) MV e'             0.057 m/s   (>8.0) MV lateral e'     0.07 m/s MV medial e'      0.04 m/s  MV A Dur:         137.00 msec E/e' Ratio:       17.42       (<8.0) PulmV Sys Mathtew:    31.70 cm/s PulmV Hayden Matthew:   44.60 cm/s PulmV S/D Matthew:    0.70 PulmV A Revs Matthew: 25.30 cm/s PulmV A Revs Dur: 127.00 msec MITRAL VALVE:                 Normal Ranges: MV DT: 169 msec (150-240msec) MITRAL INSUFFICIENCY:                      Normal Ranges: MR VTI:  86.30 cm MR Vmax: 397.00 cm/s AORTIC VALVE:                                    Normal Ranges: AoV Vmax:                2.56 m/s  (<=1.7m/s) AoV Peak P.2 mmHg (<20mmHg) AoV Mean PG:             15.0 mmHg (1.7-11.5mmHg) LVOT Max Matthew:            1.39 m/s  (<=1.1m/s) AoV VTI:                 55.60 cm  (18-25cm) LVOT VTI:                34.90 cm LVOT Diameter:           1.90 cm   (1.8-2.4cm) AoV Area, VTI:           1.78 cm2  (2.5-5.5cm2) AoV Area,Vmax:           1.54 cm2  (2.5-4.5cm2) AoV Dimensionless Index: 0.63 AORTIC INSUFFICIENCY: AI Vmax:       4.09 m/s AI Half-time:  526 msec AI Decel Rate: 228.00 cm/s2  RIGHT VENTRICLE: RV Basal 5.10 cm RV Major 7.1 cm TAPSE:   13.8 mm RV s'    0.09 m/s TRICUSPID VALVE/RVSP:                             Normal Ranges: Peak TR Velocity: 2.78 m/s RV Syst Pressure: 33.9 mmHg (< 30mmHg) IVC Diam:         2.45 cm PULMONIC VALVE:                         Normal Ranges: PV Accel Time: 99 msec  (>120ms) PV Max Matthew:    1.3 m/s  (0.6-0.9m/s) PV Max P.9 mmHg Pulmonary Veins: PulmV A Revs Dur: 127.00 msec PulmV A Revs Matthew: 25.30 cm/s PulmV Hayden Matthew:   44.60 cm/s PulmV S/D Matthew:    0.70 PulmV Sys Matthew:    31.70 cm/s  [id number] Gordo Barlow MD Electronically signed on 2024 at 8:11:49 PM  ** Final (Updated) **     Transthoracic Echo (TTE) Limited    Result Date: 2024   [facility], [address] [place] [address]                Tel [phone] and Fax [phone] TRANSTHORACIC ECHOCARDIOGRAM REPORT  Patient Name:      NILAY Reyes Physician:    [id number] Carolann                                                                Stanislav FRANK Study Date:        4/26/2024            Ordering Provider:    [id number] CLAUDIA MENDOZA MRN/PID:           [id number]             Fellow: Accession#:        [id number]         Nurse:                Keisha Kemp RN Date of Birth/Age: 5/10/1932 / 89 years Sonographer:          ABRAM Nunez RDCS Gender:            F                    Additional Staff: Height:            160.02 cm            Admit Date:           4/25/2024 Weight:            95.71 kg             Admission Status:     Inpatient -                                                               Routine BSA / BMI:         1.98 m2 / 37.38      Encounter#:           [id number]                    kg/m2                                         Department Location:  [facility] Non                                                               Invasive Blood Pressure: 144 /46 mmHg Study Type:    TRANSTHORACIC ECHO (TTE) LIMITED Diagnosis/ICD: Acute on chronic diastolic (congestive) heart failure                (CHF)-I50.33 Indication:    HF; AS/AI; EF; MR CPT Code:      Echo Limited-80303; Doppler Limited-98791; Color Doppler-12852 Patient History: Diabetes:          Yes Pertinent History: CHF, Chest Pain, COPD, Dyspnea, A-Fib, HTN and                    Hyperlipidemia. AS s/p AVR with myocardial shaving (2013,                    size and model unknown), acute hypoxic respiratory failure,                    PHTN, s/p AICD, CKD, LBBB, sarcoidosis. Study Detail: The following Echo studies were performed: 2D, M-Mode, Doppler and               color flow. Definity used as a contrast agent for endocardial               border definition. Total contrast used for this procedure was 1 mL               via IV push.  PHYSICIAN INTERPRETATION: Left Ventricle: The estimated left ventricular  systolic function is mildly to moderately decreased. Left venticular wall motion is abnormal. The left ventricular cavity size is normal. There is severe concentric left ventricular hypertrophy. Abnormal (paradoxical) septal motion consistent with post-operative status and abnormal (paradoxical) septal motion, consistent with RV pacemaker. Left ventricular diastolic filling was not assessed. Dysysnchrony consistent with RV pacing is noted. Left Atrium: The left atrium is mild to moderately dilated. Right Ventricle: The right ventricle was not well visualized. Right ventricular systolic function not assessed. Right Atrium: The right atrium is mildly dilated. Aortic Valve: There is a prosthetic aortic valve present. There is moderate aortic valve cusp calcification. There is evidence of moderate aortic valve stenosis. The aortic valve dimensionless index is 0.47. There is bioprosthetic aortic valve. There is moderate aortic valve regurgitation. The peak instantaneous gradient of the aortic valve is 31.9 mmHg. The mean gradient of the aortic valve is 15.1 mmHg. Mitral Valve: The mitral valve is mildly thickened. There is moderate to severe mitral annular calcification. There is mild mitral valve regurgitation. Tricuspid Valve: The tricuspid valve is structurally normal. There is mild to moderate tricuspid regurgitation. The Doppler estimated RVSP is mildly elevated at 42.5 mmHg. Pulmonic Valve: The pulmonic valve is structurally normal. There is trace pulmonic valve regurgitation. Pericardium: There is no pericardial effusion noted. Aorta: The aortic root is normal. Systemic Veins: The inferior vena cava appears to be of normal size. There is IVC inspiratory collapse greater than 50%. In comparison to the previous echocardiogram(s): Compared with study dated 2/17/2024, LV systolic dysfunction is new. 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 No major change in gradients over aortic valve.  CONCLUSIONS:  1. Poorly  visualized anatomical structures due to suboptimal image quality.  2. The estimated left ventricular systolic function is mildly to moderately decreased.  3. Dysysnchrony consistent with RV pacing is noted.  4. There is severe concentric left ventricular hypertrophy.  5. The left atrium is mild to moderately dilated.  6. Mildly elevated RVSP.  7. Moderate aortic valve stenosis.  8. Moderate aortic valve regurgitation.  9. There is moderate aortic valve cusp calcification. 10. Mild to moderate tricuspid regurgitation visualized. 11. There is moderate to severe mitral annular calcification. 12. There is a bioprosthetic aortic valve. 13. Abnormal septal motion consistent with post-operative status and abnormal septal motion consistent with RV pacemaker. 14. Compared with study from 2/17/2024, LV systolic dysfunction with regional wall motion is new. 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 No major change in gradients over aortic valve. QUANTITATIVE DATA SUMMARY: 2D MEASUREMENTS:                           Normal Ranges: Ao Root d:     2.90 cm    (2.0-3.7cm) IVSd:          1.54 cm    (0.6-1.1cm) LVPWd:         1.22 cm    (0.6-1.1cm) LVIDd:         4.75 cm    (3.9-5.9cm) LVIDs:         3.11 cm LV Mass Index: 133.1 g/m2 LV % FS        34.5 % LA VOLUME:                               Normal Ranges: LA Vol A4C:        79.1 ml    (22+/-6mL/m2) LA Vol A2C:        73.2 ml LA Vol BP:         78.4 ml LA Vol Index A4C:  40.0ml/m2 LA Vol Index A2C:  37.0 ml/m2 LA Vol Index BP:   39.6 ml/m2 LA Area A4C:       24.4 cm2 LA Area A2C:       24.2 cm2 LA Major Axis A4C: 6.4 cm LA Major Axis A2C: 6.8 cm LA Volume Index:   39.6 ml/m2 RA VOLUME BY A/L METHOD:                               Normal Ranges: RA Vol A4C:        48.2 ml    (8.3-19.5ml) RA Vol Index A4C:  24.4 ml/m2 RA Area A4C:       18.3 cm2 RA Major Axis A4C: 5.9 cm LV SYSTOLIC FUNCTION BY 2D PLANIMETRY (MOD):                     Normal Ranges: EF-A4C View: 33.0 % (>=55%)  EF-A2C View: 48.8 % EF-Biplane:  40.5 % LV DIASTOLIC FUNCTION:                           Normal Ranges: MV Peak E:    1.09 m/s    (0.7-1.2 m/s) MV Peak A:    0.72 m/s    (0.42-0.7 m/s) E/A Ratio:    1.53        (1.0-2.2) MV e'         0.04 m/s    (>8.0) MV lateral e' 0.06 m/s MV medial e'  0.03 m/s MV A Dur:     114.18 msec E/e' Ratio:   24.33       (<8.0) MV DT:        173 msec    (150-240 msec) MITRAL VALVE:                 Normal Ranges: MV DT: 173 msec (150-240msec) AORTIC VALVE:                                    Normal Ranges: AoV Vmax:                2.83 m/s  (<=1.7m/s) AoV Peak P.9 mmHg (<20mmHg) AoV Mean PG:             15.1 mmHg (1.7-11.5mmHg) LVOT Max Matthew:            1.18 m/s  (<=1.1m/s) AoV VTI:                 60.70 cm  (18-25cm) LVOT VTI:                28.65 cm LVOT Diameter:           2.02 cm   (1.8-2.4cm) AoV Area, VTI:           1.51 cm2  (2.5-5.5cm2) AoV Area,Vmax:           1.34 cm2  (2.5-4.5cm2) AoV Dimensionless Index: 0.47 AORTIC INSUFFICIENCY: AI Vmax:       3.61 m/s AI Half-time:  391 msec AI Decel Time: 1349 msec AI Decel Rate: 275.27 cm/s2  RIGHT VENTRICLE: RV Basal 3.70 cm RV Mid   2.80 cm RV Major 7.9 cm TAPSE:   15.0 mm RV s'    0.05 m/s TRICUSPID VALVE/RVSP:                             Normal Ranges: Peak TR Velocity: 3.14 m/s RV Syst Pressure: 42.5 mmHg (< 30mmHg) IVC Diam:         1.90 cm  [id number] Carolann Colorado MD Electronically signed on 2024 at 5:19:26 PM  ** Final (Updated) **     Transthoracic Echo (TTE) Limited    Result Date: 2024   [facility], [address] [place] [address]                Tel [phone] and Fax [phone] TRANSTHORACIC ECHOCARDIOGRAM REPORT  Patient Name:      NILAY Reyes Physician:    [id number] Carolann Colorado MD Study Date:        2024            Ordering Provider:    [id number] PANDEY                                                                LINDA MRN/PID:           [id number]             Fellow: Accession#:        [id number]         Nurse:                Chuyita Lim RN Date of Birth/Age: 5/10/1932 / 89 years Sonographer:          ABRAM Nunez RDCS Gender:            F                    Additional Staff: Height:            160.02 cm            Admit Date:           2/16/2024 Weight:            99.79 kg             Admission Status:     Inpatient -                                                               Routine BSA / BMI:         2.01 m2 / 38.97      Encounter#:           [id number]                    kg/m2                                         Department Location:  [facility]                                                               Invasive Blood Pressure: 137 /43 mmHg Study Type:    TRANSTHORACIC ECHO (TTE) LIMITED Diagnosis/ICD: Nonrheumatic aortic (valve) stenosis-I35.0 Indication:    AS CPT Code:      Echo Limited-17687; Doppler Limited-40036; Color Doppler-18165 Patient History: Pertinent History: Dyspnea, Chest Pain and COPD. tachy-shahram syndrome, AS s/p                    AVR with myocardial shaving (2013, make and model unknonw),                    GERD, hypoxemia, sarcoidosis, LBBB. Study Detail: The following Echo studies were performed: 2D, M-Mode, Doppler and               color flow. Technically challenging study due to poor acoustic               windows. Definity used as a contrast agent for endocardial border               definition. Total contrast used for this procedure was 2 mL via IV               push.  PHYSICIAN INTERPRETATION: Left Ventricle: The left ventricular systolic function is hyperdynamic, with an estimated ejection fraction of 70-75%. There are no regional wall motion abnormalities. The left ventricular cavity size is normal. There is  moderate concentric left ventricular hypertrophy. Abnormal (paradoxical) septal motion consistent with post-operative status. Spectral Doppler shows a normal pattern of left ventricular diastolic filling. Left Atrium: The left atrium is severely dilated. Right Ventricle: The right ventricle is normal in size. Unable to determine right ventricular systolic function. Right Atrium: The right atrium is normal in size. There is a device visualized in the right atrium. Aortic Valve: There is a prosthetic aortic valve present. There is evidence of moderate aortic valve stenosis. There is bioprosthetic aortic valve. There is moderate to severe aortic valve regurgitation. The peak instantaneous gradient of the aortic valve is 40.4 mmHg. The mean gradient of the aortic valve is 22.0 mmHg. Mitral Valve: The mitral valve is mildly thickened. There is mild to moderate mitral annular calcification. Mitral valve regurgitation was not assessed. Tricuspid Valve: The tricuspid valve is structurally normal. Tricuspid regurgitation was not assessed. Pulmonic Valve: The pulmonic valve is structurally normal. There is trace pulmonic valve regurgitation. Pericardium: There is no pericardial effusion noted. Aorta: The aortic root is normal. Systemic Veins: The inferior vena cava appears to be of normal size. In comparison to the previous echocardiogram(s): Compared with study from 1/10/2023, no significant change. Gradients over the prosthetic valve is unchanged. PHT for regurg is also unchanged.  CONCLUSIONS:  1. Left ventricular systolic function is hyperdynamic with a 70-75% estimated ejection fraction.  2. There is moderate concentric left ventricular hypertrophy.  3. The left atrium is severely dilated.  4. There is a bioprosthetic aortic valve.  5. Moderate aortic valve stenosis.  6. Moderate to severe aortic valve regurgitation.  7. At least moderate AR, severe cannot be ruled out.  8. Prosthetic aortic valve dysfunction due to  degenrative changes. Regurg mechanism is unclear if it is perivalvular or transvalvular.  9. Abnormal septal motion consistent with post-operative status. 10. Compared with study from 1/10/2023, no significant change. Gradients over the prosthetic valve is unchanged. PHT for regurg is also unchanged. QUANTITATIVE DATA SUMMARY: 2D MEASUREMENTS:                           Normal Ranges: Ao Root d:     3.10 cm    (2.0-3.7cm) LAs:           3.70 cm    (2.7-4.0cm) IVSd:          1.40 cm    (0.6-1.1cm) LVPWd:         1.10 cm    (0.6-1.1cm) LVIDd:         4.80 cm    (3.9-5.9cm) LVIDs:         3.50 cm LV Mass Index: 115.3 g/m2 LV % FS        27.1 % LA VOLUME:                               Normal Ranges: LA Vol A4C:        89.8 ml    (22+/-6mL/m2) LA Vol A2C:        113.4 ml LA Vol BP:         101.2 ml LA Vol Index A4C:  44.6ml/m2 LA Vol Index A2C:  56.3 ml/m2 LA Vol Index BP:   50.3 ml/m2 LA Area A4C:       26.7 cm2 LA Area A2C:       30.1 cm2 LA Major Axis A4C: 6.8 cm LA Major Axis A2C: 6.8 cm LA Volume Index:   50.3 ml/m2 AORTA MEASUREMENTS:                    Normal Ranges: Asc Ao, d: 3.90 cm (2.1-3.4cm) LV SYSTOLIC FUNCTION BY 2D PLANIMETRY (MOD):                     Normal Ranges: EF-A4C View: 76.4 % (>=55%) EF-A2C View: 68.7 % EF-Biplane:  71.8 % AORTIC VALVE:                                    Normal Ranges: AoV Vmax:                3.18 m/s  (<=1.7m/s) AoV Peak P.4 mmHg (<20mmHg) AoV Mean P.0 mmHg (1.7-11.5mmHg) LVOT Max Matthew:            1.52 m/s  (<=1.1m/s) AoV VTI:                 68.20 cm  (18-25cm) LVOT VTI:                32.50 cm LVOT Diameter:           1.90 cm   (1.8-2.4cm) AoV Area, VTI:           1.35 cm2  (2.5-5.5cm2) AoV Area,Vmax:           1.36 cm2  (2.5-4.5cm2) AoV Dimensionless Index: 0.48 AORTIC INSUFFICIENCY: AI Vmax:       3.73 m/s AI Half-time:  418 msec AI Decel Rate: 266.50 cm/s2  [id number] Carolann Colorado MD Electronically signed on 2024 at 3:32:47 PM  ** Final  **      Lab Results   Component Value Date    WBC 6.5 09/16/2024    HGB 12.6 09/16/2024    HCT 41.7 09/16/2024    MCV 94 09/16/2024     09/16/2024       Chemistry    Lab Results   Component Value Date/Time     09/16/2024 1908    K 4.5 09/16/2024 1908     09/16/2024 1908    CO2 25 09/16/2024 1908    BUN 46 (H) 09/16/2024 1908    CREATININE 1.32 (H) 09/16/2024 1908    Lab Results   Component Value Date/Time    CALCIUM 9.6 09/16/2024 1908    ALKPHOS 82 09/03/2024 1551    AST 28 09/03/2024 1551    ALT 40 09/03/2024 1551    BILITOT 0.5 09/03/2024 1551          IMPRESSION:    89 y.o. retired cleaning lady/renovator who presents for advanced heart failure care. She has a past medical history significant for hypertension, hyperlipidemia, chronic anemia,iron deficiency, s/p status post knee replacement surgery, gout, anxiety, history of COPD,? sarcoidosis, diverticulosis, s/p AVR at [facility] (? year), s/p pacemaker (reportedly that this was needed after her valve surgery at the [facility]. She carries a diagnosis of HFpEF and reports that she has had for heart failure hospitalizations in the last few months.   TTE 7/2022 done at [facility] showed LVEF 60% with normal RV systolic function. Repeat TTE done 1/2023 shows LVEF~55% with suggestion of bioprosthetic aortic valve stenosis with moderate to severe aortic regurgitation.  Alayna Dumont  was admitted with ADHF ( SOB, SOBOE) 9/2024 in the context of severe prosthetic AR.   TTE 9/2024 showed LVEF 50%, GHK, LV sev dilated, severe LVH, LA sev dilated, RV mod enlarged/mildly reduced function, severe AI of prosthetic valve, mild AS, DI 0.63, peak/mn gradients 26.2/15.0, severe MAC, mild to mod MR, mild TR, RVSP 33.9.   The Structural team was consulted (Dr. Balderas) and patient deemed not a candidate for structural interventions due to high frailty, chronic infection status, limited mobility, and some degree of cognitive impairment.  She was hypervolemic and  diuresed with subjective symptomatic improvement.  She is s/p PPM generator change 9/10/2024.     NYHA Functional Class: 3  ACC/AHA Stage C heart failure  Volume status: Euvolemic per history  Designated HC PoA: Praveen, her son, date verified 9/16/2022    PLAN:    #HFpEF with recent (9/2024) ADHF hospitalizations   Not a candidate for prosthetic AR intervention ( AR likely drives her ADHF episodes)  -Plam to resume Spironolactone and  empagliflozin at next in person review  -Continue loop diuretic    #Bioprosthetic aortic stenosis with aortic regurgitation  Not a candidate for prosthetic AR intervention per 9/2024 review    #Iron deficiency  Unable to get in for IV ferric carboxymaltose, continue oral ferrous sulfate    Heart Failure Education Booklet: Given previously    This note was transcribed using the Dragon Dictation system. There may be grammatical, punctuation, or verbiage errors that can occur with voice recognition programs.      Mima Alfaro MD PhD

## 2024-10-03 ENCOUNTER — HOSPITAL ENCOUNTER (OUTPATIENT)
Dept: CARDIOLOGY | Facility: CLINIC | Age: 89
Discharge: HOME | End: 2024-10-03
Payer: MEDICARE

## 2024-10-03 DIAGNOSIS — I51.9 CARDIOPATHY: ICD-10-CM

## 2024-10-03 DIAGNOSIS — I50.22 CHRONIC SYSTOLIC CONGESTIVE HEART FAILURE: ICD-10-CM

## 2024-10-03 PROCEDURE — 93280 PM DEVICE PROGR EVAL DUAL: CPT | Performed by: INTERNAL MEDICINE

## 2024-10-03 PROCEDURE — 93280 PM DEVICE PROGR EVAL DUAL: CPT

## 2024-10-04 ENCOUNTER — OFFICE VISIT (OUTPATIENT)
Dept: GERIATRIC MEDICINE | Facility: CLINIC | Age: 89
End: 2024-10-04
Payer: MEDICARE

## 2024-10-04 ENCOUNTER — HOSPITAL ENCOUNTER (OUTPATIENT)
Dept: CARDIOLOGY | Facility: CLINIC | Age: 89
Discharge: HOME | End: 2024-10-04
Payer: MEDICARE

## 2024-10-04 DIAGNOSIS — M79.2 PERIPHERAL NEUROPATHIC PAIN: ICD-10-CM

## 2024-10-04 DIAGNOSIS — G89.29 OTHER CHRONIC PAIN: ICD-10-CM

## 2024-10-04 DIAGNOSIS — E53.8 VITAMIN B12 DEFICIENCY WITHOUT ANEMIA: ICD-10-CM

## 2024-10-04 DIAGNOSIS — I50.9 ACUTE ON CHRONIC CONGESTIVE HEART FAILURE, UNSPECIFIED HEART FAILURE TYPE: Primary | ICD-10-CM

## 2024-10-04 DIAGNOSIS — R00.1 BRADYCARDIA ON ECG: ICD-10-CM

## 2024-10-04 DIAGNOSIS — E55.9 VITAMIN D DEFICIENCY: ICD-10-CM

## 2024-10-04 DIAGNOSIS — M10.9 GOUT, UNSPECIFIED CAUSE, UNSPECIFIED CHRONICITY, UNSPECIFIED SITE: ICD-10-CM

## 2024-10-04 DIAGNOSIS — Z95.0 PACEMAKER: ICD-10-CM

## 2024-10-04 DIAGNOSIS — N18.32 STAGE 3B CHRONIC KIDNEY DISEASE (MULTI): ICD-10-CM

## 2024-10-04 DIAGNOSIS — M62.838 MUSCLE SPASM OF LEFT LOWER EXTREMITY: ICD-10-CM

## 2024-10-04 DIAGNOSIS — I50.30 HEART FAILURE WITH PRESERVED LEFT VENTRICULAR FUNCTION (HFPEF): ICD-10-CM

## 2024-10-04 DIAGNOSIS — M62.838 MUSCLE SPASM: ICD-10-CM

## 2024-10-04 DIAGNOSIS — D50.9 IRON DEFICIENCY ANEMIA, UNSPECIFIED IRON DEFICIENCY ANEMIA TYPE: ICD-10-CM

## 2024-10-04 DIAGNOSIS — M81.0 OSTEOPOROSIS WITHOUT CURRENT PATHOLOGICAL FRACTURE, UNSPECIFIED OSTEOPOROSIS TYPE: ICD-10-CM

## 2024-10-04 DIAGNOSIS — M15.9 GENERALIZED OSTEOARTHRITIS: ICD-10-CM

## 2024-10-04 DIAGNOSIS — M00.9: ICD-10-CM

## 2024-10-04 DIAGNOSIS — Z95.810 AICD (AUTOMATIC CARDIOVERTER/DEFIBRILLATOR) PRESENT: ICD-10-CM

## 2024-10-04 ASSESSMENT — PAIN SCALES - GENERAL: PAINLEVEL: 0-NO PAIN

## 2024-10-05 VITALS
DIASTOLIC BLOOD PRESSURE: 70 MMHG | HEART RATE: 71 BPM | TEMPERATURE: 97.5 F | SYSTOLIC BLOOD PRESSURE: 110 MMHG | RESPIRATION RATE: 20 BRPM

## 2024-10-05 RX ORDER — ALLOPURINOL 100 MG/1
TABLET ORAL
Qty: 45 TABLET | Refills: 3 | Status: SHIPPED | OUTPATIENT
Start: 2024-10-05

## 2024-10-05 RX ORDER — ALENDRONATE SODIUM TABLET 35 MG/1
35 TABLET ORAL
Qty: 12 TABLET | Refills: 3 | Status: SHIPPED | OUTPATIENT
Start: 2024-10-05 | End: 2025-10-05

## 2024-10-05 RX ORDER — IBUPROFEN 100 MG/5ML
1000 SUSPENSION, ORAL (FINAL DOSE FORM) ORAL DAILY
Qty: 90 TABLET | Refills: 3 | Status: SHIPPED | OUTPATIENT
Start: 2024-10-05

## 2024-10-05 RX ORDER — BACLOFEN 5 MG/1
TABLET ORAL
Qty: 90 TABLET | Refills: 3 | Status: SHIPPED | OUTPATIENT
Start: 2024-10-05

## 2024-10-05 RX ORDER — TORSEMIDE 20 MG/1
TABLET ORAL
Qty: 120 TABLET | Refills: 3 | Status: SHIPPED | OUTPATIENT
Start: 2024-10-05

## 2024-10-05 RX ORDER — FERROUS SULFATE 325(65) MG
65 TABLET ORAL
Qty: 90 TABLET | Refills: 3 | Status: SHIPPED | OUTPATIENT
Start: 2024-10-05 | End: 2025-10-05

## 2024-10-05 RX ORDER — CHOLECALCIFEROL (VITAMIN D3) 25 MCG
TABLET ORAL
Qty: 90 TABLET | Refills: 3 | Status: SHIPPED | OUTPATIENT
Start: 2024-10-05

## 2024-10-05 NOTE — PATIENT INSTRUCTIONS
Dear Mrs. Dumont,    It was a pleasure seeing you today. Welcome home!!  Continue to take your medications as discussed and work with physical therapy.    I have reordered several medications from your pharmacy.    Please call them to see if they are ready for .    I will follow up with you in 4-6 weeks.    Sincerely,    FRANCHESCA Drake, APRN-BC

## 2024-10-05 NOTE — PROGRESS NOTES
"Some elements may have been copied from prior note(s). The elements have been updated and reflect current evaluation, examination and decision making from today.           Reason for visit:  Follow up for SNF discharge s/sp CHF exacerbation and management of chronic active illnesses.         Summary Statement: Mrs. 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      Reason for visit: Leaving her home requires a considerable and taxing amount of effort, and maximum resources due to inability to walk.    HPI:  Mrs. Dumont is being seen today at home for follow up .  In the interim patient was hospitalized for CHF exacerbation  She was discharged from [facility] on last Tuesday    PT has been out to see patient in the home.     She had a telehealth visit with Cardiologist , Dr. Mima Alfaro on Wednesday and she has also had her pacemaker replaced.       Medication Reconciliation  When NP asked, patient stated \"I go back to the way I was taking them before I went in the hospital\"  Per hospital discharge summary, patient was to be taking Torsemide 20 mg  (3 tablets)  daily; however,   She is taking Torsemide 20 mg - 2 tablets on MWF and 1 tablet all remaining days.    She is not showing any s/s of " volume overload    She is missing Alendronate Vitamin D, Ferrous Sulfate, Ascorbic Acid, and Allopurinol      She has a surplus of Tramadol due to hospitalization and rehab( none needed at this time - a full bottle and about a week's supply from )    Current Outpatient Medications on File Prior to Visit   Medication Sig Dispense Refill    acetaminophen (Tylenol) 325 mg tablet Take 3 tablets (975 mg) by mouth every 8 hours if needed for moderate pain (4 - 6).      aspirin 81 mg EC tablet Take 1 tablet (81 mg) by mouth once daily.      b complex 0.4 mg tablet Take 1 tablet by mouth once daily.      baclofen (Lioresal) 5 mg tablet Take 1 tablet (5 mg) by mouth once daily at bedtime.      cephalexin (Keflex) 500 mg capsule Take 1 capsule (500 mg) by mouth 2 times a day.      cholecalciferol (Vitamin D3) 25 MCG (1000 UT) tablet Take 1 tablet (1,000 Units) by mouth once daily.      cyanocobalamin (Dodex) 1,000 mcg/mL injection inject 1 milliliter intramuscularly every month 3 mL 3    docusate sodium (Colace) 100 mg capsule Take 1 capsule (100 mg) by mouth 2 times a day.      DULoxetine (Cymbalta) 30 mg DR capsule TAKE 1 CAPSULE BY MOUTH ONCE DAILY DISCONTINUE 20 MILLIGRAM CAPSULE 90 capsule 3    gabapentin (Neurontin) 100 mg capsule Take 1 capsule (100 mg) by mouth 2 times a day. For neuropathy (nerve pain) 180 capsule 3    ipratropium-albuteroL (Duo-Neb) 0.5-2.5 mg/3 mL nebulizer solution Inhale 3 mL every 4 hours. INHALE CONTENTS OF 1 VIAL IN NEBULIZER EVERY 4 HOURS WHILE AWAKE      metoprolol tartrate (Lopressor) 25 mg tablet Take 1 tablet (25 mg) by mouth 2 times a day. 180 tablet 3    multivitamin with minerals tablet Take 1 tablet by mouth once daily.      naloxone (Narcan) 4 mg/0.1 mL nasal spray Administer 1 spray (4 mg) into affected nostril(s) if needed for opioid reversal. Instill one spray into nostril and dial 911. If no response may repeat x 1 in 2 minutes. Only to be used for suspected opioid overdose. 1  each 3    omega 3-dha-epa-fish oil (Fish OiL) 1,000 mg (120 mg-180 mg) capsule Take 1 capsule (1,000 mg) by mouth once daily.      torsemide (Demadex) 20 mg tablet Take 1 tablet (20 mg) by mouth once daily.      traMADol (Ultram) 100 mg tablet Take 1 tablet (100 mg) by mouth 2 times a day as needed for severe pain (7 - 10).      alendronate (Fosamax) 35 mg tablet Take 1 tablet (35 mg) by mouth every 7 days. 12 tablet 3    allopurinol (Zyloprim) 100 mg tablet Take 0.5 tablets (50 mg) by mouth once every 24 hours.      ascorbic acid (Vitamin C) 1,000 mg tablet Take 1 tablet (1,000 mg) by mouth once daily.      ferrous sulfate (FeroSuL) 325 (65 Fe) MG tablet Take 1 tablet (65 mg of iron) by mouth once daily with a meal. 90 tablet 3    fluticasone furoate-vilanteroL (Breo Ellipta) 200-25 mcg/dose inhaler Inhale 1 puff once daily. Rinse mouth after each use 3 each 3    guaiFENesin (Mucinex) 600 mg 12 hr tablet Take 1 tablet (600 mg) by mouth 2 times a day as needed for cough. Do not crush, chew, or split.      pantoprazole (ProtoNix) 20 mg EC tablet take 1 tablet by mouth once daily 90 tablet 3    polyethylene glycol (Glycolax, Miralax) 17 gram packet Take 17 g by mouth 2 times a day.      pramoxine (Sarna Sensitive) 1 % lotion Apply 1 Application topically every 4 hours if needed (for pruritis).      sacubitriL-valsartan (Entresto)  mg tablet Take 1 tablet by mouth 2 times a day.      white petrolatum (Aquaphor) 41 % ointment ointment Apply 1 Application topically every 1 hour if needed (for dry skin).      [DISCONTINUED] atorvastatin (Lipitor) 20 mg tablet TAKE 1 TABLET BY MOUTH AT BEDTIME (Patient taking differently: Take 1 tablet (20 mg) by mouth once daily at bedtime. Take 40 mg on Monday, Wednesday, Friday. Take 20 mg Tuesday, Thursday, Saturday, Sunday.) 90 tablet 3    [DISCONTINUED] cetirizine (ZyrTEC) 10 mg tablet Take 1 tablet (10 mg) by mouth once daily.      [DISCONTINUED] enoxaparin (Lovenox) 30 mg/0.3  mL syringe Inject 0.3 mL (30 mg) under the skin once every 24 hours. (Patient not taking: Reported on 10/2/2024)      [DISCONTINUED] furosemide (Lasix) 20 mg tablet Take 3 tablets (60 mg) by mouth once daily. Do not fill before September 18, 2024. (Patient not taking: Reported on 10/2/2024)      [DISCONTINUED] hydrOXYzine HCL (Atarax) 25 mg tablet Take 1 tablet (25 mg) by mouth every 6 hours if needed for itching.      [DISCONTINUED] icosapent ethyL (Vascepa) 1 gram capsule Take 2 capsules (2 g) by mouth 2 times daily (morning and late afternoon). (Patient not taking: Reported on 10/2/2024)      [DISCONTINUED] melatonin 3 mg tablet Take 1 tablet (3 mg) by mouth as needed at bedtime for sleep.      [DISCONTINUED] vit A/vit C/vit E/zinc/copper (PRESERVISION AREDS ORAL) Take 1 capsule by mouth 2 times a day.      [DISCONTINUED] vitamin E 180 mg (400 unit) capsule Take 1 capsule (400 Units) by mouth once daily.       No current facility-administered medications on file prior to visit.     Controlled Substance Monitoring Medication #1   Medication Name: Tramadol (currently has surplus due to hospitalization/SNF)-see HPI    Pill Count: Yes  Recent Lab Results: 7/25/2024  confirms use of medication.   Signs of Misuse: no.   Signs of Abuse: no.   Signs of Diversion: no       ROS:  No chest pain or sob  No palpitations  No dizziness or headaches  No falls  Sleeping well  Appetite is good  Vaginal itching not present but was still present in SNF-treated with a cream  No BLE  No cough, cold or flu-like symptoms  No diarrhea or constipation  No fever or chills    PHYSICAL EXAM  Constitutional   /70 (BP Location: Left arm, Patient Position: Sitting, BP Cuff Size: Adult)   Pulse 71   Temp 36.4 °C (97.5 °F) (Temporal)   Resp 20   LMP  (LMP Unknown) /POX=95% RA     General appearance: Alert, cooperative and in no acute distress. Scooting around independently in manual wheelchair   Head and Face Examination/ inspection of  hair and scalp: Normal.   Eyes   Inspection of eyes: Sclera and conjunctiva were normal.    Pupil exam: PERRLA. Extraocular movements were intact.   Ears, Nose, Mouth, and Throat   Ears: Normal.    Oropharynx: Normal with moist mucus membranes, tongue midline, no PND, no erythema or enlargement of tonsils.    Hearing: [place]      Lips, teeth, and gums: Normal.   Neck   Neck Exam: Appearance of the neck was normal. No neck masses observed. No jugular vein distension.   Pulmonary   Respiratory assessment: No respiratory distress, normal respiratory rhythm and effort.    Auscultation of Lungs: Diminished throughout , but otherwise clear.   Cardiovascular   Auscultation of heart: Abnormal.  The heart rate was normal. A grade 2 systolic murmur was heard at the LLSB.    Pedal pulses: Regular rate. 2+ bilaterally.  +AICD left upper chest.  Area non-tender . Dressing intact with ends   Examination of extremities for edema and/or varicosities:None   Chest   Chest: Symmetrical and normal appearance.   Abdomen   Abdominal Exam: No bruits normal bowel sounds, soft, non-tender, no abdominal masses palpated.    Liver and Spleen exam: No hepato-splenomegaly. wearing a diaper.   Genitourinary   Bladder: Normal on palpation. wearing a diaper.   Lymphatic   Palpation of lymph nodes in axillae: Normal.     Palpation of lymph nodes in neck: No cervical lymphadenopathy.    Palpation of lymph nodes in other areas: Normal.    Musculoskeletal   Digits and nails: Abnormal.     Inspection/palpation of joints: No joint swelling seen.    Range of motion: Abnormal.     Muscle strength/tone: Normal.  decrease ROM to left leg/hip.   RUE.LUE 5/5 RLE 4/5  LLE 4/5  No CVA tenderness   Gait:  Transferred off [facility] to manual wheelchair without difficulty   Skin   Skin inspection: Skin dry , warm and intact. Normal skin color and pigmentation, and normal skin turgor .  Neurologic   Cranial nerves: Nerves 2-12 were intact, no focal neuro defects, except for  hearing   Psychiatric   Judgment and insight: Intact and appropriate.    Orientation: Oriented to person, place, and time.    Mood and affect: Normal.    Recent and remote memory: Normal.  .    LABS/DIAGNOSTIC IMAGING    Chemistry    Lab Results   Component Value Date/Time     09/16/2024 1908    K 4.5 09/16/2024 1908     09/16/2024 1908    CO2 25 09/16/2024 1908    BUN 46 (H) 09/16/2024 1908    CREATININE 1.32 (H) 09/16/2024 1908    Lab Results   Component Value Date/Time    CALCIUM 9.6 09/16/2024 1908    ALKPHOS 82 09/03/2024 1551    AST 28 09/03/2024 1551    ALT 40 09/03/2024 1551    BILITOT 0.5 09/03/2024 1551      eGFR=38 ml/min/1/73ml2  Lab Results   Component Value Date    WBC 6.5 09/16/2024    HGB 12.6 09/16/2024    HCT 41.7 09/16/2024    MCV 94 09/16/2024     09/16/2024       ASSESSMENT/PLAN    1. Acute on chronic congestive heart failure, unspecified heart failure type  -See HPI  -No s/s of volume overload  -Continue Entresto, Torsemide, and Metoprolol  and ASA  -Follow up with Dr. Alfaro as recommended     2. Muscle spasm of left lower extremity  -controlled on Gabapentin 100 mg at bedtime     3. Vitamin D deficiency  -continue cholecalciferol 1, 000 international   daily    4. Osteoporosis without current pathological fracture, unspecified osteoporosis type  -continue Alendronate  -no s/s of adverse effects     5. Vitamin B12 deficiency without anemia  -continue injections- son administers     6. Iron deficiency anemia, unspecified iron deficiency anemia type  H & H -stable of Ferrous Sulfate and Ascorbic Acid     7.Gout, unspecified cause, unspecified chronicity, unspecified site  - continue renal dosing of 50 mg daily     8. Chronic infection of knee (Multi)  -chronic suppressive antibiotic therapy- LIFELONG   -continue Keflex     9. Generalized osteoarthritis/Other chronic pain/Peripheral Neuropathic Pain   -At the time this note was entered, NP confirmed with pharmacy that patient  gets the 50 mg and not the 100 mg tablets  -To avoid confusion and medication errors, will continue what she is used to   -cont Tramadol 50 mg-2 tabs - (100 mg ) twice a day-patient confirmed frequency  -No s/s of adverse effects  -currently has a surplus due to hospitalization/snf- there will be a gap in history for prescribing   -good subjective relief along with low dose Gabapentin and Duloxetine 30 mg daily  -No s/s of abuse, misuse or diversion  -Continue topical pain cream also    10. Muscle spasm- to left hip only   -controlled low does Baclofen     11. Stage 3b chronic kidney disease (Multi)  -Good BP and glycemic control  -Uses to lowest effect dose of diuretic therapy     12. Pacemaker/AICD device  -recently replaced  -site without s/s of infection      Stable  Routine follow up in 4-6 weeks

## 2024-10-15 ENCOUNTER — TELEPHONE (OUTPATIENT)
Dept: GERIATRIC MEDICINE | Facility: CLINIC | Age: 89
End: 2024-10-15
Payer: MEDICARE

## 2024-10-15 DIAGNOSIS — B37.31 YEAST INFECTION INVOLVING THE VAGINA AND SURROUNDING AREA: Primary | ICD-10-CM

## 2024-10-16 RX ORDER — FLUCONAZOLE 150 MG/1
150 TABLET ORAL ONCE
Qty: 1 TABLET | Refills: 0 | Status: SHIPPED | OUTPATIENT
Start: 2024-10-16 | End: 2024-10-16

## 2024-10-16 NOTE — TELEPHONE ENCOUNTER
Called and spoke to HC agency - Latricia Sullivan.  She states that patient has been having vaginal itching.  She has nystatin cream for candidiasis for groin and buttocks.  Now, patient is starting to have itching in the vagina as well and would like to have something prescribed.  Patient's issues started post abx exposure in August and have continued since then.  Prescribed diflucan x1.  Advised to call with any worsening symptoms, or if symptoms do not resolve.  Thank you.

## 2024-11-06 DIAGNOSIS — K21.9 GASTROESOPHAGEAL REFLUX DISEASE, UNSPECIFIED WHETHER ESOPHAGITIS PRESENT: ICD-10-CM

## 2024-11-06 RX ORDER — PANTOPRAZOLE SODIUM 20 MG/1
20 TABLET, DELAYED RELEASE ORAL DAILY
Qty: 90 TABLET | Refills: 3 | Status: SHIPPED | OUTPATIENT
Start: 2024-11-06

## 2024-11-09 DIAGNOSIS — G89.29 OTHER CHRONIC PAIN: ICD-10-CM

## 2024-11-09 DIAGNOSIS — G62.9 NEUROPATHY: ICD-10-CM

## 2024-11-11 RX ORDER — GABAPENTIN 100 MG/1
CAPSULE ORAL
Qty: 180 CAPSULE | Refills: 0 | Status: SHIPPED | OUTPATIENT
Start: 2024-11-11

## 2024-11-12 PROCEDURE — G0180 MD CERTIFICATION HHA PATIENT: HCPCS | Performed by: NURSE PRACTITIONER

## 2024-11-18 ENCOUNTER — OFFICE VISIT (OUTPATIENT)
Dept: GERIATRIC MEDICINE | Facility: CLINIC | Age: 89
End: 2024-11-18
Payer: MEDICARE

## 2024-11-18 DIAGNOSIS — Z51.81 MEDICATION MONITORING ENCOUNTER: ICD-10-CM

## 2024-11-18 DIAGNOSIS — Z23 PNEUMOCOCCAL VACCINE ADMINISTERED: ICD-10-CM

## 2024-11-18 DIAGNOSIS — M16.12 OSTEOARTHRITIS OF LEFT HIP, UNSPECIFIED OSTEOARTHRITIS TYPE: ICD-10-CM

## 2024-11-18 DIAGNOSIS — G89.29 OTHER CHRONIC PAIN: ICD-10-CM

## 2024-11-18 DIAGNOSIS — I50.30 HEART FAILURE WITH PRESERVED LEFT VENTRICULAR FUNCTION (HFPEF): Primary | ICD-10-CM

## 2024-11-18 DIAGNOSIS — R42 DIZZINESS: ICD-10-CM

## 2024-11-18 DIAGNOSIS — Z23 INFLUENZA VACCINE ADMINISTERED: ICD-10-CM

## 2024-11-18 DIAGNOSIS — I95.89 OTHER SPECIFIED HYPOTENSION: ICD-10-CM

## 2024-11-18 DIAGNOSIS — M79.2 PERIPHERAL NEUROPATHIC PAIN: ICD-10-CM

## 2024-11-18 DIAGNOSIS — M10.9 GOUT, UNSPECIFIED CAUSE, UNSPECIFIED CHRONICITY, UNSPECIFIED SITE: ICD-10-CM

## 2024-11-18 DIAGNOSIS — R41.3 MEMORY CHANGES: ICD-10-CM

## 2024-11-18 DIAGNOSIS — B37.2 CUTANEOUS CANDIDIASIS: ICD-10-CM

## 2024-11-18 PROCEDURE — 3078F DIAST BP <80 MM HG: CPT | Performed by: NURSE PRACTITIONER

## 2024-11-18 PROCEDURE — 1159F MED LIST DOCD IN RCRD: CPT | Performed by: NURSE PRACTITIONER

## 2024-11-18 PROCEDURE — 99349 HOME/RES VST EST MOD MDM 40: CPT | Performed by: NURSE PRACTITIONER

## 2024-11-18 PROCEDURE — 1036F TOBACCO NON-USER: CPT | Performed by: NURSE PRACTITIONER

## 2024-11-18 PROCEDURE — 1125F AMNT PAIN NOTED PAIN PRSNT: CPT | Performed by: NURSE PRACTITIONER

## 2024-11-18 PROCEDURE — G0008 ADMIN INFLUENZA VIRUS VAC: HCPCS | Performed by: NURSE PRACTITIONER

## 2024-11-18 PROCEDURE — 3074F SYST BP LT 130 MM HG: CPT | Performed by: NURSE PRACTITIONER

## 2024-11-18 PROCEDURE — 1160F RVW MEDS BY RX/DR IN RCRD: CPT | Performed by: NURSE PRACTITIONER

## 2024-11-18 ASSESSMENT — PAIN SCALES - GENERAL: PAINLEVEL_OUTOF10: 6

## 2024-11-18 NOTE — PROGRESS NOTES
"Some elements may have been copied from prior note(s). The elements have been updated and reflect current evaluation, examination and decision making from today.             Reason for visit:  CHF follow up , to administer flu and pneumonia vaccines, and follow up for management of chronic active illnesses.       Summary Statement: Mrs. 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      Reason for visit: Leaving her home requires a considerable and taxing amount of effort, and maximum resources due to inability to walk.     HPI:  Mrs. Dumont is being seen today at home for follow up .  Patient states she has been doing fairly well.   States she has issues with her memory at times.     States in the interim she had a gout flare to her left hand.   \"I took this medication and it went away\".    Patient showed a bottle of Indomethacin prescribed by a different provider in 2013    She states that she stopped taking the Gabapentin and the itching to her scalp and recurrent candidiasis resolved. She does not wish to continue it, but continue the Duloxetine.  Also, states she is no longer taking Baclofen prescribed during a hospitalization for spasms to left hip.  She wishes to discontinue both.     She endorses " "\"dizziness\".  States she may have slacked up on drinking enough water. Denies headache or blurred vision. Denies sinus pressure.     Denies chest pain or SOB  No falls  No blood in stool or urine  No fever, chills, or night sweats  No diarrhea or constipation  No sick contacts    States she needs a refill on the Tramadol.  Continues to report good subjective relief and how it has changed her level of functioning in allowing her to get OOB to the wheelchair, dress herself (which is very important to her), and to was the dishes in the sink from the wheelchair.     Administration Dates  pneumococcal conjugate (PCV13) vaccine, 13 valent (PREVNAR 13) 12/10/2016     pneumococcal polysaccharide (PPV23) vaccine, 23 valent (PNEUMOVAX 23) 12/20/2012     tetanus diphtheria pertussis (Tdap) vaccine, age 7+ yr (ADACEL, BOOSTRIX) 03/31/2017           Menu  Alayna Dumont 89  Contact the [employer]  YOB: 1932  Recent Address:  Status of States Queried:  Error for 1 or more states. View Details  View Linked Records (0)  Other Tools/Metrics  We were unable to retrieve prescription data from your state  (Reference [id number])    UNINTENTIONAL OVERDOSE RISK SCORE MODEL    Caution/Important Reminder: Information for Healthcare Professionals  BELOW [id number]  NARX SCORES  NARCOTICS  000  ACTIVE RX  0  SEDATIVES  000  ACTIVE RX  0  STIMULANTS  000  ACTIVE RX  0  KEY CONTRIBUTING FACTORS TO OVERDOSE RISK SCORE MODEL  Greater than six dispensations  No  Benzo - Narcotics overlap  0 Days  Number of high risk scripts  0  Number of pharmacies where narcotics/sedatives/stimulants filled  0  Total days supply of short-acting drugs  0  State Indicators (0)  Details  RX Graph  [] Narcotic[] Buprenorphine[] Sedative[] Stimulant[] OtherLearn how to use graph  [id number]  Disclaimer  Morphine Milligram Equivalent (MME) Prescribed Over Time  Last 30 " DaysLast 60 DaysLast 90 DaysLast 1 YearLast 2 Years  [id number]/2411/[id number]/26/24  0  Avg MME/day  0  MME per Rx  Disclaimer  Lorazepam MgEq (LME) Prescribed Over Time  Last 30 DaysLast 60 DaysLast 90 DaysLast 1 YearLast 2 Years  [id number]/2411/12/24112411/26/24  0  Avg LME/day  0  LME per Rx  Disclaimer  Buprenorphine (mg) Prescribed Over Time  Last 30 DaysLast 60 DaysLast 90 DaysLast 1 YearLast 2 Years  [id number]/28/2411/12/24112411/26/24  0  Avg mg/day  0  Avg mg per Rx  Disclaimer  RX Summary  Summary  Total Prescriptions 0   Total Private Pay 0   Total Prescribers 0   Total Pharmacies 0   Narcotics (excluding Buprenorphine)  Current MME/day 0.00   30 Day Avg MME/day 0.00   Current Qty 0   Buprenorphine  Current mg/day 0.00   30 Day Avg mg/day 0.00   Current Qty 0     RX Summary Expanded  Narcotics (excluding Buprenorphine)  Current MME/day 0.00   30 Day Avg MME/day 0.00   90 Day Avg MME/day 0.00   Rx Count/12 Months 0   Prescriber #/6 Months 0   Pharmacy #/6 Months 0   Current Qty 0   Buprenorphine  Current mg/day 0.00   30 Day Avg mg/day 0.00   90 Day Avg mg/day 0.00   Rx Count/12 Months 0   Prescriber #/6 Months 0   Pharmacy #/6 Months 0   Current Qty 0   Sedatives  30 Day Avg LME/day 0.00   90 Day Avg LME/day 0.00   Rx Count/12 Months 0   Prescriber #/6 Months 0   Pharmacy #/6 Months 0   Current Qty 0   Stimulants  30 Day Avg mg/day 0.00   90 Day Avg mg/day 0.00   Rx Count/12 Months 0   Prescriber #/6 Months 0   Pharmacy #/6 Months 0   Current Qty 0     Prescriptions  Total: 0  Private Pay: 0  Showing 1-0 of 0 Items View 15 Items  1 of 0   Filled  Written  ID  Drug  QTY  Days  Prescriber  RX #  Dispenser  Refill  Daily Dose*  Pymt Type      Disclaimer  Showing 1-0 of 0 Items View 15 Items  1 of 0     Providers  Total: 0  Showing 1-0 of 0 Items View 15 Items  1 of 0   Name  Address  City  State  Zipcode  Phone    Showing 1-0 of 0 Items View 15 Items  1 of 0     Pharmacies  Total:  "0  Showing 1-0 of 0 Items View 15 Items  1 of 0   Name  Address  [facility]  State  Zipcode  Phone    Showing 1-0 of 0 Items View 15 Items  1 of 0     Column Settings  Integrated Patient Records  Total: 0  Showing 1-0 of 0 Items View 15 Items  1 of 0   Incident Date  Medication Given  Dosage  Administrated By  Zip code of Administration    Showing 1-0 of 0 Items View 15 Items  1 of 0     This website uses [employer], a web analytics service provided by [employer] (\"Google\"). [employer] uses \"cookies\", which are text files placed on your computer, to help the website analyze how users use the site. The information generated by the cookie about your use of the website will be transmitted to and stored by [employer] on servers in the [place].  In case IP-anonymization is activated on this website, your IP address will be truncated within the area of Member States of the  Union or other parties to the Agreement on the  Economic Area. Only in exceptional cases the whole IP address will be first transferred to a [employer]  in the [place] and truncated there. The IP-anonymization is active on this website.  [employer] will use this information on behalf of the  of this website for the purpose of evaluating your use of the website, compiling reports on website activity for website operators and providing them other services relating to website activity and internet usage.  The IP-address, that your Browser conveys within the scope of [employer], will not be associated with any other data held by [employer]. You may refuse the use of cookies by selecting the appropriate settings on your browser, however please note that if you do this you may not be able to use the full functionality of this website. You can also opt-out from being tracked by [employer] with effect for the future by downloading and installing [employer] Opt-out Browser Addon for your current web browser: " http://tools.Skyep[employer].com/dlpage/gaoptout?hl=en.      Current Outpatient Medications on File Prior to Visit   Medication Sig Dispense Refill    acetaminophen (Tylenol) 325 mg tablet Take 3 tablets (975 mg) by mouth every 8 hours if needed for moderate pain (4 - 6).      alendronate (Fosamax) 35 mg tablet Take 1 tablet (35 mg) by mouth every 7 days. 12 tablet 3    allopurinol (Zyloprim) 100 mg tablet Take 1/2 tablet ( 50 m ) daily for GOUT prevention 45 tablet 3    ascorbic acid (Vitamin C) 1,000 mg tablet Take 1 tablet (1,000 mg) by mouth once daily. 90 tablet 3    aspirin 81 mg EC tablet Take 1 tablet (81 mg) by mouth once daily.      b complex 0.4 mg tablet Take 1 tablet by mouth once daily.      cephalexin (Keflex) 500 mg capsule Take 1 capsule (500 mg) by mouth 2 times a day.      cholecalciferol (Vitamin D3) 25 MCG (1000 UT) tablet Take one tablet daily for Vitamin D 90 tablet 3    cyanocobalamin (Dodex) 1,000 mcg/mL injection inject 1 milliliter intramuscularly every month 3 mL 3    docusate sodium (Colace) 100 mg capsule Take 1 capsule (100 mg) by mouth 2 times a day.      ferrous sulfate, 325 mg ferrous sulfate, (FeroSuL) tablet Take 1 tablet (325 mg) by mouth once daily with breakfast. 90 tablet 3    fluticasone furoate-vilanteroL (Breo Ellipta) 200-25 mcg/dose inhaler Inhale 1 puff once daily. Rinse mouth after each use 3 each 3    guaiFENesin (Mucinex) 600 mg 12 hr tablet Take 1 tablet (600 mg) by mouth 2 times a day as needed for cough. Do not crush, chew, or split.      ipratropium-albuteroL (Duo-Neb) 0.5-2.5 mg/3 mL nebulizer solution Inhale 3 mL every 4 hours. INHALE CONTENTS OF 1 VIAL IN NEBULIZER EVERY 4 HOURS WHILE AWAKE      metoprolol tartrate (Lopressor) 25 mg tablet Take 1 tablet (25 mg) by mouth 2 times a day. 180 tablet 3    multivitamin with minerals tablet Take 1 tablet by mouth once daily.      omega 3-dha-epa-fish oil (Fish OiL) 1,000 mg (120 mg-180 mg) capsule Take 1 capsule (1,000 mg) by  mouth once daily.      pantoprazole (ProtoNix) 20 mg EC tablet Take 1 tablet (20 mg) by mouth once daily. 90 tablet 3    polyethylene glycol (Glycolax, Miralax) 17 gram packet Take 17 g by mouth 2 times a day.      pramoxine (Sarna Sensitive) 1 % lotion Apply 1 Application topically every 4 hours if needed (for pruritis).      sacubitriL-valsartan (Entresto)  mg tablet Take 1 tablet by mouth 2 times a day. 180 tablet 3    torsemide (Demadex) 20 mg tablet Take 1 tablet (20 mg) by mouth once daily.      torsemide (Demadex) 20 mg tablet Take 2 tablets on Mondays-Wednesdays and Fridays, and 1 tablet  Tuesday, Thursday, Saturday and Sunday 120 tablet 3    white petrolatum (Aquaphor) 41 % ointment ointment Apply 1 Application topically every 1 hour if needed (for dry skin).      [DISCONTINUED] baclofen (Lioresal) 5 mg tablet Take one tablet at bedtime for muscle cramps in left hip 90 tablet 3    [DISCONTINUED] DULoxetine (Cymbalta) 30 mg DR capsule TAKE 1 CAPSULE BY MOUTH ONCE DAILY DISCONTINUE 20 MILLIGRAM CAPSULE 90 capsule 3    [DISCONTINUED] gabapentin (Neurontin) 100 mg capsule TAKE 1 CAPSULE BY MOUTH TWICE A DAY FOR NERVE PAIN 180 capsule 0    [DISCONTINUED] naloxone (Narcan) 4 mg/0.1 mL nasal spray Administer 1 spray (4 mg) into affected nostril(s) if needed for opioid reversal. Instill one spray into nostril and dial 911. If no response may repeat x 1 in 2 minutes. Only to be used for suspected opioid overdose. 1 each 3     No current facility-administered medications on file prior to visit.       Controlled Substance Monitoring Medication #1   Medication Name: Tramadol    Pill Count: Yes  Recent Lab Results: 7/25/2024  confirms use of medication.   Signs of Misuse: no.   Signs of Abuse: no.   Signs of Diversion: no      ROS:  See HPI        PHYSICAL EXAM  Constitutional   /52 (BP Location: Left arm, Patient Position: Sitting, BP Cuff Size: Adult)   Pulse 70   Temp 36.4 °C (97.6 °F) (Temporal)    Resp 20   LMP  (LMP Unknown) /POX= 96% RA        General appearance: Alert, cooperative and in no acute distress. Scooting around independently in manual wheelchair   Head and Face Examination/ inspection of hair and scalp: Normal.   Eyes   Inspection of eyes: Sclera and conjunctiva were normal.    Pupil exam: PERRLA. Extraocular movements were intact.   Ears, Nose, Mouth, and Throat   Ears: Normal.    Oropharynx: Normal with moist mucus membranes, tongue midline, no PND, no erythema or enlargement of tonsils.    Hearing: Platinum      Lips, teeth, and gums: Normal.   Neck   Neck Exam: Appearance of the neck was normal. No neck masses observed. No jugular vein distension.   Pulmonary   Respiratory assessment: No respiratory distress, normal respiratory rhythm and effort.    Auscultation of Lungs: Diminished throughout , but otherwise clear.   Cardiovascular   Auscultation of heart: Abnormal.  The heart rate was normal. A grade 2 systolic murmur was heard at the LLSB.    Pedal pulses: Regular rate. 2+ bilaterally.  +AICD left upper chest.  Area non-tender . Dressing intact with ends   Examination of extremities for edema and/or varicosities:None   Chest   Chest: Symmetrical and normal appearance.   Abdomen   Abdominal Exam: No bruits normal bowel sounds, soft, non-tender, no abdominal masses palpated.    Liver and Spleen exam: No hepato-splenomegaly. wearing a diaper.   Genitourinary   Bladder: Normal on palpation. wearing a diaper.   Lymphatic   Palpation of lymph nodes in axillae: Normal.     Palpation of lymph nodes in neck: No cervical lymphadenopathy.    Palpation of lymph nodes in other areas: Normal.    Musculoskeletal   Digits and nails: Abnormal.     Inspection/palpation of joints: No joint swelling seen.    Range of motion: Abnormal.     Muscle strength/tone: Normal.  decrease ROM to left leg/hip.   RUE.LUE 5/5 RLE 4/5  LLE 4/5  No CVA tenderness   Gait:  Transferred off BSC to manual wheelchair without  difficulty   Skin   Skin inspection: Skin dry , warm and intact. Normal skin color and pigmentation, and normal skin turgor .  Neurologic   Cranial nerves: Nerves 2-12 were intact, no focal neuro defects, except for hearing   Psychiatric   Judgment and insight: Intact and appropriate.    Orientation: Oriented to person, place, and time.    Mood and affect: Normal.    Recent and remote memory: Normal.  .    LABS    Chemistry    Lab Results   Component Value Date/Time     09/16/2024 1908    K 4.5 09/16/2024 1908     09/16/2024 1908    CO2 25 09/16/2024 1908    BUN 46 (H) 09/16/2024 1908    CREATININE 1.32 (H) 09/16/2024 1908    Lab Results   Component Value Date/Time    CALCIUM 9.6 09/16/2024 1908    ALKPHOS 82 09/03/2024 1551    AST 28 09/03/2024 1551    ALT 40 09/03/2024 1551    BILITOT 0.5 09/03/2024 1551        Lab Results   Component Value Date    WBC 6.5 09/16/2024    HGB 12.6 09/16/2024    HCT 41.7 09/16/2024    MCV 94 09/16/2024     09/16/2024     ASSESSMENT /PLAN         1. Heart failure with preserved left ventricular function (HFpEF) (Primary)  -No s/s of volume overload  -See Problem #   - B-type natriuretic peptide; Future  - Comprehensive metabolic panel; Future    2. Gout, unspecified cause, unspecified chronicity, unspecified site  - to left hand  -resolved  -Advised not to take Indomethacin prescribed in 2015 although she reports it worked  -continue Allopurinol 100 mg daily  -Advised to call office to report any future exacerbations   - CBC and Auto Differential; Future  - Uric acid; Future    3. Cutaneous candidiasis  -Resolved  -See Problem # 11    4. Other chronic pain  -Controlled on   - DULoxetine (Cymbalta) 30 mg DR capsule; Take 1 capsule (30 mg) by mouth once daily. Do not crush or chew.  Dispense: 90 capsule; Refill: 3    - 5. Osteoarthritis of left hip, unspecified osteoarthritis type  -pain controlled on Tramadol 100 mg bid prn   -Oarrs report retrieved and  reviewed  -did not show any record   - naloxone (Narcan) 4 mg/0.1 mL nasal spray; Administer 1 spray (4 mg) into affected nostril(s) if needed for opioid reversal. May repeat every 2-3 minutes if needed, alternating nostrils, until medical assistance becomes available.  Dispense: 1 each; Refill: 2    6. Medication monitoring encounter  -See HPI  -Discontinue Gabapentin and Baclofen  -Advised not to take Indomethacin prescribed in 2015    7. Influenza vaccine administered  -requested  -no contraindications  -received the Fluzone HD vaccine via the left deltoid  -tolerated well  -see immunization record      8. Pneumococcal vaccine administered  -requested  -no contraindications  -received the Prevnar 20 vaccine via the right deltoid  -tolerate well  -see immunization record    9. Memory changes  - Vitamin B12; Future  - Folate; Future  - Vitamin B6; Future  - Vitamin B1, Whole Blood; Future  -Administer cognitive testing at next visit -poor vision- may not be able to do MOCA    10. Other specified hypotension/Dizziness  -endorses dizziness  -found to be hypotensive in the setting of no s/s of volume overload and possibly decrease in water intake   -Currently takes 2 tablets on Mondays-Wednesdays and Fridays, and 1 tablet  Tuesday, Thursday, Saturday and Sunday  -Advised to hold diuretic x 1 day tomorrow  -then decrease to 1 tablet every day  -NP elected not to discontinue order as patient will most likely at some point return to current dosing regimen  -Advised to take an additional 16 ounce bottle of water daily   -Verbal and written communication provided  -Verbalizes a clear understanding  -No edema or JVD/Cough /Orthopnea/PND are present  -Plan of care is ongoing       11. Peripheral neuropathic pain  - DULoxetine (Cymbalta) 30 mg DR capsule; Take 1 capsule (30 mg) by mouth once daily. Do not crush or chew.  Dispense: 90 capsule; Refill: 3  -Stop Gabapentin per patient's request - feels it was causing chronic  itching and candidiasis     Stable  Routine follow up in 2-4 weeks

## 2024-11-23 PROCEDURE — G0179 MD RECERTIFICATION HHA PT: HCPCS | Performed by: NURSE PRACTITIONER

## 2024-11-26 VITALS
TEMPERATURE: 97.6 F | HEART RATE: 70 BPM | RESPIRATION RATE: 20 BRPM | DIASTOLIC BLOOD PRESSURE: 52 MMHG | SYSTOLIC BLOOD PRESSURE: 100 MMHG

## 2024-11-26 PROBLEM — I95.9 ARTERIAL HYPOTENSION: Status: ACTIVE | Noted: 2024-11-26

## 2024-11-26 PROBLEM — R42 DIZZINESS: Status: ACTIVE | Noted: 2024-11-26

## 2024-11-26 PROBLEM — Z23 INFLUENZA VACCINE ADMINISTERED: Status: ACTIVE | Noted: 2024-11-26

## 2024-11-26 PROBLEM — R41.3 MEMORY CHANGES: Status: ACTIVE | Noted: 2024-11-26

## 2024-11-26 PROBLEM — Z51.81 MEDICATION MONITORING ENCOUNTER: Status: ACTIVE | Noted: 2024-11-26

## 2024-11-26 RX ORDER — DULOXETIN HYDROCHLORIDE 30 MG/1
30 CAPSULE, DELAYED RELEASE ORAL DAILY
Qty: 90 CAPSULE | Refills: 3 | Status: SHIPPED | OUTPATIENT
Start: 2024-11-26

## 2024-11-26 RX ORDER — NALOXONE HYDROCHLORIDE 4 MG/.1ML
4 SPRAY NASAL AS NEEDED
Qty: 1 EACH | Refills: 2 | Status: SHIPPED | OUTPATIENT
Start: 2024-11-26

## 2024-11-26 RX ORDER — NALOXONE HYDROCHLORIDE 4 MG/.1ML
4 SPRAY NASAL AS NEEDED
Qty: 1 EACH | Refills: 3 | Status: SHIPPED | OUTPATIENT
Start: 2024-11-26

## 2024-11-26 NOTE — PATIENT INSTRUCTIONS
Dear Mrs. Dumont,  It was a pleasure seeing you today.  As we discussed, please do not take the Indomethacin for gout  Script was written in 2015.    Also, your blood pressure is low.   We discussed decreasing your water pill -Torsemide to 1 tablet daily.    Please gently increase your fluid intake.  I have ordered labs. Home phlebotomy services will

## 2024-11-27 ENCOUNTER — LAB (OUTPATIENT)
Dept: LAB | Facility: LAB | Age: 89
End: 2024-11-27
Payer: MEDICARE

## 2024-11-27 ENCOUNTER — HOME CARE VISIT (OUTPATIENT)
Dept: HOME HEALTH SERVICES | Facility: HOME HEALTH | Age: 89
End: 2024-11-27
Payer: MEDICARE

## 2024-11-27 DIAGNOSIS — M10.9 GOUT, UNSPECIFIED CAUSE, UNSPECIFIED CHRONICITY, UNSPECIFIED SITE: ICD-10-CM

## 2024-11-27 DIAGNOSIS — M16.12 OSTEOARTHRITIS OF LEFT HIP, UNSPECIFIED OSTEOARTHRITIS TYPE: ICD-10-CM

## 2024-11-27 DIAGNOSIS — R41.3 MEMORY CHANGES: ICD-10-CM

## 2024-11-27 DIAGNOSIS — I50.30 HEART FAILURE WITH PRESERVED LEFT VENTRICULAR FUNCTION (HFPEF): ICD-10-CM

## 2024-11-27 LAB
ALBUMIN SERPL BCP-MCNC: 3.6 G/DL (ref 3.4–5)
ALP SERPL-CCNC: 78 U/L (ref 33–136)
ALT SERPL W P-5'-P-CCNC: 16 U/L (ref 7–45)
ANION GAP SERPL CALC-SCNC: 14 MMOL/L (ref 10–20)
AST SERPL W P-5'-P-CCNC: 24 U/L (ref 9–39)
BASOPHILS # BLD AUTO: 0.01 X10*3/UL (ref 0–0.1)
BASOPHILS NFR BLD AUTO: 0.1 %
BILIRUB SERPL-MCNC: 0.5 MG/DL (ref 0–1.2)
BNP SERPL-MCNC: 383 PG/ML (ref 0–99)
BUN SERPL-MCNC: 54 MG/DL (ref 6–23)
CALCIUM SERPL-MCNC: 8.6 MG/DL (ref 8.6–10.3)
CHLORIDE SERPL-SCNC: 97 MMOL/L (ref 98–107)
CO2 SERPL-SCNC: 30 MMOL/L (ref 21–32)
CREAT SERPL-MCNC: 1.42 MG/DL (ref 0.5–1.05)
EGFRCR SERPLBLD CKD-EPI 2021: 35 ML/MIN/1.73M*2
EOSINOPHIL # BLD AUTO: 0.31 X10*3/UL (ref 0–0.4)
EOSINOPHIL NFR BLD AUTO: 3.5 %
ERYTHROCYTE [DISTWIDTH] IN BLOOD BY AUTOMATED COUNT: 16.2 % (ref 11.5–14.5)
FOLATE SERPL-MCNC: 21.1 NG/ML
GLUCOSE SERPL-MCNC: 103 MG/DL (ref 74–99)
HCT VFR BLD AUTO: 35.6 % (ref 36–46)
HGB BLD-MCNC: 10.8 G/DL (ref 12–16)
IMM GRANULOCYTES # BLD AUTO: 0.04 X10*3/UL (ref 0–0.5)
IMM GRANULOCYTES NFR BLD AUTO: 0.4 % (ref 0–0.9)
LYMPHOCYTES # BLD AUTO: 1.06 X10*3/UL (ref 0.8–3)
LYMPHOCYTES NFR BLD AUTO: 11.9 %
MCH RBC QN AUTO: 29.1 PG (ref 26–34)
MCHC RBC AUTO-ENTMCNC: 30.3 G/DL (ref 32–36)
MCV RBC AUTO: 96 FL (ref 80–100)
MONOCYTES # BLD AUTO: 0.78 X10*3/UL (ref 0.05–0.8)
MONOCYTES NFR BLD AUTO: 8.7 %
NEUTROPHILS # BLD AUTO: 6.72 X10*3/UL (ref 1.6–5.5)
NEUTROPHILS NFR BLD AUTO: 75.4 %
NRBC BLD-RTO: 0 /100 WBCS (ref 0–0)
PLATELET # BLD AUTO: 261 X10*3/UL (ref 150–450)
POTASSIUM SERPL-SCNC: 5.1 MMOL/L (ref 3.5–5.3)
PROT SERPL-MCNC: 6.6 G/DL (ref 6.4–8.2)
RBC # BLD AUTO: 3.71 X10*6/UL (ref 4–5.2)
SODIUM SERPL-SCNC: 136 MMOL/L (ref 136–145)
URATE SERPL-MCNC: 6 MG/DL (ref 2.3–6.7)
VIT B12 SERPL-MCNC: 549 PG/ML (ref 211–911)
WBC # BLD AUTO: 8.9 X10*3/UL (ref 4.4–11.3)

## 2024-11-27 PROCEDURE — 82607 VITAMIN B-12: CPT

## 2024-11-27 PROCEDURE — 84550 ASSAY OF BLOOD/URIC ACID: CPT

## 2024-11-27 PROCEDURE — 80053 COMPREHEN METABOLIC PANEL: CPT

## 2024-11-27 PROCEDURE — 85025 COMPLETE CBC W/AUTO DIFF WBC: CPT

## 2024-11-27 PROCEDURE — 84207 ASSAY OF VITAMIN B-6: CPT

## 2024-11-27 PROCEDURE — 84425 ASSAY OF VITAMIN B-1: CPT

## 2024-11-27 PROCEDURE — 36415 COLL VENOUS BLD VENIPUNCTURE: CPT

## 2024-11-27 PROCEDURE — 82746 ASSAY OF FOLIC ACID SERUM: CPT

## 2024-11-27 PROCEDURE — 83880 ASSAY OF NATRIURETIC PEPTIDE: CPT

## 2024-12-01 LAB — VIT B1 PYROPHOSHATE BLD-SCNC: 197 NMOL/L (ref 70–180)

## 2024-12-02 LAB — PYRIDOXAL PHOS SERPL-SCNC: 312.8 NMOL/L (ref 20–125)

## 2024-12-03 ENCOUNTER — TELEPHONE (OUTPATIENT)
Dept: GERIATRIC MEDICINE | Facility: CLINIC | Age: 89
End: 2024-12-03
Payer: MEDICARE

## 2024-12-03 DIAGNOSIS — T84.54XS INFECTION ASSOCIATED WITH INTERNAL LEFT KNEE PROSTHESIS, SEQUELA: Primary | ICD-10-CM

## 2024-12-03 DIAGNOSIS — J45.909 ASTHMA, UNSPECIFIED ASTHMA SEVERITY, UNSPECIFIED WHETHER COMPLICATED, UNSPECIFIED WHETHER PERSISTENT (HHS-HCC): ICD-10-CM

## 2024-12-03 DIAGNOSIS — M79.2 PERIPHERAL NEUROPATHIC PAIN: ICD-10-CM

## 2024-12-03 DIAGNOSIS — M00.9: ICD-10-CM

## 2024-12-03 DIAGNOSIS — M81.0 OSTEOPOROSIS WITHOUT CURRENT PATHOLOGICAL FRACTURE, UNSPECIFIED OSTEOPOROSIS TYPE: ICD-10-CM

## 2024-12-03 DIAGNOSIS — M47.817 OSTEOARTHRITIS OF LUMBOSACRAL SPINE: ICD-10-CM

## 2024-12-03 DIAGNOSIS — I50.30 HEART FAILURE WITH PRESERVED LEFT VENTRICULAR FUNCTION (HFPEF): ICD-10-CM

## 2024-12-03 DIAGNOSIS — G89.29 OTHER CHRONIC PAIN: ICD-10-CM

## 2024-12-03 DIAGNOSIS — K21.9 GASTROESOPHAGEAL REFLUX DISEASE, UNSPECIFIED WHETHER ESOPHAGITIS PRESENT: ICD-10-CM

## 2024-12-03 DIAGNOSIS — M10.9 GOUT, UNSPECIFIED CAUSE, UNSPECIFIED CHRONICITY, UNSPECIFIED SITE: ICD-10-CM

## 2024-12-03 RX ORDER — PANTOPRAZOLE SODIUM 20 MG/1
20 TABLET, DELAYED RELEASE ORAL DAILY
Qty: 90 TABLET | Refills: 3 | Status: SHIPPED | OUTPATIENT
Start: 2024-12-03

## 2024-12-03 RX ORDER — METOPROLOL TARTRATE 25 MG/1
25 TABLET, FILM COATED ORAL 2 TIMES DAILY
Qty: 180 TABLET | Refills: 3 | Status: SHIPPED | OUTPATIENT
Start: 2024-12-03

## 2024-12-03 RX ORDER — ASPIRIN 81 MG/1
81 TABLET ORAL DAILY
Qty: 90 TABLET | Refills: 3 | Status: SHIPPED | OUTPATIENT
Start: 2024-12-03 | End: 2024-12-03

## 2024-12-03 RX ORDER — DULOXETIN HYDROCHLORIDE 30 MG/1
30 CAPSULE, DELAYED RELEASE ORAL DAILY
Qty: 90 CAPSULE | Refills: 3 | Status: SHIPPED | OUTPATIENT
Start: 2024-12-03

## 2024-12-03 RX ORDER — ACETAMINOPHEN 500 MG
TABLET ORAL
Qty: 180 TABLET | Refills: 11 | Status: SHIPPED | OUTPATIENT
Start: 2024-12-03

## 2024-12-03 RX ORDER — TORSEMIDE 20 MG/1
20 TABLET ORAL DAILY
Qty: 90 TABLET | Refills: 3 | Status: SHIPPED | OUTPATIENT
Start: 2024-12-03 | End: 2025-12-03

## 2024-12-03 RX ORDER — ALLOPURINOL 100 MG/1
TABLET ORAL
Qty: 45 TABLET | Refills: 3 | Status: SHIPPED | OUTPATIENT
Start: 2024-12-03

## 2024-12-03 RX ORDER — ALENDRONATE SODIUM TABLET 35 MG/1
35 TABLET ORAL
Qty: 12 TABLET | Refills: 3 | Status: SHIPPED | OUTPATIENT
Start: 2024-12-03 | End: 2025-12-03

## 2024-12-03 RX ORDER — CEPHALEXIN 500 MG/1
500 CAPSULE ORAL 2 TIMES DAILY
Qty: 180 CAPSULE | Refills: 3 | Status: SHIPPED | OUTPATIENT
Start: 2024-12-03

## 2024-12-03 RX ORDER — IPRATROPIUM BROMIDE AND ALBUTEROL SULFATE 2.5; .5 MG/3ML; MG/3ML
3 SOLUTION RESPIRATORY (INHALATION) EVERY 4 HOURS
Qty: 180 ML | Refills: 3 | OUTPATIENT
Start: 2024-12-03

## 2024-12-03 RX ORDER — DOCUSATE SODIUM 100 MG/1
100 CAPSULE, LIQUID FILLED ORAL 2 TIMES DAILY
Qty: 180 CAPSULE | Refills: 3 | Status: SHIPPED | OUTPATIENT
Start: 2024-12-03

## 2024-12-03 RX ORDER — IPRATROPIUM BROMIDE AND ALBUTEROL SULFATE 2.5; .5 MG/3ML; MG/3ML
3 SOLUTION RESPIRATORY (INHALATION) EVERY 4 HOURS
Qty: 180 ML | Refills: 3 | Status: SHIPPED | OUTPATIENT
Start: 2024-12-03

## 2024-12-03 NOTE — TELEPHONE ENCOUNTER
"  Son Praveen called  States patient seems to be lethargic and confused.     NP offerred to send phlebotomist out for labs.         He stated that his brother, Contreras, has basically abandoned them.  They  out a 20 suite property  \"Ran it to the ground and I have to take care of these properties by myself.  \"Property Rich and [place] Poor\".    Left me with $60-80.000 in debt    So now she is very depressed  and her meds are messed up.  I can't do it.     Tax liens on the house that he lives in.     He abandoned us.    The meds are all mess up.     He says in terms of safety, he did get her a Life Alert and he has a camera installed to see her. (Previous APS discussion)   States he understands about the clutter in the house, which he will eventually have to get rid    Albuterol Sulfate in need of this   Son states patient is out of Tramadol   NP discussed that since patient is not able to manage medications, I will not be able to prescribe more Tramadol.  In addition, patient is asking for more Gabapentin.       NP contacted pharmacy directly :  The Albuterol script needed clarification and NP has requested for the Gabapentin.  They sent the Albuterol to the incorrect Provider     Praveen let the pharmacist know that he requested meds last week and since has been told there is nothing there.  All of the bottles have been thrown out and he states she is out of all medications.     Plan  Appt Monday at 2:30 PM  No more Gabapentin or Tramadol  Stick with Tylenol and Topical Cream    NP will send refills for all meds.   Advised that her options are to go to the ED  Patient's labs on 11/27 negative for infection.  A little \"dry\" advised to gently increase fluid intake  My Mom still thinks she is independent.   NP will have this conversation with her       I have reorder only the necessary medications  ASA was not reordered as she was not discharged to home on it per last discharge summary.       DB              "

## 2024-12-09 ENCOUNTER — OFFICE VISIT (OUTPATIENT)
Dept: GERIATRIC MEDICINE | Facility: CLINIC | Age: 89
End: 2024-12-09
Payer: MEDICARE

## 2024-12-09 DIAGNOSIS — R53.83 LETHARGY: Primary | ICD-10-CM

## 2024-12-09 DIAGNOSIS — R41.3 MEMORY CHANGES: ICD-10-CM

## 2024-12-09 DIAGNOSIS — I50.30 HEART FAILURE WITH PRESERVED EJECTION FRACTION, UNSPECIFIED HF CHRONICITY: ICD-10-CM

## 2024-12-09 DIAGNOSIS — H61.21 IMPACTED CERUMEN OF RIGHT EAR: ICD-10-CM

## 2024-12-09 PROCEDURE — 1159F MED LIST DOCD IN RCRD: CPT | Performed by: NURSE PRACTITIONER

## 2024-12-09 PROCEDURE — 1036F TOBACCO NON-USER: CPT | Performed by: NURSE PRACTITIONER

## 2024-12-09 PROCEDURE — 3078F DIAST BP <80 MM HG: CPT | Performed by: NURSE PRACTITIONER

## 2024-12-09 PROCEDURE — 1126F AMNT PAIN NOTED NONE PRSNT: CPT | Performed by: NURSE PRACTITIONER

## 2024-12-09 PROCEDURE — 3074F SYST BP LT 130 MM HG: CPT | Performed by: NURSE PRACTITIONER

## 2024-12-09 PROCEDURE — 1160F RVW MEDS BY RX/DR IN RCRD: CPT | Performed by: NURSE PRACTITIONER

## 2024-12-09 PROCEDURE — 99349 HOME/RES VST EST MOD MDM 40: CPT | Performed by: NURSE PRACTITIONER

## 2024-12-09 ASSESSMENT — PAIN SCALES - GENERAL: PAINLEVEL_OUTOF10: 0-NO PAIN

## 2024-12-10 NOTE — PROGRESS NOTES
"Some elements may have been copied from prior note(s). The elements have been updated and reflect current evaluation, examination and decision making from today.           Reason for visit:  Follow up for changes in memory, and management of chronic active illnesses.         Summary Statement: Mrs. 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      Reason for visit: Leaving her home requires a considerable and taxing amount of effort, and maximum resources due to inability to walk.     HPI:  Mrs. Dumont is being seen today at home for follow up .  In the interim, her son, Praveen called.  See documentation for telephone call on 12/3/2024   States his mother is lethargic.  He wonders if she is getting her medications mixed up.  States he cannot be there 24 hours.  He did have a camera installed for safety .    States he picks up the medications.     Patient is insulted that Praveen Is questioning her memory. \"He needs his memory checked. I'm 89 y.o\".  She states that she has difficulty hearing and Praveen doesn't understand that.    She is alert and making necklaces by hand at the time of visit  NP administer the Mini-Cog to start due to hearing and visual deficits  She scored 4/5 (could not recall one word) "         ROS;  -No chest pain sob  -no fever or chills  -No diarrhea or constipation  -no rashes  -no cough, cold or flu-like symptoms  -No dizziness       HYSICAL EXAM  Constitutional   /60 (BP Location: Right arm, Patient Position: Sitting, BP Cuff Size: Adult)   Pulse 66   Temp 36.6 °C (97.9 °F) (Temporal)   Resp 20   LMP  (LMP Unknown)   /POX= 95 % RA         General appearance: Alert, cooperative and in no acute distress. Scooting around independently in manual wheelchair   Head and Face Examination/ inspection of hair and scalp: Normal.   Eyes   Inspection of eyes: Sclera and conjunctiva were normal.    Pupil exam: PERRLA. Extraocular movements were intact.   Ears, Nose, Mouth, and Throat   Ears:   External: Normal.    Internal:  Cerumen impaction right ear.  Left ear canal patent.   Oropharynx: Normal with moist mucus membranes, tongue midline, no PND, no erythema or enlargement of tonsils.    Hearing: [place]      Lips, teeth, and gums: Normal.   Neck   Neck Exam: Appearance of the neck was normal. No neck masses observed. No jugular vein distension.   Pulmonary   Respiratory assessment: No respiratory distress, normal respiratory rhythm and effort.    Auscultation of Lungs: Diminished throughout , but otherwise clear.   Cardiovascular   Auscultation of heart: Abnormal.  The heart rate was normal. A grade 2 systolic murmur was heard at the LLSB.    Pedal pulses: Regular rate. 2+ bilaterally.  +AICD left upper chest.  Area non-tender . Dressing intact with ends   Examination of extremities for edema and/or varicosities: None (unchanged)    Chest   Chest: Symmetrical and normal appearance.   Abdomen   Abdominal Exam: No bruits normal bowel sounds, soft, non-tender, no abdominal masses palpated.    Liver and Spleen exam: No hepato-splenomegaly. wearing a diaper.   Genitourinary   Bladder: Normal on palpation. wearing a diaper.   Lymphatic   Palpation of lymph nodes in axillae: Normal.     Palpation of  lymph nodes in neck: No cervical lymphadenopathy.    Palpation of lymph nodes in other areas: Normal.    Musculoskeletal   Digits and nails: Abnormal.     Inspection/palpation of joints: No joint swelling seen.    Range of motion: Abnormal.     Muscle strength/tone: Normal.  decrease ROM to left leg/hip.   RUE.LUE 5/5 RLE 4/5  LLE 4/5  No CVA tenderness   Gait:  Transferred off BSC to manual wheelchair without difficulty   Skin   Skin inspection: Skin dry , warm and intact. Normal skin color and pigmentation, and normal skin turgor .  Neurologic   Cranial nerves: Nerves 2-12 were intact, no focal neuro defects, except for hearing   Psychiatric   Judgment and insight: Intact and appropriate.    Orientation: Oriented to person, place, and time.    Mood and affect: Normal.    Recent and remote memory: Normal.  .    TEST  MINI COG   4/5 ( less the HS education)     LABS    Chemistry    Lab Results   Component Value Date/Time     11/27/2024 1132    K 5.1 11/27/2024 1132    CL 97 (L) 11/27/2024 1132    CO2 30 11/27/2024 1132    BUN 54 (H) 11/27/2024 1132    CREATININE 1.42 (H) 11/27/2024 1132    Lab Results   Component Value Date/Time    CALCIUM 8.6 11/27/2024 1132    ALKPHOS 78 11/27/2024 1132    AST 24 11/27/2024 1132    ALT 16 11/27/2024 1132    BILITOT 0.5 11/27/2024 1132        Lab Results   Component Value Date    WBC 8.9 11/27/2024    HGB 10.8 (L) 11/27/2024    HCT 35.6 (L) 11/27/2024    MCV 96 11/27/2024     11/27/2024     ASSESSMENT/PLAN  1. Lethargy (Primary)  -per son last week  -None at the time of visit  -However, NP did discontinue Tramadol and Gabapentin  -Follow up in 1-2 weeks     2. Impacted cerumen of right ear  -large  -removed under otoscopic exam-  -Tolerated well  -SNHL remains, but hearing was improved    3. Memory changes  -4/5 Mini Cog  -Possibly related to Tramadol and Gabapentin although has tolerated well for some time  -Also, possibly related to age-related forgetfulness  -    4.  Heart failure with preserved ejection fraction, unspecified HF chronicity  No s/s of volume overload  -she did decrease Torsemide to 20 mg once a day  -Hypotension resolved     Stable  Routine follow up in 1-2 weeks

## 2024-12-12 VITALS
SYSTOLIC BLOOD PRESSURE: 120 MMHG | DIASTOLIC BLOOD PRESSURE: 60 MMHG | HEART RATE: 66 BPM | RESPIRATION RATE: 20 BRPM | TEMPERATURE: 97.9 F

## 2024-12-12 PROBLEM — R53.83 LETHARGY: Status: ACTIVE | Noted: 2024-12-12

## 2024-12-12 NOTE — PATIENT INSTRUCTIONS
Dear  Tim,  It is always a pleasure to see you.    I will follow up with you in 1-2 weeks.    Sincerely,    Miladys Menon, MSN, APRN-BC

## 2025-01-15 ENCOUNTER — TELEPHONE (OUTPATIENT)
Dept: GERIATRIC MEDICINE | Facility: CLINIC | Age: OVER 89
End: 2025-01-15
Payer: MEDICARE

## 2025-01-15 DIAGNOSIS — E78.5 HYPERLIPIDEMIA, UNSPECIFIED HYPERLIPIDEMIA TYPE: ICD-10-CM

## 2025-01-16 RX ORDER — ATORVASTATIN CALCIUM 20 MG/1
20 TABLET, FILM COATED ORAL NIGHTLY
Qty: 90 TABLET | Refills: 0 | Status: SHIPPED | OUTPATIENT
Start: 2025-01-16

## 2025-01-20 ENCOUNTER — HOSPITAL ENCOUNTER (OUTPATIENT)
Dept: CARDIOLOGY | Facility: CLINIC | Age: OVER 89
Discharge: HOME | End: 2025-01-20
Payer: COMMERCIAL

## 2025-01-20 DIAGNOSIS — I44.2 CHB (COMPLETE HEART BLOCK): ICD-10-CM

## 2025-01-20 DIAGNOSIS — Z95.0 PRESENCE OF CARDIAC PACEMAKER: ICD-10-CM

## 2025-01-20 PROCEDURE — 93294 REM INTERROG EVL PM/LDLS PM: CPT | Performed by: INTERNAL MEDICINE

## 2025-01-20 PROCEDURE — 93296 REM INTERROG EVL PM/IDS: CPT

## 2025-02-11 ENCOUNTER — OFFICE VISIT (OUTPATIENT)
Dept: GERIATRIC MEDICINE | Facility: CLINIC | Age: OVER 89
End: 2025-02-11
Payer: COMMERCIAL

## 2025-02-11 VITALS
HEART RATE: 78 BPM | TEMPERATURE: 97.7 F | SYSTOLIC BLOOD PRESSURE: 128 MMHG | RESPIRATION RATE: 20 BRPM | DIASTOLIC BLOOD PRESSURE: 60 MMHG

## 2025-02-11 DIAGNOSIS — I50.30 HEART FAILURE WITH PRESERVED EJECTION FRACTION, UNSPECIFIED HF CHRONICITY: Primary | ICD-10-CM

## 2025-02-11 DIAGNOSIS — M15.9 GENERALIZED OSTEOARTHROSIS OF MULTIPLE SITES: ICD-10-CM

## 2025-02-11 DIAGNOSIS — E53.8 VITAMIN B12 DEFICIENCY WITHOUT ANEMIA: ICD-10-CM

## 2025-02-11 DIAGNOSIS — M79.2 PERIPHERAL NEUROPATHIC PAIN: ICD-10-CM

## 2025-02-11 DIAGNOSIS — J45.909 ASTHMA, UNSPECIFIED ASTHMA SEVERITY, UNSPECIFIED WHETHER COMPLICATED, UNSPECIFIED WHETHER PERSISTENT (HHS-HCC): ICD-10-CM

## 2025-02-11 DIAGNOSIS — D86.0 SARCOIDOSIS OF LUNG (MULTI): ICD-10-CM

## 2025-02-11 PROCEDURE — 99349 HOME/RES VST EST MOD MDM 40: CPT | Performed by: NURSE PRACTITIONER

## 2025-02-11 PROCEDURE — 3074F SYST BP LT 130 MM HG: CPT | Performed by: NURSE PRACTITIONER

## 2025-02-11 PROCEDURE — 3078F DIAST BP <80 MM HG: CPT | Performed by: NURSE PRACTITIONER

## 2025-02-11 PROCEDURE — 1159F MED LIST DOCD IN RCRD: CPT | Performed by: NURSE PRACTITIONER

## 2025-02-11 PROCEDURE — 1160F RVW MEDS BY RX/DR IN RCRD: CPT | Performed by: NURSE PRACTITIONER

## 2025-02-11 PROCEDURE — 1036F TOBACCO NON-USER: CPT | Performed by: NURSE PRACTITIONER

## 2025-02-11 PROCEDURE — 1126F AMNT PAIN NOTED NONE PRSNT: CPT | Performed by: NURSE PRACTITIONER

## 2025-02-11 ASSESSMENT — PAIN SCALES - GENERAL: PAINLEVEL_OUTOF10: 0-NO PAIN

## 2025-02-11 NOTE — PROGRESS NOTES
"Some elements may have been copied from prior note(s). The elements have been updated and reflect current evaluation, examination and decision making from today.           Reason for visit.  Follow up for HF and management of chronic active illnesses.       Summary Statement: Mrs. 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      Reason for visit: Leaving her home requires a considerable and taxing amount of effort, and maximum resources due to inability to walk.     HPI:  Mrs. Dumont is being seen today at home for follow up .  Her son Praveen is also home but not present for the visit.     NP complimented patient on how well she looks.  She states she has more energy and she has not had any swelling.   She states she has been moving her bowels well and has not required any stool softeners.  States her son really cooks well and she eats a lot of fruit. At the time of visit she is eating a bowel  Mixed fresh fruits.     She states she spends a lot of time making necklaces.   State she does not have anyone to talk to and is lonely.     She has a lot of medications which are low- NP called in refills      Also, states she has not have a Vitamin B12 shot in months.  Her son has poor vision and is not able  To draw " it up in syringe anymore.  She denies fatiuge or lack of energy   ROS:  Denies chest or sob  No PND or orthopnea  No falls/ED visits or hospitalizations  No cough, cold or flu-like symptoms  No dizziness  No skin rashes  Tylenol and Duloxetine control OA pain     PHYSICAL EXAM  Constitutional      /60 (BP Location: Left arm, Patient Position: Sitting, BP Cuff Size: Adult)   Pulse 78   Temp 36.5 °C (97.7 °F) (Temporal)   Resp 20   LMP  (LMP Unknown) /POX=99% RA     General appearance: Alert, cooperative and in no acute distress. Scooting around independently in manual wheelchair   Head and Face Examination/ inspection of hair and scalp: Normal.   Eyes   Inspection of eyes: Sclera and conjunctiva were normal.    Pupil exam: PERRLA. Extraocular movements were intact.   Ears, Nose, Mouth, and Throat   Ears:   External: Normal.    Internal:  Elem  Oropharynx: Normal with moist mucus membranes, tongue midline, no PND, no erythema   or enlargement of tonsils.    Lips, teeth, and gums: Normal.   Neck   Neck Exam: Appearance of the neck was normal. No neck masses observed. No jugular vein distension.     Cardiovascular   Auscultation of heart: Abnormal.  The heart rate was normal. A grade 2 systolic murmur was heard at the LLSB.    Regular rate. +AICD left upper chest.  Area non-tender .  Pedal pulses: deferred  Examination of extremities for edema and/or varicosities: None (unchanged)    Pulmonary   Respiratory assessment: No respiratory distress, normal respiratory rhythm and effort.    Auscultation of Lungs: CTAB   Chest   Chest: Symmetrical and normal appearance.   Gastrointestinal  Abdominal Exam: No bruits -normal bowel sounds, soft, non-tender, no abdominal masses palpated.    Liver and Spleen exam: No hepato-splenomegaly. wearing a diaper.   Genitourinary   Bladder: Normal on palpation. wearing a diaper.   Lymphatic   Palpation of lymph nodes in axillae: Normal.     Palpation of lymph nodes in neck: No  cervical lymphadenopathy.    Palpation of lymph nodes in other areas: Normal.    Musculoskeletal   Digits and nails: Abnormal.     Inspection/palpation of joints: No joint swelling seen.    Range of motion: Abnormal.     Muscle strength/tone: Normal.  decrease ROM to left leg/hip.   RUE.LUE 5/5 RLE 4/5  LLE 4/5  No CVA tenderness   Gait:deferred  Skin   Skin inspection: Skin dry , warm and intact. Normal skin color and pigmentation,   and normal skin turgor .  Neurologic   Cranial nerves: Nerves 2-12 were intact, no focal neuro defects, except for hearing   Psychiatric   Judgment and insight: Intact and appropriate.    Orientation: Oriented to person, place, and time.    Mood and affect: Normal.    Recent and remote memory: Normal.      LABS    Chemistry    Lab Results   Component Value Date/Time     11/27/2024 1132    K 5.1 11/27/2024 1132    CL 97 (L) 11/27/2024 1132    CO2 30 11/27/2024 1132    BUN 54 (H) 11/27/2024 1132    CREATININE 1.42 (H) 11/27/2024 1132    Lab Results   Component Value Date/Time    CALCIUM 8.6 11/27/2024 1132    ALKPHOS 78 11/27/2024 1132    AST 24 11/27/2024 1132    ALT 16 11/27/2024 1132    BILITOT 0.5 11/27/2024 1132        Lab Results   Component Value Date    WBC 8.9 11/27/2024    HGB 10.8 (L) 11/27/2024    HCT 35.6 (L) 11/27/2024    MCV 96 11/27/2024     11/27/2024     ASSESSMENT/PLAN  1. Heart failure with preserved ejection fraction, unspecified HF chronicity (Primary)  -no s/s of volume overload  -continue Furosemide 20  mg once a day (verified dosage)     2. Asthma, unspecified asthma severity, unspecified whether complicated, unspecified whether persistent /  Sarcoidosis of lung   -no exacerbations  -controlled on inhalers     3. Peripheral neuropathic pain/ Generalized osteoarthrosis of multiple sites  -controlled on Duloxetine 30 mg daily  -Tylenol prn and Topical Pain Cream prn    4. Vitamin B12 deficiency without anemia  -most recent level WNL but it is not clear  if she was still receiving the injections  -But has not had in several months due to son is no longer able to see how to withdraw medication  -Will prescribe Vitamin B12 500 mcg daily  -Will check all labs that are due in April or with next visit .                5. Polypharmacy  -Patient is taking 2 MVI's  - Discussed to continue with the Centrum only  - She is also taking a B-Complex Vitamin    ++At the time of visit several medications were called into pharmacy refill line     Stable  Routine follow up in 2 months   -

## 2025-02-11 NOTE — PATIENT INSTRUCTIONS
Dear Mrs. Dumont,  It was a pleasure seeing you today.    Continue your medications as we discussed.  Your vital signs are excellent!!     I will follow up with you in 2 months and we will order  routine blood work at that time.     Sincerely,    Miladys Menon MSN, APRN-BC

## 2025-03-24 ENCOUNTER — OFFICE VISIT (OUTPATIENT)
Dept: GERIATRIC MEDICINE | Facility: CLINIC | Age: OVER 89
End: 2025-03-24
Payer: COMMERCIAL

## 2025-03-24 DIAGNOSIS — E78.1 HYPERTRIGLYCERIDEMIA: ICD-10-CM

## 2025-03-24 DIAGNOSIS — R10.9 LEFT SIDED ABDOMINAL PAIN: ICD-10-CM

## 2025-03-24 DIAGNOSIS — G89.29 OTHER CHRONIC PAIN: Primary | ICD-10-CM

## 2025-03-24 DIAGNOSIS — N18.32 STAGE 3B CHRONIC KIDNEY DISEASE (MULTI): ICD-10-CM

## 2025-03-24 DIAGNOSIS — R11.0 NAUSEA: ICD-10-CM

## 2025-03-24 DIAGNOSIS — D64.9 ANEMIA, UNSPECIFIED TYPE: ICD-10-CM

## 2025-03-24 DIAGNOSIS — E53.8 VITAMIN B12 DEFICIENCY: ICD-10-CM

## 2025-03-25 ENCOUNTER — HOME CARE VISIT (OUTPATIENT)
Dept: HOME HEALTH SERVICES | Facility: HOME HEALTH | Age: OVER 89
End: 2025-03-25
Payer: MEDICARE

## 2025-03-25 RX ORDER — TRAMADOL HYDROCHLORIDE 50 MG/1
50 TABLET ORAL NIGHTLY PRN
Qty: 7 TABLET | Refills: 0 | Status: SHIPPED | OUTPATIENT
Start: 2025-03-25

## 2025-03-25 RX ORDER — ONDANSETRON 4 MG/1
4 TABLET, FILM COATED ORAL EVERY 8 HOURS PRN
Qty: 20 TABLET | Refills: 0 | Status: SHIPPED | OUTPATIENT
Start: 2025-03-25 | End: 2025-03-27 | Stop reason: SDUPTHER

## 2025-03-25 NOTE — PROGRESS NOTES
"  Some elements may have been copied from prior note(s). The elements have been updated and   reflect current evaluation, examination and decision making from today.             Reason for visit.  Follow up chronic pain and management of chronic active illnesses.         Summary Statement: Mrs. 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      Reason for visit: Leaving her home requires a considerable and taxing amount of effort, and maximum resources due to inability to walk.     HPI:  Mrs. Dumont is being seen today at home for follow up .  Patient is asking if she can resume the Tramadol.  \"The pain in my left hip and leg is so bad. Especially at night\".  Tylenol alone (scheduled) and topical pain creams have been ineffective.  History of infected hip (left) s.p surgery- on chronic suppressive antibiotic therapy ( Keflex)-left total knee replacement (2011) and revision (2014) . Tramadol controlled pain in the past.  NP did a trial off medication       In addition, she is asking if she can have a refill for the Zofran.   \"At times a get a real sharp pain on the left side of my stomach.  When I take this , it helps\".  Note that in 2022/2023 patient was hospitalized for diverticulitis ( left-sided abdominal " pain )  complicated by gastrointestinal bleeding and received blood transfusions.   She denies nausea , vomiting, loose stools , move bowels daily (eats lots of fruits with fiber),  No blood in stools, and no  fever or chills.         ROS:  Denies chest or sob  Denies dizziness or headache   No PND or orthopnea  No falls/ED visits or hospitalizations  No cough, cold or flu-like symptoms  No dizziness  No skin rashes     PHYSICAL EXAM  Constitutional       /60 (BP Location: Left arm, Patient Position: Sitting, BP Cuff Size: Adult)   Pulse 78   Temp 36.5 °C (97.7 °F) (Temporal)   Resp 20   LMP  (LMP Unknown) /POX=99% RA      General appearance: Alert, cooperative and in no acute distress. Scooting around independently in manual wheelchair   Head and Face Examination/ inspection of hair and scalp: Normal.   Eyes   Inspection of eyes: Sclera and conjunctiva were normal.    Pupil exam: PERRLA. Extraocular movements were intact.   Ears, Nose, Mouth, and Throat   Ears:   External: Normal.    Internal:  [place]  Oropharynx: Normal with moist mucus membranes, tongue midline, no PND, no erythema   or enlargement of tonsils.    Lips, teeth, and gums: Normal.   Neck   Neck Exam: Appearance of the neck was normal. No neck masses observed. No jugular vein distension.      Cardiovascular   Auscultation of heart: Abnormal.  The heart rate was normal. A grade 2 systolic murmur was heard at the LLSB.    Regular rate. +AICD left upper chest.  Area non-tender .  Pedal pulses: deferred  Examination of extremities for edema and/or varicosities: None (unchanged)    Pulmonary   Respiratory assessment: No respiratory distress, normal respiratory rhythm and effort.    Auscultation of Lungs: CTAB   Chest   Chest: Symmetrical and normal appearance.   Gastrointestinal  Abdominal Exam: No bruits -normal bowel sounds, soft, non-tender, no abdominal masses palpated.    Liver and Spleen exam: No hepato-splenomegaly. wearing a diaper.    Genitourinary   Bladder: Normal on palpation. wearing a diaper.   Lymphatic   Palpation of lymph nodes in axillae: Normal.     Palpation of lymph nodes in neck: No cervical lymphadenopathy.    Palpation of lymph nodes in other areas: Normal.    Musculoskeletal   Digits and nails: Abnormal.     Inspection/palpation of joints: No joint swelling seen.    Range of motion: Abnormal.     Muscle strength/tone: Normal.  decrease ROM to left leg/hip.   RUE.LUE 5/5 RLE 4/5  LLE 4/5  No CVA tenderness   Gait:deferred  Skin   Skin inspection: Skin dry , warm and intact. Normal skin color and pigmentation,   and normal skin turgor .  Neurologic   Cranial nerves: Nerves 2-12 were intact, no focal neuro defects, except for hearing   Psychiatric   Judgment and insight: Intact and appropriate.    Orientation: Oriented to person, place, and time.    Mood and affect: Normal.    Recent and remote memory: Normal.       LABS    Chemistry           Lab Results   Component Value Date/Time      11/27/2024 1132     K 5.1 11/27/2024 1132     CL 97 (L) 11/27/2024 1132     CO2 30 11/27/2024 1132     BUN 54 (H) 11/27/2024 1132     CREATININE 1.42 (H) 11/27/2024 1132          Lab Results   Component Value Date/Time     CALCIUM 8.6 11/27/2024 1132     ALKPHOS 78 11/27/2024 1132     AST 24 11/27/2024 1132     ALT 16 11/27/2024 1132     BILITOT 0.5 11/27/2024 1132               Lab Results   Component Value Date     WBC 8.9 11/27/2024     HGB 10.8 (L) 11/27/2024     HCT 35.6 (L) 11/27/2024     MCV 96 11/27/2024      11/27/2024        ASSESSMENT/PLAN     1. Other chronic pain (Primary)/Chronic left hip pain/  -history of  infected hip (left) s.p surgery- on chronic suppressive antibiotic therapy ( Keflex)-left total knee replacement (2011) and revision (2014) . Tramadol controlled pain in the past.  -pain is controlled during the day on scheduled Tylenol and Duloxetine  -but worse at night  -Has been off Tramadol for some time   - Will  resume traMADol (Ultram) 50 mg tablet; Take 1 tablet (50 mg) by mouth as needed at bedtime   for severe pain (7 - 10) or moderate pain (4 - 6) for up to 7 doses.  Dispense: 7 tablet; Refill: 0  -Controlled substance agreement reviewed and signed  -will obtain baseline labs Tramadol Urine and Drug Profile 9     2. Nausea/ Left sided abdominal pain  -Intermittent  -not interfering with appetite   - In 2022/2023 patient was hospitalized for diverticulitis ( left-sided abdominal pain )  complicated by gastrointestinal bleeding and received blood transfusions.   -reports no blood in stool   -states Zofran relieves pain  -Will check CBC  -      3. Anemia, unspecified type  -on Ferrous sulfate/vitamin C   - Ferritin; Future  - Iron and TIBC; Future      4. Stage 3b chronic kidney disease (Multi)  -BP well controlled    - Renal function panel; Future      5. Vitamin B12 deficiency  -Patient used to receive injections  -Son is no longer able to administer due to poor vision  -Discussed checking level to see if it is still required or can be switched to oral   - Vitamin B12; Future    6. Hypertriglyceridemia  -On statin  -NO s/s of adverse effects   - Lipid panel; Future             Menu  Alayna Dumont 89E  Contact the [employer]  YOB: 1932  Recent Address:  [address] [place] [address]  Status of States Queried:  Error for 1 or more states. View Details  View Linked Records (4)  Other Resources  Starting 12/16/2024, the [place] Automated Rx Reporting System ([place]ARRS) will begin alerting healthcare providers about patients who have experienced a non-fatal drug overdose. The non-fatal drug overdose indicator is intended to improve care coordination and promote access to medication for opioid use disorder and other tools to prevent fatal overdoses.  This information is intended to be used to improve care coordination and should not be used to terminate a patient relationship.  For  more information about the indicator, please see the following quick reference guide: www.pharmacy.[place].gov/NFOD.  One or more patients do not exactly match the search patient demographic information that was sent during search: Alayna Dumont, 05/10/1932.  The report displayed is for the next best possible match. Please verify the report is for the intended patient before proceeding.      Report generated on 03/25/2025. Report Date Range: 03/25/2023 - 03/25/2025  UNINTENTIONAL OVERDOSE RISK SCORE MODEL    Caution/Important Reminder: Information for Healthcare Professionals  BELOW [id number]  NARX SCORES  NARCOTICS  200  ACTIVE RX  0  SEDATIVES  090  ACTIVE RX  0  STIMULANTS  000  ACTIVE RX  0  KEY CONTRIBUTING FACTORS TO OVERDOSE RISK SCORE MODEL  Greater than six dispensations  Yes  Benzo - Narcotics overlap  0 Days  Number of high risk scripts  0  Number of pharmacies where narcotics/sedatives/stimulants filled  3  Total days supply of short-acting drugs  66  State Indicators (0)  Details  RX Graph  [] Narcotic[] Buprenorphine[] Sedative[] Stimulant[] OtherLearn how to use graph  All PrescribersPrescribers5 - Roro Zamoranoa4 - Miladys Menon3 - Mary Jane Yadav2 - Hudson Michael1 - Jessica Pickard,[id number]  Disclaimer  Morphine Milligram Equivalent (MME) Dispensed Over Time  Last 30 DaysLast 60 DaysLast 90 DaysLast 1 YearLast 2 Years  [id number]/24/253/11/253/25/25  0  Avg MME/day  0  MME per Rx  Disclaimer  Lorazepam MgEq (LME) Dispensed Over Time  Last 30 DaysLast 60 DaysLast 90 DaysLast 1 YearLast 2 Years  [id number]/24/253/11/253/25/25  0  Avg LME/day  0  LME per Rx  Disclaimer  Buprenorphine (mg) Dispensed Over Time  Last 30 DaysLast 60 DaysLast 90 DaysLast 1 YearLast 2 Years  [id number]/24/253/11/253/25/25  0  Avg mg/day  0  Avg mg per Rx  Disclaimer  RX Summary  Summary  Total Prescriptions 18   Total Private Pay 4   Total Prescribers 5   Total Pharmacies 4   Narcotics (excluding  Buprenorphine)  Current MME/day 0.00   30 Day Avg MME/day 0.00   Current Qty 0   Buprenorphine  Current mg/day 0.00   30 Day Avg mg/day 0.00   Current Qty 0     RX Summary Expanded  Narcotics (excluding Buprenorphine)  Current MME/day 0.00   30 Day Avg MME/day 0.00   90 Day Avg MME/day 0.00   Rx Count/12 Months 5   Prescriber #/6 Months 1   Pharmacy #/6 Months 1   Current Qty 0   Buprenorphine  Current mg/day 0.00   30 Day Avg mg/day 0.00   90 Day Avg mg/day 0.00   Rx Count/12 Months 0   Prescriber #/6 Months 0   Pharmacy #/6 Months 0   Current Qty 0   Sedatives  30 Day Avg LME/day 0.00   90 Day Avg LME/day 0.00   Rx Count/12 Months 0   Prescriber #/6 Months 0   Pharmacy #/6 Months 0   Current Qty 0   Stimulants  30 Day Avg mg/day 0.00   90 Day Avg mg/day 0.00   Rx Count/12 Months 0   Prescriber #/6 Months 0   Pharmacy #/6 Months 0   Current Qty 0     Prescriptions  Total: 18  Private Pay: 4  Showing 1-15 of 18 Items View 15 Items   of 2   Filled  Written  ID  Drug  QTY  Days  Prescriber  RX #  Dispenser  Refill  Daily Dose*  Pymt Type      09/18/2024 09/18/2024 4 Tramadol Hcl 50 Mg Tablet 60.00 15 Ju Adrian [id number] Abs (7899) 0 40.00 MME Private Pay [place]   09/17/2024 09/17/2024 4 Gabapentin 100 Mg Capsule 1.00 1 Ju Adrian [id number] Abs (5899) 0  Private Pay [place]   09/17/2024 09/17/2024 4 Gabapentin 100 Mg Capsule 60.00 30 Ju Adrian [id number] Abs (5099) 0  Private Pay [place]   07/12/2024 07/12/2024 3 Tramadol Hcl 50 Mg Tablet 120.00 30 De Bro [id number] Tracy (5417) 0 40.00 MME [place]   06/25/2024 06/25/2024 3 Gabapentin 100 Mg Capsule 60.00 30 Ma Palomo [id number] Tracy (1817) 0  Medicare [place]   06/25/2024 06/25/2024 3 Tramadol Hcl 50 Mg Tablet 42.00 14 Ma Palomo [id number] Tracy (5417) 0 30.00 MME [place]   03/31/2024 03/31/2024 1 Tramadol Hcl 50 Mg Tablet 28.00 7  Bur [id number] Med (7846) 0 40.00 MME Private Pay [place]   03/22/2024 03/22/2024 3 Tramadol Hcl 50 Mg Tablet 180.00 30 De Bro [id number] Tracy (5417) 0 60.00 MME [place]   02/02/2024  06/24/2023 3 Gabapentin 100 Mg Capsule 180.00 90 De Bro [id number] Tracy (5417) 0  Medicare [place]   02/01/2024 11/06/2023 3 Tramadol Hcl 50 Mg Tablet 180.00 30 De Bro [id number] Tracy (5417) 0 60.00 MME [place]   11/07/2023 11/06/2023 1 Tramadol Hcl 50 Mg Tablet 180.00 30 De Bro [id number] Rit (6242) 0 60.00 MME [place]   11/06/2023 11/06/2023 2 Gabapentin 100 Mg Capsule 180.00 90 De Bro [id number] Rit (6242) 0  Medicare [place]   08/16/2023 08/15/2023 1 Tramadol Hcl 50 Mg Tablet 120.00 30 De Bro [id number] Rit (6242) 0 40.00 MME [place]   07/19/2023 07/19/2023 1 Tramadol Hcl 50 Mg Tablet 28.00 7 De Bro [id number] Rit (6242) 0 40.00 MME [place]   07/19/2023 07/18/2023 1 Gabapentin 100 Mg Capsule 180.00 90 De Bro [id number] Rit (6242) 0  Medicare [place]   Disclaimer  Showing 1-15 of 18 Items View 15 Items   of 2     Providers  Total: 5  Showing 1-5 of 5 Items View 15 Items  1 of 1   Name  Address  City  State  Zipcode  Phone    Jess[place] [address] [place] [facility] [address] -   H[place] Michael [address] [place] [address] -   Mary Jane Yadav [address] [place] [address] -   Miladys Menon [address] [place] [address] [facility] [address] -   Roro Butterfield [address] [place] [address] -   Showing 1-5 of 5 Items View 15 Items  1 of 1     Pharmacies  Total: 4  Showing 1-4 of 4 Items View 15 Items  1 of 1   Name  Address  City  State  Zipcode  Phone    [facility] [place] [phone] [facility] [address] [phone]   [employer] [facility] [phone]) [phone] [facility] [address] [phone]   [id number] [phone] [facility] [address] [phone]   [employer] [phone]) [phone] [facility] [address] [phone]   Showing 1-4 of 4 Items View 15 Items  1 of 1     Column Settings  Integrated Patient Records  Total: 0  Showing 1-0 of 0 Items View 15 Items  1 of 0   Incident Date  Medication Given  Dosage  Administrated By  Zip code of  "Administration    Showing 1-0 of 0 Items View 15 Items  1 of 0     This website uses TopChalks, a web analytics service provided by [employer] (\"Google\"). TopChalks uses \"cookies\", which are text files placed on your computer, to help the website analyze how users use the site. The information generated by the cookie about your use of the website will be transmitted to and stored by [employer] on servers in the [place].  In case IP-anonymization is activated on this website, your IP address will be truncated within the area of Member States of the  Union or other parties to the Agreement on the  Economic Area. Only in exceptional cases the whole IP address will be first transferred to a Google  in the [place] and truncated there. The IP-anonymization is active on this website.  [employer] will use this information on behalf of the  of this website for the purpose of evaluating your use of the website, compiling reports on website activity for website operators and providing them other services relating to website activity and internet usage.  The IP-address, that your Browser conveys within the scope of TopChalks, will not be associated with any other data held by [employer]. You may refuse the use of cookies by selecting the appropriate settings on your browser, however please note that if you do this you may not be able to use the full functionality of this website. You can also opt-out from being tracked by TopChalks with effect for the future by downloading and installing TopChalks Opt-out Browser Addon for your current web browser: http://tools.[email or web address]/dlpage/gaoptout?hl=en.  "

## 2025-03-27 DIAGNOSIS — R11.0 NAUSEA: ICD-10-CM

## 2025-03-27 RX ORDER — ONDANSETRON 4 MG/1
4 TABLET, FILM COATED ORAL EVERY 8 HOURS PRN
Qty: 30 TABLET | Refills: 0 | Status: SHIPPED | OUTPATIENT
Start: 2025-03-27

## 2025-03-28 LAB
ALBUMIN SERPL-MCNC: 4.2 G/DL (ref 3.6–5.1)
AMPHETAMINES SERPL QL: NEGATIVE
BARBITURATES SERPL-MCNC: NEGATIVE MG/DL
BENZODIAZ SERPL QL: NEGATIVE
BUN SERPL-MCNC: 42 MG/DL (ref 7–25)
BUN/CREAT SERPL: 34 (CALC) (ref 6–22)
CALCIUM SERPL-MCNC: 9 MG/DL (ref 8.6–10.4)
CHLORIDE SERPL-SCNC: 105 MMOL/L (ref 98–110)
CHOLEST SERPL-MCNC: 129 MG/DL
CHOLEST/HDLC SERPL: 3.5 (CALC)
CO2 SERPL-SCNC: 29 MMOL/L (ref 20–32)
COCAINE SERPL QL: NEGATIVE
CREAT SERPL-MCNC: 1.23 MG/DL (ref 0.6–0.95)
DRUG SCREEN COMMENT UR-IMP: NORMAL
EGFRCR SERPLBLD CKD-EPI 2021: 41 ML/MIN/1.73M2
FERRITIN SERPL-MCNC: 134 NG/ML (ref 16–288)
GLUCOSE SERPL-MCNC: 112 MG/DL (ref 65–99)
HDLC SERPL-MCNC: 37 MG/DL
IRON SATN MFR SERPL: 20 % (CALC) (ref 16–45)
IRON SERPL-MCNC: 54 MCG/DL (ref 45–160)
LDLC SERPL CALC-MCNC: 67 MG/DL (CALC)
NONHDLC SERPL-MCNC: 92 MG/DL (CALC)
NORTRAMADOL UR-MCNC: NEGATIVE NG/ML
OPIATES SERPL QL: NEGATIVE
PCP SERPL QL: NEGATIVE
PHOSPHATE SERPL-MCNC: 3.3 MG/DL (ref 2.1–4.3)
POTASSIUM SERPL-SCNC: 4.3 MMOL/L (ref 3.5–5.3)
QUEST NOTES AND COMMENTS: NORMAL
SERVICE CMNT-IMP: NORMAL
SODIUM SERPL-SCNC: 142 MMOL/L (ref 135–146)
THC SERPL-MCNC: NEGATIVE NG/ML
TIBC SERPL-MCNC: 270 MCG/DL (CALC) (ref 250–450)
TRAMADOL UR-MCNC: NEGATIVE NG/ML
TRIGL SERPL-MCNC: 178 MG/DL
VIT B12 SERPL-MCNC: 554 PG/ML (ref 200–1100)

## 2025-03-29 RX ORDER — IBUPROFEN 100 MG/5ML
1000 SUSPENSION, ORAL (FINAL DOSE FORM) ORAL DAILY
COMMUNITY

## 2025-03-29 NOTE — PATIENT INSTRUCTIONS
Dear Mrs. Dumont,    It is always a pleasure to see you.    Please let me know if the abdominal pain on your left side intensifies.    We will resume low dose Tramadol at bedtime only.    Please call me when you run out of the initial 7 day supply.     I will follow  up with you in 1 month.    Sincerely,    Miladys Menon, FRANCHESCA, APRN-BC

## 2025-04-18 ENCOUNTER — DOCUMENTATION (OUTPATIENT)
Dept: GERIATRIC MEDICINE | Facility: CLINIC | Age: OVER 89
End: 2025-04-18
Payer: COMMERCIAL

## 2025-04-18 DIAGNOSIS — M16.12 OSTEOARTHRITIS OF LEFT HIP, UNSPECIFIED OSTEOARTHRITIS TYPE: ICD-10-CM

## 2025-04-18 DIAGNOSIS — M00.9: ICD-10-CM

## 2025-04-18 DIAGNOSIS — M47.817 OSTEOARTHRITIS OF LUMBOSACRAL SPINE: ICD-10-CM

## 2025-04-18 DIAGNOSIS — G89.29 OTHER CHRONIC PAIN: Primary | ICD-10-CM

## 2025-04-18 DIAGNOSIS — G89.29 OTHER CHRONIC PAIN: ICD-10-CM

## 2025-04-18 RX ORDER — TRAMADOL HYDROCHLORIDE 50 MG/1
50 TABLET ORAL NIGHTLY PRN
Qty: 7 TABLET | Refills: 0 | OUTPATIENT
Start: 2025-04-18

## 2025-04-18 RX ORDER — TRAMADOL HYDROCHLORIDE 50 MG/1
50 TABLET ORAL NIGHTLY PRN
Qty: 30 TABLET | Refills: 2 | Status: SHIPPED | OUTPATIENT
Start: 2025-04-18 | End: 2025-07-17

## 2025-04-18 NOTE — PROGRESS NOTES
Son called for refill for Tramadol  NP spoke with pharmacy earlier today  Patient did  7 day supply on 3/25/2025  Oarrs report retrieved and reviewed.  No discrepancies.  Alayna Dumont 89F  Refine Search  Contact the [facility]  YOB: 1932  Recent Address:  [address] [place] [address]  View Linked Records (4)    Other Resources  One or more patients do not exactly match the search patient demographic information that was sent during search: Alayna Dumont, 05/10/1932.  The report displayed is for the next best possible match. Please verify the report is for the intended patient before proceeding.    [facility]  Report generated on 04/18/2025. Report Date Range: 04/19/2023 - 04/18/2025  PDF Report  Ethel  UNINTENTIONAL OVERDOSE RISK SCORE MODEL  Caution/Important Reminder: Information for Healthcare Professionals  BELOW AVERAGE  010  NARX SCORES  NARCOTICS  230  ACTIVE RX  0  SEDATIVES  110  ACTIVE RX  0  STIMULANTS  000  ACTIVE RX  0  KEY CONTRIBUTING FACTORS TO OVERDOSE RISK SCORE MODEL  History of MOUD use (excluding Buprenorphine dispensations for pain management*)  No  Number of high-risk dispensations in most recent year  0  Gender most frequently reported by pharmacies is male  No  Age most frequently reported by pharmacies  89  Number of pharmacies where narcotics/sedatives were filled in most recent year  1  Benzo - Narcotics overlap  0 Days  State Indicators (0)  Details  RX Graph   Narcotic   Buprenorphine   Sedative   Stimulant   Other  Learn how to use graph  All Prescribers  Prescribers  5 - Miladys Menon  4 - Roro Butterfield  3 - Mary Jane Yadav  2 - Hudson Michael  1 - Jessica Pickard,  Timeline  04/18  2m  6m  1y  2y  Disclaimer  Morphine Milligram Equivalent (MME) Dispensed Over Time  Last 30 Days  Last 60 Days  Last 90 Days  Last 1 Year  Last 2 Years  MME  Timeframe  0  5  10  3/20/25  4/4/25  4/18/25  2.3  Avg MME/day  70  MME per  Rx  Disclaimer  Lorazepam MgEq (LME) Dispensed Over Time  Last 30 Days  Last 60 Days  Last 90 Days  Last 1 Year  Last 2 Years  LME  Timeframe  0  1  2  3/20/25  4/4/25  4/18/25  0  Avg LME/day  0  LME per Rx  Disclaimer  Buprenorphine (mg) Dispensed Over Time  Last 30 Days  Last 60 Days  Last 90 Days  Last 1 Year  Last 2 Years  mg  Timeframe  0  1  2  3/20/25  4/4/25  4/18/25  0  Avg mg/day  0  Avg mg per Rx  Disclaimer  RX Summary  Summary  Total Prescriptions 18  Total Private Pay 4  Total Prescribers 5  Total Pharmacies 4  Narcotics (excluding Buprenorphine)  Current MME/day 0.00  30 Day Avg MME/day 2.33  Current Qty 0  Buprenorphine  Current mg/day 0.00  30 Day Avg mg/day 0.00  Current Qty 0  RX Summary Expanded  Narcotics (excluding Buprenorphine)  Current MME/day 0.00  30 Day Avg MME/day 2.33  90 Day Avg MME/day 0.78  Rx Count/12 Months 5  Prescriber #/6 Months 1  Pharmacy #/6 Months 1  Current Qty 0  Buprenorphine  Current mg/day 0.00  30 Day Avg mg/day 0.00  90 Day Avg mg/day 0.00  Rx Count/12 Months 0  Prescriber #/6 Months 0  Pharmacy #/6 Months 0  Current Qty 0  Sedatives  30 Day Avg LME/day 0.00  90 Day Avg LME/day 0.00  Rx Count/12 Months 0  Prescriber #/6 Months 0  Pharmacy #/6 Months 0  Current Qty 0  Stimulants  30 Day Avg mg/day 0.00  90 Day Avg mg/day 0.00  Rx Count/12 Months 0  Prescriber #/6 Months 0  Pharmacy #/6 Months 0  Current Qty 0  Prescriptions  Total: 18  Private Pay: 4  Showing 1-15 of 18 Items View   15 Items      1   of 2   Filled  Written  ID  Drug  QTY  Days  Prescriber  RX #  Dispenser  Refill  Daily Dose*  Pymt Type     03/25/2025 03/25/2025 2   Tramadol Hcl 50 Mg Tablet  7.00 7 De Bro [id number] Tracy (2135) 0 10.00 MME [place]  09/18/2024 09/18/2024 1   Tramadol Hcl 50 Mg Tablet  60.00 15 Ju Adrian [id number] Abs (1904) 0 40.00 MME [place]  09/17/2024 09/17/2024 1   Gabapentin 100 Mg Capsule  1.00 1 Ju Adrian [id number] Abs (2121) 0  Private  Pay [place]  09/17/2024 09/17/2024 1   Gabapentin 100 Mg Capsule  60.00 30 Ju Adrian [id number] Abs (6899) 0  Private [place]  07/12/2024 07/12/2024 2   Tramadol Hcl 50 Mg Tablet  120.00 30 De Bro [id number] Tracy (5417) 0 40.00 [place]  06/25/2024 06/25/2024 2   Tramadol Hcl 50 Mg Tablet  42.00 14 Ma Palomo [id number] Tracy (5417) 0 30.00 [place]  06/25/2024 06/25/2024 2   Gabapentin 100 Mg Capsule  60.00 30 Ma Palomo [id number] Tracy (5417) 0  [place]are [place]  03/31/2024 03/31/2024 3   Tramadol Hcl 50 Mg Tablet  28.00 7 Sh Bur [id number] Med (7846) 0 40.00 MME [place]  03/22/2024 03/22/2024 2   Tramadol Hcl 50 Mg Tablet  180.00 30 De Bro [id number] Tracy (5417) 0 60.00 [place]  02/02/2024 06/24/2023 2   Gabapentin 100 Mg Capsule  180.00 90 De Bro [id number] Tracy (5417) 0  [place]  02/01/2024 11/06/2023 2   Tramadol Hcl 50 Mg Tablet  180.00 30 De Bro [id number] Tracy (5417) 0 60.00 [place]  11/07/2023 11/06/2023 3   Tramadol Hcl 50 Mg Tablet  180.00 30 De Bro [id number] Rit (6242) 0 60.00 [place]  11/06/2023 11/06/2023 4   Gabapentin 100 Mg Capsule  180.00 90 De Bro [id number] Rit (6242) 0  [place]  08/16/2023 08/15/2023 3   Tramadol Hcl 50 Mg Tablet  120.00 30 De Bro [id number] Rit (6242) 0 40.00 [place]  07/19/2023 07/18/2023 3   Gabapentin 100 Mg Capsule  180.00 90 De Bro [id number] Rit (6242) 0  Med[place]are [place]  Disclaimer  Showing 1-15 of 18 Items View   15 Items      1   of 2   Providers  Total: 5  Showing 1-5 of 5 Items View   15 Items    1 of 1   Name  Address  City  State  Zipcode  Phone   Roro Butterfield [address] [place] [id number] [phone]  Mary Jane Yadav [address] [place] [address] -  Jessica Pickard [place] [address] [place] [address] [facility] [address] -  Miladys Menon [address] [place] [address] [facility] [address] -  Shezabrina Michael [address] [place] [id number] [phone]  Showing 1-5 of 5 Items View   15 Items    1 of 1   Pharmacies  Total: 4  Showing 1-4 of 4 Items View   15 Items    1 of 1   Name  " Address  City  State  Zipcode  Phone   [facility] ([phone]) [address] [facility] [address] [phone]  [id number] (9440) [address] [facility] [id number] [phone]  [facility] ([phone]) [address] [facility] [id number] [phone]  [facility] [place] (3952) [address] [facility] [address] [phone]  Showing 1-4 of 4 Items View   15 Items    1 of 1   Column Settings  Integrated Patient Records  Total: 0  Showing 1-0 of 0 Items View   15 Items    1 of 0   Incident Date  Medication Given  Dosage  Administrated By  Zip code of Administration   Showing 1-0 of 0 Items View   15 Items    1 of 0   The [facility] does not warrant the information contained in this report to be accurate or complete. The Report reflects the search criteria entered by the requestor, the data entered by the dispensing pharmacy, and the frequency at which the data is reported. For more information about any prescription, please contact the dispensing pharmacy or the prescriber.  This website uses Noquo, a web analytics service provided by Google, Inc[employer] (\"Google\"). Noquo uses \"cookies\", which are text files placed on your computer, to help the website analyze how users use the site. The information generated by the cookie about your use of the website will be transmitted to and stored by GLOG on servers in the [place].    In case IP-anonymization is activated on this website, your IP address will be truncated within the area of Member States of the  Union or other parties to the Agreement on the  Economic Area. Only in exceptional cases the whole IP address will be first transferred to a Google  in the [place] and truncated there. The IP-anonymization is active on this website.    GLOG will use this information on behalf of the  of this website for the purpose of evaluating your use of the " website, compiling reports on website activity for website operators and providing them other services relating to website activity and internet usage.    The IP-address, that your Browser conveys within the scope of [employer], will not be associated with any other data held by [employer]. You may refuse the use of cookies by selecting the appropriate settings on your browser, however please note that if you do this you may not be able to use the full functionality of this website. You can also opt-out from being tracked by [employer] with effect for the future by downloading and installing [employer]tv Opt-out Browser Addon for your current web browser: http://tools."[employer].SealedMedia/dlpage/gaoptout?hl=en.    Other Health Information  Info  Non-Fatal Drug Overdose Events  i  No history of overdose reported.    Resources (2)    Powered By

## 2025-04-21 ENCOUNTER — HOSPITAL ENCOUNTER (OUTPATIENT)
Dept: CARDIOLOGY | Facility: CLINIC | Age: OVER 89
Discharge: HOME | End: 2025-04-21
Payer: COMMERCIAL

## 2025-04-21 DIAGNOSIS — R00.1 BRADYCARDIA ON ECG: ICD-10-CM

## 2025-04-21 DIAGNOSIS — Z95.0 PACEMAKER: ICD-10-CM

## 2025-04-21 DIAGNOSIS — I44.2 CHB (COMPLETE HEART BLOCK): ICD-10-CM

## 2025-04-21 PROCEDURE — 93294 REM INTERROG EVL PM/LDLS PM: CPT | Performed by: INTERNAL MEDICINE

## 2025-04-21 PROCEDURE — 93296 REM INTERROG EVL PM/IDS: CPT

## 2025-04-28 ENCOUNTER — OFFICE VISIT (OUTPATIENT)
Dept: GERIATRIC MEDICINE | Facility: CLINIC | Age: OVER 89
End: 2025-04-28
Payer: COMMERCIAL

## 2025-04-28 VITALS — HEART RATE: 75 BPM | SYSTOLIC BLOOD PRESSURE: 138 MMHG | RESPIRATION RATE: 20 BRPM | DIASTOLIC BLOOD PRESSURE: 58 MMHG

## 2025-04-28 DIAGNOSIS — M79.2 PERIPHERAL NEUROPATHIC PAIN: ICD-10-CM

## 2025-04-28 DIAGNOSIS — I95.89 OTHER SPECIFIED HYPOTENSION: ICD-10-CM

## 2025-04-28 DIAGNOSIS — M25.552 LEFT HIP PAIN: ICD-10-CM

## 2025-04-28 DIAGNOSIS — M15.9 GENERALIZED OSTEOARTHROSIS OF MULTIPLE SITES: ICD-10-CM

## 2025-04-28 DIAGNOSIS — M17.0 PRIMARY OSTEOARTHRITIS OF BOTH KNEES: ICD-10-CM

## 2025-04-28 DIAGNOSIS — M47.817 OSTEOARTHRITIS OF LUMBOSACRAL SPINE: ICD-10-CM
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
## 2025-04-28 DIAGNOSIS — M81.0 OSTEOPOROSIS WITHOUT CURRENT PATHOLOGICAL FRACTURE, UNSPECIFIED OSTEOPOROSIS TYPE: ICD-10-CM

## 2025-04-28 DIAGNOSIS — E53.8 VITAMIN B12 DEFICIENCY: ICD-10-CM

## 2025-04-28 DIAGNOSIS — G89.29 OTHER CHRONIC PAIN: ICD-10-CM

## 2025-04-28 DIAGNOSIS — I50.30 HEART FAILURE WITH PRESERVED LEFT VENTRICULAR FUNCTION (HFPEF): ICD-10-CM

## 2025-04-28 PROCEDURE — 3075F SYST BP GE 130 - 139MM HG: CPT | Performed by: NURSE PRACTITIONER

## 2025-04-28 PROCEDURE — 1036F TOBACCO NON-USER: CPT | Performed by: NURSE PRACTITIONER

## 2025-04-28 PROCEDURE — 1159F MED LIST DOCD IN RCRD: CPT | Performed by: NURSE PRACTITIONER

## 2025-04-28 PROCEDURE — 1160F RVW MEDS BY RX/DR IN RCRD: CPT | Performed by: NURSE PRACTITIONER

## 2025-04-28 PROCEDURE — 99349 HOME/RES VST EST MOD MDM 40: CPT | Performed by: NURSE PRACTITIONER

## 2025-04-28 PROCEDURE — 1126F AMNT PAIN NOTED NONE PRSNT: CPT | Performed by: NURSE PRACTITIONER

## 2025-04-28 PROCEDURE — 3078F DIAST BP <80 MM HG: CPT | Performed by: NURSE PRACTITIONER

## 2025-04-28 RX ORDER — TRAMADOL HYDROCHLORIDE 50 MG/1
50 TABLET ORAL NIGHTLY PRN
Qty: 90 TABLET | Refills: 0 | Status: SHIPPED | OUTPATIENT
Start: 2025-04-28 | End: 2025-07-27

## 2025-04-28 RX ORDER — TRAMADOL HYDROCHLORIDE 50 MG/1
50 TABLET ORAL NIGHTLY PRN
COMMUNITY
End: 2025-04-28 | Stop reason: WASHOUT

## 2025-04-28 RX ORDER — ALENDRONATE SODIUM TABLET 35 MG/1
35 TABLET ORAL
Qty: 12 TABLET | Refills: 3 | Status: SHIPPED | OUTPATIENT
Start: 2025-04-28 | End: 2026-04-28

## 2025-04-28 ASSESSMENT — PAIN SCALES - GENERAL: PAINLEVEL_OUTOF10: 0-NO PAIN

## 2025-04-28 NOTE — PROGRESS NOTES
"Some elements may have been copied from prior note(s). The elements have been updated   and reflect current evaluation, examination and decision making from today.         Reason for visit : Follow up s/p fall and management of chronic active illnesses.       HPI:  Mrs. Dumont is being seen today in her home. Her son, Praveen is in the another room.  Patient states she was getting off the bedside commode.  She denies feeling dizzy or passing out,   But, states \"I saw like a flashgo past her eyes\", and while she was pulling up her diaper and past ,   She fell forward. She remained awake the enter time.  Her son was home, He had to call a friend and his   Brother to get patient off the floor. The put a blanke under her and were able to move her to the bed.     She denies any pain or discomfort.  She does have a Life Alert, but she does not like to use it because 1 day she inadvertently activated it   and the EMS squad came and she does not like that.    She completed a 7-day course of tramadol 50 mg at bedtime only.  Reports good subjective relief.  She needs a refill.  States she only has abdominal pain now if she eats red cabbage, or pancakes.    ROS:    Denies chest pain or shortness of breath  No cough cold or flulike symptoms  No fevers or chills  No dysuria  No diarrhea or constipation  Denies dizziness or lightheadedness  Does drink plenty of fluids  No blood in stool or urine  Appetite is very good      PHYSICAL EXAM  /58 (BP Location: Left arm, Patient Position: Sitting, BP Cuff Size: Adult)   Pulse 75   Resp 20   LMP  (LMP Unknown)   /POX= 99% RA   General appearance: Alert, cooperative and in no acute distress. Scooting around independently in manual wheelchair   Head and Face Examination/ inspection of hair and scalp: Normal.   Eyes   Inspection of eyes: Sclera and conjunctiva were normal.    Pupil exam: PERRLA. Extraocular movements were intact.   Ears, Nose, Mouth, and Throat   Ears:   External: " Normal.    Internal:  [place]  Oropharynx: Normal with moist mucus membranes, tongue midline, no PND, no erythema   or enlargement of tonsils.    Lips, teeth, and gums: Normal.   Neck   Neck Exam: Appearance of the neck was normal. No neck masses observed. No jugular vein distension.   Cardiovascular   Auscultation of heart: Abnormal.  The heart rate was normal. A grade 2 systolic murmur was heard at the LLSB.    Regular rate. +AICD left upper chest.  Area non-tender .   Pedal pulses: deferred  Examination of extremities for edema and/or varicosities: None (unchanged)    Pulmonary   Respiratory assessment: No respiratory distress, normal respiratory rhythm and effort.    Auscultation of Lungs: CTAB   Chest   Chest: Symmetrical and normal appearance.   Gastrointestinal  Abdominal Exam: No bruits -normal bowel sounds, soft, non-tender, no abdominal masses palpated.    Liver and Spleen exam: No hepato-splenomegaly. wearing a diaper.   Genitourinary   Bladder: Normal on palpation. wearing a diaper.   Lymphatic   Palpation of lymph nodes in axillae: Normal.     Palpation of lymph nodes in neck: No cervical lymphadenopathy.    Palpation of lymph nodes in other areas: Normal.    Musculoskeletal   Digits and nails: Abnormal.     Inspection/palpation of joints: No joint swelling seen.    Range of motion: Abnormal.     Muscle strength/tone: Normal.  decrease ROM to left leg/hip.   RUE.LUE 5/5 RLE 4/5  LLE 4/5  No CVA tenderness   Gait:deferred  Skin   Skin inspection: Skin dry , warm and intact. Normal skin color and pigmentation,   and normal skin turgor .  Neurologic   Cranial nerves: Nerves 2-12 were intact, no focal neuro defects, except for hearing   Psychiatric   Judgment and insight: Intact and appropriate.    Orientation: Oriented to person, place, and time.    Mood and affect: Normal.    Recent and remote memory: Normal.         LABS    Vitamin B12  Order: [id number]   Status: Final result       Dx: Vitamin B12 deficiency     Test Result Released: Yes (not seen)    0 Result Notes      Component  Ref Range & Units 1 mo ago   VITAMIN B12  200 - 1,100 pg/mL 554   Resulting Agency [facility]             Specimen Collected: 03/25/25 14:32 Last Resulted: 03/28/25 06:29       Chemistry    Lab Results   Component Value Date/Time     03/25/2025 1432    K 4.3 03/25/2025 1432     03/25/2025 1432    CO2 29 03/25/2025 1432    BUN 42 (H) 03/25/2025 1432    CREATININE 1.23 (H) 03/25/2025 1432    Lab Results   Component Value Date/Time    CALCIUM 9.0 03/25/2025 1432    ALKPHOS 78 11/27/2024 1132    AST 24 11/27/2024 1132    ALT 16 11/27/2024 1132    BILITOT 0.5 11/27/2024 1132        Lab Results   Component Value Date    WBC 8.9 11/27/2024    HGB 10.8 (L) 11/27/2024    HCT 35.6 (L) 11/27/2024    MCV 96 11/27/2024     11/27/2024     Alayna Dumont 89F  Contact the [employer]  YOB: 1932  Recent Address:  [address] [place] [address]  Status of States Queried:  View Details  View Linked Records (4)  Other Resources  Starting 12/16/2024, the [place] Automated Rx Reporting System ([place]ARRS) will begin alerting healthcare providers about patients who have experienced a non-fatal drug overdose. The non-fatal drug overdose indicator is intended to improve care coordination and promote access to medication for opioid use disorder and other tools to prevent fatal overdoses.  This information is intended to be used to improve care coordination and should not be used to terminate a patient relationship.  For more information about the indicator, please see the following quick reference guide: www.pharmacy.[place].gov/NFOD.  One or more patients do not exactly match the search patient demographic information that was sent during search: Alayna Dumont, 05/10/1932.  The report displayed is for the next best possible match. Please verify the report is for the intended patient  before proceeding.      Report generated on 04/28/2025. Report Date Range: 04/28/2023 - 04/28/2025  UNINTENTIONAL OVERDOSE RISK SCORE MODEL    Caution/Important Reminder: Information for Healthcare Professionals  BELOW [id number]  NARX SCORES  NARCOTICS  230  ACTIVE RX  0  SEDATIVES  110  ACTIVE RX  0  STIMULANTS  000  ACTIVE RX  0  KEY CONTRIBUTING FACTORS TO OVERDOSE RISK SCORE MODEL  History of MOUD use (excluding Buprenorphine dispensations for pain management*)  No  Number of high-risk dispensations in most recent year  0  Gender most frequently reported by pharmacies is male  No  Age most frequently reported by pharmacies  89  Number of pharmacies where narcotics/sedatives were filled in most recent year  1  Benzo - Narcotics overlap  0 Days  State Indicators (0)  Details  RX Graph  [] Narcotic[] Buprenorphine[] Sedative[] Stimulant[] OtherLearn how to use graph  All PrescribersPrescribers5 - Miladys Menon4 - Roro Zamoranoa3 - Mary Jane Yadav2 - Hudson Michael1 - Jessica Pickard,[id number]  Disclaimer  Morphine Milligram Equivalent (MME) Dispensed Over Time  Last 30 DaysLast 60 DaysLast 90 DaysLast 1 YearLast 2 Years  [id number]/30/254/14/254/28/25  0.7  Avg MME/day  20  MME per Rx  Disclaimer  Lorazepam MgEq (LME) Dispensed Over Time  Last 30 DaysLast 60 DaysLast 90 DaysLast 1 YearLast 2 Years  [id number]/30/254/14/254/28/25  0  Avg LME/day  0  LME per Rx  Disclaimer  Buprenorphine (mg) Dispensed Over Time  Last 30 DaysLast 60 DaysLast 90 DaysLast 1 YearLast 2 Years  [id number]/30/254/14/254/28/25  0  Avg mg/day  0  Avg mg per Rx  Disclaimer  RX Summary  Summary  Total Prescriptions 18   Total Private Pay 4   Total Prescribers 5   Total Pharmacies 4   Narcotics (excluding Buprenorphine)  Current MME/day 0.00   30 Day Avg MME/day 0.67   Current Qty 0   Buprenorphine  Current mg/day 0.00   30 Day Avg mg/day 0.00   Current Qty 0     RX Summary Expanded  Narcotics (excluding  Buprenorphine)  Current MME/day 0.00   30 Day Avg MME/day 0.67   90 Day Avg MME/day 0.78   Rx Count/12 Months 4   Prescriber #/6 Months 1   Pharmacy #/6 Months 1   Current Qty 0   Buprenorphine  Current mg/day 0.00   30 Day Avg mg/day 0.00   90 Day Avg mg/day 0.00   Rx Count/12 Months 0   Prescriber #/6 Months 0   Pharmacy #/6 Months 0   Current Qty 0   Sedatives  30 Day Avg LME/day 0.00   90 Day Avg LME/day 0.00   Rx Count/12 Months 0   Prescriber #/6 Months 0   Pharmacy #/6 Months 0   Current Qty 0   Stimulants  30 Day Avg mg/day 0.00   90 Day Avg mg/day 0.00   Rx Count/12 Months 0   Prescriber #/6 Months 0   Pharmacy #/6 Months 0   Current Qty 0     Prescriptions  Total: 18  Private Pay: 4  Showing 1-15 of 18 Items View 15 Items   of 2   Filled  Written  ID  Drug  QTY  Days  Prescriber  RX #  Dispenser  Refill  Daily Dose*  Pymt Type  [facility]    03/25/2025 03/25/2025 3 Tramadol Hcl 50 Mg Tablet 7.00 7 De Bro [id number] Tracy (5417) 0 10.00 [place]   09/18/2024 09/18/2024 4 Tramadol Hcl 50 Mg Tablet 60.00 15 Ju Adrian [id number] Abs (6899) 0 40.00 [place]ME [place]   09/17/2024 09/17/2024 4 Gabapentin 100 Mg Capsule 1.00 1 Ju Adrian [id number] Abs (6899) 0  [place]   09/17/2024 09/17/2024 4 Gabapentin 100 Mg Capsule 60.00 30 Ju Adrian [id number] Abs (6899) 0  [place]   07/12/2024 07/12/2024 3 Tramadol Hcl 50 Mg Tablet 120.00 30 De Bro [id number] Tracy (5417) 0 40.00 [place]   06/25/2024 06/25/2024 3 Gabapentin 100 Mg Capsule 60.00 30 Ma Palomo [id number] Tracy (5417) 0  [place]   06/25/2024 06/25/2024 3 Tramadol Hcl 50 Mg Tablet 42.00 14 Ma Palomo [id number] Tracy (5417) 0 30.00 [place]   03/31/2024 03/31/2024 1 Tramadol Hcl 50 Mg Tablet 28.00 7 Sh Bur [id number] Med (7846) 0 40.00 MME [place]   03/22/2024 03/22/2024 3 Tramadol Hcl 50 Mg Tablet 180.00 30 De Greyson [id number] Tracy (5417) 0 60.00 [place]   02/02/2024 06/24/2023 3 Gabapentin 100 Mg Capsule 180.00 90 De Greyson [id number] Tracy (5417) 0  [place]are [place]  "  02/01/2024 11/06/2023 3 Tramadol Hcl 50 Mg Tablet 180.00 30 De Bro [id number] Tracy (5417) 0 60.00 [place]   11/07/2023 11/06/2023 1 Tramadol Hcl 50 Mg Tablet 180.00 30 De Bro [id number] Rit (6242) 0 60.00 [place]   11/06/2023 11/06/2023 2 Gabapentin 100 Mg Capsule 180.00 90 De Bro [id number] Rit (6242) 0  [place]   08/16/2023 08/15/2023 1 Tramadol Hcl 50 Mg Tablet 120.00 30 De Bro [id number] Rit (6242) 0 40.00 [place]   07/19/2023 07/19/2023 1 Tramadol Hcl 50 Mg Tablet 28.00 7 De Bro [id number] Rit (6242) 0 40.00 [place]   Disclaimer  Showing 1-15 of 18 Items View 15 Items   of 2     Providers  Total: 5  Showing 1-5 of 5 Items View 15 Items  1 of 1   Name  Address  City  State  Zipcode  Phone    [place] [address] [place] [address] [facility] [address] -   Hudson Michael [address] [place] [address] -   Mary Jane Yadav [address] [place] [address] -   Roro Butterfield [address] [place] [address] -   Miladys Brown [address] [place] [address] [facility] [address] -   Showing 1-5 of 5 Items View 15 Items  1 of 1     Pharmacies  Total: 4  Showing 1-4 of 4 Items View 15 Items  1 of 1   Name  Address  City  State  Zipcode  Phone    [facility] ([phone]) [address] [facility] [id number] [phone]   [email or web address] ([phone]) [address] [facility] [address] [phone]   [facility] [employer] ([phone]) [address] [facility] [address] [phone]   [facility] [phone] [facility] 3948395 [phone]   Showing 1-4 of 4 Items View 15 Items  1 of 1     Column Settings  Integrated Patient Records  Total: 0  Showing 1-0 of 0 Items View 15 Items  1 of 0   Incident Date  Medication Given  Dosage  Administrated By  Zip code of Administration    Showing 1-0 of 0 Items View 15 Items  1 of 0     This website uses PLYmedia, a web analytics service provided by Google, Inc. (\"Google\"). PLYmedia uses \"cookies\", " "which are text files placed on your computer, to help the website analyze how users use the site. The information generated by the cookie about your use of the website will be transmitted to and stored by [employer] on servers in the [place].  In case IP-anonymization is activated on this website, your IP address will be truncated within the area of Member States of the  Union or other parties to the Agreement on the  Economic Area. Only in exceptional cases the whole IP address will be first transferred to a Google  in the [place] and truncated there. The IP-anonymization is active on this website.  [employer] will use this information on behalf of the  of this website for the purpose of evaluating your use of the website, compiling reports on website activity for website operators and providing them other services relating to website activity and internet usage.  The IP-address, that your Browser conveys within the scope of [employer], will not be associated with any other data held by [employer]. You may refuse the use of cookies by selecting the appropriate settings on your browser, however please note that if you do this you may not be able to use the full functionality of this website. You can also opt-out from being tracked by [employer] with effect for the future by downloading and installing [employer] Opt-out Browser Addon for your current web browser: http://tools.SinDelantal.Mx.Pairin/dlpage/gaoptout?hl=en.      ASSESSMENT/PLAN    1. Fall, initial encounter (Primary)  -see HPI  -No injury  -See problem # 4    2. Other chronic pain/ Generalized osteoarthrosis of multiple sites/ Left hip pain/  .Primary osteoarthritis of both knees/Osteoarthritis of lumbosacral spine/  Osteoarthritis of lumbosacral spine  -good subjective relief on Tramadol 50 mg nightly, Tylenol 1000 mg bid scheduled  -Controlled substance agreement reviewed and signed on 3/2025 -scanned in Epic under \"Media " "Section\"  -Oarrs report received and reviewed. 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 Osteoporosis without current pathological fracture, unspecified osteoporosis type  -requesting a refill on Alendronate   no signs or symptom of adverse effects     4. Other specified hypotension/Heart failure with preserved left ventricular function (HFpEF)  -/58  -denies dizziness  -possibly contributed to fall  -drinks fluids   -No s/s of edema or volume overload   -Will decrease Torsemide 20 mg daily to q one tablet on   M-W-F and Saturday  -Written and verbal communication provided.   -Continue Metoprolol Tartrate,     5. Vitamin B12 deficiency  - vitamin B12 well WNL off injections for several months  -will discontinue oral supplementation       6 Peripheral neuropathic pain  -stable on Duloxetine     Stable  Routine follow up in 2 months       "

## 2025-04-28 NOTE — PATIENT INSTRUCTIONS
Dear Mrs. Dumont,  It is always a pleasure to see you.     We will decrease your water pill to M-W-Fr- and Sundays.  Your BP is a little low and may have contributed to your fall.     I will follow up with you in 2 months.     Sincerely,    Miladys Menon, MSN, APRN-BC

## 2025-04-29 DIAGNOSIS — E78.5 HYPERLIPIDEMIA, UNSPECIFIED HYPERLIPIDEMIA TYPE: ICD-10-CM

## 2025-04-30 RX ORDER — ATORVASTATIN CALCIUM 20 MG/1
20 TABLET, FILM COATED ORAL NIGHTLY
Qty: 90 TABLET | Refills: 0 | OUTPATIENT
Start: 2025-04-30

## 2025-04-30 RX ORDER — ATORVASTATIN CALCIUM 20 MG/1
20 TABLET, FILM COATED ORAL NIGHTLY
Qty: 90 TABLET | Refills: 3 | Status: SHIPPED | OUTPATIENT
Start: 2025-04-30

## 2025-06-01 DIAGNOSIS — I50.30 HEART FAILURE WITH PRESERVED LEFT VENTRICULAR FUNCTION (HFPEF): Primary | ICD-10-CM

## 2025-06-02 RX ORDER — EMPAGLIFLOZIN 10 MG/1
10 TABLET, FILM COATED ORAL DAILY
Qty: 90 TABLET | Refills: 0 | Status: SHIPPED | OUTPATIENT
Start: 2025-06-02

## 2025-06-22 ENCOUNTER — HOSPITAL ENCOUNTER (EMERGENCY)
Facility: HOSPITAL | Age: OVER 89
Discharge: HOME | End: 2025-06-23
Attending: STUDENT IN AN ORGANIZED HEALTH CARE EDUCATION/TRAINING PROGRAM
Payer: MEDICARE

## 2025-06-22 ENCOUNTER — APPOINTMENT (OUTPATIENT)
Dept: RADIOLOGY | Facility: HOSPITAL | Age: OVER 89
End: 2025-06-22
Payer: MEDICARE

## 2025-06-22 VITALS
WEIGHT: 200 LBS | HEART RATE: 70 BPM | SYSTOLIC BLOOD PRESSURE: 143 MMHG | BODY MASS INDEX: 35.44 KG/M2 | TEMPERATURE: 98.2 F | OXYGEN SATURATION: 98 % | RESPIRATION RATE: 16 BRPM | DIASTOLIC BLOOD PRESSURE: 51 MMHG | HEIGHT: 63 IN

## 2025-06-22 DIAGNOSIS — R19.7 DIARRHEA, UNSPECIFIED TYPE: Primary | ICD-10-CM

## 2025-06-22 LAB
ALBUMIN SERPL BCP-MCNC: 4.1 G/DL (ref 3.4–5)
ALP SERPL-CCNC: 51 U/L (ref 33–136)
ALT SERPL W P-5'-P-CCNC: 17 U/L (ref 7–45)
ANION GAP SERPL CALCULATED.3IONS-SCNC: 12 MMOL/L (ref 10–20)
APPEARANCE UR: CLEAR
AST SERPL W P-5'-P-CCNC: 24 U/L (ref 9–39)
BASOPHILS # BLD AUTO: 0.01 X10*3/UL (ref 0–0.1)
BASOPHILS NFR BLD AUTO: 0.2 %
BILIRUB SERPL-MCNC: 0.4 MG/DL (ref 0–1.2)
BILIRUB UR STRIP.AUTO-MCNC: NEGATIVE MG/DL
BUN SERPL-MCNC: 51 MG/DL (ref 6–23)
CALCIUM SERPL-MCNC: 8.8 MG/DL (ref 8.6–10.3)
CHLORIDE SERPL-SCNC: 104 MMOL/L (ref 98–107)
CO2 SERPL-SCNC: 27 MMOL/L (ref 21–32)
COLOR UR: COLORLESS
CREAT SERPL-MCNC: 1.4 MG/DL (ref 0.5–1.05)
EGFRCR SERPLBLD CKD-EPI 2021: 35 ML/MIN/1.73M*2
EOSINOPHIL # BLD AUTO: 0.16 X10*3/UL (ref 0–0.4)
EOSINOPHIL NFR BLD AUTO: 2.7 %
ERYTHROCYTE [DISTWIDTH] IN BLOOD BY AUTOMATED COUNT: 13.3 % (ref 11.5–14.5)
GLUCOSE SERPL-MCNC: 95 MG/DL (ref 74–99)
GLUCOSE UR STRIP.AUTO-MCNC: NORMAL MG/DL
HCT VFR BLD AUTO: 34.6 % (ref 36–46)
HGB BLD-MCNC: 10.7 G/DL (ref 12–16)
IMM GRANULOCYTES # BLD AUTO: 0.02 X10*3/UL (ref 0–0.5)
IMM GRANULOCYTES NFR BLD AUTO: 0.3 % (ref 0–0.9)
KETONES UR STRIP.AUTO-MCNC: NEGATIVE MG/DL
LACTATE SERPL-SCNC: 0.6 MMOL/L (ref 0.4–2)
LEUKOCYTE ESTERASE UR QL STRIP.AUTO: ABNORMAL
LIPASE SERPL-CCNC: 40 U/L (ref 9–82)
LYMPHOCYTES # BLD AUTO: 0.94 X10*3/UL (ref 0.8–3)
LYMPHOCYTES NFR BLD AUTO: 15.8 %
MCH RBC QN AUTO: 30.1 PG (ref 26–34)
MCHC RBC AUTO-ENTMCNC: 30.9 G/DL (ref 32–36)
MCV RBC AUTO: 98 FL (ref 80–100)
MONOCYTES # BLD AUTO: 0.55 X10*3/UL (ref 0.05–0.8)
MONOCYTES NFR BLD AUTO: 9.3 %
NEUTROPHILS # BLD AUTO: 4.26 X10*3/UL (ref 1.6–5.5)
NEUTROPHILS NFR BLD AUTO: 71.7 %
NITRITE UR QL STRIP.AUTO: NEGATIVE
NRBC BLD-RTO: 0 /100 WBCS (ref 0–0)
PH UR STRIP.AUTO: 5.5 [PH]
PLATELET # BLD AUTO: 194 X10*3/UL (ref 150–450)
POTASSIUM SERPL-SCNC: 4.2 MMOL/L (ref 3.5–5.3)
PROT SERPL-MCNC: 7 G/DL (ref 6.4–8.2)
PROT UR STRIP.AUTO-MCNC: NEGATIVE MG/DL
RBC # BLD AUTO: 3.55 X10*6/UL (ref 4–5.2)
RBC # UR STRIP.AUTO: NEGATIVE MG/DL
RBC #/AREA URNS AUTO: NORMAL /HPF
SODIUM SERPL-SCNC: 139 MMOL/L (ref 136–145)
SP GR UR STRIP.AUTO: 1.02
UROBILINOGEN UR STRIP.AUTO-MCNC: NORMAL MG/DL
WBC # BLD AUTO: 5.9 X10*3/UL (ref 4.4–11.3)
WBC #/AREA URNS AUTO: NORMAL /HPF

## 2025-06-22 PROCEDURE — 83690 ASSAY OF LIPASE: CPT | Performed by: PHYSICIAN ASSISTANT

## 2025-06-22 PROCEDURE — 87086 URINE CULTURE/COLONY COUNT: CPT | Mod: WESLAB | Performed by: PHYSICIAN ASSISTANT

## 2025-06-22 PROCEDURE — 2500000004 HC RX 250 GENERAL PHARMACY W/ HCPCS (ALT 636 FOR OP/ED): Performed by: PHYSICIAN ASSISTANT

## 2025-06-22 PROCEDURE — 99285 EMERGENCY DEPT VISIT HI MDM: CPT | Mod: 25 | Performed by: STUDENT IN AN ORGANIZED HEALTH CARE EDUCATION/TRAINING PROGRAM

## 2025-06-22 PROCEDURE — 74177 CT ABD & PELVIS W/CONTRAST: CPT | Mod: FOREIGN READ | Performed by: RADIOLOGY

## 2025-06-22 PROCEDURE — 96361 HYDRATE IV INFUSION ADD-ON: CPT

## 2025-06-22 PROCEDURE — 80053 COMPREHEN METABOLIC PANEL: CPT | Performed by: PHYSICIAN ASSISTANT

## 2025-06-22 PROCEDURE — 85025 COMPLETE CBC W/AUTO DIFF WBC: CPT | Performed by: PHYSICIAN ASSISTANT

## 2025-06-22 PROCEDURE — 2550000001 HC RX 255 CONTRASTS: Performed by: STUDENT IN AN ORGANIZED HEALTH CARE EDUCATION/TRAINING PROGRAM

## 2025-06-22 PROCEDURE — 36415 COLL VENOUS BLD VENIPUNCTURE: CPT | Performed by: PHYSICIAN ASSISTANT

## 2025-06-22 PROCEDURE — 81003 URINALYSIS AUTO W/O SCOPE: CPT | Performed by: PHYSICIAN ASSISTANT

## 2025-06-22 PROCEDURE — 83605 ASSAY OF LACTIC ACID: CPT | Performed by: PHYSICIAN ASSISTANT

## 2025-06-22 PROCEDURE — 96374 THER/PROPH/DIAG INJ IV PUSH: CPT | Mod: 59

## 2025-06-22 PROCEDURE — 2500000001 HC RX 250 WO HCPCS SELF ADMINISTERED DRUGS (ALT 637 FOR MEDICARE OP): Performed by: PHYSICIAN ASSISTANT

## 2025-06-22 PROCEDURE — 74177 CT ABD & PELVIS W/CONTRAST: CPT

## 2025-06-22 RX ORDER — LOPERAMIDE HYDROCHLORIDE 2 MG/1
2 CAPSULE ORAL 4 TIMES DAILY PRN
Qty: 12 CAPSULE | Refills: 0 | Status: SHIPPED | OUTPATIENT
Start: 2025-06-22 | End: 2025-06-25

## 2025-06-22 RX ORDER — ONDANSETRON HYDROCHLORIDE 2 MG/ML
4 INJECTION, SOLUTION INTRAVENOUS ONCE
Status: COMPLETED | OUTPATIENT
Start: 2025-06-22 | End: 2025-06-22

## 2025-06-22 RX ORDER — ACETAMINOPHEN 325 MG/1
650 TABLET ORAL ONCE
Status: COMPLETED | OUTPATIENT
Start: 2025-06-22 | End: 2025-06-22

## 2025-06-22 RX ORDER — DICYCLOMINE HYDROCHLORIDE 10 MG/1
20 CAPSULE ORAL ONCE
Status: COMPLETED | OUTPATIENT
Start: 2025-06-22 | End: 2025-06-22

## 2025-06-22 RX ADMIN — ACETAMINOPHEN 650 MG: 325 TABLET ORAL at 22:10

## 2025-06-22 RX ADMIN — SODIUM CHLORIDE 500 ML: 900 INJECTION, SOLUTION INTRAVENOUS at 17:56

## 2025-06-22 RX ADMIN — ONDANSETRON 4 MG: 2 INJECTION, SOLUTION INTRAMUSCULAR; INTRAVENOUS at 17:57

## 2025-06-22 RX ADMIN — DICYCLOMINE HYDROCHLORIDE 20 MG: 10 CAPSULE ORAL at 17:57

## 2025-06-22 RX ADMIN — SODIUM CHLORIDE 500 ML: 900 INJECTION, SOLUTION INTRAVENOUS at 19:37

## 2025-06-22 RX ADMIN — IOHEXOL 75 ML: 350 INJECTION, SOLUTION INTRAVENOUS at 18:30

## 2025-06-22 ASSESSMENT — LIFESTYLE VARIABLES
HAVE YOU EVER FELT YOU SHOULD CUT DOWN ON YOUR DRINKING: NO
EVER HAD A DRINK FIRST THING IN THE MORNING TO STEADY YOUR NERVES TO GET RID OF A HANGOVER: NO
EVER FELT BAD OR GUILTY ABOUT YOUR DRINKING: NO
HAVE PEOPLE ANNOYED YOU BY CRITICIZING YOUR DRINKING: NO
TOTAL SCORE: 0

## 2025-06-22 ASSESSMENT — PAIN SCALES - GENERAL
PAINLEVEL_OUTOF10: 7
PAINLEVEL_OUTOF10: 3
PAINLEVEL_OUTOF10: 3

## 2025-06-22 ASSESSMENT — PAIN - FUNCTIONAL ASSESSMENT: PAIN_FUNCTIONAL_ASSESSMENT: 0-10

## 2025-06-22 ASSESSMENT — PAIN DESCRIPTION - LOCATION: LOCATION: ABDOMEN

## 2025-06-22 NOTE — ED TRIAGE NOTES
Pt states that she has been having diarrhea every time she eats. Pt states that she is heaving right sided ab tenderness

## 2025-06-22 NOTE — LETTER
June 22, 2025     Patient: Alayna Dumont   YOB: 1932   Date of Visit: 6/22/2025       To Whom It May Concern:    Alayna Dumont was seen in my clinic on 6/22/2025 at . Please excuse Alayna for her absence from work on this day to make the appointment.    If you have any questions or concerns, please don't hesitate to call.         Sincerely,         No name on file        CC: No Recipients

## 2025-06-23 NOTE — ED PROVIDER NOTES
HPI   Chief Complaint   Patient presents with    Diarrhea       HPI  This is a 89-year-old female presenting the emergency room for evaluation of diarrhea and abdominal pain.  Symptom started about a week ago.  Patient states that when she eats she feels like her food goes right through her and she has been having multiple episodes of loose stool a day.  No blood in her stool.  No fevers or chills.  Patient has had a decrease in appetite.  She has had some lower abdominal discomfort as well.  She does have a history of diverticulitis and is concerned this may be what she has right now.  No nausea or vomiting.  No chest pain or shortness of breath.  No urinary complaints.    Please see HPI for pertinent positive and negative ROS.     Patient History   Medical History[1]  Surgical History[2]  Family History[3]  Social History[4]    Physical Exam   ED Triage Vitals   Temperature Heart Rate Respirations BP   06/22/25 1736 06/22/25 1736 06/22/25 1736 06/22/25 1736   36.8 °C (98.2 °F) 70 20 110/78      Pulse Ox Temp Source Heart Rate Source Patient Position   06/22/25 1736 06/22/25 1736 06/22/25 1942 06/22/25 1942   94 % Oral Monitor Lying      BP Location FiO2 (%)     06/22/25 1942 --     Left arm        Physical Exam  GENERAL APPEARANCE: Awake and alert. No acute respiratory distress.   VITAL SIGNS: As per the nurses' triage record.  HEENT: Normocephalic, atraumatic.  Mucous membranes are dry  NECK: Soft, nontender and supple  CHEST: Nontender to palpation. Clear to auscultation bilaterally. Symmetric rise and fall of chest wall.   HEART: Clear S1 and S2. Regular rate and rhythm.  Strong and equal pulses in the extremities.  ABDOMEN: Soft, tenderness to palpation in the left lower quadrant, nondistended, positive bowel sounds, no palpable masses.  MUSCULOSKELETAL: Full gross active range of motion. Ambulating on own with no acute difficulties  NEUROLOGICAL: Awake, alert and oriented x 3. Motor power intact in the upper  and lower extremities. Sensation is intact to light touch in the upper and lower extremities. Patient answering questions appropriately.   IMMUNOLOGICAL: No lymphatic streaking noted  DERMATOLOGIC: Warm and dry   PYSCH: Cooperative with appropriate mood and affect.    ED Course & MDM   Diagnoses as of 06/23/25 0003   Diarrhea, unspecified type                 No data recorded     [place] Coma Scale Score: 15 (06/22/25 1740 : Jayna Ventura RN)                           Medical Decision Making  Parts of this chart have been completed using voice recognition software. Please excuse any errors of transcription.  My thought process and reason for plan has been formulated from the time that I saw the patient until the time of disposition and is not specific to one specific moment during their visit and furthermore my MDM encompasses this entire chart and not only this text box.      HPI: Detailed above.    Exam: A medically appropriate exam performed, outlined above, given the known history and presentation.    History obtained from: Patient    Medications given during visit:  Medications   sodium chloride 0.9 % bolus 500 mL (0 mL intravenous Stopped 6/22/25 1903)   ondansetron (Zofran) injection 4 mg (4 mg intravenous Given 6/22/25 1757)   dicyclomine (Bentyl) capsule 20 mg (20 mg oral Given 6/22/25 1757)   iohexol (OMNIPaque) 350 mg iodine/mL solution 75 mL (75 mL intravenous Given 6/22/25 1830)   sodium chloride 0.9 % bolus 500 mL (0 mL intravenous Stopped 6/22/25 2214)   acetaminophen (Tylenol) tablet 650 mg (650 mg oral Given 6/22/25 2210)        Diagnostic/tests  Labs Reviewed   CBC WITH AUTO DIFFERENTIAL - Abnormal       Result Value    WBC 5.9      nRBC 0.0      RBC 3.55 (*)     Hemoglobin 10.7 (*)     Hematocrit 34.6 (*)     MCV 98      MCH 30.1      MCHC 30.9 (*)     RDW 13.3      Platelets 194      Neutrophils % 71.7      Immature Granulocytes %, Automated 0.3      Lymphocytes % 15.8      Monocytes % 9.3       Eosinophils % 2.7      Basophils % 0.2      Neutrophils Absolute 4.26      Immature Granulocytes Absolute, Automated 0.02      Lymphocytes Absolute 0.94      Monocytes Absolute 0.55      Eosinophils Absolute 0.16      Basophils Absolute 0.01     COMPREHENSIVE METABOLIC PANEL - Abnormal    Glucose 95      Sodium 139      Potassium 4.2      Chloride 104      Bicarbonate 27      Anion Gap 12      Urea Nitrogen 51 (*)     Creatinine 1.40 (*)     eGFR 35 (*)     Calcium 8.8      Albumin 4.1      Alkaline Phosphatase 51      Total Protein 7.0      AST 24      Bilirubin, Total 0.4      ALT 17     URINALYSIS WITH REFLEX CULTURE AND MICROSCOPIC - Abnormal    Color, Urine Colorless (*)     Appearance, Urine Clear      Specific Gravity, Urine 1.020      pH, Urine 5.5      Protein, Urine NEGATIVE      Glucose, Urine Normal      Blood, Urine NEGATIVE      Ketones, Urine NEGATIVE      Bilirubin, Urine NEGATIVE      Urobilinogen, Urine Normal      Nitrite, Urine NEGATIVE      Leukocyte Esterase, Urine 25 Anibal/uL (*)    LIPASE - Normal    Lipase 40      Narrative:     Venipuncture immediately after or during the administration of Metamizole may lead to falsely low results. Testing should be performed immediately prior to Metamizole dosing.   LACTATE - Normal    Lactate 0.6      Narrative:     Venipuncture immediately after or during the administration of Metamizole may lead to falsely low results. Testing should be performed immediately prior to Metamizole dosing.   MICROSCOPIC ONLY, URINE - Normal    WBC, Urine 1-5      RBC, Urine NONE     C. DIFFICILE, PCR   STOOL PATHOGEN PANEL, PCR   URINE CULTURE   URINALYSIS WITH REFLEX CULTURE AND MICROSCOPIC    Narrative:     The following orders were created for panel order Urinalysis with Reflex Culture and Microscopic.  Procedure                               Abnormality         Status                     ---------                               -----------         ------                      Urinalysis with Reflex C...[[id number]]  Abnormal            Final result               Extra Urine Gray Tube[[id number]]                            In process                   Please view results for these tests on the individual orders.   EXTRA URINE GRAY TUBE      CT abdomen pelvis w IV contrast   Final Result   Diverticulosis without diverticulitis.   No acute process.   Signed by Oseas Chamorro MD           Considerations/further MDM:  Patient was seen in conjucntion with my supervising physician,  Dr. Garcia. Please refer to his note.    Differential diagnosis includes was not limited to diverticulitis versus colitis versus gastroenteritis versus dehydration versus bowel obstruction versus infectious diarrhea    CT abdomen pelvis with IV contrast shows diverticulosis but there is no evidence of acute diverticulitis.  There is no acute processes identified.  CBC shows no leukocytosis.  Lactate is not elevated at 0.6.  This lowers my suspicion for ischemia in the bowel.  Urinalysis shows no evidence of urinary tract infection.  CMP shows stable renal function for patient.  Lipase is not elevated at 40.  Patient did not have a bowel movement in the emergency department, therefore stool pathogen panel and C. difficile were not able to be obtained.  Patient was given a total of 1 L IV normal saline, oral Bentyl, IV Zofran and oral Tylenol with improvement of her symptoms.  She was able to eat and drink in the emergency department.  She was discharged with a prescription for loperamide and educated follow-up with her primary care physician.  Return precautions were discussed.  She verbalized understanding and felt comfortable with discharge plan home.  She was discharged in stable condition.      Procedure  Procedures       [1]   Past Medical History:  Diagnosis Date    Anemia     Bone infection of knee     left    CHF (congestive heart failure)     COPD (chronic obstructive pulmonary disease) (Multi)     DM  (diabetes mellitus) (Multi)     GERD (gastroesophageal reflux disease)     Gout     Hypertension     Left hip pain     Left knee pain     Osteoarthritis    [2]   Past Surgical History:  Procedure Laterality Date    CARDIAC ELECTROPHYSIOLOGY PROCEDURE Left 9/10/2024    Procedure: PPM Generator Change;  Surgeon: Efe Guerra MD;  Location: [place] [address] [facility]iac [facility];  Service: Electrophysiology;  Laterality: Left;  *IF blood cultures negative over the weekend. Possible temp wire. Dual chamber Jackman Sci    OTHER SURGICAL HISTORY Left 11/10/2022    Knee surgery, 2 times    OTHER SURGICAL HISTORY Right 11/10/2022    Hip surgery  total    OTHER SURGICAL HISTORY  11/10/2022    Heart surgery    OTHER SURGICAL HISTORY  11/10/2022    Pacemaker insertion   [3]   Family History  Problem Relation Name Age of Onset    No Known Problems Mother      No Known Problems Father      No Known Problems Other Child    [4]   Social History  Tobacco Use    Smoking status: Never    Smokeless tobacco: Never   Vaping Use    Vaping status: Never Used   Substance Use Topics    Alcohol use: Never    Drug use: Never        Patience Vargas PA-C  06/23/25 0004

## 2025-06-23 NOTE — DISCHARGE INSTRUCTIONS
Follow-up with your primary care physician, you can take immodium as needed to decrease the frequency of your diarrhea.  Return to the ED for any new or worsening symptoms including severe worsening abdominal pain, inability to tolerate any oral intake due to nausea and vomiting.  Otherwise, you should focus on trying to stay hydrated.  Try to take in as much fluid as you are losing via vomiting and diarrhea.

## 2025-06-24 LAB — BACTERIA UR CULT: NORMAL

## 2025-06-30 ENCOUNTER — OFFICE VISIT (OUTPATIENT)
Dept: GERIATRIC MEDICINE | Facility: CLINIC | Age: OVER 89
End: 2025-06-30
Payer: MEDICARE

## 2025-06-30 DIAGNOSIS — I50.32 CHRONIC HEART FAILURE WITH PRESERVED EJECTION FRACTION: ICD-10-CM

## 2025-06-30 DIAGNOSIS — R19.7 ACUTE DIARRHEA: Primary | ICD-10-CM

## 2025-06-30 DIAGNOSIS — J45.909 ASTHMA, UNSPECIFIED ASTHMA SEVERITY, UNSPECIFIED WHETHER COMPLICATED, UNSPECIFIED WHETHER PERSISTENT (HHS-HCC): ICD-10-CM

## 2025-06-30 DIAGNOSIS — D86.0 SARCOIDOSIS OF LUNG (MULTI): ICD-10-CM

## 2025-06-30 ASSESSMENT — PAIN SCALES - GENERAL: PAINLEVEL_OUTOF10: 4

## 2025-07-02 ENCOUNTER — TELEPHONE (OUTPATIENT)
Dept: GERIATRIC MEDICINE | Facility: CLINIC | Age: OVER 89
End: 2025-07-02
Payer: MEDICARE

## 2025-07-02 RX ORDER — FLUTICASONE FUROATE AND VILANTEROL 200; 25 UG/1; UG/1
1 POWDER RESPIRATORY (INHALATION) DAILY
Qty: 3 EACH | Refills: 3 | Status: SHIPPED | OUTPATIENT
Start: 2025-07-02

## 2025-07-02 NOTE — TELEPHONE ENCOUNTER
Spoke with the patient son and per the patient son they have not switched pharmacies and have remained with the [facility] pharmacy. Pharmacy verified with the patient son.

## 2025-07-03 VITALS
HEART RATE: 70 BPM | RESPIRATION RATE: 20 BRPM | TEMPERATURE: 97 F | SYSTOLIC BLOOD PRESSURE: 130 MMHG | DIASTOLIC BLOOD PRESSURE: 70 MMHG

## 2025-07-03 NOTE — PATIENT INSTRUCTIONS
Dear Mrs. Dumont,   it was a pleasure seeing you today.  I am so happy to hear that you are feeling much better.  I will send the prescription for your Breo inhaler.      I will follow-up with you in 1 month.  Sincerely,    Miladys Menon, MSN, APRN-BC  Office

## 2025-07-03 NOTE — PROGRESS NOTES
"Some elements may have been copied from prior note(s). The elements have been updated and reflect current evaluation, examination and decision making from today.               Reason for visit:  Follow up s/p ED visit for abdominal pain and management of chronic active illnesses.    Summary Statement: Mrs. 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      Reason for visit: Leaving her home requires a considerable and taxing amount of effort, and maximum resources due to inability to walk.     HPI:  Mrs. Dumont is being seen today at home for follow up .  On June 22, 2025, patient presented to the emergency room with a chief complaint of diarrhea which had persisted for a few days.  She developed abdominal pain.  Her workup was unremarkable.  States she felt wonderful after they gave her IV fluids and Zofran.  Since returning home she has had no more episodes, and she has not required any Zofran.  She does not associated with any new foods, no sick contacts, or new medications    States she has been doing well.  She is requesting a refill on her Breo inhaler.  No cough cold or flulike symptoms  No fevers or chills  No dysuria  No diarrhea or constipation  Denies dizziness or lightheadedness  Does drink plenty of " fluids  No blood in stool or urine  Appetite is very good        PHYSICAL EXAM  /70   Pulse 70   Temp 36.1 °C (97 °F) (Temporal)   Resp 20   LMP  (LMP Unknown) /POX=95% RA     General appearance: Alert, cooperative and in no acute distress. Scooting around independently in manual wheelchair   Head and Face Examination/ inspection of hair and scalp: Normal.   Eyes   Inspection of eyes: Sclera and conjunctiva were normal.    Pupil exam: PERRLA. Extraocular movements were intact.   Ears, Nose, Mouth, and Throat   Ears:   External: Normal.    Internal:  [place]  Oropharynx: Normal with moist mucus membranes, tongue midline, no PND, no erythema   or enlargement of tonsils.    Lips, teeth, and gums: Normal.   Neck   Neck Exam: Appearance of the neck was normal. No neck masses observed. No jugular vein distension.   Cardiovascular   Auscultation of heart: Abnormal.  The heart rate was normal. A grade 2 systolic murmur was heard at the LLSB.    Regular rate. +AICD left upper chest.  Area non-tender .   Pedal pulses: deferred  Examination of extremities for edema and/or varicosities: None (unchanged)    Pulmonary   Respiratory assessment: No respiratory distress, normal respiratory rhythm and effort.    Auscultation of Lungs: CTAB   Chest   Chest: Symmetrical and normal appearance.   Gastrointestinal  Abdominal Exam: No bruits -normal bowel sounds, soft, non-tender, no abdominal masses palpated.    Liver and Spleen exam: No hepato-splenomegaly. wearing a diaper.   Genitourinary   Bladder: Normal on palpation. wearing a diaper.   Lymphatic   Palpation of lymph nodes in axillae: Normal.     Palpation of lymph nodes in neck: No cervical lymphadenopathy.    Palpation of lymph nodes in other areas: Normal.    Musculoskeletal   Digits and nails: Abnormal.     Inspection/palpation of joints: No joint swelling seen.    Range of motion: Abnormal.     Muscle strength/tone: Normal.  decrease ROM to left leg/hip.   RUE.LUE 5/5 RLE  4/5  LLE 4/5  No CVA tenderness   Gait:deferred  Skin   Skin inspection: Skin dry , warm and intact. Normal skin color and pigmentation,   and normal skin turgor .  Neurologic   Cranial nerves: Nerves 2-12 were intact, no focal neuro defects, except for hearing   Psychiatric   Judgment and insight: Intact and appropriate.    Orientation: Oriented to person, place, and time.    Mood and affect: Normal.    Recent and remote memory: Normal.         LABS  1. Acute diarrhea (Primary)  -resolved    2. Sarcoidosis of lung / Asthma, unspecified asthma severity, unspecified whether complicated, unspecified whether persistent ([facility]  -stable   -continue Breo Elipta, and DuoNeb prn    3. Chronic heart failure with preserved ejection fraction  -stable   -no s/s of volume overload  -Continue, Metoprolol Tartrate, Torsemide, Atorvastatin and Entresto     Stable  Routine follow up in 1 month

## 2025-07-16 DIAGNOSIS — D50.9 IRON DEFICIENCY ANEMIA, UNSPECIFIED IRON DEFICIENCY ANEMIA TYPE: ICD-10-CM

## 2025-07-16 DIAGNOSIS — I50.30 HEART FAILURE WITH PRESERVED LEFT VENTRICULAR FUNCTION (HFPEF): ICD-10-CM

## 2025-07-16 DIAGNOSIS — J45.909 ASTHMA, UNSPECIFIED ASTHMA SEVERITY, UNSPECIFIED WHETHER COMPLICATED, UNSPECIFIED WHETHER PERSISTENT (HHS-HCC): ICD-10-CM

## 2025-07-16 RX ORDER — FERROUS SULFATE 325(65) MG
1 TABLET ORAL
Qty: 90 TABLET | Refills: 0 | Status: SHIPPED | OUTPATIENT
Start: 2025-07-16

## 2025-07-16 RX ORDER — IPRATROPIUM BROMIDE AND ALBUTEROL SULFATE 2.5; .5 MG/3ML; MG/3ML
SOLUTION RESPIRATORY (INHALATION)
Qty: 180 ML | Refills: 0 | Status: SHIPPED | OUTPATIENT
Start: 2025-07-16

## 2025-07-16 RX ORDER — METOPROLOL TARTRATE 25 MG/1
25 TABLET, FILM COATED ORAL 2 TIMES DAILY
Qty: 180 TABLET | Refills: 0 | Status: SHIPPED | OUTPATIENT
Start: 2025-07-16

## 2025-07-21 ENCOUNTER — HOSPITAL ENCOUNTER (OUTPATIENT)
Dept: CARDIOLOGY | Facility: CLINIC | Age: OVER 89
Discharge: HOME | End: 2025-07-21
Payer: COMMERCIAL

## 2025-07-21 DIAGNOSIS — Z95.0 PRESENCE OF CARDIAC PACEMAKER: ICD-10-CM

## 2025-07-21 DIAGNOSIS — I44.2 CHB (COMPLETE HEART BLOCK): ICD-10-CM

## 2025-07-21 PROCEDURE — 93294 REM INTERROG EVL PM/LDLS PM: CPT | Performed by: INTERNAL MEDICINE

## 2025-07-21 PROCEDURE — 93296 REM INTERROG EVL PM/IDS: CPT

## 2025-07-22 DIAGNOSIS — I44.2 CHB (COMPLETE HEART BLOCK): ICD-10-CM

## 2025-07-22 DIAGNOSIS — Z95.0 PRESENCE OF CARDIAC PACEMAKER: Primary | ICD-10-CM

## (undated) DEVICE — ENVELOPE, ANTIBACTERIAL, AIGIS RX TYRX, ABSORBABLE, LRG

## (undated) DEVICE — PAD, ELECTRODE DEFIB PADPRO ADULT STRL W/ADAPTER

## (undated) DEVICE — CABLE, SURGICAL, SM CLIP

## (undated) DEVICE — PHOTONBLADE, WITH ADAPTIVE SMOKE EVAC